# Patient Record
Sex: MALE | Race: BLACK OR AFRICAN AMERICAN | Employment: OTHER | URBAN - METROPOLITAN AREA
[De-identification: names, ages, dates, MRNs, and addresses within clinical notes are randomized per-mention and may not be internally consistent; named-entity substitution may affect disease eponyms.]

---

## 2018-09-18 ENCOUNTER — OP HISTORICAL/CONVERTED ENCOUNTER (OUTPATIENT)
Dept: OTHER | Age: 63
End: 2018-09-18

## 2019-01-30 ENCOUNTER — OP HISTORICAL/CONVERTED ENCOUNTER (OUTPATIENT)
Dept: OTHER | Age: 64
End: 2019-01-30

## 2019-11-06 ENCOUNTER — OP HISTORICAL/CONVERTED ENCOUNTER (OUTPATIENT)
Dept: OTHER | Age: 64
End: 2019-11-06

## 2019-11-08 ENCOUNTER — OP HISTORICAL/CONVERTED ENCOUNTER (OUTPATIENT)
Dept: OTHER | Age: 64
End: 2019-11-08

## 2021-01-05 ENCOUNTER — APPOINTMENT (OUTPATIENT)
Dept: CT IMAGING | Age: 66
DRG: 177 | End: 2021-01-05
Attending: EMERGENCY MEDICINE
Payer: MEDICARE

## 2021-01-05 ENCOUNTER — APPOINTMENT (OUTPATIENT)
Dept: GENERAL RADIOLOGY | Age: 66
DRG: 177 | End: 2021-01-05
Attending: EMERGENCY MEDICINE
Payer: MEDICARE

## 2021-01-05 ENCOUNTER — HOSPITAL ENCOUNTER (INPATIENT)
Age: 66
LOS: 8 days | Discharge: HOME | DRG: 177 | End: 2021-01-13
Attending: EMERGENCY MEDICINE | Admitting: INTERNAL MEDICINE
Payer: MEDICARE

## 2021-01-05 DIAGNOSIS — J96.01 ACUTE RESPIRATORY FAILURE WITH HYPOXIA (HCC): ICD-10-CM

## 2021-01-05 DIAGNOSIS — D64.9 ANEMIA, UNSPECIFIED TYPE: ICD-10-CM

## 2021-01-05 DIAGNOSIS — N17.9 AKI (ACUTE KIDNEY INJURY) (HCC): Primary | ICD-10-CM

## 2021-01-05 DIAGNOSIS — U07.1 COVID-19: ICD-10-CM

## 2021-01-05 DIAGNOSIS — E87.20 METABOLIC ACIDOSIS: ICD-10-CM

## 2021-01-05 PROBLEM — K92.2 GI BLEED: Status: ACTIVE | Noted: 2021-01-05

## 2021-01-05 LAB
ALBUMIN SERPL-MCNC: 2.6 G/DL (ref 3.5–5)
ALBUMIN/GLOB SERPL: 0.7 {RATIO} (ref 1.1–2.2)
ALP SERPL-CCNC: 61 U/L (ref 45–117)
ALT SERPL-CCNC: 22 U/L (ref 12–78)
ANION GAP SERPL CALC-SCNC: 9 MMOL/L (ref 5–15)
AST SERPL W P-5'-P-CCNC: 69 U/L (ref 15–37)
BASOPHILS # BLD: 0 K/UL (ref 0–0.1)
BASOPHILS NFR BLD: 0 % (ref 0–1)
BILIRUB SERPL-MCNC: 0.3 MG/DL (ref 0.2–1)
BUN SERPL-MCNC: 72 MG/DL (ref 6–20)
BUN/CREAT SERPL: 12 (ref 12–20)
CA-I BLD-MCNC: 7.9 MG/DL (ref 8.5–10.1)
CHLORIDE SERPL-SCNC: 119 MMOL/L (ref 97–108)
CO2 SERPL-SCNC: 15 MMOL/L (ref 21–32)
COVID-19 RAPID TEST, COVR: POSITIVE
CREAT SERPL-MCNC: 5.9 MG/DL (ref 0.7–1.3)
DIFFERENTIAL METHOD BLD: ABNORMAL
EOSINOPHIL # BLD: 0 K/UL (ref 0–0.4)
EOSINOPHIL NFR BLD: 0 % (ref 0–7)
ERYTHROCYTE [DISTWIDTH] IN BLOOD BY AUTOMATED COUNT: 16.5 % (ref 11.5–14.5)
GLOBULIN SER CALC-MCNC: 3.5 G/DL (ref 2–4)
GLUCOSE SERPL-MCNC: 105 MG/DL (ref 65–100)
HCT VFR BLD AUTO: 12 % (ref 36.6–50.3)
HEMOCCULT SP1 STL QL: POSITIVE
HGB BLD-MCNC: 3.5 G/DL (ref 12.1–17)
IMM GRANULOCYTES # BLD AUTO: 0 K/UL
IMM GRANULOCYTES NFR BLD AUTO: 0 %
LACTATE SERPL-SCNC: 1.3 MMOL/L (ref 0.4–2)
LYMPHOCYTES # BLD: 0.2 K/UL (ref 0.8–3.5)
LYMPHOCYTES NFR BLD: 4 % (ref 12–49)
MCH RBC QN AUTO: 23.8 PG (ref 26–34)
MCHC RBC AUTO-ENTMCNC: 29.2 G/DL (ref 30–36.5)
MCV RBC AUTO: 81.6 FL (ref 80–99)
MONOCYTES # BLD: 0.3 K/UL (ref 0–1)
MONOCYTES NFR BLD: 6 % (ref 5–13)
NEUTS BAND NFR BLD MANUAL: 2 % (ref 0–6)
NEUTS SEG # BLD: 5.1 K/UL (ref 1.8–8)
NEUTS SEG NFR BLD: 88 % (ref 32–75)
PLATELET # BLD AUTO: 243 K/UL (ref 150–400)
PMV BLD AUTO: 9.2 FL (ref 8.9–12.9)
POTASSIUM SERPL-SCNC: 6.2 MMOL/L (ref 3.5–5.1)
PROCALCITONIN SERPL-MCNC: 6.63 NG/ML
PROT SERPL-MCNC: 6.1 G/DL (ref 6.4–8.2)
RBC # BLD AUTO: 1.47 M/UL (ref 4.1–5.7)
RBC MORPH BLD: ABNORMAL
SARS-COV-2, COV2: NORMAL
SODIUM SERPL-SCNC: 143 MMOL/L (ref 136–145)
SPECIMEN SOURCE: ABNORMAL
WBC # BLD AUTO: 5.6 K/UL (ref 4.1–11.1)

## 2021-01-05 PROCEDURE — 30233N1 TRANSFUSION OF NONAUTOLOGOUS RED BLOOD CELLS INTO PERIPHERAL VEIN, PERCUTANEOUS APPROACH: ICD-10-PCS | Performed by: INTERNAL MEDICINE

## 2021-01-05 PROCEDURE — 80053 COMPREHEN METABOLIC PANEL: CPT

## 2021-01-05 PROCEDURE — 74011250636 HC RX REV CODE- 250/636: Performed by: EMERGENCY MEDICINE

## 2021-01-05 PROCEDURE — 83540 ASSAY OF IRON: CPT

## 2021-01-05 PROCEDURE — 36415 COLL VENOUS BLD VENIPUNCTURE: CPT

## 2021-01-05 PROCEDURE — 82272 OCCULT BLD FECES 1-3 TESTS: CPT

## 2021-01-05 PROCEDURE — 65610000006 HC RM INTENSIVE CARE

## 2021-01-05 PROCEDURE — P9016 RBC LEUKOCYTES REDUCED: HCPCS

## 2021-01-05 PROCEDURE — 96374 THER/PROPH/DIAG INJ IV PUSH: CPT

## 2021-01-05 PROCEDURE — 85025 COMPLETE CBC W/AUTO DIFF WBC: CPT

## 2021-01-05 PROCEDURE — 86901 BLOOD TYPING SEROLOGIC RH(D): CPT

## 2021-01-05 PROCEDURE — 83735 ASSAY OF MAGNESIUM: CPT

## 2021-01-05 PROCEDURE — 71045 X-RAY EXAM CHEST 1 VIEW: CPT

## 2021-01-05 PROCEDURE — 71250 CT THORAX DX C-: CPT

## 2021-01-05 PROCEDURE — 84145 PROCALCITONIN (PCT): CPT

## 2021-01-05 PROCEDURE — 83605 ASSAY OF LACTIC ACID: CPT

## 2021-01-05 PROCEDURE — 36430 TRANSFUSION BLD/BLD COMPNT: CPT

## 2021-01-05 PROCEDURE — 86923 COMPATIBILITY TEST ELECTRIC: CPT

## 2021-01-05 PROCEDURE — 82607 VITAMIN B-12: CPT

## 2021-01-05 PROCEDURE — 83880 ASSAY OF NATRIURETIC PEPTIDE: CPT

## 2021-01-05 PROCEDURE — 99285 EMERGENCY DEPT VISIT HI MDM: CPT

## 2021-01-05 PROCEDURE — 87040 BLOOD CULTURE FOR BACTERIA: CPT

## 2021-01-05 PROCEDURE — 87635 SARS-COV-2 COVID-19 AMP PRB: CPT

## 2021-01-05 RX ORDER — SODIUM CHLORIDE, SODIUM LACTATE, POTASSIUM CHLORIDE, CALCIUM CHLORIDE 600; 310; 30; 20 MG/100ML; MG/100ML; MG/100ML; MG/100ML
1000 INJECTION, SOLUTION INTRAVENOUS CONTINUOUS
Status: DISCONTINUED | OUTPATIENT
Start: 2021-01-05 | End: 2021-01-05

## 2021-01-05 RX ORDER — DEXAMETHASONE SODIUM PHOSPHATE 100 MG/10ML
10 INJECTION INTRAMUSCULAR; INTRAVENOUS
Status: COMPLETED | OUTPATIENT
Start: 2021-01-05 | End: 2021-01-05

## 2021-01-05 RX ORDER — ACETAMINOPHEN 325 MG/1
650 TABLET ORAL
Status: DISCONTINUED | OUTPATIENT
Start: 2021-01-05 | End: 2021-01-13 | Stop reason: HOSPADM

## 2021-01-05 RX ORDER — SODIUM CHLORIDE, SODIUM LACTATE, POTASSIUM CHLORIDE, CALCIUM CHLORIDE 600; 310; 30; 20 MG/100ML; MG/100ML; MG/100ML; MG/100ML
1000 INJECTION, SOLUTION INTRAVENOUS CONTINUOUS
Status: DISCONTINUED | OUTPATIENT
Start: 2021-01-05 | End: 2021-01-07

## 2021-01-05 RX ORDER — SODIUM CHLORIDE 9 MG/ML
250 INJECTION, SOLUTION INTRAVENOUS AS NEEDED
Status: DISCONTINUED | OUTPATIENT
Start: 2021-01-05 | End: 2021-01-13 | Stop reason: HOSPADM

## 2021-01-05 RX ADMIN — DEXAMETHASONE SODIUM PHOSPHATE 10 MG: 10 INJECTION INTRAMUSCULAR; INTRAVENOUS at 19:13

## 2021-01-05 RX ADMIN — SODIUM CHLORIDE, POTASSIUM CHLORIDE, SODIUM LACTATE AND CALCIUM CHLORIDE 1000 ML: 600; 310; 30; 20 INJECTION, SOLUTION INTRAVENOUS at 21:50

## 2021-01-05 NOTE — ED TRIAGE NOTES
Patient lives with sister who is covid positive , now having dizziness, nausea weakness and tachypnea .

## 2021-01-06 LAB
ALBUMIN SERPL-MCNC: 2.2 G/DL (ref 3.5–5)
ALBUMIN/GLOB SERPL: 0.7 {RATIO} (ref 1.1–2.2)
ALP SERPL-CCNC: 49 U/L (ref 45–117)
ALT SERPL-CCNC: 21 U/L (ref 12–78)
ANION GAP SERPL CALC-SCNC: 8 MMOL/L (ref 5–15)
AST SERPL W P-5'-P-CCNC: 61 U/L (ref 15–37)
BASOPHILS # BLD: 0 K/UL (ref 0–0.1)
BASOPHILS NFR BLD: 0 % (ref 0–1)
BILIRUB SERPL-MCNC: 0.3 MG/DL (ref 0.2–1)
BNP SERPL-MCNC: 5772 PG/ML
BUN SERPL-MCNC: 70 MG/DL (ref 6–20)
BUN/CREAT SERPL: 13 (ref 12–20)
CA-I BLD-MCNC: 7.6 MG/DL (ref 8.5–10.1)
CHLORIDE SERPL-SCNC: 120 MMOL/L (ref 97–108)
CO2 SERPL-SCNC: 17 MMOL/L (ref 21–32)
CREAT SERPL-MCNC: 5.47 MG/DL (ref 0.7–1.3)
CRP SERPL-MCNC: 2.2 MG/DL (ref 0–0.6)
DIFFERENTIAL METHOD BLD: ABNORMAL
EOSINOPHIL # BLD: 0 K/UL (ref 0–0.4)
EOSINOPHIL NFR BLD: 0 % (ref 0–7)
ERYTHROCYTE [DISTWIDTH] IN BLOOD BY AUTOMATED COUNT: 15.7 % (ref 11.5–14.5)
GLOBULIN SER CALC-MCNC: 3.2 G/DL (ref 2–4)
GLUCOSE SERPL-MCNC: 139 MG/DL (ref 65–100)
HCT VFR BLD AUTO: 17.2 % (ref 36.6–50.3)
HGB BLD-MCNC: 5.4 G/DL (ref 12.1–17)
IMM GRANULOCYTES # BLD AUTO: 0 K/UL
IMM GRANULOCYTES NFR BLD AUTO: 0 %
LDH SERPL L TO P-CCNC: 391 U/L (ref 85–241)
LYMPHOCYTES # BLD: 0.1 K/UL (ref 0.8–3.5)
LYMPHOCYTES NFR BLD: 4 % (ref 12–49)
MAGNESIUM SERPL-MCNC: 2.2 MG/DL (ref 1.6–2.4)
MCH RBC QN AUTO: 26.5 PG (ref 26–34)
MCHC RBC AUTO-ENTMCNC: 31.4 G/DL (ref 30–36.5)
MCV RBC AUTO: 84.3 FL (ref 80–99)
MONOCYTES # BLD: 0.1 K/UL (ref 0–1)
MONOCYTES NFR BLD: 4 % (ref 5–13)
NEUTS BAND NFR BLD MANUAL: 2 % (ref 0–6)
NEUTS SEG # BLD: 3.5 K/UL (ref 1.8–8)
NEUTS SEG NFR BLD: 90 % (ref 32–75)
PLATELET # BLD AUTO: 201 K/UL (ref 150–400)
PMV BLD AUTO: 9.1 FL (ref 8.9–12.9)
POTASSIUM SERPL-SCNC: 5.8 MMOL/L (ref 3.5–5.1)
PROCALCITONIN SERPL-MCNC: 5.19 NG/ML
PROT SERPL-MCNC: 5.4 G/DL (ref 6.4–8.2)
RBC # BLD AUTO: 2.04 M/UL (ref 4.1–5.7)
RBC MORPH BLD: ABNORMAL
RBC MORPH BLD: ABNORMAL
SODIUM SERPL-SCNC: 145 MMOL/L (ref 136–145)
WBC # BLD AUTO: 3.7 K/UL (ref 4.1–11.1)

## 2021-01-06 PROCEDURE — 74011000250 HC RX REV CODE- 250: Performed by: EMERGENCY MEDICINE

## 2021-01-06 PROCEDURE — 36430 TRANSFUSION BLD/BLD COMPNT: CPT

## 2021-01-06 PROCEDURE — 84145 PROCALCITONIN (PCT): CPT

## 2021-01-06 PROCEDURE — P9016 RBC LEUKOCYTES REDUCED: HCPCS

## 2021-01-06 PROCEDURE — 82728 ASSAY OF FERRITIN: CPT

## 2021-01-06 PROCEDURE — 74011000250 HC RX REV CODE- 250: Performed by: INTERNAL MEDICINE

## 2021-01-06 PROCEDURE — C9113 INJ PANTOPRAZOLE SODIUM, VIA: HCPCS | Performed by: INTERNAL MEDICINE

## 2021-01-06 PROCEDURE — 80053 COMPREHEN METABOLIC PANEL: CPT

## 2021-01-06 PROCEDURE — XW033E5 INTRODUCTION OF REMDESIVIR ANTI-INFECTIVE INTO PERIPHERAL VEIN, PERCUTANEOUS APPROACH, NEW TECHNOLOGY GROUP 5: ICD-10-PCS | Performed by: INTERNAL MEDICINE

## 2021-01-06 PROCEDURE — 65610000006 HC RM INTENSIVE CARE

## 2021-01-06 PROCEDURE — XW13325 TRANSFUSION OF CONVALESCENT PLASMA (NONAUTOLOGOUS) INTO PERIPHERAL VEIN, PERCUTANEOUS APPROACH, NEW TECHNOLOGY GROUP 5: ICD-10-PCS | Performed by: INTERNAL MEDICINE

## 2021-01-06 PROCEDURE — 86140 C-REACTIVE PROTEIN: CPT

## 2021-01-06 PROCEDURE — 74011250636 HC RX REV CODE- 250/636: Performed by: EMERGENCY MEDICINE

## 2021-01-06 PROCEDURE — 83615 LACTATE (LD) (LDH) ENZYME: CPT

## 2021-01-06 PROCEDURE — C9113 INJ PANTOPRAZOLE SODIUM, VIA: HCPCS | Performed by: EMERGENCY MEDICINE

## 2021-01-06 PROCEDURE — 30233N1 TRANSFUSION OF NONAUTOLOGOUS RED BLOOD CELLS INTO PERIPHERAL VEIN, PERCUTANEOUS APPROACH: ICD-10-PCS | Performed by: INTERNAL MEDICINE

## 2021-01-06 PROCEDURE — 74011250636 HC RX REV CODE- 250/636: Performed by: INTERNAL MEDICINE

## 2021-01-06 PROCEDURE — 99223 1ST HOSP IP/OBS HIGH 75: CPT | Performed by: INTERNAL MEDICINE

## 2021-01-06 RX ORDER — SODIUM CHLORIDE 9 MG/ML
250 INJECTION, SOLUTION INTRAVENOUS AS NEEDED
Status: DISCONTINUED | OUTPATIENT
Start: 2021-01-06 | End: 2021-01-13 | Stop reason: HOSPADM

## 2021-01-06 RX ORDER — DEXAMETHASONE SODIUM PHOSPHATE 4 MG/ML
4 INJECTION, SOLUTION INTRA-ARTICULAR; INTRALESIONAL; INTRAMUSCULAR; INTRAVENOUS; SOFT TISSUE EVERY 12 HOURS
Status: DISCONTINUED | OUTPATIENT
Start: 2021-01-06 | End: 2021-01-07

## 2021-01-06 RX ORDER — FUROSEMIDE 10 MG/ML
20 INJECTION INTRAMUSCULAR; INTRAVENOUS AS NEEDED
Status: COMPLETED | OUTPATIENT
Start: 2021-01-06 | End: 2021-01-06

## 2021-01-06 RX ADMIN — PANTOPRAZOLE SODIUM 40 MG: 40 INJECTION, POWDER, FOR SOLUTION INTRAVENOUS at 10:04

## 2021-01-06 RX ADMIN — FUROSEMIDE 20 MG: 10 INJECTION, SOLUTION INTRAMUSCULAR; INTRAVENOUS at 22:26

## 2021-01-06 RX ADMIN — AZITHROMYCIN DIHYDRATE 500 MG: 500 INJECTION, POWDER, LYOPHILIZED, FOR SOLUTION INTRAVENOUS at 01:37

## 2021-01-06 RX ADMIN — DEXAMETHASONE SODIUM PHOSPHATE 4 MG: 4 INJECTION, SOLUTION INTRAMUSCULAR; INTRAVENOUS at 10:04

## 2021-01-06 RX ADMIN — SODIUM BICARBONATE: 84 INJECTION, SOLUTION INTRAVENOUS at 14:11

## 2021-01-06 RX ADMIN — PANTOPRAZOLE SODIUM 40 MG: 40 INJECTION, POWDER, FOR SOLUTION INTRAVENOUS at 00:32

## 2021-01-06 RX ADMIN — FUROSEMIDE 20 MG: 10 INJECTION, SOLUTION INTRAMUSCULAR; INTRAVENOUS at 15:11

## 2021-01-06 RX ADMIN — DEXAMETHASONE SODIUM PHOSPHATE 4 MG: 4 INJECTION, SOLUTION INTRAMUSCULAR; INTRAVENOUS at 22:26

## 2021-01-06 RX ADMIN — FUROSEMIDE 20 MG: 10 INJECTION, SOLUTION INTRAMUSCULAR; INTRAVENOUS at 18:17

## 2021-01-06 RX ADMIN — DEXAMETHASONE SODIUM PHOSPHATE 4 MG: 4 INJECTION, SOLUTION INTRAMUSCULAR; INTRAVENOUS at 01:37

## 2021-01-06 RX ADMIN — SODIUM BICARBONATE: 84 INJECTION, SOLUTION INTRAVENOUS at 00:30

## 2021-01-06 RX ADMIN — CEFTRIAXONE SODIUM 1 G: 1 INJECTION, POWDER, FOR SOLUTION INTRAMUSCULAR; INTRAVENOUS at 01:37

## 2021-01-06 NOTE — H&P
History and Physical    NAME: Lanie Lundborg   :  1955   MRN:  [id number]     Date/Time:  2021 12:16 AM    Patient PCP: Praveen Ferrera MD  ______________________________________________________________________  Given the patient's current clinical presentation, I have a high level of concern for decompensation if discharged from the emergency department. Complex decision making was performed, which includes reviewing the patient's available past medical records, laboratory results, and x-ray films. My assessment of this patient's clinical condition and my plan of care is as follows. Assessment / Plan:  Severe anemia  COVID-19 needs oxygen  Acute on chronic renal failure  Hypertension        Code Status: Full code DVT Prophylaxis: Patient may have GI bleed so cannot get any anticoagulation plus he has edema on the legs or not a candidate for sequential pressure device  GI Prophylaxis: On IV Protonix  Baseline: Guarded        Problem List:     Active Hospital Problems    Diagnosis Date Noted    GI bleed 2021   Severe anemia  Acute on chronic renal failure  Hypertension  COVID-19 infection with hypoxemia on exertion    Subjective:   CHIEF COMPLAINT: Not feeling well since last 1 week history of shortness of breath about 1 week on exertion history of dizziness on change of position  HISTORY OF PRESENT ILLNESS:     Janelle Garrett is a 76 y.o.  male who presents with history of shortness of breath on exertion as well as dizziness on change of position. Denies any history of chest pain no history of loss of consciousnes diarrhea positive history of burning pain in abdomen as well as he has history of s no history of seizures no history of headache. No history of change in vision.   No history of nausea vomiting history of burning pain in epigastric region as well as history of black stool off and on  We were asked to admit for work up and evaluation of the above problems. Past Medical History:   Diagnosis Date    Chronic kidney disease     Hypertension         History reviewed. No pertinent surgical history. Social History     Tobacco Use    Smoking status: Never Smoker    Smokeless tobacco: Never Used   Substance Use Topics    Alcohol use: Not on file        History reviewed. No pertinent family history. No Known Allergies     Prior to Admission medications    Not on File       REVIEW OF SYSTEMS:     I am not able to complete the review of systems because:    The patient is intubated and sedated    The patient has altered mental status due to his acute medical problems    The patient has baseline aphasia from prior stroke(s)    The patient has baseline dementia and is not reliable historian    The patient is in acute medical distress and unable to provide information           Total of 12 systems reviewed as follows:       POSITIVE= underlined text  Negative = text not underlined  General:  fever, chills, sweats, generalized weakness, weight loss/gain,      loss of appetite   Eyes:    blurred vision, eye pain, loss of vision, double vision  ENT:    rhinorrhea, pharyngitis   Respiratory:   cough, sputum production, SOB, CARMONA, wheezing, pleuritic pain positive for shortness of breath  Cardiology:   chest pain, palpitations, orthopnea, PND, edema, syncope   Gastrointestinal:  abdominal pain , N/V, diarrhea, dysphagia, constipation, bleeding   Genitourinary:  frequency, urgency, dysuria, hematuria, incontinence   Muskuloskeletal :  arthralgia, myalgia, back pain  Hematology:  easy bruising, nose or gum bleeding, lymphadenopathy   Dermatological: rash, ulceration, pruritis, color change / jaundice  Endocrine:   hot flashes or polydipsia   Neurological:  headache, dizziness, confusion, focal weakness, paresthesia,     Speech difficulties, memory loss, gait difficulty positive for dizziness  Psychological: Feelings of anxiety, depression, agitation  IMMUNOLOGY: Records are not available  Objective:   VITALS:    Visit Vitals  /79 (BP 1 Location: Right arm, BP Patient Position: Head of bed elevated (Comment degrees))   Pulse 77   Temp 98.3 °F (36.8 °C)   Resp 21   Ht 6' (1.829 m)   Wt 79.4 kg (175 lb)   SpO2 100%   BMI 23.73 kg/m²       PHYSICAL EXAM:    General:    Alert, cooperative, no distress, appears stated age. HEENT: Atraumatic, anicteric sclerae, pink conjunctivae     No oral ulcers, mucosa moist, throat clear, dentition fair  Neck:  Supple, symmetrical,  thyroid: non tender  Lungs:   Clear to auscultation bilaterally. No Wheezing or Rhonchi. No rales. Chest wall:  No tenderness  No Accessory muscle use. Heart:   Regular  rhythm,  No  murmur   No edema  Abdomen:   Soft, non-tender. Not distended. Bowel sounds normal  Extremities: No cyanosis. No clubbing,      Skin turgor normal, Capillary refill normal, Radial dial pulse 2+ 2+ edema  Skin:     Not pale. Not Jaundiced  No rashes   Psych:  Good insight. Not depressed. Not anxious or agitated. Neurologic: EOMs intact. No facial asymmetry. No aphasia or slurred speech. Symmetrical strength, Sensation grossly intact.  Alert and oriented X 4.     _______________________________________________________________________  Care Plan discussed with:    Comments   Patient y    Family      RN y    Care Manager                    Consultant:      _______________________________________________________________________  Expected  Disposition:   Home with Family y   HH/PT/OT/RN    SNF/LTC    [place]    ________________________________________________________________________  TOTAL TIME: 47 Minutes    Critical Care Provided     Minutes non procedure based      Comments     Reviewed previous records   >50% of visit spent in counseling and coordination of care  Discussion with patient and/or family and questions answered       ________________________________________________________________________  Signed: Elbridge Shaker Bekah Kumar MD    Procedures: see electronic medical records for all procedures/Xrays and details which were not copied into this note but were reviewed prior to creation of Plan. LAB DATA REVIEWED:    Recent Results (from the past 24 hour(s))   METABOLIC PANEL, COMPREHENSIVE    Collection Time: 01/05/21  6:30 PM   Result Value Ref Range    Sodium 143 136 - 145 mmol/L    Potassium 6.2 (H) 3.5 - 5.1 mmol/L    Chloride 119 (H) 97 - 108 mmol/L    CO2 15 (LL) 21 - 32 mmol/L    Anion gap 9 5 - 15 mmol/L    Glucose 105 (H) 65 - 100 mg/dL    BUN 72 (H) 6 - 20 mg/dL    Creatinine 5.90 (H) 0.70 - 1.30 mg/dL    BUN/Creatinine ratio 12 12 - 20      GFR est AA 12 (L) >60 ml/min/1.73m2    GFR est non-AA 10 (L) >60 ml/min/1.73m2    Calcium 7.9 (L) 8.5 - 10.1 mg/dL    Bilirubin, total 0.3 0.2 - 1.0 mg/dL    AST (SGOT) 69 (H) 15 - 37 U/L    ALT (SGPT) 22 12 - 78 U/L    Alk. phosphatase 61 45 - 117 U/L    Protein, total 6.1 (L) 6.4 - 8.2 g/dL    Albumin 2.6 (L) 3.5 - 5.0 g/dL    Globulin 3.5 2.0 - 4.0 g/dL    A-G Ratio 0.7 (L) 1.1 - 2.2     LACTIC ACID    Collection Time: 01/05/21  6:30 PM   Result Value Ref Range    Lactic acid 1.3 0.4 - 2.0 mmol/L   PROCALCITONIN    Collection Time: 01/05/21  6:30 PM   Result Value Ref Range    Procalcitonin 6.63 (H) 0 ng/mL   CBC WITH AUTOMATED DIFF    Collection Time: 01/05/21  7:56 PM   Result Value Ref Range    WBC 5.6 4.1 - 11.1 K/uL    RBC 1.47 (L) 4.10 - 5.70 M/uL    HGB 3.5 (LL) 12.1 - 17.0 g/dL    HCT 12.0 (LL) 36.6 - 50.3 %    MCV 81.6 80.0 - 99.0 FL    MCH 23.8 (L) 26.0 - 34.0 PG    MCHC 29.2 (L) 30.0 - 36.5 g/dL    RDW 16.5 (H) 11.5 - 14.5 %    PLATELET 975 712 - 171 K/uL    MPV 9.2 8.9 - 12.9 FL    NEUTROPHILS 88 (H) 32 - 75 %    BAND NEUTROPHILS 2 0 - 6 %    LYMPHOCYTES 4 (L) 12 - 49 %    MONOCYTES 6 5 - 13 %    EOSINOPHILS 0 0 - 7 %    BASOPHILS 0 0 - 1 %    IMMATURE GRANULOCYTES 0 %    ABS. NEUTROPHILS 5.1 1.8 - 8.0 K/UL    ABS. LYMPHOCYTES 0.2 (L) 0.8 - 3.5 K/UL    ABS.  MONOCYTES 0.3 0.0 - 1.0 K/UL    ABS. EOSINOPHILS 0.0 0.0 - 0.4 K/UL    ABS. BASOPHILS 0.0 0.0 - 0.1 K/UL    ABS. IMM. GRANS. 0.0 K/UL    DF Manual      RBC COMMENTS Hypochromia  3+        RBC COMMENTS Microcytosis  2+        RBC COMMENTS Ovalocytes  2+       TYPE & SCREEN    Collection Time: 01/05/21  7:56 PM   Result Value Ref Range    Crossmatch Expiration 01/08/2021,2359     ABO/Rh(D) O Positive     Antibody screen Negative     Unit number [id number]     Blood component type [facility]     Unit division 00     Status of unit [address] to transfuse     Crossmatch result Compatible     Unit number [id number]     Blood component type  LR     Unit division 00     Status of unit [place] to transfuse     Crossmatch result Compatible    SARS-COV-2    Collection Time: 01/05/21  9:07 PM   Result Value Ref Range    SARS-CoV-2 Please find results under separate order     COVID-19 RAPID TEST    Collection Time: 01/05/21  9:07 PM   Result Value Ref Range    Specimen source Nasopharyngeal      COVID-19 rapid test Positive (A) Not Detected     OCCULT BLOOD, STOOL    Collection Time: 01/05/21  9:54 PM   Result Value Ref Range    Occult Blood,day 1 Positive (A) Negative         CT CHEST WO CONT   Final Result   IMPRESSION:   1. No pneumomediastinum or pneumothorax. 2. Cardiomegaly. 3. Subtle groundglass densities in both lungs might be pneumonia or other. XR CHEST PORT   Final Result   IMPRESSION: Subtle but potentially clinically significant findings as above. Suggest radiographic follow-up.        History and Physical

## 2021-01-06 NOTE — CONSULTS
Consult Date: 1/6/2021    Consults :  Covid-19    Subjective      This is a 76year old male with CKD and exposure to Covid-19, presenting with malaise, dizziness, weakness and nausea along with SOB. He was afebrile with initially normal WBC and lactic acid. Procalcitonin was markedly elevated. He tested positive for Covid-19. CXR showed patchy densities in the lower lungs and CT Chest showed subtle groundglass densities in both lungs, both images suggestive of pneumonia (reviewed by me). Patient has been started on Azithromycin, Ceftriaxone and Dexamethasone. ID has been consulted for this reason. Patient seen in the ED. He states that he was most concerned about progressive generalized weakness and malaise. SOB reportedly intermittent. No fever or chills. States that his legs were swollen two days ago but that has resolved while here. Past Medical History:   Diagnosis Date    Chronic kidney disease     Hypertension       History reviewed. No pertinent surgical history. History reviewed. No pertinent family history.    Social History     Tobacco Use    Smoking status: Never Smoker    Smokeless tobacco: Never Used   Substance Use Topics    Alcohol use: Not on file       Current Facility-Administered Medications   Medication Dose Route Frequency Provider Last Rate Last Admin    0.9% sodium chloride infusion 250 mL  250 mL IntraVENous PRN Sunshine Kearney MD        dexamethasone (DECADRON) 4 mg/mL injection 4 mg  4 mg IntraVENous Q12H Sunshine Kearney MD   4 mg at 01/06/21 1004    azithromycin (ZITHROMAX) 500 mg in 0.9% sodium chloride 250 mL (VIAL-MATE)  500 mg IntraVENous Q24H Sunshine Kearney MD   500 mg at 01/06/21 0137    cefTRIAXone (ROCEPHIN) 1 g in sterile water (preservative free) 10 mL IV syringe  1 g IntraVENous Q24H Sunshine Kearney MD   1 g at 01/06/21 0137    pantoprazole (PROTONIX) 40 mg in 0.9% sodium chloride 10 mL injection  40 mg IntraVENous DAILY Sunshine Kearney MD   40 mg at Maria Del Rosario Suárez is a 43 y.o. male.     43-year-old male presents today to follow-up on Nausea and vomiting.  At last visit patient was due for sleep study.  This was performed and patient was found to have obstructive sleep apnea.  Orders were placed to start CPAP machine.  At last visit also discussed that he is continuing workup with gastroenterology.  Next step was a gastric emptying study.  Of note cardiology workup has been negative.  Gastric emptying sending has not been done yet.  Patient said this was attempted however he had a bad reaction to the eggs he had to eat and was advised to reschedule however for study to be done with beef stew instead.  Patient has not heard back about rescheduling it.    He has not gotten his auto CPAP at either.  Continues to have nausea and vomiting.  GI workup is not complete.         PHQ-2/PHQ-9 Depression Screening 10/31/2017   Little interest or pleasure in doing things 0   Feeling down, depressed, or hopeless 0   Total Score 0       The following portions of the patient's history were reviewed and updated as appropriate: allergies, current medications, past family history, past medical history, past social history, past surgical history and problem list.    Review of Systems   Constitutional: Positive for fatigue. Negative for chills and fever.   HENT: Negative for congestion.    Respiratory: Positive for apnea. Negative for shortness of breath.    Gastrointestinal: Positive for nausea and vomiting.   Genitourinary: Negative for nocturia.   Psychiatric/Behavioral: Positive for sleep disturbance. Negative for depressed mood and stress. The patient is not nervous/anxious.        Objective   Physical Exam   Constitutional: He is oriented to person, place, and time. He appears well-developed and well-nourished.   HENT:   Head: Normocephalic and atraumatic.   Eyes: EOM are normal. Pupils are equal, round, and reactive to light.   Neck: Normal range of motion. Neck  01/06/21 1004    furosemide (LASIX) injection 20 mg  20 mg IntraVENous PRN Neymar Gallo MD        lactated Ringers infusion 1,000 mL  1,000 mL IntraVENous CONTINUOUS Isis Nicole MD   Stopped at 01/06/21 1229    0.9% sodium chloride infusion 250 mL  250 mL IntraVENous PRN Isis Nicole MD        dextrose 5% 1,000 mL with sodium bicarbonate (8.4%) 150 mEq infusion   IntraVENous CONTINUOUS Isis Nicole  mL/hr at 01/06/21 1411 New Bag at 01/06/21 1411    acetaminophen (TYLENOL) tablet 650 mg  650 mg Oral Q6H PRN Neymar Gallo MD            Review of Systems   Constitutional: Positive for activity change and fatigue. Negative for appetite change, chills, diaphoresis, fever and unexpected weight change. HENT: Negative. Eyes: Negative. Respiratory: Positive for shortness of breath. Negative for cough and wheezing. Cardiovascular: Positive for leg swelling ( ). Negative for chest pain. Gastrointestinal: Negative. Endocrine: Negative. Genitourinary: Negative. Musculoskeletal: Negative. Skin: Negative. Allergic/Immunologic: Negative. Neurological: Negative. Hematological: Negative. Psychiatric/Behavioral: Negative.         Objective     Vital signs for last 24 hours:  Visit Vitals  /75   Pulse (!) 58   Temp 98.1 °F (36.7 °C)   Resp 18   Ht 6' (1.829 m)   Wt 175 lb (79.4 kg)   SpO2 97%   BMI 23.73 kg/m²       Intake/Output this shift:  Current Shift: 01/06 0701 - 01/06 1900  In: 1000 [I.V.:1000]  Out: -   Last 3 Shifts: 01/04 1901 - 01/06 0700  In: 2000   Out: -     Data Review:   Recent Results (from the past 24 hour(s))   METABOLIC PANEL, COMPREHENSIVE    Collection Time: 01/05/21  6:30 PM   Result Value Ref Range    Sodium 143 136 - 145 mmol/L    Potassium 6.2 (H) 3.5 - 5.1 mmol/L    Chloride 119 (H) 97 - 108 mmol/L    CO2 15 (LL) 21 - 32 mmol/L    Anion gap 9 5 - 15 mmol/L    Glucose 105 (H) 65 - 100 mg/dL    BUN 72 (H) 6 - 20 mg/dL    Creatinine supple.   Cardiovascular: Normal rate, regular rhythm and normal heart sounds.   No murmur heard.  Pulmonary/Chest: Effort normal and breath sounds normal. No stridor. No respiratory distress. He has no wheezes. He has no rales.   Neurological: He is alert and oriented to person, place, and time.   Skin: Skin is warm.   Psychiatric: He has a normal mood and affect. His behavior is normal.         No results found for: COLORU, CLARITYU, SPECGRAV, PHUR, LEUKOCYTESUR, NITRITE, PROTEINPOCUA, GLUCOSEUR, KETONESU, UROBILINOGEN, BILIRUBINUR, RBCUR      Assessment/Plan     Jamie was seen today for nausea and vomiting intractability of vomiting not specified.    Diagnoses and all orders for this visit:    Nausea and vomiting, intractability of vomiting not specified, unspecified vomiting type    Restless legs  -     Ferritin    SANA (obstructive sleep apnea)        Extend FMLA 6 weeks.  Check ferritin as patient was told he was moving his legs around a lot at night at the night of sleep study.  Advised patient that restless legs can go away when sleep apnea is treated.  Follow-up in getting gastric emptying study done.  Return to clinic in 6 weeks for follow-up.        5.90 (H) 0.70 - 1.30 mg/dL    BUN/Creatinine ratio 12 12 - 20      GFR est AA 12 (L) >60 ml/min/1.73m2    GFR est non-AA 10 (L) >60 ml/min/1.73m2    Calcium 7.9 (L) 8.5 - 10.1 mg/dL    Bilirubin, total 0.3 0.2 - 1.0 mg/dL    AST (SGOT) 69 (H) 15 - 37 U/L    ALT (SGPT) 22 12 - 78 U/L    Alk. phosphatase 61 45 - 117 U/L    Protein, total 6.1 (L) 6.4 - 8.2 g/dL    Albumin 2.6 (L) 3.5 - 5.0 g/dL    Globulin 3.5 2.0 - 4.0 g/dL    A-G Ratio 0.7 (L) 1.1 - 2.2     LACTIC ACID    Collection Time: 01/05/21  6:30 PM   Result Value Ref Range    Lactic acid 1.3 0.4 - 2.0 mmol/L   PROCALCITONIN    Collection Time: 01/05/21  6:30 PM   Result Value Ref Range    Procalcitonin 6.63 (H) 0 ng/mL   BNP    Collection Time: 01/05/21  6:30 PM   Result Value Ref Range    NT pro-BNP 5,772 (H) <125 pg/mL   MAGNESIUM    Collection Time: 01/05/21  6:30 PM   Result Value Ref Range    Magnesium 2.2 1.6 - 2.4 mg/dL   CBC WITH AUTOMATED DIFF    Collection Time: 01/05/21  7:56 PM   Result Value Ref Range    WBC 5.6 4.1 - 11.1 K/uL    RBC 1.47 (L) 4.10 - 5.70 M/uL    HGB 3.5 (LL) 12.1 - 17.0 g/dL    HCT 12.0 (LL) 36.6 - 50.3 %    MCV 81.6 80.0 - 99.0 FL    MCH 23.8 (L) 26.0 - 34.0 PG    MCHC 29.2 (L) 30.0 - 36.5 g/dL    RDW 16.5 (H) 11.5 - 14.5 %    PLATELET 924 410 - 086 K/uL    MPV 9.2 8.9 - 12.9 FL    NEUTROPHILS 88 (H) 32 - 75 %    BAND NEUTROPHILS 2 0 - 6 %    LYMPHOCYTES 4 (L) 12 - 49 %    MONOCYTES 6 5 - 13 %    EOSINOPHILS 0 0 - 7 %    BASOPHILS 0 0 - 1 %    IMMATURE GRANULOCYTES 0 %    ABS. NEUTROPHILS 5.1 1.8 - 8.0 K/UL    ABS. LYMPHOCYTES 0.2 (L) 0.8 - 3.5 K/UL    ABS. MONOCYTES 0.3 0.0 - 1.0 K/UL    ABS. EOSINOPHILS 0.0 0.0 - 0.4 K/UL    ABS. BASOPHILS 0.0 0.0 - 0.1 K/UL    ABS. IMM.  GRANS. 0.0 K/UL    DF Manual      RBC COMMENTS Hypochromia  3+        RBC COMMENTS Microcytosis  2+        RBC COMMENTS Ovalocytes  2+       TYPE & SCREEN    Collection Time: 01/05/21  7:56 PM   Result Value Ref Range    Crossmatch Expiration 01/08/2021,1326 ABO/Rh(D) O Positive     Antibody screen Negative     Unit number [id number]     Blood component type  LR     Unit division 00     Status of unit Issued,final     TRANSFUSION STATUS Ok to transfuse     Crossmatch result Compatible     Unit number [id number]     Blood component type RC LR     Unit division 00     Status of unit [address] to transfuse     Crossmatch result Compatible     Unit number [id number]     Blood component type RC LR     Unit division 00     Status of unit [address] to transfuse     Crossmatch result Compatible     Unit number [id number]     Blood component type RC LR     Unit division 00     Status of unit [address] to transfuse     Crossmatch result Compatible     Unit number [id number]     Blood component type  LR     Unit division 00     Status of unit [address] to transfuse     Crossmatch result Compatible    SARS-COV-2    Collection Time: 01/05/21  9:07 PM   Result Value Ref Range    SARS-CoV-2 Please find results under separate order     COVID-19 RAPID TEST    Collection Time: 01/05/21  9:07 PM   Result Value Ref Range    Specimen source Nasopharyngeal      COVID-19 rapid test Positive (A) Not Detected     OCCULT BLOOD, STOOL    Collection Time: 01/05/21  9:54 PM   Result Value Ref Range    Occult Blood,day 1 Positive (A) Negative     CBC WITH AUTOMATED DIFF    Collection Time: 01/06/21  9:57 AM   Result Value Ref Range    WBC 3.7 (L) 4.1 - 11.1 K/uL    RBC 2.04 (L) 4.10 - 5.70 M/uL    HGB 5.4 (LL) 12.1 - 17.0 g/dL    HCT 17.2 (LL) 36.6 - 50.3 %    MCV 84.3 80.0 - 99.0 FL    MCH 26.5 26.0 - 34.0 PG    MCHC 31.4 30.0 - 36.5 g/dL    RDW 15.7 (H) 11.5 - 14.5 %    PLATELET 890 279 - 715 K/uL    MPV 9.1 8.9 - 12.9 FL    NEUTROPHILS PENDING %    LYMPHOCYTES PENDING %    MONOCYTES PENDING %    EOSINOPHILS PENDING %    BASOPHILS PENDING %    IMMATURE GRANULOCYTES PENDING % ABS. NEUTROPHILS PENDING K/UL    ABS. LYMPHOCYTES PENDING K/UL    ABS. MONOCYTES PENDING K/UL    ABS. EOSINOPHILS PENDING K/UL    ABS. BASOPHILS PENDING K/UL    ABS. IMM. GRANS. PENDING K/UL    DF PENDING    METABOLIC PANEL, COMPREHENSIVE    Collection Time: 01/06/21  9:57 AM   Result Value Ref Range    Sodium 145 136 - 145 mmol/L    Potassium 5.8 (H) 3.5 - 5.1 mmol/L    Chloride 120 (H) 97 - 108 mmol/L    CO2 17 (L) 21 - 32 mmol/L    Anion gap 8 5 - 15 mmol/L    Glucose 139 (H) 65 - 100 mg/dL    BUN 70 (H) 6 - 20 mg/dL    Creatinine 5.47 (H) 0.70 - 1.30 mg/dL    BUN/Creatinine ratio 13 12 - 20      GFR est AA 13 (L) >60 ml/min/1.73m2    GFR est non-AA 11 (L) >60 ml/min/1.73m2    Calcium 7.6 (L) 8.5 - 10.1 mg/dL    Bilirubin, total 0.3 0.2 - 1.0 mg/dL    AST (SGOT) 61 (H) 15 - 37 U/L    ALT (SGPT) 21 12 - 78 U/L    Alk. phosphatase 49 45 - 117 U/L    Protein, total 5.4 (L) 6.4 - 8.2 g/dL    Albumin 2.2 (L) 3.5 - 5.0 g/dL    Globulin 3.2 2.0 - 4.0 g/dL    A-G Ratio 0.7 (L) 1.1 - 2.2         Physical Exam  Constitutional:       General: He is not in acute distress. Appearance: Normal appearance. He is ill-appearing. He is not toxic-appearing. HENT:      Head: Normocephalic and atraumatic. Nose: Nose normal.      Comments: Nasal O2 cannula displaced but still with O2Sat 99%, set on 7 L/min     Mouth/Throat:      Pharynx: Oropharynx is clear. Eyes:      Pupils: Pupils are equal, round, and reactive to light. Neck:      Musculoskeletal: Neck supple. Cardiovascular:      Rate and Rhythm: Normal rate and regular rhythm. Heart sounds: No murmur. Pulmonary:      Breath sounds: Rhonchi present. No wheezing or rales. Abdominal:      Palpations: Abdomen is soft. Tenderness: There is no abdominal tenderness. There is no guarding. Genitourinary:     Penis: Normal.       Testes: Normal.      Comments: No Quesada  Musculoskeletal:      Right lower leg: No edema. Left lower leg: No edema. Comments: Not pitting edema but brawn changes noted   Skin:     Findings: Rash present. Neurological:      General: No focal deficit present. Mental Status: He is alert and oriented to person, place, and time. Psychiatric:         Mood and Affect: Mood normal.         Behavior: Behavior normal.         Thought Content: Thought content normal.         Judgment: Judgment normal.       ASSESSMENT/PLAN    1. Covid-19 infection with possible pneumonia  2. Possible sepsis with elevated procalcitonin  3. Severe anemia with positive occult stool  4. CKD with GFR <30    Comment:  I am inclined to hold off on Remdesivir because of his CKD and risk of accumulation of cyclodextrin. He reports SOB but CXR and CT findings are unimpressive. He is on nasal O2, however, when nasal cannula was off he was still had O2Sat 97%. Concerned about bacterial infection given his elevated procalcitonin. 1. Continue Azithromycin, Ceftriaxone and Decadron  2. Hold off on Remdesivir since he appears to be oxygenating well on room air and because of his CKD he is at risk for cyclodextrin accumulation. 3. Hold off on Actemra  4. Would favor convalescent plasma  5. Send urinalysis and urine culture  6. In am, repeat CBC, check procal, CRP, LDH and ferritin  7. Monitor oximetry    Hardy Rowell MD

## 2021-01-06 NOTE — ED PROVIDER NOTES
EMERGENCY DEPARTMENT HISTORY AND PHYSICAL EXAM      Date: 1/5/2021  Patient Name: Malik Saavedra    History of Presenting Illness     Chief Complaint   Patient presents with    Dizziness    Dehydration       History Provided By: Patient    HPI: Malik Saavedra, 76 y.o. male with PMHx as noted below presents the emergency department as noted below presents the emergency department with complaints of malaise and dyspnea. History is limited as patient is very poor historian. Reports he has been feeling unwell now for the last several weeks. Patient notes that he has been feeling lightheaded and becoming very dyspneic with minimal exertion. He otherwise is denying any pain at this time. No other relieving or exacerbating factors. Denying any syncope, chest pain, fevers, chills, abdominal pain, melena. PCP: Hyun Bridges MD    Current Facility-Administered Medications   Medication Dose Route Frequency Provider Last Rate Last Admin    lactated Ringers infusion 1,000 mL  1,000 mL IntraVENous CONTINUOUS Cristina Richmond MD   1,000 mL at 01/05/21 2150    0.9% sodium chloride infusion 250 mL  250 mL IntraVENous PRN Cristina Richmond MD        dextrose 5% 1,000 mL with sodium bicarbonate (8.4%) 150 mEq infusion   IntraVENous CONTINUOUS Cristina Richmond MD        pantoprazole (PROTONIX) 40 mg in 0.9% sodium chloride 10 mL injection  40 mg IntraVENous NOW Cristina Richmond MD        acetaminophen (TYLENOL) tablet 650 mg  650 mg Oral Q6H PRN Lorenso Favre, MD           Past History     Past Medical History:  Past Medical History:   Diagnosis Date    Chronic kidney disease     Hypertension        Past Surgical History:  History reviewed. No pertinent surgical history. Family History:  History reviewed. No pertinent family history.     Social History:  Social History     Tobacco Use    Smoking status: Never Smoker    Smokeless tobacco: Never Used   Substance Use Topics    Alcohol use: Not on file    Drug use: Not on file       Allergies:  No Known Allergies      Review of Systems   Review of Systems  Constitutional: Negative for fever, chills. Positive fatigue. HENT: Negative for congestion, sore throat, rhinorrhea, sneezing and neck stiffness   Eyes: Negative for discharge and redness. Respiratory: Positive for  shortness of breath. Negative wheezing   Cardiovascular: Negative for chest pain, palpitations   Gastrointestinal: Negative for nausea, vomiting, abdominal pain, constipation, diarrhea and blood in stool. Genitourinary: Negative for dysuria, urgency, frequency, hematuria, flank pain, decreased urine volume, discharge,   Musculoskeletal: Negative for myalgias or joint pain . Skin: Negative for rash or lesions . Neurological: Negative weakness, light-headedness, numbness and headaches. Physical Exam   Physical Exam    GENERAL: alert and oriented, no acute distress  EYES: PEERL, No injection, discharge or icterus. ENT: Mucous membranes dry  NECK: Supple  LUNGS: Airway patent. Labored respirations breath sounds clear with good air entry bilaterally. HEART: Regular rate and rhythm. ABDOMEN: Non-distended and non-tender, without guarding or rebound.   SKIN:  warm, dry, pale  EXTREMITIES:  symmetric with normal ROM  NEUROLOGICAL: Alert, oriented      Diagnostic Study Results     Labs -     Recent Results (from the past 12 hour(s))   METABOLIC PANEL, COMPREHENSIVE    Collection Time: 01/05/21  6:30 PM   Result Value Ref Range    Sodium 143 136 - 145 mmol/L    Potassium 6.2 (H) 3.5 - 5.1 mmol/L    Chloride 119 (H) 97 - 108 mmol/L    CO2 15 (LL) 21 - 32 mmol/L    Anion gap 9 5 - 15 mmol/L    Glucose 105 (H) 65 - 100 mg/dL    BUN 72 (H) 6 - 20 mg/dL    Creatinine 5.90 (H) 0.70 - 1.30 mg/dL    BUN/Creatinine ratio 12 12 - 20      GFR est AA 12 (L) >60 ml/min/1.73m2    GFR est non-AA 10 (L) >60 ml/min/1.73m2    Calcium 7.9 (L) 8.5 - 10.1 mg/dL    Bilirubin, total 0.3 0.2 - 1.0 mg/dL    AST (SGOT) 69 (H) 15 - 37 U/L    ALT (SGPT) 22 12 - 78 U/L    Alk. phosphatase 61 45 - 117 U/L    Protein, total 6.1 (L) 6.4 - 8.2 g/dL    Albumin 2.6 (L) 3.5 - 5.0 g/dL    Globulin 3.5 2.0 - 4.0 g/dL    A-G Ratio 0.7 (L) 1.1 - 2.2     LACTIC ACID    Collection Time: 01/05/21  6:30 PM   Result Value Ref Range    Lactic acid 1.3 0.4 - 2.0 mmol/L   PROCALCITONIN    Collection Time: 01/05/21  6:30 PM   Result Value Ref Range    Procalcitonin 6.63 (H) 0 ng/mL   CBC WITH AUTOMATED DIFF    Collection Time: 01/05/21  7:56 PM   Result Value Ref Range    WBC 5.6 4.1 - 11.1 K/uL    RBC 1.47 (L) 4.10 - 5.70 M/uL    HGB 3.5 (LL) 12.1 - 17.0 g/dL    HCT 12.0 (LL) 36.6 - 50.3 %    MCV 81.6 80.0 - 99.0 FL    MCH 23.8 (L) 26.0 - 34.0 PG    MCHC 29.2 (L) 30.0 - 36.5 g/dL    RDW 16.5 (H) 11.5 - 14.5 %    PLATELET 435 968 - 868 K/uL    MPV 9.2 8.9 - 12.9 FL    NEUTROPHILS 88 (H) 32 - 75 %    BAND NEUTROPHILS 2 0 - 6 %    LYMPHOCYTES 4 (L) 12 - 49 %    MONOCYTES 6 5 - 13 %    EOSINOPHILS 0 0 - 7 %    BASOPHILS 0 0 - 1 %    IMMATURE GRANULOCYTES 0 %    ABS. NEUTROPHILS 5.1 1.8 - 8.0 K/UL    ABS. LYMPHOCYTES 0.2 (L) 0.8 - 3.5 K/UL    ABS. MONOCYTES 0.3 0.0 - 1.0 K/UL    ABS. EOSINOPHILS 0.0 0.0 - 0.4 K/UL    ABS. BASOPHILS 0.0 0.0 - 0.1 K/UL    ABS. IMM.  GRANS. 0.0 K/UL    DF Manual      RBC COMMENTS Hypochromia  3+        RBC COMMENTS Microcytosis  2+        RBC COMMENTS Ovalocytes  2+       TYPE & SCREEN    Collection Time: 01/05/21  7:56 PM   Result Value Ref Range    Crossmatch Expiration 01/08/2021,2359     ABO/Rh(D) O Positive     Antibody screen Negative     Unit number [id number]     Blood component type [facility]     Unit division 00     Status of unit [address] to transfuse     Crossmatch result Compatible     Unit number [id number]     Blood component type [facility]     Unit division 00     Status of unit [place] to transfuse     Crossmatch result Compatible SARS-COV-2    Collection Time: 01/05/21  9:07 PM   Result Value Ref Range    SARS-CoV-2 Please find results under separate order     COVID-19 RAPID TEST    Collection Time: 01/05/21  9:07 PM   Result Value Ref Range    Specimen source Nasopharyngeal      COVID-19 rapid test Positive (A) Not Detected     OCCULT BLOOD, STOOL    Collection Time: 01/05/21  9:54 PM   Result Value Ref Range    Occult Blood,day 1 Positive (A) Negative         Radiologic Studies -   CT CHEST WO CONT   Final Result   IMPRESSION:   1. No pneumomediastinum or pneumothorax. 2. Cardiomegaly. 3. Subtle groundglass densities in both lungs might be pneumonia or other. XR CHEST PORT   Final Result   IMPRESSION: Subtle but potentially clinically significant findings as above. Suggest radiographic follow-up. CT Results  (Last 48 hours)               01/05/21 1936  CT CHEST WO CONT Final result    Impression:  IMPRESSION:   1. No pneumomediastinum or pneumothorax. 2. Cardiomegaly. 3. Subtle groundglass densities in both lungs might be pneumonia or other. Narrative:  Images obtained without contrast include the abdomen and pelvis. Comparison portable chest radiograph earlier today. Dose Reduction:    All CT scans at this facility are performed using dose reduction optimization   techniques as appropriate to a performed exam including the following: Automated   exposure control, adjustments of the mA and/or kV according to patient size, or   use of iterative reconstruction technique. Subtle peripheral groundglass densities are noted in both lungs, could reflect   Covid pneumonia or other. No pneumothorax or pneumomediastinum. The radiographic findings suspicious for   pneumomediastinum probably was artifact, perhaps related to cardiac motion. No pleural effusion or adenopathy. Cardiomegaly again noted.                CXR Results  (Last 48 hours)               01/05/21 1817  XR CHEST PORT Final result    Impression:  IMPRESSION: Subtle but potentially clinically significant findings as above. Suggest radiographic follow-up. Narrative:  Portable AP view at 1816 hours. Comparison 23 November 2008. Cardiac silhouette size is borderline enlarged. Arcuate lucency adjacent to the left side of the aortic knob margin could   reflect a tiny pneumomediastinum. Mild pulmonary vascular congestion, without overt edema. Patchy densities in the lower lungs could be minimal infiltrate. Consider   follow-up if warranted clinically. Medical Decision Making     I, Shari Luna MD am the first provider for this patient and am the attending of record for this patient encounter. I reviewed the vital signs, available nursing notes, past medical history, past surgical history, family history and social history. Vital Signs-Reviewed the patient's vital signs. Patient Vitals for the past 12 hrs:   Temp Pulse Resp BP SpO2   01/05/21 2320 98.3 °F (36.8 °C) 77 21 134/82 100 %   01/05/21 2152 -- 83 18 (!) 143/75 100 %   01/05/21 2115 -- -- -- -- 99 %   01/05/21 1915 98.6 °F (37 °C) 78 16 133/69 97 %       Records Reviewed: Nursing Notes and Old Medical Records    Provider Notes (Medical Decision Making):   Six 9year-old male, poor historian presenting feeling unwell for several months symptoms acutely worseneed over the last few days. On presentation patient was noted to be mildly hypoxic so was placed on supplemental oxygen. Differential was broad and included pneumonia, [id number], anemia, metabolic abnormalities, electrolyte abnormalities, ACS, pneumothorax. Work-up notable for likely acute on chronic kidney disease although do not have patient's baseline as well as hyperkalemia. No EKG changes warranting acute lowering and do suspect that his potassium will improve with fluids and blood. Patient was profoundly anemic with a hemoglobin of 3.5 and did have Hemoccult positive stool. Patient will be transfused 2 units of packed red blood cells and started on Protonix. We will plan for GI and nephrology consults. Patient is remained hemodynamically stable. We will plan to admit for further management. ED Course:     Initial assessment performed. The patients presenting problems have been discussed, and they are in agreement with the care plan formulated and outlined with them. I have encouraged them to ask questions as they arise throughout their visit. Medications   lactated Ringers infusion 1,000 mL (1,000 mL IntraVENous New Bag 1/5/21 2150)   0.9% sodium chloride infusion 250 mL (has no administration in time range)   dextrose 5% 1,000 mL with sodium bicarbonate (8.4%) 150 mEq infusion (has no administration in time range)   pantoprazole (PROTONIX) 40 mg in 0.9% sodium chloride 10 mL injection (has no administration in time range)   acetaminophen (TYLENOL) tablet 650 mg (has no administration in time range)   dexamethasone (DECADRON) 10 mg/mL injection 10 mg (10 mg IntraVENous Given 1/5/21 1913)         PROGRESS:  The patient has been re-evaluated and sx have improved. Reviewed available results with patient and have counseled them on diagnosis and care plan. They have expressed understanding, and all their questions have been answered. They agree with plan for admission. CONSULT:  Parker Leyva MD spoke with Dr. Ismael Aase  Discussed HPI and PE, available diagnostic tests and clinical findings. He is in agreement with care plans as outlined and will evaluate for admission     Admit Note  Patient is being admitted to  Dr. Irving Myesr. The results of their tests and reasons for their admission have been discussed with them and/or available family. They convey agreement and understanding for the need to be admitted and for their admission diagnosis. Consultation has been made with the inpatient physician specialist for hospitalization.     Critical Care Note  9:50 PM    IMPENDING DETERIORATION -Respiratory, Cardiovascular, CNS and Renal  ASSOCIATED RISK FACTORS - Bleeding and Metabolic changes  MANAGEMENT- Bedside Assessment and Supervision of Care  INTERPRETATION -  Xrays, ECG and Blood Pressure  INTERVENTIONS - Metobolic interventions  CASE REVIEW - Hospitalist  TREATMENT RESPONSE -Improved and Stable  PERFORMED BY - Self    NOTES   :  I have provided a total of 45 minutes of critical time not including time spent on separately documented procedures. The reason for providing this level of medical care for this critically ill patient was due to a critical illness that impaired one or more vital organ systems such that there was a high probability of imminent or life threatening deterioration in the patients condition. This care involved high complexity decision making to assess, manipulate, and support vital system functions,  lab review, consultations with specialist, family decision- making, bedside attention and documentation. During this entire length of time I was immediately available to the patient    Disposition:  admission    PLAN:  1. Admit     Diagnosis     Clinical Impression:   1. ESME (acute kidney injury) ([facility], [address]    2. Anemia, unspecified type    3. Metabolic acidosis    4. Acute respiratory failure with hypoxia ([address]    5. COVID-19        Please note that this dictation was completed with Dragon, computer voice recognition software. Quite often unanticipated grammatical, syntax, homophones, and other interpretive errors are inadvertently transcribed by the computer software. Please disregard these errors. Additionally, please excuse any errors that have escaped final proofreading.

## 2021-01-06 NOTE — CONSULTS
Pulmonary/ CC Consult    Subjective:   Date of Consultation:  2021  Referring Physician: Chris Marrero MD    Patient is a 76 y.o. male  AA who is known to have history of chronic kidney disease stage IV with baseline serum creatinine ranging between 3.7 and 4.2. He is admitted hospital because of generalized weakness, dizziness. He additionally history of hypertension. Initial work-up showed hemoglobin of 3.5 g/dL. He was also noted to have COVID-19 infection. Patient is admitted for further management. He has been on nasal cannula oxygen at 4 L via nasal cannula. Patient is evaluated in the ER where he is waiting for a bed on the floor. He denies any history of smoking, alcohol use. Reported that he lives with his sister who has been in hospital for last 12 days. Patient complains of black stools but denied any abdominal pains. Patient Active Problem List   Diagnosis Code    GI bleed K92.2     Past Medical History:   Diagnosis Date    Chronic kidney disease     Hypertension       History reviewed. No pertinent family history. Social History     Tobacco Use    Smoking status: Never Smoker    Smokeless tobacco: Never Used   Substance Use Topics    Alcohol use: Not on file     History reviewed. No pertinent surgical history. Prior to Admission medications    Not on File     No Known Allergies     Review of Systems: Denies any loose bowel movements, denies any nausea or vomiting. Denies any abdominal pains. Denies any chest pains. He does have complaints of shortness of breath on physical activity. She denies any high-grade fever or chills. Objective:   Blood pressure 127/72, pulse (!) 55, temperature 98.5 °F (36.9 °C), resp. rate 16, height 6' (1.829 m), weight 79.4 kg (175 lb), SpO2 96 %. Temp (24hrs), Av.5 °F (36.9 °C), Min:98.3 °F (36.8 °C), Max:99.9 °F (37.7 °C)    CT CHEST WO CONT   Final Result   IMPRESSION:   1. No pneumomediastinum or pneumothorax.    2. Cardiomegaly. 3. Subtle groundglass densities in both lungs might be pneumonia or other. XR CHEST PORT   Final Result   IMPRESSION: Subtle but potentially clinically significant findings as above. Suggest radiographic follow-up. Data Review:  Current Facility-Administered Medications   Medication Dose Route Frequency    0.9% sodium chloride infusion 250 mL  250 mL IntraVENous PRN    dexamethasone (DECADRON) 4 mg/mL injection 4 mg  4 mg IntraVENous Q12H    azithromycin (ZITHROMAX) 500 mg in 0.9% sodium chloride 250 mL (VIAL-MATE)  500 mg IntraVENous Q24H    cefTRIAXone (ROCEPHIN) 1 g in sterile water (preservative free) 10 mL IV syringe  1 g IntraVENous Q24H    pantoprazole (PROTONIX) 40 mg in 0.9% sodium chloride 10 mL injection  40 mg IntraVENous DAILY    lactated Ringers infusion 1,000 mL  1,000 mL IntraVENous CONTINUOUS    0.9% sodium chloride infusion 250 mL  250 mL IntraVENous PRN    dextrose 5% 1,000 mL with sodium bicarbonate (8.4%) 150 mEq infusion   IntraVENous CONTINUOUS    acetaminophen (TYLENOL) tablet 650 mg  650 mg Oral Q6H PRN     No current outpatient medications on file. Exam:      This is an elderly male who is currently not in significant respiratory distress. He is in of average build. He is alert and oriented x3  Neck is supple. No cervical lymphadenopathy. Thyroid not enlarged  Chest: Decreased air entry at lung bases. Few rhonchi audible. No wheezing was appreciated  Heart: S1-S2 normal  Abdomen: Soft, nontender, no visceromegaly. Extremities: No edema, cyanosis or clubbing  Neuro: No focal motor deficit.     Recent Results (from the past 24 hour(s))   METABOLIC PANEL, COMPREHENSIVE    Collection Time: 01/05/21  6:30 PM   Result Value Ref Range    Sodium 143 136 - 145 mmol/L    Potassium 6.2 (H) 3.5 - 5.1 mmol/L    Chloride 119 (H) 97 - 108 mmol/L    CO2 15 (LL) 21 - 32 mmol/L    Anion gap 9 5 - 15 mmol/L    Glucose 105 (H) 65 - 100 mg/dL    BUN 72 (H) 6 - 20 mg/dL    Creatinine 5.90 (H) 0.70 - 1.30 mg/dL    BUN/Creatinine ratio 12 12 - 20      GFR est AA 12 (L) >60 ml/min/1.73m2    GFR est non-AA 10 (L) >60 ml/min/1.73m2    Calcium 7.9 (L) 8.5 - 10.1 mg/dL    Bilirubin, total 0.3 0.2 - 1.0 mg/dL    AST (SGOT) 69 (H) 15 - 37 U/L    ALT (SGPT) 22 12 - 78 U/L    Alk. phosphatase 61 45 - 117 U/L    Protein, total 6.1 (L) 6.4 - 8.2 g/dL    Albumin 2.6 (L) 3.5 - 5.0 g/dL    Globulin 3.5 2.0 - 4.0 g/dL    A-G Ratio 0.7 (L) 1.1 - 2.2     LACTIC ACID    Collection Time: 01/05/21  6:30 PM   Result Value Ref Range    Lactic acid 1.3 0.4 - 2.0 mmol/L   PROCALCITONIN    Collection Time: 01/05/21  6:30 PM   Result Value Ref Range    Procalcitonin 6.63 (H) 0 ng/mL   BNP    Collection Time: 01/05/21  6:30 PM   Result Value Ref Range    NT pro-BNP 5,772 (H) <125 pg/mL   MAGNESIUM    Collection Time: 01/05/21  6:30 PM   Result Value Ref Range    Magnesium 2.2 1.6 - 2.4 mg/dL   CBC WITH AUTOMATED DIFF    Collection Time: 01/05/21  7:56 PM   Result Value Ref Range    WBC 5.6 4.1 - 11.1 K/uL    RBC 1.47 (L) 4.10 - 5.70 M/uL    HGB 3.5 (LL) 12.1 - 17.0 g/dL    HCT 12.0 (LL) 36.6 - 50.3 %    MCV 81.6 80.0 - 99.0 FL    MCH 23.8 (L) 26.0 - 34.0 PG    MCHC 29.2 (L) 30.0 - 36.5 g/dL    RDW 16.5 (H) 11.5 - 14.5 %    PLATELET 907 308 - 287 K/uL    MPV 9.2 8.9 - 12.9 FL    NEUTROPHILS 88 (H) 32 - 75 %    BAND NEUTROPHILS 2 0 - 6 %    LYMPHOCYTES 4 (L) 12 - 49 %    MONOCYTES 6 5 - 13 %    EOSINOPHILS 0 0 - 7 %    BASOPHILS 0 0 - 1 %    IMMATURE GRANULOCYTES 0 %    ABS. NEUTROPHILS 5.1 1.8 - 8.0 K/UL    ABS. LYMPHOCYTES 0.2 (L) 0.8 - 3.5 K/UL    ABS. MONOCYTES 0.3 0.0 - 1.0 K/UL    ABS. EOSINOPHILS 0.0 0.0 - 0.4 K/UL    ABS. BASOPHILS 0.0 0.0 - 0.1 K/UL    ABS. IMM.  GRANS. 0.0 K/UL    DF Manual      RBC COMMENTS Hypochromia  3+        RBC COMMENTS Microcytosis  2+        RBC COMMENTS Ovalocytes  2+       TYPE & SCREEN    Collection Time: 01/05/21  7:56 PM   Result Value Ref Range    Crossmatch Expiration 01/08/2021,7518     ABO/Rh(D) Jerrell Ley Positive     Antibody screen Negative     Unit number [id number]     Blood component type  LR     Unit division 00     Status of unit Issued,final     TRANSFUSION STATUS Ok to transfuse     Crossmatch result Compatible     Unit number [id number]     Blood component type RC LR     Unit division 00     Status of unit [address] to transfuse     Crossmatch result Compatible     Unit number [id number]     Blood component type RC LR     Unit division 00     Status of unit [address] to transfuse     Crossmatch result Compatible     Unit number [id number]     Blood component type RC LR     Unit division 00     Status of unit [place] to transfuse     Crossmatch result Compatible     Unit number [id number]     Blood component type RC LR     Unit division 00     Status of unit [place] to transfuse     Crossmatch result Compatible    SARS-COV-2    Collection Time: 01/05/21  9:07 PM   Result Value Ref Range    SARS-CoV-2 Please find results under separate order     COVID-19 RAPID TEST    Collection Time: 01/05/21  9:07 PM   Result Value Ref Range    Specimen source Nasopharyngeal      COVID-19 rapid test Positive (A) Not Detected     OCCULT BLOOD, STOOL    Collection Time: 01/05/21  9:54 PM   Result Value Ref Range    Occult Blood,day 1 Positive (A) Negative     CBC WITH AUTOMATED DIFF    Collection Time: 01/06/21  9:57 AM   Result Value Ref Range    WBC 3.7 (L) 4.1 - 11.1 K/uL    RBC 2.04 (L) 4.10 - 5.70 M/uL    HGB 5.4 (LL) 12.1 - 17.0 g/dL    HCT 17.2 (LL) 36.6 - 50.3 %    MCV 84.3 80.0 - 99.0 FL    MCH 26.5 26.0 - 34.0 PG    MCHC 31.4 30.0 - 36.5 g/dL    RDW 15.7 (H) 11.5 - 14.5 %    PLATELET 682 105 - 533 K/uL    MPV 9.1 8.9 - 12.9 FL    NEUTROPHILS PENDING %    LYMPHOCYTES PENDING %    MONOCYTES PENDING %    EOSINOPHILS PENDING %    BASOPHILS PENDING %    IMMATURE GRANULOCYTES PENDING %    ABS. NEUTROPHILS PENDING K/UL    ABS. LYMPHOCYTES PENDING K/UL    ABS. MONOCYTES PENDING K/UL    ABS. EOSINOPHILS PENDING K/UL    ABS. BASOPHILS PENDING K/UL    ABS. IMM. GRANS. PENDING K/UL    DF PENDING    METABOLIC PANEL, COMPREHENSIVE    Collection Time: 01/06/21  9:57 AM   Result Value Ref Range    Sodium 145 136 - 145 mmol/L    Potassium 5.8 (H) 3.5 - 5.1 mmol/L    Chloride 120 (H) 97 - 108 mmol/L    CO2 17 (L) 21 - 32 mmol/L    Anion gap 8 5 - 15 mmol/L    Glucose 139 (H) 65 - 100 mg/dL    BUN 70 (H) 6 - 20 mg/dL    Creatinine 5.47 (H) 0.70 - 1.30 mg/dL    BUN/Creatinine ratio 13 12 - 20      GFR est AA 13 (L) >60 ml/min/1.73m2    GFR est non-AA 11 (L) >60 ml/min/1.73m2    Calcium 7.6 (L) 8.5 - 10.1 mg/dL    Bilirubin, total 0.3 0.2 - 1.0 mg/dL    AST (SGOT) 61 (H) 15 - 37 U/L    ALT (SGPT) 21 12 - 78 U/L    Alk. phosphatase 49 45 - 117 U/L    Protein, total 5.4 (L) 6.4 - 8.2 g/dL    Albumin 2.2 (L) 3.5 - 5.0 g/dL    Globulin 3.2 2.0 - 4.0 g/dL    A-G Ratio 0.7 (L) 1.1 - 2.2         Impression:   Patient is a 76 y.o. male  AA who is known to have history of chronic kidney disease stage IV with baseline serum creatinine ranging between 3.7 and 4.2. He is admitted hospital because of generalized weakness, dizziness. He additionally history of hypertension. Initial work-up showed hemoglobin of 3.5 g/dL. He was also noted to have COVID-19 infection. Plan:   1. Dyspnea:  Patient noted to be short of breath on physical activity. Differential diagnosis includes hypoxia from severe anemia versus or combination from Covid 19 pneumonitis. Patient is on supplemental oxygen at 4 L/min. We will continue to watch his oxygen saturations. 2.  COVID-19 pneumonitis:  Patient has bilateral basal infiltrates, has positive COVID-19 test results. He has developed COVID-19 pneumonitis.   Patient is on supplemental oxygen, started on dexamethasone, however the increased dosage to 6 mg once daily. Patient started on IV Zithromax. Since patient has stage IV chronic kidney disease with GFR of 13, he will not be a candidate for remdesivir. 3.  Severe anemia:  He has developed hemoglobin of 3.5 g/dL. This seems to be secondary to blood loss anemia. He is getting 3 units of packed RBCs. We will repeat his CBC in the morning. He will require GI evaluation for chronic blood loss. 4.  Chronic kidney disease:  He has stage IV chronic kidney disease. He has been followed by Dr. Harvey Le. Patient also has nephrotic range proteinuria. Patient is on DVT prophylaxis. Once he gets a bed on the medical floor then he will get transferred. I thank you for involving me in the management of the patient    This document has been prepared by the Dragon voice recognition system, typographical errors may have occurred.  Attempts have been made to correct errors, however inadvertent errors may persist.    Dagoberto Beach MD  [facility] ( [facility])

## 2021-01-06 NOTE — ED NOTES
PT with episodes of bradycardia rate of 38 for 5-10 second periods. Pt asymptomatic with sustained Blood pressure. Spoke with benjamin rose MD states if pt stays asymptomatic and sustained blood pressures that no further orders were to be given.       Will  Continue to monitor

## 2021-01-06 NOTE — CONSULTS
[facility] Consult Note      NAME:  Noe Zuniga   :   1955   MRN:  [id number]     Requesting Physician Thai Perez MD   Reason for Consult:  ESME, CKD 4     PCP:  Ace Zarco MD     Date/Time:  2021 11:26 AM          Subjective:     CHIEF COMPLAINT: SOB, anemia, Covid +, pneumonia     HISTORY OF PRESENT ILLNESS:     Mr. Nisha Pacheco is a 76 y.o.  male who is admitted to the medical Service with anemia, dyspnea, Covid19+. We are asked to evaluate forAKI on CKD 4. Patient is well-known to our service. He has been followed for over a decade. Has chronic kidney disease which is slowly progressing. He has stage IV on the cusp of stage V.  Usual serum creatinine hovers between 3.7 and 4.2. Also has a history of sub-nephrotic range proteinuria with negative serologic work-up. Has a history of severe diverticular bleed in 2016 requiring multiple transfusions. His admission was prompted by development of weakness, shortness of breath he was found to have a hemoglobin of 3.5. He received 2 units of packed cells with hemoglobin increased to 5.5. Moreover, he was found to be COVID-19 positive with evidence of pneumonia. Serum creatinine increased to 5.9 with hyperkalemia and metabolic acidosis. Currently on a HCO3 drip. Past Medical History:   Diagnosis Date    Chronic kidney disease     Hypertension         History reviewed. No pertinent surgical history. Social History     Tobacco Use    Smoking status: Never Smoker    Smokeless tobacco: Never Used   Substance Use Topics    Alcohol use: Not on file        History reviewed. No pertinent family history.      No Known Allergies     Prior to Admission medications    Not on File   Home Meds per my records:    Amlodipine/atorvastatin 10/10 1 daily  Aspirin 81 mg a day  Hydralazine 100 mg every 8 hours  Lasix 40 mg daily  Lisinopril 40 mg daily  Metoprolol 50 mg extended release daily  Minoxidil. 5 mg tablet  Calcifediol 30 mg daily  Sodium bicarbonate 600 mg 3 times daily  Allergies none      Current Facility-Administered Medications:     0.9% sodium chloride infusion 250 mL, 250 mL, IntraVENous, PRN, Mary Cerna MD    dexamethasone (DECADRON) 4 mg/mL injection 4 mg, 4 mg, IntraVENous, Q12H, Mary Cerna MD, 4 mg at 01/06/21 1004    azithromycin (ZITHROMAX) 500 mg in 0.9% sodium chloride 250 mL (VIAL-MATE), 500 mg, IntraVENous, Q24H, Mary Cerna MD, 500 mg at 01/06/21 0137    cefTRIAXone (ROCEPHIN) 1 g in sterile water (preservative free) 10 mL IV syringe, 1 g, IntraVENous, Q24H, Mary Cerna MD, 1 g at 01/06/21 0137    pantoprazole (PROTONIX) 40 mg in 0.9% sodium chloride 10 mL injection, 40 mg, IntraVENous, DAILY, Mary Cerna MD, 40 mg at 01/06/21 1004    lactated Ringers infusion 1,000 mL, 1,000 mL, IntraVENous, CONTINUOUS, Jarod, Carmen Wang MD, 1,000 mL at 01/05/21 2150    0.9% sodium chloride infusion 250 mL, 250 mL, IntraVENous, PRN, Shelby Patel MD    dextrose 5% 1,000 mL with sodium bicarbonate (8.4%) 150 mEq infusion, , IntraVENous, CONTINUOUS, Shelby Patel MD, Last Rate: 100 mL/hr at 01/06/21 0030, New Bag at 01/06/21 0030    acetaminophen (TYLENOL) tablet 650 mg, 650 mg, Oral, Q6H PRN, Mary Cerna MD  No current outpatient medications on file.       Review of Systems:  Constitutional: positive for fatigue and malaise, negative for fevers, chills and sweats  Respiratory: positive for cough, pneumonia or dyspnea on exertion, negative for sputum, hemoptysis or wheezing  Cardiovascular: negative for chest pain, chest pressure/discomfort, palpitations, lower extremity edema  Gastrointestinal: negative for nausea, vomiting, change in bowel habits, melena, abdominal pain, jaundice and Blood per rectum  Genitourinary:negative for dysuria and hematuria  Musculoskeletal:negative for muscle weakness and bone pain  Neurological: negative Objective:      VITALS:    Vital signs reviewed; most recent are:    Visit Vitals  /72 (BP 1 Location: Right arm, BP Patient Position: At rest)   Pulse (!) 55   Temp 98.5 °F (36.9 °C)   Resp 16   Ht 6' (1.829 m)   Wt 79.4 kg (175 lb)   SpO2 96%   BMI 23.73 kg/m²     SpO2 Readings from Last 6 Encounters:   01/06/21 96%    O2 Flow Rate (L/min): 6 l/min       Intake/Output Summary (Last 24 hours) at 1/6/2021 1126  Last data filed at 1/6/2021 0630  Gross per 24 hour   Intake 2000 ml   Output --   Net 2000 ml            Exam:   Physical Exam:General appearance: alert, fatigued, cooperative, no distress, appears stated age  Head: Normocephalic, without obvious abnormality, atraumatic  Eyes: negative findings: anicteric sclera, lids and lashes normal and conjunctivae and sclerae normal  Neck: supple, symmetrical, trachea midline, no adenopathy, thyroid: not enlarged, symmetric, no tenderness/mass/nodules and no JVD  Lungs: Tubular breath sounds without wheezing or rhonchi  Heart: regular rate and rhythm, no S3 or S4  Abdomen: soft, non-tender. Bowel sounds normal. No masses,  no organomegaly  Extremities: no edema  Lymph nodes: Cervical, supraclavicular, and axillary nodes normal.  Neurologic: Grossly normal      LABS:  Recent Labs     01/05/21  1830      K 6.2*   *   CO2 15*   BUN 72*   CREA 5.90*   CA 7.9*   ALB 2.6*   MG 2.2     Recent Labs     01/06/21  0957 01/05/21  1956   WBC 3.7* 5.6   HGB 5.4* 3.5*   HCT 17.2* 12.0*    243     No results found for: GLUCPOC  No results for input(s): PH, PCO2, PO2, HCO3, FIO2 in the last 72 hours. No results for input(s): INR, INREXT in the last 72 hours. No results for input(s): HÉCTOR, KU, CLU, CREAU in the last 72 hours. No lab exists for component: PROU   CXR 1-5-21  Portable AP view at 1816 hours. Comparison 23 November 2008. Cardiac silhouette size is borderline enlarged.   Arcuate lucency adjacent to the left side of the aortic knob margin could  reflect a tiny pneumomediastinum. Mild pulmonary vascular congestion, without overt edema. Patchy densities in the lower lungs could be minimal infiltrate. Consider  follow-up if warranted clinically.     IMPRESSION  IMPRESSION: Subtle but potentially clinically significant findings as above. Suggest radiographic follow-up. CT chest 1-5-21  Images obtained without contrast include the abdomen and pelvis. Comparison portable chest radiograph earlier today.     Subtle peripheral groundglass densities are noted in both lungs, could reflect  Covid pneumonia or other. No pneumothorax or pneumomediastinum. The radiographic findings suspicious for  pneumomediastinum probably was artifact, perhaps related to cardiac motion. No pleural effusion or adenopathy. Cardiomegaly again noted.     IMPRESSION  IMPRESSION:  1. No pneumomediastinum or pneumothorax. 2. Cardiomegaly. 3. Subtle groundglass densities in both lungs might be pneumonia or other.     Assessment:   Renal Specific Problems  Chronic kidney disease stage IV at baseline  Acute kidney injury related to acute blood loss, Covid infection  Acute severe anemia, rule out GI bleed, past history of diverticular bleed but he has no blood per rectum  Hyperkalemia  Metabolic acidosis  Volume depletion    COVID-19 positive test (U07.1, COVID-19) with Acute Pneumonia (J12.89, Other viral pneumonia)  (If respiratory failure or sepsis present, add as separate assessment)            Plan:     Obtain/ Order: labs/cultures/radiology/procedures:  urine lytes and urine creatinine and spot urine protein and creatinine ratio and renal panel and magnesium      Therapeutic:    Continue current IV fluids  Recheck labs at 4 PM and adjust  No indication for dialysis yet, hopefully hyperkalemia will correct with correction of acidosis  Hold ACE inhibitor but restart oral bicarbonate  Antibiotic treatment as ordered  Remdesivir if appropriate, no real renal contraindication at this particular point    Risk of deterioration: medium      Total time spent with patient: 30 Minutes                  Discussed:  Patient           ___________________________________________________    Attending Physician: Nabeel Scott MD

## 2021-01-06 NOTE — CONSULTS
Gastroenterology Consult     Referring Physician: Monie Schultz MD     Consult Date: 1/6/2021     Subjective:     Chief Complaint: Dizziness and dehydration  History of Present Illness: Lucero Pruitt is a 76 y.o. male who is seen in consultation for anemia positive occult blood. Patient was admitted to the hospital with complaints of dizziness and dehydration. Periport, patient is has not been feeling well in the last week with history of dizziness and shortness of breath. Past medical history includes chronic kidney disease and hypertension. He lives with his sister who is Covid positive. He is Covid positive as well. Patient is awake and alert, not in acute distress. Denies any acute pain. He looks pale, shortness of breath seems to be better. He is receiving blood transfusion for an H&H of 5.4 and 17.2. He reports that he has been having blood in his stool on and off for the past couple of days. According to chart he had a history of diverticular bleed in 2016 and received blood transfusions. Stool is positive for occult blood. He cannot remember the last time he had a colonoscopy done. Past Medical History:   Diagnosis Date    Chronic kidney disease     Hypertension      History reviewed. No pertinent surgical history. History reviewed. No pertinent family history.   Social History     Tobacco Use    Smoking status: Never Smoker    Smokeless tobacco: Never Used   Substance Use Topics    Alcohol use: Not on file      No Known Allergies  Current Facility-Administered Medications   Medication Dose Route Frequency    0.9% sodium chloride infusion 250 mL  250 mL IntraVENous PRN    dexamethasone (DECADRON) 4 mg/mL injection 4 mg  4 mg IntraVENous Q12H    azithromycin (ZITHROMAX) 500 mg in 0.9% sodium chloride 250 mL (VIAL-MATE)  500 mg IntraVENous Q24H    cefTRIAXone (ROCEPHIN) 1 g in sterile water (preservative free) 10 mL IV syringe  1 g IntraVENous Q24H    pantoprazole (PROTONIX) 40 mg in 0.9% sodium chloride 10 mL injection  40 mg IntraVENous DAILY    furosemide (LASIX) injection 20 mg  20 mg IntraVENous PRN    lactated Ringers infusion 1,000 mL  1,000 mL IntraVENous CONTINUOUS    0.9% sodium chloride infusion 250 mL  250 mL IntraVENous PRN    dextrose 5% 1,000 mL with sodium bicarbonate (8.4%) 150 mEq infusion   IntraVENous CONTINUOUS    acetaminophen (TYLENOL) tablet 650 mg  650 mg Oral Q6H PRN     No current outpatient medications on file. Review of Systems:  A detailed 10 organ review of systems is obtained with pertinent positives as listed in the History of Present Illness and Past Medical History. All others are negative. Objective:     Physical Exam:  Visit Vitals  /75   Pulse (!) 58   Temp 98.1 °F (36.7 °C)   Resp 18   Ht 6' (1.829 m)   Wt 79.4 kg (175 lb)   SpO2 97%   BMI 23.73 kg/m²        Skin:  Extremities and face reveal no rashes. No green erythema. No telangiectasias on the chest wall. HEENT: Sclerae anicteric. Extra-occular muscles are intact. No oral ulcers. No abnormal pigmentation of the lips. The neck is supple. Cardiovascular: Regular rate and rhythm. No murmurs, gallops, or rubs. PMI nondisplaced. Carotids without bruits. Respiratory:  Comfortable breathing with no accessory muscle use. Clear breath sounds with no wheezes, rales, or rhonchi. GI:  Abdomen nondistended, soft, and nontender. Normal active bowel sounds. No enlargement of the liver or spleen. No masses palpable. Rectal:  Deferred  Musculoskeletal:  No pitting edema of the lower legs. Extremities have good range of motion. No costovertebral tenderness. Neurological:  Gross memory appears intact. Patient is alert and oriented. Psychiatric:  Mood appears appropriate with judgement intact. Lymphatic:  No cervical or supraclavicular adenopathy.     Lab/Data Review:  Recent Results (from the past 24 hour(s))   METABOLIC PANEL, COMPREHENSIVE    Collection Time: 01/05/21 6:30 PM   Result Value Ref Range    Sodium 143 136 - 145 mmol/L    Potassium 6.2 (H) 3.5 - 5.1 mmol/L    Chloride 119 (H) 97 - 108 mmol/L    CO2 15 (LL) 21 - 32 mmol/L    Anion gap 9 5 - 15 mmol/L    Glucose 105 (H) 65 - 100 mg/dL    BUN 72 (H) 6 - 20 mg/dL    Creatinine 5.90 (H) 0.70 - 1.30 mg/dL    BUN/Creatinine ratio 12 12 - 20      GFR est AA 12 (L) >60 ml/min/1.73m2    GFR est non-AA 10 (L) >60 ml/min/1.73m2    Calcium 7.9 (L) 8.5 - 10.1 mg/dL    Bilirubin, total 0.3 0.2 - 1.0 mg/dL    AST (SGOT) 69 (H) 15 - 37 U/L    ALT (SGPT) 22 12 - 78 U/L    Alk. phosphatase 61 45 - 117 U/L    Protein, total 6.1 (L) 6.4 - 8.2 g/dL    Albumin 2.6 (L) 3.5 - 5.0 g/dL    Globulin 3.5 2.0 - 4.0 g/dL    A-G Ratio 0.7 (L) 1.1 - 2.2     LACTIC ACID    Collection Time: 01/05/21  6:30 PM   Result Value Ref Range    Lactic acid 1.3 0.4 - 2.0 mmol/L   PROCALCITONIN    Collection Time: 01/05/21  6:30 PM   Result Value Ref Range    Procalcitonin 6.63 (H) 0 ng/mL   BNP    Collection Time: 01/05/21  6:30 PM   Result Value Ref Range    NT pro-BNP 5,772 (H) <125 pg/mL   MAGNESIUM    Collection Time: 01/05/21  6:30 PM   Result Value Ref Range    Magnesium 2.2 1.6 - 2.4 mg/dL   CBC WITH AUTOMATED DIFF    Collection Time: 01/05/21  7:56 PM   Result Value Ref Range    WBC 5.6 4.1 - 11.1 K/uL    RBC 1.47 (L) 4.10 - 5.70 M/uL    HGB 3.5 (LL) 12.1 - 17.0 g/dL    HCT 12.0 (LL) 36.6 - 50.3 %    MCV 81.6 80.0 - 99.0 FL    MCH 23.8 (L) 26.0 - 34.0 PG    MCHC 29.2 (L) 30.0 - 36.5 g/dL    RDW 16.5 (H) 11.5 - 14.5 %    PLATELET 005 931 - 419 K/uL    MPV 9.2 8.9 - 12.9 FL    NEUTROPHILS 88 (H) 32 - 75 %    BAND NEUTROPHILS 2 0 - 6 %    LYMPHOCYTES 4 (L) 12 - 49 %    MONOCYTES 6 5 - 13 %    EOSINOPHILS 0 0 - 7 %    BASOPHILS 0 0 - 1 %    IMMATURE GRANULOCYTES 0 %    ABS. NEUTROPHILS 5.1 1.8 - 8.0 K/UL    ABS. LYMPHOCYTES 0.2 (L) 0.8 - 3.5 K/UL    ABS. MONOCYTES 0.3 0.0 - 1.0 K/UL    ABS. EOSINOPHILS 0.0 0.0 - 0.4 K/UL    ABS.  BASOPHILS 0.0 0.0 - 0.1 K/UL ABS. IMM.  GRANS. 0.0 K/UL    DF Manual      RBC COMMENTS Hypochromia  3+        RBC COMMENTS Microcytosis  2+        RBC COMMENTS Ovalocytes  2+       TYPE & SCREEN    Collection Time: 01/05/21  7:56 PM   Result Value Ref Range    Crossmatch Expiration 01/08/2021,2359     ABO/Rh(D) O Positive     Antibody screen Negative     Unit number [id number]     Blood component type RC LR     Unit division 00     Status of unit Issued,final     TRANSFUSION STATUS Ok to transfuse     Crossmatch result Compatible     Unit number [id number]     Blood component type RC LR     Unit division 00     Status of unit [address] to transfuse     Crossmatch result Compatible     Unit number [id number]     Blood component type RC LR     Unit division 00     Status of unit [address] to transfuse     Crossmatch result Compatible     Unit number [id number]     Blood component type RC LR     Unit division 00     Status of unit [address] to transfuse     Crossmatch result Compatible     Unit number [id number]     Blood component type RC LR     Unit division 00     Status of unit [place] to transfuse     Crossmatch result Compatible    SARS-COV-2    Collection Time: 01/05/21  9:07 PM   Result Value Ref Range    SARS-CoV-2 Please find results under separate order     COVID-19 RAPID TEST    Collection Time: 01/05/21  9:07 PM   Result Value Ref Range    Specimen source Nasopharyngeal      COVID-19 rapid test Positive (A) Not Detected     OCCULT BLOOD, STOOL    Collection Time: 01/05/21  9:54 PM   Result Value Ref Range    Occult Blood,day 1 Positive (A) Negative     CBC WITH AUTOMATED DIFF    Collection Time: 01/06/21  9:57 AM   Result Value Ref Range    WBC 3.7 (L) 4.1 - 11.1 K/uL    RBC 2.04 (L) 4.10 - 5.70 M/uL    HGB 5.4 (LL) 12.1 - 17.0 g/dL    HCT 17.2 (LL) 36.6 - 50.3 %    MCV 84.3 80.0 - 99.0 FL    MCH 26.5 26.0 - 34.0 PG MCHC 31.4 30.0 - 36.5 g/dL    RDW 15.7 (H) 11.5 - 14.5 %    PLATELET 619 325 - 681 K/uL    MPV 9.1 8.9 - 12.9 FL    NEUTROPHILS 90 (H) 32 - 75 %    BAND NEUTROPHILS 2 0 - 6 %    LYMPHOCYTES 4 (L) 12 - 49 %    MONOCYTES 4 (L) 5 - 13 %    EOSINOPHILS 0 0 - 7 %    BASOPHILS 0 0 - 1 %    IMMATURE GRANULOCYTES 0 %    ABS. NEUTROPHILS 3.5 1.8 - 8.0 K/UL    ABS. LYMPHOCYTES 0.1 (L) 0.8 - 3.5 K/UL    ABS. MONOCYTES 0.1 0.0 - 1.0 K/UL    ABS. EOSINOPHILS 0.0 0.0 - 0.4 K/UL    ABS. BASOPHILS 0.0 0.0 - 0.1 K/UL    ABS. IMM. GRANS. 0.0 K/UL    DF AUTOMATED      RBC COMMENTS Anisocytosis  1+        RBC COMMENTS Poikilocytosis  1+       METABOLIC PANEL, COMPREHENSIVE    Collection Time: 01/06/21  9:57 AM   Result Value Ref Range    Sodium 145 136 - 145 mmol/L    Potassium 5.8 (H) 3.5 - 5.1 mmol/L    Chloride 120 (H) 97 - 108 mmol/L    CO2 17 (L) 21 - 32 mmol/L    Anion gap 8 5 - 15 mmol/L    Glucose 139 (H) 65 - 100 mg/dL    BUN 70 (H) 6 - 20 mg/dL    Creatinine 5.47 (H) 0.70 - 1.30 mg/dL    BUN/Creatinine ratio 13 12 - 20      GFR est AA 13 (L) >60 ml/min/1.73m2    GFR est non-AA 11 (L) >60 ml/min/1.73m2    Calcium 7.6 (L) 8.5 - 10.1 mg/dL    Bilirubin, total 0.3 0.2 - 1.0 mg/dL    AST (SGOT) 61 (H) 15 - 37 U/L    ALT (SGPT) 21 12 - 78 U/L    Alk. phosphatase 49 45 - 117 U/L    Protein, total 5.4 (L) 6.4 - 8.2 g/dL    Albumin 2.2 (L) 3.5 - 5.0 g/dL    Globulin 3.2 2.0 - 4.0 g/dL    A-G Ratio 0.7 (L) 1.1 - 2.2          CT CHEST WO CONT   Final Result   IMPRESSION:   1. No pneumomediastinum or pneumothorax. 2. Cardiomegaly. 3. Subtle groundglass densities in both lungs might be pneumonia or other. XR CHEST PORT   Final Result   IMPRESSION: Subtle but potentially clinically significant findings as above. Suggest radiographic follow-up. Assessment/Plan:      -GI is consulted for anemia and positive occult blood.   Patient reported that he had this happened in the past. This could be another diverticular bleed.  -Do bleeding scan. -Monitor H&H, transfuse as necessary. Continue IV Protonix. Colonoscopy once patient is stable. Active Problems:    GI bleed (1/5/2021)         IP CONSULT TO HOSPITALIST  IP CONSULT TO GASTROENTEROLOGY  IP CONSULT TO NEPHROLOGY  IP CONSULT TO INFECTIOUS DISEASES  IP CONSULT TO PULMONOLOGY      Patient discussed with Dr Shae Meyers and agrees to above impression and plan.   Thank you for allowing me to participate in this patients care    VASHTI Omer  Cc Referring Physician   Ayaz Garvin MD

## 2021-01-06 NOTE — CONSULTS
Consult    Patient: Abbey Agudelo MRN: [id number]  SSN: [id number]    YOB: 1955  Age: 76 y.o. Sex: male      Subjective:      Abbey Agudelo is a 76 y.o. male who is being seen for anemia. patient is seen in ER room   Patient's present to ER for dizziness,, shortness of breath he was found to have a hemoglobin of 3.5. He received 2 units of packed cells with hemoglobin increased to 5.5. Moreover, he was found to be COVID-19 positive with evidence of pneumonia. Has a history of severe diverticular bleed in 2016 requiring multiple transfusions. Past Medical History:   Diagnosis Date    Chronic kidney disease     Hypertension      History reviewed. No pertinent surgical history. History reviewed. No pertinent family history.   Social History     Tobacco Use    Smoking status: Never Smoker    Smokeless tobacco: Never Used   Substance Use Topics    Alcohol use: Not on file      Current Facility-Administered Medications   Medication Dose Route Frequency Provider Last Rate Last Admin    0.9% sodium chloride infusion 250 mL  250 mL IntraVENous PRN Krystal Roach MD        dexamethasone (DECADRON) 4 mg/mL injection 4 mg  4 mg IntraVENous Q12H Krystal Roach MD   4 mg at 01/06/21 1004    azithromycin (ZITHROMAX) 500 mg in 0.9% sodium chloride 250 mL (VIAL-MATE)  500 mg IntraVENous Q24H Krystal Roach MD   500 mg at 01/06/21 0137    cefTRIAXone (ROCEPHIN) 1 g in sterile water (preservative free) 10 mL IV syringe  1 g IntraVENous Q24H Krystal Roach MD   1 g at 01/06/21 0137    pantoprazole (PROTONIX) 40 mg in 0.9% sodium chloride 10 mL injection  40 mg IntraVENous DAILY Krystal Roach MD   40 mg at 01/06/21 1004    furosemide (LASIX) injection 20 mg  20 mg IntraVENous PRN Krystal Roach MD   20 mg at 01/06/21 1511    lactated Ringers infusion 1,000 mL  1,000 mL IntraVENous CONTINUOUS Kaleigh Rudolph MD   Stopped at 01/06/21 1229    0.9% sodium chloride infusion 250 mL  250 mL IntraVENous PRN Kit Nava MD        dextrose 5% 1,000 mL with sodium bicarbonate (8.4%) 150 mEq infusion   IntraVENous CONTINUOUS Kit Nava  mL/hr at 01/06/21 1411 New Bag at 01/06/21 1411    acetaminophen (TYLENOL) tablet 650 mg  650 mg Oral Q6H PRN Sofy Wright MD            No Known Allergies    Review of Systems:  Review of Systems   Constitutional: Positive for malaise/fatigue. HENT: Negative. Eyes: Negative. Cardiovascular: Positive for palpitations, orthopnea and PND. Gastrointestinal: Positive for melena. Genitourinary: Positive for dysuria. Negative for hematuria. Skin: Negative. Neurological: Positive for dizziness and headaches. Endo/Heme/Allergies: Negative. Psychiatric/Behavioral: Negative. Objective:     Vitals:    01/06/21 1355 01/06/21 1556 01/06/21 1613 01/06/21 1626   BP: 137/75 (!) 144/79 (!) 151/87 (!) 140/81   Pulse: (!) 58 (!) 54 69 (!) 58   Resp: 18  20 18   Temp:  98.2 °F (36.8 °C)  98.2 °F (36.8 °C)   SpO2: 97% 97% 100% 98%   Weight:       Height:            Physical Exam:  Physical Exam   Constitutional: He is oriented to person, place, and time. He appears distressed. HENT:   Head: Atraumatic. Eyes: Pupils are equal, round, and reactive to light. Conjunctivae and EOM are normal.   Neck: No JVD present. No tracheal deviation present. Cardiovascular: Normal heart sounds and intact distal pulses. Pulmonary/Chest: Effort normal and breath sounds normal.   Abdominal: Soft. Bowel sounds are normal. He exhibits no distension. There is no abdominal tenderness. There is no rebound and no guarding. Musculoskeletal:         General: No tenderness or edema. Neurological: He is oriented to person, place, and time. Skin: Skin is dry. Psychiatric: He has a normal mood and affect.         Recent Results (from the past 24 hour(s))   METABOLIC PANEL, COMPREHENSIVE    Collection Time: 01/05/21  6:30 PM   Result Value Ref Range    Sodium 143 136 - 145 mmol/L    Potassium 6.2 (H) 3.5 - 5.1 mmol/L    Chloride 119 (H) 97 - 108 mmol/L    CO2 15 (LL) 21 - 32 mmol/L    Anion gap 9 5 - 15 mmol/L    Glucose 105 (H) 65 - 100 mg/dL    BUN 72 (H) 6 - 20 mg/dL    Creatinine 5.90 (H) 0.70 - 1.30 mg/dL    BUN/Creatinine ratio 12 12 - 20      GFR est AA 12 (L) >60 ml/min/1.73m2    GFR est non-AA 10 (L) >60 ml/min/1.73m2    Calcium 7.9 (L) 8.5 - 10.1 mg/dL    Bilirubin, total 0.3 0.2 - 1.0 mg/dL    AST (SGOT) 69 (H) 15 - 37 U/L    ALT (SGPT) 22 12 - 78 U/L    Alk. phosphatase 61 45 - 117 U/L    Protein, total 6.1 (L) 6.4 - 8.2 g/dL    Albumin 2.6 (L) 3.5 - 5.0 g/dL    Globulin 3.5 2.0 - 4.0 g/dL    A-G Ratio 0.7 (L) 1.1 - 2.2     LACTIC ACID    Collection Time: 01/05/21  6:30 PM   Result Value Ref Range    Lactic acid 1.3 0.4 - 2.0 mmol/L   PROCALCITONIN    Collection Time: 01/05/21  6:30 PM   Result Value Ref Range    Procalcitonin 6.63 (H) 0 ng/mL   BNP    Collection Time: 01/05/21  6:30 PM   Result Value Ref Range    NT pro-BNP 5,772 (H) <125 pg/mL   MAGNESIUM    Collection Time: 01/05/21  6:30 PM   Result Value Ref Range    Magnesium 2.2 1.6 - 2.4 mg/dL   CBC WITH AUTOMATED DIFF    Collection Time: 01/05/21  7:56 PM   Result Value Ref Range    WBC 5.6 4.1 - 11.1 K/uL    RBC 1.47 (L) 4.10 - 5.70 M/uL    HGB 3.5 (LL) 12.1 - 17.0 g/dL    HCT 12.0 (LL) 36.6 - 50.3 %    MCV 81.6 80.0 - 99.0 FL    MCH 23.8 (L) 26.0 - 34.0 PG    MCHC 29.2 (L) 30.0 - 36.5 g/dL    RDW 16.5 (H) 11.5 - 14.5 %    PLATELET 924 718 - 109 K/uL    MPV 9.2 8.9 - 12.9 FL    NEUTROPHILS 88 (H) 32 - 75 %    BAND NEUTROPHILS 2 0 - 6 %    LYMPHOCYTES 4 (L) 12 - 49 %    MONOCYTES 6 5 - 13 %    EOSINOPHILS 0 0 - 7 %    BASOPHILS 0 0 - 1 %    IMMATURE GRANULOCYTES 0 %    ABS. NEUTROPHILS 5.1 1.8 - 8.0 K/UL    ABS. LYMPHOCYTES 0.2 (L) 0.8 - 3.5 K/UL    ABS. MONOCYTES 0.3 0.0 - 1.0 K/UL    ABS. EOSINOPHILS 0.0 0.0 - 0.4 K/UL    ABS. BASOPHILS 0.0 0.0 - 0.1 K/UL    ABS. IMM.  GRANS. 0.0 K/UL    DF Manual      RBC COMMENTS Hypochromia  3+        RBC COMMENTS Microcytosis  2+        RBC COMMENTS Ovalocytes  2+       TYPE & SCREEN    Collection Time: 01/05/21  7:56 PM   Result Value Ref Range    Crossmatch Expiration 01/08/2021,2359     ABO/Rh(D) O Positive     Antibody screen Negative     Unit number [id number]     Blood component type RC LR     Unit division 00     Status of unit Issued,final     TRANSFUSION STATUS Ok to transfuse     Crossmatch result Compatible     Unit number [id number]     Blood component type RC LR     Unit division 00     Status of unit [address] to transfuse     Crossmatch result Compatible     Unit number [id number]     Blood component type RC LR     Unit division 00     Status of unit [address] to transfuse     Crossmatch result Compatible     Unit number [id number]     Blood component type RC LR     Unit division 00     Status of unit [address] to transfuse     Crossmatch result Compatible     Unit number [id number]     Blood component type RC LR     Unit division 00     Status of unit [place] to transfuse     Crossmatch result Compatible    SARS-COV-2    Collection Time: 01/05/21  9:07 PM   Result Value Ref Range    SARS-CoV-2 Please find results under separate order     COVID-19 RAPID TEST    Collection Time: 01/05/21  9:07 PM   Result Value Ref Range    Specimen source Nasopharyngeal      COVID-19 rapid test Positive (A) Not Detected     OCCULT BLOOD, STOOL    Collection Time: 01/05/21  9:54 PM   Result Value Ref Range    Occult Blood,day 1 Positive (A) Negative     CBC WITH AUTOMATED DIFF    Collection Time: 01/06/21  9:57 AM   Result Value Ref Range    WBC 3.7 (L) 4.1 - 11.1 K/uL    RBC 2.04 (L) 4.10 - 5.70 M/uL    HGB 5.4 (LL) 12.1 - 17.0 g/dL    HCT 17.2 (LL) 36.6 - 50.3 %    MCV 84.3 80.0 - 99.0 FL    MCH 26.5 26.0 - 34.0 PG    MCHC 31.4 30.0 - 36.5 g/dL RDW 15.7 (H) 11.5 - 14.5 %    PLATELET 377 848 - 330 K/uL    MPV 9.1 8.9 - 12.9 FL    NEUTROPHILS 90 (H) 32 - 75 %    BAND NEUTROPHILS 2 0 - 6 %    LYMPHOCYTES 4 (L) 12 - 49 %    MONOCYTES 4 (L) 5 - 13 %    EOSINOPHILS 0 0 - 7 %    BASOPHILS 0 0 - 1 %    IMMATURE GRANULOCYTES 0 %    ABS. NEUTROPHILS 3.5 1.8 - 8.0 K/UL    ABS. LYMPHOCYTES 0.1 (L) 0.8 - 3.5 K/UL    ABS. MONOCYTES 0.1 0.0 - 1.0 K/UL    ABS. EOSINOPHILS 0.0 0.0 - 0.4 K/UL    ABS. BASOPHILS 0.0 0.0 - 0.1 K/UL    ABS. IMM. GRANS. 0.0 K/UL    DF AUTOMATED      RBC COMMENTS Anisocytosis  1+        RBC COMMENTS Poikilocytosis  1+       METABOLIC PANEL, COMPREHENSIVE    Collection Time: 01/06/21  9:57 AM   Result Value Ref Range    Sodium 145 136 - 145 mmol/L    Potassium 5.8 (H) 3.5 - 5.1 mmol/L    Chloride 120 (H) 97 - 108 mmol/L    CO2 17 (L) 21 - 32 mmol/L    Anion gap 8 5 - 15 mmol/L    Glucose 139 (H) 65 - 100 mg/dL    BUN 70 (H) 6 - 20 mg/dL    Creatinine 5.47 (H) 0.70 - 1.30 mg/dL    BUN/Creatinine ratio 13 12 - 20      GFR est AA 13 (L) >60 ml/min/1.73m2    GFR est non-AA 11 (L) >60 ml/min/1.73m2    Calcium 7.6 (L) 8.5 - 10.1 mg/dL    Bilirubin, total 0.3 0.2 - 1.0 mg/dL    AST (SGOT) 61 (H) 15 - 37 U/L    ALT (SGPT) 21 12 - 78 U/L    Alk. phosphatase 49 45 - 117 U/L    Protein, total 5.4 (L) 6.4 - 8.2 g/dL    Albumin 2.2 (L) 3.5 - 5.0 g/dL    Globulin 3.2 2.0 - 4.0 g/dL    A-G Ratio 0.7 (L) 1.1 - 2.2          CT CHEST WO CONT   Final Result   IMPRESSION:   1. No pneumomediastinum or pneumothorax. 2. Cardiomegaly. 3. Subtle groundglass densities in both lungs might be pneumonia or other. XR CHEST PORT   Final Result   IMPRESSION: Subtle but potentially clinically significant findings as above. Suggest radiographic follow-up.          Assessment:     Hospital Problems  Never Reviewed          Codes Class Noted POA    GI bleed ICD-10-CM: K92.2  ICD-9-CM: 578.9  1/5/2021 Unknown          Severe anemia  COVID-19 needs oxygen  Acute on chronic renal     Plan:   May need a more blood transfusion  PPI as ordered  scd for GI Prophylaxis:  Follow the hemoglobin closely  COVID 19 treatment  We will follow the patient to see if any GI study is needed  Signed By: Ryan Johnson MD     January 6, 2021         Thank you for allowing me to participate in this patients care  Cc Referring Physician   Marcial Tinoco MD

## 2021-01-06 NOTE — PROGRESS NOTES
Hospitalist Progress Note               Daily Progress Note: 1/6/2021      Subjective: The patient is seen for follow up. Patient's dizziness has been gone he is not short of breath. Also patient has been bradycardic as per RN it goes into 35s but he has been asymptomatic and blood pressure has been normal.  I will check his thyroid function if it is not checked already. I have discussed the case with pulmonologist as well as infectious disease. Problem List:  Problem List as of 1/6/2021 Never Reviewed          Codes Class Noted - Resolved    GI bleed ICD-10-CM: K92.2  ICD-9-CM: 578.9  1/5/2021 - Present              Medications reviewed  Current Facility-Administered Medications   Medication Dose Route Frequency    0.9% sodium chloride infusion 250 mL  250 mL IntraVENous PRN    dexamethasone (DECADRON) 4 mg/mL injection 4 mg  4 mg IntraVENous Q12H    azithromycin (ZITHROMAX) 500 mg in 0.9% sodium chloride 250 mL (VIAL-MATE)  500 mg IntraVENous Q24H    cefTRIAXone (ROCEPHIN) 1 g in sterile water (preservative free) 10 mL IV syringe  1 g IntraVENous Q24H    pantoprazole (PROTONIX) 40 mg in 0.9% sodium chloride 10 mL injection  40 mg IntraVENous DAILY    furosemide (LASIX) injection 20 mg  20 mg IntraVENous PRN    lactated Ringers infusion 1,000 mL  1,000 mL IntraVENous CONTINUOUS    0.9% sodium chloride infusion 250 mL  250 mL IntraVENous PRN    dextrose 5% 1,000 mL with sodium bicarbonate (8.4%) 150 mEq infusion   IntraVENous CONTINUOUS    acetaminophen (TYLENOL) tablet 650 mg  650 mg Oral Q6H PRN     No current outpatient medications on file. Review of Systems:   A comprehensive review of systems was negative except for that written in the HPI.     Objective:   Physical Exam:     Visit Vitals  /75   Pulse (!) 58   Temp 98.1 °F (36.7 °C)   Resp 18   Ht 6' (1.829 m)   Wt 79.4 kg (175 lb)   SpO2 97%   BMI 23.73 kg/m²    O2 Flow Rate (L/min): 6 l/min O2 Device: Nasal cannula    Temp (24hrs), Av.4 °F (36.9 °C), Min:98.1 °F (36.7 °C), Max:99.9 °F (37.7 °C)    701 - 1900  In: 1000 [I.V.:1000]  Out: -    1901 -  07  In:    Out: -   HEENT-normocephalic atraumatic skull pupils are bilaterally equal reacting to light sclera nonicteric conjunctive is pale no edema of the eyelids no yellow nasal discharge tongue is pale no ulcer positive gag reflex no pharyngeal inflammation extraocular movements are intact  Neck is supple pharyngeal motion no JVD no HJR no lymphadenopathy no thyromegaly no carotid bruit  General:   Awake and alert   Lungs:   Clear to auscultation bilaterally. Chest wall:  No tenderness or deformity. Heart:  Regular rate and rhythm, S1, S2 normal, no murmur, click, rub or gallop. Abdomen:   Soft, non-tender. Bowel sounds normal. No masses,  No organomegaly. Extremities: Extremities normal, atraumatic, no cyanosis or edema. Pulses: 2+ and symmetric all extremities. No edema   Skin: Skin color, texture, turgor normal. No rashes or lesions   Neurologic: CNII-XII intact. No gross focal deficits         Data Review:       Recent Days:  Recent Labs     21  0957 21  1956   WBC 3.7* 5.6   HGB 5.4* 3.5*   HCT 17.2* 12.0*    243     Recent Labs     21  0957 21  1830    143   K 5.8* 6.2*   * 119*   CO2 17* 15*   * 105*   BUN 70* 72*   CREA 5.47* 5.90*   CA 7.6* 7.9*   MG  --  2.2   ALB 2.2* 2.6*   TBILI 0.3 0.3   ALT 21 22     No results for input(s): PH, PCO2, PO2, HCO3, FIO2 in the last 72 hours.     24 Hour Results:  Recent Results (from the past 24 hour(s))   METABOLIC PANEL, COMPREHENSIVE    Collection Time: 21  6:30 PM   Result Value Ref Range    Sodium 143 136 - 145 mmol/L    Potassium 6.2 (H) 3.5 - 5.1 mmol/L    Chloride 119 (H) 97 - 108 mmol/L    CO2 15 (LL) 21 - 32 mmol/L    Anion gap 9 5 - 15 mmol/L    Glucose 105 (H) 65 - 100 mg/dL    BUN 72 (H) 6 - 20 mg/dL    Creatinine 5.90 (H) 0.70 - 1.30 mg/dL    BUN/Creatinine ratio 12 12 - 20      GFR est AA 12 (L) >60 ml/min/1.73m2    GFR est non-AA 10 (L) >60 ml/min/1.73m2    Calcium 7.9 (L) 8.5 - 10.1 mg/dL    Bilirubin, total 0.3 0.2 - 1.0 mg/dL    AST (SGOT) 69 (H) 15 - 37 U/L    ALT (SGPT) 22 12 - 78 U/L    Alk. phosphatase 61 45 - 117 U/L    Protein, total 6.1 (L) 6.4 - 8.2 g/dL    Albumin 2.6 (L) 3.5 - 5.0 g/dL    Globulin 3.5 2.0 - 4.0 g/dL    A-G Ratio 0.7 (L) 1.1 - 2.2     LACTIC ACID    Collection Time: 01/05/21  6:30 PM   Result Value Ref Range    Lactic acid 1.3 0.4 - 2.0 mmol/L   PROCALCITONIN    Collection Time: 01/05/21  6:30 PM   Result Value Ref Range    Procalcitonin 6.63 (H) 0 ng/mL   BNP    Collection Time: 01/05/21  6:30 PM   Result Value Ref Range    NT pro-BNP 5,772 (H) <125 pg/mL   MAGNESIUM    Collection Time: 01/05/21  6:30 PM   Result Value Ref Range    Magnesium 2.2 1.6 - 2.4 mg/dL   CBC WITH AUTOMATED DIFF    Collection Time: 01/05/21  7:56 PM   Result Value Ref Range    WBC 5.6 4.1 - 11.1 K/uL    RBC 1.47 (L) 4.10 - 5.70 M/uL    HGB 3.5 (LL) 12.1 - 17.0 g/dL    HCT 12.0 (LL) 36.6 - 50.3 %    MCV 81.6 80.0 - 99.0 FL    MCH 23.8 (L) 26.0 - 34.0 PG    MCHC 29.2 (L) 30.0 - 36.5 g/dL    RDW 16.5 (H) 11.5 - 14.5 %    PLATELET 714 657 - 082 K/uL    MPV 9.2 8.9 - 12.9 FL    NEUTROPHILS 88 (H) 32 - 75 %    BAND NEUTROPHILS 2 0 - 6 %    LYMPHOCYTES 4 (L) 12 - 49 %    MONOCYTES 6 5 - 13 %    EOSINOPHILS 0 0 - 7 %    BASOPHILS 0 0 - 1 %    IMMATURE GRANULOCYTES 0 %    ABS. NEUTROPHILS 5.1 1.8 - 8.0 K/UL    ABS. LYMPHOCYTES 0.2 (L) 0.8 - 3.5 K/UL    ABS. MONOCYTES 0.3 0.0 - 1.0 K/UL    ABS. EOSINOPHILS 0.0 0.0 - 0.4 K/UL    ABS. BASOPHILS 0.0 0.0 - 0.1 K/UL    ABS. IMM.  GRANS. 0.0 K/UL    DF Manual      RBC COMMENTS Hypochromia  3+        RBC COMMENTS Microcytosis  2+        RBC COMMENTS Ovalocytes  2+       TYPE & SCREEN    Collection Time: 01/05/21  7:56 PM   Result Value Ref Range    Crossmatch Expiration 01/08/2021,2357     ABO/Rh(D) O Positive     Antibody screen Negative     Unit number [id number]     Blood component type  LR     Unit division 00     Status of unit Issued,final     TRANSFUSION STATUS Ok to transfuse     Crossmatch result Compatible     Unit number [id number]     Blood component type RC LR     Unit division 00     Status of unit [address] to transfuse     Crossmatch result Compatible     Unit number [id number]     Blood component type RC LR     Unit division 00     Status of unit [address] to transfuse     Crossmatch result Compatible     Unit number [id number]     Blood component type RC LR     Unit division 00     Status of unit [place] to transfuse     Crossmatch result Compatible     Unit number [id number]     Blood component type  LR     Unit division 00     Status of unit [place] to transfuse     Crossmatch result Compatible    SARS-COV-2    Collection Time: 01/05/21  9:07 PM   Result Value Ref Range    SARS-CoV-2 Please find results under separate order     COVID-19 RAPID TEST    Collection Time: 01/05/21  9:07 PM   Result Value Ref Range    Specimen source Nasopharyngeal      COVID-19 rapid test Positive (A) Not Detected     OCCULT BLOOD, STOOL    Collection Time: 01/05/21  9:54 PM   Result Value Ref Range    Occult Blood,day 1 Positive (A) Negative     CBC WITH AUTOMATED DIFF    Collection Time: 01/06/21  9:57 AM   Result Value Ref Range    WBC 3.7 (L) 4.1 - 11.1 K/uL    RBC 2.04 (L) 4.10 - 5.70 M/uL    HGB 5.4 (LL) 12.1 - 17.0 g/dL    HCT 17.2 (LL) 36.6 - 50.3 %    MCV 84.3 80.0 - 99.0 FL    MCH 26.5 26.0 - 34.0 PG    MCHC 31.4 30.0 - 36.5 g/dL    RDW 15.7 (H) 11.5 - 14.5 %    PLATELET 230 261 - 180 K/uL    MPV 9.1 8.9 - 12.9 FL    NEUTROPHILS PENDING %    LYMPHOCYTES PENDING %    MONOCYTES PENDING %    EOSINOPHILS PENDING %    BASOPHILS PENDING %    IMMATURE GRANULOCYTES PENDING %    ABS. NEUTROPHILS PENDING K/UL    ABS. LYMPHOCYTES PENDING K/UL    ABS. MONOCYTES PENDING K/UL    ABS. EOSINOPHILS PENDING K/UL    ABS. BASOPHILS PENDING K/UL    ABS. IMM. GRANS. PENDING K/UL    DF PENDING    METABOLIC PANEL, COMPREHENSIVE    Collection Time: 01/06/21  9:57 AM   Result Value Ref Range    Sodium 145 136 - 145 mmol/L    Potassium 5.8 (H) 3.5 - 5.1 mmol/L    Chloride 120 (H) 97 - 108 mmol/L    CO2 17 (L) 21 - 32 mmol/L    Anion gap 8 5 - 15 mmol/L    Glucose 139 (H) 65 - 100 mg/dL    BUN 70 (H) 6 - 20 mg/dL    Creatinine 5.47 (H) 0.70 - 1.30 mg/dL    BUN/Creatinine ratio 13 12 - 20      GFR est AA 13 (L) >60 ml/min/1.73m2    GFR est non-AA 11 (L) >60 ml/min/1.73m2    Calcium 7.6 (L) 8.5 - 10.1 mg/dL    Bilirubin, total 0.3 0.2 - 1.0 mg/dL    AST (SGOT) 61 (H) 15 - 37 U/L    ALT (SGPT) 21 12 - 78 U/L    Alk. phosphatase 49 45 - 117 U/L    Protein, total 5.4 (L) 6.4 - 8.2 g/dL    Albumin 2.2 (L) 3.5 - 5.0 g/dL    Globulin 3.2 2.0 - 4.0 g/dL    A-G Ratio 0.7 (L) 1.1 - 2.2         CT CHEST WO CONT   Final Result   IMPRESSION:   1. No pneumomediastinum or pneumothorax. 2. Cardiomegaly. 3. Subtle groundglass densities in both lungs might be pneumonia or other. XR CHEST PORT   Final Result   IMPRESSION: Subtle but potentially clinically significant findings as above. Suggest radiographic follow-up. Assessment: COVID-19 pneumonitis CT scan is showing groundglass appearance in both the basal area  Anemia  History of hypertension  Acute on chronic renal failure  Bradycardia-asymptomatic      Plan: I will order thyroid function test.  We will continue other medications. Awaiting for GI evaluation        Care Plan discussed with: Patient as well as RN taking care of the patient    Total time spent with patient: 45 minutes.     Leanna Walton MD

## 2021-01-07 ENCOUNTER — APPOINTMENT (OUTPATIENT)
Dept: NUCLEAR MEDICINE | Age: 66
DRG: 177 | End: 2021-01-07
Attending: NURSE PRACTITIONER
Payer: MEDICARE

## 2021-01-07 LAB
ABO + RH BLD: NORMAL
ALBUMIN SERPL-MCNC: 2.3 G/DL (ref 3.5–5)
ALBUMIN/GLOB SERPL: 0.7 {RATIO} (ref 1.1–2.2)
ALP SERPL-CCNC: 57 U/L (ref 45–117)
ALT SERPL-CCNC: 25 U/L (ref 12–78)
ANION GAP SERPL CALC-SCNC: 6 MMOL/L (ref 5–15)
AST SERPL W P-5'-P-CCNC: 55 U/L (ref 15–37)
BASOPHILS # BLD: 0 K/UL (ref 0–0.1)
BASOPHILS # BLD: 0 K/UL (ref 0–0.1)
BASOPHILS NFR BLD: 0 % (ref 0–1)
BASOPHILS NFR BLD: 0 % (ref 0–1)
BILIRUB SERPL-MCNC: 0.3 MG/DL (ref 0.2–1)
BLD PROD TYP BPU: NORMAL
BLOOD GROUP ANTIBODIES SERPL: NEGATIVE
BPU ID: NORMAL
BUN SERPL-MCNC: 83 MG/DL (ref 6–20)
BUN/CREAT SERPL: 15 (ref 12–20)
CA-I BLD-MCNC: 7.9 MG/DL (ref 8.5–10.1)
CHLORIDE SERPL-SCNC: 114 MMOL/L (ref 97–108)
CO2 SERPL-SCNC: 22 MMOL/L (ref 21–32)
CREAT SERPL-MCNC: 5.42 MG/DL (ref 0.7–1.3)
CROSSMATCH RESULT,%XM: NORMAL
DIFFERENTIAL METHOD BLD: ABNORMAL
DIFFERENTIAL METHOD BLD: ABNORMAL
EOSINOPHIL # BLD: 0 K/UL (ref 0–0.4)
EOSINOPHIL # BLD: 0 K/UL (ref 0–0.4)
EOSINOPHIL NFR BLD: 0 % (ref 0–7)
EOSINOPHIL NFR BLD: 0 % (ref 0–7)
ERYTHROCYTE [DISTWIDTH] IN BLOOD BY AUTOMATED COUNT: 15.5 % (ref 11.5–14.5)
ERYTHROCYTE [DISTWIDTH] IN BLOOD BY AUTOMATED COUNT: 15.5 % (ref 11.5–14.5)
FOLATE SERPL-MCNC: 6.1 NG/ML (ref 5–21)
GLOBULIN SER CALC-MCNC: 3.2 G/DL (ref 2–4)
GLUCOSE SERPL-MCNC: 136 MG/DL (ref 65–100)
HCT VFR BLD AUTO: 24.5 % (ref 36.6–50.3)
HCT VFR BLD AUTO: 26.3 % (ref 36.6–50.3)
HGB BLD-MCNC: 8.1 G/DL (ref 12.1–17)
HGB BLD-MCNC: 8.4 G/DL (ref 12.1–17)
IMM GRANULOCYTES # BLD AUTO: 0 K/UL (ref 0–0.04)
IMM GRANULOCYTES # BLD AUTO: 0 K/UL (ref 0–0.04)
IMM GRANULOCYTES NFR BLD AUTO: 0 % (ref 0–0.5)
IMM GRANULOCYTES NFR BLD AUTO: 0 % (ref 0–0.5)
IRON SATN MFR SERPL: 3 % (ref 20–50)
IRON SERPL-MCNC: 10 UG/DL (ref 35–150)
LYMPHOCYTES # BLD: 0.3 K/UL (ref 0.8–3.5)
LYMPHOCYTES # BLD: 0.3 K/UL (ref 0.8–3.5)
LYMPHOCYTES NFR BLD: 5 % (ref 12–49)
LYMPHOCYTES NFR BLD: 5 % (ref 12–49)
MCH RBC QN AUTO: 27.5 PG (ref 26–34)
MCH RBC QN AUTO: 28 PG (ref 26–34)
MCHC RBC AUTO-ENTMCNC: 31.9 G/DL (ref 30–36.5)
MCHC RBC AUTO-ENTMCNC: 33.1 G/DL (ref 30–36.5)
MCV RBC AUTO: 84.8 FL (ref 80–99)
MCV RBC AUTO: 85.9 FL (ref 80–99)
MONOCYTES # BLD: 0.3 K/UL (ref 0–1)
MONOCYTES # BLD: 0.5 K/UL (ref 0–1)
MONOCYTES NFR BLD: 5 % (ref 5–13)
MONOCYTES NFR BLD: 9 % (ref 5–13)
MRSA DNA SPEC QL NAA+PROBE: NOT DETECTED
NEUTS SEG # BLD: 4.7 K/UL (ref 1.8–8)
NEUTS SEG # BLD: 6.6 K/UL (ref 1.8–8)
NEUTS SEG NFR BLD: 86 % (ref 32–75)
NEUTS SEG NFR BLD: 91 % (ref 32–75)
PLATELET # BLD AUTO: 185 K/UL (ref 150–400)
PLATELET # BLD AUTO: 199 K/UL (ref 150–400)
PMV BLD AUTO: 10.2 FL (ref 8.9–12.9)
PMV BLD AUTO: 9.8 FL (ref 8.9–12.9)
POTASSIUM SERPL-SCNC: 5.5 MMOL/L (ref 3.5–5.1)
PROT SERPL-MCNC: 5.5 G/DL (ref 6.4–8.2)
RBC # BLD AUTO: 2.89 M/UL (ref 4.1–5.7)
RBC # BLD AUTO: 3.06 M/UL (ref 4.1–5.7)
SODIUM SERPL-SCNC: 142 MMOL/L (ref 136–145)
SPECIMEN EXP DATE BLD: NORMAL
STATUS OF UNIT,%ST: NORMAL
TIBC SERPL-MCNC: 320 UG/DL (ref 250–450)
TRANSFUSION STATUS PATIENT QL: NORMAL
UNIT DIVISION, %UDIV: 0
VIT B12 SERPL-MCNC: 381 PG/ML (ref 193–986)
WBC # BLD AUTO: 5.5 K/UL (ref 4.1–11.1)
WBC # BLD AUTO: 7.3 K/UL (ref 4.1–11.1)

## 2021-01-07 PROCEDURE — 74011250636 HC RX REV CODE- 250/636: Performed by: INTERNAL MEDICINE

## 2021-01-07 PROCEDURE — 74011000258 HC RX REV CODE- 258: Performed by: INTERNAL MEDICINE

## 2021-01-07 PROCEDURE — 84300 ASSAY OF URINE SODIUM: CPT

## 2021-01-07 PROCEDURE — 82570 ASSAY OF URINE CREATININE: CPT

## 2021-01-07 PROCEDURE — C9113 INJ PANTOPRAZOLE SODIUM, VIA: HCPCS | Performed by: INTERNAL MEDICINE

## 2021-01-07 PROCEDURE — 65270000029 HC RM PRIVATE

## 2021-01-07 PROCEDURE — 74011250637 HC RX REV CODE- 250/637: Performed by: INTERNAL MEDICINE

## 2021-01-07 PROCEDURE — 87641 MR-STAPH DNA AMP PROBE: CPT

## 2021-01-07 PROCEDURE — 99233 SBSQ HOSP IP/OBS HIGH 50: CPT | Performed by: INTERNAL MEDICINE

## 2021-01-07 PROCEDURE — 84156 ASSAY OF PROTEIN URINE: CPT

## 2021-01-07 PROCEDURE — 74011000250 HC RX REV CODE- 250: Performed by: INTERNAL MEDICINE

## 2021-01-07 PROCEDURE — 80053 COMPREHEN METABOLIC PANEL: CPT

## 2021-01-07 PROCEDURE — 84133 ASSAY OF URINE POTASSIUM: CPT

## 2021-01-07 PROCEDURE — 85025 COMPLETE CBC W/AUTO DIFF WBC: CPT

## 2021-01-07 RX ORDER — DEXAMETHASONE SODIUM PHOSPHATE 4 MG/ML
6 INJECTION, SOLUTION INTRA-ARTICULAR; INTRALESIONAL; INTRAMUSCULAR; INTRAVENOUS; SOFT TISSUE EVERY 12 HOURS
Status: DISCONTINUED | OUTPATIENT
Start: 2021-01-07 | End: 2021-01-10

## 2021-01-07 RX ORDER — SODIUM BICARBONATE 650 MG/1
650 TABLET ORAL 2 TIMES DAILY
Status: DISCONTINUED | OUTPATIENT
Start: 2021-01-07 | End: 2021-01-13 | Stop reason: HOSPADM

## 2021-01-07 RX ORDER — SODIUM CHLORIDE 450 MG/100ML
25 INJECTION, SOLUTION INTRAVENOUS CONTINUOUS
Status: DISCONTINUED | OUTPATIENT
Start: 2021-01-07 | End: 2021-01-13 | Stop reason: HOSPADM

## 2021-01-07 RX ADMIN — AZITHROMYCIN DIHYDRATE 500 MG: 500 INJECTION, POWDER, LYOPHILIZED, FOR SOLUTION INTRAVENOUS at 01:10

## 2021-01-07 RX ADMIN — PANTOPRAZOLE SODIUM 40 MG: 40 INJECTION, POWDER, FOR SOLUTION INTRAVENOUS at 11:08

## 2021-01-07 RX ADMIN — AZITHROMYCIN DIHYDRATE 500 MG: 500 INJECTION, POWDER, LYOPHILIZED, FOR SOLUTION INTRAVENOUS at 23:58

## 2021-01-07 RX ADMIN — SODIUM CHLORIDE 75 ML/HR: 4.5 INJECTION, SOLUTION INTRAVENOUS at 16:42

## 2021-01-07 RX ADMIN — SODIUM BICARBONATE 650 MG TABLET 650 MG: at 22:13

## 2021-01-07 RX ADMIN — DEXAMETHASONE SODIUM PHOSPHATE 6 MG: 4 INJECTION, SOLUTION INTRAMUSCULAR; INTRAVENOUS at 22:13

## 2021-01-07 RX ADMIN — CEFTRIAXONE SODIUM 1 G: 1 INJECTION, POWDER, FOR SOLUTION INTRAMUSCULAR; INTRAVENOUS at 23:57

## 2021-01-07 RX ADMIN — CEFTRIAXONE SODIUM 1 G: 1 INJECTION, POWDER, FOR SOLUTION INTRAMUSCULAR; INTRAVENOUS at 01:10

## 2021-01-07 RX ADMIN — DEXAMETHASONE SODIUM PHOSPHATE 4 MG: 4 INJECTION, SOLUTION INTRAMUSCULAR; INTRAVENOUS at 11:09

## 2021-01-07 NOTE — PROGRESS NOTES
[facility] skin assessment by Shawnee Martin RN and Chandrika Marrero RN. Excessive dryness noted, but no open areas. Skin is warm, dry, and intact.

## 2021-01-07 NOTE — ROUTINE PROCESS
Patient transferred to  516 on 3L NC. No signs of acute distress or complaints during transfer. Bedside report given to Nicky Nieves RN. Patient belongings transferred with patient in bed and placed in closet.

## 2021-01-07 NOTE — PROGRESS NOTES
Progress Note    Patient: Silverio Miguel MRN: [id number]  SSN: [id number]    YOB: 1955  Age: 76 y.o.   Sex: male      Admit Date: 1/5/2021    LOS: 2 days     Subjective:   Patient was seen   Initially patient was in ICU  patient will be transferred to the medical floor  Denied any rectal bleeding,no severe shortness breath,  Past Medical History:   Diagnosis Date    Chronic kidney disease     Hypertension         Current Facility-Administered Medications:     technetium labeled red blood cells (ULTRATAG) injection 25 millicurie, 25 millicurie, IntraVENous, RAD ONCE, Sabrina Luong NP, Stopped at 01/07/21 0845    dexamethasone (DECADRON) 4 mg/mL injection 6 mg, 6 mg, IntraVENous, Q12H, Roney Mcnulty MD    0.45% sodium chloride infusion, 75 mL/hr, IntraVENous, CONTINUOUS, Frankie Valencia MD, Last Rate: 75 mL/hr at 01/07/21 1642, 75 mL/hr at 01/07/21 1642    sodium bicarbonate tablet 650 mg, 650 mg, Oral, BID, Frankie Valencia MD    0.9% sodium chloride infusion 250 mL, 250 mL, IntraVENous, PRN, Ruma Martins MD    azithromycin (ZITHROMAX) 500 mg in 0.9% sodium chloride 250 mL (VIAL-MATE), 500 mg, IntraVENous, Q24H, Ruma Martins MD, 500 mg at 01/07/21 0110    cefTRIAXone (ROCEPHIN) 1 g in sterile water (preservative free) 10 mL IV syringe, 1 g, IntraVENous, Q24H, Ruma Martins MD, 1 g at 01/07/21 0110    pantoprazole (PROTONIX) 40 mg in 0.9% sodium chloride 10 mL injection, 40 mg, IntraVENous, DAILY, Ruma Martins MD, 40 mg at 01/07/21 1108    0.9% sodium chloride infusion 250 mL, 250 mL, IntraVENous, PRN, Rogelio Muñoz MD    0.9% sodium chloride infusion 250 mL, 250 mL, IntraVENous, PRN, Juju Drew MD    acetaminophen (TYLENOL) tablet 650 mg, 650 mg, Oral, Q6H PRN, Ruma Martins MD    Objective:     Vitals:    01/07/21 0806 01/07/21 0809 01/07/21 1113 01/07/21 1500   BP: (!) 158/86  (!) 152/81    Pulse: (!) 37  (!) 45    Resp: 15  14    Temp: 98.2 °F (36.8 °C)  97.7 °F (36.5 °C) 98.1 °F (36.7 °C)   SpO2: 100% 99% 100%    Weight:       Height:            Intake and Output:  Current Shift: No intake/output data recorded. Last three shifts: 01/05 1901 - 01/07 0700  In: 05743 [I.V.:1000]  Out: 1200 [Urine:1200]    Physical Exam:   Physical Exam   Constitutional: He appears distressed. HENT:   Head: Atraumatic. Eyes: Conjunctivae are normal.   Neck: Neck supple. No tracheal deviation present. Cardiovascular: Normal heart sounds. Pulmonary/Chest: Effort normal. No respiratory distress. He has no wheezes. Abdominal: Soft. Bowel sounds are normal.   Musculoskeletal: Normal range of motion. Neurological: He is alert. Skin: Skin is warm. Lab/Data Review:  Recent Results (from the past 24 hour(s))   CONVALESCENT PLASMA, ALLOCATE    Collection Time: 01/06/21  5:45 PM   Result Value Ref Range    Unit number [id number]     Blood component type Convs,     Unit division 00     Status of unit Issued,final     TRANSFUSION STATUS Ok to transfuse    C REACTIVE PROTEIN, QT    Collection Time: 01/06/21  8:30 PM   Result Value Ref Range    C-Reactive protein 2.20 (H) 0.00 - 0.60 mg/dL   LD    Collection Time: 01/06/21  8:30 PM   Result Value Ref Range     (H) 85 - 241 U/L   PROCALCITONIN    Collection Time: 01/06/21  8:30 PM   Result Value Ref Range    Procalcitonin 5.19 (H) 0 ng/mL   CBC WITH AUTOMATED DIFF    Collection Time: 01/07/21  4:21 AM   Result Value Ref Range    WBC 7.3 4.1 - 11.1 K/uL    RBC 2.89 (L) 4.10 - 5.70 M/uL    HGB 8.1 (L) 12.1 - 17.0 g/dL    HCT 24.5 (L) 36.6 - 50.3 %    MCV 84.8 80.0 - 99.0 FL    MCH 28.0 26.0 - 34.0 PG    MCHC 33.1 30.0 - 36.5 g/dL    RDW 15.5 (H) 11.5 - 14.5 %    PLATELET 915 301 - 408 K/uL    MPV 9.8 8.9 - 12.9 FL    NEUTROPHILS 91 (H) 32 - 75 %    LYMPHOCYTES 5 (L) 12 - 49 %    MONOCYTES 5 5 - 13 %    EOSINOPHILS 0 0 - 7 %    BASOPHILS 0 0 - 1 %    IMMATURE GRANULOCYTES 0 0.0 - 0.5 %    ABS.  NEUTROPHILS 6.6 1.8 - 8.0 K/UL    ABS. LYMPHOCYTES 0.3 (L) 0.8 - 3.5 K/UL    ABS. MONOCYTES 0.3 0.0 - 1.0 K/UL    ABS. EOSINOPHILS 0.0 0.0 - 0.4 K/UL    ABS. BASOPHILS 0.0 0.0 - 0.1 K/UL    ABS. IMM. GRANS. 0.0 0.00 - 0.04 K/UL    DF AUTOMATED     METABOLIC PANEL, COMPREHENSIVE    Collection Time: 01/07/21  4:21 AM   Result Value Ref Range    Sodium 142 136 - 145 mmol/L    Potassium 5.5 (H) 3.5 - 5.1 mmol/L    Chloride 114 (H) 97 - 108 mmol/L    CO2 22 21 - 32 mmol/L    Anion gap 6 5 - 15 mmol/L    Glucose 136 (H) 65 - 100 mg/dL    BUN 83 (H) 6 - 20 mg/dL    Creatinine 5.42 (H) 0.70 - 1.30 mg/dL    BUN/Creatinine ratio 15 12 - 20      GFR est AA 13 (L) >60 ml/min/1.73m2    GFR est non-AA 11 (L) >60 ml/min/1.73m2    Calcium 7.9 (L) 8.5 - 10.1 mg/dL    Bilirubin, total 0.3 0.2 - 1.0 mg/dL    AST (SGOT) 55 (H) 15 - 37 U/L    ALT (SGPT) 25 12 - 78 U/L    Alk. phosphatase 57 45 - 117 U/L    Protein, total 5.5 (L) 6.4 - 8.2 g/dL    Albumin 2.3 (L) 3.5 - 5.0 g/dL    Globulin 3.2 2.0 - 4.0 g/dL    A-G Ratio 0.7 (L) 1.1 - 2.2     MRSA SCREEN - PCR (NASAL)    Collection Time: 01/07/21 12:00 PM   Result Value Ref Range    MRSA by PCR, Nasal Not Detected Not Detected          CT CHEST WO CONT   Final Result   IMPRESSION:   1. No pneumomediastinum or pneumothorax.   2. Cardiomegaly.   3. Subtle groundglass densities in both lungs might be pneumonia or other.         XR CHEST PORT   Final Result   IMPRESSION: Subtle but potentially clinically significant findings as above.   Suggest radiographic follow-up.           Assessment:     Active Problems:    GI bleed (1/5/2021)             Severe anemia, past history of diverticular bleed but he has no no evidence of active GI bleeding  COVID-19 pneumonia,  Renal insufficiency     Plan:   May need a more blood transfusion, IV half-normal saline to correct electrolytes of abnormally  PPI as ordered  Follow the hemoglobin closely  Follow the patient to see if an endoscopy study is needed    Plan:  Signed By: Elisa Douglas MD     January 7, 2021        Thank you for allowing me to participate in this patients care  Cc Referring Physician   Tanya Castro MD

## 2021-01-07 NOTE — PROGRESS NOTES
Patient is COVID positive. CM attempted to reach patient on the room phone, no answer. CM attempted to reach patient's emergency contact, Nettie Sharri [phone], for discharge assessment. No answer. Number no longer in service. CM will attempted assessment at a later time.

## 2021-01-07 NOTE — PROGRESS NOTES
Pulmonary/CC Progress Note  Patient is a 76 y.o. male  AA who is known to have history of chronic kidney disease stage IV with baseline serum creatinine ranging between 3.7 and 4.2. He is admitted hospital because of generalized weakness, dizziness. He additionally history of hypertension. Initial work-up showed hemoglobin of 3.5 g/dL. He was also noted to have COVID-19 infection. Subjective:   Daily Progress Note: 1/7/2021 2:10 PM    Patient is transferred to ICU. Examined at bedside. Not in any distress. He is on 2 L of nasal cannula oxygen. No respiratory distress noted. He received 2 units of packed RBCs yesterday. He was evaluated by GI service. Denied any fever or chills. Dizziness has shown improvement      Current Facility-Administered Medications   Medication Dose Route Frequency    technetium labeled red blood cells (ULTRATAG) injection 25 millicurie  25 millicurie IntraVENous RAD ONCE    0.9% sodium chloride infusion 250 mL  250 mL IntraVENous PRN    dexamethasone (DECADRON) 4 mg/mL injection 4 mg  4 mg IntraVENous Q12H    azithromycin (ZITHROMAX) 500 mg in 0.9% sodium chloride 250 mL (VIAL-MATE)  500 mg IntraVENous Q24H    cefTRIAXone (ROCEPHIN) 1 g in sterile water (preservative free) 10 mL IV syringe  1 g IntraVENous Q24H    pantoprazole (PROTONIX) 40 mg in 0.9% sodium chloride 10 mL injection  40 mg IntraVENous DAILY    0.9% sodium chloride infusion 250 mL  250 mL IntraVENous PRN    lactated Ringers infusion 1,000 mL  1,000 mL IntraVENous CONTINUOUS    0.9% sodium chloride infusion 250 mL  250 mL IntraVENous PRN    dextrose 5% 1,000 mL with sodium bicarbonate (8.4%) 150 mEq infusion   IntraVENous CONTINUOUS    acetaminophen (TYLENOL) tablet 650 mg  650 mg Oral Q6H PRN        Review of Systems  Denies any loose bowel movements. Denied any bloody stools. Denied any nausea or vomiting.     Objective:     Visit Vitals  BP (!) 152/81 (BP 1 Location: Right arm, BP Patient Position: At rest)   Pulse (!) 45   Temp 97.7 °F (36.5 °C)   Resp 14   Ht 6' (1.829 m)   Wt 79.4 kg (175 lb)   SpO2 100%   BMI 23.73 kg/m²    O2 Flow Rate (L/min): 3 l/min O2 Device: Nasal cannula    Temp (24hrs), Av.2 °F (36.8 °C), Min:97.7 °F (36.5 °C), Max:98.4 °F (36.9 °C)      No intake/output data recorded.  1901 -  0700  In: 89540 [I.V.:1000]  Out: 18 [Urine:1200]    This is an elderly male who is currently not in significant respiratory distress. He is in of average build. He is alert and oriented x3  Neck is supple. No cervical lymphadenopathy. Thyroid not enlarged  Chest: Decreased air entry at lung bases. Few rhonchi audible. No wheezing was appreciated  Heart: S1-S2 normal  Abdomen: Soft, nontender, no visceromegaly. Extremities: No edema, cyanosis or clubbing  Neuro: No focal motor deficit. Lab/Data Review:  Recent Labs     21  0421 21  0957 21  0001   WBC 7.3 3.7* 5.5   HGB 8.1* 5.4* 8.4*   HCT 24.5* 17.2* 26.3*    201 199     Recent Labs     21  0421 21  0957 21  1830    145 143   K 5.5* 5.8* 6.2*   * 120* 119*   CO2 22 17* 15*   * 139* 105*   BUN 83* 70* 72*   CREA 5.42* 5.47* 5.90*   CA 7.9* 7.6* 7.9*   MG  --   --  2.2   ALB 2.3* 2.2* 2.6*   TBILI 0.3 0.3 0.3   ALT 25          Diagnostics   CXR Results  (Last 48 hours)               21 1817  XR CHEST PORT Final result    Impression:  IMPRESSION: Subtle but potentially clinically significant findings as above. Suggest radiographic follow-up. Narrative:  Portable AP view at 1816 hours. Comparison 2008. Cardiac silhouette size is borderline enlarged. Arcuate lucency adjacent to the left side of the aortic knob margin could   reflect a tiny pneumomediastinum. Mild pulmonary vascular congestion, without overt edema. Patchy densities in the lower lungs could be minimal infiltrate. Consider   follow-up if warranted clinically. Assessment/Plan:     Active Problems:    GI bleed (1/5/2021)       Impression:   Patient is a 76 y.o. male  AA who is known to have history of chronic kidney disease stage IV with baseline serum creatinine ranging between 3.7 and 4.2. He is admitted hospital because of generalized weakness, dizziness. He additionally history of hypertension. Initial work-up showed hemoglobin of 3.5 g/dL. He was also noted to have COVID-19 infection.     Plan:   1. Dyspnea:  Patient noted to be short of breath on physical activity. Differential diagnosis includes hypoxia from severe anemia versus or combination from Covid 19 pneumonitis. Patient is on supplemental oxygen at 4 L/min. Saturating 98%. We will continue to watch his oxygen saturations. Patient can be transferred to medical floor  2. COVID-19 pneumonitis:  Patient has bilateral basal infiltrates, has positive COVID-19 test results. He has developed COVID-19 pneumonitis. Patient is on supplemental oxygen, started on dexamethasone, however the increased dosage to 6 mg once daily. Patient started on IV Zithromax. Since patient has stage IV chronic kidney disease with GFR of 13, he will not be a candidate for remdesivir.     3. Severe anemia:  He had hemoglobin of 3.5 g/dL. This seems to be secondary to blood loss anemia. He is getting 5 units of packed RBCs. His hemoglobin has come up to 8.1 g/dL. Patient was seen by GI service. 4.  Chronic kidney disease:  He has stage IV chronic kidney disease. He has been followed by Dr. Stephanie Stewart. Patient also has nephrotic range proteinuria.     Patient is on DVT prophylaxis. Once he gets a bed on the medical floor then he will get transferred.     I thank you for involving me in the management of the patient     This document has been prepared by the Dragon voice recognition system, typographical errors may have occurred.  Attempts have been made to correct errors, however inadvertent errors may persist.        Care Plan discussed with: patient and covering nurse        Thomas Cline MD  Pulmonary/CC

## 2021-01-07 NOTE — PROGRESS NOTES
[facility]     Renal Daily Progress Note:     Admission Date: 2021     Subjective: seen in ICU bed 283 post transfer from ED. That additional blood yesterday and now hemoglobin up to 8.1. Feels better. O2 sats are acceptable on supplemental oxygen. However, his chest x-ray suggests CoVid pneumonitis. Serum creatinine has dropped with volume administration. He is 5 L positive. He has no longer acidotic. Not short of breath, no chest pain, no nausea or vomiting. Able to eat. No longer with edema. No abdominal pain. Review of Systems  Pertinent items are noted in HPI.     Objective:     Visit Vitals  BP (!) 152/81 (BP 1 Location: Right arm, BP Patient Position: At rest)   Pulse (!) 45   Temp 97.7 °F (36.5 °C)   Resp 14   Ht 6' (1.829 m)   Wt 79.4 kg (175 lb)   SpO2 100%   BMI 23.73 kg/m²     Temp (24hrs), Av.2 °F (36.8 °C), Min:97.7 °F (36.5 °C), Max:98.4 °F (36.9 °C)        Intake/Output Summary (Last 24 hours) at 2021 1501  Last data filed at 2021 0228  Gross per 24 hour   Intake 6900 ml   Output 1200 ml   Net 5700 ml     Current Facility-Administered Medications   Medication Dose Route Frequency    technetium labeled red blood cells (ULTRATAG) injection 25 millicurie  25 millicurie IntraVENous RAD ONCE    dexamethasone (DECADRON) 4 mg/mL injection 6 mg  6 mg IntraVENous Q12H    0.9% sodium chloride infusion 250 mL  250 mL IntraVENous PRN    azithromycin (ZITHROMAX) 500 mg in 0.9% sodium chloride 250 mL (VIAL-MATE)  500 mg IntraVENous Q24H    cefTRIAXone (ROCEPHIN) 1 g in sterile water (preservative free) 10 mL IV syringe  1 g IntraVENous Q24H    pantoprazole (PROTONIX) 40 mg in 0.9% sodium chloride 10 mL injection  40 mg IntraVENous DAILY    0.9% sodium chloride infusion 250 mL  250 mL IntraVENous PRN    lactated Ringers infusion 1,000 mL  1,000 mL IntraVENous CONTINUOUS    0.9% sodium chloride infusion 250 mL  250 mL IntraVENous PRN    dextrose 5% 1,000 mL with sodium bicarbonate (8.4%) 150 mEq infusion   IntraVENous CONTINUOUS    acetaminophen (TYLENOL) tablet 650 mg  650 mg Oral Q6H PRN       Physical Exam:General appearance: alert, cooperative, no distress, appears stated age  Head: Normocephalic, without obvious abnormality, atraumatic  Neck: supple, symmetrical, trachea midline, no adenopathy and no JVD  Lungs: scattered crackles, no wheeze  Heart: regular rate and rhythm, no S3 or S4, no rub  Abdomen: soft, non-tender. Bowel sounds normal. No masses,  no organomegaly  Extremities: no edema, lower extremity skin is wrinkled and appears actually to be poor turgor  Lymph nodes: no adenopathy  Neurologic: grossly normal    Data Review:     LABS:  Recent Labs     01/07/21  0421 01/06/21  0957 01/05/21  1830    145 143   K 5.5* 5.8* 6.2*   * 120* 119*   CO2 22 17* 15*   BUN 83* 70* 72*   CREA 5.42* 5.47* 5.90*   CA 7.9* 7.6* 7.9*   ALB 2.3* 2.2* 2.6*   MG  --   --  2.2     Recent Labs     01/07/21  0421 01/06/21  0957 01/06/21  0001   WBC 7.3 3.7* 5.5   HGB 8.1* 5.4* 8.4*   HCT 24.5* 17.2* 26.3*    201 199     No results for input(s): HÉCTOR, KU, CLU, CREAU in the last 72 hours. No lab exists for component: PROU     CXR 1-5-21  Portable AP view at 1816 hours. Comparison 23 November 2008. Cardiac silhouette size is borderline enlarged. Arcuate lucency adjacent to the left side of the aortic knob margin could  reflect a tiny pneumomediastinum. Mild pulmonary vascular congestion, without overt edema. Patchy densities in the lower lungs could be minimal infiltrate. Consider  follow-up if warranted clinically.     IMPRESSION  IMPRESSION: Subtle but potentially clinically significant findings as above. Suggest radiographic follow-up.     CT chest 1-5-21  Images obtained without contrast include the abdomen and pelvis.   Comparison portable chest radiograph earlier today.     Subtle peripheral groundglass densities are noted in both lungs, could reflect  Covid pneumonia or other. No pneumothorax or pneumomediastinum. The radiographic findings suspicious for  pneumomediastinum probably was artifact, perhaps related to cardiac motion. No pleural effusion or adenopathy. Cardiomegaly again noted.     IMPRESSION  IMPRESSION:  1. No pneumomediastinum or pneumothorax. 2. Cardiomegaly.   3. Subtle groundglass densities in both lungs might be pneumonia or other.     Assessment:   Renal Specific Problems  Chronic kidney disease stage IV at baseline  Acute kidney injury related to acute blood loss, Covid infection  Acute severe anemia, rule out GI bleed, past history of diverticular bleed but he has no blood per rectum  Hyperkalemia  Metabolic acidosis--resolved  Volume depletion--corrected     COVID-19 positive test (U07.1, COVID-19) with Acute Pneumonia (J12.89, Other viral pneumonia)  (If respiratory failure or sepsis present, add as separate assessment)  in    Plan:     Obtain/ Order: labs/cultures/radiology/procedures:  Serial creatinine/BMP    Awaiting urine electrolytes  Oral sodium bicarbonate    Therapeutic:    Continue IV fluids at half-normal saline        Douglas Camargo, [id number] [address]

## 2021-01-07 NOTE — PROGRESS NOTES
Progress Note    Patient: Nabil Mercedes MRN: [id number]  SSN: [id number]    YOB: 1955  Age: 76 y.o. Sex: male      Admit Date: 1/5/2021    LOS: 2 days     Subjective:   Patient followed for Covid-19 with possible pneumonia and sepsis. Decision was made to hold off on Remdesivir because he saturating well on room air and has CKD. He was admitted to ICU. He is now on nasal O2 but still without respiratory difficulty. May be transferred to the floor. Objective:     Vitals:    01/07/21 0616 01/07/21 0806 01/07/21 0809 01/07/21 1113   BP: 125/87 (!) 158/86  (!) 152/81   Pulse: (!) 35 (!) 37  (!) 45   Resp: 16 15  14   Temp:  98.2 °F (36.8 °C)  97.7 °F (36.5 °C)   SpO2: 100% 100% 99% 100%   Weight:       Height:            Intake and Output:  Current Shift: No intake/output data recorded. Last three shifts: 01/05 1901 - 01/07 0700  In: 20633 [I.V.:1000]  Out: 1200 [Urine:1200]    Physical Exam:    Constitutional:       General: He is not in acute distress. Appearance: Normal appearance. He is ill-appearing. He is not toxic-appearing. HENT:      Head: Normocephalic and atraumatic. Nose: Nose normal.      Comments: Nasal O2 cannula 2L/min     Mouth/Throat:      Pharynx: Oropharynx is clear. Eyes:      Pupils: Pupils are equal, round, and reactive to light. Neck:      Musculoskeletal: Neck supple. Cardiovascular:  Bradycardic to 40 bpm      Heart sounds: No murmur. Pulmonary:      Breath sounds: Rhonchi present. No wheezing or rales. Abdominal:      Palpations: Abdomen is soft. Tenderness: There is no abdominal tenderness. There is no guarding. Genitourinary:     Penis: Normal.       Testes: Normal.      Comments: No Quesada  Musculoskeletal:      Right lower leg: No edema. Left lower leg: No edema. Comments: Not pitting edema but brawny changes noted   Skin: no rashes or open wounds   Neurological:      General: No focal deficit present.       Mental Status: He is alert and oriented to person, place, and time. Psychiatric:         Mood and Affect: Mood normal.         Behavior: Behavior normal.         Thought Content: Thought content normal.         Judgment: Judgment normal.     Lab/Data Review:     WBC 7,300      Ferritin pending    Procalcitonin 5.19 <6.63  CRP 2.20    Blood cultures (1/5) No growth at 1 day  Assessment:     Active Problems:    GI bleed (1/5/2021)    1. Covid-19 infection with possible pneumonia  2. Possible sepsis with elevated procalcitonin  3. Severe anemia with positive occult stool  4. CKD with GFR <30     Comment:  I would continue to hold off on Remdesivir because of his CKD and risk of accumulation of cyclodextrin. He is on nasal O2 but in no significant respiratory distress. Still concerned about bacterial infection given his elevated procalcitonin. Plan:   1. Continue Azithromycin, Ceftriaxone and Decadron  2. Hold off on Remdesivir since he appears to be oxygenating well on room air and because of his CKD he is at risk for cyclodextrin accumulation. 3. Hold off on Actemra  4. Convalescent plasma ordered  5. Awaiting urinalysis and urine culture  6. In am, repeat CBC, procal, CRP, LDH   7.  Monitor oximetry       Signed By: Mallory Cope MD     January 7, 2021

## 2021-01-07 NOTE — ROUTINE PROCESS
TRANSFER - OUT REPORT:    Verbal report given to [address] Rosen RN(name) on Mirta Quijano  being transferred to ICU(unit) for routine progression of care       Report consisted of patients Situation, Background, Assessment and   Recommendations(SBAR). Information from the following report(s) SBAR, Kardex, Intake/Output, MAR and Recent Results was reviewed with the receiving nurse. Lines:   Peripheral IV 01/05/21 Posterior;Right Hand (Active)   Site Assessment Clean, dry, & intact 01/05/21 2034   Phlebitis Assessment 0 01/05/21 2034   Infiltration Assessment 0 01/05/21 2034   Dressing Status Clean, dry, & intact 01/05/21 2034   Dressing Type Transparent 01/05/21 2034   Hub Color/Line Status Flushed 01/05/21 2034   Action Taken Blood drawn 01/05/21 2034       Peripheral IV 01/06/21 Left Antecubital (Active)       Peripheral IV 01/06/21 Left Arm (Active)        Opportunity for questions and clarification was provided.       Patient transported with:   Monitor  O2 @ 3 liters  Registered Nurse, EDT, pt belongings and chart

## 2021-01-08 LAB
ALBUMIN SERPL-MCNC: 2.3 G/DL (ref 3.5–5)
ALBUMIN SERPL-MCNC: 2.3 G/DL (ref 3.5–5)
ALBUMIN/GLOB SERPL: 0.7 {RATIO} (ref 1.1–2.2)
ALP SERPL-CCNC: 56 U/L (ref 45–117)
ALT SERPL-CCNC: 26 U/L (ref 12–78)
ANION GAP SERPL CALC-SCNC: 7 MMOL/L (ref 5–15)
ANION GAP SERPL CALC-SCNC: 7 MMOL/L (ref 5–15)
AST SERPL W P-5'-P-CCNC: 40 U/L (ref 15–37)
BASOPHILS # BLD: 0 K/UL (ref 0–0.1)
BASOPHILS NFR BLD: 0 % (ref 0–1)
BILIRUB SERPL-MCNC: 0.4 MG/DL (ref 0.2–1)
BUN SERPL-MCNC: 84 MG/DL (ref 6–20)
BUN SERPL-MCNC: 87 MG/DL (ref 6–20)
BUN/CREAT SERPL: 16 (ref 12–20)
BUN/CREAT SERPL: 17 (ref 12–20)
CA-I BLD-MCNC: 7.4 MG/DL (ref 8.5–10.1)
CA-I BLD-MCNC: 7.7 MG/DL (ref 8.5–10.1)
CHLORIDE SERPL-SCNC: 114 MMOL/L (ref 97–108)
CHLORIDE SERPL-SCNC: 115 MMOL/L (ref 97–108)
CO2 SERPL-SCNC: 21 MMOL/L (ref 21–32)
CO2 SERPL-SCNC: 21 MMOL/L (ref 21–32)
CREAT SERPL-MCNC: 5.26 MG/DL (ref 0.7–1.3)
CREAT SERPL-MCNC: 5.27 MG/DL (ref 0.7–1.3)
CREAT UR-MCNC: 91 MG/DL
CREAT UR-MCNC: 91 MG/DL
DIFFERENTIAL METHOD BLD: ABNORMAL
EOSINOPHIL # BLD: 0 K/UL (ref 0–0.4)
EOSINOPHIL NFR BLD: 0 % (ref 0–7)
ERYTHROCYTE [DISTWIDTH] IN BLOOD BY AUTOMATED COUNT: 16 % (ref 11.5–14.5)
FERRITIN SERPL-MCNC: 60 NG/ML (ref 26–388)
GLOBULIN SER CALC-MCNC: 3.1 G/DL (ref 2–4)
GLUCOSE BLD STRIP.AUTO-MCNC: 123 MG/DL (ref 65–100)
GLUCOSE SERPL-MCNC: 130 MG/DL (ref 65–100)
GLUCOSE SERPL-MCNC: 132 MG/DL (ref 65–100)
HCT VFR BLD AUTO: 27.4 % (ref 36.6–50.3)
HGB BLD-MCNC: 8.5 G/DL (ref 12.1–17)
IMM GRANULOCYTES # BLD AUTO: 0.1 K/UL (ref 0–0.04)
IMM GRANULOCYTES NFR BLD AUTO: 1 % (ref 0–0.5)
LYMPHOCYTES # BLD: 0.3 K/UL (ref 0.8–3.5)
LYMPHOCYTES NFR BLD: 4 % (ref 12–49)
MCH RBC QN AUTO: 27 PG (ref 26–34)
MCHC RBC AUTO-ENTMCNC: 31 G/DL (ref 30–36.5)
MCV RBC AUTO: 87 FL (ref 80–99)
MONOCYTES # BLD: 0.3 K/UL (ref 0–1)
MONOCYTES NFR BLD: 4 % (ref 5–13)
NEUTS SEG # BLD: 7.6 K/UL (ref 1.8–8)
NEUTS SEG NFR BLD: 91 % (ref 32–75)
PERFORMED BY, TECHID: ABNORMAL
PHOSPHATE SERPL-MCNC: 6.3 MG/DL (ref 2.6–4.7)
PLATELET # BLD AUTO: 209 K/UL (ref 150–400)
PMV BLD AUTO: 9.7 FL (ref 8.9–12.9)
POTASSIUM SERPL-SCNC: 5.4 MMOL/L (ref 3.5–5.1)
POTASSIUM SERPL-SCNC: 5.5 MMOL/L (ref 3.5–5.1)
POTASSIUM UR-SCNC: 26 MMOL/L
PROT SERPL-MCNC: 5.4 G/DL (ref 6.4–8.2)
PROT UR-MCNC: 126 MG/DL (ref 0–11.9)
PROT UR-MCNC: 128 MG/DL (ref 0–11.9)
PROT/CREAT UR-RTO: 1.4
RBC # BLD AUTO: 3.15 M/UL (ref 4.1–5.7)
SODIUM SERPL-SCNC: 142 MMOL/L (ref 136–145)
SODIUM SERPL-SCNC: 143 MMOL/L (ref 136–145)
SODIUM UR-SCNC: 52 MMOL/L
WBC # BLD AUTO: 8.3 K/UL (ref 4.1–11.1)

## 2021-01-08 PROCEDURE — 74011250636 HC RX REV CODE- 250/636: Performed by: INTERNAL MEDICINE

## 2021-01-08 PROCEDURE — 99232 SBSQ HOSP IP/OBS MODERATE 35: CPT | Performed by: INTERNAL MEDICINE

## 2021-01-08 PROCEDURE — 80053 COMPREHEN METABOLIC PANEL: CPT

## 2021-01-08 PROCEDURE — 94760 N-INVAS EAR/PLS OXIMETRY 1: CPT

## 2021-01-08 PROCEDURE — C9113 INJ PANTOPRAZOLE SODIUM, VIA: HCPCS | Performed by: INTERNAL MEDICINE

## 2021-01-08 PROCEDURE — 80069 RENAL FUNCTION PANEL: CPT

## 2021-01-08 PROCEDURE — 85025 COMPLETE CBC W/AUTO DIFF WBC: CPT

## 2021-01-08 PROCEDURE — 74011250637 HC RX REV CODE- 250/637: Performed by: INTERNAL MEDICINE

## 2021-01-08 PROCEDURE — 77010033678 HC OXYGEN DAILY

## 2021-01-08 PROCEDURE — 65270000029 HC RM PRIVATE

## 2021-01-08 PROCEDURE — 82962 GLUCOSE BLOOD TEST: CPT

## 2021-01-08 RX ORDER — HYDRALAZINE HYDROCHLORIDE 25 MG/1
25 TABLET, FILM COATED ORAL 3 TIMES DAILY
Status: DISCONTINUED | OUTPATIENT
Start: 2021-01-08 | End: 2021-01-09

## 2021-01-08 RX ADMIN — HYDRALAZINE HYDROCHLORIDE 25 MG: 25 TABLET, FILM COATED ORAL at 21:48

## 2021-01-08 RX ADMIN — DEXAMETHASONE SODIUM PHOSPHATE 6 MG: 4 INJECTION, SOLUTION INTRAMUSCULAR; INTRAVENOUS at 09:26

## 2021-01-08 RX ADMIN — DEXAMETHASONE SODIUM PHOSPHATE 6 MG: 4 INJECTION, SOLUTION INTRAMUSCULAR; INTRAVENOUS at 21:48

## 2021-01-08 RX ADMIN — SODIUM BICARBONATE 650 MG TABLET 650 MG: at 21:48

## 2021-01-08 RX ADMIN — SODIUM BICARBONATE 650 MG TABLET 650 MG: at 09:27

## 2021-01-08 RX ADMIN — PANTOPRAZOLE SODIUM 40 MG: 40 INJECTION, POWDER, FOR SOLUTION INTRAVENOUS at 09:26

## 2021-01-08 RX ADMIN — HYDRALAZINE HYDROCHLORIDE 25 MG: 25 TABLET, FILM COATED ORAL at 17:07

## 2021-01-08 NOTE — PROGRESS NOTES
Progress Note    Patient: Nancy Frank MRN: [id number]  SSN: [id number]    YOB: 1955  Age: 76 y.o. Sex: male      Admit Date: 1/5/2021    LOS: 3 days     Subjective:   Patient followed for Covid-19 with possible pneumonia and sepsis. Decision was made to hold off on Remdesivir because he was saturating well on room air and has CKD. He has been transferred out of the ICU but is now on nasal O2. He still denies any SOB and minimal cough. Objective:     Vitals:    01/08/21 0912 01/08/21 0939 01/08/21 1055 01/08/21 1441   BP:  (!) 151/89  (!) 160/89   Pulse:  (!) 49  (!) 55   Resp:  16  18   Temp:  97.8 °F (36.6 °C)  98.3 °F (36.8 °C)   SpO2: 97% 100%  98%   Weight:       Height:   6' 0.01\" (1.829 m)         Intake and Output:  Current Shift: No intake/output data recorded. Last three shifts: 01/06 1901 - 01/08 0700  In: 1400   Out: 1200 [Urine:1200]    Physical Exam:    Constitutional:       General: He is not in acute distress. Appearance: Normal appearance. He is ill-appearing. He is not toxic-appearing. HENT:      Head: Normocephalic and atraumatic. Nose: Nose normal.      Comments: Nasal O2 cannula 2L/min     Mouth/Throat:      Pharynx: Oropharynx is clear. Eyes:      Pupils: Pupils are equal, round, and reactive to light. Neck:      Musculoskeletal: Neck supple. Cardiovascular:  Bradycardic to 49 bpm      Heart sounds: No murmur. Pulmonary:      Breath sounds: Rhonchi present. No wheezing or rales. Abdominal:      Palpations: Abdomen is soft. Tenderness: There is no abdominal tenderness. There is no guarding. Genitourinary:     Penis: Normal.       Testes: Normal.      Comments: No Quesada  Musculoskeletal:      Right lower leg: No edema. Left lower leg: No edema. Comments: Not pitting edema but brawny changes noted   Skin: no rashes or open wounds   Neurological:      General: No focal deficit present.       Mental Status: He is alert and oriented to person, place, and time. Psychiatric:         Mood and Affect: Mood normal.         Behavior: Behavior normal.         Thought Content: Thought content normal.         Judgment: Judgment normal.     Lab/Data Review:     WBC 8,300      Ferritin pending    Procalcitonin 5.19 <6.63  CRP 2.20    Blood cultures (1/5) No growth at 2 days  Assessment:     Active Problems:    GI bleed (1/5/2021)    1. Covid-19 infection with possible pneumonia  2. Possible sepsis with elevated procalcitonin  3. Severe anemia with positive occult stool  4. CKD with GFR <30     Comment:  I would continue to hold off on Remdesivir because of his CKD and risk of accumulation of cyclodextrin. He is on nasal O2 but in no significant respiratory distress. Still concerned about bacterial infection given his elevated procalcitonin. Plan:   1. Continue Azithromycin, Ceftriaxone and Decadron  2. Hold off on Remdesivir since he appears to be oxygenating well on room air and because of his CKD he is at risk for cyclodextrin accumulation. 3. No indication for Actemra  4. Convalescent plasma   5. Awaiting urinalysis and urine culture  6.  Monday, repeat procal, CRP, LDH           Signed By: Tiana Santiago MD     January 8, 2021

## 2021-01-08 NOTE — PROGRESS NOTES
Progress Note    Penny Glasgow MD             Daily Progress Note: 2021      Subjective: The patient is seen for follow  up. ofers no complaints. Problem List:  Problem List as of 2021 Never Reviewed          Codes Class Noted - Resolved    GI bleed ICD-10-CM: K92.2  ICD-9-CM: 578.9  2021 - Present              Medications reviewed  Current Facility-Administered Medications   Medication Dose Route Frequency    dexamethasone (DECADRON) 4 mg/mL injection 6 mg  6 mg IntraVENous Q12H    0.45% sodium chloride infusion  75 mL/hr IntraVENous CONTINUOUS    sodium bicarbonate tablet 650 mg  650 mg Oral BID    0.9% sodium chloride infusion 250 mL  250 mL IntraVENous PRN    azithromycin (ZITHROMAX) 500 mg in 0.9% sodium chloride 250 mL (VIAL-MATE)  500 mg IntraVENous Q24H    cefTRIAXone (ROCEPHIN) 1 g in sterile water (preservative free) 10 mL IV syringe  1 g IntraVENous Q24H    pantoprazole (PROTONIX) 40 mg in 0.9% sodium chloride 10 mL injection  40 mg IntraVENous DAILY    0.9% sodium chloride infusion 250 mL  250 mL IntraVENous PRN    0.9% sodium chloride infusion 250 mL  250 mL IntraVENous PRN    acetaminophen (TYLENOL) tablet 650 mg  650 mg Oral Q6H PRN       Review of Systems:   A comprehensive review of systems was negative except for that written in the HPI. Objective:   Physical Exam:     Visit Vitals  BP (!) 167/98   Pulse 92   Temp 97.5 °F (36.4 °C)   Resp 16   Ht 6' (1.829 m)   Wt 79.4 kg (175 lb)   SpO2 97%   BMI 23.73 kg/m²    O2 Flow Rate (L/min): 3 l/min O2 Device: Nasal cannula    Temp (24hrs), Av.1 °F (36.7 °C), Min:97.5 °F (36.4 °C), Max:98.4 °F (36.9 °C)    No intake/output data recorded.     07 -  1900  In: 8400 [I.V.:1000]  Out: 1200 [Urine:1200]  HEENt-NC AT skull pupils -BERL sclera nonicteric conjunctive are pink no edema of the eyelids no yellow nasal discharge tongue is pink no ulcer positive gag reflex no pharyngeal inflammation extraocular movements are intact  Neck is supple for range of motion no JVD no HJR no lymphadenopathy no thyromegaly no carotid  General:  Alert, cooperative, no distress, appears stated age. Lungs:   Clear to auscultation bilaterally. Chest wall:  No tenderness or deformity. Heart:  Regular rate and rhythm, S1, S2 normal, no murmur, click, rub or gallop. Abdomen:   Soft, non-tender. Bowel sounds normal. No masses,  No organomegaly. Extremities: Extremities normal, atraumatic, no cyanosis or edema. Pulses: 2+ and symmetric all extremities. Skin: Skin color, texture, turgor normal. No rashes or lesions   Neurologic: CNII-XII intact. No gross sensory or motor deficits     Data Review:       Recent Days:  Recent Labs     01/07/21  0421 01/06/21  0957 01/06/21  0001   WBC 7.3 3.7* 5.5   HGB 8.1* 5.4* 8.4*   HCT 24.5* 17.2* 26.3*    201 199     Recent Labs     01/07/21  0421 01/06/21  0957 01/05/21  1830    145 143   K 5.5* 5.8* 6.2*   * 120* 119*   CO2 22 17* 15*   * 139* 105*   BUN 83* 70* 72*   CREA 5.42* 5.47* 5.90*   CA 7.9* 7.6* 7.9*   MG  --   --  2.2   ALB 2.3* 2.2* 2.6*   TBILI 0.3 0.3 0.3   ALT 25 21 22     No results for input(s): PH, PCO2, PO2, HCO3, FIO2 in the last 72 hours. 24 Hour Results:  Recent Results (from the past 24 hour(s))   CBC WITH AUTOMATED DIFF    Collection Time: 01/07/21  4:21 AM   Result Value Ref Range    WBC 7.3 4.1 - 11.1 K/uL    RBC 2.89 (L) 4.10 - 5.70 M/uL    HGB 8.1 (L) 12.1 - 17.0 g/dL    HCT 24.5 (L) 36.6 - 50.3 %    MCV 84.8 80.0 - 99.0 FL    MCH 28.0 26.0 - 34.0 PG    MCHC 33.1 30.0 - 36.5 g/dL    RDW 15.5 (H) 11.5 - 14.5 %    PLATELET 776 211 - 543 K/uL    MPV 9.8 8.9 - 12.9 FL    NEUTROPHILS 91 (H) 32 - 75 %    LYMPHOCYTES 5 (L) 12 - 49 %    MONOCYTES 5 5 - 13 %    EOSINOPHILS 0 0 - 7 %    BASOPHILS 0 0 - 1 %    IMMATURE GRANULOCYTES 0 0.0 - 0.5 %    ABS. NEUTROPHILS 6.6 1.8 - 8.0 K/UL    ABS. LYMPHOCYTES 0.3 (L) 0.8 - 3.5 K/UL    ABS.  MONOCYTES 0.3 0.0 - 1.0 K/UL    ABS. EOSINOPHILS 0.0 0.0 - 0.4 K/UL    ABS. BASOPHILS 0.0 0.0 - 0.1 K/UL    ABS. IMM. GRANS. 0.0 0.00 - 0.04 K/UL    DF AUTOMATED     METABOLIC PANEL, COMPREHENSIVE    Collection Time: 01/07/21  4:21 AM   Result Value Ref Range    Sodium 142 136 - 145 mmol/L    Potassium 5.5 (H) 3.5 - 5.1 mmol/L    Chloride 114 (H) 97 - 108 mmol/L    CO2 22 21 - 32 mmol/L    Anion gap 6 5 - 15 mmol/L    Glucose 136 (H) 65 - 100 mg/dL    BUN 83 (H) 6 - 20 mg/dL    Creatinine 5.42 (H) 0.70 - 1.30 mg/dL    BUN/Creatinine ratio 15 12 - 20      GFR est AA 13 (L) >60 ml/min/1.73m2    GFR est non-AA 11 (L) >60 ml/min/1.73m2    Calcium 7.9 (L) 8.5 - 10.1 mg/dL    Bilirubin, total 0.3 0.2 - 1.0 mg/dL    AST (SGOT) 55 (H) 15 - 37 U/L    ALT (SGPT) 25 12 - 78 U/L    Alk. phosphatase 57 45 - 117 U/L    Protein, total 5.5 (L) 6.4 - 8.2 g/dL    Albumin 2.3 (L) 3.5 - 5.0 g/dL    Globulin 3.2 2.0 - 4.0 g/dL    A-G Ratio 0.7 (L) 1.1 - 2.2     MRSA SCREEN - PCR (NASAL)    Collection Time: 01/07/21 12:00 PM   Result Value Ref Range    MRSA by PCR, Nasal Not Detected Not Detected         CT CHEST WO CONT   Final Result   IMPRESSION:   1. No pneumomediastinum or pneumothorax. 2. Cardiomegaly. 3. Subtle groundglass densities in both lungs might be pneumonia or other. XR CHEST PORT   Final Result   IMPRESSION: Subtle but potentially clinically significant findings as above. Suggest radiographic follow-up. Assessment: Anemia  COVID-19 pneumonitis  Hypertension  Acute on chronic renal failure        Plan: Improved renal function now. Also his hemoglobin is 8.1 I will wait till tomorrow how we see his hemoglobin. I believe that this is most likely chronic. Also anemia of chronic disease as indices are in favor of it. We will follow recommendations from pulmonary as well as infectious disease        Care Plan discussed with: Patient himself    Total time spent with patient: 30 minutes.     Nils Osler, MD

## 2021-01-08 NOTE — PROGRESS NOTES
Pulmonary/CC Progress Note  Patient is a 76 y.o. male  AA who is known to have history of chronic kidney disease stage IV with baseline serum creatinine ranging between 3.7 and 4.2. He is admitted hospital because of generalized weakness, dizziness. He additionally history of hypertension. Initial work-up showed hemoglobin of 3.5 g/dL. He was also noted to have COVID-19 infection. Subjective:   Daily Progress Note: 1/8/2021 2:10 PM    Examined at bedside. Not in any distress. He is on 2 L of nasal cannula oxygen. No respiratory distress noted. He received 5 units of packed RBCs yesterday. He was evaluated by GI service. Denied any fever or chills. Dizziness has shown improvement      Current Facility-Administered Medications   Medication Dose Route Frequency    hydrALAZINE (APRESOLINE) tablet 25 mg  25 mg Oral TID    dexamethasone (DECADRON) 4 mg/mL injection 6 mg  6 mg IntraVENous Q12H    0.45% sodium chloride infusion  25 mL/hr IntraVENous CONTINUOUS    sodium bicarbonate tablet 650 mg  650 mg Oral BID    0.9% sodium chloride infusion 250 mL  250 mL IntraVENous PRN    azithromycin (ZITHROMAX) 500 mg in 0.9% sodium chloride 250 mL (VIAL-MATE)  500 mg IntraVENous Q24H    cefTRIAXone (ROCEPHIN) 1 g in sterile water (preservative free) 10 mL IV syringe  1 g IntraVENous Q24H    pantoprazole (PROTONIX) 40 mg in 0.9% sodium chloride 10 mL injection  40 mg IntraVENous DAILY    0.9% sodium chloride infusion 250 mL  250 mL IntraVENous PRN    0.9% sodium chloride infusion 250 mL  250 mL IntraVENous PRN    acetaminophen (TYLENOL) tablet 650 mg  650 mg Oral Q6H PRN        Review of Systems  Denies any loose bowel movements. Denied any bloody stools. Denied any nausea or vomiting.     Objective:     Visit Vitals  BP (!) 160/89 (BP 1 Location: Right arm, BP Patient Position: At rest)   Pulse (!) 55   Temp 98.3 °F (36.8 °C)   Resp 18   Ht 6' 0.01\" (1.829 m)   Wt 79.4 kg (175 lb)   SpO2 98%   BMI 23.73 kg/m²    O2 Flow Rate (L/min): 2 l/min O2 Device: Nasal cannula    Temp (24hrs), Av.8 °F (36.6 °C), Min:97.4 °F (36.3 °C), Max:98.3 °F (36.8 °C)      No intake/output data recorded.  190 -  0700  In: 1400   Out: 18 [Urine:1200]    This is an elderly male who is currently not in significant respiratory distress. He is in of average build. He is alert and oriented x3  Neck is supple. No cervical lymphadenopathy. Thyroid not enlarged  Chest: Decreased air entry at lung bases. Few rhonchi audible. No wheezing was appreciated  Heart: S1-S2 normal  Abdomen: Soft, nontender, no visceromegaly. Extremities: No edema, cyanosis or clubbing  Neuro: No focal motor deficit. Lab/Data Review:  Recent Labs     21  0650 21  0421 21  0957   WBC 8.3 7.3 3.7*   HGB 8.5* 8.1* 5.4*   HCT 27.4* 24.5* 17.2*    185 201     Recent Labs     21  0650 21  0421 21  0957 21  1830     143 142 145 143   K 5.4*  5.5* 5.5* 5.8* 6.2*   *  115* 114* 120* 119*   CO2 21  21 22 17* 15*   *  130* 136* 139* 105*   BUN 84*  87* 83* 70* 72*   CREA 5.27*  5.26* 5.42* 5.47* 5.90*   CA 7.7*  7.4* 7.9* 7.6* 7.9*   MG  --   --   --  2.2   PHOS 6.3*  --   --   --    ALB 2.3*  2.3* 2.3* 2.2* 2.6*   TBILI 0.4 0.3 0.3 0.3   ALT 26 25 21 22         Diagnostics   CXR Results  (Last 48 hours)    None             Assessment/Plan:     Active Problems:    GI bleed (2021)       Impression:   Patient is a 76 y.o. male  AA who is known to have history of chronic kidney disease stage IV with baseline serum creatinine ranging between 3.7 and 4.2. He is admitted hospital because of generalized weakness, dizziness. He additionally history of hypertension. Initial work-up showed hemoglobin of 3.5 g/dL. He was also noted to have COVID-19 infection.     Plan:   1. Dyspnea:  Patient noted to be short of breath on physical activity.   Differential diagnosis includes hypoxia from severe anemia versus or combination from Covid 19 pneumonitis. Patient is on supplemental oxygen at 4 L/min. Saturating 98%. We will continue to watch his oxygen saturations. 2.  COVID-19 pneumonitis:  Patient has bilateral basal infiltrates, has positive COVID-19 test results. He has developed COVID-19 pneumonitis. Patient is on supplemental oxygen, started on dexamethasone, however the increased dosage to 6 mg once daily. Patient started on IV Zithromax. Since patient has stage IV chronic kidney disease with GFR of 13, he will not be a candidate for remdesivir.     3. Severe anemia:  He had hemoglobin of 3.5 g/dL. This seems to be secondary to blood loss anemia. He is getting 5 units of packed RBCs. His hemoglobin has come up to 8.1 g/dL. Patient was seen by GI service. 4.  Chronic kidney disease:  He has stage IV chronic kidney disease. He has been followed by Dr. Heidi Chavez. Patient also has nephrotic range proteinuria.     Patient is on DVT prophylaxis. Once he gets a bed on the medical floor then he will get transferred.     I thank you for involving me in the management of the patient     This document has been prepared by the Dragon voice recognition system, typographical errors may have occurred.  Attempts have been made to correct errors, however inadvertent errors may persist.        Care Plan discussed with: patient and covering nurse        Thomas Cline MD  Pulmonary/CC

## 2021-01-08 NOTE — CONSULTS
Consult    NAME: Sharee Vazquez   :  1955   MRN:  [id number]     Date/Time:  2021 2:56 PM    Patient PCP: Joya Jeffries MD  ________________________________________________________________________      Subjective:   CHIEF COMPLAINT: Fatigue, shortness of breath and that he was found anemic and has a Covid positive. HISTORY OF PRESENT ILLNESS:   Patient presented with multiple symptoms including fatigue, shortness of breath, dark-colored bowel movement for 3 to 4 weeks. He did experience some dizziness. He denied any fever or muscle ache or joint pain. He is found to have high blood pressure and heart rate dropped significantly into 30s and 40s and cardiology consultation is invited. Past Medical History:   Diagnosis Date    Chronic kidney disease     Hypertension    Anemia. History reviewed. No pertinent surgical history. No Known Allergies   Meds:  See below  Social History     Tobacco Use    Smoking status: Never Smoker    Smokeless tobacco: Never Used   Substance Use Topics    Alcohol use: Not on file   Negative for smoking drinking or drugs. History reviewed. No pertinent family history. FAMILY HISTORY: Mother had a brain cancer and  at the age of 58 and father  in his 46s from cancer but patient does not know what kind of cancer he had. PERSONAL HISTORY : Patient is single and has 2 children and works in [employer] line.     REVIEW OF SYSTEMS:     []         Unable to obtain  ROS due to ---   [x]         Total of 12 systems reviewed as follows:    Constitutional: negative fever, positive for fatigue and shortness of breath  Eyes:   negative visual changes  ENT:   negative sore throat, tongue or lip swelling  Respiratory:  negative cough, positive for dyspnea  Cards:  negative for chest pain, palpitations, positive for lower extremity edema  GI:   negative for nausea, vomiting, diarrhea, and abdominal pain  Genitourinary: negative for frequency, dysuria  Integument:  negative for rash   Hematologic:  negative for easy bruising and gum/nose bleeding  Musculoskel: negative for myalgias,  back pain  Neurological:  negative for headaches, dizziness, vertigo, weakness  Behavl/Psych: negative for feelings of anxiety, depression     Pertinent Positives include :    Objective:      Physical Exam:    Last 24hrs VS reviewed since prior progress note. Most recent are:    Visit Vitals  BP (!) 160/89 (BP 1 Location: Right arm, BP Patient Position: At rest)   Pulse (!) 55   Temp 98.3 °F (36.8 °C)   Resp 18   Ht 6' 0.01\" (1.829 m)   Wt 79.4 kg (175 lb)   SpO2 98%   BMI 23.73 kg/m²     No intake or output data in the 24 hours ending 01/08/21 1456     General Appearance: Well developed, well nourished, alert & oriented x 3,    no acute distress. Ears/Nose/Mouth/Throat: Pupils equal and round, Hearing grossly normal.  Neck: Supple. JVP within normal limits. Carotids good upstrokes, with no bruit. Chest: Lungs clear to auscultation bilaterally. Cardiovascular: Regular rate and rhythm, S1S2 normal, no murmur, rubs, gallops. Abdomen: Soft, non-tender, bowel sounds are active. No organomegaly. Extremities: No edema bilaterally. Femoral pulses +2, Distal Pulses +1. Skin: Warm and dry. Neuro: CN II-XII grossly intact, Strength and sensation grossly intact. []         Post-cath site without hematoma, bruit, tenderness, or thrill. Distal pulses intact. Data:      Telemetry:    EKG:  []  No new EKG for review.         Prior to Admission medications    Not on File       Recent Results (from the past 24 hour(s))   RENAL FUNCTION PANEL    Collection Time: 01/08/21  6:50 AM   Result Value Ref Range    Sodium 143 136 - 145 mmol/L    Potassium 5.5 (H) 3.5 - 5.1 mmol/L    Chloride 115 (H) 97 - 108 mmol/L    CO2 21 21 - 32 mmol/L    Anion gap 7 5 - 15 mmol/L    Glucose 130 (H) 65 - 100 mg/dL    BUN 87 (H) 6 - 20 mg/dL    Creatinine 5.26 (H) 0.70 - 1.30 mg/dL    BUN/Creatinine ratio 17 12 - 20      GFR est AA 13 (L) >60 ml/min/1.73m2    GFR est non-AA 11 (L) >60 ml/min/1.73m2    Calcium 7.4 (L) 8.5 - 10.1 mg/dL    Phosphorus 6.3 (H) 2.6 - 4.7 mg/dL    Albumin 2.3 (L) 3.5 - 5.0 g/dL   CBC WITH AUTOMATED DIFF    Collection Time: 01/08/21  6:50 AM   Result Value Ref Range    WBC 8.3 4.1 - 11.1 K/uL    RBC 3.15 (L) 4.10 - 5.70 M/uL    HGB 8.5 (L) 12.1 - 17.0 g/dL    HCT 27.4 (L) 36.6 - 50.3 %    MCV 87.0 80.0 - 99.0 FL    MCH 27.0 26.0 - 34.0 PG    MCHC 31.0 30.0 - 36.5 g/dL    RDW 16.0 (H) 11.5 - 14.5 %    PLATELET 239 683 - 527 K/uL    MPV 9.7 8.9 - 12.9 FL    NEUTROPHILS 91 (H) 32 - 75 %    LYMPHOCYTES 4 (L) 12 - 49 %    MONOCYTES 4 (L) 5 - 13 %    EOSINOPHILS 0 0 - 7 %    BASOPHILS 0 0 - 1 %    IMMATURE GRANULOCYTES 1 (H) 0.0 - 0.5 %    ABS. NEUTROPHILS 7.6 1.8 - 8.0 K/UL    ABS. LYMPHOCYTES 0.3 (L) 0.8 - 3.5 K/UL    ABS. MONOCYTES 0.3 0.0 - 1.0 K/UL    ABS. EOSINOPHILS 0.0 0.0 - 0.4 K/UL    ABS. BASOPHILS 0.0 0.0 - 0.1 K/UL    ABS. IMM. GRANS. 0.1 (H) 0.00 - 0.04 K/UL    DF AUTOMATED     METABOLIC PANEL, COMPREHENSIVE    Collection Time: 01/08/21  6:50 AM   Result Value Ref Range    Sodium 142 136 - 145 mmol/L    Potassium 5.4 (H) 3.5 - 5.1 mmol/L    Chloride 114 (H) 97 - 108 mmol/L    CO2 21 21 - 32 mmol/L    Anion gap 7 5 - 15 mmol/L    Glucose 132 (H) 65 - 100 mg/dL    BUN 84 (H) 6 - 20 mg/dL    Creatinine 5.27 (H) 0.70 - 1.30 mg/dL    BUN/Creatinine ratio 16 12 - 20      GFR est AA 13 (L) >60 ml/min/1.73m2    GFR est non-AA 11 (L) >60 ml/min/1.73m2    Calcium 7.7 (L) 8.5 - 10.1 mg/dL    Bilirubin, total 0.4 0.2 - 1.0 mg/dL    AST (SGOT) 40 (H) 15 - 37 U/L    ALT (SGPT) 26 12 - 78 U/L    Alk. phosphatase 56 45 - 117 U/L    Protein, total 5.4 (L) 6.4 - 8.2 g/dL    Albumin 2.3 (L) 3.5 - 5.0 g/dL    Globulin 3.1 2.0 - 4.0 g/dL    A-G Ratio 0.7 (L) 1.1 - 2.2           Assessment:   Blood pressure is uncontrolled. Bradycardia. Chronic kidney disease stage V.   Anemia could be from anemia of chronic diseases however could have a GI bleed also. Covid positive. 1.         Plan:   I will start him on hydralazine 25 mg 3 times a day. I will check serial EKG and echocardiogram.  We will continue to follow pulmonary, infectious disease and GI recommendations. For chronic kidney disease will follow nephrology recommendations. Thank you.   1.       []        High complexity decision making was performed    Ying Farris MD

## 2021-01-08 NOTE — PROGRESS NOTES
Progress Note    Patient: Salma Pierson MRN: [id number]  SSN: [id number]    YOB: 1955  Age: 76 y.o.   Sex: male      Admit Date: 1/5/2021    LOS: 3 days     Subjective:   Patient was seen   Patient has no perceived complaint, on nasal cannula oxygen  Denied any rectal bleeding,no severe shortness breath,  Past Medical History:   Diagnosis Date    Chronic kidney disease     Hypertension         Current Facility-Administered Medications:     dexamethasone (DECADRON) 4 mg/mL injection 6 mg, 6 mg, IntraVENous, Q12H, Douglas Dangelo MD, 6 mg at 01/08/21 0926    0.45% sodium chloride infusion, 75 mL/hr, IntraVENous, CONTINUOUS, Esha Lockwood MD, Last Rate: 75 mL/hr at 01/07/21 1642, 75 mL/hr at 01/07/21 1642    sodium bicarbonate tablet 650 mg, 650 mg, Oral, BID, Esha Lockwood MD, 650 mg at 01/08/21 0132    0.9% sodium chloride infusion 250 mL, 250 mL, IntraVENous, PRN, Yuli Mcmullen MD    azithromycin (ZITHROMAX) 500 mg in 0.9% sodium chloride 250 mL (VIAL-MATE), 500 mg, IntraVENous, Q24H, Yuli Mcmullen MD, 500 mg at 01/07/21 2358    cefTRIAXone (ROCEPHIN) 1 g in sterile water (preservative free) 10 mL IV syringe, 1 g, IntraVENous, Q24H, Yuli Mcmullen MD, 1 g at 01/07/21 2357    pantoprazole (PROTONIX) 40 mg in 0.9% sodium chloride 10 mL injection, 40 mg, IntraVENous, DAILY, Yuli Mcmullen MD, 40 mg at 01/08/21 0926    0.9% sodium chloride infusion 250 mL, 250 mL, IntraVENous, PRN, Carmenza Melvin MD    0.9% sodium chloride infusion 250 mL, 250 mL, IntraVENous, PRN, Claude Monte MD    acetaminophen (TYLENOL) tablet 650 mg, 650 mg, Oral, Q6H PRN, Yuli Mcmullen MD    Objective:     Vitals:    01/07/21 2354 01/08/21 0912 01/08/21 0939 01/08/21 1055   BP: 128/65  (!) 151/89    Pulse: 80  (!) 49    Resp: 16  16    Temp: 97.4 °F (36.3 °C)  97.8 °F (36.6 °C)    SpO2: 98% 97% 100%    Weight:       Height:    6' 0.01\" (1.829 m)        Intake and Output:  Current Shift: No intake/output data recorded. Last three shifts: 01/06 1901 - 01/08 0700  In: 1400   Out: 1200 [Urine:1200]    Physical Exam:   Physical Exam   Constitutional: He appears distressed. HENT:   Head: Atraumatic. Eyes: Conjunctivae are normal.   Neck: Neck supple. No tracheal deviation present. Cardiovascular: Normal heart sounds. Pulmonary/Chest: Effort normal. No respiratory distress. He has no wheezes. Abdominal: Soft. Bowel sounds are normal.   Musculoskeletal: Normal range of motion. Neurological: He is alert. Skin: Skin is warm. Lab/Data Review:  Recent Results (from the past 24 hour(s))   RENAL FUNCTION PANEL    Collection Time: 01/08/21  6:50 AM   Result Value Ref Range    Sodium 143 136 - 145 mmol/L    Potassium 5.5 (H) 3.5 - 5.1 mmol/L    Chloride 115 (H) 97 - 108 mmol/L    CO2 21 21 - 32 mmol/L    Anion gap 7 5 - 15 mmol/L    Glucose 130 (H) 65 - 100 mg/dL    BUN 87 (H) 6 - 20 mg/dL    Creatinine 5.26 (H) 0.70 - 1.30 mg/dL    BUN/Creatinine ratio 17 12 - 20      GFR est AA 13 (L) >60 ml/min/1.73m2    GFR est non-AA 11 (L) >60 ml/min/1.73m2    Calcium 7.4 (L) 8.5 - 10.1 mg/dL    Phosphorus 6.3 (H) 2.6 - 4.7 mg/dL    Albumin 2.3 (L) 3.5 - 5.0 g/dL   CBC WITH AUTOMATED DIFF    Collection Time: 01/08/21  6:50 AM   Result Value Ref Range    WBC 8.3 4.1 - 11.1 K/uL    RBC 3.15 (L) 4.10 - 5.70 M/uL    HGB 8.5 (L) 12.1 - 17.0 g/dL    HCT 27.4 (L) 36.6 - 50.3 %    MCV 87.0 80.0 - 99.0 FL    MCH 27.0 26.0 - 34.0 PG    MCHC 31.0 30.0 - 36.5 g/dL    RDW 16.0 (H) 11.5 - 14.5 %    PLATELET 239 728 - 135 K/uL    MPV 9.7 8.9 - 12.9 FL    NEUTROPHILS 91 (H) 32 - 75 %    LYMPHOCYTES 4 (L) 12 - 49 %    MONOCYTES 4 (L) 5 - 13 %    EOSINOPHILS 0 0 - 7 %    BASOPHILS 0 0 - 1 %    IMMATURE GRANULOCYTES 1 (H) 0.0 - 0.5 %    ABS. NEUTROPHILS 7.6 1.8 - 8.0 K/UL    ABS. LYMPHOCYTES 0.3 (L) 0.8 - 3.5 K/UL    ABS. MONOCYTES 0.3 0.0 - 1.0 K/UL    ABS. EOSINOPHILS 0.0 0.0 - 0.4 K/UL    ABS.  BASOPHILS 0.0 0.0 - 0.1 K/UL    ABS. IMM. GRANS. 0.1 (H) 0.00 - 0.04 K/UL    DF AUTOMATED     METABOLIC PANEL, COMPREHENSIVE    Collection Time: 01/08/21  6:50 AM   Result Value Ref Range    Sodium 142 136 - 145 mmol/L    Potassium 5.4 (H) 3.5 - 5.1 mmol/L    Chloride 114 (H) 97 - 108 mmol/L    CO2 21 21 - 32 mmol/L    Anion gap 7 5 - 15 mmol/L    Glucose 132 (H) 65 - 100 mg/dL    BUN 84 (H) 6 - 20 mg/dL    Creatinine 5.27 (H) 0.70 - 1.30 mg/dL    BUN/Creatinine ratio 16 12 - 20      GFR est AA 13 (L) >60 ml/min/1.73m2    GFR est non-AA 11 (L) >60 ml/min/1.73m2    Calcium 7.7 (L) 8.5 - 10.1 mg/dL    Bilirubin, total 0.4 0.2 - 1.0 mg/dL    AST (SGOT) 40 (H) 15 - 37 U/L    ALT (SGPT) 26 12 - 78 U/L    Alk. phosphatase 56 45 - 117 U/L    Protein, total 5.4 (L) 6.4 - 8.2 g/dL    Albumin 2.3 (L) 3.5 - 5.0 g/dL    Globulin 3.1 2.0 - 4.0 g/dL    A-G Ratio 0.7 (L) 1.1 - 2.2          CT CHEST WO CONT   Final Result   IMPRESSION:   1. No pneumomediastinum or pneumothorax. 2. Cardiomegaly. 3. Subtle groundglass densities in both lungs might be pneumonia or other. XR CHEST PORT   Final Result   IMPRESSION: Subtle but potentially clinically significant findings as above. Suggest radiographic follow-up.            Assessment:     Active Problems:    GI bleed (1/5/2021)             Severe anemia, questionable GI blood loss   past history of diverticular bleed but he has no no evidence of active GI bleeding  COVID-19 pneumonia,  Renal insufficiency  Patient was not on any anticoagulation  Plan:   May need a more blood transfusion, IV half-normal saline to correct electrolytes of abnormally  PPI as ordered  Follow the hemoglobin closely  no endoscopy study is needed at this time    Plan:       Signed By: Suzan Leach MD     January 8, 2021        Thank you for allowing me to participate in this patients care  Cc Referring Physician   Monie Schultz MD

## 2021-01-08 NOTE — PROGRESS NOTES
[facility] KIDNEY     Renal Daily Progress Note:     Admission Date: 2021     Subjective: seen in room 516  No additional blood x 2 days and hemoglobin stable. Feels better. O2 sats are acceptable on supplemental oxygen. However, his chest x-ray suggests CoVid pneumonitis. Serum creatinine has dropped with volume administration. \He has no longer acidotic. Not short of breath, no chest pain, no nausea or vomiting. Able to eat. No longer with edema. No abdominal pain. Review of Systems  Pertinent items are noted in HPI.     Objective:     Visit Vitals  BP (!) 151/89   Pulse (!) 49   Temp 97.8 °F (36.6 °C)   Resp 16   Ht 6' 0.01\" (1.829 m)   Wt 79.4 kg (175 lb)   SpO2 100%   BMI 23.73 kg/m²     Temp (24hrs), Av.8 °F (36.6 °C), Min:97.4 °F (36.3 °C), Max:98.2 °F (36.8 °C)      No intake or output data in the 24 hours ending 21 1406  Current Facility-Administered Medications   Medication Dose Route Frequency    dexamethasone (DECADRON) 4 mg/mL injection 6 mg  6 mg IntraVENous Q12H    0.45% sodium chloride infusion  75 mL/hr IntraVENous CONTINUOUS    sodium bicarbonate tablet 650 mg  650 mg Oral BID    0.9% sodium chloride infusion 250 mL  250 mL IntraVENous PRN    azithromycin (ZITHROMAX) 500 mg in 0.9% sodium chloride 250 mL (VIAL-MATE)  500 mg IntraVENous Q24H    cefTRIAXone (ROCEPHIN) 1 g in sterile water (preservative free) 10 mL IV syringe  1 g IntraVENous Q24H    pantoprazole (PROTONIX) 40 mg in 0.9% sodium chloride 10 mL injection  40 mg IntraVENous DAILY    0.9% sodium chloride infusion 250 mL  250 mL IntraVENous PRN    0.9% sodium chloride infusion 250 mL  250 mL IntraVENous PRN    acetaminophen (TYLENOL) tablet 650 mg  650 mg Oral Q6H PRN       Physical Exam:General appearance: alert, cooperative, no distress, appears stated age  Head: Normocephalic, without obvious abnormality, atraumatic  Neck: supple, symmetrical, trachea midline, no adenopathy and no JVD  Lungs: scattered crackles, no wheeze  Heart: regular rate and rhythm, no S3 or S4, no rub  Abdomen: soft, non-tender. Bowel sounds normal. No masses,  no organomegaly  Extremities: no edema  Lymph nodes: no adenopathy  Neurologic: grossly normal    Data Review:     LABS:  Recent Labs     01/08/21  0650 01/07/21  0421 01/06/21  0957 01/05/21  1830     143 142 145 143   K 5.4*  5.5* 5.5* 5.8* 6.2*   *  115* 114* 120* 119*   CO2 21 21 22 17* 15*   BUN 84*  87* 83* 70* 72*   CREA 5.27*  5.26* 5.42* 5.47* 5.90*   CA 7.7*  7.4* 7.9* 7.6* 7.9*   ALB 2.3*  2.3* 2.3* 2.2* 2.6*   PHOS 6.3*  --   --   --    MG  --   --   --  2.2     Recent Labs     01/08/21  0650 01/07/21 0421 01/06/21  0957   WBC 8.3 7.3 3.7*   HGB 8.5* 8.1* 5.4*   HCT 27.4* 24.5* 17.2*    185 201     No results for input(s): HÉCTOR, KU, CLU, CREAU in the last 72 hours. No lab exists for component: PROU     CXR 1-5-21  Portable AP view at 1816 hours. Comparison 23 November 2008. Cardiac silhouette size is borderline enlarged. Arcuate lucency adjacent to the left side of the aortic knob margin could  reflect a tiny pneumomediastinum. Mild pulmonary vascular congestion, without overt edema. Patchy densities in the lower lungs could be minimal infiltrate. Consider  follow-up if warranted clinically.     IMPRESSION  IMPRESSION: Subtle but potentially clinically significant findings as above. Suggest radiographic follow-up.     CT chest 1-5-21  Images obtained without contrast include the abdomen and pelvis. Comparison portable chest radiograph earlier today.     Subtle peripheral groundglass densities are noted in both lungs, could reflect  Covid pneumonia or other. No pneumothorax or pneumomediastinum. The radiographic findings suspicious for  pneumomediastinum probably was artifact, perhaps related to cardiac motion. No pleural effusion or adenopathy. Cardiomegaly again noted.     IMPRESSION  IMPRESSION:  1.  No pneumomediastinum or pneumothorax. 2. Cardiomegaly.   3. Subtle groundglass densities in both lungs might be pneumonia or other.     Assessment:   Renal Specific Problems  Chronic kidney disease stage IV at baseline  Acute kidney injury related to acute blood loss, Covid infection--improving  Acute severe anemia, rule out GI bleed, past history of diverticular bleed but he has no blood per rectum  Hyperkalemia  Metabolic acidosis--resolved  Volume depletion--corrected     COVID-19 positive test (U07.1, COVID-19) with Acute Pneumonia (J12.89, Other viral pneumonia)  (If respiratory failure or sepsis present, add as separate assessment)  in    Plan:   D/w Dr. Robinson Luna, no plans to do EGD/ colonoscopy if Hgb stabel    Obtain/ Order: labs/cultures/radiology/procedures:  Serial creatinine/BMP      Oral sodium bicarbonate    Therapeutic:    decrease IV fluids at half-normal saline  Feed patient  PT        Narcisa Timmons MD    [phone]

## 2021-01-08 NOTE — PROGRESS NOTES
15: 10PM Called patient's cellphone @ [phone]. VM left requesting a return phone call. RN informed writer patient doesn't have a phone on his side of the room. JUAN Sosa          10:05AM Outbound call to patient's daughter Radha White. Phone number isn't currently working at this time. JUAN Sosa        09:55AM SW phoned patient's room no answer. Called patient's cell phone and voice message left requesting a return phone call. Will try again.       JUAN Sosa

## 2021-01-08 NOTE — PROGRESS NOTES
Comprehensive Nutrition Assessment    Type and Reason for Visit: Initial, NPO/clear liquid    Nutrition Recommendations/Plan:   Advance diet as medically able to goal of GI soft   Adding Ensure Clear TID (720kcals, 24g pro)   RD to adjust ONS as appropriate with diet advancement  Please document % of all meal/snack consumed    Nutrition Assessment:  Admitted for malaise, SOB. +COVID-19, +ESME. Severely anemic on admit with HGB 3.5, GI consulted for GIB, planning colonoscopy when stable as pt with hx severe diverticular bleed. In ICU x1 day. , now on medical floor. S/p 6 U PRBC. Noted no appetite or intakes documented in EMR. RD called pt x1, unsuccessful. Called RN who was able to speak with pt who stated he ate nothing this AM as no one brought him a tray, was eating well yesterday with no issues with clear liquids. Discussed with RN ability to advance diet, RN to speak with GI about plan of action. RD to add Ensure Clear TID, recs advance diet as medically able to goal of GI soft. Labs: H/H 8.5/27.4, K+ 5.5, BUN 87, Cr 5.26, -136, Corrected Ca 8.76, AST 40, Phos 6.3. Meds: 1.2 NS at 75, azithromycin, ceftriaxone, dexamethasone, PPI, bicarb. Malnutrition Assessment:  Malnutrition Status:  Mild malnutrition(Insufficient data)    Context:  Acute illness     Findings of the 6 clinical characteristics of malnutrition:   Energy Intake:  7 - 50% or less of est energy requirements for 5 or more days  Weight Loss:  Unable to assess     Body Fat Loss:  Unable to assess,     Muscle Mass Loss:  Unable to assess,    Fluid Accumulation:  No significant fluid accumulation,      Estimated Daily Nutrient Needs:  Energy (kcal): 2223kcals (28kcals/kg); Weight Used for Energy Requirements: Current  Protein (g): 80g (1.0g/kg); Weight Used for Protein Requirements: Current  Fluid (ml/day): 2000ml (25ml/kg);  Method Used for Fluid Requirements: 1 ml/kcal    Needs for wt maintenance with GIB vs CKD-4    Nutrition Related Findings: Unable to complete NFPE d/t COVID-19 precautions. No edema documented. No N/V/D/C per EMR, per RN pt unable to report last BM, none documented since admit, RD discussed addition of bowel regimen. Unknown hx dysphagia. Wounds:   None       Current Nutrition Therapies:  DIET NUTRITIONAL SUPPLEMENTS Breakfast, Lunch, Dinner; Ensure Clear    Anthropometric Measures:  · Height:  6' 0.01\" (182.9 cm)  · Current Body Wt:  79.4 kg (175 lb 0.7 oz)(1/5)   · Usual Body Wt:  (rachel)     · Ideal Body Wt:  178 lbs:  98.3 %   · BMI Category:  Normal weight (BMI 22.0-24.9) age over 72     No prior wt hx to assess, pt unable to provides as RD unable to reach. ? If current wt stated vs estimated on admit    Nutrition Diagnosis:   · Inadequate oral intake related to altered GI function(GIB) as evidenced by NPO or clear liquid status due to medical condition(x3-4 days)    Nutrition Interventions:   Food and/or Nutrient Delivery: Modify current diet(Advance diet ad medically able to goal of GI soft, adding Ensure Clear TID)  Nutrition Education and Counseling: No recommendations at this time, Education not appropriate  Coordination of Nutrition Care: Continue to monitor while inpatient    Goals:  Initiate diet capable of meeting >/=75% of EENs within 3-4 days  Intakes >/=50% of EENs in >5 days  Wt maintenance within +/-0.5kg in >5 days       Nutrition Monitoring and Evaluation:   Behavioral-Environmental Outcomes: None identified  Food/Nutrient Intake Outcomes: Food and nutrient intake, Diet advancement/tolerance, Supplement intake  Physical Signs/Symptoms Outcomes: Biochemical data, Hemodynamic status, Weight, GI status(H/H, lytes)    Discharge Planning:     Too soon to determine     Electronically signed by Daamri Sutherland RD on 1/8/2021 at 10:38 AM    Contact: Ext [phone]

## 2021-01-09 LAB
ALBUMIN SERPL-MCNC: 2.3 G/DL (ref 3.5–5)
ALBUMIN/GLOB SERPL: 0.8 {RATIO} (ref 1.1–2.2)
ALP SERPL-CCNC: 54 U/L (ref 45–117)
ALT SERPL-CCNC: 29 U/L (ref 12–78)
ANION GAP SERPL CALC-SCNC: 8 MMOL/L (ref 5–15)
AST SERPL W P-5'-P-CCNC: 29 U/L (ref 15–37)
BASOPHILS # BLD: 0 K/UL (ref 0–0.1)
BASOPHILS NFR BLD: 0 % (ref 0–1)
BILIRUB SERPL-MCNC: 0.3 MG/DL (ref 0.2–1)
BUN SERPL-MCNC: 87 MG/DL (ref 6–20)
BUN/CREAT SERPL: 17 (ref 12–20)
CA-I BLD-MCNC: 7.4 MG/DL (ref 8.5–10.1)
CHLORIDE SERPL-SCNC: 114 MMOL/L (ref 97–108)
CO2 SERPL-SCNC: 22 MMOL/L (ref 21–32)
CREAT SERPL-MCNC: 5.03 MG/DL (ref 0.7–1.3)
DIFFERENTIAL METHOD BLD: ABNORMAL
EOSINOPHIL # BLD: 0 K/UL (ref 0–0.4)
EOSINOPHIL NFR BLD: 0 % (ref 0–7)
ERYTHROCYTE [DISTWIDTH] IN BLOOD BY AUTOMATED COUNT: 15.9 % (ref 11.5–14.5)
GLOBULIN SER CALC-MCNC: 3 G/DL (ref 2–4)
GLUCOSE SERPL-MCNC: 112 MG/DL (ref 65–100)
HCT VFR BLD AUTO: 29.8 % (ref 36.6–50.3)
HGB BLD-MCNC: 9.2 G/DL (ref 12.1–17)
IMM GRANULOCYTES # BLD AUTO: 0 K/UL
IMM GRANULOCYTES NFR BLD AUTO: 0 %
LYMPHOCYTES # BLD: 0.2 K/UL (ref 0.8–3.5)
LYMPHOCYTES NFR BLD: 3 % (ref 12–49)
MCH RBC QN AUTO: 27.1 PG (ref 26–34)
MCHC RBC AUTO-ENTMCNC: 30.9 G/DL (ref 30–36.5)
MCV RBC AUTO: 87.9 FL (ref 80–99)
MONOCYTES # BLD: 0.2 K/UL (ref 0–1)
MONOCYTES NFR BLD: 2 % (ref 5–13)
NEUTS SEG # BLD: 7.3 K/UL (ref 1.8–8)
NEUTS SEG NFR BLD: 95 % (ref 32–75)
PLATELET # BLD AUTO: 210 K/UL (ref 150–400)
PMV BLD AUTO: 9.6 FL (ref 8.9–12.9)
POTASSIUM SERPL-SCNC: 4.7 MMOL/L (ref 3.5–5.1)
PROT SERPL-MCNC: 5.3 G/DL (ref 6.4–8.2)
RBC # BLD AUTO: 3.39 M/UL (ref 4.1–5.7)
RBC MORPH BLD: ABNORMAL
RBC MORPH BLD: ABNORMAL
SODIUM SERPL-SCNC: 144 MMOL/L (ref 136–145)
WBC # BLD AUTO: 7.7 K/UL (ref 4.1–11.1)

## 2021-01-09 PROCEDURE — 74011250637 HC RX REV CODE- 250/637: Performed by: INTERNAL MEDICINE

## 2021-01-09 PROCEDURE — 85025 COMPLETE CBC W/AUTO DIFF WBC: CPT

## 2021-01-09 PROCEDURE — 36415 COLL VENOUS BLD VENIPUNCTURE: CPT

## 2021-01-09 PROCEDURE — 65270000029 HC RM PRIVATE

## 2021-01-09 PROCEDURE — 74011000250 HC RX REV CODE- 250: Performed by: INTERNAL MEDICINE

## 2021-01-09 PROCEDURE — 80053 COMPREHEN METABOLIC PANEL: CPT

## 2021-01-09 PROCEDURE — 74011250636 HC RX REV CODE- 250/636: Performed by: INTERNAL MEDICINE

## 2021-01-09 PROCEDURE — C9113 INJ PANTOPRAZOLE SODIUM, VIA: HCPCS | Performed by: INTERNAL MEDICINE

## 2021-01-09 PROCEDURE — 83540 ASSAY OF IRON: CPT

## 2021-01-09 RX ORDER — HYDRALAZINE HYDROCHLORIDE 50 MG/1
50 TABLET, FILM COATED ORAL 3 TIMES DAILY
Status: DISCONTINUED | OUTPATIENT
Start: 2021-01-09 | End: 2021-01-13 | Stop reason: HOSPADM

## 2021-01-09 RX ORDER — THEOPHYLLINE 300 MG/1
150 TABLET, EXTENDED RELEASE ORAL EVERY 12 HOURS
Status: DISCONTINUED | OUTPATIENT
Start: 2021-01-09 | End: 2021-01-13 | Stop reason: HOSPADM

## 2021-01-09 RX ORDER — ICOSAPENT ETHYL 1000 MG/1
2 CAPSULE ORAL 2 TIMES DAILY WITH MEALS
Status: DISCONTINUED | OUTPATIENT
Start: 2021-01-09 | End: 2021-01-13 | Stop reason: HOSPADM

## 2021-01-09 RX ADMIN — SODIUM BICARBONATE 650 MG TABLET 650 MG: at 09:26

## 2021-01-09 RX ADMIN — PANTOPRAZOLE SODIUM 40 MG: 40 INJECTION, POWDER, FOR SOLUTION INTRAVENOUS at 14:40

## 2021-01-09 RX ADMIN — THEOPHYLLINE 150 MG: 300 TABLET, EXTENDED RELEASE ORAL at 16:59

## 2021-01-09 RX ADMIN — SODIUM BICARBONATE 650 MG TABLET 650 MG: at 21:05

## 2021-01-09 RX ADMIN — AZITHROMYCIN DIHYDRATE 500 MG: 500 INJECTION, POWDER, LYOPHILIZED, FOR SOLUTION INTRAVENOUS at 01:13

## 2021-01-09 RX ADMIN — THEOPHYLLINE 150 MG: 300 TABLET, EXTENDED RELEASE ORAL at 21:00

## 2021-01-09 RX ADMIN — CEFTRIAXONE SODIUM 1 G: 1 INJECTION, POWDER, FOR SOLUTION INTRAMUSCULAR; INTRAVENOUS at 23:19

## 2021-01-09 RX ADMIN — DEXAMETHASONE SODIUM PHOSPHATE 6 MG: 4 INJECTION, SOLUTION INTRAMUSCULAR; INTRAVENOUS at 09:26

## 2021-01-09 RX ADMIN — HYDRALAZINE HYDROCHLORIDE 50 MG: 50 TABLET, FILM COATED ORAL at 21:05

## 2021-01-09 RX ADMIN — ICOSAPENT ETHYL 2 G: 1000 CAPSULE ORAL at 17:00

## 2021-01-09 RX ADMIN — CEFTRIAXONE SODIUM 1 G: 1 INJECTION, POWDER, FOR SOLUTION INTRAMUSCULAR; INTRAVENOUS at 01:09

## 2021-01-09 RX ADMIN — AZITHROMYCIN DIHYDRATE 500 MG: 500 INJECTION, POWDER, LYOPHILIZED, FOR SOLUTION INTRAVENOUS at 23:19

## 2021-01-09 RX ADMIN — HYDRALAZINE HYDROCHLORIDE 25 MG: 25 TABLET, FILM COATED ORAL at 09:26

## 2021-01-09 RX ADMIN — DEXAMETHASONE SODIUM PHOSPHATE 6 MG: 4 INJECTION, SOLUTION INTRAMUSCULAR; INTRAVENOUS at 21:05

## 2021-01-09 NOTE — PROGRESS NOTES
Visited patient in [facility] unit during rounds. Per current COVID-19 isolation protocol in effect, I provided silent support and prayer. No family members were present. Chaplains will follow up if further referrals are requested. Chaplain Santiago Castle M.Div.    can be reached by calling the  at [facility]  [phone]

## 2021-01-09 NOTE — PROGRESS NOTES
Name: Jude Ohara MRN: [id number]   : 1955 Hospital: [address] [place] [address]   Date: 2021        IMPRESSION:   · CKD stage 4/5. Patient's GFR is incrementally improving. · ESME- better  · Hyperkalemia- resolved. · COVID pneumonia- on standard treatment  · Critical anemia- Hb is better after transfusion of several units of RBC's      PLAN:   · No indications for RRT  · Continue present care  · Monitor electrolytes daily  · Iron profile  · Avoid nephrotoxic agents. · Will be seen again on Monday. Please call if any questions. Subjective/Interval History:   I have reviewed the flowsheet and previous days notes. ROS:Pertinent items are noted in HPI. Feels somewhat better. Objective:   Vital Signs:    Visit Vitals  BP (!) 168/96 (BP 1 Location: Right arm, BP Patient Position: At rest;Supine)   Pulse (!) 50   Temp 98.9 °F (37.2 °C)   Resp 16   Ht 6' 0.01\" (1.829 m)   Wt 79.4 kg (175 lb)   SpO2 100%   BMI 23.73 kg/m²       O2 Device: Nasal cannula   O2 Flow Rate (L/min): 2 l/min   Temp (24hrs), Av.2 °F (36.8 °C), Min:97.5 °F (36.4 °C), Max:98.9 °F (37.2 °C)       Intake/Output:   Last shift:      No intake/output data recorded. Last 3 shifts: No intake/output data recorded. Intake/Output Summary (Last 24 hours) at 2021 1436  Last data filed at 2021 0933  Gross per 24 hour   Intake 0 ml   Output --   Net 0 ml        Physical Exam:  General:    Alert, cooperative, no distress, appears stated age. Head:   Normocephalic, without obvious abnormality, atraumatic. Eyes:   Conjunctivae/corneas clear. Nose:  Nares normal. No drainage or sinus tenderness. Neck:  Supple, symmetrical.  Back:    Symmetric,  No CVA tenderness. Lungs:   Diminished to auscultation bilaterally. Bibasilar crackles  Chest wall:  No tenderness or deformity. No Accessory muscle use. Heart:   Regular rate and rhythm,  no murmur, rub or gallop. Abdomen:   Soft, non-tender. Not distended. Bowel sounds normal. No masses  Extremities: Extremities normal, atraumatic, No cyanosis. No edema. No clubbing  Skin:     Texture, turgor normal. No rashes or lesions. Not Jaundiced  Psych:  Fair insight. Not depressed. Not anxious or agitated.         DATA:  Labs:  Recent Labs     01/09/21  1030 01/08/21  0650 01/07/21  0421    142  143 142   K 4.7 5.4*  5.5* 5.5*   * 114*  115* 114*   CO2 22 21 21 22   BUN 87* 84*  87* 83*   CREA 5.03* 5.27*  5.26* 5.42*   CA 7.4* 7.7*  7.4* 7.9*   ALB 2.3* 2.3*  2.3* 2.3*   PHOS  --  6.3*  --      Recent Labs     01/09/21  1030 01/08/21  0650 01/07/21  0421   WBC 7.7 8.3 7.3   HGB 9.2* 8.5* 8.1*   HCT 29.8* 27.4* 24.5*    209 185     Recent Labs     01/07/21  1645   HÉCTOR 52   KU 26   CREAU 91.00  91.00       Total time spent with patient:  35 minutes    [] Critical Care Provided    Care Plan discussed with:   Staff, Yandel Neri MD

## 2021-01-09 NOTE — PROGRESS NOTES
Progress Note    Camila Nieves MD             Daily Progress Note: 2021      Subjective: The patient is seen for follow  up. Offers no complaints. Patient told me that today morning he had black bowel movement but his hemoglobin is improving 8.5 today oxygenation is good. Agree with Dr. Marcela Zayas plan  Problem List:. Problem List as of 2021 Never Reviewed          Codes Class Noted - Resolved    GI bleed ICD-10-CM: K92.2  ICD-9-CM: 578.9  2021 - Present              Medications reviewed  Current Facility-Administered Medications   Medication Dose Route Frequency    hydrALAZINE (APRESOLINE) tablet 25 mg  25 mg Oral TID    dexamethasone (DECADRON) 4 mg/mL injection 6 mg  6 mg IntraVENous Q12H    0.45% sodium chloride infusion  25 mL/hr IntraVENous CONTINUOUS    sodium bicarbonate tablet 650 mg  650 mg Oral BID    0.9% sodium chloride infusion 250 mL  250 mL IntraVENous PRN    azithromycin (ZITHROMAX) 500 mg in 0.9% sodium chloride 250 mL (VIAL-MATE)  500 mg IntraVENous Q24H    cefTRIAXone (ROCEPHIN) 1 g in sterile water (preservative free) 10 mL IV syringe  1 g IntraVENous Q24H    pantoprazole (PROTONIX) 40 mg in 0.9% sodium chloride 10 mL injection  40 mg IntraVENous DAILY    0.9% sodium chloride infusion 250 mL  250 mL IntraVENous PRN    0.9% sodium chloride infusion 250 mL  250 mL IntraVENous PRN    acetaminophen (TYLENOL) tablet 650 mg  650 mg Oral Q6H PRN       Review of Systems:   All systems are reviewed melena  Objective:   Physical Exam:     Visit Vitals  BP (!) 160/89 (BP 1 Location: Right arm, BP Patient Position: At rest)   Pulse (!) 55   Temp 98.3 °F (36.8 °C)   Resp 18   Ht 6' 0.01\" (1.829 m)   Wt 79.4 kg (175 lb)   SpO2 98%   BMI 23.73 kg/m²    O2 Flow Rate (L/min): 2 l/min O2 Device: Nasal cannula    Temp (24hrs), Av.8 °F (36.6 °C), Min:97.4 °F (36.3 °C), Max:98.3 °F (36.8 °C)    No intake/output data recorded.    No intake/output data recorded. HEENT-normocephalic atraumatic skull pupils are bilaterally equal reactive to light sclera nonicteric conjunctive are pink no edema of the eyelids no yellow nasal discharge tongue is pink no ulcer positive gag reflex no pharyngeal inflammation extraocular movements are intact  Neck is supple lymphadenopathy no thyromegaly no carotid  General:  Alert, cooperative, no distress, appears stated age. Lungs:   Clear to auscultation bilaterally. Chest wall:  No tenderness or deformity. Heart:  Regular rate and rhythm, S1, S2 normal, no murmur, click, rub or gallop. Abdomen:   Soft, non-tender. Bowel sounds normal. No masses,  No organomegaly. Extremities: Extremities normal, atraumatic, no cyanosis or edema. Pulses: 2+ and symmetric all extremities. Skin: Skin color, texture, turgor normal. No rashes or lesions   Neurologic: CNII-XII intact. No gross sensory or motor deficits     Data Review:       Recent Days:  Recent Labs     01/08/21  0650 01/07/21  0421 01/06/21  0957   WBC 8.3 7.3 3.7*   HGB 8.5* 8.1* 5.4*   HCT 27.4* 24.5* 17.2*    185 201     Recent Labs     01/08/21  0650 01/07/21  0421 01/06/21  0957     143 142 145   K 5.4*  5.5* 5.5* 5.8*   *  115* 114* 120*   CO2 21 21 22 17*   *  130* 136* 139*   BUN 84*  87* 83* 70*   CREA 5.27*  5.26* 5.42* 5.47*   CA 7.7*  7.4* 7.9* 7.6*   PHOS 6.3*  --   --    ALB 2.3*  2.3* 2.3* 2.2*   TBILI 0.4 0.3 0.3   ALT 26 25 21     No results for input(s): PH, PCO2, PO2, HCO3, FIO2 in the last 72 hours.     24 Hour Results:  Recent Results (from the past 24 hour(s))   RENAL FUNCTION PANEL    Collection Time: 01/08/21  6:50 AM   Result Value Ref Range    Sodium 143 136 - 145 mmol/L    Potassium 5.5 (H) 3.5 - 5.1 mmol/L    Chloride 115 (H) 97 - 108 mmol/L    CO2 21 21 - 32 mmol/L    Anion gap 7 5 - 15 mmol/L    Glucose 130 (H) 65 - 100 mg/dL    BUN 87 (H) 6 - 20 mg/dL    Creatinine 5.26 (H) 0.70 - 1.30 mg/dL BUN/Creatinine ratio 17 12 - 20      GFR est AA 13 (L) >60 ml/min/1.73m2    GFR est non-AA 11 (L) >60 ml/min/1.73m2    Calcium 7.4 (L) 8.5 - 10.1 mg/dL    Phosphorus 6.3 (H) 2.6 - 4.7 mg/dL    Albumin 2.3 (L) 3.5 - 5.0 g/dL   CBC WITH AUTOMATED DIFF    Collection Time: 01/08/21  6:50 AM   Result Value Ref Range    WBC 8.3 4.1 - 11.1 K/uL    RBC 3.15 (L) 4.10 - 5.70 M/uL    HGB 8.5 (L) 12.1 - 17.0 g/dL    HCT 27.4 (L) 36.6 - 50.3 %    MCV 87.0 80.0 - 99.0 FL    MCH 27.0 26.0 - 34.0 PG    MCHC 31.0 30.0 - 36.5 g/dL    RDW 16.0 (H) 11.5 - 14.5 %    PLATELET 810 522 - 602 K/uL    MPV 9.7 8.9 - 12.9 FL    NEUTROPHILS 91 (H) 32 - 75 %    LYMPHOCYTES 4 (L) 12 - 49 %    MONOCYTES 4 (L) 5 - 13 %    EOSINOPHILS 0 0 - 7 %    BASOPHILS 0 0 - 1 %    IMMATURE GRANULOCYTES 1 (H) 0.0 - 0.5 %    ABS. NEUTROPHILS 7.6 1.8 - 8.0 K/UL    ABS. LYMPHOCYTES 0.3 (L) 0.8 - 3.5 K/UL    ABS. MONOCYTES 0.3 0.0 - 1.0 K/UL    ABS. EOSINOPHILS 0.0 0.0 - 0.4 K/UL    ABS. BASOPHILS 0.0 0.0 - 0.1 K/UL    ABS. IMM. GRANS. 0.1 (H) 0.00 - 0.04 K/UL    DF AUTOMATED     METABOLIC PANEL, COMPREHENSIVE    Collection Time: 01/08/21  6:50 AM   Result Value Ref Range    Sodium 142 136 - 145 mmol/L    Potassium 5.4 (H) 3.5 - 5.1 mmol/L    Chloride 114 (H) 97 - 108 mmol/L    CO2 21 21 - 32 mmol/L    Anion gap 7 5 - 15 mmol/L    Glucose 132 (H) 65 - 100 mg/dL    BUN 84 (H) 6 - 20 mg/dL    Creatinine 5.27 (H) 0.70 - 1.30 mg/dL    BUN/Creatinine ratio 16 12 - 20      GFR est AA 13 (L) >60 ml/min/1.73m2    GFR est non-AA 11 (L) >60 ml/min/1.73m2    Calcium 7.7 (L) 8.5 - 10.1 mg/dL    Bilirubin, total 0.4 0.2 - 1.0 mg/dL    AST (SGOT) 40 (H) 15 - 37 U/L    ALT (SGPT) 26 12 - 78 U/L    Alk. phosphatase 56 45 - 117 U/L    Protein, total 5.4 (L) 6.4 - 8.2 g/dL    Albumin 2.3 (L) 3.5 - 5.0 g/dL    Globulin 3.1 2.0 - 4.0 g/dL    A-G Ratio 0.7 (L) 1.1 - 2.2         CT CHEST WO CONT   Final Result   IMPRESSION:   1. No pneumomediastinum or pneumothorax. 2. Cardiomegaly. 3. Subtle groundglass densities in both lungs might be pneumonia or other. XR CHEST PORT   Final Result   IMPRESSION: Subtle but potentially clinically significant findings as above. Suggest radiographic follow-up. Assessment: Anemia  COVID-19 pneumonitis  Hypertension  Acute on chronic renal failure        Plan: Agree with infectious disease plan  Patient's hemoglobin is stable  We will continue current treatment        Care Plan discussed with: RN taking care of patient as well as patient himself    Total time spent with patient: 30 minutes.     Briseida Verma MD

## 2021-01-09 NOTE — PROGRESS NOTES
Problem: Falls - Risk of  Goal: *Absence of Falls  Description: Document Danne Lobe Fall Risk and appropriate interventions in the flowsheet. Outcome: Progressing Towards Goal  Note: Fall Risk Interventions:  Mobility Interventions: Patient to call before getting OOB    Mentation Interventions: Bed/chair exit alarm, Increase mobility, Adequate sleep, hydration, pain control    Medication Interventions: Teach patient to arise slowly    Elimination Interventions: Patient to call for help with toileting needs              Problem: Patient Education: Go to Patient Education Activity  Goal: Patient/Family Education  Outcome: Progressing Towards Goal     Problem: Pressure Injury - Risk of  Goal: *Prevention of pressure injury  Description: Document Alfa Scale and appropriate interventions in the flowsheet. Outcome: Progressing Towards Goal  Note: Pressure Injury Interventions:             Activity Interventions: Increase time out of bed    Mobility Interventions: HOB 30 degrees or less, PT/OT evaluation    Nutrition Interventions: Document food/fluid/supplement intake, Offer support with meals,snacks and hydration                     Problem: Patient Education: Go to Patient Education Activity  Goal: Patient/Family Education  Outcome: Progressing Towards Goal     Problem: Nutrition Deficit  Goal: *Optimize nutritional status  Outcome: Progressing Towards Goal

## 2021-01-09 NOTE — PROGRESS NOTES
Pulmonary/CC Progress Note  Patient is a 76 y.o. male  AA who is known to have history of chronic kidney disease stage IV with baseline serum creatinine ranging between 3.7 and 4.2. He is admitted hospital because of generalized weakness, dizziness. He additionally history of hypertension. Initial work-up showed hemoglobin of 3.5 g/dL. He was also noted to have COVID-19 infection. Subjective:   Daily Progress Note: 1/9/2021 2:10 PM    Examined at bedside. Awake and alert  Not in any distress. He is on 2 L of nasal cannula oxygen. No respiratory distress noted. He was evaluated by GI service. Denied any fever or chills. Dizziness has shown improvement      Current Facility-Administered Medications   Medication Dose Route Frequency    theophylline ER(12 hour) (THEOCHRON) tablet 150 mg  150 mg Oral Q12H    icosapent ethyL (VASCEPA) capsule 2 g  2 g Oral BID WITH MEALS    hydrALAZINE (APRESOLINE) tablet 50 mg  50 mg Oral TID    dexamethasone (DECADRON) 4 mg/mL injection 6 mg  6 mg IntraVENous Q12H    0.45% sodium chloride infusion  25 mL/hr IntraVENous CONTINUOUS    sodium bicarbonate tablet 650 mg  650 mg Oral BID    0.9% sodium chloride infusion 250 mL  250 mL IntraVENous PRN    azithromycin (ZITHROMAX) 500 mg in 0.9% sodium chloride 250 mL (VIAL-MATE)  500 mg IntraVENous Q24H    cefTRIAXone (ROCEPHIN) 1 g in sterile water (preservative free) 10 mL IV syringe  1 g IntraVENous Q24H    pantoprazole (PROTONIX) 40 mg in 0.9% sodium chloride 10 mL injection  40 mg IntraVENous DAILY    0.9% sodium chloride infusion 250 mL  250 mL IntraVENous PRN    0.9% sodium chloride infusion 250 mL  250 mL IntraVENous PRN    acetaminophen (TYLENOL) tablet 650 mg  650 mg Oral Q6H PRN        Review of Systems  Denies any loose bowel movements. Denied any bloody stools. Denied any nausea or vomiting.     Objective:     Visit Vitals  BP (!) 168/96 (BP 1 Location: Right arm, BP Patient Position: At rest;Supine) Pulse (!) 50   Temp 98.9 °F (37.2 °C)   Resp 16   Ht 6' 0.01\" (1.829 m)   Wt 79.4 kg (175 lb)   SpO2 100%   BMI 23.73 kg/m²    O2 Flow Rate (L/min): 2 l/min O2 Device: Nasal cannula    Temp (24hrs), Av.2 °F (36.8 °C), Min:97.5 °F (36.4 °C), Max:98.9 °F (37.2 °C)      No intake/output data recorded. No intake/output data recorded. This is an elderly male who is currently not in significant respiratory distress. He is in of average build. He is alert and oriented x3  Neck is supple. No cervical lymphadenopathy. Thyroid not enlarged  Chest: Decreased air entry at lung bases. Few rhonchi audible. No wheezing was appreciated  Heart: S1-S2 normal  Abdomen: Soft, nontender, no visceromegaly. Extremities: No edema, cyanosis or clubbing  Neuro: No focal motor deficit. Lab/Data Review:  Recent Labs     21  1030 21  0650 21  0421   WBC 7.7 8.3 7.3   HGB 9.2* 8.5* 8.1*   HCT 29.8* 27.4* 24.5*    209 185     Recent Labs     21  1030 21  0650 21  0421    142  143 142   K 4.7 5.4*  5.5* 5.5*   * 114*  115* 114*   CO2    * 132*  130* 136*   BUN 87* 84*  87* 83*   CREA 5.03* 5.27*  5.26* 5.42*   CA 7.4* 7.7*  7.4* 7.9*   PHOS  --  6.3*  --    ALB 2.3* 2.3*  2.3* 2.3*   TBILI 0.3 0.4 0.3   ALT 29 26 25         Diagnostics   CXR Results  (Last 48 hours)    None             Assessment/Plan:     Active Problems:    GI bleed (2021)       Impression:   Patient is a 76 y.o. male  AA who is known to have history of chronic kidney disease stage IV with baseline serum creatinine ranging between 3.7 and 4.2. He is admitted hospital because of generalized weakness, dizziness. He additionally history of hypertension. Initial work-up showed hemoglobin of 3.5 g/dL. He was also noted to have COVID-19 infection.     Plan:   1. Dyspnea:  Patient noted to be short of breath on physical activity.   Differential diagnosis includes hypoxia from severe anemia versus or combination from Covid 19 pneumonitis. Patient is on supplemental oxygen at 4 L/min. Saturating 98%. We will continue to watch his oxygen saturations. 2.  COVID-19 pneumonitis:  Patient has bilateral basal infiltrates, has positive COVID-19 test results. He has developed COVID-19 pneumonitis. Patient is on supplemental oxygen, started on dexamethasone, however the increased dosage to 6 mg once daily. Patient started on IV Zithromax. Since patient has stage IV chronic kidney disease with GFR of 13, he will not be a candidate for remdesivir.     3. Severe anemia:  He had hemoglobin of 3.5 g/dL. This seems to be secondary to blood loss anemia. He is getting 5 units of packed RBCs. His hemoglobin has come up to 8.1 g/dL. Patient was seen by GI service. 4.  Chronic kidney disease:  He has stage IV chronic kidney disease. He has been followed by Dr. Heidi Chavez. Patient also has nephrotic range proteinuria.     Patient is on DVT prophylaxis. Once he gets a bed on the medical floor then he will get transferred.     Questions of patient were answered at bedside in detail  Case discussed in detail with RN, RT, and care team  Thank you for involving me in the care of this patient  I will follow with you closely during hospitalization    Time spent more than 30 minutes under patient care with no overlap reviewing results and records, decision making, and answering questions. Haris Graf MD  Pulmonary/CC     This document has been prepared by the Dragon voice recognition system, typographical errors may have occurred.  Attempts have been made to correct errors, however inadvertent errors may persist.

## 2021-01-09 NOTE — PROGRESS NOTES
Progress Note      1/9/2021 12:25 PM  NAME: Eldon Carroll   MRN:  [id number]   Admit Diagnosis: GI bleed [K92.2]      Subjective:   Chart reviewed. Patient feels somewhat better. Review of Systems:    Symptom Y/N Comments  Symptom Y/N Comments   Fever/Chills n   Chest Pain n    Poor Appetite    Edema     Cough    Abdominal Pain n    Sputum    Joint Pain     SOB/CARMONA n   Pruritis/Rash     Nausea/vomit n   Other     Diarrhea         Constipation           Could NOT obtain due to:      Objective:          Physical Exam:    Last 24hrs VS reviewed since prior progress note. Most recent are:    Visit Vitals  BP (!) 168/96 (BP 1 Location: Right arm, BP Patient Position: At rest;Supine)   Pulse (!) 50   Temp 98.9 °F (37.2 °C)   Resp 16   Ht 6' 0.01\" (1.829 m)   Wt 79.4 kg (175 lb)   SpO2 100%   BMI 23.73 kg/m²       Intake/Output Summary (Last 24 hours) at 1/9/2021 1225  Last data filed at 1/9/2021 0933  Gross per 24 hour   Intake 0 ml   Output --   Net 0 ml        General Appearance: Well developed, well nourished, alert & oriented x 3,    no acute distress. Ears/Nose/Mouth/Throat: Hearing grossly normal.  Neck: Supple. Chest: Lungs clear to auscultation bilaterally. Cardiovascular: Regular rate and rhythm, S1,S2 normal, no murmur. Abdomen: Soft, non-tender, bowel sounds are active. Extremities: No edema bilaterally. Skin: Warm and dry. []         Post-cath site without hematoma, bruit, tenderness, or thrill. Distal pulses intact. PMH/SH reviewed - no change compared to H&P    Data Review    Telemetry: Sinus bradycardia, episodes of PVCs and bigeminy and a pause up to 2.5 seconds. EKG:   []  No new EKG for review    Lab Data Personally Reviewed:    Recent Labs     01/09/21  1030 01/08/21  0650   WBC 7.7 8.3   HGB 9.2* 8.5*   HCT 29.8* 27.4*    209     No results for input(s): INR, PTP, APTT, INREXT in the last 72 hours.    Recent Labs     01/09/21  1030 01/08/21  0650 01/07/21  0421  142  143 142   K 4.7 5.4*  5.5* 5.5*   * 114*  115* 114*   CO2 22 21 21 22   BUN 87* 84*  87* 83*   CREA 5.03* 5.27*  5.26* 5.42*   * 132*  130* 136*   CA 7.4* 7.7*  7.4* 7.9*     No results for input(s): CPK, CKNDX, TROIQ in the last 72 hours. No lab exists for component: CPKMB  No results found for: CHOL, CHOLX, CHLST, CHOLV, HDL, HDLP, LDL, LDLC, DLDLP, Ana Levels, CHHD, CHHDX    Recent Labs     01/09/21  1030 01/08/21  0650 01/07/21  0421   AP 54 56 57   TP 5.3* 5.4* 5.5*   ALB 2.3* 2.3*  2.3* 2.3*   GLOB 3.0 3.1 3.2     No results for input(s): PH, PCO2, PO2 in the last 72 hours. Medications Personally Reviewed:    Current Facility-Administered Medications   Medication Dose Route Frequency    theophylline ER(12 hour) (THEOCHRON) tablet 150 mg  150 mg Oral Q12H    icosapent ethyL (VASCEPA) capsule 2 g  2 g Oral BID WITH MEALS    hydrALAZINE (APRESOLINE) tablet 25 mg  25 mg Oral TID    dexamethasone (DECADRON) 4 mg/mL injection 6 mg  6 mg IntraVENous Q12H    0.45% sodium chloride infusion  25 mL/hr IntraVENous CONTINUOUS    sodium bicarbonate tablet 650 mg  650 mg Oral BID    0.9% sodium chloride infusion 250 mL  250 mL IntraVENous PRN    azithromycin (ZITHROMAX) 500 mg in 0.9% sodium chloride 250 mL (VIAL-MATE)  500 mg IntraVENous Q24H    cefTRIAXone (ROCEPHIN) 1 g in sterile water (preservative free) 10 mL IV syringe  1 g IntraVENous Q24H    pantoprazole (PROTONIX) 40 mg in 0.9% sodium chloride 10 mL injection  40 mg IntraVENous DAILY    0.9% sodium chloride infusion 250 mL  250 mL IntraVENous PRN    0.9% sodium chloride infusion 250 mL  250 mL IntraVENous PRN    acetaminophen (TYLENOL) tablet 650 mg  650 mg Oral Q6H PRN           Problem List:   Patient has had a marked bradycardia and seems to have somewhat improvement after hydralazine has been started. Covid positive status. Chronic kidney disease. Blood pressure is still high.   1. Assessment/Plan:   I will increase the dose of hydralazine to 50 mg 3 times daily. I will also add Ezekiel-Dur 150 mg twice daily. Will add Vascepa 2 g twice daily as patient is Covid positive. Thank you. 1.          []       High complexity decision making was performed in this patient at high risk for decompensation with multiple organ involvement.     Justin Franco MD

## 2021-01-10 LAB
ALBUMIN SERPL-MCNC: 2.2 G/DL (ref 3.5–5)
ALBUMIN/GLOB SERPL: 0.7 {RATIO} (ref 1.1–2.2)
ALP SERPL-CCNC: 56 U/L (ref 45–117)
ALT SERPL-CCNC: 33 U/L (ref 12–78)
ANION GAP SERPL CALC-SCNC: 8 MMOL/L (ref 5–15)
AST SERPL W P-5'-P-CCNC: 27 U/L (ref 15–37)
BASOPHILS # BLD: 0 K/UL (ref 0–0.1)
BASOPHILS NFR BLD: 0 % (ref 0–1)
BILIRUB SERPL-MCNC: 0.3 MG/DL (ref 0.2–1)
BUN SERPL-MCNC: 89 MG/DL (ref 6–20)
BUN/CREAT SERPL: 18 (ref 12–20)
CA-I BLD-MCNC: 7.3 MG/DL (ref 8.5–10.1)
CHLORIDE SERPL-SCNC: 111 MMOL/L (ref 97–108)
CO2 SERPL-SCNC: 21 MMOL/L (ref 21–32)
CREAT SERPL-MCNC: 4.88 MG/DL (ref 0.7–1.3)
DIFFERENTIAL METHOD BLD: ABNORMAL
EOSINOPHIL # BLD: 0 K/UL (ref 0–0.4)
EOSINOPHIL NFR BLD: 0 % (ref 0–7)
ERYTHROCYTE [DISTWIDTH] IN BLOOD BY AUTOMATED COUNT: 15.9 % (ref 11.5–14.5)
GLOBULIN SER CALC-MCNC: 3 G/DL (ref 2–4)
GLUCOSE BLD STRIP.AUTO-MCNC: 124 MG/DL (ref 65–100)
GLUCOSE BLD STRIP.AUTO-MCNC: 133 MG/DL (ref 65–100)
GLUCOSE BLD STRIP.AUTO-MCNC: 99 MG/DL (ref 65–100)
GLUCOSE SERPL-MCNC: 117 MG/DL (ref 65–100)
HCT VFR BLD AUTO: 29.3 % (ref 36.6–50.3)
HGB BLD-MCNC: 9 G/DL (ref 12.1–17)
IMM GRANULOCYTES # BLD AUTO: 0.1 K/UL (ref 0–0.04)
IMM GRANULOCYTES NFR BLD AUTO: 1 % (ref 0–0.5)
IRON SATN MFR SERPL: 9 % (ref 20–50)
IRON SERPL-MCNC: 22 UG/DL (ref 35–150)
LYMPHOCYTES # BLD: 0.2 K/UL (ref 0.8–3.5)
LYMPHOCYTES NFR BLD: 3 % (ref 12–49)
MCH RBC QN AUTO: 27.3 PG (ref 26–34)
MCHC RBC AUTO-ENTMCNC: 30.7 G/DL (ref 30–36.5)
MCV RBC AUTO: 88.8 FL (ref 80–99)
MONOCYTES # BLD: 0.3 K/UL (ref 0–1)
MONOCYTES NFR BLD: 4 % (ref 5–13)
NEUTS SEG # BLD: 6.9 K/UL (ref 1.8–8)
NEUTS SEG NFR BLD: 92 % (ref 32–75)
PERFORMED BY, TECHID: ABNORMAL
PERFORMED BY, TECHID: ABNORMAL
PERFORMED BY, TECHID: NORMAL
PLATELET # BLD AUTO: 174 K/UL (ref 150–400)
PMV BLD AUTO: 9.4 FL (ref 8.9–12.9)
POTASSIUM SERPL-SCNC: 5.2 MMOL/L (ref 3.5–5.1)
PROT SERPL-MCNC: 5.2 G/DL (ref 6.4–8.2)
RBC # BLD AUTO: 3.3 M/UL (ref 4.1–5.7)
SODIUM SERPL-SCNC: 140 MMOL/L (ref 136–145)
TIBC SERPL-MCNC: 253 UG/DL (ref 250–450)
WBC # BLD AUTO: 7.5 K/UL (ref 4.1–11.1)

## 2021-01-10 PROCEDURE — 74011000250 HC RX REV CODE- 250: Performed by: INTERNAL MEDICINE

## 2021-01-10 PROCEDURE — 85025 COMPLETE CBC W/AUTO DIFF WBC: CPT

## 2021-01-10 PROCEDURE — 82962 GLUCOSE BLOOD TEST: CPT

## 2021-01-10 PROCEDURE — 74011250637 HC RX REV CODE- 250/637: Performed by: INTERNAL MEDICINE

## 2021-01-10 PROCEDURE — 74011250636 HC RX REV CODE- 250/636: Performed by: INTERNAL MEDICINE

## 2021-01-10 PROCEDURE — 77010033678 HC OXYGEN DAILY

## 2021-01-10 PROCEDURE — 80053 COMPREHEN METABOLIC PANEL: CPT

## 2021-01-10 PROCEDURE — C9113 INJ PANTOPRAZOLE SODIUM, VIA: HCPCS | Performed by: INTERNAL MEDICINE

## 2021-01-10 PROCEDURE — 65270000029 HC RM PRIVATE

## 2021-01-10 RX ORDER — DEXAMETHASONE SODIUM PHOSPHATE 4 MG/ML
3 INJECTION, SOLUTION INTRA-ARTICULAR; INTRALESIONAL; INTRAMUSCULAR; INTRAVENOUS; SOFT TISSUE EVERY 24 HOURS
Status: DISCONTINUED | OUTPATIENT
Start: 2021-01-10 | End: 2021-01-13 | Stop reason: HOSPADM

## 2021-01-10 RX ADMIN — DEXAMETHASONE SODIUM PHOSPHATE 3 MG: 4 INJECTION, SOLUTION INTRAMUSCULAR; INTRAVENOUS at 17:42

## 2021-01-10 RX ADMIN — DEXAMETHASONE SODIUM PHOSPHATE 6 MG: 4 INJECTION, SOLUTION INTRAMUSCULAR; INTRAVENOUS at 09:23

## 2021-01-10 RX ADMIN — CEFTRIAXONE SODIUM 1 G: 1 INJECTION, POWDER, FOR SOLUTION INTRAMUSCULAR; INTRAVENOUS at 23:15

## 2021-01-10 RX ADMIN — THEOPHYLLINE: 300 TABLET, EXTENDED RELEASE ORAL at 19:08

## 2021-01-10 RX ADMIN — ICOSAPENT ETHYL 2 G: 1000 CAPSULE ORAL at 08:00

## 2021-01-10 RX ADMIN — ICOSAPENT ETHYL 2 G: 1000 CAPSULE ORAL at 17:00

## 2021-01-10 RX ADMIN — PANTOPRAZOLE SODIUM 40 MG: 40 INJECTION, POWDER, FOR SOLUTION INTRAVENOUS at 09:24

## 2021-01-10 RX ADMIN — HYDRALAZINE HYDROCHLORIDE 50 MG: 50 TABLET, FILM COATED ORAL at 17:42

## 2021-01-10 RX ADMIN — SODIUM BICARBONATE 650 MG TABLET 650 MG: at 09:23

## 2021-01-10 RX ADMIN — HYDRALAZINE HYDROCHLORIDE 50 MG: 50 TABLET, FILM COATED ORAL at 19:08

## 2021-01-10 RX ADMIN — THEOPHYLLINE 150 MG: 300 TABLET, EXTENDED RELEASE ORAL at 09:23

## 2021-01-10 RX ADMIN — HYDRALAZINE HYDROCHLORIDE 50 MG: 50 TABLET, FILM COATED ORAL at 09:23

## 2021-01-10 RX ADMIN — SODIUM BICARBONATE 650 MG TABLET 650 MG: at 19:08

## 2021-01-10 NOTE — PROGRESS NOTES
Progress Note    Germania Ramos MD             Daily Progress Note: 1/10/2021      Subjective: The patient is seen for follow  up. Offers no complaints.   Problem List:  Problem List as of 1/10/2021 Never Reviewed          Codes Class Noted - Resolved    GI bleed ICD-10-CM: K92.2  ICD-9-CM: 578.9  2021 - Present              Medications reviewed  Current Facility-Administered Medications   Medication Dose Route Frequency    theophylline ER(12 hour) (THEOCHRON) tablet 150 mg  150 mg Oral Q12H    icosapent ethyL (VASCEPA) capsule 2 g  2 g Oral BID WITH MEALS    hydrALAZINE (APRESOLINE) tablet 50 mg  50 mg Oral TID    dexamethasone (DECADRON) 4 mg/mL injection 6 mg  6 mg IntraVENous Q12H    0.45% sodium chloride infusion  25 mL/hr IntraVENous CONTINUOUS    sodium bicarbonate tablet 650 mg  650 mg Oral BID    0.9% sodium chloride infusion 250 mL  250 mL IntraVENous PRN    cefTRIAXone (ROCEPHIN) 1 g in sterile water (preservative free) 10 mL IV syringe  1 g IntraVENous Q24H    0.9% sodium chloride infusion 250 mL  250 mL IntraVENous PRN    0.9% sodium chloride infusion 250 mL  250 mL IntraVENous PRN    acetaminophen (TYLENOL) tablet 650 mg  650 mg Oral Q6H PRN       Review of Systems:   All the systems are reviewed negative    Objective:   Physical Exam:     Visit Vitals  BP (!) 141/93   Pulse (!) 57   Temp 97.7 °F (36.5 °C)   Resp 18   Ht 6' 0.01\" (1.829 m)   Wt 79.4 kg (175 lb)   SpO2 93%   BMI 23.73 kg/m²    O2 Flow Rate (L/min): 2 l/min O2 Device: Nasal cannula    Temp (24hrs), Av.5 °F (36.9 °C), Min:97.7 °F (36.5 °C), Max:99.1 °F (37.3 °C)    01/10 0701 - 01/10 1900  In: 100 [P.O.:100]  Out: -    1901 - 01/10 07  In: 360 [P.O.:360]  Out: 800 [Urine:800]  HEENT-normocephalic atraumatic skull pupils are bilaterally equal reacting to light sclera nonicteric conjunctive pink no edema of the eyelids no yellow nasal discharge tongue is pink no ulcer positive gag reflex no pharyngeal inflammation extraocular movements are intact  Neck is supple for range of motion no JVD no HJR no lymphadenopathy no thyromegaly no carotid bruit  General:  Alert, cooperative, no distress, appears stated age. Lungs:   Clear to auscultation bilaterally. Chest wall:  No tenderness or deformity. Heart:  Regular rate and rhythm, S1, S2 normal, no murmur, click, rub or gallop. Abdomen:   Soft, non-tender. Bowel sounds normal. No masses,  No organomegaly. Extremities: Extremities normal, atraumatic, no cyanosis or edema. Pulses: 2+ and symmetric all extremities. Skin: Skin color, texture, turgor normal. No rashes or lesions   Neurologic: CNII-XII intact. No gross sensory or motor deficits     Data Review:       Recent Days:  Recent Labs     01/09/21  1030 01/08/21  0650   WBC 7.7 8.3   HGB 9.2* 8.5*   HCT 29.8* 27.4*    209     Recent Labs     01/09/21  1030 01/08/21  0650    142  143   K 4.7 5.4*  5.5*   * 114*  115*   CO2 22 21  21   * 132*  130*   BUN 87* 84*  87*   CREA 5.03* 5.27*  5.26*   CA 7.4* 7.7*  7.4*   PHOS  --  6.3*   ALB 2.3* 2.3*  2.3*   TBILI 0.3 0.4   ALT 29 26     No results for input(s): PH, PCO2, PO2, HCO3, FIO2 in the last 72 hours. 24 Hour Results:  Recent Results (from the past 24 hour(s))   GLUCOSE, POC    Collection Time: 01/10/21 11:07 AM   Result Value Ref Range    Glucose (POC) 99 65 - 100 mg/dL    Performed by Kevin Daniel        CT CHEST WO CONT   Final Result   IMPRESSION:   1. No pneumomediastinum or pneumothorax. 2. Cardiomegaly. 3. Subtle groundglass densities in both lungs might be pneumonia or other. XR CHEST PORT   Final Result   IMPRESSION: Subtle but potentially clinically significant findings as above. Suggest radiographic follow-up.            Assessment: Anemia  COVID-19 pneumonitis  Hypertension  Acute renal failure on chronic renal failure        Plan: Since patient is improving I would like to order pulse oximetry on room air as well as on ambulation on room air        Care Plan discussed with: RN taking care of the patient as well as patient himself    Total time spent with patient: 30 minutes.     Shira Whyte MD

## 2021-01-10 NOTE — PROGRESS NOTES
Progress Note    Patient: Shaka Bennett MRN: [id number]  SSN: [id number]    YOB: 1955  Age: 76 y.o.   Sex: male      Admit Date: 1/5/2021    LOS: 4 days     Subjective:   Patient was seen   Patient has  on nasal cannula oxygen  Denied any rectal bleeding,no severe shortness breath,  Past Medical History:   Diagnosis Date    Chronic kidney disease     Hypertension         Current Facility-Administered Medications:     theophylline ER(12 hour) (THEOCHRON) tablet 150 mg, 150 mg, Oral, Q12H, Segun Bowman MD, 150 mg at 01/09/21 1659    icosapent ethyL (VASCEPA) capsule 2 g, 2 g, Oral, BID WITH MEALS, Segun Bowman MD, 2 g at 01/09/21 1700    hydrALAZINE (APRESOLINE) tablet 50 mg, 50 mg, Oral, TID, Segun Bowman MD, 50 mg at 01/09/21 2105    dexamethasone (DECADRON) 4 mg/mL injection 6 mg, 6 mg, IntraVENous, Q12H, Nisreen Mcnair MD, 6 mg at 01/09/21 2105    0.45% sodium chloride infusion, 25 mL/hr, IntraVENous, CONTINUOUS, Tila Leonardo MD, Last Rate: 25 mL/hr at 01/09/21 0928, 25 mL/hr at 01/09/21 0928    sodium bicarbonate tablet 650 mg, 650 mg, Oral, BID, Tila Leonardo MD, 650 mg at 01/09/21 2105    0.9% sodium chloride infusion 250 mL, 250 mL, IntraVENous, PRN, Neymar Gallo MD    azithromycin (ZITHROMAX) 500 mg in 0.9% sodium chloride 250 mL (VIAL-MATE), 500 mg, IntraVENous, Q24H, Neymar Gallo MD, 500 mg at 01/09/21 0113    cefTRIAXone (ROCEPHIN) 1 g in sterile water (preservative free) 10 mL IV syringe, 1 g, IntraVENous, Q24H, Neymar Gallo MD, 1 g at 01/09/21 0109    pantoprazole (PROTONIX) 40 mg in 0.9% sodium chloride 10 mL injection, 40 mg, IntraVENous, DAILY, Neymar Gallo MD, 40 mg at 01/09/21 1440    0.9% sodium chloride infusion 250 mL, 250 mL, IntraVENous, PRN, See Cui MD    0.9% sodium chloride infusion 250 mL, 250 mL, IntraVENous, PRN, Isis Nicole MD    acetaminophen (TYLENOL) tablet 650 mg, 650 mg, Oral, Q6H PRN, Neymar Gallo, MD    Objective:     Vitals:    01/09/21 0131 01/09/21 0933 01/09/21 1600 01/09/21 2054   BP: (!) 156/89 (!) 168/96 123/80 (!) 154/74   Pulse: (!) 55 (!) 50 (!) 55 (!) 57   Resp: 16 16  18   Temp: 98.4 °F (36.9 °C) 98.9 °F (37.2 °C)  99.1 °F (37.3 °C)   SpO2: 100% 100%  99%   Weight:       Height:            Intake and Output:  Current Shift: No intake/output data recorded. Last three shifts: No intake/output data recorded. Physical Exam:   Physical Exam   Constitutional: He appears distressed. HENT:   Head: Atraumatic. Eyes: Conjunctivae are normal.   Neck: Neck supple. No tracheal deviation present. Cardiovascular: Normal heart sounds. Pulmonary/Chest: Effort normal. No respiratory distress. He has no wheezes. Abdominal: Soft. Bowel sounds are normal.   Musculoskeletal: Normal range of motion. Neurological: He is alert. Skin: Skin is warm. Lab/Data Review:  Recent Results (from the past 24 hour(s))   GLUCOSE, POC    Collection Time: 01/08/21 10:21 PM   Result Value Ref Range    Glucose (POC) 123 (H) 65 - 100 mg/dL    Performed by DES DE SANTIAGO    CBC WITH AUTOMATED DIFF    Collection Time: 01/09/21 10:30 AM   Result Value Ref Range    WBC 7.7 4.1 - 11.1 K/uL    RBC 3.39 (L) 4.10 - 5.70 M/uL    HGB 9.2 (L) 12.1 - 17.0 g/dL    HCT 29.8 (L) 36.6 - 50.3 %    MCV 87.9 80.0 - 99.0 FL    MCH 27.1 26.0 - 34.0 PG    MCHC 30.9 30.0 - 36.5 g/dL    RDW 15.9 (H) 11.5 - 14.5 %    PLATELET 363 686 - 606 K/uL    MPV 9.6 8.9 - 12.9 FL    NEUTROPHILS 95 (H) 32 - 75 %    LYMPHOCYTES 3 (L) 12 - 49 %    MONOCYTES 2 (L) 5 - 13 %    EOSINOPHILS 0 0 - 7 %    BASOPHILS 0 0 - 1 %    IMMATURE GRANULOCYTES 0 %    ABS. NEUTROPHILS 7.3 1.8 - 8.0 K/UL    ABS. LYMPHOCYTES 0.2 (L) 0.8 - 3.5 K/UL    ABS. MONOCYTES 0.2 0.0 - 1.0 K/UL    ABS. EOSINOPHILS 0.0 0.0 - 0.4 K/UL    ABS. BASOPHILS 0.0 0.0 - 0.1 K/UL    ABS. IMM.  GRANS. 0.0 K/UL    DF AUTOMATED      RBC COMMENTS Anisocytosis  1+        RBC COMMENTS Poikilocytosis  1+       METABOLIC PANEL, COMPREHENSIVE    Collection Time: 01/09/21 10:30 AM   Result Value Ref Range    Sodium 144 136 - 145 mmol/L    Potassium 4.7 3.5 - 5.1 mmol/L    Chloride 114 (H) 97 - 108 mmol/L    CO2 22 21 - 32 mmol/L    Anion gap 8 5 - 15 mmol/L    Glucose 112 (H) 65 - 100 mg/dL    BUN 87 (H) 6 - 20 mg/dL    Creatinine 5.03 (H) 0.70 - 1.30 mg/dL    BUN/Creatinine ratio 17 12 - 20      GFR est AA 14 (L) >60 ml/min/1.73m2    GFR est non-AA 12 (L) >60 ml/min/1.73m2    Calcium 7.4 (L) 8.5 - 10.1 mg/dL    Bilirubin, total 0.3 0.2 - 1.0 mg/dL    AST (SGOT) 29 15 - 37 U/L    ALT (SGPT) 29 12 - 78 U/L    Alk. phosphatase 54 45 - 117 U/L    Protein, total 5.3 (L) 6.4 - 8.2 g/dL    Albumin 2.3 (L) 3.5 - 5.0 g/dL    Globulin 3.0 2.0 - 4.0 g/dL    A-G Ratio 0.8 (L) 1.1 - 2.2          CT CHEST WO CONT   Final Result   IMPRESSION:   1. No pneumomediastinum or pneumothorax. 2. Cardiomegaly. 3. Subtle groundglass densities in both lungs might be pneumonia or other. XR CHEST PORT   Final Result   IMPRESSION: Subtle but potentially clinically significant findings as above. Suggest radiographic follow-up.            Assessment:     Active Problems:    GI bleed (1/5/2021)             Severe anemia, questionable GI blood loss, HB 9 after blood transfusion , stable in last 24 hrs   past history of diverticular bleed but he has no no evidence of active GI bleeding  COVID-19 pneumonia,  Plan:   Followed by nephrologist, IV iron, Epogen placement  PPI as ordered  Follow the hemoglobin closely  no endoscopy study is needed at this time  Sign off  Plan:       Signed By: Ninfa Rubio MD     January 9, 2021        Thank you for allowing me to participate in this patients care  Cc Referring Physician   Horace Mendez MD

## 2021-01-10 NOTE — PROGRESS NOTES
Progress Note      1/10/2021 12:25 PM  NAME: Adiel Díaz   MRN:  [id number]   Admit Diagnosis: GI bleed [K92.2]      Subjective:   Chart reviewed. Patient feels somewhat better. Remains afebrile. Review of Systems:    Symptom Y/N Comments  Symptom Y/N Comments   Fever/Chills n   Chest Pain n    Poor Appetite    Edema     Cough    Abdominal Pain n    Sputum    Joint Pain     SOB/CARMONA n   Pruritis/Rash     Nausea/vomit n   Other     Diarrhea         Constipation           Could NOT obtain due to:      Objective:          Physical Exam:    Last 24hrs VS reviewed since prior progress note. Most recent are:    Visit Vitals  BP (!) 141/93   Pulse (!) 56   Temp 98.2 °F (36.8 °C)   Resp 18   Ht 6' 0.01\" (1.829 m)   Wt 79.4 kg (175 lb)   SpO2 100%   BMI 23.73 kg/m²       Intake/Output Summary (Last 24 hours) at 1/10/2021 1305  Last data filed at 1/10/2021 0126  Gross per 24 hour   Intake 360 ml   Output 800 ml   Net -440 ml        General Appearance: Well developed, well nourished, alert & oriented x 3,    no acute distress. Ears/Nose/Mouth/Throat: Hearing grossly normal.  Neck: Supple. Chest: Lungs clear to auscultation bilaterally. Cardiovascular: Regular rate and rhythm, S1,S2 normal, no murmur. Abdomen: Soft, non-tender, bowel sounds are active. Extremities: No edema bilaterally. Skin: Warm and dry. []         Post-cath site without hematoma, bruit, tenderness, or thrill. Distal pulses intact. PMH/SH reviewed - no change compared to H&P    Data Review    Telemetry: Sinus bradycardia, episodes of PVCs and bigeminy and a pause up to 2.5 seconds. EKG:   []  No new EKG for review    Lab Data Personally Reviewed:    Recent Labs     01/09/21  1030 01/08/21  0650   WBC 7.7 8.3   HGB 9.2* 8.5*   HCT 29.8* 27.4*    209     No results for input(s): INR, PTP, APTT, INREXT, INREXT in the last 72 hours.    Recent Labs     01/09/21  1030 01/08/21  0650    142  143   K 4.7 5.4*  5.5* * 114*  115*   CO2 22 21 21   BUN 87* 84*  87*   CREA 5.03* 5.27*  5.26*   * 132*  130*   CA 7.4* 7.7*  7.4*     No results for input(s): CPK, CKNDX, TROIQ in the last 72 hours. No lab exists for component: CPKMB  No results found for: CHOL, CHOLX, CHLST, CHOLV, HDL, HDLP, LDL, LDLC, DLDLP, TGLX, TRIGL, TRIGP, CHHD, CHHDX    Recent Labs     01/09/21  1030 01/08/21  0650   AP 54 56   TP 5.3* 5.4*   ALB 2.3* 2.3*  2.3*   GLOB 3.0 3.1     No results for input(s): PH, PCO2, PO2 in the last 72 hours. Medications Personally Reviewed:    Current Facility-Administered Medications   Medication Dose Route Frequency    theophylline ER(12 hour) (THEOCHRON) tablet 150 mg  150 mg Oral Q12H    icosapent ethyL (VASCEPA) capsule 2 g  2 g Oral BID WITH MEALS    hydrALAZINE (APRESOLINE) tablet 50 mg  50 mg Oral TID    dexamethasone (DECADRON) 4 mg/mL injection 6 mg  6 mg IntraVENous Q12H    0.45% sodium chloride infusion  25 mL/hr IntraVENous CONTINUOUS    sodium bicarbonate tablet 650 mg  650 mg Oral BID    0.9% sodium chloride infusion 250 mL  250 mL IntraVENous PRN    cefTRIAXone (ROCEPHIN) 1 g in sterile water (preservative free) 10 mL IV syringe  1 g IntraVENous Q24H    0.9% sodium chloride infusion 250 mL  250 mL IntraVENous PRN    0.9% sodium chloride infusion 250 mL  250 mL IntraVENous PRN    acetaminophen (TYLENOL) tablet 650 mg  650 mg Oral Q6H PRN           Problem List:   Patient has had a marked bradycardia and seems to have somewhat improvement after hydralazine has been started. Covid positive status. Chronic kidney disease. Blood pressure is better. 1.      Assessment/Plan:   I will increase the dose of hydralazine to 50 mg 3 times daily. I will also add Ezekiel-Dur 150 mg twice daily. Will add Vascepa 2 g twice daily as patient is Covid positive. Thank you.   1.          []       High complexity decision making was performed in this patient at high risk for decompensation with multiple organ involvement.     Shailesh Sheffield MD

## 2021-01-11 LAB
ALBUMIN SERPL-MCNC: 2.3 G/DL (ref 3.5–5)
ALBUMIN/GLOB SERPL: 0.7 {RATIO} (ref 1.1–2.2)
ALP SERPL-CCNC: 55 U/L (ref 45–117)
ALT SERPL-CCNC: 33 U/L (ref 12–78)
ANION GAP SERPL CALC-SCNC: 9 MMOL/L (ref 5–15)
AST SERPL W P-5'-P-CCNC: 24 U/L (ref 15–37)
BASOPHILS # BLD: 0 K/UL (ref 0–0.1)
BASOPHILS NFR BLD: 0 % (ref 0–1)
BILIRUB SERPL-MCNC: 0.4 MG/DL (ref 0.2–1)
BUN SERPL-MCNC: 87 MG/DL (ref 6–20)
BUN/CREAT SERPL: 18 (ref 12–20)
CA-I BLD-MCNC: 7.4 MG/DL (ref 8.5–10.1)
CHLORIDE SERPL-SCNC: 110 MMOL/L (ref 97–108)
CO2 SERPL-SCNC: 20 MMOL/L (ref 21–32)
CREAT SERPL-MCNC: 4.77 MG/DL (ref 0.7–1.3)
CRP SERPL-MCNC: 0.32 MG/DL (ref 0–0.6)
DIFFERENTIAL METHOD BLD: ABNORMAL
EOSINOPHIL # BLD: 0.1 K/UL (ref 0–0.4)
EOSINOPHIL NFR BLD: 2 % (ref 0–7)
ERYTHROCYTE [DISTWIDTH] IN BLOOD BY AUTOMATED COUNT: 15.6 % (ref 11.5–14.5)
GLOBULIN SER CALC-MCNC: 3.2 G/DL (ref 2–4)
GLUCOSE SERPL-MCNC: 109 MG/DL (ref 65–100)
HCT VFR BLD AUTO: 29.7 % (ref 36.6–50.3)
HGB BLD-MCNC: 9.3 G/DL (ref 12.1–17)
IMM GRANULOCYTES # BLD AUTO: 0.1 K/UL (ref 0–0.04)
IMM GRANULOCYTES NFR BLD AUTO: 1 % (ref 0–0.5)
LDH SERPL L TO P-CCNC: 413 U/L (ref 85–241)
LYMPHOCYTES # BLD: 0.5 K/UL (ref 0.8–3.5)
LYMPHOCYTES NFR BLD: 8 % (ref 12–49)
MCH RBC QN AUTO: 27.6 PG (ref 26–34)
MCHC RBC AUTO-ENTMCNC: 31.3 G/DL (ref 30–36.5)
MCV RBC AUTO: 88.1 FL (ref 80–99)
MONOCYTES # BLD: 0.5 K/UL (ref 0–1)
MONOCYTES NFR BLD: 7 % (ref 5–13)
NEUTS SEG # BLD: 5.9 K/UL (ref 1.8–8)
NEUTS SEG NFR BLD: 82 % (ref 32–75)
PLATELET # BLD AUTO: 206 K/UL (ref 150–400)
PMV BLD AUTO: 9.7 FL (ref 8.9–12.9)
POTASSIUM SERPL-SCNC: 4.5 MMOL/L (ref 3.5–5.1)
PROCALCITONIN SERPL-MCNC: 0.64 NG/ML
PROT SERPL-MCNC: 5.5 G/DL (ref 6.4–8.2)
RBC # BLD AUTO: 3.37 M/UL (ref 4.1–5.7)
SODIUM SERPL-SCNC: 139 MMOL/L (ref 136–145)
WBC # BLD AUTO: 7 K/UL (ref 4.1–11.1)

## 2021-01-11 PROCEDURE — 99232 SBSQ HOSP IP/OBS MODERATE 35: CPT | Performed by: INTERNAL MEDICINE

## 2021-01-11 PROCEDURE — 36415 COLL VENOUS BLD VENIPUNCTURE: CPT

## 2021-01-11 PROCEDURE — 85025 COMPLETE CBC W/AUTO DIFF WBC: CPT

## 2021-01-11 PROCEDURE — 77010033678 HC OXYGEN DAILY

## 2021-01-11 PROCEDURE — 74011250637 HC RX REV CODE- 250/637: Performed by: INTERNAL MEDICINE

## 2021-01-11 PROCEDURE — 65270000029 HC RM PRIVATE

## 2021-01-11 PROCEDURE — 94760 N-INVAS EAR/PLS OXIMETRY 1: CPT

## 2021-01-11 PROCEDURE — 74011250636 HC RX REV CODE- 250/636: Performed by: INTERNAL MEDICINE

## 2021-01-11 PROCEDURE — 80053 COMPREHEN METABOLIC PANEL: CPT

## 2021-01-11 PROCEDURE — 84145 PROCALCITONIN (PCT): CPT

## 2021-01-11 PROCEDURE — 74011000250 HC RX REV CODE- 250: Performed by: INTERNAL MEDICINE

## 2021-01-11 PROCEDURE — 86140 C-REACTIVE PROTEIN: CPT

## 2021-01-11 PROCEDURE — 83615 LACTATE (LD) (LDH) ENZYME: CPT

## 2021-01-11 RX ADMIN — THEOPHYLLINE 150 MG: 300 TABLET, EXTENDED RELEASE ORAL at 08:46

## 2021-01-11 RX ADMIN — HYDRALAZINE HYDROCHLORIDE 50 MG: 50 TABLET, FILM COATED ORAL at 20:30

## 2021-01-11 RX ADMIN — THEOPHYLLINE 150 MG: 300 TABLET, EXTENDED RELEASE ORAL at 20:31

## 2021-01-11 RX ADMIN — ICOSAPENT ETHYL 2 G: 1000 CAPSULE ORAL at 08:00

## 2021-01-11 RX ADMIN — SODIUM BICARBONATE 650 MG TABLET 650 MG: at 08:46

## 2021-01-11 RX ADMIN — DEXAMETHASONE SODIUM PHOSPHATE 3 MG: 4 INJECTION, SOLUTION INTRAMUSCULAR; INTRAVENOUS at 17:24

## 2021-01-11 RX ADMIN — HYDRALAZINE HYDROCHLORIDE 50 MG: 50 TABLET, FILM COATED ORAL at 17:24

## 2021-01-11 RX ADMIN — SODIUM BICARBONATE 650 MG TABLET 650 MG: at 20:30

## 2021-01-11 RX ADMIN — CEFTRIAXONE SODIUM 1 G: 1 INJECTION, POWDER, FOR SOLUTION INTRAMUSCULAR; INTRAVENOUS at 23:22

## 2021-01-11 RX ADMIN — HYDRALAZINE HYDROCHLORIDE 50 MG: 50 TABLET, FILM COATED ORAL at 08:46

## 2021-01-11 RX ADMIN — ICOSAPENT ETHYL 2 G: 1000 CAPSULE ORAL at 17:00

## 2021-01-11 NOTE — PROGRESS NOTES
Bedside shift change report given to nando ng (oncoming nurse) by Velvet douglas (offgoing nurse). Report included the following information SBAR, Intake/Output, MAR and Recent Results.

## 2021-01-11 NOTE — PROGRESS NOTES
Reason for Admission:  GI Bleed                    RUR Score: 16%                    Plan for utilizing home health:  declines        PCP: First and Last name: Dr. Brandon Saavedra    Name of Practice:    Are you a current patient: Yes/No: yes   Approximate date of last visit: one month   Can you participate in a virtual visit with your PCP: no                    Current Advanced Directive/Advance Care Plan: Full Code, No advance directive or POA. Sister is next of kin for decision making if patient is unable Yolanda Hannah [phone]. Transition of Care Plan:  CM met with the patient at the bedside to complete assessment. Patient reported he lives at home with his sister. He reported he is mostly independent and only uses a cane recently to maximize his independence. We discussed any discharge needs and he declined. He stated he does not feel he needs any PT or OT at home. He is currently on 2L of oxygen, however, per attending note plan is to have a room air pulse ox prior to discharge to determine if patient needs home oxygen. CM will follow. DC plan: home self-care pending oxygen needs.

## 2021-01-11 NOTE — PROGRESS NOTES
Progress Note    Patient: Silverio Miguel MRN: [id number]  SSN: [id number]    YOB: 1955  Age: 76 y.o. Sex: male      Admit Date: 1/5/2021    LOS: 6 days     Subjective:   Patient followed for Covid-19 with possible pneumonia and sepsis. He remains afebrile and is now off supplemental oxygen. Resting comfortably with no complaints. Objective:     Vitals:    01/10/21 2314 01/11/21 0803 01/11/21 0817 01/11/21 1430   BP: (!) 146/88 (!) 159/97  125/78   Pulse: (!) 54 60  63   Resp: 20 18  18   Temp: 99.1 °F (37.3 °C) 98.1 °F (36.7 °C)  98.1 °F (36.7 °C)   SpO2: 95% 100% 100% 100%   Weight:       Height:            Intake and Output:  Current Shift: No intake/output data recorded. Last three shifts: 01/09 1901 - 01/11 0700  In: 36 [P.O.:820]  Out: 1400 [Urine:1400]    Physical Exam:    Constitutional:       General: He is not in acute distress. Appearance: Normal appearance. He is ill-appearing. He is not toxic-appearing. HENT:      Head: Normocephalic and atraumatic. Nose: Nose normal.      Comments: OFF nasal O2      Mouth/Throat:      Pharynx: Oropharynx is clear. Eyes:      Pupils: Pupils are equal, round, and reactive to light. Neck:      Musculoskeletal: Neck supple. Cardiovascular:  Bradycardic to 54 bpm      Heart sounds: No murmur. Pulmonary:      Breath sounds: Rhonchi present. No wheezing or rales. Abdominal:      Palpations: Abdomen is soft. Tenderness: There is no abdominal tenderness. There is no guarding. Genitourinary:     Penis: Normal.       Testes: Normal.      Comments: No Quesada  Musculoskeletal:      Right lower leg: No edema. Left lower leg: No edema. Comments: Not pitting edema but brawny changes noted   Skin: no rashes or open wounds   Neurological:      General: No focal deficit present. Mental Status: He is alert and oriented to person, place, and time.    Psychiatric:         Mood and Affect: Mood normal.         Behavior: Behavior normal.         Thought Content: Thought content normal.         Judgment: Judgment normal.     Lab/Data Review:     WBC 7,000           Procalcitonin 0.64 <5.19 <6.63  CRP 0.32 < 2.20    Blood cultures (1/5) No growth at 5 days  Assessment:     Active Problems:    GI bleed (1/5/2021)    1. Covid-19 infection with possible pneumonia, resolving  2. Possible sepsis with elevated procalcitonin/CRP, Day #6 IV Ceftriaxone  3. Severe anemia with positive occult stool  4. CKD with GFR <30     Comment:  Procalcitonin and CRP have decreased with no signs of bacterial infection. I think that is reasonable to discontinue Ceftriaxone tomorrow. Oxygenating well on room air. Plan:   1. Discontinue Ceftriaxone tomorrow  2. In am, repeat procal and CRP. 3. Follow-up blood cultures  4.  Consider stopping Azithromycin and Dexamethasone; defer to Pulmonary        Signed By: Keiry Kirk MD     January 11, 2021

## 2021-01-11 NOTE — PROGRESS NOTES
Pulmonary/CC Progress Note  Patient is a 76 y.o. male  AA who is known to have history of chronic kidney disease stage IV with baseline serum creatinine ranging between 3.7 and 4.2. He is admitted hospital because of generalized weakness, dizziness. He additionally history of hypertension. Initial work-up showed hemoglobin of 3.5 g/dL. He was also noted to have COVID-19 infection. Subjective:   Daily Progress Note: 1/11/2021 2:10 PM    Patient seen and examined in his room this morning, no acute events overnight. Patient was resting comfortably on 2 L nasal cannula and he is satting very well, hopefully can get him off of oxygen today. Heart rate improved with theophylline. Still on IV Decadron scheduled. Awake and alert  Not in any distress. No respiratory distress noted. He was evaluated by GI service. Denied any fever or chills. Dizziness has shown improvement      Current Facility-Administered Medications   Medication Dose Route Frequency    dexamethasone (DECADRON) 4 mg/mL injection 3 mg  3 mg IntraVENous Q24H    theophylline ER(12 hour) (THEOCHRON) tablet 150 mg  150 mg Oral Q12H    icosapent ethyL (VASCEPA) capsule 2 g  2 g Oral BID WITH MEALS    hydrALAZINE (APRESOLINE) tablet 50 mg  50 mg Oral TID    0.45% sodium chloride infusion  25 mL/hr IntraVENous CONTINUOUS    sodium bicarbonate tablet 650 mg  650 mg Oral BID    0.9% sodium chloride infusion 250 mL  250 mL IntraVENous PRN    cefTRIAXone (ROCEPHIN) 1 g in sterile water (preservative free) 10 mL IV syringe  1 g IntraVENous Q24H    0.9% sodium chloride infusion 250 mL  250 mL IntraVENous PRN    0.9% sodium chloride infusion 250 mL  250 mL IntraVENous PRN    acetaminophen (TYLENOL) tablet 650 mg  650 mg Oral Q6H PRN        Review of Systems  Denies any loose bowel movements. Denied any bloody stools. Denied any nausea or vomiting.     Objective:     Visit Vitals  BP (!) 159/97 (BP 1 Location: Left arm)   Pulse 60   Temp 98.1 °F (36.7 °C)   Resp 18   Ht 6' 0.01\" (1.829 m)   Wt 79.4 kg (175 lb)   SpO2 100%   BMI 23.73 kg/m²    O2 Flow Rate (L/min): 2 l/min O2 Device: Nasal cannula    Temp (24hrs), Av.2 °F (36.8 °C), Min:97.7 °F (36.5 °C), Max:99.1 °F (37.3 °C)      No intake/output data recorded.  1901 -  0700  In: 36 [P.O.:820]  Out: 1400 [Urine:1400]    This is an elderly male who is currently not in significant respiratory distress. He is in of average build. He is alert and oriented x3  Neck is supple. No cervical lymphadenopathy. Thyroid not enlarged  Chest: Decreased air entry at lung bases. Few rhonchi audible. No wheezing was appreciated  Heart: S1-S2 normal  Abdomen: Soft, nontender, no visceromegaly. Extremities: No edema, cyanosis or clubbing  Neuro: No focal motor deficit. Lab/Data Review:  Recent Labs     21  0935 01/10/21  1645 21  1030   WBC 7.0 7.5 7.7   HGB 9.3* 9.0* 9.2*   HCT 29.7* 29.3* 29.8*    174 210     Recent Labs     21  0935 01/10/21  1645 21  1030    140 144   K 4.5 5.2* 4.7   * 111* 114*   CO2 20* 21 22   * 117* 112*   BUN 87* 89* 87*   CREA 4.77* 4.88* 5.03*   CA 7.4* 7.3* 7.4*   ALB 2.3* 2.2* 2.3*   TBILI 0.4 0.3 0.3   ALT 33 33 29         Diagnostics   CXR Results  (Last 48 hours)    None             Assessment/Plan:     Active Problems:    GI bleed (2021)       Impression:   Patient is a 76 y.o. male  AA who is known to have history of chronic kidney disease stage IV with baseline serum creatinine ranging between 3.7 and 4.2. He is admitted hospital because of generalized weakness, dizziness. He additionally history of hypertension. Initial work-up showed hemoglobin of 3.5 g/dL. He was also noted to have COVID-19 infection.     Plan:     1. Dyspnea:  Patient noted to be short of breath on physical activity on admission.   Differential diagnosis includes hypoxia from severe anemia versus or combination from Covid 19 pneumonitis. Patient is on supplemental oxygen at 2 L/min. Saturating 96%. Continue to wean supplemental oxygen to off for goal sats greater than 92%. Otherwise, patient is likely ready for discharge from a pulmonary perspective. 2.  COVID-19 pneumonitis:  Patient has bilateral basal infiltrates, has positive COVID-19 test results. He has developed COVID-19 pneumonitis, very mild groundglass opacities bilaterally on admission CT chest.  Patient is on supplemental oxygen (2 L nasal cannula), started on dexamethasone. This can be continued for a total of 10 days. Patient started on IV Rocephin, it appears that he already finished azithromycin  Since patient has stage IV chronic kidney disease with GFR of 13, he will not be a candidate for remdesivir.     3. Severe anemia:  He had hemoglobin of 3.5 g/dL on admission. This seems to be secondary to blood loss anemia. He is status post 5 units of packed RBCs. His hemoglobin has come up in the 8-9 range. Patient was seen by GI service, they have signed off.    4.  Chronic kidney disease:  He has stage IV chronic kidney disease. He has been followed by Dr. Shree Archer. Patient also has nephrotic range proteinuria.     Patient is on DVT prophylaxis. Once he gets a bed on the medical floor then he will get transferred.     Questions of patient were answered at bedside in detail  Case discussed in detail with RN, RT, and care team  Thank you for involving me in the care of this patient  I will follow with you closely during hospitalization    Time spent more than 30 minutes under patient care with no overlap reviewing results and records, decision making, and answering questions. Shay Muhammad, DO  Pulmonary/CC     This document has been prepared by the Dragon voice recognition system, typographical errors may have occurred.  Attempts have been made to correct errors, however inadvertent errors may persist.

## 2021-01-11 NOTE — PROGRESS NOTES
Progress Note      1/11/2021 12:25 PM  NAME: Lucero Pruitt   MRN:  [id number]   Admit Diagnosis: GI bleed [K92.2]      Subjective:   Chart reviewed. Patient feels somewhat better. Remains afebrile. Offers no complaints. Review of Systems:    Symptom Y/N Comments  Symptom Y/N Comments   Fever/Chills n   Chest Pain n    Poor Appetite    Edema     Cough    Abdominal Pain n    Sputum    Joint Pain     SOB/CARMONA n   Pruritis/Rash     Nausea/vomit n   Other     Diarrhea         Constipation           Could NOT obtain due to:      Objective:          Physical Exam:    Last 24hrs VS reviewed since prior progress note. Most recent are:    Visit Vitals  BP (!) 159/97 (BP 1 Location: Left arm)   Pulse 60   Temp 98.1 °F (36.7 °C)   Resp 18   Ht 6' 0.01\" (1.829 m)   Wt 79.4 kg (175 lb)   SpO2 100%   BMI 23.73 kg/m²       Intake/Output Summary (Last 24 hours) at 1/11/2021 1325  Last data filed at 1/10/2021 2314  Gross per 24 hour   Intake 360 ml   Output 600 ml   Net -240 ml        General Appearance: Well developed, well nourished, alert & oriented x 3,    no acute distress. Ears/Nose/Mouth/Throat: Hearing grossly normal.  Neck: Supple. Chest: Lungs clear to auscultation bilaterally. Cardiovascular: Regular rate and rhythm, S1,S2 normal, no murmur. Abdomen: Soft, non-tender, bowel sounds are active. Extremities: No edema bilaterally. Skin: Warm and dry. []         Post-cath site without hematoma, bruit, tenderness, or thrill. Distal pulses intact. PMH/SH reviewed - no change compared to H&P    Data Review    Telemetry: Sinus bradycardia, episodes of PVCs and bigeminy and a pause up to 2.5 seconds. EKG:   []  No new EKG for review    Lab Data Personally Reviewed:    Recent Labs     01/11/21  0935 01/10/21  1645   WBC 7.0 7.5   HGB 9.3* 9.0*   HCT 29.7* 29.3*    174     No results for input(s): INR, PTP, APTT, INREXT, INREXT in the last 72 hours.    Recent Labs     01/11/21  0935 01/10/21  1645 01/09/21  1030    140 144   K 4.5 5.2* 4.7   * 111* 114*   CO2 20* 21 22   BUN 87* 89* 87*   CREA 4.77* 4.88* 5.03*   * 117* 112*   CA 7.4* 7.3* 7.4*     No results for input(s): CPK, CKNDX, TROIQ in the last 72 hours. No lab exists for component: CPKMB  No results found for: CHOL, CHOLX, CHLST, CHOLV, HDL, HDLP, LDL, LDLC, DLDLP, TGLX, [place], CHHD, CHHDX    Recent Labs     01/11/21  0935 01/10/21  1645 01/09/21  1030   AP 55 56 54   TP 5.5* 5.2* 5.3*   ALB 2.3* 2.2* 2.3*   GLOB 3.2 3.0 3.0     No results for input(s): PH, PCO2, PO2 in the last 72 hours. Medications Personally Reviewed:    Current Facility-Administered Medications   Medication Dose Route Frequency    dexamethasone (DECADRON) 4 mg/mL injection 3 mg  3 mg IntraVENous Q24H    theophylline ER(12 hour) (THEOCHRON) tablet 150 mg  150 mg Oral Q12H    icosapent ethyL (VASCEPA) capsule 2 g  2 g Oral BID WITH MEALS    hydrALAZINE (APRESOLINE) tablet 50 mg  50 mg Oral TID    0.45% sodium chloride infusion  25 mL/hr IntraVENous CONTINUOUS    sodium bicarbonate tablet 650 mg  650 mg Oral BID    0.9% sodium chloride infusion 250 mL  250 mL IntraVENous PRN    cefTRIAXone (ROCEPHIN) 1 g in sterile water (preservative free) 10 mL IV syringe  1 g IntraVENous Q24H    0.9% sodium chloride infusion 250 mL  250 mL IntraVENous PRN    0.9% sodium chloride infusion 250 mL  250 mL IntraVENous PRN    acetaminophen (TYLENOL) tablet 650 mg  650 mg Oral Q6H PRN           Problem List:   Patient has had a marked bradycardia and seems to have somewhat improvement after hydralazine has been started. Covid positive status. Chronic kidney disease. Blood pressure is better. 1.      Assessment/Plan:   I will increase the dose of hydralazine to 50 mg 3 times daily. I will also add Ezekiel-Dur 150 mg twice daily. Will add Vascepa 2 g twice daily as patient is Covid positive. Recommend to check theophylline level.   Thank you.  1.          []       High complexity decision making was performed in this patient at high risk for decompensation with multiple organ involvement.     Mia Gaxiola MD

## 2021-01-11 NOTE — PROGRESS NOTES
Pulmonary/CC Progress Note  Patient is a 76 y.o. male  AA who is known to have history of chronic kidney disease stage IV with baseline serum creatinine ranging between 3.7 and 4.2. He is admitted hospital because of generalized weakness, dizziness. He additionally history of hypertension. Initial work-up showed hemoglobin of 3.5 g/dL. He was also noted to have COVID-19 infection. Subjective:   Daily Progress Note: 2021 2:10 PM    Examined at bedside. Awake and alert  Not in any distress. He is on 2 L of nasal cannula oxygen. No respiratory distress noted. He was evaluated by GI service. Denied any fever or chills. Dizziness has shown improvement      Current Facility-Administered Medications   Medication Dose Route Frequency    dexamethasone (DECADRON) 4 mg/mL injection 3 mg  3 mg IntraVENous Q24H    theophylline ER(12 hour) (THEOCHRON) tablet 150 mg  150 mg Oral Q12H    icosapent ethyL (VASCEPA) capsule 2 g  2 g Oral BID WITH MEALS    hydrALAZINE (APRESOLINE) tablet 50 mg  50 mg Oral TID    0.45% sodium chloride infusion  25 mL/hr IntraVENous CONTINUOUS    sodium bicarbonate tablet 650 mg  650 mg Oral BID    0.9% sodium chloride infusion 250 mL  250 mL IntraVENous PRN    cefTRIAXone (ROCEPHIN) 1 g in sterile water (preservative free) 10 mL IV syringe  1 g IntraVENous Q24H    0.9% sodium chloride infusion 250 mL  250 mL IntraVENous PRN    0.9% sodium chloride infusion 250 mL  250 mL IntraVENous PRN    acetaminophen (TYLENOL) tablet 650 mg  650 mg Oral Q6H PRN        Review of Systems  Denies any loose bowel movements. Denied any bloody stools. Denied any nausea or vomiting.     Objective:     Visit Vitals  BP (!) 146/88   Pulse (!) 54   Temp 99.1 °F (37.3 °C)   Resp 20   Ht 6' 0.01\" (1.829 m)   Wt 79.4 kg (175 lb)   SpO2 95%   BMI 23.73 kg/m²    O2 Flow Rate (L/min): 2 l/min O2 Device: Room air    Temp (24hrs), Av.2 °F (36.8 °C), Min:97.7 °F (36.5 °C), Max:99.1 °F (37.3 °C)      01/10 1901 - 01/11 0700  In: 360 [P.O.:360]  Out: 600 [Urine:600]  01/09 0701 - 01/10 1900  In: 26 [P.O.:460]  Out: 12 [Urine:800]    This is an elderly male who is currently not in significant respiratory distress. He is in of average build. He is alert and oriented x3  Neck is supple. No cervical lymphadenopathy. Thyroid not enlarged  Chest: Decreased air entry at lung bases. Few rhonchi audible. No wheezing was appreciated  Heart: S1-S2 normal  Abdomen: Soft, nontender, no visceromegaly. Extremities: No edema, cyanosis or clubbing  Neuro: No focal motor deficit. Lab/Data Review:  Recent Labs     01/10/21  1645 01/09/21  1030 01/08/21  0650   WBC 7.5 7.7 8.3   HGB 9.0* 9.2* 8.5*   HCT 29.3* 29.8* 27.4*    210 209     Recent Labs     01/10/21  1645 01/09/21  1030 01/08/21  0650    144 142  143   K 5.2* 4.7 5.4*  5.5*   * 114* 114*  115*   CO2 21 22 21 21   * 112* 132*  130*   BUN 89* 87* 84*  87*   CREA 4.88* 5.03* 5.27*  5.26*   CA 7.3* 7.4* 7.7*  7.4*   PHOS  --   --  6.3*   ALB 2.2* 2.3* 2.3*  2.3*   TBILI 0.3 0.3 0.4   ALT 33 29 26         Diagnostics   CXR Results  (Last 48 hours)    None             Assessment/Plan:     Active Problems:    GI bleed (1/5/2021)       Impression:   Patient is a 76 y.o. male  AA who is known to have history of chronic kidney disease stage IV with baseline serum creatinine ranging between 3.7 and 4.2. He is admitted hospital because of generalized weakness, dizziness. He additionally history of hypertension. Initial work-up showed hemoglobin of 3.5 g/dL. He was also noted to have COVID-19 infection.     Plan:   1. Dyspnea:  Patient noted to be short of breath on physical activity. Differential diagnosis includes hypoxia from severe anemia versus or combination from Covid 19 pneumonitis. Patient is on supplemental oxygen at 4 L/min. Saturating 98%. We will continue to watch his oxygen saturations.     2.  COVID-19 pneumonitis:  Patient has bilateral basal infiltrates, has positive COVID-19 test results. He has developed COVID-19 pneumonitis. Patient is on supplemental oxygen, started on dexamethasone, however the increased dosage to 6 mg once daily. Patient started on IV Zithromax. Since patient has stage IV chronic kidney disease with GFR of 13, he will not be a candidate for remdesivir.     3. Severe anemia:  He had hemoglobin of 3.5 g/dL. This seems to be secondary to blood loss anemia. He is getting 5 units of packed RBCs. His hemoglobin has come up to 8.1 g/dL. Patient was seen by GI service. 4.  Chronic kidney disease:  He has stage IV chronic kidney disease. He has been followed by Dr. Shree Archer. Patient also has nephrotic range proteinuria.     Patient is on DVT prophylaxis. Once he gets a bed on the medical floor then he will get transferred.     Questions of patient were answered at bedside in detail  Case discussed in detail with RN, RT, and care team  Thank you for involving me in the care of this patient  I will follow with you closely during hospitalization    Time spent more than 30 minutes under patient care with no overlap reviewing results and records, decision making, and answering questions. Clarissa Doty MD  Pulmonary/CC     This document has been prepared by the Dragon voice recognition system, typographical errors may have occurred.  Attempts have been made to correct errors, however inadvertent errors may persist.

## 2021-01-11 NOTE — PROGRESS NOTES
Progress Note    Teodora Manuel MD             Daily Progress Note: 2021      Subjective: The patient is seen for follow  up. Offers no complaints lying in bed comfortably. No acute shortness of breath no cough fever  Problem List:  Problem List as of 2021 Never Reviewed          Codes Class Noted - Resolved    GI bleed ICD-10-CM: K92.2  ICD-9-CM: 578.9  2021 - Present              Medications reviewed  Current Facility-Administered Medications   Medication Dose Route Frequency    dexamethasone (DECADRON) 4 mg/mL injection 3 mg  3 mg IntraVENous Q24H    theophylline ER(12 hour) (THEOCHRON) tablet 150 mg  150 mg Oral Q12H    icosapent ethyL (VASCEPA) capsule 2 g  2 g Oral BID WITH MEALS    hydrALAZINE (APRESOLINE) tablet 50 mg  50 mg Oral TID    0.45% sodium chloride infusion  25 mL/hr IntraVENous CONTINUOUS    sodium bicarbonate tablet 650 mg  650 mg Oral BID    0.9% sodium chloride infusion 250 mL  250 mL IntraVENous PRN    cefTRIAXone (ROCEPHIN) 1 g in sterile water (preservative free) 10 mL IV syringe  1 g IntraVENous Q24H    0.9% sodium chloride infusion 250 mL  250 mL IntraVENous PRN    0.9% sodium chloride infusion 250 mL  250 mL IntraVENous PRN    acetaminophen (TYLENOL) tablet 650 mg  650 mg Oral Q6H PRN       Review of Systems:   All systems are reviewed and it is negative    Objective:   Physical Exam:     Visit Vitals  BP (!) 159/97 (BP 1 Location: Left arm)   Pulse 60   Temp 98.1 °F (36.7 °C)   Resp 18   Ht 6' 0.01\" (1.829 m)   Wt 79.4 kg (175 lb)   SpO2 100%   BMI 23.73 kg/m²    O2 Flow Rate (L/min): 2 l/min O2 Device: Nasal cannula    Temp (24hrs), Av.2 °F (36.8 °C), Min:97.7 °F (36.5 °C), Max:99.1 °F (37.3 °C)    No intake/output data recorded.     1901 -  0700  In: 820 [P.O.:820]  Out: 1400 [Urine:1400]  HEENT-normocephalic atraumatic skull pupils are bilaterally equal reacting to light sclera nonicteric conjunctive pink no edema of the eyelids no ear or nasal discharge tongue is pink no ulcer positive gag reflex no pharyngeal permission extraocular movements I  General:  Alert, cooperative, no distress, appears stated age. Lungs:   Clear to auscultation bilaterally. Chest wall:  No tenderness or deformity. Heart:  Regular rate and rhythm, S1, S2 normal, no murmur, click, rub or gallop. Abdomen:   Soft, non-tender. Bowel sounds normal. No masses,  No organomegaly. Extremities: Extremities normal, atraumatic, no cyanosis or edema. Pulses: 2+ and symmetric all extremities. Skin: Skin color, texture, turgor normal. No rashes or lesions   Neurologic: CNII-XII intact. No gross sensory or motor deficits     Data Review:       Recent Days:  Recent Labs     01/11/21  0935 01/10/21  1645 01/09/21  1030   WBC 7.0 7.5 7.7   HGB 9.3* 9.0* 9.2*   HCT 29.7* 29.3* 29.8*    174 210     Recent Labs     01/11/21  0935 01/10/21  1645 01/09/21  1030    140 144   K 4.5 5.2* 4.7   * 111* 114*   CO2 20* 21 22   * 117* 112*   BUN 87* 89* 87*   CREA 4.77* 4.88* 5.03*   CA 7.4* 7.3* 7.4*   ALB 2.3* 2.2* 2.3*   TBILI 0.4 0.3 0.3   ALT 33 33 29     No results for input(s): PH, PCO2, PO2, HCO3, FIO2 in the last 72 hours. 24 Hour Results:  Recent Results (from the past 24 hour(s))   CBC WITH AUTOMATED DIFF    Collection Time: 01/10/21  4:45 PM   Result Value Ref Range    WBC 7.5 4.1 - 11.1 K/uL    RBC 3.30 (L) 4.10 - 5.70 M/uL    HGB 9.0 (L) 12.1 - 17.0 g/dL    HCT 29.3 (L) 36.6 - 50.3 %    MCV 88.8 80.0 - 99.0 FL    MCH 27.3 26.0 - 34.0 PG    MCHC 30.7 30.0 - 36.5 g/dL    RDW 15.9 (H) 11.5 - 14.5 %    PLATELET 211 321 - 375 K/uL    MPV 9.4 8.9 - 12.9 FL    NEUTROPHILS 92 (H) 32 - 75 %    LYMPHOCYTES 3 (L) 12 - 49 %    MONOCYTES 4 (L) 5 - 13 %    EOSINOPHILS 0 0 - 7 %    BASOPHILS 0 0 - 1 %    IMMATURE GRANULOCYTES 1 (H) 0.0 - 0.5 %    ABS. NEUTROPHILS 6.9 1.8 - 8.0 K/UL    ABS. LYMPHOCYTES 0.2 (L) 0.8 - 3.5 K/UL    ABS.  MONOCYTES 0.3 0.0 - 1.0 K/UL ABS. EOSINOPHILS 0.0 0.0 - 0.4 K/UL    ABS. BASOPHILS 0.0 0.0 - 0.1 K/UL    ABS. IMM. GRANS. 0.1 (H) 0.00 - 0.04 K/UL    DF AUTOMATED     METABOLIC PANEL, COMPREHENSIVE    Collection Time: 01/10/21  4:45 PM   Result Value Ref Range    Sodium 140 136 - 145 mmol/L    Potassium 5.2 (H) 3.5 - 5.1 mmol/L    Chloride 111 (H) 97 - 108 mmol/L    CO2 21 21 - 32 mmol/L    Anion gap 8 5 - 15 mmol/L    Glucose 117 (H) 65 - 100 mg/dL    BUN 89 (H) 6 - 20 mg/dL    Creatinine 4.88 (H) 0.70 - 1.30 mg/dL    BUN/Creatinine ratio 18 12 - 20      GFR est AA 15 (L) >60 ml/min/1.73m2    GFR est non-AA 12 (L) >60 ml/min/1.73m2    Calcium 7.3 (L) 8.5 - 10.1 mg/dL    Bilirubin, total 0.3 0.2 - 1.0 mg/dL    AST (SGOT) 27 15 - 37 U/L    ALT (SGPT) 33 12 - 78 U/L    Alk.  phosphatase 56 45 - 117 U/L    Protein, total 5.2 (L) 6.4 - 8.2 g/dL    Albumin 2.2 (L) 3.5 - 5.0 g/dL    Globulin 3.0 2.0 - 4.0 g/dL    A-G Ratio 0.7 (L) 1.1 - 2.2     GLUCOSE, POC    Collection Time: 01/10/21  4:57 PM   Result Value Ref Range    Glucose (POC) 133 (H) 65 - 100 mg/dL    Performed by Markel Vázquez    GLUCOSE, POC    Collection Time: 01/10/21  7:33 PM   Result Value Ref Range    Glucose (POC) 124 (H) 65 - 100 mg/dL    Performed by Sunshine Jones    C REACTIVE PROTEIN, QT    Collection Time: 01/11/21  9:35 AM   Result Value Ref Range    C-Reactive protein 0.32 0.00 - 0.60 mg/dL   PROCALCITONIN    Collection Time: 01/11/21  9:35 AM   Result Value Ref Range    Procalcitonin 0.64 (H) 0 ng/mL   LD    Collection Time: 01/11/21  9:35 AM   Result Value Ref Range     (H) 85 - 241 U/L   CBC WITH AUTOMATED DIFF    Collection Time: 01/11/21  9:35 AM   Result Value Ref Range    WBC 7.0 4.1 - 11.1 K/uL    RBC 3.37 (L) 4.10 - 5.70 M/uL    HGB 9.3 (L) 12.1 - 17.0 g/dL    HCT 29.7 (L) 36.6 - 50.3 %    MCV 88.1 80.0 - 99.0 FL    MCH 27.6 26.0 - 34.0 PG    MCHC 31.3 30.0 - 36.5 g/dL    RDW 15.6 (H) 11.5 - 14.5 %    PLATELET 128 366 - 321 K/uL    MPV 9.7 8.9 - 12.9 FL NEUTROPHILS 82 (H) 32 - 75 %    LYMPHOCYTES 8 (L) 12 - 49 %    MONOCYTES 7 5 - 13 %    EOSINOPHILS 2 0 - 7 %    BASOPHILS 0 0 - 1 %    IMMATURE GRANULOCYTES 1 (H) 0.0 - 0.5 %    ABS. NEUTROPHILS 5.9 1.8 - 8.0 K/UL    ABS. LYMPHOCYTES 0.5 (L) 0.8 - 3.5 K/UL    ABS. MONOCYTES 0.5 0.0 - 1.0 K/UL    ABS. EOSINOPHILS 0.1 0.0 - 0.4 K/UL    ABS. BASOPHILS 0.0 0.0 - 0.1 K/UL    ABS. IMM. GRANS. 0.1 (H) 0.00 - 0.04 K/UL    DF AUTOMATED     METABOLIC PANEL, COMPREHENSIVE    Collection Time: 01/11/21  9:35 AM   Result Value Ref Range    Sodium 139 136 - 145 mmol/L    Potassium 4.5 3.5 - 5.1 mmol/L    Chloride 110 (H) 97 - 108 mmol/L    CO2 20 (L) 21 - 32 mmol/L    Anion gap 9 5 - 15 mmol/L    Glucose 109 (H) 65 - 100 mg/dL    BUN 87 (H) 6 - 20 mg/dL    Creatinine 4.77 (H) 0.70 - 1.30 mg/dL    BUN/Creatinine ratio 18 12 - 20      GFR est AA 15 (L) >60 ml/min/1.73m2    GFR est non-AA 12 (L) >60 ml/min/1.73m2    Calcium 7.4 (L) 8.5 - 10.1 mg/dL    Bilirubin, total 0.4 0.2 - 1.0 mg/dL    AST (SGOT) 24 15 - 37 U/L    ALT (SGPT) 33 12 - 78 U/L    Alk. phosphatase 55 45 - 117 U/L    Protein, total 5.5 (L) 6.4 - 8.2 g/dL    Albumin 2.3 (L) 3.5 - 5.0 g/dL    Globulin 3.2 2.0 - 4.0 g/dL    A-G Ratio 0.7 (L) 1.1 - 2.2         CT CHEST WO CONT   Final Result   IMPRESSION:   1. No pneumomediastinum or pneumothorax. 2. Cardiomegaly. 3. Subtle groundglass densities in both lungs might be pneumonia or other. XR CHEST PORT   Final Result   IMPRESSION: Subtle but potentially clinically significant findings as above. Suggest radiographic follow-up. Assessment: COVID-19 pneumonitis improving  Anemia  Hypertension  Acute on chronic renal failure        Plan: Doing for the room air saturation as well as ambulatory pulse oximetry        Care Plan discussed with: Patient himself as well as RN taking care of the patient Autumn Wesly. Total time spent with patient: 30 minutes.     Malachi Nunez MD

## 2021-01-12 LAB
BACTERIA SPEC CULT: NORMAL
GLUCOSE BLD STRIP.AUTO-MCNC: 134 MG/DL (ref 65–100)
PERFORMED BY, TECHID: ABNORMAL
SPECIAL REQUESTS,SREQ: NORMAL

## 2021-01-12 PROCEDURE — 99232 SBSQ HOSP IP/OBS MODERATE 35: CPT | Performed by: INTERNAL MEDICINE

## 2021-01-12 PROCEDURE — 65270000029 HC RM PRIVATE

## 2021-01-12 PROCEDURE — 74011250637 HC RX REV CODE- 250/637: Performed by: INTERNAL MEDICINE

## 2021-01-12 PROCEDURE — 82962 GLUCOSE BLOOD TEST: CPT

## 2021-01-12 PROCEDURE — 74011250636 HC RX REV CODE- 250/636: Performed by: INTERNAL MEDICINE

## 2021-01-12 RX ADMIN — THEOPHYLLINE 150 MG: 300 TABLET, EXTENDED RELEASE ORAL at 21:07

## 2021-01-12 RX ADMIN — HYDRALAZINE HYDROCHLORIDE 50 MG: 50 TABLET, FILM COATED ORAL at 17:07

## 2021-01-12 RX ADMIN — DEXAMETHASONE SODIUM PHOSPHATE 3 MG: 4 INJECTION, SOLUTION INTRAMUSCULAR; INTRAVENOUS at 17:08

## 2021-01-12 RX ADMIN — SODIUM BICARBONATE 650 MG TABLET 650 MG: at 21:07

## 2021-01-12 RX ADMIN — SODIUM BICARBONATE 650 MG TABLET 650 MG: at 09:04

## 2021-01-12 RX ADMIN — HYDRALAZINE HYDROCHLORIDE 50 MG: 50 TABLET, FILM COATED ORAL at 09:04

## 2021-01-12 RX ADMIN — ICOSAPENT ETHYL 2 G: 1000 CAPSULE ORAL at 08:00

## 2021-01-12 RX ADMIN — ICOSAPENT ETHYL 2 G: 1000 CAPSULE ORAL at 17:00

## 2021-01-12 RX ADMIN — THEOPHYLLINE 150 MG: 300 TABLET, EXTENDED RELEASE ORAL at 09:04

## 2021-01-12 RX ADMIN — HYDRALAZINE HYDROCHLORIDE 50 MG: 50 TABLET, FILM COATED ORAL at 21:07

## 2021-01-12 NOTE — PROGRESS NOTES
[facility]     Renal Daily Progress Note:     Admission Date: 2021     Subjective: seen in room 505. Comfortable, bored. Hemoglobin stable. Feels better. O2 sats are acceptable on room air   Serum creatinine continues to drop. He has a mild  Acidosis. Not short of breath, no chest pain, no nausea or vomiting. Able to eat. No longer with edema. No abdominal pain. Review of Systems  Pertinent items are noted in HPI. Objective:     Visit Vitals  /63   Pulse 62   Temp 98.5 °F (36.9 °C)   Resp 18   Ht 6' 0.01\" (1.829 m)   Wt 79.4 kg (175 lb)   SpO2 98%   BMI 23.73 kg/m²     Temp (24hrs), Av.5 °F (36.9 °C), Min:98.3 °F (36.8 °C), Max:98.7 °F (37.1 °C)      No intake or output data in the 24 hours ending 21 9374  Current Facility-Administered Medications   Medication Dose Route Frequency    dexamethasone (DECADRON) 4 mg/mL injection 3 mg  3 mg IntraVENous Q24H    theophylline ER(12 hour) (THEOCHRON) tablet 150 mg  150 mg Oral Q12H    icosapent ethyL (VASCEPA) capsule 2 g  2 g Oral BID WITH MEALS    hydrALAZINE (APRESOLINE) tablet 50 mg  50 mg Oral TID    0.45% sodium chloride infusion  25 mL/hr IntraVENous CONTINUOUS    sodium bicarbonate tablet 650 mg  650 mg Oral BID    0.9% sodium chloride infusion 250 mL  250 mL IntraVENous PRN    cefTRIAXone (ROCEPHIN) 1 g in sterile water (preservative free) 10 mL IV syringe  1 g IntraVENous Q24H    0.9% sodium chloride infusion 250 mL  250 mL IntraVENous PRN    0.9% sodium chloride infusion 250 mL  250 mL IntraVENous PRN    acetaminophen (TYLENOL) tablet 650 mg  650 mg Oral Q6H PRN       Physical Exam:    General appearance: alert, cooperative, no distress, appears stated age  Head: Normocephalic, without obvious abnormality, atraumatic  Neck: supple, symmetrical, trachea midline, no adenopathy and no JVD  Lungs: clear, no wheeze  Heart: regular rate and rhythm, no S3 or S4, no rub  Abdomen: soft, non-tender.  Bowel sounds normal. No masses,  no organomegaly  Extremities: no edema  Lymph nodes: no adenopathy  Neurologic: grossly normal    Data Review:     LABS:  Recent Labs     01/11/21  0935 01/10/21  1645    140   K 4.5 5.2*   * 111*   CO2 20* 21   BUN 87* 89*   CREA 4.77* 4.88*   CA 7.4* 7.3*   ALB 2.3* 2.2*     Recent Labs     01/11/21  0935 01/10/21  1645   WBC 7.0 7.5   HGB 9.3* 9.0*   HCT 29.7* 29.3*    174     No results for input(s): HÉCTOR, KU, CLU, CREAU in the last 72 hours. No lab exists for component: PROU     CXR 1-5-21  Portable AP view at 1816 hours. Comparison 23 November 2008. Cardiac silhouette size is borderline enlarged. Arcuate lucency adjacent to the left side of the aortic knob margin could  reflect a tiny pneumomediastinum. Mild pulmonary vascular congestion, without overt edema. Patchy densities in the lower lungs could be minimal infiltrate. Consider  follow-up if warranted clinically.     IMPRESSION  IMPRESSION: Subtle but potentially clinically significant findings as above. Suggest radiographic follow-up.     CT chest 1-5-21  Images obtained without contrast include the abdomen and pelvis. Comparison portable chest radiograph earlier today.     Subtle peripheral groundglass densities are noted in both lungs, could reflect  Covid pneumonia or other. No pneumothorax or pneumomediastinum. The radiographic findings suspicious for  pneumomediastinum probably was artifact, perhaps related to cardiac motion. No pleural effusion or adenopathy. Cardiomegaly again noted.     IMPRESSION  IMPRESSION:  1. No pneumomediastinum or pneumothorax. 2. Cardiomegaly.   3. Subtle groundglass densities in both lungs might be pneumonia or other.     Assessment:   Renal Specific Problems  Chronic kidney disease stage IV at baseline  Acute kidney injury related to acute blood loss, Covid infection--improving  Acute severe anemia, rule out GI bleed, past history of diverticular bleed but he has no blood per rectum  Hyperkalemia  Metabolic acidosis--resolved  Volume depletion--corrected     COVID-19 positive test (U07.1, COVID-19) with Acute Pneumonia (J12.89, Other viral pneumonia)  (If respiratory failure or sepsis present, add as separate assessment)  in    Plan:   D/w Dr. Antoinette Schaefer, no plans to do EGD/ colonoscopy if Hgb stable    Obtain/ Order: labs/cultures/radiology/procedures:  Serial creatinine/BMP      Oral sodium bicarbonate    Therapeutic:    Will f/u in office 4-6 weeks  PT  D/c soon once cleared by ID/ Samra Ovalles MD    [phone]

## 2021-01-12 NOTE — INTERDISCIPLINARY ROUNDS
Pt. Currently has SCDs on but refuses to be hooked up. He says he gets up too much to be hooked up. Patient has an IV in his right wrist and refuses to be hooked up. He states that he has been drinking enough.

## 2021-01-12 NOTE — PROGRESS NOTES
[facility]     Renal Daily Progress Note:     Admission Date: 2021     Subjective: seen in room 505. Events of weekend noted. Hemoglobin stable. Feels better. O2 sats are acceptable on room air   Serum creatinine has dropped with volume administration. He has a mild  Acidosis. Not short of breath, no chest pain, no nausea or vomiting. Able to eat. No longer with edema. No abdominal pain. Review of Systems  Pertinent items are noted in HPI.     Objective:     Visit Vitals  /61   Pulse 86   Temp 98.3 °F (36.8 °C)   Resp 18   Ht 6' 0.01\" (1.829 m)   Wt 79.4 kg (175 lb)   SpO2 97%   BMI 23.73 kg/m²     Temp (24hrs), Av.4 °F (36.9 °C), Min:98.1 °F (36.7 °C), Max:99.1 °F (37.3 °C)        Intake/Output Summary (Last 24 hours) at 2021 9098  Last data filed at 1/10/2021 2315  Gross per 24 hour   Intake 360 ml   Output 600 ml   Net -240 ml     Current Facility-Administered Medications   Medication Dose Route Frequency    dexamethasone (DECADRON) 4 mg/mL injection 3 mg  3 mg IntraVENous Q24H    theophylline ER(12 hour) (THEOCHRON) tablet 150 mg  150 mg Oral Q12H    icosapent ethyL (VASCEPA) capsule 2 g  2 g Oral BID WITH MEALS    hydrALAZINE (APRESOLINE) tablet 50 mg  50 mg Oral TID    0.45% sodium chloride infusion  25 mL/hr IntraVENous CONTINUOUS    sodium bicarbonate tablet 650 mg  650 mg Oral BID    0.9% sodium chloride infusion 250 mL  250 mL IntraVENous PRN    cefTRIAXone (ROCEPHIN) 1 g in sterile water (preservative free) 10 mL IV syringe  1 g IntraVENous Q24H    0.9% sodium chloride infusion 250 mL  250 mL IntraVENous PRN    0.9% sodium chloride infusion 250 mL  250 mL IntraVENous PRN    acetaminophen (TYLENOL) tablet 650 mg  650 mg Oral Q6H PRN       Physical Exam:General appearance: alert, cooperative, no distress, appears stated age  Head: Normocephalic, without obvious abnormality, atraumatic  Neck: supple, symmetrical, trachea midline, no adenopathy and no JVD  Lungs: scattered crackles, no wheeze  Heart: regular rate and rhythm, no S3 or S4, no rub  Abdomen: soft, non-tender. Bowel sounds normal. No masses,  no organomegaly  Extremities: no edema  Lymph nodes: no adenopathy  Neurologic: grossly normal    Data Review:     LABS:  Recent Labs     01/11/21  0935 01/10/21  1645 01/09/21  1030    140 144   K 4.5 5.2* 4.7   * 111* 114*   CO2 20* 21 22   BUN 87* 89* 87*   CREA 4.77* 4.88* 5.03*   CA 7.4* 7.3* 7.4*   ALB 2.3* 2.2* 2.3*     Recent Labs     01/11/21  0935 01/10/21  1645 01/09/21  1030   WBC 7.0 7.5 7.7   HGB 9.3* 9.0* 9.2*   HCT 29.7* 29.3* 29.8*    174 210     No results for input(s): HÉCTOR, KU, CLU, CREAU in the last 72 hours. No lab exists for component: PROU     CXR 1-5-21  Portable AP view at 1816 hours. Comparison 23 November 2008. Cardiac silhouette size is borderline enlarged. Arcuate lucency adjacent to the left side of the aortic knob margin could  reflect a tiny pneumomediastinum. Mild pulmonary vascular congestion, without overt edema. Patchy densities in the lower lungs could be minimal infiltrate. Consider  follow-up if warranted clinically.     IMPRESSION  IMPRESSION: Subtle but potentially clinically significant findings as above. Suggest radiographic follow-up.     CT chest 1-5-21  Images obtained without contrast include the abdomen and pelvis. Comparison portable chest radiograph earlier today.     Subtle peripheral groundglass densities are noted in both lungs, could reflect  Covid pneumonia or other. No pneumothorax or pneumomediastinum. The radiographic findings suspicious for  pneumomediastinum probably was artifact, perhaps related to cardiac motion. No pleural effusion or adenopathy. Cardiomegaly again noted.     IMPRESSION  IMPRESSION:  1. No pneumomediastinum or pneumothorax. 2. Cardiomegaly.   3. Subtle groundglass densities in both lungs might be pneumonia or other.     Assessment:   Renal Specific Problems  Chronic kidney disease stage IV at baseline  Acute kidney injury related to acute blood loss, Covid infection--improving  Acute severe anemia, rule out GI bleed, past history of diverticular bleed but he has no blood per rectum  Hyperkalemia  Metabolic acidosis--resolved  Volume depletion--corrected     COVID-19 positive test (U07.1, COVID-19) with Acute Pneumonia (J12.89, Other viral pneumonia)  (If respiratory failure or sepsis present, add as separate assessment)  in    Plan:   D/w Dr. Nela Hagan, no plans to do EGD/ colonoscopy if Hgb stabel    Obtain/ Order: labs/cultures/radiology/procedures:  Serial creatinine/BMP      Oral sodium bicarbonate    Therapeutic:    Off IV fluids  Feed patient  PT  D/c soon once cleared by ID/ Jaison Baptiste MD    [phone]

## 2021-01-12 NOTE — PROGRESS NOTES
Progress Note    Manav Remy MD             Daily Progress Note: 2021      Subjective: The patient is seen for follow  up. Offers no complaints. Patient's room air saturation is 97% and on room air ambulatory pulse oximetry is 94%. Will DC oxygen  Awaiting for the CBC results today.   If hemoglobin is is improving then I would like to discharge patient in the morning provided pulmonary as well as infectious disease agrees with with the decision  Problem List:  Problem List as of 2021 Never Reviewed          Codes Class Noted - Resolved    GI bleed ICD-10-CM: K92.2  ICD-9-CM: 578.9  2021 - Present              Medications reviewed  Current Facility-Administered Medications   Medication Dose Route Frequency    dexamethasone (DECADRON) 4 mg/mL injection 3 mg  3 mg IntraVENous Q24H    theophylline ER(12 hour) (THEOCHRON) tablet 150 mg  150 mg Oral Q12H    icosapent ethyL (VASCEPA) capsule 2 g  2 g Oral BID WITH MEALS    hydrALAZINE (APRESOLINE) tablet 50 mg  50 mg Oral TID    0.45% sodium chloride infusion  25 mL/hr IntraVENous CONTINUOUS    sodium bicarbonate tablet 650 mg  650 mg Oral BID    0.9% sodium chloride infusion 250 mL  250 mL IntraVENous PRN    cefTRIAXone (ROCEPHIN) 1 g in sterile water (preservative free) 10 mL IV syringe  1 g IntraVENous Q24H    0.9% sodium chloride infusion 250 mL  250 mL IntraVENous PRN    0.9% sodium chloride infusion 250 mL  250 mL IntraVENous PRN    acetaminophen (TYLENOL) tablet 650 mg  650 mg Oral Q6H PRN       Review of Systems:   All systems are reviewed negative    Objective:   Physical Exam:     Visit Vitals  /79 (BP Patient Position: At rest;Head of bed elevated (Comment degrees))   Pulse 64   Temp 98.5 °F (36.9 °C)   Resp 18   Ht 6' 0.01\" (1.829 m)   Wt 79.4 kg (175 lb)   SpO2 99%   BMI 23.73 kg/m²    O2 Flow Rate (L/min): 2 l/min O2 Device: Room air    Temp (24hrs), Av.4 °F (36.9 °C), Min:98.1 °F (36.7 °C), Max:98.7 °F (37.1 °C)    No intake/output data recorded. 01/10 1901 - 01/12 0700  In: 360 [P.O.:360]  Out: 600 [Urine:600]  HEENT-normocephalic atraumatic skull pupils are bilaterally equal reacting to light sclera nonicteric conjunctive are pink no edema of the eyelids no yellow nasal discharge tongue is pink no ulcer positive gag reflex no pharyngeal inflammation extraocular movements are intact  Neck is supple pharyngeal motion no JVD no HJR no lymphadenopathy no thyromegaly no carotid bruit  General:  Alert, cooperative, no distress, appears stated age. Lungs:   Clear to auscultation bilaterally. Chest wall:  No tenderness or deformity. Heart:  Regular rate and rhythm, S1, S2 normal, no murmur, click, rub or gallop. Abdomen:   Soft, non-tender. Bowel sounds normal. No masses,  No organomegaly. Extremities: Extremities normal, atraumatic, no cyanosis or edema. Pulses: 2+ and symmetric all extremities. Skin: Skin color, texture, turgor normal. No rashes or lesions   Neurologic: CNII-XII intact. No gross sensory or motor deficits     Data Review:       Recent Days:  Recent Labs     01/11/21  0935 01/10/21  1645   WBC 7.0 7.5   HGB 9.3* 9.0*   HCT 29.7* 29.3*    174     Recent Labs     01/11/21  0935 01/10/21  1645    140   K 4.5 5.2*   * 111*   CO2 20* 21   * 117*   BUN 87* 89*   CREA 4.77* 4.88*   CA 7.4* 7.3*   ALB 2.3* 2.2*   TBILI 0.4 0.3   ALT 33 33     No results for input(s): PH, PCO2, PO2, HCO3, FIO2 in the last 72 hours. 24 Hour Results:  No results found for this or any previous visit (from the past 24 hour(s)). CT CHEST WO CONT   Final Result   IMPRESSION:   1. No pneumomediastinum or pneumothorax. 2. Cardiomegaly. 3. Subtle groundglass densities in both lungs might be pneumonia or other. XR CHEST PORT   Final Result   IMPRESSION: Subtle but potentially clinically significant findings as above. Suggest radiographic follow-up.            Assessment: COVID-19 pneumonitis  Anemia  Acute on chronic renal failure  Hypertension        Plan: Will await for the lab results today        Care Plan discussed with: RN taking care of the patient as well as patient himself    Total time spent with patient: 30 minutes.     Cristal Mancera MD

## 2021-01-12 NOTE — PROGRESS NOTES
Progress Note      1/12/2021 12:25 PM  NAME: Donnajean Alpers   MRN:  [id number]   Admit Diagnosis: GI bleed [K92.2]      Subjective:   Chart reviewed. Patient feels somewhat better. Remains afebrile. Offers no complaints. Review of Systems:    Symptom Y/N Comments  Symptom Y/N Comments   Fever/Chills n   Chest Pain n    Poor Appetite    Edema     Cough    Abdominal Pain n    Sputum    Joint Pain     SOB/CARMONA n   Pruritis/Rash     Nausea/vomit n   Other     Diarrhea         Constipation           Could NOT obtain due to:      Objective:          Physical Exam:    Last 24hrs VS reviewed since prior progress note. Most recent are:    Visit Vitals  /79 (BP Patient Position: At rest;Head of bed elevated (Comment degrees))   Pulse 64   Temp 98.5 °F (36.9 °C)   Resp 18   Ht 6' 0.01\" (1.829 m)   Wt 79.4 kg (175 lb)   SpO2 99%   BMI 23.73 kg/m²     No intake or output data in the 24 hours ending 01/12/21 1235     General Appearance: Well developed, well nourished, alert & oriented x 3,    no acute distress. Ears/Nose/Mouth/Throat: Hearing grossly normal.  Neck: Supple. Chest: Lungs clear to auscultation bilaterally. Cardiovascular: Regular rate and rhythm, S1,S2 normal, no murmur. Abdomen: Soft, non-tender, bowel sounds are active. Extremities: No edema bilaterally. Skin: Warm and dry. []         Post-cath site without hematoma, bruit, tenderness, or thrill. Distal pulses intact. PMH/SH reviewed - no change compared to H&P    Data Review    Telemetry: Sinus bradycardia, episodes of PVCs and bigeminy and a pause up to 2.5 seconds. EKG:   []  No new EKG for review    Lab Data Personally Reviewed:    Recent Labs     01/11/21  0935 01/10/21  1645   WBC 7.0 7.5   HGB 9.3* 9.0*   HCT 29.7* 29.3*    174     No results for input(s): INR, PTP, APTT, INREXT, INREXT in the last 72 hours.    Recent Labs     01/11/21  0935 01/10/21  1645    140   K 4.5 5.2*   * 111*   CO2 20* 21 BUN 87* 89*   CREA 4.77* 4.88*   * 117*   CA 7.4* 7.3*     No results for input(s): CPK, CKNDX, TROIQ in the last 72 hours. No lab exists for component: CPKMB  No results found for: CHOL, CHOLX, CHLST, CHOLV, HDL, HDLP, LDL, LDLC, DLDLP, TGLX, TRIGL, TRIGP, CHHD, CHHDX    Recent Labs     01/11/21  0935 01/10/21  1645   AP 55 56   TP 5.5* 5.2*   ALB 2.3* 2.2*   GLOB 3.2 3.0     No results for input(s): PH, PCO2, PO2 in the last 72 hours. Medications Personally Reviewed:    Current Facility-Administered Medications   Medication Dose Route Frequency    dexamethasone (DECADRON) 4 mg/mL injection 3 mg  3 mg IntraVENous Q24H    theophylline ER(12 hour) (THEOCHRON) tablet 150 mg  150 mg Oral Q12H    icosapent ethyL (VASCEPA) capsule 2 g  2 g Oral BID WITH MEALS    hydrALAZINE (APRESOLINE) tablet 50 mg  50 mg Oral TID    0.45% sodium chloride infusion  25 mL/hr IntraVENous CONTINUOUS    sodium bicarbonate tablet 650 mg  650 mg Oral BID    0.9% sodium chloride infusion 250 mL  250 mL IntraVENous PRN    cefTRIAXone (ROCEPHIN) 1 g in sterile water (preservative free) 10 mL IV syringe  1 g IntraVENous Q24H    0.9% sodium chloride infusion 250 mL  250 mL IntraVENous PRN    0.9% sodium chloride infusion 250 mL  250 mL IntraVENous PRN    acetaminophen (TYLENOL) tablet 650 mg  650 mg Oral Q6H PRN           Problem List:   Patient has had a marked bradycardia and seems to havet improvement after hydralazine and Ezekiel-Dur has been started. Covid positive status. Chronic kidney disease. Blood pressure is better. 1.      Assessment/Plan:   I will increase the dose of hydralazine to 50 mg 3 times daily. Will add Vascepa 2 g twice daily as patient is Covid positive. Recommend to check theophylline level. Thank you. 1.          []       High complexity decision making was performed in this patient at high risk for decompensation with multiple organ involvement.     Ryan Nuno MD

## 2021-01-12 NOTE — CARDIO/PULMONARY
Chart reviewed in detail today, patient was seen by myself in person yesterday. Now doing well on room air. Saturating well on room air at rest and with ambulation per respiratory therapy notes yesterday. Heart rate better controlled, hemoglobin stable. Per infectious disease Rocephin likely being stopped today. Decadron can be stopped at any time per primary team.  The pulmonary service will sign off now, please call with any questions/concerns prior to discharge.       Ray Pichardo,   [facility] ([facility])

## 2021-01-13 VITALS
HEIGHT: 72 IN | WEIGHT: 175 LBS | HEART RATE: 71 BPM | RESPIRATION RATE: 18 BRPM | TEMPERATURE: 98.4 F | SYSTOLIC BLOOD PRESSURE: 107 MMHG | OXYGEN SATURATION: 100 % | DIASTOLIC BLOOD PRESSURE: 70 MMHG | BODY MASS INDEX: 23.7 KG/M2

## 2021-01-13 LAB
ALBUMIN SERPL-MCNC: 2.2 G/DL (ref 3.5–5)
ALBUMIN/GLOB SERPL: 0.7 {RATIO} (ref 1.1–2.2)
ALP SERPL-CCNC: 51 U/L (ref 45–117)
ALT SERPL-CCNC: 28 U/L (ref 12–78)
ANION GAP SERPL CALC-SCNC: 9 MMOL/L (ref 5–15)
AST SERPL W P-5'-P-CCNC: 18 U/L (ref 15–37)
BASOPHILS # BLD: 0 K/UL (ref 0–0.1)
BASOPHILS NFR BLD: 0 % (ref 0–1)
BILIRUB SERPL-MCNC: 0.3 MG/DL (ref 0.2–1)
BUN SERPL-MCNC: 95 MG/DL (ref 6–20)
BUN/CREAT SERPL: 18 (ref 12–20)
CA-I BLD-MCNC: 7.6 MG/DL (ref 8.5–10.1)
CHLORIDE SERPL-SCNC: 110 MMOL/L (ref 97–108)
CO2 SERPL-SCNC: 19 MMOL/L (ref 21–32)
CREAT SERPL-MCNC: 5.22 MG/DL (ref 0.7–1.3)
DIFFERENTIAL METHOD BLD: ABNORMAL
EOSINOPHIL # BLD: 0.2 K/UL (ref 0–0.4)
EOSINOPHIL NFR BLD: 3 % (ref 0–7)
ERYTHROCYTE [DISTWIDTH] IN BLOOD BY AUTOMATED COUNT: 16 % (ref 11.5–14.5)
GLOBULIN SER CALC-MCNC: 3.1 G/DL (ref 2–4)
GLUCOSE BLD STRIP.AUTO-MCNC: 126 MG/DL (ref 65–100)
GLUCOSE SERPL-MCNC: 102 MG/DL (ref 65–100)
HCT VFR BLD AUTO: 27.8 % (ref 36.6–50.3)
HGB BLD-MCNC: 8.6 G/DL (ref 12.1–17)
IMM GRANULOCYTES # BLD AUTO: 0.1 K/UL (ref 0–0.04)
IMM GRANULOCYTES NFR BLD AUTO: 2 % (ref 0–0.5)
LYMPHOCYTES # BLD: 0.8 K/UL (ref 0.8–3.5)
LYMPHOCYTES NFR BLD: 12 % (ref 12–49)
MCH RBC QN AUTO: 27.2 PG (ref 26–34)
MCHC RBC AUTO-ENTMCNC: 30.9 G/DL (ref 30–36.5)
MCV RBC AUTO: 88 FL (ref 80–99)
MONOCYTES # BLD: 0.6 K/UL (ref 0–1)
MONOCYTES NFR BLD: 10 % (ref 5–13)
NEUTS SEG # BLD: 4.8 K/UL (ref 1.8–8)
NEUTS SEG NFR BLD: 73 % (ref 32–75)
PERFORMED BY, TECHID: ABNORMAL
PLATELET # BLD AUTO: 198 K/UL (ref 150–400)
PMV BLD AUTO: 9.6 FL (ref 8.9–12.9)
POTASSIUM SERPL-SCNC: 5 MMOL/L (ref 3.5–5.1)
PROT SERPL-MCNC: 5.3 G/DL (ref 6.4–8.2)
RBC # BLD AUTO: 3.16 M/UL (ref 4.1–5.7)
SODIUM SERPL-SCNC: 138 MMOL/L (ref 136–145)
WBC # BLD AUTO: 6.4 K/UL (ref 4.1–11.1)

## 2021-01-13 PROCEDURE — 36415 COLL VENOUS BLD VENIPUNCTURE: CPT

## 2021-01-13 PROCEDURE — 99232 SBSQ HOSP IP/OBS MODERATE 35: CPT | Performed by: INTERNAL MEDICINE

## 2021-01-13 PROCEDURE — 85025 COMPLETE CBC W/AUTO DIFF WBC: CPT

## 2021-01-13 PROCEDURE — 74011250636 HC RX REV CODE- 250/636: Performed by: INTERNAL MEDICINE

## 2021-01-13 PROCEDURE — 74011250637 HC RX REV CODE- 250/637: Performed by: INTERNAL MEDICINE

## 2021-01-13 PROCEDURE — 82962 GLUCOSE BLOOD TEST: CPT

## 2021-01-13 PROCEDURE — 80053 COMPREHEN METABOLIC PANEL: CPT

## 2021-01-13 RX ORDER — SODIUM BICARBONATE 650 MG/1
650 TABLET ORAL 2 TIMES DAILY
Qty: 60 TAB | Refills: 0 | Status: SHIPPED | OUTPATIENT
Start: 2021-01-13

## 2021-01-13 RX ORDER — HYDRALAZINE HYDROCHLORIDE 50 MG/1
50 TABLET, FILM COATED ORAL 3 TIMES DAILY
Qty: 90 TAB | Refills: 0 | Status: SHIPPED | OUTPATIENT
Start: 2021-01-13 | End: 2021-08-05

## 2021-01-13 RX ADMIN — ICOSAPENT ETHYL 2 G: 1000 CAPSULE ORAL at 08:00

## 2021-01-13 RX ADMIN — SODIUM BICARBONATE 650 MG TABLET 650 MG: at 09:04

## 2021-01-13 RX ADMIN — ICOSAPENT ETHYL 2 G: 1000 CAPSULE ORAL at 17:00

## 2021-01-13 RX ADMIN — HYDRALAZINE HYDROCHLORIDE 50 MG: 50 TABLET, FILM COATED ORAL at 09:04

## 2021-01-13 RX ADMIN — THEOPHYLLINE 150 MG: 300 TABLET, EXTENDED RELEASE ORAL at 09:05

## 2021-01-13 RX ADMIN — DEXAMETHASONE SODIUM PHOSPHATE 3 MG: 4 INJECTION, SOLUTION INTRAMUSCULAR; INTRAVENOUS at 16:56

## 2021-01-13 RX ADMIN — HYDRALAZINE HYDROCHLORIDE 50 MG: 50 TABLET, FILM COATED ORAL at 16:56

## 2021-01-13 NOTE — PROGRESS NOTES
Progress Note    Patient: Sharona Cabral MRN: [id number]  SSN: [id number]    YOB: 1955  Age: 76 y.o. Sex: male      Admit Date: 1/5/2021    LOS: 8 days     Subjective:   Patient followed for Covid-19 with possible pneumonia and sepsis. He remains afebrile and is on room air with O2Sat 100%. Resting comfortably with no complaints. Staff state that he may discharged today. Objective:     Vitals:    01/12/21 0831 01/12/21 1655 01/12/21 2105 01/13/21 0729   BP: 116/79 111/63 108/64 139/82   Pulse: 64 62 (!) 57 70   Resp: 18 18 18 18   Temp: 98.5 °F (36.9 °C) 98.5 °F (36.9 °C) 98.9 °F (37.2 °C) 97.9 °F (36.6 °C)   SpO2: 99% 98% 96% 98%   Weight:       Height:            Intake and Output:  Current Shift: No intake/output data recorded. Last three shifts: No intake/output data recorded. Physical Exam:    Constitutional:       General: He is not in acute distress. Appearance: Normal appearance. He is well appearing    HENT:      Head: Normocephalic and atraumatic. Nose: Nose normal.      Comments: OFF nasal O2      Mouth/Throat:      Pharynx: Oropharynx is clear. Eyes:      Pupils: Pupils are equal, round, and reactive to light. Neck:      Musculoskeletal: Neck supple. Cardiovascular:  Bradycardic to 57 bpm      Heart sounds: No murmur. Pulmonary:      Breath sounds: Rhonchi present. No wheezing or rales. Abdominal:      Palpations: Abdomen is soft. Tenderness: There is no abdominal tenderness. There is no guarding. Genitourinary:     Penis: Normal.       Testes: Normal.      Comments: No Quesada  Musculoskeletal:      Right lower leg: No edema. Left lower leg: No edema. Comments: Not pitting edema but brawny changes noted   Skin: no rashes or open wounds   Neurological:      General: No focal deficit present. Mental Status: He is alert and oriented to person, place, and time.    Psychiatric:         Mood and Affect: Mood normal.         Behavior: Behavior normal.         Thought Content: Thought content normal.         Judgment: Judgment normal.     Lab/Data Review:     WBC 7,000           Procalcitonin 0.64 <5.19 <6.63  CRP 0.32 < 2.20    Blood cultures (1/5) No growth FINAL  Assessment:     Active Problems:    GI bleed (1/5/2021)    1. Covid-19 infection with possible pneumonia, resolving  2. Possible sepsis with elevated procalcitonin/CRP, status post Ceftriaxone  3. Severe anemia with positive occult stool  4. CKD with GFR <30        Plan:   1. No further recommendations  2.  Cleared for discharge from ID standpoint        Signed By: Nicole Quintanilla MD     January 13, 2021

## 2021-01-13 NOTE — PROGRESS NOTES
Progress Note    Patient: Lanie Lundborg MRN: [id number]  SSN: [id number]    YOB: 1955  Age: 76 y.o. Sex: male      Admit Date: 1/5/2021    LOS: 7 days     Subjective:   Patient followed for Covid-19 with possible pneumonia and sepsis. He remains afebrile and is on room air. Resting comfortably with no complaints. Remains on Ceftriaxone. Objective:     Vitals:    01/12/21 0339 01/12/21 0831 01/12/21 1655 01/12/21 2105   BP: 115/77 116/79 111/63 108/64   Pulse: 67 64 62 (!) 57   Resp: 16 18 18 18   Temp: 98.7 °F (37.1 °C) 98.5 °F (36.9 °C) 98.5 °F (36.9 °C) 98.9 °F (37.2 °C)   SpO2: 98% 99% 98% 96%   Weight:       Height:            Intake and Output:  Current Shift: No intake/output data recorded. Last three shifts: No intake/output data recorded. Physical Exam:    Constitutional:       General: He is not in acute distress. Appearance: Normal appearance. He is well appearing    HENT:      Head: Normocephalic and atraumatic. Nose: Nose normal.      Comments: OFF nasal O2      Mouth/Throat:      Pharynx: Oropharynx is clear. Eyes:      Pupils: Pupils are equal, round, and reactive to light. Neck:      Musculoskeletal: Neck supple. Cardiovascular:  Bradycardic to 57 bpm      Heart sounds: No murmur. Pulmonary:      Breath sounds: Rhonchi present. No wheezing or rales. Abdominal:      Palpations: Abdomen is soft. Tenderness: There is no abdominal tenderness. There is no guarding. Genitourinary:     Penis: Normal.       Testes: Normal.      Comments: No Quesada  Musculoskeletal:      Right lower leg: No edema. Left lower leg: No edema. Comments: Not pitting edema but brawny changes noted   Skin: no rashes or open wounds   Neurological:      General: No focal deficit present. Mental Status: He is alert and oriented to person, place, and time.    Psychiatric:         Mood and Affect: Mood normal.         Behavior: Behavior normal.         Thought Content: Thought content normal.         Judgment: Judgment normal.     Lab/Data Review:     WBC 7,000           Procalcitonin 0.64 <5.19 <6.63  CRP 0.32 < 2.20    Blood cultures (1/5) No growth FINAL  Assessment:     Active Problems:    GI bleed (1/5/2021)    1. Covid-19 infection with possible pneumonia, resolving  2. Possible sepsis with elevated procalcitonin/CRP, Day #7 IV Ceftriaxone  3. Severe anemia with positive occult stool  4. CKD with GFR <30     Comment:  Procalcitonin and CRP have decreased with no signs of bacterial infection. I think that is reasonable to discontinue Ceftriaxone tomorrow. Oxygenating well on room air. Plan:   1. Discontinue Ceftriaxone  2. No further recommendations  3.  Cleared for discharge from ID standpoint        Signed By: Yayo Dove MD     January 12, 2021

## 2021-01-13 NOTE — PROGRESS NOTES
Chart reviewed. Patient continues to have no discharge planning needs at this time and plans to return home self-care.

## 2021-01-13 NOTE — PROGRESS NOTES
Progress Note      1/13/2021 12:25 PM  NAME: Noe Zuniga   MRN:  [id number]   Admit Diagnosis: GI bleed [K92.2]      Subjective:   Chart reviewed. Patient feels much better. Remains afebrile. Offers no complaints. Review of Systems:    Symptom Y/N Comments  Symptom Y/N Comments   Fever/Chills n   Chest Pain n    Poor Appetite    Edema     Cough    Abdominal Pain n    Sputum    Joint Pain     SOB/CARMONA n   Pruritis/Rash     Nausea/vomit n   Other     Diarrhea         Constipation           Could NOT obtain due to:      Objective:          Physical Exam:    Last 24hrs VS reviewed since prior progress note. Most recent are:    Visit Vitals  /82 (BP 1 Location: Left arm, BP Patient Position: At rest)   Pulse 70   Temp 97.9 °F (36.6 °C)   Resp 18   Ht 6' 0.01\" (1.829 m)   Wt 79.4 kg (175 lb)   SpO2 98%   BMI 23.73 kg/m²     No intake or output data in the 24 hours ending 01/13/21 1147     General Appearance: Well developed, well nourished, alert & oriented x 3,    no acute distress. Ears/Nose/Mouth/Throat: Hearing grossly normal.  Neck: Supple. Chest: Lungs clear to auscultation bilaterally. Cardiovascular: Regular rate and rhythm, S1,S2 normal, no murmur. Abdomen: Soft, non-tender, bowel sounds are active. Extremities: No edema bilaterally. Skin: Warm and dry. []         Post-cath site without hematoma, bruit, tenderness, or thrill. Distal pulses intact. PMH/SH reviewed - no change compared to H&P    Data Review    Telemetry: Sinus bradycardia, episodes of PVCs and bigeminy and a pause up to 2.5 seconds. EKG:   []  No new EKG for review    Lab Data Personally Reviewed:    Recent Labs     01/11/21  0935 01/10/21  1645   WBC 7.0 7.5   HGB 9.3* 9.0*   HCT 29.7* 29.3*    174     No results for input(s): INR, PTP, APTT, INREXT, INREXT in the last 72 hours.    Recent Labs     01/11/21  0935 01/10/21  1645    140   K 4.5 5.2*   * 111*   CO2 20* 21   BUN 87* 89*   CREA 4.77* 4.88*   * 117*   CA 7.4* 7.3*     No results for input(s): CPK, CKNDX, TROIQ in the last 72 hours. No lab exists for component: CPKMB  No results found for: CHOL, CHOLX, CHLST, CHOLV, HDL, HDLP, LDL, LDLC, DLDLP, TGLX, TRIGL, TRIGP, CHHD, CHHDX    Recent Labs     01/11/21  0935 01/10/21  1645   AP 55 56   TP 5.5* 5.2*   ALB 2.3* 2.2*   GLOB 3.2 3.0     No results for input(s): PH, PCO2, PO2 in the last 72 hours. Medications Personally Reviewed:    Current Facility-Administered Medications   Medication Dose Route Frequency    dexamethasone (DECADRON) 4 mg/mL injection 3 mg  3 mg IntraVENous Q24H    theophylline ER(12 hour) (THEOCHRON) tablet 150 mg  150 mg Oral Q12H    icosapent ethyL (VASCEPA) capsule 2 g  2 g Oral BID WITH MEALS    hydrALAZINE (APRESOLINE) tablet 50 mg  50 mg Oral TID    0.45% sodium chloride infusion  25 mL/hr IntraVENous CONTINUOUS    sodium bicarbonate tablet 650 mg  650 mg Oral BID    0.9% sodium chloride infusion 250 mL  250 mL IntraVENous PRN    0.9% sodium chloride infusion 250 mL  250 mL IntraVENous PRN    0.9% sodium chloride infusion 250 mL  250 mL IntraVENous PRN    acetaminophen (TYLENOL) tablet 650 mg  650 mg Oral Q6H PRN           Problem List:   Patient has had a marked bradycardia and seems to havet improvement after hydralazine and Ezekiel-Dur has been started. Covid positive status. Chronic kidney disease. Blood pressure is better. 1.      Assessment/Plan:   I will increase the dose of hydralazine to 50 mg 3 times daily. Will add Vascepa 2 g twice daily as patient is Covid positive. Recommend to check theophylline level. Okay to discharge from cardiac viewpoint. Thank you. 1.          []       High complexity decision making was performed in this patient at high risk for decompensation with multiple organ involvement.     Stephanie Quintanilla MD

## 2021-01-13 NOTE — INTERDISCIPLINARY ROUNDS
Patient discharged home self. Prescriptions changed pharmacy. Medication rec reviewed with patient. Belongings packed at bedside; patient wheeled down front to his ride. No further questions or concerns at this time. IV and telemetry were removed.

## 2021-01-13 NOTE — PROGRESS NOTES
Problem: Falls - Risk of  Goal: *Absence of Falls  Description: Document Earma Sheldon Fall Risk and appropriate interventions in the flowsheet.   Outcome: Progressing Towards Goal  Note: Fall Risk Interventions:  Mobility Interventions: Bed/chair exit alarm, OT consult for ADLs, PT Consult for mobility concerns, PT Consult for assist device competence    Mentation Interventions: Adequate sleep, hydration, pain control    Medication Interventions: Patient to call before getting OOB    Elimination Interventions: Call light in reach              Problem: Patient Education: Go to Patient Education Activity  Goal: Patient/Family Education  Outcome: Progressing Towards Goal

## 2021-01-13 NOTE — PROGRESS NOTES
shift change report given to [facility] and Paulette Rn(oncoming nurse) by Ubaldo Garcia RN(offgoing nurse). Report included the following information SBAR.

## 2021-01-13 NOTE — DISCHARGE SUMMARY
Discharge Summary       PATIENT ID: Darryle Hatter  MRN: [id number]   YOB: 1955    DATE OF ADMISSION: 1/5/2021  5:53 PM    DATE OF DISCHARGE today  PRIMARY CARE PROVIDER: Kyle Sharif MD     ATTENDING PHYSICIAN: Artemio Mckeon  DISCHARGING [place] [address]   To contact this individual call [phone]    CONSULTATIONS: IP CONSULT TO HOSPITALIST  IP CONSULT TO GASTROENTEROLOGY  IP CONSULT TO NEPHROLOGY  IP CONSULT TO INFECTIOUS DISEASES  IP CONSULT TO PULMONOLOGY  IP CONSULT TO CARDIOLOGY    PROCEDURES/SURGERIES: * No surgery found *    [facility] COURSE:   COVID-19 pneumonitis  Acute on chronic renal failure  Severe anemia  Patient came in with history of shortness of breath and his hemoglobin was found 3.5 he received  Total of 4 units of PRBCs and his hemoglobin came up after that patient's hemoglobin has been always stable he was started on IV dexamethasone as well as he was also on supplemental oxygen also. He was also started on IV Zithromax and Rocephin. Patient improved later on. His hemoglobin has been stable as I mentioned before renal function has been also stable now. He will be discharged today advised him to have 1 week of quarantine and then he can go in general public we will also give him dexamethasone as direct  DISCHARGE DIAGNOSES / PLAN:      1. Same as admission     ADDITIONAL CARE RECOMMENDATIONS:   None    PENDING TEST RESULTS:   At the time of discharge the following test results are still pending: None    FOLLOW UP APPOINTMENTS:    Dr.Akshay FLAVIA Hernandez in 1 week      DIET: Regular Diet  Oral Nutritional Supplements: No Oral Supplement prescribedNone    ACTIVITY: Activity as tolerated    WOUND CARE: Not required    EQUIPMENT needed: Not needed      DISCHARGE MEDICATIONS:  Current Discharge Medication List      START taking these medications    Details   hydrALAZINE (APRESOLINE) 50 mg tablet Take 1 Tab by mouth three (3) times daily.  Qty: 90 Tab, Refills: 0      sodium bicarbonate 650 mg tablet Take 1 Tab by mouth two (2) times a day. Qty: 60 Tab, Refills: 0         Dexamethasone as prescribed    NOTIFY YOUR PHYSICIAN FOR ANY OF THE FOLLOWING:   Fever over 101 degrees for 24 hours. Chest pain, shortness of breath, fever, chills, nausea, vomiting, diarrhea, change in mentation, falling, weakness, bleeding. Severe pain or pain not relieved by medications. Or, any other signs or symptoms that you may have questions about. DISPOSITION:    Home With:   OT  PT  HH  RN       Long term SNF/Inpatient Rehab    Independent/assisted living    Hospice   y Other:       PATIENT CONDITION AT DISCHARGE:     Functional status    Poor     Deconditioned    y Independent      Cognition     Lucid     Forgetful     Dementia      Catheters/lines (plus indication)    Quesada     PICC     PEG    y None      Code status   y  Full code     DNR      PHYSICAL EXAMINATION AT DISCHARGE:  General:          Alert, cooperative, no distress, appears stated age. HEENT:           Atraumatic, anicteric sclerae, pink conjunctivae                          No oral ulcers, mucosa moist, throat clear, dentition fair  Neck:               Supple, symmetrical  Lungs:             Clear to auscultation bilaterally. No Wheezing or Rhonchi. No rales. Chest wall:      No tenderness  No Accessory muscle use. Heart:              Regular  rhythm,  No  murmur   No edema  Abdomen:        Soft, non-tender. Not distended. Bowel sounds normal  Extremities:     No cyanosis. No clubbing,                            Skin turgor normal, Capillary refill normal  Skin:                Not pale. Not Jaundiced  No rashes   Psych:             Not anxious or agitated.   Neurologic:      Alert, moves all extremities, answers questions appropriately and responds to commands       CHRONIC MEDICAL DIAGNOSES:  Chronic renal failure    Greater than 36 minutes were spent with the patient on counseling and coordination of care    Signed:   Germania Ramos MD  1/13/2021  5:17 PM

## 2021-07-12 ENCOUNTER — HOSPITAL ENCOUNTER (INPATIENT)
Age: 66
LOS: 23 days | Discharge: HOME HEALTH | DRG: 291 | End: 2021-08-05
Attending: EMERGENCY MEDICINE | Admitting: INTERNAL MEDICINE
Payer: MEDICARE

## 2021-07-12 ENCOUNTER — APPOINTMENT (OUTPATIENT)
Dept: GENERAL RADIOLOGY | Age: 66
DRG: 291 | End: 2021-07-12
Attending: EMERGENCY MEDICINE
Payer: MEDICARE

## 2021-07-12 DIAGNOSIS — J18.9 PNEUMONIA DUE TO INFECTIOUS ORGANISM, UNSPECIFIED LATERALITY, UNSPECIFIED PART OF LUNG: ICD-10-CM

## 2021-07-12 DIAGNOSIS — E87.5 ACUTE HYPERKALEMIA: ICD-10-CM

## 2021-07-12 DIAGNOSIS — I50.9 ACUTE ON CHRONIC CONGESTIVE HEART FAILURE, UNSPECIFIED HEART FAILURE TYPE (HCC): ICD-10-CM

## 2021-07-12 DIAGNOSIS — I82.409 DEEP VEIN THROMBOSIS (DVT) OF LOWER EXTREMITY, UNSPECIFIED CHRONICITY, UNSPECIFIED LATERALITY, UNSPECIFIED VEIN (HCC): ICD-10-CM

## 2021-07-12 DIAGNOSIS — N17.9 ACUTE KIDNEY INJURY (HCC): ICD-10-CM

## 2021-07-12 DIAGNOSIS — J81.0 ACUTE PULMONARY EDEMA (HCC): Primary | ICD-10-CM

## 2021-07-12 DIAGNOSIS — A41.9 SEPTICEMIA (HCC): ICD-10-CM

## 2021-07-12 DIAGNOSIS — I73.9 PVD (PERIPHERAL VASCULAR DISEASE) (HCC): ICD-10-CM

## 2021-07-12 DIAGNOSIS — R33.9 INCOMPLETE EMPTYING OF BLADDER: ICD-10-CM

## 2021-07-12 DIAGNOSIS — I47.20 V-TACH: ICD-10-CM

## 2021-07-12 DIAGNOSIS — R77.8 ELEVATED TROPONIN: ICD-10-CM

## 2021-07-12 LAB
BASOPHILS # BLD: 0.1 K/UL (ref 0–0.1)
BASOPHILS NFR BLD: 1 % (ref 0–1)
DIFFERENTIAL METHOD BLD: ABNORMAL
EOSINOPHIL # BLD: 0 K/UL (ref 0–0.4)
EOSINOPHIL NFR BLD: 0 % (ref 0–7)
ERYTHROCYTE [DISTWIDTH] IN BLOOD BY AUTOMATED COUNT: 16.5 % (ref 11.5–14.5)
HCT VFR BLD AUTO: 25.1 % (ref 36.6–50.3)
HGB BLD-MCNC: 7.2 G/DL (ref 12.1–17)
IMM GRANULOCYTES # BLD AUTO: 0 K/UL (ref 0–0.04)
IMM GRANULOCYTES NFR BLD AUTO: 0 % (ref 0–0.5)
LACTATE SERPL-SCNC: 2.6 MMOL/L (ref 0.4–2)
LYMPHOCYTES # BLD: 0.2 K/UL (ref 0.8–3.5)
LYMPHOCYTES NFR BLD: 3 % (ref 12–49)
MCH RBC QN AUTO: 26 PG (ref 26–34)
MCHC RBC AUTO-ENTMCNC: 28.7 G/DL (ref 30–36.5)
MCV RBC AUTO: 90.6 FL (ref 80–99)
MONOCYTES # BLD: 0.3 K/UL (ref 0–1)
MONOCYTES NFR BLD: 5 % (ref 5–13)
NEUTS SEG # BLD: 6 K/UL (ref 1.8–8)
NEUTS SEG NFR BLD: 91 % (ref 32–75)
NRBC # BLD: 0 K/UL (ref 0–0.01)
NRBC BLD-RTO: 0 PER 100 WBC
PLATELET # BLD AUTO: 193 K/UL (ref 150–400)
PMV BLD AUTO: 10.4 FL (ref 8.9–12.9)
RBC # BLD AUTO: 2.77 M/UL (ref 4.1–5.7)
WBC # BLD AUTO: 6.6 K/UL (ref 4.1–11.1)

## 2021-07-12 PROCEDURE — 83605 ASSAY OF LACTIC ACID: CPT

## 2021-07-12 PROCEDURE — 83735 ASSAY OF MAGNESIUM: CPT

## 2021-07-12 PROCEDURE — 36415 COLL VENOUS BLD VENIPUNCTURE: CPT

## 2021-07-12 PROCEDURE — 71045 X-RAY EXAM CHEST 1 VIEW: CPT

## 2021-07-12 PROCEDURE — 99285 EMERGENCY DEPT VISIT HI MDM: CPT

## 2021-07-12 PROCEDURE — 87040 BLOOD CULTURE FOR BACTERIA: CPT

## 2021-07-12 PROCEDURE — 86901 BLOOD TYPING SEROLOGIC RH(D): CPT

## 2021-07-12 PROCEDURE — 80053 COMPREHEN METABOLIC PANEL: CPT

## 2021-07-12 PROCEDURE — 93005 ELECTROCARDIOGRAM TRACING: CPT

## 2021-07-12 PROCEDURE — 83036 HEMOGLOBIN GLYCOSYLATED A1C: CPT

## 2021-07-12 PROCEDURE — 85025 COMPLETE CBC W/AUTO DIFF WBC: CPT

## 2021-07-12 PROCEDURE — 84484 ASSAY OF TROPONIN QUANT: CPT

## 2021-07-12 PROCEDURE — 83880 ASSAY OF NATRIURETIC PEPTIDE: CPT

## 2021-07-12 NOTE — LETTER
[address] [address]  [phone]    Work/School Note    Date: 7/12/2021    To Whom It May concern:    Bee Coates was seen and treated today in the emergency room . Ismael Mónica is excused from work/school on 07/12/2021 and 07/13/2021.            Sincerely,  Dr Therese Calderón

## 2021-07-13 ENCOUNTER — APPOINTMENT (OUTPATIENT)
Dept: NON INVASIVE DIAGNOSTICS | Age: 66
DRG: 291 | End: 2021-07-13
Attending: INTERNAL MEDICINE
Payer: MEDICARE

## 2021-07-13 PROBLEM — J81.1 PULMONARY EDEMA: Status: ACTIVE | Noted: 2021-07-13

## 2021-07-13 LAB
ALBUMIN SERPL-MCNC: 3.7 G/DL (ref 3.5–5)
ALBUMIN/GLOB SERPL: 1.1 {RATIO} (ref 1.1–2.2)
ALP SERPL-CCNC: 76 U/L (ref 45–117)
ALT SERPL-CCNC: 19 U/L (ref 12–78)
ANION GAP SERPL CALC-SCNC: 11 MMOL/L (ref 5–15)
APPEARANCE UR: CLEAR
APTT PPP: 28.8 SEC (ref 21.2–34.1)
APTT PPP: 72.3 SEC (ref 21.2–34.1)
AST SERPL W P-5'-P-CCNC: 26 U/L (ref 15–37)
ATRIAL RATE: 95 BPM
BACTERIA URNS QL MICRO: NEGATIVE /HPF
BILIRUB SERPL-MCNC: 0.7 MG/DL (ref 0.2–1)
BILIRUB UR QL: NEGATIVE
BNP SERPL-MCNC: ABNORMAL PG/ML
BUN SERPL-MCNC: 59 MG/DL (ref 6–20)
BUN/CREAT SERPL: 10 (ref 12–20)
CA-I BLD-MCNC: 8.5 MG/DL (ref 8.5–10.1)
CALCULATED P AXIS, ECG09: 63 DEGREES
CALCULATED R AXIS, ECG10: 91 DEGREES
CALCULATED T AXIS, ECG11: 98 DEGREES
CHLORIDE SERPL-SCNC: 111 MMOL/L (ref 97–108)
CO2 SERPL-SCNC: 17 MMOL/L (ref 21–32)
COLOR UR: ABNORMAL
COVID-19 RAPID TEST, COVR: NOT DETECTED
CREAT SERPL-MCNC: 6.02 MG/DL (ref 0.7–1.3)
CREAT UR-MCNC: 51 MG/DL
CREAT UR-MCNC: 52 MG/DL
DIAGNOSIS, 93000: NORMAL
GLOBULIN SER CALC-MCNC: 3.5 G/DL (ref 2–4)
GLUCOSE SERPL-MCNC: 110 MG/DL (ref 65–100)
GLUCOSE UR STRIP.AUTO-MCNC: 50 MG/DL
HCT VFR BLD AUTO: 23.5 % (ref 36.6–50.3)
HCT VFR BLD AUTO: 24.8 % (ref 36.6–50.3)
HGB BLD-MCNC: 6.9 G/DL (ref 12.1–17)
HGB BLD-MCNC: 7.1 G/DL (ref 12.1–17)
HGB UR QL STRIP: NEGATIVE
KETONES UR QL STRIP.AUTO: NEGATIVE MG/DL
LACTATE SERPL-SCNC: 1.5 MMOL/L (ref 0.4–2)
LEUKOCYTE ESTERASE UR QL STRIP.AUTO: NEGATIVE
MAGNESIUM SERPL-MCNC: 2.8 MG/DL (ref 1.6–2.4)
MRSA DNA SPEC QL NAA+PROBE: NOT DETECTED
NITRITE UR QL STRIP.AUTO: NEGATIVE
P-R INTERVAL, ECG05: 188 MS
PH UR STRIP: 6 [PH] (ref 5–8)
POTASSIUM SERPL-SCNC: 5.7 MMOL/L (ref 3.5–5.1)
POTASSIUM UR-SCNC: 21 MMOL/L
PROT SERPL-MCNC: 7.2 G/DL (ref 6.4–8.2)
PROT UR STRIP-MCNC: >300 MG/DL
PROT UR-MCNC: 207 MG/DL (ref 0–11.9)
PROT UR-MCNC: 209 MG/DL (ref 0–11.9)
PROT/CREAT UR-RTO: 4
Q-T INTERVAL, ECG07: 386 MS
QRS DURATION, ECG06: 92 MS
QTC CALCULATION (BEZET), ECG08: 485 MS
RBC #/AREA URNS HPF: ABNORMAL /HPF (ref 0–5)
SODIUM SERPL-SCNC: 139 MMOL/L (ref 136–145)
SODIUM UR-SCNC: 93 MMOL/L
SP GR UR REFRACTOMETRY: 1.01 (ref 1–1.03)
SPECIMEN SOURCE: NORMAL
THERAPEUTIC RANGE,PTTT: ABNORMAL SEC (ref 82–109)
THERAPEUTIC RANGE,PTTT: NORMAL SEC (ref 82–109)
TROPONIN I SERPL-MCNC: 0.18 NG/ML
TROPONIN I SERPL-MCNC: 0.18 NG/ML
UA: UC IF INDICATED,UAUC: ABNORMAL
UROBILINOGEN UR QL STRIP.AUTO: 0.1 EU/DL (ref 0.1–1)
VENTRICULAR RATE, ECG03: 95 BPM
WBC URNS QL MICRO: ABNORMAL /HPF (ref 0–4)

## 2021-07-13 PROCEDURE — 5A09457 ASSISTANCE WITH RESPIRATORY VENTILATION, 24-96 CONSECUTIVE HOURS, CONTINUOUS POSITIVE AIRWAY PRESSURE: ICD-10-PCS | Performed by: INTERNAL MEDICINE

## 2021-07-13 PROCEDURE — 84300 ASSAY OF URINE SODIUM: CPT

## 2021-07-13 PROCEDURE — 94660 CPAP INITIATION&MGMT: CPT

## 2021-07-13 PROCEDURE — 74011250636 HC RX REV CODE- 250/636: Performed by: INTERNAL MEDICINE

## 2021-07-13 PROCEDURE — 74011000250 HC RX REV CODE- 250: Performed by: EMERGENCY MEDICINE

## 2021-07-13 PROCEDURE — 74011636637 HC RX REV CODE- 636/637: Performed by: EMERGENCY MEDICINE

## 2021-07-13 PROCEDURE — 81001 URINALYSIS AUTO W/SCOPE: CPT

## 2021-07-13 PROCEDURE — 96365 THER/PROPH/DIAG IV INF INIT: CPT

## 2021-07-13 PROCEDURE — 74011250637 HC RX REV CODE- 250/637: Performed by: EMERGENCY MEDICINE

## 2021-07-13 PROCEDURE — 87635 SARS-COV-2 COVID-19 AMP PRB: CPT

## 2021-07-13 PROCEDURE — 84156 ASSAY OF PROTEIN URINE: CPT

## 2021-07-13 PROCEDURE — 65270000029 HC RM PRIVATE

## 2021-07-13 PROCEDURE — 84484 ASSAY OF TROPONIN QUANT: CPT

## 2021-07-13 PROCEDURE — 74011250636 HC RX REV CODE- 250/636: Performed by: EMERGENCY MEDICINE

## 2021-07-13 PROCEDURE — 83605 ASSAY OF LACTIC ACID: CPT

## 2021-07-13 PROCEDURE — 51798 US URINE CAPACITY MEASURE: CPT

## 2021-07-13 PROCEDURE — 87641 MR-STAPH DNA AMP PROBE: CPT

## 2021-07-13 PROCEDURE — 85730 THROMBOPLASTIN TIME PARTIAL: CPT

## 2021-07-13 PROCEDURE — 85018 HEMOGLOBIN: CPT

## 2021-07-13 PROCEDURE — 84133 ASSAY OF URINE POTASSIUM: CPT

## 2021-07-13 PROCEDURE — 96375 TX/PRO/DX INJ NEW DRUG ADDON: CPT

## 2021-07-13 PROCEDURE — 82570 ASSAY OF URINE CREATININE: CPT

## 2021-07-13 PROCEDURE — 93971 EXTREMITY STUDY: CPT

## 2021-07-13 RX ORDER — CLONIDINE HYDROCHLORIDE 0.1 MG/1
0.2 TABLET ORAL
Status: COMPLETED | OUTPATIENT
Start: 2021-07-13 | End: 2021-07-13

## 2021-07-13 RX ORDER — LOSARTAN POTASSIUM 50 MG/1
100 TABLET ORAL DAILY
Status: DISCONTINUED | OUTPATIENT
Start: 2021-07-13 | End: 2021-07-13

## 2021-07-13 RX ORDER — HYDRALAZINE HYDROCHLORIDE 20 MG/ML
20 INJECTION INTRAMUSCULAR; INTRAVENOUS ONCE
Status: COMPLETED | OUTPATIENT
Start: 2021-07-13 | End: 2021-07-13

## 2021-07-13 RX ORDER — HEPARIN SODIUM 1000 [USP'U]/ML
40 INJECTION, SOLUTION INTRAVENOUS; SUBCUTANEOUS AS NEEDED
Status: DISCONTINUED | OUTPATIENT
Start: 2021-07-13 | End: 2021-07-23

## 2021-07-13 RX ORDER — HEPARIN SODIUM 1000 [USP'U]/ML
80 INJECTION, SOLUTION INTRAVENOUS; SUBCUTANEOUS AS NEEDED
Status: DISCONTINUED | OUTPATIENT
Start: 2021-07-13 | End: 2021-07-23

## 2021-07-13 RX ORDER — HEPARIN SODIUM 10000 [USP'U]/100ML
18-36 INJECTION, SOLUTION INTRAVENOUS
Status: DISCONTINUED | OUTPATIENT
Start: 2021-07-13 | End: 2021-07-23

## 2021-07-13 RX ORDER — CLONIDINE 0.3 MG/24H
1 PATCH, EXTENDED RELEASE TRANSDERMAL
Status: DISCONTINUED | OUTPATIENT
Start: 2021-07-13 | End: 2021-07-19

## 2021-07-13 RX ORDER — HYDRALAZINE HYDROCHLORIDE 20 MG/ML
40 INJECTION INTRAMUSCULAR; INTRAVENOUS
Status: DISCONTINUED | OUTPATIENT
Start: 2021-07-13 | End: 2021-08-05 | Stop reason: HOSPADM

## 2021-07-13 RX ORDER — FUROSEMIDE 10 MG/ML
100 INJECTION INTRAMUSCULAR; INTRAVENOUS EVERY 8 HOURS
Status: DISCONTINUED | OUTPATIENT
Start: 2021-07-13 | End: 2021-07-14

## 2021-07-13 RX ORDER — DEXTROSE 50 % IN WATER (D50W) INTRAVENOUS SYRINGE
25
Status: COMPLETED | OUTPATIENT
Start: 2021-07-13 | End: 2021-07-13

## 2021-07-13 RX ORDER — CLONIDINE HYDROCHLORIDE 0.1 MG/1
0.1 TABLET ORAL
Status: COMPLETED | OUTPATIENT
Start: 2021-07-13 | End: 2021-07-13

## 2021-07-13 RX ORDER — FUROSEMIDE 10 MG/ML
40 INJECTION INTRAMUSCULAR; INTRAVENOUS EVERY 12 HOURS
Status: DISCONTINUED | OUTPATIENT
Start: 2021-07-13 | End: 2021-07-13

## 2021-07-13 RX ORDER — ASPIRIN 325 MG
325 TABLET ORAL
Status: COMPLETED | OUTPATIENT
Start: 2021-07-13 | End: 2021-07-13

## 2021-07-13 RX ORDER — SODIUM BICARBONATE 1 MEQ/ML
50 SYRINGE (ML) INTRAVENOUS
Status: COMPLETED | OUTPATIENT
Start: 2021-07-13 | End: 2021-07-13

## 2021-07-13 RX ORDER — FUROSEMIDE 10 MG/ML
40 INJECTION INTRAMUSCULAR; INTRAVENOUS ONCE
Status: DISCONTINUED | OUTPATIENT
Start: 2021-07-13 | End: 2021-07-13

## 2021-07-13 RX ADMIN — CLONIDINE HYDROCHLORIDE 0.2 MG: 0.1 TABLET ORAL at 11:16

## 2021-07-13 RX ADMIN — FUROSEMIDE 100 MG: 10 INJECTION, SOLUTION INTRAMUSCULAR; INTRAVENOUS at 11:18

## 2021-07-13 RX ADMIN — HYDRALAZINE HYDROCHLORIDE 40 MG: 20 INJECTION INTRAMUSCULAR; INTRAVENOUS at 11:28

## 2021-07-13 RX ADMIN — CLONIDINE HYDROCHLORIDE 0.1 MG: 0.1 TABLET ORAL at 00:22

## 2021-07-13 RX ADMIN — HYDRALAZINE HYDROCHLORIDE 20 MG: 20 INJECTION INTRAMUSCULAR; INTRAVENOUS at 00:18

## 2021-07-13 RX ADMIN — DEXTROSE MONOHYDRATE 25 G: 25 INJECTION, SOLUTION INTRAVENOUS at 03:53

## 2021-07-13 RX ADMIN — INSULIN HUMAN 10 UNITS: 100 INJECTION, SOLUTION PARENTERAL at 03:55

## 2021-07-13 RX ADMIN — SODIUM BICARBONATE 50 MEQ: 84 INJECTION, SOLUTION INTRAVENOUS at 03:59

## 2021-07-13 RX ADMIN — VANCOMYCIN HYDROCHLORIDE 1500 MG: 10 INJECTION, POWDER, LYOPHILIZED, FOR SOLUTION INTRAVENOUS at 02:13

## 2021-07-13 RX ADMIN — HEPARIN SODIUM AND DEXTROSE 18 UNITS/KG/HR: 10000; 5 INJECTION INTRAVENOUS at 14:37

## 2021-07-13 RX ADMIN — CEFTRIAXONE 1 G: 1 INJECTION, POWDER, FOR SOLUTION INTRAMUSCULAR; INTRAVENOUS at 00:19

## 2021-07-13 RX ADMIN — FUROSEMIDE 100 MG: 10 INJECTION, SOLUTION INTRAMUSCULAR; INTRAVENOUS at 18:44

## 2021-07-13 RX ADMIN — ASPIRIN 325 MG ORAL TABLET 325 MG: 325 PILL ORAL at 00:22

## 2021-07-13 NOTE — H&P
History & Physical    Primary Care Provider: Kyle Sarabia MD  Source of Information: Patient      History of Presenting Illness:   Jacobo Uribe is a 76 y.o. male who presents with history of increased shortness of breath since last few days. History of increasing the weight. History of unable to lie down flat of the bed. History of waking up at night because of shortness of breath. No history of cough fever or chills. History of left upper limb swelling started recently. No history of chest pain. No history of nausea vomiting diarrhea abdominal pain or black stool. No history of increased frequency of micturition or painful micturition. No history of headache or dizziness. No history of loss of consciousness or seizures. No history of change in vision. No history of any joint pain or joint swelling. Review of Systems:  A comprehensive review of systems was negative except for that written in the History of Present Illness. Past Medical History:   Diagnosis Date    Chronic kidney disease     Hypertension       No past surgical history on file. Prior to Admission medications    Medication Sig Start Date End Date Taking? Authorizing Provider   hydrALAZINE (APRESOLINE) 50 mg tablet Take 1 Tab by mouth three (3) times daily. 1/13/21  Yes Kyle Sarabia MD   sodium bicarbonate 650 mg tablet Take 1 Tab by mouth two (2) times a day. 1/13/21  Yes Kyle Sarabia MD     No Known Allergies   No family history on file.    Social History     Social History Narrative    Not on file      SOCIAL HISTORY:    Patient resides:  Independently y   Assisted Living    SNF    With family care    Ambulates:   Independently y   w/cane    w/walker    w/wc    CODE STATUS:  DNR    Full y   Other      Objective:     Physical Exam: Patient is a 76years old -American man wearing a BiPAP alert awake oriented x3    Visit Vitals  BP (!) 179/118   Pulse 85   Temp 98.2 °F (36.8 °C)   Resp 16   Ht 6' (1.829 m)   Wt 81.6 kg (180 lb)   SpO2 100%   BMI 24.41 kg/m²    O2 Flow Rate (L/min): 2 l/min O2 Device: BIPAP    General:  Alert, cooperative, no distress, appears stated age. Head:  Normocephalic, without obvious abnormality, atraumatic. Eyes:  Conjunctivae/corneas clear. PERRL, EOMs intact. Nose: Nares normal. Septum midline. Mucosa normal. No drainage or sinus tenderness. Throat: Lips, mucosa, and tongue normal. Teeth and gums normal.   Neck: Supple, symmetrical, trachea midline, no adenopathy, thyroid: no enlargement/tenderness/nodules, no carotid bruit and no JVD. Back:   Symmetric, no curvature. ROM normal. No CVA tenderness. Lungs:   Clear to auscultation bilaterally. Decreased in bilaterally and lower zone   Chest wall:  No tenderness or deformity. Heart:  Regular rate and rhythm, S1, S2 normal, no murmur, click, rub or gallop. Abdomen:   Soft, non-tender. Bowel sounds normal. No masses,  No organomegaly. Extremities:  4+ edema no calf tenderness   Pulses: 2+ and symmetric both upper limbs unable to palpate on lower limbs because of swelling   Skin: Skin color, texture, turgor normal. No rashes or lesions   Neurologic: CNII-XII intact. No motor or sensory deficits. EKG:   As read by cardiologist      Data Review: Chest x-ray showed pulmonary edema    Recent Days:  Recent Labs     07/12/21  2240   WBC 6.6   HGB 7.2*   HCT 25.1*        Recent Labs     07/12/21  2240      K 5.7*   *   CO2 17*   *   BUN 59*   CREA 6.02*   CA 8.5   MG 2.8*   ALB 3.7   TBILI 0.7   ALT 19     No results for input(s): PH, PCO2, PO2, HCO3, FIO2 in the last 72 hours.     24 Hour Results:  Recent Results (from the past 24 hour(s))   LACTIC ACID    Collection Time: 07/12/21 10:40 PM   Result Value Ref Range    Lactic acid 2.6 (HH) 0.4 - 2.0 mmol/L   CBC WITH AUTOMATED DIFF    Collection Time: 07/12/21 10:40 PM   Result Value Ref Range    WBC 6.6 4.1 - 11.1 K/uL    RBC 2.77 (L) 4.10 - 5.70 M/uL    HGB 7.2 (L) 12.1 - 17.0 g/dL    HCT 25.1 (L) 36.6 - 50.3 %    MCV 90.6 80.0 - 99.0 FL    MCH 26.0 26.0 - 34.0 PG    MCHC 28.7 (L) 30.0 - 36.5 g/dL    RDW 16.5 (H) 11.5 - 14.5 %    PLATELET 858 618 - 019 K/uL    MPV 10.4 8.9 - 12.9 FL    NRBC 0.0 0.0  WBC    ABSOLUTE NRBC 0.00 0.00 - 0.01 K/uL    NEUTROPHILS 91 (H) 32 - 75 %    LYMPHOCYTES 3 (L) 12 - 49 %    MONOCYTES 5 5 - 13 %    EOSINOPHILS 0 0 - 7 %    BASOPHILS 1 0 - 1 %    IMMATURE GRANULOCYTES 0 0 - 0.5 %    ABS. NEUTROPHILS 6.0 1.8 - 8.0 K/UL    ABS. LYMPHOCYTES 0.2 (L) 0.8 - 3.5 K/UL    ABS. MONOCYTES 0.3 0.0 - 1.0 K/UL    ABS. EOSINOPHILS 0.0 0.0 - 0.4 K/UL    ABS. BASOPHILS 0.1 0.0 - 0.1 K/UL    ABS. IMM. GRANS. 0.0 0.00 - 0.04 K/UL    DF AUTOMATED     METABOLIC PANEL, COMPREHENSIVE    Collection Time: 07/12/21 10:40 PM   Result Value Ref Range    Sodium 139 136 - 145 mmol/L    Potassium 5.7 (H) 3.5 - 5.1 mmol/L    Chloride 111 (H) 97 - 108 mmol/L    CO2 17 (L) 21 - 32 mmol/L    Anion gap 11 5 - 15 mmol/L    Glucose 110 (H) 65 - 100 mg/dL    BUN 59 (H) 6 - 20 mg/dL    Creatinine 6.02 (H) 0.70 - 1.30 mg/dL    BUN/Creatinine ratio 10 (L) 12 - 20      GFR est AA 11 (L) >60 ml/min/1.73m2    GFR est non-AA 9 (L) >60 ml/min/1.73m2    Calcium 8.5 8.5 - 10.1 mg/dL    Bilirubin, total 0.7 0.2 - 1.0 mg/dL    AST (SGOT) 26 15 - 37 U/L    ALT (SGPT) 19 12 - 78 U/L    Alk.  phosphatase 76 45 - 117 U/L    Protein, total 7.2 6.4 - 8.2 g/dL    Albumin 3.7 3.5 - 5.0 g/dL    Globulin 3.5 2.0 - 4.0 g/dL    A-G Ratio 1.1 1.1 - 2.2     TROPONIN I    Collection Time: 07/12/21 10:40 PM   Result Value Ref Range    Troponin-I, Qt. 0.18 (H) <0.05 ng/mL   MAGNESIUM    Collection Time: 07/12/21 10:40 PM   Result Value Ref Range    Magnesium 2.8 (H) 1.6 - 2.4 mg/dL   BNP    Collection Time: 07/12/21 10:40 PM   Result Value Ref Range    NT pro-BNP >35,000 (H) <125 pg/mL   TYPE & SCREEN    Collection Time: 07/12/21 11:15 PM   Result Value Ref Range    Crossmatch Expiration 07/15/2021,2359     ABO/Rh(D) [place] Positive     Antibody screen Negative    LACTIC ACID    Collection Time: 07/13/21  3:00 AM   Result Value Ref Range    Lactic acid 1.5 0.4 - 2.0 mmol/L   TROPONIN I    Collection Time: 07/13/21  3:00 AM   Result Value Ref Range    Troponin-I, Qt. 0.18 (H) <0.05 ng/mL   COVID-19 RAPID TEST    Collection Time: 07/13/21  5:39 AM   Result Value Ref Range    Specimen source Nasopharyngeal      COVID-19 rapid test Not Detected Not Detected           Imaging: Chest x-ray reported pulmonary edema    Assessment:     Decompensated CHF  Left upper limb DVT  Hypertension  Acute hypoxemic respiratory failure  Acute on chronic renal failure  Anemia most likely secondary to chronic disease as indices are normal       Plan:     Patient is not septic elevated lactic acid level is because of renal failure as well as respiratory failure. Start on IV Lasix  Start on heparin drip  Cardiology evaluation  Pulmonary evaluation  Start him on IV Lasix as well as p.o. losartan  Requested RN to obtain list of home medications  Discussed with pulmonologist regarding patient's condition as well as heparin drip.   We will check patient's hemoglobin every 4 hours  Nephrology evaluation  Discussed with patient treatment plan in detail       Signed By: Abner Pressley MD     July 13, 2021

## 2021-07-13 NOTE — ED NOTES
2355- Pt suddenly in significantly increased respiratory distress while talking with writer. , RR 32, Sat 99 on 2L, /129 diaphoretic, with audible wheezing. Pt re positioned sitting straight up at 90 degrees and given duoneb by writer by VO from provider.     0003- Pt still hypertensive, hr 102, states breathing is easier    0005- Respiratory called for BiPap VO from Provider

## 2021-07-13 NOTE — ROUTINE PROCESS
TRANSFER - OUT REPORT:    Verbal report given to Jackie Claudio RN(name) on Vinnie Cousin  being transferred to 470(unit) for routine progression of care       Report consisted of patients Situation, Background, Assessment and   Recommendations(SBAR). Information from the following report(s) SBAR, ED Summary, [facility] and Recent Results was reviewed with the receiving nurse. Lines:   Peripheral IV 07/12/21 Distal;Posterior;Right Forearm (Active)        Opportunity for questions and clarification was provided.       Patient transported with:   Calvin RT

## 2021-07-13 NOTE — PROGRESS NOTES
Visit attempted for patient in the ED for [facility]. Staff were providing care for the patient during the visit. There were no family members. Povided silent support and prayer. Chaplains will follow up if further referrals are requested. Chaplain Chandra Nagy M.Div.    can be reached by calling the  at [facility]  [phone]

## 2021-07-13 NOTE — ED TRIAGE NOTES
Having sob since about 11am, wife is on home O2 and shared 2L which did not improve symptoms. PTA pt 100% on 2L NC. Significant edema to L hand which is new. Tight, hard edema in BLE whic his chronic.  Denies pain

## 2021-07-13 NOTE — CONSULTS
Consult    NAME: Stacy Preston   :  1955   MRN:  [id number]     Date/Time:  2021 1:52 PM    Patient PCP: Britt Wayne MD  ________________________________________________________________________      Subjective:   CHIEF COMPLAINT: Worsening of shortness of breath and leg edema. HISTORY OF PRESENT ILLNESS:   Patient presented with the significant shortness of breath and leg edema and  left upper extremity edema. He was significantly hypoxic and is found to be in hypoxic respiratory failure. He denied any chest tightness however. His troponin I is minimally elevated and cardiology consultation is invited. Past Medical History:   Diagnosis Date    Chronic kidney disease     Hypertension       No past surgical history on file. No Known Allergies   Meds:  See below  Social History     Tobacco Use    Smoking status: Never Smoker    Smokeless tobacco: Never Used   Substance Use Topics    Alcohol use: Not on file   Negative for smoking drinking or drugs. No family history on file. FAMILY HISTORY: Mother  at the age of 58 from brain cancer and father also  in his 46s from a cancer.     PERSONAL HISTORY : Patient is single has 2 children    REVIEW OF SYSTEMS:     []         Unable to obtain  ROS due to ---   [x]         Total of 12 systems reviewed as follows:    Constitutional: negative fever, negative chills, negative weight loss  Eyes:   negative visual changes  ENT:   negative sore throat, tongue or lip swelling  Respiratory:  negative cough, significant for dyspnea  Cards:  negative for chest pain, palpitations, positive for extremity edema  GI:   negative for nausea, vomiting, diarrhea, and abdominal pain  Genitourinary: negative for frequency, dysuria  Integument:  negative for rash   Hematologic:  negative for easy bruising and gum/nose bleeding  Musculoskel: negative for myalgias,  back pain, left upper extremity edema  Neurological:  negative for headaches, dizziness, vertigo, weakness  Behavl/Psych: negative for feelings of anxiety, depression     Pertinent Positives include :    Objective:      Physical Exam:    Last 24hrs VS reviewed since prior progress note. Most recent are:    Visit Vitals  BP (!) 179/118   Pulse 85   Temp 98.2 °F (36.8 °C)   Resp 16   Ht 6' (1.829 m)   Wt 81.6 kg (180 lb)   SpO2 100%   BMI 24.41 kg/m²       Intake/Output Summary (Last 24 hours) at 7/13/2021 1352  Last data filed at 7/13/2021 0023  Gross per 24 hour   Intake 10 ml   Output --   Net 10 ml        General Appearance: Well developed, well nourished, alert & oriented x 3,    Patient is in mild respiratory distress and on BiPAP  Ears/Nose/Mouth/Throat: Pupils equal and round, Hearing grossly normal.  Neck: Supple. JVP within normal limits. Carotids good upstrokes, with no bruit. Chest: Lungs clear to auscultation bilaterally. Cardiovascular: Regular rate and rhythm, S1S2 normal, no murmur, rubs, gallops. Abdomen: Soft, non-tender, bowel sounds are active. No organomegaly. Extremities:  edema bilaterally. Left upper extremity edema evident also  Skin: Warm and dry. Neuro: CN II-XII grossly intact, Strength and sensation grossly intact. []         Post-cath site without hematoma, bruit, tenderness, or thrill. Distal pulses intact. Data:      Telemetry: Sinus rhythm    EKG:  []  No new EKG for review. Prior to Admission medications    Medication Sig Start Date End Date Taking? Authorizing Provider   hydrALAZINE (APRESOLINE) 50 mg tablet Take 1 Tab by mouth three (3) times daily. 1/13/21  Yes Ethelene Soulier, MD   sodium bicarbonate 650 mg tablet Take 1 Tab by mouth two (2) times a day.  1/13/21  Yes Ethelene Soulier, MD       Recent Results (from the past 24 hour(s))   LACTIC ACID    Collection Time: 07/12/21 10:40 PM   Result Value Ref Range    Lactic acid 2.6 (HH) 0.4 - 2.0 mmol/L   CBC WITH AUTOMATED DIFF    Collection Time: 07/12/21 10:40 PM   Result Value Ref Range    WBC 6.6 4.1 - 11.1 K/uL    RBC 2.77 (L) 4.10 - 5.70 M/uL    HGB 7.2 (L) 12.1 - 17.0 g/dL    HCT 25.1 (L) 36.6 - 50.3 %    MCV 90.6 80.0 - 99.0 FL    MCH 26.0 26.0 - 34.0 PG    MCHC 28.7 (L) 30.0 - 36.5 g/dL    RDW 16.5 (H) 11.5 - 14.5 %    PLATELET 003 379 - 365 K/uL    MPV 10.4 8.9 - 12.9 FL    NRBC 0.0 0.0  WBC    ABSOLUTE NRBC 0.00 0.00 - 0.01 K/uL    NEUTROPHILS 91 (H) 32 - 75 %    LYMPHOCYTES 3 (L) 12 - 49 %    MONOCYTES 5 5 - 13 %    EOSINOPHILS 0 0 - 7 %    BASOPHILS 1 0 - 1 %    IMMATURE GRANULOCYTES 0 0 - 0.5 %    ABS. NEUTROPHILS 6.0 1.8 - 8.0 K/UL    ABS. LYMPHOCYTES 0.2 (L) 0.8 - 3.5 K/UL    ABS. MONOCYTES 0.3 0.0 - 1.0 K/UL    ABS. EOSINOPHILS 0.0 0.0 - 0.4 K/UL    ABS. BASOPHILS 0.1 0.0 - 0.1 K/UL    ABS. IMM. GRANS. 0.0 0.00 - 0.04 K/UL    DF AUTOMATED     METABOLIC PANEL, COMPREHENSIVE    Collection Time: 07/12/21 10:40 PM   Result Value Ref Range    Sodium 139 136 - 145 mmol/L    Potassium 5.7 (H) 3.5 - 5.1 mmol/L    Chloride 111 (H) 97 - 108 mmol/L    CO2 17 (L) 21 - 32 mmol/L    Anion gap 11 5 - 15 mmol/L    Glucose 110 (H) 65 - 100 mg/dL    BUN 59 (H) 6 - 20 mg/dL    Creatinine 6.02 (H) 0.70 - 1.30 mg/dL    BUN/Creatinine ratio 10 (L) 12 - 20      GFR est AA 11 (L) >60 ml/min/1.73m2    GFR est non-AA 9 (L) >60 ml/min/1.73m2    Calcium 8.5 8.5 - 10.1 mg/dL    Bilirubin, total 0.7 0.2 - 1.0 mg/dL    AST (SGOT) 26 15 - 37 U/L    ALT (SGPT) 19 12 - 78 U/L    Alk.  phosphatase 76 45 - 117 U/L    Protein, total 7.2 6.4 - 8.2 g/dL    Albumin 3.7 3.5 - 5.0 g/dL    Globulin 3.5 2.0 - 4.0 g/dL    A-G Ratio 1.1 1.1 - 2.2     TROPONIN I    Collection Time: 07/12/21 10:40 PM   Result Value Ref Range    Troponin-I, Qt. 0.18 (H) <0.05 ng/mL   MAGNESIUM    Collection Time: 07/12/21 10:40 PM   Result Value Ref Range    Magnesium 2.8 (H) 1.6 - 2.4 mg/dL   BNP    Collection Time: 07/12/21 10:40 PM   Result Value Ref Range    NT pro-BNP >35,000 (H) <125 pg/mL   TYPE & SCREEN    Collection Time: 07/12/21 11:15 PM   Result Value Ref Range    Crossmatch Expiration 07/15/2021,2359     ABO/Rh(D) O Positive     Antibody screen Negative    LACTIC ACID    Collection Time: 07/13/21  3:00 AM   Result Value Ref Range    Lactic acid 1.5 0.4 - 2.0 mmol/L   TROPONIN I    Collection Time: 07/13/21  3:00 AM   Result Value Ref Range    Troponin-I, Qt. 0.18 (H) <0.05 ng/mL   COVID-19 RAPID TEST    Collection Time: 07/13/21  5:39 AM   Result Value Ref Range    Specimen source Nasopharyngeal      COVID-19 rapid test Not Detected Not Detected     PTT    Collection Time: 07/13/21  1:12 PM   Result Value Ref Range    aPTT 28.8 21.2 - 34.1 sec    aPTT, therapeutic range   82 - 109 sec   HGB & HCT    Collection Time: 07/13/21  1:12 PM   Result Value Ref Range    HGB 6.9 (L) 12.1 - 17.0 g/dL    HCT 23.5 (L) 36.6 - 50.3 %         Assessment:   Acute hypoxic respiratory failure. Minimal elevation of troponin I is probably not myocardial infarction. Renal failure. Left upper extremity DVT. Severe exogenous obesity. Heart failure. Anemia. 1.         Plan:   I will check serial EKG and enzymes. Will follow nephrology and pulmonary recommendations. Otherwise continue present care. Thank you.   1.       []        High complexity decision making was performed    Ronda Espinosa MD

## 2021-07-13 NOTE — CONSULTS
[facility]al Consult Note      NAME:  Jenn Anderson   :   1955   MRN:  [id number]     Requesting Physician Olayinka Willett MD   Reason for Consult:  Acute kidney injury, CKD     PCP:  Olayinka Willett MD     Date/Time:  2021 4:08 PM          Subjective:     CHIEF COMPLAINT: dyspnea    HISTORY OF PRESENT ILLNESS:     Mr. Karime Mir is a 76 y.o.  male who is admitted to the medical Service with dyspnea and increasing lower extremity edema. We are asked to evaluate for acute kidney injury. Patient well known to our service. Last  office visit in 2020. At that time his serum creatinine was about 3.77 He was CKD stage IV at baseline. History of hypertension greater than 25 years. History of recurrent diverticular bleed. Significant DJD of the left knee but has not been taking nonsteroidals. Presents with a several-day increase in shortness of breath with inability to lay flat. This was accompanied by lower extremity edema. Was hypoxic on presentation to the emergency room. He has noted decreasing urinary output. No nonsteroidal anti-inflammatory use. Found to have a left axillary vein DVT. Patient received IV Lasix in the emergency room with vomiting cc of urine output. He is currently on BiPAP. He is mildly hyperkalemic and acidotic. Past Medical History:   Diagnosis Date    Chronic kidney disease     Hypertension       No past surgical history on file. Social History     Tobacco Use    Smoking status: Never Smoker    Smokeless tobacco: Never Used   Substance Use Topics    Alcohol use: Not on file        No family history on file. No Known Allergies     Prior to Admission medications    Medication Sig Start Date End Date Taking? Authorizing Provider   hydrALAZINE (APRESOLINE) 50 mg tablet Take 1 Tab by mouth three (3) times daily. 21  Yes Olayinka Willett MD   sodium bicarbonate 650 mg tablet Take 1 Tab by mouth two (2) times a day. 1/13/21  Yes Haim Telles MD   From office chart:  amlodipine-atorvastatin 10-10 mg tablet Take 1 tablet by mouth once a day    aspirin 81 mg tablet,delayed release   Take 1 tablet by mouth once a day    hydralazine 100 mg tablet   Take 1 tablet by mouth every eight hours    Lasix (furosemide) 40 mg tablet  Take 1 tablet by mouth every morning   lisinopril 40 mg tablet    Take 1 tablet by mouth once a day    metoprolol succinate 50 mg tablet   Take 1 tablet by mouth at bedtime    minoxidil 2.5 mg tablet   Take 2 1/2 tablet by mouth once a day  Rayaldee (calcifediol) 30 mcg capsul  Take 1 capsule by mouth once a day 01/16/2018  sodium bicarbonate 650 mg tablet  Take 1 tablet by mouth three times a day 10/31/2019          Current Facility-Administered Medications:     furosemide (LASIX) injection 100 mg, 100 mg, IntraVENous, Q8H, Adelaida Reynolds MD, 100 mg at 07/13/21 1118    hydrALAZINE (APRESOLINE) 20 mg/mL injection 40 mg, 40 mg, IntraVENous, Q6H PRN, Adelaida Reynolds MD, 40 mg at 07/13/21 1128    cloNIDine (CATAPRES) 0.3 mg/24 hr patch 1 Patch, 1 Patch, TransDERmal, Q7D, Adelaida Reynolds MD    heparin 25,000 units in D5W 250 ml infusion, 18-36 Units/kg/hr (Adjusted), IntraVENous, TITRATE, Haim Telles MD, Last Rate: 14.3 mL/hr at 07/13/21 1437, 18 Units/kg/hr at 07/13/21 1437    furosemide (LASIX) injection 40 mg, 40 mg, IntraVENous, Q12H, Haim Telles MD    losartan (COZAAR) tablet 100 mg, 100 mg, Oral, DAILY, Haim Telels MD    Current Outpatient Medications:     hydrALAZINE (APRESOLINE) 50 mg tablet, Take 1 Tab by mouth three (3) times daily. , Disp: 90 Tab, Rfl: 0    sodium bicarbonate 650 mg tablet, Take 1 Tab by mouth two (2) times a day., Disp: 60 Tab, Rfl: 0      Review of Systems:  Constitutional: positive for fatigue and malaise, negative for fevers, chills and sweats  Eyes: negative  Respiratory: positive for cough or dyspnea on exertion, negative for sputum, wheezing or stridor  Cardiovascular: positive for dyspnea, orthopnea, lower extremity edema, tachypnea, dyspnea on exertion, negative for chest pain, palpitations, syncope, claudication  Gastrointestinal: negative for dysphagia, nausea, vomiting, melena, diarrhea and abdominal pain  Genitourinary:positive for nocturia, hesitancy and decreased stream, negative for dysuria, urinary incontinence and hematuria  Hematologic/lymphatic: negative for bleeding  Musculoskeletal:negative for arthralgias and muscle weakness  Neurological: negative       Objective:      VITALS:    Vital signs reviewed; most recent are:    Visit Vitals  BP (!) 144/95   Pulse 64   Temp 98.2 °F (36.8 °C)   Resp 12   Ht 6' (1.829 m)   Wt 81.6 kg (180 lb)   SpO2 100%   BMI 24.41 kg/m²     SpO2 Readings from Last 6 Encounters:   07/13/21 100%   01/13/21 100%    O2 Flow Rate (L/min): 2 l/min       Intake/Output Summary (Last 24 hours) at 7/13/2021 1608  Last data filed at 7/13/2021 1545  Gross per 24 hour   Intake 10 ml   Output 500 ml   Net -490 ml            Exam:   Physical Exam:General appearance: cooperative, distracted, no distress, appears stated age, fatigued  Head: Normocephalic, without obvious abnormality, atraumatic  Neck: supple, symmetrical, trachea midline, no adenopathy and has JVD  Back: negative, symmetric, no curvature. ROM normal. No CVA tenderness. Lungs: bibastlar rales without wheezing  Heart: regular rate and rhythm, no S3 or S4, no rub  Abdomen: soft, non-tender.  Bowel sounds normal. No masses,  no organomegaly  Extremities: edema bilateral lower extremity and left arm  Neurologic: Grossly normal      LABS:  Recent Labs     07/12/21  2240      K 5.7*   *   CO2 17*   BUN 59*   CREA 6.02*   CA 8.5   ALB 3.7   MG 2.8*     Recent Labs     07/13/21  1312 07/12/21  2240   WBC  --  6.6   HGB 6.9* 7.2*   HCT 23.5* 25.1*   PLT  --  193     Lab Results   Component Value Date/Time    Glucose (POC) 126 (H) 01/13/2021 11:21 AM    Glucose (POC) 134 (H) 01/12/2021 07:41 PM     No results for input(s): PH, PCO2, PO2, HCO3, FIO2 in the last 72 hours. No results for input(s): INR, INREXT in the last 72 hours. No results for input(s): HÉCTOR, KU, CLU, CREAU in the last 72 hours. No lab exists for component: PROU     Doppler left upper extremity 7-13-21  · Acute appearing thrombus noted in the left axillary and brachial vein(s). Renal ultrasound in January 30, 2019:  Right kidney is 10.5 cm and left is 13.0. Multiple cysts bilaterally, largest of 6 cm on the left. They're all simple. The kidneys are symmetric in size. There is no evidence of hydronephrosis, nephrolithiasis, cough scar or mass. Equivocal echogenicity is borderline. The aorta and IVC appeared normal as visualized. There is no evidence of bladder mass. Impressions: finding of early medical renal disease. Cyst noted. Prostate enlargement noted. Assessment:   Renal Specific Problems  CKD stage IV at baseline  Acute azotemia-rule out obstruction  Volume overload  Hyperkalemia  Metabolic acidosis  Acute anemia  Acute left axillary/brachial vein DVT      Plan:     Obtain/ Order: labs/cultures/radiology/procedures:  urine lytes and urine creatinine and spot urine protein and creatinine ratio, renal panel, CBC, Fe studies- FE, TIBC, Ferritin, retic count, CPK, iPTH and vitamin D2 level and renal ultrasound with bladder postvoid residual      Therapeutic:    Bladder scan very important. If obstructed then Quesada catheter should be placed. Obstruction could explain all of his symptoms. His kidney function has generally been stable over the last several years. There is nothing in his recent history that would explain the acute insult except obstruction.     Transfusion as needed  Continue IV diuretics Lasix at 80 mg q.12 IV  Stop ARB for the time being  Continue oral bicarbonate    Heparin per Dr. Margoth Granados of deterioration: medium      Total time spent with patient: 30 Minutes Discussed:  ER RN           ___________________________________________________    Attending Physician: Ria Diehl MD

## 2021-07-13 NOTE — CONSULTS
Consult  Pulmonary, Critical Care    Name: Jewels Berg MRN: [id number]   : 1955 Hospital: [address] [place] [address]   Date: 2021  Admission date: 2021 Hospital Day: 2       Subjective/Interval History:   Seen in the emergency room wearing noninvasive ventilator. Patient has underlying renal insufficiency developed worsening shortness of breath cough presented to the emergency room chest x-ray shows pulmonary edema. He was profoundly hypoxic is now on noninvasive ventilator and feels more comfortable. Has not had any significant urine output since he has been in the ER. He also complains of new onset left arm swelling    Patient Active Problem List   Diagnosis Code    GI bleed K92.2    Pulmonary edema J81.1       IMPRESSION:   1. Acute hypoxic respiratory failure  2. Acute pulmonary edema  3. Bilateral pleural effusions  4. Acute on chronic kidney disease  5. Severe hypertension  6. New onset left arm edema  7. Anemia  8. Troponin leak likely due to renal insufficiency  9. Body mass index is 24.41 kg/m². 10.       RECOMMENDATIONS/PLAN:   1. Will give high-dose IV Lasix to see if diuresis is induced  2. We will check bladder scan to make sure no signs of obstructive uropathy  3. Continue noninvasive ventilator until significant fluid removal as she  4. Renal consultation pending may need dialysis  5. Continue to monitor and anemia         Subjective/Initial History:       I was asked by Esau Tello MD to see Jewels Berg a 76 y.o.  male  in consultation for a chief complaint of shortness of breath pulmonary edema acute hypoxic respiratory failure        Patient PCP: Linda Pérez MD  PMH:  has a past medical history of Chronic kidney disease and Hypertension. PSH:   has no past surgical history on file. FHX: family history is not on file. SHX:  reports that he has never smoked.  He has never used smokeless tobacco.    Systemic review somewhat limited as he is on noninvasive ventilator remains short of breath although states he is feeling better  General he has not noted any worsening edema of his upper legs fever chills or sweats does complain of new onset swelling of his left hand and arm  Eyes no double vision or momentary blindness  ENT no facial pain over the sinuses  Endocrinologic no polyuria polydipsia  Musculoskeletal no swollen tender joints  Neurologic no history seizures or syncope  Gastrointestinal no nausea vomiting acid indigestion  Genitourinary states he does still pass fair amount of urine each day has not noted any decrease in that. Does not have to strain to urinate has no bleeding or drainage  Cardiovascular he is not aware of any underlying heart disease has not had chest pain diaphoresis has had worsening shortness of breath laying down and with exertion  Respiratory worsening shortness of breath over the last week or more no significant cough no sputum production no chills or sweats did have history COVID-19 pneumonitis January of this year    No Known Allergies   MEDS:   Current Facility-Administered Medications   Medication    cloNIDine HCL (CATAPRES) tablet 0.2 mg    furosemide (LASIX) injection 100 mg     Current Outpatient Medications   Medication Sig    hydrALAZINE (APRESOLINE) 50 mg tablet Take 1 Tab by mouth three (3) times daily.  sodium bicarbonate 650 mg tablet Take 1 Tab by mouth two (2) times a day. Current Facility-Administered Medications:     cloNIDine HCL (CATAPRES) tablet 0.2 mg, 0.2 mg, Oral, NOW, Nick Valentin H, DO    furosemide (LASIX) injection 100 mg, 100 mg, IntraVENous, Q8H, Kyle Monroe MD    Current Outpatient Medications:     hydrALAZINE (APRESOLINE) 50 mg tablet, Take 1 Tab by mouth three (3) times daily. , Disp: 90 Tab, Rfl: 0    sodium bicarbonate 650 mg tablet, Take 1 Tab by mouth two (2) times a day., Disp: 60 Tab, Rfl: 0      Objective:     Vital Signs: Telemetry: normal sinus rhythm Intake/Output:   Visit Vitals  BP (!) 140/93   Pulse 62   Temp 98.2 °F (36.8 °C)   Resp 16   Ht 6' (1.829 m)   Wt 81.6 kg (180 lb)   SpO2 100%   BMI 24.41 kg/m²       Temp (24hrs), Av.2 °F (36.8 °C), Min:98.2 °F (36.8 °C), Max:98.2 °F (36.8 °C)        O2 Device: BIPAP O2 Flow Rate (L/min): 2 l/min         Body mass index is 24.41 kg/m². Wt Readings from Last 4 Encounters:   21 81.6 kg (180 lb)   21 79.4 kg (175 lb)          Intake/Output Summary (Last 24 hours) at 2021 1042  Last data filed at 2021 0023  Gross per 24 hour   Intake 10 ml   Output --   Net 10 ml       Last shift:      No intake/output data recorded. Last 3 shifts:  1901 -  0700  In: 10 [I.V.:10]  Out: -        Hemodynamics:    CO:    CI:    CVP:    SVR:   PAP Systolic:    PAP Diastolic:    PVR:    DG60:       Ventilator Settings:      Mode Rate TV Press PEEP FiO2 PIP Min. Vent               28 %     11 l/min      Physical Exam:    General:  male; currently on noninvasive ventilator has mild distress  HEENT: NCAT, visible oral mucosa is moist and clear  Eyes: anicteric; conjunctiva clear extraocular movements intact  Neck: no nodes,, mild accessory MM use. Mild respiratory distress  Chest: no deformity,   Cardiac: Regular rate and rhythm  Lungs: distant breath sounds; diffuse rales laterally and posteriorly no wheezing  Abd: Soft positive bowel sounds no tenderness  Ext: Extensive edema pitting and lymphedema of the lower extremities with new onset significant edema of the left arm; no joint swelling;  No clubbing  : Scant clear urine  Neuro: Anxious awake currently on noninvasive ventilator does speak moves all four extremities  Psych-anxious, oriented to person;   Skin: warm, dry, no cyanosis;   Pulses: Brachial and radial pulses intact  Capillary:slow capillary refill      Labs:    Recent Labs     21  2240   WBC 6.6   HGB 7.2*        Recent Labs 07/13/21  0300 07/12/21  2240   NA  --  139   K  --  5.7*   CL  --  111*   CO2  --  17*   GLU  --  110*   BUN  --  59*   CREA  --  6.02*   CA  --  8.5   MG  --  2.8*   LAC 1.5 2.6*   ALB  --  3.7   ALT  --  19     currently on noninvasive ventilator inspiratory pressure 16 expiratory pressure six rate 16 FiO2 28% with oxygen saturation 96%   Recent Labs     07/13/21  0300 07/12/21 2240   TROIQ 0.18* 0.18*     BNP greater than 35,000  Lab Results   Component Value Date/Time    Culture result: No growth 6 days 01/05/2021 06:30 PM        Imaging:  I have personally reviewed the patients radiographs and have reviewed the reports:    CXR Results  (Last 48 hours)               07/12/21 2251  XR CHEST PORT Final result    Impression:  Findings/impression:       Cardiac silhouette is enlarged. Central vascular congestion and patchy central   airspace disease/edema. Suspect trace right pleural effusion. No evidence of pneumothorax. No acute osseous abnormality identified. Narrative:  Study: XR CHEST PORT       Clinical indication: sob       Comparison: None. To me chest x-ray consistent with cardiomegaly pulmonary edema and bilateral pleural effusions           Results from Hospital Encounter encounter on 07/12/21    XR CHEST PORT    Narrative  Study: XR CHEST PORT    Clinical indication: sob    Comparison: None. Impression  Findings/impression:    Cardiac silhouette is enlarged. Central vascular congestion and patchy central  airspace disease/edema. Suspect trace right pleural effusion. No evidence of pneumothorax. No acute osseous abnormality identified. Results from [place] encounter on 01/05/21    XR CHEST PORT    Narrative  Portable AP view at 1816 hours. Comparison 23 November 2008. Cardiac silhouette size is borderline enlarged. Arcuate lucency adjacent to the left side of the aortic knob margin could  reflect a tiny pneumomediastinum.   Mild pulmonary vascular congestion, without overt edema. Patchy densities in the lower lungs could be minimal infiltrate. Consider  follow-up if warranted clinically. Impression  IMPRESSION: Subtle but potentially clinically significant findings as above. Suggest radiographic follow-up. [facility] from [facility] 01[facility]21    CT CHEST WO CONT    Narrative  Images obtained without contrast include the abdomen and pelvis. Comparison portable chest radiograph earlier today. Dose Reduction:  All CT scans at this facility are performed using dose reduction optimization  techniques as appropriate to a performed exam including the following: Automated  exposure control, adjustments of the mA and/or kV according to patient size, or  use of iterative reconstruction technique. Subtle peripheral groundglass densities are noted in both lungs, could reflect  Covid pneumonia or other. No pneumothorax or pneumomediastinum. The radiographic findings suspicious for  pneumomediastinum probably was artifact, perhaps related to cardiac motion. No pleural effusion or adenopathy. Cardiomegaly again noted. Impression  IMPRESSION:  1. No pneumomediastinum or pneumothorax. 2. Cardiomegaly. 3. Subtle groundglass densities in both lungs might be pneumonia or other. Discussion patient with worsening renal insufficiency has developed pulmonary edema acute hypoxic respiratory failure. He states he still has urine output normally at home. We will give him high-dose IV Lasix to see if diuresis is induced. He very likely will need dialysis. He has minimal elevation in troponin probably troponin leak from hypoxia or just due to his renal insufficiency. He is not having any chest pain. Does have severe hypertension. Has been started on clonidine and hydralazine. Time of care 50 minutes       Thank you for allowing us to participate in the care of this patient.   We will follow along with you     Riana Browne MD

## 2021-07-14 ENCOUNTER — APPOINTMENT (OUTPATIENT)
Dept: GENERAL RADIOLOGY | Age: 66
DRG: 291 | End: 2021-07-14
Attending: INTERNAL MEDICINE
Payer: MEDICARE

## 2021-07-14 ENCOUNTER — APPOINTMENT (OUTPATIENT)
Dept: ULTRASOUND IMAGING | Age: 66
DRG: 291 | End: 2021-07-14
Attending: INTERNAL MEDICINE
Payer: MEDICARE

## 2021-07-14 LAB
25(OH)D3 SERPL-MCNC: 11.1 NG/ML (ref 30–100)
ALBUMIN SERPL-MCNC: 3.2 G/DL (ref 3.5–5)
ALBUMIN SERPL-MCNC: 3.2 G/DL (ref 3.5–5)
ALBUMIN/GLOB SERPL: 1.1 {RATIO} (ref 1.1–2.2)
ALP SERPL-CCNC: 64 U/L (ref 45–117)
ALT SERPL-CCNC: 14 U/L (ref 12–78)
ANION GAP SERPL CALC-SCNC: 12 MMOL/L (ref 5–15)
ANION GAP SERPL CALC-SCNC: 12 MMOL/L (ref 5–15)
APTT PPP: 54.9 SEC (ref 21.2–34.1)
APTT PPP: >153 SEC (ref 21.2–34.1)
AST SERPL W P-5'-P-CCNC: 12 U/L (ref 15–37)
BACTERIA SPEC CULT: NORMAL
BASOPHILS # BLD: 0.1 K/UL (ref 0–0.1)
BASOPHILS NFR BLD: 1 % (ref 0–1)
BILIRUB SERPL-MCNC: 0.5 MG/DL (ref 0.2–1)
BUN SERPL-MCNC: 71 MG/DL (ref 6–20)
BUN SERPL-MCNC: 72 MG/DL (ref 6–20)
BUN/CREAT SERPL: 11 (ref 12–20)
BUN/CREAT SERPL: 11 (ref 12–20)
CA-I BLD-MCNC: 8.2 MG/DL (ref 8.5–10.1)
CA-I BLD-MCNC: 8.2 MG/DL (ref 8.5–10.1)
CA-I BLD-MCNC: 8.3 MG/DL (ref 8.5–10.1)
CHLORIDE SERPL-SCNC: 113 MMOL/L (ref 97–108)
CHLORIDE SERPL-SCNC: 114 MMOL/L (ref 97–108)
CK SERPL-CCNC: 159 U/L (ref 39–308)
CO2 SERPL-SCNC: 16 MMOL/L (ref 21–32)
CO2 SERPL-SCNC: 17 MMOL/L (ref 21–32)
CREAT SERPL-MCNC: 6.36 MG/DL (ref 0.7–1.3)
CREAT SERPL-MCNC: 6.41 MG/DL (ref 0.7–1.3)
DIFFERENTIAL METHOD BLD: ABNORMAL
EOSINOPHIL # BLD: 0.2 K/UL (ref 0–0.4)
EOSINOPHIL NFR BLD: 3 % (ref 0–7)
ERYTHROCYTE [DISTWIDTH] IN BLOOD BY AUTOMATED COUNT: 16.6 % (ref 11.5–14.5)
GLOBULIN SER CALC-MCNC: 3 G/DL (ref 2–4)
GLUCOSE SERPL-MCNC: 91 MG/DL (ref 65–100)
GLUCOSE SERPL-MCNC: 91 MG/DL (ref 65–100)
HCT VFR BLD AUTO: 23.5 % (ref 36.6–50.3)
HGB BLD-MCNC: 6.7 G/DL (ref 12.1–17)
IMM GRANULOCYTES # BLD AUTO: 0 K/UL (ref 0–0.04)
IMM GRANULOCYTES NFR BLD AUTO: 0 % (ref 0–0.5)
IRON SATN MFR SERPL: 5 % (ref 20–50)
IRON SERPL-MCNC: 18 UG/DL (ref 35–150)
LYMPHOCYTES # BLD: 0.2 K/UL (ref 0.8–3.5)
LYMPHOCYTES NFR BLD: 5 % (ref 12–49)
MAGNESIUM SERPL-MCNC: 2.9 MG/DL (ref 1.6–2.4)
MCH RBC QN AUTO: 25.8 PG (ref 26–34)
MCHC RBC AUTO-ENTMCNC: 28.5 G/DL (ref 30–36.5)
MCV RBC AUTO: 90.4 FL (ref 80–99)
MONOCYTES # BLD: 0.6 K/UL (ref 0–1)
MONOCYTES NFR BLD: 11 % (ref 5–13)
NEUTS SEG # BLD: 3.9 K/UL (ref 1.8–8)
NEUTS SEG NFR BLD: 80 % (ref 32–75)
NRBC # BLD: 0 K/UL (ref 0–0.01)
NRBC BLD-RTO: 0 PER 100 WBC
PHOSPHATE SERPL-MCNC: 8.5 MG/DL (ref 2.6–4.7)
PLATELET # BLD AUTO: 173 K/UL (ref 150–400)
PMV BLD AUTO: 10.4 FL (ref 8.9–12.9)
POTASSIUM SERPL-SCNC: 6 MMOL/L (ref 3.5–5.1)
POTASSIUM SERPL-SCNC: 6 MMOL/L (ref 3.5–5.1)
PROT SERPL-MCNC: 6.2 G/DL (ref 6.4–8.2)
PTH-INTACT SERPL-MCNC: 450.3 PG/ML (ref 18.4–88)
RBC # BLD AUTO: 2.6 M/UL (ref 4.1–5.7)
SODIUM SERPL-SCNC: 142 MMOL/L (ref 136–145)
SODIUM SERPL-SCNC: 142 MMOL/L (ref 136–145)
SPECIAL REQUESTS,SREQ: NORMAL
THERAPEUTIC RANGE,PTTT: ABNORMAL SEC (ref 82–109)
THERAPEUTIC RANGE,PTTT: ABNORMAL SEC (ref 82–109)
TIBC SERPL-MCNC: 354 UG/DL (ref 250–450)
TROPONIN I SERPL-MCNC: 0.17 NG/ML
VANCOMYCIN SERPL-MCNC: 12.3 UG/ML
WBC # BLD AUTO: 4.9 K/UL (ref 4.1–11.1)

## 2021-07-14 PROCEDURE — 83540 ASSAY OF IRON: CPT

## 2021-07-14 PROCEDURE — 74011250636 HC RX REV CODE- 250/636: Performed by: INTERNAL MEDICINE

## 2021-07-14 PROCEDURE — 74011250637 HC RX REV CODE- 250/637: Performed by: INTERNAL MEDICINE

## 2021-07-14 PROCEDURE — 76770 US EXAM ABDO BACK WALL COMP: CPT

## 2021-07-14 PROCEDURE — 82550 ASSAY OF CK (CPK): CPT

## 2021-07-14 PROCEDURE — 83970 ASSAY OF PARATHORMONE: CPT

## 2021-07-14 PROCEDURE — 94660 CPAP INITIATION&MGMT: CPT

## 2021-07-14 PROCEDURE — 36415 COLL VENOUS BLD VENIPUNCTURE: CPT

## 2021-07-14 PROCEDURE — 77010033678 HC OXYGEN DAILY

## 2021-07-14 PROCEDURE — 85025 COMPLETE CBC W/AUTO DIFF WBC: CPT

## 2021-07-14 PROCEDURE — 71045 X-RAY EXAM CHEST 1 VIEW: CPT

## 2021-07-14 PROCEDURE — 80202 ASSAY OF VANCOMYCIN: CPT

## 2021-07-14 PROCEDURE — 83735 ASSAY OF MAGNESIUM: CPT

## 2021-07-14 PROCEDURE — 65270000029 HC RM PRIVATE

## 2021-07-14 PROCEDURE — 82306 VITAMIN D 25 HYDROXY: CPT

## 2021-07-14 PROCEDURE — 85730 THROMBOPLASTIN TIME PARTIAL: CPT

## 2021-07-14 PROCEDURE — 80069 RENAL FUNCTION PANEL: CPT

## 2021-07-14 PROCEDURE — 82272 OCCULT BLD FECES 1-3 TESTS: CPT

## 2021-07-14 PROCEDURE — 0T9B70Z DRAINAGE OF BLADDER WITH DRAINAGE DEVICE, VIA NATURAL OR ARTIFICIAL OPENING: ICD-10-PCS | Performed by: INTERNAL MEDICINE

## 2021-07-14 PROCEDURE — 84484 ASSAY OF TROPONIN QUANT: CPT

## 2021-07-14 PROCEDURE — 30233N1 TRANSFUSION OF NONAUTOLOGOUS RED BLOOD CELLS INTO PERIPHERAL VEIN, PERCUTANEOUS APPROACH: ICD-10-PCS | Performed by: INTERNAL MEDICINE

## 2021-07-14 PROCEDURE — P9016 RBC LEUKOCYTES REDUCED: HCPCS

## 2021-07-14 PROCEDURE — 80053 COMPREHEN METABOLIC PANEL: CPT

## 2021-07-14 PROCEDURE — 36430 TRANSFUSION BLD/BLD COMPNT: CPT

## 2021-07-14 RX ORDER — SODIUM POLYSTYRENE SULFONATE 15 G/60ML
30 SUSPENSION ORAL; RECTAL
Status: COMPLETED | OUTPATIENT
Start: 2021-07-14 | End: 2021-07-14

## 2021-07-14 RX ORDER — HYDRALAZINE HYDROCHLORIDE 50 MG/1
50 TABLET, FILM COATED ORAL 3 TIMES DAILY
Status: DISCONTINUED | OUTPATIENT
Start: 2021-07-14 | End: 2021-08-05 | Stop reason: HOSPADM

## 2021-07-14 RX ORDER — SEVELAMER CARBONATE 800 MG/1
800 TABLET, FILM COATED ORAL
Status: DISCONTINUED | OUTPATIENT
Start: 2021-07-14 | End: 2021-07-22

## 2021-07-14 RX ORDER — FUROSEMIDE 10 MG/ML
20 INJECTION INTRAMUSCULAR; INTRAVENOUS
Status: ACTIVE | OUTPATIENT
Start: 2021-07-14 | End: 2021-07-15

## 2021-07-14 RX ORDER — FUROSEMIDE 10 MG/ML
20 INJECTION INTRAMUSCULAR; INTRAVENOUS
Status: COMPLETED | OUTPATIENT
Start: 2021-07-14 | End: 2021-07-14

## 2021-07-14 RX ORDER — TAMSULOSIN HYDROCHLORIDE 0.4 MG/1
0.4 CAPSULE ORAL DAILY
Status: DISCONTINUED | OUTPATIENT
Start: 2021-07-14 | End: 2021-08-05 | Stop reason: HOSPADM

## 2021-07-14 RX ORDER — ERGOCALCIFEROL 1.25 MG/1
50000 CAPSULE ORAL
Status: COMPLETED | OUTPATIENT
Start: 2021-07-14 | End: 2021-08-04

## 2021-07-14 RX ORDER — FUROSEMIDE 10 MG/ML
80 INJECTION INTRAMUSCULAR; INTRAVENOUS EVERY 12 HOURS
Status: DISCONTINUED | OUTPATIENT
Start: 2021-07-14 | End: 2021-07-18

## 2021-07-14 RX ORDER — SODIUM CHLORIDE 9 MG/ML
250 INJECTION, SOLUTION INTRAVENOUS AS NEEDED
Status: DISCONTINUED | OUTPATIENT
Start: 2021-07-14 | End: 2021-08-05 | Stop reason: HOSPADM

## 2021-07-14 RX ORDER — CALCITRIOL 0.25 UG/1
0.25 CAPSULE ORAL DAILY
Status: DISCONTINUED | OUTPATIENT
Start: 2021-07-14 | End: 2021-08-05 | Stop reason: HOSPADM

## 2021-07-14 RX ORDER — SODIUM BICARBONATE 650 MG/1
650 TABLET ORAL 3 TIMES DAILY
Status: DISCONTINUED | OUTPATIENT
Start: 2021-07-14 | End: 2021-07-26

## 2021-07-14 RX ADMIN — HEPARIN SODIUM AND DEXTROSE 20 UNITS/KG/HR: 10000; 5 INJECTION INTRAVENOUS at 08:11

## 2021-07-14 RX ADMIN — CALCITRIOL CAPSULES 0.25 MCG 0.25 MCG: 0.25 CAPSULE ORAL at 18:41

## 2021-07-14 RX ADMIN — ERGOCALCIFEROL 50000 UNITS: 1.25 CAPSULE ORAL at 18:41

## 2021-07-14 RX ADMIN — SODIUM BICARBONATE 650 MG: 650 TABLET ORAL at 22:27

## 2021-07-14 RX ADMIN — HYDRALAZINE HYDROCHLORIDE 50 MG: 50 TABLET, FILM COATED ORAL at 22:27

## 2021-07-14 RX ADMIN — HEPARIN SODIUM 3170 UNITS: 1000 INJECTION INTRAVENOUS; SUBCUTANEOUS at 00:08

## 2021-07-14 RX ADMIN — SODIUM ZIRCONIUM CYCLOSILICATE 10 G: 10 POWDER, FOR SUSPENSION ORAL at 19:40

## 2021-07-14 RX ADMIN — FUROSEMIDE 20 MG: 10 INJECTION, SOLUTION INTRAMUSCULAR; INTRAVENOUS at 15:41

## 2021-07-14 RX ADMIN — SODIUM BICARBONATE 650 MG: 650 TABLET ORAL at 15:41

## 2021-07-14 RX ADMIN — FUROSEMIDE 100 MG: 10 INJECTION, SOLUTION INTRAMUSCULAR; INTRAVENOUS at 04:39

## 2021-07-14 RX ADMIN — HYDRALAZINE HYDROCHLORIDE 50 MG: 50 TABLET, FILM COATED ORAL at 10:16

## 2021-07-14 RX ADMIN — HEPARIN SODIUM AND DEXTROSE 17 UNITS/KG/HR: 10000; 5 INJECTION INTRAVENOUS at 14:26

## 2021-07-14 RX ADMIN — SEVELAMER CARBONATE 800 MG: 800 TABLET, FILM COATED ORAL at 18:41

## 2021-07-14 RX ADMIN — VANCOMYCIN HYDROCHLORIDE 750 MG: 750 INJECTION, POWDER, LYOPHILIZED, FOR SOLUTION INTRAVENOUS at 22:32

## 2021-07-14 RX ADMIN — TAMSULOSIN HYDROCHLORIDE 0.4 MG: 0.4 CAPSULE ORAL at 10:16

## 2021-07-14 RX ADMIN — HYDRALAZINE HYDROCHLORIDE 50 MG: 50 TABLET, FILM COATED ORAL at 16:41

## 2021-07-14 RX ADMIN — SODIUM POLYSTYRENE SULFONATE 30 G: 15 SUSPENSION ORAL; RECTAL at 12:03

## 2021-07-14 RX ADMIN — FUROSEMIDE 80 MG: 10 INJECTION, SOLUTION INTRAMUSCULAR; INTRAVENOUS at 22:31

## 2021-07-14 RX ADMIN — SODIUM BICARBONATE 650 MG: 650 TABLET ORAL at 10:16

## 2021-07-14 RX ADMIN — IRON SUCROSE 200 MG: 20 INJECTION, SOLUTION INTRAVENOUS at 19:40

## 2021-07-14 RX ADMIN — HEPARIN SODIUM AND DEXTROSE 17 UNITS/KG/HR: 10000; 5 INJECTION INTRAVENOUS at 09:59

## 2021-07-14 NOTE — PROGRESS NOTES
Progress Note      7/14/2021 9:54 AM  NAME: Marshall Castorena   MRN:  [id number]   Admit Diagnosis: Pulmonary edema [J81.1]      Subjective:   Chart reviewed. Patient feels better. Shortness of breath is somewhat better. Review of Systems:    Symptom Y/N Comments  Symptom Y/N Comments   Fever/Chills n   Chest Pain n    Poor Appetite    Edema     Cough    Abdominal Pain     Sputum    Joint Pain n    SOB/CARMONA y   Pruritis/Rash     Nausea/vomit    Other     Diarrhea         Constipation           Could NOT obtain due to:      Objective:          Physical Exam:    Last 24hrs VS reviewed since prior progress note. Most recent are:    Visit Vitals  BP (!) 167/106 (BP 1 Location: Right lower arm, BP Patient Position: Supine)   Pulse 81   Temp 97.7 °F (36.5 °C)   Resp 20   Ht 6' (1.829 m)   Wt 81.6 kg (180 lb)   SpO2 97%   BMI 24.41 kg/m²       Intake/Output Summary (Last 24 hours) at 7/14/2021 0954  Last data filed at 7/13/2021 1545  Gross per 24 hour   Intake --   Output 500 ml   Net -500 ml        General Appearance: Well developed, well nourished, alert & oriented x 3,    no acute distress. Ears/Nose/Mouth/Throat: Hearing grossly normal.  Neck: Supple. Chest: Lungs clear to auscultation bilaterally. Cardiovascular: Regular rate and rhythm, S1,S2 normal, no murmur. Abdomen: Soft, non-tender, bowel sounds are active. Extremities: No edema bilaterally. Skin: Warm and dry. []         Post-cath site without hematoma, bruit, tenderness, or thrill. Distal pulses intact. PMH/SH reviewed - no change compared to H&P    Data Review    Telemetry: normal sinus rhythm     EKG:   []  No new EKG for review    Lab Data Personally Reviewed:    Recent Labs     07/14/21  0720 07/13/21 2003 07/13/21  1312 07/12/21  2240   WBC 4.9  --   --  6.6   HGB 6.7* 7.1*   < > 7.2*   HCT 23.5* 24.8*   < > 25.1*     --   --  193    < > = values in this interval not displayed.      Recent Labs     07/14/21  0720 07/13/21 2003 07/13/21  1312   APTT >153.0* 72.3* 28.8      Recent Labs     07/14/21 0720 07/12/21  2240     142 139   K 6.0*  6.0* 5.7*   *  113* 111*   CO2 16*  17* 17*   BUN 72*  71* 59*   CREA 6.41*  6.36* 6.02*   GLU 91  91 110*   CA 8.2*  8.2* 8.5   MG 2.9* 2.8*     Recent Labs     07/14/21 0720 07/13/21 0300 07/12/21  2240     --   --    TROIQ 0.17* 0.18* 0.18*     No results found for: CHOL, CHOLX, CHLST, CHOLV, HDL, HDLP, LDL, LDLC, DLDLP, TGLX, TRIGL, TRIGP, CHHD, CHHDX    Recent Labs     07/14/21 0720 07/12/21  2240   AP 64 76   TP 6.2* 7.2   ALB 3.2*  3.2* 3.7   GLOB 3.0 3.5     No results for input(s): PH, PCO2, PO2 in the last 72 hours. Medications Personally Reviewed:    Current Facility-Administered Medications   Medication Dose Route Frequency    furosemide (LASIX) injection 80 mg  80 mg IntraVENous Q12H    sodium polystyrene (KAYEXALATE) 15 gram/60 mL oral suspension 30 g  30 g Oral NOW    tamsulosin (FLOMAX) capsule 0.4 mg  0.4 mg Oral DAILY    hydrALAZINE (APRESOLINE) tablet 50 mg  50 mg Oral TID    sodium bicarbonate tablet 650 mg  650 mg Oral TID    0.9% sodium chloride infusion 250 mL  250 mL IntraVENous PRN    hydrALAZINE (APRESOLINE) 20 mg/mL injection 40 mg  40 mg IntraVENous Q6H PRN    cloNIDine (CATAPRES) 0.3 mg/24 hr patch 1 Patch  1 Patch TransDERmal Q7D    heparin 25,000 units in D5W 250 ml infusion  18-36 Units/kg/hr (Adjusted) IntraVENous TITRATE    heparin (porcine) 1,000 unit/mL injection 6,340 Units  80 Units/kg (Adjusted) IntraVENous PRN    Or    heparin (porcine) 1,000 unit/mL injection 3,170 Units  40 Units/kg (Adjusted) IntraVENous PRN           Problem List:   Acute hypoxic respiratory failure and patient seems to be somewhat better. Severe anemia. Renal failure. Heart failure. Left upper extremity DVT. Minimal elevation of troponin I is probably not a presentation of myocardial infarction.   1. Assessment/Plan:   Discussed with the pulmonologist.  Nallely Pina with the transfusion. I will continue otherwise present care. Thank you. 1.          []       High complexity decision making was performed in this patient at high risk for decompensation with multiple organ involvement.     Solomon Guillen MD

## 2021-07-14 NOTE — PROGRESS NOTES
Spoke to TREVOR Herman via telephone. Informed him that bleeding from penis had stopped. Blood transfusion started. Patient tolerating well and in no acute distress. Per Dr. Vito Vázquez, no need to consult Dr. Chaparro Beaver at this time since the bleeding had stopped. Will continue to monitor. Dr. Eldon Herman stated he is on his way to see the patient now.

## 2021-07-14 NOTE — PROGRESS NOTES
This RN attempted to start new IV line x2 in right forearm without success. Patient tolerated well. Unable to palpate or visualize other possible venous access. This RN paged PICC team for consult. Unable to start blood transfusion at this time.

## 2021-07-14 NOTE — PROGRESS NOTES
Informed Dr. Atif Sandoval of positive blood cultures. Ordered for vancomycin per pharmacy dosing protocol.      Spoke to Carlos in the Pharmacy, informed of consult

## 2021-07-14 NOTE — PROGRESS NOTES
Spoke to Dr. Marcus Park via telephone. Informed him that we were unable to start IV line and requested PICC team to insert an additional access line for heparin/blood transfusions. This RN informed him that the Patient's penis was still actively bleeding. Dr. Wilbert Cleaning instructed to stop heparin drip and reevaluate patient in one hour for continued bleeding, if the patient is still bleeding consult Dr. Alexa Perdomo (Urology) regarding suspected trauma from urinary catheter insertion. Dr. Marcus Park also requested consult to Dr. Jon Dawkins regarding IVC filter for DVT in left arm. This RN requested consideration for ICU placement considering the level of care the patient required, Dr. Zelda Fang declined at this time. Stated he would be here at 1300 to evaluate the patient.

## 2021-07-14 NOTE — PROGRESS NOTES
Per Eduin Manjarrez in lab, unable to draw Vancomycin trough level while patient receiving a blood transfusion. Expected end time of transfusion, 1500. Rescheduled ptt draw and vancomycin tough at 1600.     This Rn informed Dr. Alison Jean and Cassy, Pharmacist.

## 2021-07-14 NOTE — PROGRESS NOTES
[facility]     Renal Daily Progress Note:     Admission Date: 2021     Subjective: patient seen around 12:10 PM-- feeling better, awaiting blood transfusion. Had significant bladder residual volume which was alleviated by Quesada catheter. However, he developed blood around the meatus and catheter was subsequently removed. Flomax started. Potassium was elevated and received a dose of Kayexalate. No longer short of breath, on nasal O2. No cough. No chest or abdominal pain. Left great toe with blood blister but no pain. Apparently stubbed his toe last week he did some furniture. Blood cultures show gram-positive cocci on Vanco.    Review of Systems  Pertinent items are noted in HPI.     Objective:     Visit Vitals  BP (!) 180/88 (BP 1 Location: Left leg, BP Patient Position: Supine)   Pulse 77   Temp 97.8 °F (36.6 °C)   Resp 20   Ht 6' (1.829 m)   Wt 81.6 kg (180 lb)   SpO2 100%   BMI 24.41 kg/m²     Temp (24hrs), Av.6 °F (36.4 °C), Min:97.4 °F (36.3 °C), Max:97.9 °F (36.6 °C)        Intake/Output Summary (Last 24 hours) at 2021 1714  Last data filed at 2021 1600  Gross per 24 hour   Intake 958.3 ml   Output 1800 ml   Net -841.7 ml     Current Facility-Administered Medications   Medication Dose Route Frequency    furosemide (LASIX) injection 80 mg  80 mg IntraVENous Q12H    tamsulosin (FLOMAX) capsule 0.4 mg  0.4 mg Oral DAILY    hydrALAZINE (APRESOLINE) tablet 50 mg  50 mg Oral TID    sodium bicarbonate tablet 650 mg  650 mg Oral TID    0.9% sodium chloride infusion 250 mL  250 mL IntraVENous PRN    VANCOMYCIN INFORMATION NOTE   Other Rx Dosing/Monitoring    furosemide (LASIX) injection 20 mg  20 mg IntraVENous ONCE PRN    hydrALAZINE (APRESOLINE) 20 mg/mL injection 40 mg  40 mg IntraVENous Q6H PRN    cloNIDine (CATAPRES) 0.3 mg/24 hr patch 1 Patch  1 Patch TransDERmal Q7D    heparin 25,000 units in D5W 250 ml infusion  18-36 Units/kg/hr (Adjusted) IntraVENous TITRATE    heparin (porcine) 1,000 unit/mL injection 6,340 Units  80 Units/kg (Adjusted) IntraVENous PRN    Or    heparin (porcine) 1,000 unit/mL injection 3,170 Units  40 Units/kg (Adjusted) IntraVENous PRN       Physical Exam:General appearance: alert, cooperative, no distress, appears stated age, fatigued appearing  Head: Normocephalic, without obvious abnormality, atraumatic, mild temporal wasting  Lungs: clear to auscultation bilaterally  Heart: regular rate and rhythm, no S3 or S4  Abdomen: soft, non-tender. Bowel sounds normal. No masses,  no organomegaly  Extremities: edema decreased lower extremity edema  Skin: left great toe with blood blister    Data Review:     LABS:  Recent Labs     07/14/21  0720 07/12/21  2240     142 139   K 6.0*  6.0* 5.7*   *  113* 111*   CO2 16*  17* 17*   BUN 72*  71* 59*   CREA 6.41*  6.36* 6.02*   CA 8.3*  8.2*  8.2* 8.5   ALB 3.2*  3.2* 3.7   PHOS 8.5*  --    MG 2.9* 2.8*   Vitamin D2 11.1  Intact   Recent Labs     07/14/21  0720 07/13/21 2003 07/13/21  1312 07/12/21  2240 07/12/21  2240   WBC 4.9  --   --   --  6.6   HGB 6.7* 7.1* 6.9*   < > 7.2*   HCT 23.5* 24.8* 23.5*   < > 25.1*     --   --   --  193    < > = values in this interval not displayed. iron saturation 5%    Recent Labs     07/13/21  1600   HÉCTOR 93   KU 21   CREAU 52.00  51.00   Blood Cultures 7-12-21  Two of four bottles have been flagged positive by instrument.  Bottles have been sent to [facility] laboratory to assess for possible growth. Gram Positive Cocci in clusters     Chest x-ray July 14, 2021:  1 view comparison to 7/12     Continued cardiomegaly and congestion. If There is mild interstitial edema, it  is unchanged. Right pleural effusion and adjacent atelectasis have improved. Retroperitoneal US 7-14-21  Left kidney is 12.2 cm and right kidney is 9.1. Right renal cortex is echogenic  but not the left. Multiple cysts on each side, including a large cyst is 7 cm on  the left.  No hydronephrosis. Aorta and IVC normals visualized. Prostatic  enlargement and diffuse bladder wall thickening. Moderate ascites     IMPRESSION  Medical renal disease on the right  Assessment:   Renal Specific Problems  CKD stage IV at baseline  Acute azotemia-exacerbated by bladder outlet obstruction  Volume overload- resolving  Hyperkalemia  Metabolic acidosis  Acute anemia with significant iron deficiency  Acute left axillary/brachial vein DVT  Vitamin D2 def  Secondary hyperparathyroid  Proteinuria - nephrotic range  Gram + bacteremia      Plan:     Obtain/ Order: labs/cultures/radiology/procedures:  renal panel, CBC in AM    Stool for occult blood  PSA  PLA2R Ab    Therapeutic:    IV iron to rapidly replete stores  Avoid Kayexalate if possible, use Lokelma  Continue sodium bicarbonate  Start calcitriol and ergocalciferol  Start PO4 binder--sevelamer  Flomax started  Continue diuretic and perhaps change to oral dosing in a.m.   Blood transfusion ordered  Antibiotics per primary team      Trixie Moritz, MD    [phone]

## 2021-07-14 NOTE — PROGRESS NOTES
Progress Note    Jacque Grider MD             Daily Progress Note: 7/14/2021      Subjective: The patient is seen for follow  up. Patient is looking better. Also he is feeling better. Patient denies any history of chest pain or shortness of breath. Patient had blood coming around his Quesada. At that time PTT was 150. We stopped heparin for 1 hour and then bleeding has stopped. Discussed with Dr. Hernan Erazo. Will restart heparin. Informed RN about this decision. Patient has a blister on his left great toe patient does not need any surgical intervention at present time. Patient has also blood cultures positive for gram-positive cocci in clusters I believe this is most likely contamination but prophylactically I will start patient on IV vancomycin first dose was given in ER.   Problem List:  Problem List as of 7/14/2021 Never Reviewed        Codes Class Noted - Resolved    Pulmonary edema ICD-10-CM: J81.1  ICD-9-CM: 578  7/13/2021 - Present        GI bleed ICD-10-CM: K92.2  ICD-9-CM: 578.9  1/5/2021 - Present              Medications reviewed  Current Facility-Administered Medications   Medication Dose Route Frequency    furosemide (LASIX) injection 80 mg  80 mg IntraVENous Q12H    tamsulosin (FLOMAX) capsule 0.4 mg  0.4 mg Oral DAILY    hydrALAZINE (APRESOLINE) tablet 50 mg  50 mg Oral TID    sodium bicarbonate tablet 650 mg  650 mg Oral TID    0.9% sodium chloride infusion 250 mL  250 mL IntraVENous PRN    VANCOMYCIN INFORMATION NOTE   Other Rx Dosing/Monitoring    [Held by provider] hydrALAZINE (APRESOLINE) 20 mg/mL injection 40 mg  40 mg IntraVENous Q6H PRN    cloNIDine (CATAPRES) 0.3 mg/24 hr patch 1 Patch  1 Patch TransDERmal Q7D    heparin 25,000 units in D5W 250 ml infusion  18-36 Units/kg/hr (Adjusted) IntraVENous TITRATE    [Held by provider] heparin (porcine) 1,000 unit/mL injection 6,340 Units  80 Units/kg (Adjusted) IntraVENous PRN    Or    [Held by provider] heparin (porcine) 1,000 unit/mL injection 3,170 Units  40 Units/kg (Adjusted) IntraVENous PRN       Review of Systems:   A comprehensive review of systems was negative except for that written in the HPI. Objective:   Physical Exam:     Visit Vitals  BP (!) 145/89 (BP 1 Location: Left leg, BP Patient Position: Supine)   Pulse 71   Temp 97.5 °F (36.4 °C)   Resp 24   Ht 6' (1.829 m)   Wt 81.6 kg (180 lb)   SpO2 99%   BMI 24.41 kg/m²    O2 Flow Rate (L/min): 2 l/min O2 Device: Nasal cannula    Temp (24hrs), Av.5 °F (36.4 °C), Min:96.5 °F (35.8 °C), Max:97.7 °F (36.5 °C)    701 - 1900  In: 300   Out: 9292 [YZZJT:2077]   1901 -  07  In: 10 [I.V.:10]  Out: 500 [Urine:500]    General:  Alert, cooperative, no distress, appears stated age. Lungs:   Clear to auscultation bilaterally. Chest wall:  No tenderness or deformity. Heart:  Regular rate and rhythm, S1, S2 normal, no murmur, click, rub or gallop. Abdomen:   Soft, non-tender. Bowel sounds normal. No masses,  No organomegaly. Extremities:  Edema on both the legs. Much better than yesterday. Has also left upper limb edema. Has a blister on left great toe medially. No cellulitis or infection   Pulses: 2+ and symmetric all extremities. Skin: Skin color, texture, turgor normal. No rashes or lesions   Neurologic: CNII-XII intact. No gross sensory or motor deficits     Data Review:       Recent Days:  Recent Labs     21  0720 21  1312 21  2240 21  2240   WBC 4.9  --   --   --  6.6   HGB 6.7* 7.1* 6.9*   < > 7.2*   HCT 23.5* 24.8* 23.5*   < > 25.1*     --   --   --  193    < > = values in this interval not displayed.      Recent Labs     21  0720 21  2240     142 139   K 6.0*  6.0* 5.7*   *  113* 111*   CO2 16*  17* 17*   GLU 91  91 110*   BUN 72*  71* 59*   CREA 6.41*  6.36* 6.02*   CA 8.3*  8.2*  8.2* 8.5   MG 2.9* 2.8*   PHOS 8.5*  --    ALB 3.2*  3.2* 3.7   TBILI 0.5 0. 7   ALT 14 19     No results for input(s): PH, PCO2, PO2, HCO3, FIO2 in the last 72 hours. Results     Procedure Component Value Units Date/Time    MRSA SCREEN - PCR (NASAL) [[id number]] Collected: 07/13/21 1730    Order Status: Completed Specimen: Swab Updated: 07/13/21 2123     MRSA by PCR, Nasal Not Detected       COVID-19 RAPID TEST [[id number]] Collected: 07/13/21 0539    Order Status: Completed Specimen: Nasopharyngeal Updated: 07/13/21 0655     Specimen source Nasopharyngeal        COVID-19 rapid test Not Detected        Comment: Rapid Abbott ID Now   Rapid NAAT:  The specimen is NEGATIVE for SARS-CoV-2, the novel coronavirus associated with COVID-19. Negative results should be treated as presumptive and, if inconsistent with clinical signs and symptoms or necessary for patient management, should be tested with an alternative molecular assay. Negative results do not preclude SARS-CoV-2 infection and should not be used as the sole basis for patient management decisions. This test has been authorized by the FDA under   an Emergency Use Authorization (EUA) for use by authorized laboratories. Fact sheet for Healthcare Providers: ConventionUpdate.co.nz Fact sheet for Patients: ConventionUpdate.co.nz   Methodology: Isothermal Nucleic Acid Amplification         CULTURE, BLOOD, PAIRED [[id number]] Collected: 07/12/21 2240    Order Status: Completed Specimen: Blood Updated: 07/14/21 1129     Special Requests: No Special Requests        Culture result:       Two of four bottles have been flagged positive by instrument. Bottles have been sent to [facility] laboratory to assess for possible growth.  Gram Positive Cocci in clusters CALLED TO AND READ BACK BY Jodi Easley r.n. [id number] 07/14/2021 by hanane               24 Hour Results:  Recent Results (from the past 24 hour(s))   URINALYSIS W/ REFLEX CULTURE    Collection Time: 07/13/21  4:00 PM    Specimen: Urine   Result Value Ref Range Color Yellow/Straw      Appearance Clear Clear      Specific gravity 1.009 1.003 - 1.030      pH (UA) 6.0 5.0 - 8.0      Protein >300 (A) Negative mg/dL    Glucose 50 (A) Negative mg/dL    Ketone Negative Negative mg/dL    Bilirubin Negative Negative      Blood Negative Negative      Urobilinogen 0.1 0.1 - 1.0 EU/dL    Nitrites Negative Negative      Leukocyte Esterase Negative Negative      UA:UC IF INDICATED Culture not indicated by UA result Culture not indicated by UA result      WBC 0-4 0 - 4 /hpf    RBC 0-5 0 - 5 /hpf    Bacteria Negative Negative /hpf   PROTEIN URINE, RANDOM    Collection Time: 07/13/21  4:00 PM   Result Value Ref Range    Protein, urine random 207 (H) 0.0 - 11.9 mg/dL   POTASSIUM, UR, RANDOM    Collection Time: 07/13/21  4:00 PM   Result Value Ref Range    Potassium urine, random 21 mmol/L   PROTEIN/CREATININE RATIO, URINE    Collection Time: 07/13/21  4:00 PM   Result Value Ref Range    Protein, urine random 209 (H) 0.0 - 11.9 mg/dL    Creatinine, urine 52.00 mg/dL    Protein/Creat.  urine Ratio 4.0     CREATININE, UR, RANDOM    Collection Time: 07/13/21  4:00 PM   Result Value Ref Range    Creatinine, urine 51.00 mg/dL   SODIUM, UR, RANDOM    Collection Time: 07/13/21  4:00 PM   Result Value Ref Range    Sodium,urine random 93 mmol/L   MRSA SCREEN - PCR (NASAL)    Collection Time: 07/13/21  5:30 PM   Result Value Ref Range    MRSA by PCR, Nasal Not Detected Not Detected     PTT    Collection Time: 07/13/21  8:03 PM   Result Value Ref Range    aPTT 72.3 (H) 21.2 - 34.1 sec    aPTT, therapeutic range   82 - 109 sec   HGB & HCT    Collection Time: 07/13/21  8:03 PM   Result Value Ref Range    HGB 7.1 (L) 12.1 - 17.0 g/dL    HCT 24.8 (L) 36.6 - 50.3 %   RENAL FUNCTION PANEL    Collection Time: 07/14/21  7:20 AM   Result Value Ref Range    Sodium 142 136 - 145 mmol/L    Potassium 6.0 (H) 3.5 - 5.1 mmol/L    Chloride 114 (H) 97 - 108 mmol/L    CO2 16 (L) 21 - 32 mmol/L    Anion gap 12 5 - 15 mmol/L    Glucose 91 65 - 100 mg/dL    BUN 72 (H) 6 - 20 mg/dL    Creatinine 6.41 (H) 0.70 - 1.30 mg/dL    BUN/Creatinine ratio 11 (L) 12 - 20      GFR est AA 11 (L) >60 ml/min/1.73m2    GFR est non-AA 9 (L) >60 ml/min/1.73m2    Calcium 8.2 (L) 8.5 - 10.1 mg/dL    Phosphorus 8.5 (H) 2.6 - 4.7 mg/dL    Albumin 3.2 (L) 3.5 - 5.0 g/dL   MAGNESIUM    Collection Time: 07/14/21  7:20 AM   Result Value Ref Range    Magnesium 2.9 (H) 1.6 - 2.4 mg/dL   TROPONIN I    Collection Time: 07/14/21  7:20 AM   Result Value Ref Range    Troponin-I, Qt. 0.17 (H) <0.05 ng/mL   CBC WITH AUTOMATED DIFF    Collection Time: 07/14/21  7:20 AM   Result Value Ref Range    WBC 4.9 4.1 - 11.1 K/uL    RBC 2.60 (L) 4.10 - 5.70 M/uL    HGB 6.7 (L) 12.1 - 17.0 g/dL    HCT 23.5 (L) 36.6 - 50.3 %    MCV 90.4 80.0 - 99.0 FL    MCH 25.8 (L) 26.0 - 34.0 PG    MCHC 28.5 (L) 30.0 - 36.5 g/dL    RDW 16.6 (H) 11.5 - 14.5 %    PLATELET 193 721 - 197 K/uL    MPV 10.4 8.9 - 12.9 FL    NRBC 0.0 0.0  WBC    ABSOLUTE NRBC 0.00 0.00 - 0.01 K/uL    NEUTROPHILS 80 (H) 32 - 75 %    LYMPHOCYTES 5 (L) 12 - 49 %    MONOCYTES 11 5 - 13 %    EOSINOPHILS 3 0 - 7 %    BASOPHILS 1 0 - 1 %    IMMATURE GRANULOCYTES 0 0 - 0.5 %    ABS. NEUTROPHILS 3.9 1.8 - 8.0 K/UL    ABS. LYMPHOCYTES 0.2 (L) 0.8 - 3.5 K/UL    ABS. MONOCYTES 0.6 0.0 - 1.0 K/UL    ABS. EOSINOPHILS 0.2 0.0 - 0.4 K/UL    ABS. BASOPHILS 0.1 0.0 - 0.1 K/UL    ABS. IMM.  GRANS. 0.0 0.00 - 0.04 K/UL    DF AUTOMATED     METABOLIC PANEL, COMPREHENSIVE    Collection Time: 07/14/21  7:20 AM   Result Value Ref Range    Sodium 142 136 - 145 mmol/L    Potassium 6.0 (H) 3.5 - 5.1 mmol/L    Chloride 113 (H) 97 - 108 mmol/L    CO2 17 (L) 21 - 32 mmol/L    Anion gap 12 5 - 15 mmol/L    Glucose 91 65 - 100 mg/dL    BUN 71 (H) 6 - 20 mg/dL    Creatinine 6.36 (H) 0.70 - 1.30 mg/dL    BUN/Creatinine ratio 11 (L) 12 - 20      GFR est AA 11 (L) >60 ml/min/1.73m2    GFR est non-AA 9 (L) >60 ml/min/1.73m2    Calcium 8.2 (L) 8.5 - 10.1 mg/dL    Bilirubin, total 0.5 0.2 - 1.0 mg/dL    AST (SGOT) 12 (L) 15 - 37 U/L    ALT (SGPT) 14 12 - 78 U/L    Alk. phosphatase 64 45 - 117 U/L    Protein, total 6.2 (L) 6.4 - 8.2 g/dL    Albumin 3.2 (L) 3.5 - 5.0 g/dL    Globulin 3.0 2.0 - 4.0 g/dL    A-G Ratio 1.1 1.1 - 2.2     CK    Collection Time: 07/14/21  7:20 AM   Result Value Ref Range     39 - 308 U/L   PTH INTACT    Collection Time: 07/14/21  7:20 AM   Result Value Ref Range    Calcium 8.3 (L) 8.5 - 10.1 mg/dL    PTH, Intact PENDING pg/mL   PTT    Collection Time: 07/14/21  7:20 AM   Result Value Ref Range    aPTT >153.0 (HH) 21.2 - 34.1 sec    aPTT, therapeutic range   82 - 109 sec       XR CHEST PORT   Final Result      US RETROPERITONEUM COMP   Final Result   Medical renal disease on the right      DUPLEX UPPER EXT VENOUS LEFT   Final Result      XR CHEST PORT   Final Result   Findings/impression:      Cardiac silhouette is enlarged. Central vascular congestion and patchy central   airspace disease/edema. Suspect trace right pleural effusion. No evidence of pneumothorax. No acute osseous abnormality identified. Assessment: Anemia of chronic disease [chronic renal failure]  CHF  Hypertension  Left upper limb venous clot  Blister on left great toe  Chronic renal failure  Hematuria          Plan: I have already explained patient has been started back on heparin. We will give Lasix 20 mg IV between the units of the blood. Transfusion is appropriately selected. Care Plan discussed with: Patient/Family as well as RN taking care of the patient go ahead    Total time spent with patient: 45 minutes.     Cain Garrett MD

## 2021-07-14 NOTE — PROGRESS NOTES
Clinton Norris stated she turned the heparin drip off at [phone]. Noted that the patient had small bleeding from the urinary catheter insertion site. This RN paged Dr. Cuba Mendez. This RN spoke with Fausto Mcclelland, informed her of stopping the heparin drip per protocol. Stated to consult Dr. Brittnee Wisdom for further instructions.

## 2021-07-14 NOTE — PROGRESS NOTES
Spoke to TRENT Renee RN in [address] [place] working with Dr. Fouzia Ojeda. Informed her of consult for this patient. Dr. Fouzia Ojeda currently in surgery,but will notify him when done.

## 2021-07-14 NOTE — PROGRESS NOTES
Per clearance from Dr. Atiya Piper, restarted heparin at 17 units/kg/hr (previous rate before being stopped). Patient in no acute distress, resting in bed. Will continue to monitor and assess.

## 2021-07-14 NOTE — ACP (ADVANCE CARE PLANNING)
Discussed advance directive with patient and he wants CPR and ventilation if needed. He states his sister, Isatu Shrestha as next of kin and decision maker if needed.  ([phone])    Cleveland Duncan BSN, [address] [place] [address]  [phone]

## 2021-07-14 NOTE — PROGRESS NOTES
Bleeding from penis/urinary meatus noted to be slowing down. Spotting present on cotton pad. Patient stated \"some blood comes out when I pee. \"    Will continue to assess.

## 2021-07-14 NOTE — PROGRESS NOTES
Spoke to Dr. Yahaira Goodwin, stated to continue with heparin drip protocol despite the drop in Hgb to 6.7 and small amount of bleeding from urinary catheter site. Also stated to call Dr. Yolanda Disla (who originally ordered Quesada Cath). This RN will assess the patient when he returns back to unit.

## 2021-07-14 NOTE — PROGRESS NOTES
Bedside shift report given to Corey Acevedo RN using SBAR and I trace. All wounds visualized. Plan of care, orders, and results reviewed with oncoming nurse. Patient in no acute distress. Opportunity for questions given, none at this time.     Transfer of care complete at [facility]

## 2021-07-14 NOTE — PROGRESS NOTES
Attempted to Call Dr. Robson Israel, went straight to voicemail twice. \"Voice mail box has not been set up. \"  Unable to leave message. Will try again later.

## 2021-07-14 NOTE — PROGRESS NOTES
PICC team at the bedside attempting to insert additional line. Patient still actively bleeding at the penis, small sanguinous. Unable to obtain vital at this time as patient has been sterile draped. Will assess when available.

## 2021-07-14 NOTE — PROGRESS NOTES
Reason for Admission:  Mary Ann Perez is a 76 y.o. male who presents with history of increased shortness of breath since last few days. History of increasing the weight. History of unable to lie down flat of the bed. History of waking up at night because of shortness of breath. No history of cough fever or chills. History of left upper limb swelling started recently. No history of chest pain. No history of nausea vomiting diarrhea abdominal pain or black stool. No history of increased frequency of micturition or painful micturition. No history of headache or dizziness. No history of loss of consciousness or seizures. No history of change in vision. No history of any joint pain or joint swelling.                        RUR Score:   18%                  Plan for utilizing home health:     no     PCP: First and Last name:  Olivia Hooper MD     Name of Practice:    Are you a current patient: Yes/No:    Approximate date of last visit: 7/13/21   Can you participate in a virtual visit with your PCP:                     Current Advanced Directive/Advance Care Plan: Discussed advance directive with patient and he wants CPR and ventilation if needed. He states his sister, Cary Duane as next of kin and decision maker if needed. ([phone])      Healthcare Decision Maker:   Click here to complete [address] including selection of the Healthcare Decision Maker Relationship (ie \"Primary\")                             Transition of Care Plan:   CM assessed patient at beside. Patient lives at [address] [place] [address], [address] [place] [address]. He lives in a home with is sister. He does drive. The best number to reach him at is [phone]. He receives social security benefits each month. He uses a cane at home. He has [employer] MEDICARE and is INPATIENT. 1st IMM letter was obtained by patient access rep. His pcp is Rylie Gavin who he last saw yesterday. He uses the [facility] pharmacy on [address].  He sees a Dr Darrick Jacobo for nephrology. He takes medications at home and his copay depends on the medications. Upon discharge he may need transport home. Cm: pcp follow up, 2nd IMM letter, transport home    Care Management Interventions  PCP Verified by CM: Yes  Last Visit to PCP: 07/13/21  Mode of Transport at Discharge:  Other (see comment) (may need ride)  Transition of Care Consult (CM Consult): Discharge Planning  Health Maintenance Reviewed: Yes  Current Support Network: Own Home  Confirm Follow Up Transport: Self  The Plan for Transition of Care is Related to the Following Treatment Goals : pcp follow up  The Patient and/or Patient Representative was Provided with a Choice of Provider and Agrees with the Discharge Plan?: Yes  Freedom of Choice List was Provided with Basic Dialogue that Supports the Patient's Individualized Plan of Care/Goals, Treatment Preferences and Shares the Quality Data Associated with the Providers?: Yes  Discharge Location  Discharge Placement: Home    GABRIELA Encarnacion, [address] [place] [address]  [phone]

## 2021-07-14 NOTE — PROGRESS NOTES
Blood transfusion complete at 1541. Patient tolerated well. Administered PRN lasix dose, held hydralazine for now (will re evaluate at 1630). Notified Van Quevedo, Phlebotomist.  Stated she needed to wait for 2 hours after completion of blood transfusion to draw vancomycin trough and ptt.

## 2021-07-14 NOTE — CONSULTS
Discussed with nursing staff, admitting would prefer ortho vs podiatry to service his patients. I will defer to the wishes of the admitting. Please re-consult if needed        Ramon Montero, [facility], [address] [place] [address] and Thomas [address] [place] [address] [address], [address] [place] [address]  O: [phone]  F: [phone]  C: [phone]

## 2021-07-14 NOTE — PROGRESS NOTES
Spoke to Dr. Yahaira Stoll regarding jordan use.   Agreed to discontinue jordan per protocol if patient is urinating appropriately

## 2021-07-14 NOTE — PROGRESS NOTES
During [facility] bath, this RN noted that patient had black, boggy wound to left big toe and distal foot. Patient stated that he has had that wound for a few days, thinks he may have kicked a piece of furniture when walking by accident at home. Bilateral lower legs edematous, leathery. Abrasion/scab noted to right anterior lower leg. Patient also states he has had that for a while. \"Doesn't bother me. \"    Left upper arm down to hand noted to also be red, swollen. Right arm also edematous. Appropriate color. No other skin break down noted. Four eye skin assessment completed by this RN and Jossy Nagy RN.

## 2021-07-14 NOTE — PROGRESS NOTES
Per discussion with Dr. Mat Briones, OK to restart heparin transfusion. Also per Dr. Catina Dorantes consult to Dr. Aida Vazquez. Patient is not a candidate for filter with upper extremity. Also, Per Dr. Catina Dorantes consult with Dr. Rico German. Per Dr. Christine Kim- unnecessary at this time.

## 2021-07-14 NOTE — PROGRESS NOTES
Received call from hematology, pateint's ptt was >153.0    Patient currently off the unit for ultrasound. This RN paged Dr. Mat Briones, awaiting call back.

## 2021-07-14 NOTE — PROGRESS NOTES
Spoke to Debora Mcdonald, RN regarding wound consult- stated she would come assess the wound later today. Romy Verma requested consult to Dr. Wei Schafer. Spoke to Gloria at Dr. Usman Ponce office regarding consult for left foot wound.

## 2021-07-14 NOTE — PROGRESS NOTES
Received call from Lab. Patient has gram positive cocci in clusters in blood culture. This RN paged Dr. Tami Ash.

## 2021-07-15 LAB
ALBUMIN SERPL-MCNC: 3.1 G/DL (ref 3.5–5)
ALBUMIN SERPL-MCNC: 3.1 G/DL (ref 3.5–5)
ALBUMIN/GLOB SERPL: 1 {RATIO} (ref 1.1–2.2)
ALP SERPL-CCNC: 68 U/L (ref 45–117)
ALT SERPL-CCNC: 15 U/L (ref 12–78)
ANION GAP SERPL CALC-SCNC: 10 MMOL/L (ref 5–15)
ANION GAP SERPL CALC-SCNC: 9 MMOL/L (ref 5–15)
APTT PPP: 98.7 SEC (ref 21.2–34.1)
APTT PPP: 99.6 SEC (ref 21.2–34.1)
AST SERPL W P-5'-P-CCNC: 15 U/L (ref 15–37)
BASOPHILS # BLD: 0 K/UL (ref 0–0.1)
BASOPHILS # BLD: 0 K/UL (ref 0–0.1)
BASOPHILS NFR BLD: 1 % (ref 0–1)
BASOPHILS NFR BLD: 1 % (ref 0–1)
BILIRUB SERPL-MCNC: 0.6 MG/DL (ref 0.2–1)
BNP SERPL-MCNC: ABNORMAL PG/ML
BUN SERPL-MCNC: 67 MG/DL (ref 6–20)
BUN SERPL-MCNC: 67 MG/DL (ref 6–20)
BUN/CREAT SERPL: 11 (ref 12–20)
BUN/CREAT SERPL: 11 (ref 12–20)
CA-I BLD-MCNC: 7.8 MG/DL (ref 8.5–10.1)
CA-I BLD-MCNC: 7.8 MG/DL (ref 8.5–10.1)
CHLORIDE SERPL-SCNC: 109 MMOL/L (ref 97–108)
CHLORIDE SERPL-SCNC: 110 MMOL/L (ref 97–108)
CO2 SERPL-SCNC: 21 MMOL/L (ref 21–32)
CO2 SERPL-SCNC: 22 MMOL/L (ref 21–32)
COLLECT DATE STL: NORMAL
CREAT SERPL-MCNC: 6.27 MG/DL (ref 0.7–1.3)
CREAT SERPL-MCNC: 6.31 MG/DL (ref 0.7–1.3)
DATE LAST DOSE: NORMAL
DIFFERENTIAL METHOD BLD: ABNORMAL
DIFFERENTIAL METHOD BLD: ABNORMAL
EOSINOPHIL # BLD: 0.2 K/UL (ref 0–0.4)
EOSINOPHIL # BLD: 0.2 K/UL (ref 0–0.4)
EOSINOPHIL NFR BLD: 4 % (ref 0–7)
EOSINOPHIL NFR BLD: 4 % (ref 0–7)
ERYTHROCYTE [DISTWIDTH] IN BLOOD BY AUTOMATED COUNT: 17 % (ref 11.5–14.5)
ERYTHROCYTE [DISTWIDTH] IN BLOOD BY AUTOMATED COUNT: 17.2 % (ref 11.5–14.5)
GLOBULIN SER CALC-MCNC: 3.2 G/DL (ref 2–4)
GLUCOSE SERPL-MCNC: 101 MG/DL (ref 65–100)
GLUCOSE SERPL-MCNC: 101 MG/DL (ref 65–100)
HCT VFR BLD AUTO: 24.7 % (ref 36.6–50.3)
HCT VFR BLD AUTO: 27.4 % (ref 36.6–50.3)
HEMOCCULT SP1 STL QL: NEGATIVE
HGB BLD-MCNC: 7.3 G/DL (ref 12.1–17)
HGB BLD-MCNC: 8.3 G/DL (ref 12.1–17)
IMM GRANULOCYTES # BLD AUTO: 0 K/UL (ref 0–0.04)
IMM GRANULOCYTES # BLD AUTO: 0 K/UL (ref 0–0.04)
IMM GRANULOCYTES NFR BLD AUTO: 0 % (ref 0–0.5)
IMM GRANULOCYTES NFR BLD AUTO: 0 % (ref 0–0.5)
LYMPHOCYTES # BLD: 0.2 K/UL (ref 0.8–3.5)
LYMPHOCYTES # BLD: 0.3 K/UL (ref 0.8–3.5)
LYMPHOCYTES NFR BLD: 4 % (ref 12–49)
LYMPHOCYTES NFR BLD: 5 % (ref 12–49)
MCH RBC QN AUTO: 26.3 PG (ref 26–34)
MCH RBC QN AUTO: 27.2 PG (ref 26–34)
MCHC RBC AUTO-ENTMCNC: 29.6 G/DL (ref 30–36.5)
MCHC RBC AUTO-ENTMCNC: 30.3 G/DL (ref 30–36.5)
MCV RBC AUTO: 88.8 FL (ref 80–99)
MCV RBC AUTO: 89.8 FL (ref 80–99)
MONOCYTES # BLD: 0.5 K/UL (ref 0–1)
MONOCYTES # BLD: 0.5 K/UL (ref 0–1)
MONOCYTES NFR BLD: 10 % (ref 5–13)
MONOCYTES NFR BLD: 10 % (ref 5–13)
NEUTS SEG # BLD: 4.2 K/UL (ref 1.8–8)
NEUTS SEG # BLD: 4.2 K/UL (ref 1.8–8)
NEUTS SEG NFR BLD: 80 % (ref 32–75)
NEUTS SEG NFR BLD: 81 % (ref 32–75)
NRBC # BLD: 0 K/UL (ref 0–0.01)
NRBC # BLD: 0 K/UL (ref 0–0.01)
NRBC BLD-RTO: 0 PER 100 WBC
NRBC BLD-RTO: 0 PER 100 WBC
PHOSPHATE SERPL-MCNC: 8.4 MG/DL (ref 2.6–4.7)
PLATELET # BLD AUTO: 157 K/UL (ref 150–400)
PLATELET # BLD AUTO: 171 K/UL (ref 150–400)
PMV BLD AUTO: 10.1 FL (ref 8.9–12.9)
PMV BLD AUTO: 10.2 FL (ref 8.9–12.9)
POTASSIUM SERPL-SCNC: 5 MMOL/L (ref 3.5–5.1)
POTASSIUM SERPL-SCNC: 5 MMOL/L (ref 3.5–5.1)
PROT SERPL-MCNC: 6.3 G/DL (ref 6.4–8.2)
PSA SERPL-MCNC: 5.2 NG/ML (ref 0.01–4)
RBC # BLD AUTO: 2.78 M/UL (ref 4.1–5.7)
RBC # BLD AUTO: 3.05 M/UL (ref 4.1–5.7)
REPORTED DOSE,DOSE: NORMAL UNITS
SODIUM SERPL-SCNC: 140 MMOL/L (ref 136–145)
SODIUM SERPL-SCNC: 141 MMOL/L (ref 136–145)
THERAPEUTIC RANGE,PTTT: ABNORMAL SEC (ref 82–109)
THERAPEUTIC RANGE,PTTT: ABNORMAL SEC (ref 82–109)
VANCOMYCIN SERPL-MCNC: 28.8 UG/ML
WBC # BLD AUTO: 5.2 K/UL (ref 4.1–11.1)
WBC # BLD AUTO: 5.2 K/UL (ref 4.1–11.1)

## 2021-07-15 PROCEDURE — 74011250636 HC RX REV CODE- 250/636: Performed by: INTERNAL MEDICINE

## 2021-07-15 PROCEDURE — 74011250637 HC RX REV CODE- 250/637: Performed by: INTERNAL MEDICINE

## 2021-07-15 PROCEDURE — 65270000029 HC RM PRIVATE

## 2021-07-15 PROCEDURE — 85730 THROMBOPLASTIN TIME PARTIAL: CPT

## 2021-07-15 PROCEDURE — 83516 IMMUNOASSAY NONANTIBODY: CPT

## 2021-07-15 PROCEDURE — 99253 IP/OBS CNSLTJ NEW/EST LOW 45: CPT | Performed by: UROLOGY

## 2021-07-15 PROCEDURE — 94760 N-INVAS EAR/PLS OXIMETRY 1: CPT

## 2021-07-15 PROCEDURE — 77010033678 HC OXYGEN DAILY

## 2021-07-15 PROCEDURE — 84153 ASSAY OF PSA TOTAL: CPT

## 2021-07-15 PROCEDURE — 80069 RENAL FUNCTION PANEL: CPT

## 2021-07-15 PROCEDURE — P9016 RBC LEUKOCYTES REDUCED: HCPCS

## 2021-07-15 PROCEDURE — 36430 TRANSFUSION BLD/BLD COMPNT: CPT

## 2021-07-15 PROCEDURE — 80202 ASSAY OF VANCOMYCIN: CPT

## 2021-07-15 PROCEDURE — 85025 COMPLETE CBC W/AUTO DIFF WBC: CPT

## 2021-07-15 PROCEDURE — 36415 COLL VENOUS BLD VENIPUNCTURE: CPT

## 2021-07-15 PROCEDURE — 83880 ASSAY OF NATRIURETIC PEPTIDE: CPT

## 2021-07-15 PROCEDURE — 80053 COMPREHEN METABOLIC PANEL: CPT

## 2021-07-15 PROCEDURE — 86022 PLATELET ANTIBODIES: CPT

## 2021-07-15 RX ORDER — AMLODIPINE BESYLATE 5 MG/1
10 TABLET ORAL DAILY
Status: DISCONTINUED | OUTPATIENT
Start: 2021-07-16 | End: 2021-07-15

## 2021-07-15 RX ORDER — AMLODIPINE BESYLATE 5 MG/1
10 TABLET ORAL DAILY
Status: DISCONTINUED | OUTPATIENT
Start: 2021-07-15 | End: 2021-07-15

## 2021-07-15 RX ORDER — MINOXIDIL 2.5 MG/1
2.5 TABLET ORAL 2 TIMES DAILY
Status: DISCONTINUED | OUTPATIENT
Start: 2021-07-15 | End: 2021-07-19

## 2021-07-15 RX ORDER — AMLODIPINE BESYLATE 5 MG/1
10 TABLET ORAL DAILY
Status: COMPLETED | OUTPATIENT
Start: 2021-07-15 | End: 2021-07-15

## 2021-07-15 RX ORDER — AMLODIPINE BESYLATE 5 MG/1
10 TABLET ORAL DAILY
Status: DISCONTINUED | OUTPATIENT
Start: 2021-07-16 | End: 2021-07-25

## 2021-07-15 RX ADMIN — SEVELAMER CARBONATE 800 MG: 800 TABLET, FILM COATED ORAL at 17:34

## 2021-07-15 RX ADMIN — TAMSULOSIN HYDROCHLORIDE 0.4 MG: 0.4 CAPSULE ORAL at 08:37

## 2021-07-15 RX ADMIN — CALCITRIOL CAPSULES 0.25 MCG 0.25 MCG: 0.25 CAPSULE ORAL at 08:37

## 2021-07-15 RX ADMIN — SODIUM BICARBONATE 650 MG: 650 TABLET ORAL at 20:56

## 2021-07-15 RX ADMIN — SEVELAMER CARBONATE 800 MG: 800 TABLET, FILM COATED ORAL at 13:24

## 2021-07-15 RX ADMIN — MINOXIDIL 2.5 MG: 2.5 TABLET ORAL at 20:56

## 2021-07-15 RX ADMIN — HYDRALAZINE HYDROCHLORIDE 50 MG: 50 TABLET, FILM COATED ORAL at 20:56

## 2021-07-15 RX ADMIN — FUROSEMIDE 80 MG: 10 INJECTION, SOLUTION INTRAMUSCULAR; INTRAVENOUS at 20:57

## 2021-07-15 RX ADMIN — HYDRALAZINE HYDROCHLORIDE 50 MG: 50 TABLET, FILM COATED ORAL at 08:37

## 2021-07-15 RX ADMIN — HYDRALAZINE HYDROCHLORIDE 40 MG: 20 INJECTION INTRAMUSCULAR; INTRAVENOUS at 15:52

## 2021-07-15 RX ADMIN — FUROSEMIDE 80 MG: 10 INJECTION, SOLUTION INTRAMUSCULAR; INTRAVENOUS at 08:48

## 2021-07-15 RX ADMIN — HYDRALAZINE HYDROCHLORIDE 40 MG: 20 INJECTION INTRAMUSCULAR; INTRAVENOUS at 02:04

## 2021-07-15 RX ADMIN — SODIUM BICARBONATE 650 MG: 650 TABLET ORAL at 08:37

## 2021-07-15 RX ADMIN — IRON SUCROSE 200 MG: 20 INJECTION, SOLUTION INTRAVENOUS at 23:05

## 2021-07-15 RX ADMIN — HEPARIN SODIUM AND DEXTROSE 17 UNITS/KG/HR: 10000; 5 INJECTION INTRAVENOUS at 08:36

## 2021-07-15 RX ADMIN — SEVELAMER CARBONATE 800 MG: 800 TABLET, FILM COATED ORAL at 08:36

## 2021-07-15 RX ADMIN — HYDRALAZINE HYDROCHLORIDE 50 MG: 50 TABLET, FILM COATED ORAL at 17:34

## 2021-07-15 RX ADMIN — SODIUM ZIRCONIUM CYCLOSILICATE 10 G: 10 POWDER, FOR SUSPENSION ORAL at 10:49

## 2021-07-15 RX ADMIN — AMLODIPINE BESYLATE 10 MG: 5 TABLET ORAL at 20:56

## 2021-07-15 RX ADMIN — SODIUM BICARBONATE 650 MG: 650 TABLET ORAL at 17:34

## 2021-07-15 NOTE — PROGRESS NOTES
Consult for Vancomycin Dosing by Pharmacy by Dr. Sumaya Otto provided for this 76y.o. year old male , for indication of bacteremia. Day of Therapy: 1  Goal of Level(s): 15-20mcg/dL    Significant Cultures:       Date                             Culture                                       Organism      7/12                              Blood x2                                     GPC    Serum Creatinine Creatinine   Date Value Ref Range Status   07/14/2021 6.41 (H) 0.70 - 1.30 mg/dL Final   07/14/2021 6.36 (H) 0.70 - 1.30 mg/dL Final   07/12/2021 6.02 (H) 0.70 - 1.30 mg/dL Final      Creatinine Clearance Estimated Creatinine Clearance: 12.4 mL/min (A) (based on SCr of 6.41 mg/dL (H)). BUN Lab Results   Component Value Date/Time    BUN 72 (H) 07/14/2021 07:20 AM    BUN 71 (H) 07/14/2021 07:20 AM      WBC Lab Results   Component Value Date/Time    WBC 4.9 07/14/2021 07:20 AM      Temp Temp Readings from Last 1 Encounters:   07/14/21 97.7 °F (36.5 °C)        Last Level: No results found for: VANCT   Vancomycin, random   Date/Time Value Ref Range Status   07/14/2021 05:48 PM 12.3 ug/mL Final     Comment:     No reference range has been established. Ht Readings from Last 1 Encounters:   07/12/21 182.9 cm (72\")        Wt Readings from Last 1 Encounters:   07/12/21 81.6 kg (180 lb)     Ideal body weight: 77.6 kg (171 lb 1.2 oz)  Adjusted ideal body weight: 79.2 kg (174 lb 10.3 oz)     Previous Regimen: 1500mg IV x1 (7/13)    New Regimen:   Patient is CKD stage IV. Start Vancomycin 750mg IV x1 today. Pharmacy will order a random level tomorrow to redose. Pharmacy to follow daily and will make changes to dose and/or frequency based on clinical status.   _________________________________     Pharmacist Cassy Paredes

## 2021-07-15 NOTE — PROGRESS NOTES
[facility]     Renal Daily Progress Note:     Admission Date: 2021     Subjective: feeling better, got blood transfusion. Had significant bladder residual volume which was alleviated by Quesada catheter. However, he developed blood around the meatus and catheter was subsequently removed. Flomax started. Potassium better. BP elevated, he had been on Monoxidil    No longer short of breath, on room air with O2 sat > 90%. No cough. No chest or abdominal pain. Left great toe with blood blister but no pain. Apparently stubbed his toe last week he did some furniture. Blood cultures show gram-positive cocci on Vanco.    Review of Systems  Pertinent items are noted in HPI.     Objective:     Visit Vitals  BP (!) 208/105 (BP 1 Location: Right lower arm)   Pulse 92   Temp 98.8 °F (37.1 °C)   Resp 18   Ht 6' (1.829 m)   Wt 82.4 kg (181 lb 10.5 oz)   SpO2 95%   BMI 24.64 kg/m²     Temp (24hrs), Av.2 °F (36.8 °C), Min:97.3 °F (36.3 °C), Max:98.8 °F (37.1 °C)        Intake/Output Summary (Last 24 hours) at 7/15/2021 1754  Last data filed at 7/15/2021 0835  Gross per 24 hour   Intake 790 ml   Output 850 ml   Net -60 ml     Current Facility-Administered Medications   Medication Dose Route Frequency    [START ON 2021] amLODIPine (NORVASC) tablet 10 mg  10 mg Oral DAILY    furosemide (LASIX) injection 80 mg  80 mg IntraVENous Q12H    tamsulosin (FLOMAX) capsule 0.4 mg  0.4 mg Oral DAILY    hydrALAZINE (APRESOLINE) tablet 50 mg  50 mg Oral TID    sodium bicarbonate tablet 650 mg  650 mg Oral TID    0.9% sodium chloride infusion 250 mL  250 mL IntraVENous PRN    calcitRIOL (ROCALTROL) capsule 0.25 mcg  0.25 mcg Oral DAILY    sevelamer carbonate (RENVELA) tab 800 mg  800 mg Oral TID WITH MEALS    ergocalciferol capsule 50,000 Units  50,000 Units Oral Q7D    iron sucrose (VENOFER) injection 200 mg  200 mg IntraVENous Q24H    hydrALAZINE (APRESOLINE) 20 mg/mL injection 40 mg  40 mg IntraVENous Q6H PRN    cloNIDine (CATAPRES) 0.3 mg/24 hr patch 1 Patch  1 Patch TransDERmal Q7D    heparin 25,000 units in D5W 250 ml infusion  18-36 Units/kg/hr (Adjusted) IntraVENous TITRATE    heparin (porcine) 1,000 unit/mL injection 6,340 Units  80 Units/kg (Adjusted) IntraVENous PRN    Or    heparin (porcine) 1,000 unit/mL injection 3,170 Units  40 Units/kg (Adjusted) IntraVENous PRN       Physical Exam:General appearance: alert, cooperative, no distress, appears stated age, fatigued appearing  Head: Normocephalic, without obvious abnormality, atraumatic, mild temporal wasting  Lungs: clear to auscultation bilaterally  Heart: regular rate and rhythm, no S3 or S4  Abdomen: soft, non-tender. Bowel sounds normal. No masses,  no organomegaly  Extremities: edema decreased lower extremity edema  Skin: left great toe with blood blister    Data Review:     LABS:  Recent Labs     07/15/21  0154 07/15/21  0145 07/14/21  0720 07/12/21  2240 07/12/21  2240    141 142  142   < > 139   K 5.0 5.0 6.0*  6.0*   < > 5.7*   * 110* 114*  113*   < > 111*   CO2 22 21 16*  17*   < > 17*   BUN 67* 67* 72*  71*   < > 59*   CREA 6.31* 6.27* 6.41*  6.36*   < > 6.02*   CA 7.8* 7.8* 8.3*  8.2*  8.2*   < > 8.5   ALB 3.1* 3.1* 3.2*  3.2*   < > 3.7   PHOS  --  8.4* 8.5*  --   --    MG  --   --  2.9*  --  2.8*    < > = values in this interval not displayed. Vitamin D2 11.1  Intact   PSA  5.2  Recent Labs     07/15/21  1048 07/15/21  0145 07/14/21  0720   WBC 5.2 5.2 4.9   HGB 8.3* 7.3* 6.7*   HCT 27.4* 24.7* 23.5*    171 173   iron saturation 5%    Recent Labs     07/13/21  1600   HÉCTOR 93   KU 21   CREAU 52.00  51.00   Blood Cultures 7-12-21  Two of four bottles have been flagged positive by instrument.  Bottles have been sent to [facility] laboratory to assess for possible growth. Gram Positive Cocci in clusters     Chest x-ray July 14, 2021:  1 view comparison to 7/12     Continued cardiomegaly and congestion.  If There is mild interstitial edema, it  is unchanged. Right pleural effusion and adjacent atelectasis have improved. Retroperitoneal US 7-14-21  Left kidney is 12.2 cm and right kidney is 9.1. Right renal cortex is echogenic  but not the left. Multiple cysts on each side, including a large cyst is 7 cm on  the left. No hydronephrosis. Aorta and IVC normals visualized. Prostatic  enlargement and diffuse bladder wall thickening. Moderate ascites     IMPRESSION  Medical renal disease on the right  Assessment:   Renal Specific Problems  CKD stage IV at baseline  Acute azotemia-exacerbated by bladder outlet obstruction  Hypertension not controlled  Volume overload- resolving  Hyperkalemia- corrected  Metabolic acidosis- resolved  Acute anemia with significant iron deficiency  Acute left axillary/brachial vein DVT  Vitamin D2 def  Secondary hyperparathyroid  Proteinuria - nephrotic range  Gram + bacteremia      Plan:     Obtain/ Order: labs/cultures/radiology/procedures:  renal panel, CBC in AM    Stool for occult blood  PLA2R Ab pending    Therapeutic:    Resume minoxidil 2.5 mg b.i.d  IV iron to rapidly replete stores  Avoid Kayexalate if possible, use Lokelma  Continue sodium bicarbonate  Startd calcitriol and ergocalciferol  Start PO4 binder--sevelamer  Flomax started  Continue diuretic and perhaps change to oral dosing in a.m.   Antibiotics per primary team      Trixie Moritz, MD    [phone]

## 2021-07-15 NOTE — ROUTINE PROCESS
Bedside shift report given to Ketan Whitlock RN  (oncoming nurse) by Mala Lui RN (off going nurse). Report to include SBAR, plan of care, recent results, discharge planning, and patient's questions and concerns.

## 2021-07-15 NOTE — PROGRESS NOTES
Progress Note    Jamaica Hughes MD             Daily Progress Note: 7/15/2021      Subjective: The patient is seen for follow  up. Patient does not complain but he looks short of breath on exertion he gets very very short of breath. His upper limbs seems to be more swollen than yesterday. Both the lower limbs are swollen. The hands and feet are darker than forearm and leg. Patient has a blister on his left great toe  Vascular surgeon is already on consult as well as neurosurgeon  Also I consulted hematologist today as patient has gross hematuria and he requires IV heparin.   Patient has fresh DVT in his left upper limb up to proximal brachial vein  Problem List:  Problem List as of 7/15/2021 Never Reviewed        Codes Class Noted - Resolved    Pulmonary edema ICD-10-CM: J81.1  ICD-9-CM: 919  7/13/2021 - Present        GI bleed ICD-10-CM: K92.2  ICD-9-CM: 578.9  1/5/2021 - Present              Medications reviewed  Current Facility-Administered Medications   Medication Dose Route Frequency    minoxidiL (LONITEN) tablet 2.5 mg  2.5 mg Oral BID    amLODIPine (NORVASC) tablet 10 mg  10 mg Oral DAILY    furosemide (LASIX) injection 80 mg  80 mg IntraVENous Q12H    tamsulosin (FLOMAX) capsule 0.4 mg  0.4 mg Oral DAILY    hydrALAZINE (APRESOLINE) tablet 50 mg  50 mg Oral TID    sodium bicarbonate tablet 650 mg  650 mg Oral TID    0.9% sodium chloride infusion 250 mL  250 mL IntraVENous PRN    calcitRIOL (ROCALTROL) capsule 0.25 mcg  0.25 mcg Oral DAILY    sevelamer carbonate (RENVELA) tab 800 mg  800 mg Oral TID WITH MEALS    ergocalciferol capsule 50,000 Units  50,000 Units Oral Q7D    iron sucrose (VENOFER) injection 200 mg  200 mg IntraVENous Q24H    hydrALAZINE (APRESOLINE) 20 mg/mL injection 40 mg  40 mg IntraVENous Q6H PRN    cloNIDine (CATAPRES) 0.3 mg/24 hr patch 1 Patch  1 Patch TransDERmal Q7D    heparin 25,000 units in D5W 250 ml infusion  18-36 Units/kg/hr (Adjusted) IntraVENous TITRATE    heparin (porcine) 1,000 unit/mL injection 6,340 Units  80 Units/kg (Adjusted) IntraVENous PRN    Or    heparin (porcine) 1,000 unit/mL injection 3,170 Units  40 Units/kg (Adjusted) IntraVENous PRN       Review of Systems:   Shortness of breath on exertion edema and on the legs as well as left upper limb    Objective:   Physical Exam:     Visit Vitals  BP (!) 180/88 (BP 1 Location: Right lower arm, BP Patient Position: Sitting)   Pulse 92   Temp 98.8 °F (37.1 °C)   Resp 18   Ht 6' (1.829 m)   Wt 82.4 kg (181 lb 10.5 oz)   SpO2 95%   BMI 24.64 kg/m²    O2 Flow Rate (L/min): 1 l/min O2 Device: Nasal cannula    Temp (24hrs), Av.2 °F (36.8 °C), Min:97.3 °F (36.3 °C), Max:98.8 °F (37.1 °C)    No intake/output data recorded.  07 - 07/15 1900  In: 1748.3 [P.O.:840]  Out: 2650 [Urine:2650]    General:  Alert, cooperative, no distress, appears stated age. Lungs:   Clear to auscultation bilaterally. Chest wall:  No tenderness or deformity. Heart:  Regular rate and rhythm, S1, S2 normal, no murmur, click, rub or gallop. Abdomen:   Soft, non-tender. Bowel sounds normal. No masses,  No organomegaly. Extremities:  Edema on the upper limb the left upper limb as well as both the lower limbs much better than admission   Pulses: 2+ and symmetric all extremities. Skin: Skin color, texture, turgor normal. No rashes or lesions   Neurologic: CNII-XII intact.  No gross sensory or motor deficits     Data Review:       Recent Days:  Recent Labs     07/15/21  1048 07/15/21  0145 21  0720   WBC 5.2 5.2 4.9   HGB 8.3* 7.3* 6.7*   HCT 27.4* 24.7* 23.5*    171 173     Recent Labs     07/15/21  0154 07/15/21  0145 21  0720 21  2240 21  2240    141 142  142   < > 139   K 5.0 5.0 6.0*  6.0*   < > 5.7*   * 110* 114*  113*   < > 111*   CO2 22 21 16*  17*   < > 17*   * 101* 91  91   < > 110*   BUN 67* 67* 72*  71*   < > 59*   CREA 6.31* 6.27* 6.41*  6.36*   < > 6.02*   CA 7.8* 7.8* 8.3*  8.2*  8.2*   < > 8.5   MG  --   --  2.9*  --  2.8*   PHOS  --  8.4* 8.5*  --   --    ALB 3.1* 3.1* 3.2*  3.2*   < > 3.7   TBILI 0.6  --  0.5  --  0.7   ALT 15  --  14  --  19    < > = values in this interval not displayed. No results for input(s): PH, PCO2, PO2, HCO3, FIO2 in the last 72 hours. Results     Procedure Component Value Units Date/Time    MRSA SCREEN - PCR (NASAL) [[id number]] Collected: 07/13/21 1730    Order Status: Completed Specimen: Swab Updated: 07/13/21 2123     MRSA by PCR, Nasal Not Detected       COVID-19 RAPID TEST [[id number]] Collected: 07/13/21 0539    Order Status: Completed Specimen: Nasopharyngeal Updated: 07/13/21 0655     Specimen source Nasopharyngeal        COVID-19 rapid test Not Detected        Comment: Rapid Abbott ID Now   Rapid NAAT:  The specimen is NEGATIVE for SARS-CoV-2, the novel coronavirus associated with COVID-19. Negative results should be treated as presumptive and, if inconsistent with clinical signs and symptoms or necessary for patient management, should be tested with an alternative molecular assay. Negative results do not preclude SARS-CoV-2 infection and should not be used as the sole basis for patient management decisions. This test has been authorized by the FDA under   an Emergency Use Authorization (EUA) for use by authorized laboratories. Fact sheet for Healthcare Providers: ConventionUpdate.co.nz Fact sheet for Patients: ConventionUpdate.co.nz   Methodology: Isothermal Nucleic Acid Amplification         CULTURE, BLOOD, PAIRED [[id number]] Collected: 07/12/21 2240    Order Status: Completed Specimen: Blood Updated: 07/15/21 0715     Special Requests: No Special Requests        Culture result:       Two of four bottles have been flagged positive by instrument. Bottles have been sent to [facility] laboratory to assess for possible growth.  Gram Positive Cocci in clusters CALLED TO AND READ BACK BY chiquita mtz r.n. [id number] 07/14/2021 by hanane                  Probable Staphylococcus species, coagulase negative growing in 2 of 4 bottles drawn (ONE AEROBIC, AND ONE ANAEROBIC BOTTLE               24 Hour Results:  Recent Results (from the past 24 hour(s))   RENAL FUNCTION PANEL    Collection Time: 07/15/21  1:45 AM   Result Value Ref Range    Sodium 141 136 - 145 mmol/L    Potassium 5.0 3.5 - 5.1 mmol/L    Chloride 110 (H) 97 - 108 mmol/L    CO2 21 21 - 32 mmol/L    Anion gap 10 5 - 15 mmol/L    Glucose 101 (H) 65 - 100 mg/dL    BUN 67 (H) 6 - 20 mg/dL    Creatinine 6.27 (H) 0.70 - 1.30 mg/dL    BUN/Creatinine ratio 11 (L) 12 - 20      GFR est AA 11 (L) >60 ml/min/1.73m2    GFR est non-AA 9 (L) >60 ml/min/1.73m2    Calcium 7.8 (L) 8.5 - 10.1 mg/dL    Phosphorus 8.4 (H) 2.6 - 4.7 mg/dL    Albumin 3.1 (L) 3.5 - 5.0 g/dL   CBC WITH AUTOMATED DIFF    Collection Time: 07/15/21  1:45 AM   Result Value Ref Range    WBC 5.2 4.1 - 11.1 K/uL    RBC 2.78 (L) 4.10 - 5.70 M/uL    HGB 7.3 (L) 12.1 - 17.0 g/dL    HCT 24.7 (L) 36.6 - 50.3 %    MCV 88.8 80.0 - 99.0 FL    MCH 26.3 26.0 - 34.0 PG    MCHC 29.6 (L) 30.0 - 36.5 g/dL    RDW 17.2 (H) 11.5 - 14.5 %    PLATELET 866 164 - 688 K/uL    MPV 10.1 8.9 - 12.9 FL    NRBC 0.0 0.0  WBC    ABSOLUTE NRBC 0.00 0.00 - 0.01 K/uL    NEUTROPHILS 80 (H) 32 - 75 %    LYMPHOCYTES 5 (L) 12 - 49 %    MONOCYTES 10 5 - 13 %    EOSINOPHILS 4 0 - 7 %    BASOPHILS 1 0 - 1 %    IMMATURE GRANULOCYTES 0 0 - 0.5 %    ABS. NEUTROPHILS 4.2 1.8 - 8.0 K/UL    ABS. LYMPHOCYTES 0.3 (L) 0.8 - 3.5 K/UL    ABS. MONOCYTES 0.5 0.0 - 1.0 K/UL    ABS. EOSINOPHILS 0.2 0.0 - 0.4 K/UL    ABS. BASOPHILS 0.0 0.0 - 0.1 K/UL    ABS. IMM.  GRANS. 0.0 0.00 - 0.04 K/UL    DF AUTOMATED     BNP    Collection Time: 07/15/21  1:45 AM   Result Value Ref Range    NT pro-BNP >35,000 (H) <125 pg/mL   PSA SCREENING (SCREENING)    Collection Time: 07/15/21  1:45 AM   Result Value Ref Range    Prostate Specific Ag 5.2 (H) 0.01 - 4.0 ng/mL   PTT    Collection Time: 07/15/21  1:45 AM   Result Value Ref Range    aPTT 99.6 (H) 21.2 - 34.1 sec    aPTT, therapeutic range   82 - 984 sec   METABOLIC PANEL, COMPREHENSIVE    Collection Time: 07/15/21  1:54 AM   Result Value Ref Range    Sodium 140 136 - 145 mmol/L    Potassium 5.0 3.5 - 5.1 mmol/L    Chloride 109 (H) 97 - 108 mmol/L    CO2 22 21 - 32 mmol/L    Anion gap 9 5 - 15 mmol/L    Glucose 101 (H) 65 - 100 mg/dL    BUN 67 (H) 6 - 20 mg/dL    Creatinine 6.31 (H) 0.70 - 1.30 mg/dL    BUN/Creatinine ratio 11 (L) 12 - 20      GFR est AA 11 (L) >60 ml/min/1.73m2    GFR est non-AA 9 (L) >60 ml/min/1.73m2    Calcium 7.8 (L) 8.5 - 10.1 mg/dL    Bilirubin, total 0.6 0.2 - 1.0 mg/dL    AST (SGOT) 15 15 - 37 U/L    ALT (SGPT) 15 12 - 78 U/L    Alk. phosphatase 68 45 - 117 U/L    Protein, total 6.3 (L) 6.4 - 8.2 g/dL    Albumin 3.1 (L) 3.5 - 5.0 g/dL    Globulin 3.2 2.0 - 4.0 g/dL    A-G Ratio 1.0 (L) 1.1 - 2.2     VANCOMYCIN, RANDOM    Collection Time: 07/15/21  1:54 AM   Result Value Ref Range    Vancomycin, random 28.8 ug/mL    Reported dose date Not provided      Reported dose: Not provided Units   PTT    Collection Time: 07/15/21 10:48 AM   Result Value Ref Range    aPTT 98.7 (H) 21.2 - 34.1 sec    aPTT, therapeutic range   82 - 109 sec   CBC WITH AUTOMATED DIFF    Collection Time: 07/15/21 10:48 AM   Result Value Ref Range    WBC 5.2 4.1 - 11.1 K/uL    RBC 3.05 (L) 4.10 - 5.70 M/uL    HGB 8.3 (L) 12.1 - 17.0 g/dL    HCT 27.4 (L) 36.6 - 50.3 %    MCV 89.8 80.0 - 99.0 FL    MCH 27.2 26.0 - 34.0 PG    MCHC 30.3 30.0 - 36.5 g/dL    RDW 17.0 (H) 11.5 - 14.5 %    PLATELET 494 198 - 310 K/uL    MPV 10.2 8.9 - 12.9 FL    NRBC 0.0 0.0  WBC    ABSOLUTE NRBC 0.00 0.00 - 0.01 K/uL    NEUTROPHILS 81 (H) 32 - 75 %    LYMPHOCYTES 4 (L) 12 - 49 %    MONOCYTES 10 5 - 13 %    EOSINOPHILS 4 0 - 7 %    BASOPHILS 1 0 - 1 %    IMMATURE GRANULOCYTES 0 0 - 0.5 %    ABS. NEUTROPHILS 4.2 1.8 - 8.0 K/UL    ABS. LYMPHOCYTES 0.2 (L) 0.8 - 3.5 K/UL    ABS. MONOCYTES 0.5 0.0 - 1.0 K/UL    ABS. EOSINOPHILS 0.2 0.0 - 0.4 K/UL    ABS. BASOPHILS 0.0 0.0 - 0.1 K/UL    ABS. IMM. GRANS. 0.0 0.00 - 0.04 K/UL    DF AUTOMATED         XR CHEST PORT   Final Result      US RETROPERITONEUM COMP   Final Result   Medical renal disease on the right      DUPLEX UPPER EXT VENOUS LEFT   Final Result      XR CHEST PORT   Final Result   Findings/impression:      Cardiac silhouette is enlarged. Central vascular congestion and patchy central   airspace disease/edema. Suspect trace right pleural effusion. No evidence of pneumothorax. No acute osseous abnormality identified. XR CHEST PORT    (Results Pending)        Assessment: Acute DVT  Acute pulmonary edema  Acute on chronic CHF  Acute on chronic renal failure  Severe anemia  Gross hematuria  Acute hypoxemic respiratory failure now resolved  Hypertension        Plan: Patient has been already started on Norvasc for uncontrolled hypertension  Continue IV Heparin  Urology is on consult  Will have vascular surgeon on consult as patient has cold feet as well as discoloration of the left hand. Care Plan discussed with: RN taking care of the patient as well as patient's son and patient himself    Total time spent with patient: 45 minutes.     Debra Narayan MD

## 2021-07-15 NOTE — PROGRESS NOTES
Progress Note      7/15/2021 9:54 AM  NAME: Marshall Castorena   MRN:  [id number]   Admit Diagnosis: Pulmonary edema [J81.1]      Subjective:   Chart reviewed. Patient feels better. Shortness of breath is somewhat better. Review of Systems:    Symptom Y/N Comments  Symptom Y/N Comments   Fever/Chills n   Chest Pain n    Poor Appetite    Edema     Cough    Abdominal Pain     Sputum    Joint Pain n    SOB/CARMONA y   Pruritis/Rash     Nausea/vomit    Other     Diarrhea         Constipation           Could NOT obtain due to:      Objective:          Physical Exam:    Last 24hrs VS reviewed since prior progress note. Most recent are:    Visit Vitals  BP (!) 193/105 (BP 1 Location: Right upper arm, BP Patient Position: At rest;Semi fowlers)   Pulse 89   Temp 98.1 °F (36.7 °C)   Resp 17   Ht 6' (1.829 m)   Wt 82.4 kg (181 lb 10.5 oz)   SpO2 97%   BMI 24.64 kg/m²       Intake/Output Summary (Last 24 hours) at 7/15/2021 1109  Last data filed at 7/15/2021 0835  Gross per 24 hour   Intake 1508.3 ml   Output 1050 ml   Net 458.3 ml        General Appearance: Well developed, well nourished, alert & oriented x 3,    no acute distress. Ears/Nose/Mouth/Throat: Hearing grossly normal.  Neck: Supple. Chest: Lungs clear to auscultation bilaterally. Cardiovascular: Regular rate and rhythm, S1,S2 normal, no murmur. Abdomen: Soft, non-tender, bowel sounds are active. Extremities: No edema bilaterally. Skin: Warm and dry. []         Post-cath site without hematoma, bruit, tenderness, or thrill. Distal pulses intact.     PMH/SH reviewed - no change compared to H&P    Data Review    Telemetry: normal sinus rhythm     EKG:   []  No new EKG for review    Lab Data Personally Reviewed:    Recent Labs     07/15/21  0145 07/14/21  0720   WBC 5.2 4.9   HGB 7.3* 6.7*   HCT 24.7* 23.5*    173     Recent Labs     07/15/21  0145 07/14/21  1748 07/14/21  0720   APTT 99.6* 54.9* >153.0*      Recent Labs     07/15/21  0154 07/15/21  0145 07/14/21 0720 07/12/21 2240 07/12/21 2240    141 142  142   < > 139   K 5.0 5.0 6.0*  6.0*   < > 5.7*   * 110* 114*  113*   < > 111*   CO2 22 21 16*  17*   < > 17*   BUN 67* 67* 72*  71*   < > 59*   CREA 6.31* 6.27* 6.41*  6.36*   < > 6.02*   * 101* 91  91   < > 110*   CA 7.8* 7.8* 8.3*  8.2*  8.2*   < > 8.5   MG  --   --  2.9*  --  2.8*    < > = values in this interval not displayed. Recent Labs     07/14/21 0720 07/13/21 0300 07/12/21 2240     --   --    TROIQ 0.17* 0.18* 0.18*     No results found for: CHOL, CHOLX, CHLST, CHOLV, HDL, HDLP, LDL, LDLC, DLDLP, Bernetta Rand, CHHD, CHHDX    Recent Labs     07/15/21  0154 07/15/21  0145 07/14/21 0720 07/12/21 2240 07/12/21  2240   AP 68  --  64  --  76   TP 6.3*  --  6.2*  --  7.2   ALB 3.1* 3.1* 3.2*  3.2*   < > 3.7   GLOB 3.2  --  3.0  --  3.5    < > = values in this interval not displayed. No results for input(s): PH, PCO2, PO2 in the last 72 hours.     Medications Personally Reviewed:    Current Facility-Administered Medications   Medication Dose Route Frequency    furosemide (LASIX) injection 80 mg  80 mg IntraVENous Q12H    tamsulosin (FLOMAX) capsule 0.4 mg  0.4 mg Oral DAILY    hydrALAZINE (APRESOLINE) tablet 50 mg  50 mg Oral TID    sodium bicarbonate tablet 650 mg  650 mg Oral TID    0.9% sodium chloride infusion 250 mL  250 mL IntraVENous PRN    furosemide (LASIX) injection 20 mg  20 mg IntraVENous ONCE PRN    calcitRIOL (ROCALTROL) capsule 0.25 mcg  0.25 mcg Oral DAILY    sevelamer carbonate (RENVELA) tab 800 mg  800 mg Oral TID WITH MEALS    ergocalciferol capsule 50,000 Units  50,000 Units Oral Q7D    iron sucrose (VENOFER) injection 200 mg  200 mg IntraVENous Q24H    hydrALAZINE (APRESOLINE) 20 mg/mL injection 40 mg  40 mg IntraVENous Q6H PRN    cloNIDine (CATAPRES) 0.3 mg/24 hr patch 1 Patch  1 Patch TransDERmal Q7D    heparin 25,000 units in D5W 250 ml infusion  18-36 Units/kg/hr (Adjusted) IntraVENous TITRATE    heparin (porcine) 1,000 unit/mL injection 6,340 Units  80 Units/kg (Adjusted) IntraVENous PRN    Or    heparin (porcine) 1,000 unit/mL injection 3,170 Units  40 Units/kg (Adjusted) IntraVENous PRN           Problem List:   Acute hypoxic respiratory failure and patient seems to be somewhat better. Severe anemia. Renal failure. Heart failure. Left upper extremity DVT. Minimal elevation of troponin I is probably not a presentation of myocardial infarction. Blood pressure still very high. 1.      Assessment/Plan:   Discussed with the pulmonologist.  Donald Way with the transfusion. I will adjust blood pressure medications. I will continue otherwise present care. Thank you. 1.          []       High complexity decision making was performed in this patient at high risk for decompensation with multiple organ involvement.     Helen Patel MD

## 2021-07-15 NOTE — CONSULTS
UROLOGY CONSULT    Immanuel Jean, [address] [facility] Office    Patient: Vinnie Brenner MRN: [id number]  SSN: [id number]    YOB: 1955  Age: 76 y.o. Sex: male          Date of Encounter:  July 15, 2021  ADMITTED: 2021 to Ethelene Soulier, MD by Neptali Ca MD for Pulmonary edema [J81.1]  Chief Complaint:  Swelling, SOB  Reason for consult:  Gross hematuria           History of Present Illness:  Patient is a 76 y.o. male admitted 2021 to the hospital for Pulmonary edema [J81.1]. He states he has CHF and CKD. He notes no difficulty voiding. He was thought to be in retention with a bladder scan >200cc. A jordan catheter was attempted and he had gross hematuria. He did have 500cc urine output. He is found to have ascites and anasarca. He denies a weak stream or incomplete emptying. No hematuria or dysuria. Past Medical History:  No Known Allergies   Prior to Admission medications    Medication Sig Start Date End Date Taking? Authorizing Provider   hydrALAZINE (APRESOLINE) 50 mg tablet Take 1 Tab by mouth three (3) times daily. 21  Yes Ethelene Soulier, MD   sodium bicarbonate 650 mg tablet Take 1 Tab by mouth two (2) times a day. 21  Yes Ethelene Soulier, MD      PMHx:  has a past medical history of Chronic kidney disease and Hypertension. PSurgHx:  has no past surgical history on file. PSocHx:  reports that he has never smoked. He has never used smokeless tobacco.   ROS:  Admission ROS by Neptali Ca MD from 2021 were reviewed with the patient and changes (other than per HPI) include: SOB, general edema none. All 12 systems were reviewed and were otherwise negative. Physical Exam:            Vitals[de-identified]    Temp (24hrs), Av.1 °F (36.7 °C), Min:97.3 °F (36.3 °C), Max:98.6 °F (37 °C)   Blood pressure (!) 179/103, pulse 91, temperature 98.6 °F (37 °C), resp. rate 18, height 6' (1.829 m), weight 181 lb 10.5 oz (82.4 kg), SpO2 94 %.    Estimated body mass index is 24.64 kg/m² as calculated from the following:    Height as of this encounter: 6' (1.829 m). Weight as of this encounter: 181 lb 10.5 oz (82.4 kg).              I&O's:    07/15 0701 - 07/15 1900  In: 56   Out: -    General Well developed, in NAD   Conjunctiva/Lids Normal without gross defects   Pupil/Iris Pupils equal, round, reactive, anicteric   External Ears/Nose No lesions or deformities   Hearing  Grossly intact   Neck Supple without obvious, masses   Lymphatic BUE/ BLE 3+ edema   Respiratory Effort Breathing easily, no audible wheezing, rhonchi, stridor   CV RRR   Abdomen / Flank Soft, non tender without guarding or rebound, without obvious masses, no CVA tenderness   Digits/ Nails No clubbing, cyanosis, petechiae      Blood at meatus   Skin Inspection Warm and dry, no obvious rashes   Neurologic Grossly normal without focal deficits   Judgement / Insight intact   Mood / Affect normal       Lab Results   Component Value Date/Time    WBC 5.2 07/15/2021 10:48 AM    HCT 27.4 (L) 07/15/2021 10:48 AM    PLATELET 429 63/96/4217 10:48 AM    Sodium 140 07/15/2021 01:54 AM    Potassium 5.0 07/15/2021 01:54 AM    Chloride 109 (H) 07/15/2021 01:54 AM    CO2 22 07/15/2021 01:54 AM    BUN 67 (H) 07/15/2021 01:54 AM    Creatinine 6.31 (H) 07/15/2021 01:54 AM    Glucose 101 (H) 07/15/2021 01:54 AM    Calcium 7.8 (L) 07/15/2021 01:54 AM    Magnesium 2.9 (H) 07/14/2021 07:20 AM    Prostate Specific Ag 5.2 (H) 07/15/2021 01:45 AM       UA:   Lab Results   Component Value Date/Time    Color Yellow/Straw 07/13/2021 04:00 PM    Appearance Clear 07/13/2021 04:00 PM    Specific gravity 1.009 07/13/2021 04:00 PM    pH (UA) 6.0 07/13/2021 04:00 PM    Protein >300 (A) 07/13/2021 04:00 PM    Glucose 50 (A) 07/13/2021 04:00 PM    Ketone Negative 07/13/2021 04:00 PM    Bilirubin Negative 07/13/2021 04:00 PM    Urobilinogen 0.1 07/13/2021 04:00 PM    Nitrites Negative 07/13/2021 04:00 PM    Leukocyte Esterase Negative 07/13/2021 04:00 PM    Bacteria Negative 07/13/2021 04:00 PM    WBC 0-4 07/13/2021 04:00 PM    RBC 0-5 07/13/2021 04:00 PM       Xrays:       Diagnoses and all orders for this visit:    1. Acute pulmonary edema (HCC)    2. Acute on chronic congestive heart failure, unspecified heart failure type ([facility]    3. Acute kidney injury ([facility]    4. Pneumonia due to infectious organism, unspecified laterality, unspecified part of lung    5. Septicemia ([facility]    6. Elevated troponin    7.  Acute hyperkalemia    Other orders  -     CULTURE, BLOOD, PAIRED; Standing  -     LACTIC ACID; Standing  -     VITAL SIGNS; Standing  -     SALINE LOCK IV; Standing  -     CBC WITH AUTOMATED DIFF; Standing  -     METABOLIC PANEL, COMPREHENSIVE; Standing  -     TROPONIN I; Standing  -     MAGNESIUM; Standing  -     XR CHEST PORT; Standing  -     BNP; Standing  -     EKG, 12 LEAD, INITIAL; Standing  -     HEMOGLOBIN A1C WITH EAG; Standing  -     TYPE & SCREEN; Standing  -     cefTRIAXone (ROCEPHIN) 1 g in sterile water (preservative free) 10 mL IV syringe  -     vancomycin (VANCOCIN) 1,500 mg in 0.9% sodium chloride 500 mL IVPB  -     hydrALAZINE (APRESOLINE) 20 mg/mL injection 20 mg  -     cloNIDine HCL (CATAPRES) tablet 0.1 mg  -     cloNIDine HCL (CATAPRES) tablet 0.2 mg  -     aspirin tablet 325 mg  -     LACTIC ACID; Standing  -     TROPONIN I; Standing  -     sodium bicarbonate 8.4 % (1 mEq/mL) injection 50 mEq  -     insulin regular (NOVOLIN R, HUMULIN R) injection 10 Units  -     dextrose (D50W) injection syrg 25 g  -     INITIAL PHYSICIAN ORDER: INPATIENT; Standing  -     CARDIAC MONITORING; Standing  -     COVID-19 RAPID TEST; Standing  -     IP CONSULT TO PULMONOLOGY; Standing  -     BLADDER SCAN POST-VOID; Standing  -     DUPLEX UPPER EXT VENOUS LEFT; Standing  -     XR CHEST PORT; Standing  -     RENAL FUNCTION PANEL; Standing  -     MAGNESIUM; Standing  -     TROPONIN I; Standing  -     hydrALAZINE (APRESOLINE) 20 mg/mL injection 40 mg  -     cloNIDine (CATAPRES) 0.3 mg/24 hr patch 1 Patch  -     IP CONSULT TO NEPHROLOGY; Standing  -     NOTIFY PROVIDER: LAB VALUES CHANGES; Standing  -     CBC WITH AUTOMATED DIFF; Standing  -     PTT; Standing  -     NOTIFY PROVIDER: SPECIFY; Standing  -     CBC WITH AUTOMATED DIFF; Standing  -     NOTIFY PROVIDER: LAB VALUES CHANGES; Standing  -     heparin 25,000 units in D5W 250 ml infusion  -     HGB & HCT; Standing  -     IP CONSULT TO CARDIOLOGY; Standing  -     CBC WITH AUTOMATED DIFF; Standing  -     METABOLIC PANEL, COMPREHENSIVE; Standing  -     URINALYSIS W/ REFLEX CULTURE; Standing  -     JONES CATHETER, INSERTION; Standing  -     BLADDER SCAN POST-VOID; Standing  -     CK; Standing  -     PROTEIN URINE, RANDOM; Standing  -     POTASSIUM, UR, RANDOM; Standing  -     PROTEIN/CREATININE RATIO, URINE; Standing  -     CREATININE, UR, RANDOM; Standing  -     SODIUM, UR, RANDOM; Standing  -     MRSA SCREEN - PCR (NASAL);  Standing  -     PTT; Standing  -     heparin (porcine) 1,000 unit/mL injection 6,340 Units  -     heparin (porcine) 1,000 unit/mL injection 3,170 Units  -     US RETROPERITONEUM COMP; Standing  -     PTH INTACT; Standing  -     IRON PROFILE; Standing  -     VITAMIN D, 25 HYDROXY; Standing  -     PTT; Standing  -     DIET ADULT; Standing  -     furosemide (LASIX) injection 80 mg  -     sodium polystyrene (KAYEXALATE) 15 gram/60 mL oral suspension 30 g  -     tamsulosin (FLOMAX) capsule 0.4 mg  -     hydrALAZINE (APRESOLINE) tablet 50 mg  -     sodium bicarbonate tablet 650 mg  -     0.9% sodium chloride infusion 250 mL  -     RBC, ALLOCATE; Standing  -     TRANSFUSE PACKED RBC'S; Standing  -     RENAL FUNCTION PANEL; Standing  -     CBC WITH AUTOMATED DIFF; Standing  -     BNP; Standing  -     IP CONSULT TO PICC TEAM; Standing  -     IP CONSULT TO WOUND CARE; Standing  -     PTT; Standing  -     furosemide (LASIX) injection 20 mg  -     furosemide (LASIX) injection 20 mg  -     VANCOMYCIN, RANDOM; Standing  -     PSA SCREENING (SCREENING); Standing  -     calcitRIOL (ROCALTROL) capsule 0.25 mcg  -     sodium zirconium cyclosilicate (LOKELMA) powder packet 10 g  -     sevelamer carbonate (RENVELA) tab 800 mg  -     ergocalciferol capsule 50,000 Units  -     OCCULT BLOOD, STOOL; Standing  -     iron sucrose (VENOFER) injection 200 mg  -     vancomycin (VANCOCIN) 750 mg in 0.9% sodium chloride 250 mL (VIAL-MATE)  -     VANCOMYCIN, RANDOM; Standing  -     PTT; Standing  -     PTT; Standing  -     RT--OVERNIGHT OXIMETRY; Standing  -     RT--OXYGEN CANNULA; Standing  -     CBC WITH AUTOMATED DIFF; Standing  -     XR CHEST PORT; Standing  -     RENAL FUNCTION PANEL; Standing  -     CBC WITH AUTOMATED DIFF; Standing  -     IP CONSULT TO UROLOGY; Standing  -     IP CONSULT TO HEMATOLOGY; Standing  -     amLODIPine (NORVASC) tablet 10 mg  -     PHOSPHOLIPASE A2 RECEPTOR AUTOANTIBODIES, IGG; Standing             Hospital Problems  Never Reviewed        Codes Class Noted POA    Pulmonary edema ICD-10-CM: J81.1  ICD-9-CM: 559  7/13/2021 Unknown              Assessment/Plan:  CHF, fluid overload, gross hematuria from traumatic jordan insertion. He is voiding now. I would hold on a jordan unless needed for fluid monitoring. I can follow.           Signed By: Vicky Preciado MD  - July 15, 2021

## 2021-07-15 NOTE — CONSULTS
Hematology Oncology Consultation    Reason for consult: Pulmonary emboli    Admitting Diagnosis: Pulmonary edema [J81.1]    Impression:   1. Acute symptomatic pulmonary emboli With DVT  -Presented with hypoxia requiring noninvasive ventilation  -currently on heparin drip  -Had bleeding as a consequence of Quesada insertion  -Renal insufficiency, eliquis at discharge once all bleeding stabilizes    2. Hematuria  -Related to traumatic Quesada only mild bleeding so far    Given severe life-threatening nature of pulmonary emboli does need long-term anticoagulation. Immobility and venous status in LE predisposing    Discussion: Elías Camarillo is a  76y.o. year old seen in consultation at the request of Dr. Stefanie Veloz for Anticoagulation management in the setting of pulmonary emboli. He presented for acute shortness of breath hypoxia was found to have a pulmonary emboli as well as DVTs in L arm. He was started on heparin. He did have some bleeding as a consequence for Quesada catheter inserted showing for management of renal insuffiency  Currently hematuria is controlled no other sites of bleeding falls or other changes.   He is substantially improved  Imaging:    Imaging reviewed    Labs:  Recent Results (from the past 24 hour(s))   PTT    Collection Time: 07/14/21  5:48 PM   Result Value Ref Range    aPTT 54.9 (H) 21.2 - 34.1 sec    aPTT, therapeutic range   82 - 109 sec   VANCOMYCIN, RANDOM    Collection Time: 07/14/21  5:48 PM   Result Value Ref Range    Vancomycin, random 12.3 ug/mL   OCCULT BLOOD, STOOL    Collection Time: 07/14/21  6:15 PM   Result Value Ref Range    Occult Blood,day 1 Negative Negative      Day 1 date: 7,152,021     RENAL FUNCTION PANEL    Collection Time: 07/15/21  1:45 AM   Result Value Ref Range    Sodium 141 136 - 145 mmol/L    Potassium 5.0 3.5 - 5.1 mmol/L    Chloride 110 (H) 97 - 108 mmol/L    CO2 21 21 - 32 mmol/L    Anion gap 10 5 - 15 mmol/L    Glucose 101 (H) 65 - 100 mg/dL    BUN 67 (H) 6 - 20 mg/dL    Creatinine 6.27 (H) 0.70 - 1.30 mg/dL    BUN/Creatinine ratio 11 (L) 12 - 20      GFR est AA 11 (L) >60 ml/min/1.73m2    GFR est non-AA 9 (L) >60 ml/min/1.73m2    Calcium 7.8 (L) 8.5 - 10.1 mg/dL    Phosphorus 8.4 (H) 2.6 - 4.7 mg/dL    Albumin 3.1 (L) 3.5 - 5.0 g/dL   CBC WITH AUTOMATED DIFF    Collection Time: 07/15/21  1:45 AM   Result Value Ref Range    WBC 5.2 4.1 - 11.1 K/uL    RBC 2.78 (L) 4.10 - 5.70 M/uL    HGB 7.3 (L) 12.1 - 17.0 g/dL    HCT 24.7 (L) 36.6 - 50.3 %    MCV 88.8 80.0 - 99.0 FL    MCH 26.3 26.0 - 34.0 PG    MCHC 29.6 (L) 30.0 - 36.5 g/dL    RDW 17.2 (H) 11.5 - 14.5 %    PLATELET 493 104 - 885 K/uL    MPV 10.1 8.9 - 12.9 FL    NRBC 0.0 0.0  WBC    ABSOLUTE NRBC 0.00 0.00 - 0.01 K/uL    NEUTROPHILS 80 (H) 32 - 75 %    LYMPHOCYTES 5 (L) 12 - 49 %    MONOCYTES 10 5 - 13 %    EOSINOPHILS 4 0 - 7 %    BASOPHILS 1 0 - 1 %    IMMATURE GRANULOCYTES 0 0 - 0.5 %    ABS. NEUTROPHILS 4.2 1.8 - 8.0 K/UL    ABS. LYMPHOCYTES 0.3 (L) 0.8 - 3.5 K/UL    ABS. MONOCYTES 0.5 0.0 - 1.0 K/UL    ABS. EOSINOPHILS 0.2 0.0 - 0.4 K/UL    ABS. BASOPHILS 0.0 0.0 - 0.1 K/UL    ABS. IMM.  GRANS. 0.0 0.00 - 0.04 K/UL    DF AUTOMATED     BNP    Collection Time: 07/15/21  1:45 AM   Result Value Ref Range    NT pro-BNP >35,000 (H) <125 pg/mL   PSA SCREENING (SCREENING)    Collection Time: 07/15/21  1:45 AM   Result Value Ref Range    Prostate Specific Ag 5.2 (H) 0.01 - 4.0 ng/mL   PTT    Collection Time: 07/15/21  1:45 AM   Result Value Ref Range    aPTT 99.6 (H) 21.2 - 34.1 sec    aPTT, therapeutic range   82 - 169 sec   METABOLIC PANEL, COMPREHENSIVE    Collection Time: 07/15/21  1:54 AM   Result Value Ref Range    Sodium 140 136 - 145 mmol/L    Potassium 5.0 3.5 - 5.1 mmol/L    Chloride 109 (H) 97 - 108 mmol/L    CO2 22 21 - 32 mmol/L    Anion gap 9 5 - 15 mmol/L    Glucose 101 (H) 65 - 100 mg/dL    BUN 67 (H) 6 - 20 mg/dL    Creatinine 6.31 (H) 0.70 - 1.30 mg/dL    BUN/Creatinine ratio 11 (L) 12 - 20 GFR est AA 11 (L) >60 ml/min/1.73m2    GFR est non-AA 9 (L) >60 ml/min/1.73m2    Calcium 7.8 (L) 8.5 - 10.1 mg/dL    Bilirubin, total 0.6 0.2 - 1.0 mg/dL    AST (SGOT) 15 15 - 37 U/L    ALT (SGPT) 15 12 - 78 U/L    Alk. phosphatase 68 45 - 117 U/L    Protein, total 6.3 (L) 6.4 - 8.2 g/dL    Albumin 3.1 (L) 3.5 - 5.0 g/dL    Globulin 3.2 2.0 - 4.0 g/dL    A-G Ratio 1.0 (L) 1.1 - 2.2     VANCOMYCIN, RANDOM    Collection Time: 07/15/21  1:54 AM   Result Value Ref Range    Vancomycin, random 28.8 ug/mL    Reported dose date Not provided      Reported dose: Not provided Units   PTT    Collection Time: 07/15/21 10:48 AM   Result Value Ref Range    aPTT 98.7 (H) 21.2 - 34.1 sec    aPTT, therapeutic range   82 - 109 sec   CBC WITH AUTOMATED DIFF    Collection Time: 07/15/21 10:48 AM   Result Value Ref Range    WBC 5.2 4.1 - 11.1 K/uL    RBC 3.05 (L) 4.10 - 5.70 M/uL    HGB 8.3 (L) 12.1 - 17.0 g/dL    HCT 27.4 (L) 36.6 - 50.3 %    MCV 89.8 80.0 - 99.0 FL    MCH 27.2 26.0 - 34.0 PG    MCHC 30.3 30.0 - 36.5 g/dL    RDW 17.0 (H) 11.5 - 14.5 %    PLATELET 898 965 - 277 K/uL    MPV 10.2 8.9 - 12.9 FL    NRBC 0.0 0.0  WBC    ABSOLUTE NRBC 0.00 0.00 - 0.01 K/uL    NEUTROPHILS 81 (H) 32 - 75 %    LYMPHOCYTES 4 (L) 12 - 49 %    MONOCYTES 10 5 - 13 %    EOSINOPHILS 4 0 - 7 %    BASOPHILS 1 0 - 1 %    IMMATURE GRANULOCYTES 0 0 - 0.5 %    ABS. NEUTROPHILS 4.2 1.8 - 8.0 K/UL    ABS. LYMPHOCYTES 0.2 (L) 0.8 - 3.5 K/UL    ABS. MONOCYTES 0.5 0.0 - 1.0 K/UL    ABS. EOSINOPHILS 0.2 0.0 - 0.4 K/UL    ABS. BASOPHILS 0.0 0.0 - 0.1 K/UL    ABS. IMM. GRANS. 0.0 0.00 - 0.04 K/UL    DF AUTOMATED         History:  Past Medical History:   Diagnosis Date    Chronic kidney disease     Hypertension       No past surgical history on file. Prior to Admission medications    Medication Sig Start Date End Date Taking? Authorizing Provider   hydrALAZINE (APRESOLINE) 50 mg tablet Take 1 Tab by mouth three (3) times daily.  1/13/21  Yes Scott Johns, MD   sodium bicarbonate 650 mg tablet Take 1 Tab by mouth two (2) times a day. 1/13/21  Yes Anuj Smith MD     No Known Allergies   Social History     Tobacco Use    Smoking status: Never Smoker    Smokeless tobacco: Never Used   Substance Use Topics    Alcohol use: Not on file      No family history on file. Current Medications:  Current Facility-Administered Medications   Medication Dose Route Frequency    [START ON 7/16/2021] amLODIPine (NORVASC) tablet 10 mg  10 mg Oral DAILY    furosemide (LASIX) injection 80 mg  80 mg IntraVENous Q12H    tamsulosin (FLOMAX) capsule 0.4 mg  0.4 mg Oral DAILY    hydrALAZINE (APRESOLINE) tablet 50 mg  50 mg Oral TID    sodium bicarbonate tablet 650 mg  650 mg Oral TID    0.9% sodium chloride infusion 250 mL  250 mL IntraVENous PRN    calcitRIOL (ROCALTROL) capsule 0.25 mcg  0.25 mcg Oral DAILY    sevelamer carbonate (RENVELA) tab 800 mg  800 mg Oral TID WITH MEALS    ergocalciferol capsule 50,000 Units  50,000 Units Oral Q7D    iron sucrose (VENOFER) injection 200 mg  200 mg IntraVENous Q24H    hydrALAZINE (APRESOLINE) 20 mg/mL injection 40 mg  40 mg IntraVENous Q6H PRN    cloNIDine (CATAPRES) 0.3 mg/24 hr patch 1 Patch  1 Patch TransDERmal Q7D    heparin 25,000 units in D5W 250 ml infusion  18-36 Units/kg/hr (Adjusted) IntraVENous TITRATE    heparin (porcine) 1,000 unit/mL injection 6,340 Units  80 Units/kg (Adjusted) IntraVENous PRN    Or    heparin (porcine) 1,000 unit/mL injection 3,170 Units  40 Units/kg (Adjusted) IntraVENous PRN         Review of Systems:  Constitutional No fevers, chills, night sweats, excessive fatigue or weight loss. Allergic/Immunologic No recent allergic reactions   Eyes No significant visual difficulties. No diplopia. ENMT No problems with hearing, no sore throat, no sinus drainage. Endocrine No hot flashes or night sweats.  No cold intolerance, polyuria, or polydipsia   Hematologic/Lymphatic No easy bruising or bleeding. The patient denies any tender or palpable lymph nodes   Breasts No abnormal masses of breast, nipple discharge or pain. Respiratory No dyspnea on exertion, orthopnea, chest pain, cough or hemoptysis. Cardiovascular No anginal chest pain, irregular heart beat, tachycardia, palpitations or orthopnea. Gastrointestinal No nausea, vomiting, diarrhea, constipation, cramping, dysphagia, reflux, heartburn, GI bleeding, or early satiety. No change in bowel habits. Genitourinary (M) No hematuria, dysuria, increased frequency, urgency, hesitancy or incontinence. Musculoskeletal No joint pain, swelling or redness. No decreased range of motion. Integumentary No chronic rashes, inflammation, ulcerations, pruritus, petechiae, purpura, ecchymoses, or skin changes. Neurologic No headache, blurred vision, and no areas of focal weakness or numbness. Normal gait. No sensory problems. Psychiatric No insomnia, depression, jaime or mood swings. No psychotropic drugs. Physical Exam:  Constitutional Alert, cooperative, oriented. Mood and affect appropriate. Appears close to chronological age. Well nourished. Well developed. Head Normocephalic; no scars   Eyes Conjunctivae and sclerae are clear and without icterus. Pupils are reactive and equal.   ENMT Sinuses are nontender. No oral exudates, ulcers, masses, thrush or mucositis. Oropharynx clear. Tongue normal.   Neck Supple without masses or thyromegaly. No jugular venous distension. Hematologic/Lymphatic No petechiae or purpura. No tender or palpable lymph nodes in the cervical, supraclavicular, axillary or inguinal area. Respiratory Lungs are clear to auscultation without rhonchi or wheezing. Cardiovascular Regular rate and rhythm of heart without murmurs, gallops or rubs. Chest / Line Site Chest is symmetric with no chest wall deformities. Abdomen Non-tender, non-distended, no masses, ascites or hepatosplenomegaly. Good bowel sounds.  No guarding or rebound tenderness. No pulsatile masses. Musculoskeletal No tenderness or swelling, normal range of motion without obvious weakness. Extremities No visible deformities, no cyanosis, clubbing or edema. Skin No rashes, scars, or lesions suggestive of malignancy. No petechiae, purpura, or ecchymoses. No excoriations. Neurologic No sensory or motor deficits, normal cerebellar function, normal gait, cranial nerves intact. Psychiatric Alert and oriented times three. Coherent speech. Verbalizes understanding of our discussions today.

## 2021-07-15 NOTE — PROGRESS NOTES
Consult  Pulmonary, Critical Care    Name: Giorgi Blanton MRN: [id number]   : 1955 Hospital: [address] [place] [address]   Date: 7/15/2021  Admission date: 2021 Hospital Day: 4       Subjective/Interval History:   Seen in the emergency room wearing noninvasive ventilator. Patient has underlying renal insufficiency developed worsening shortness of breath cough presented to the emergency room chest x-ray shows pulmonary edema. He was profoundly hypoxic is now on noninvasive ventilator and feels more comfortable. Has not had any significant urine output since he has been in the ER. He also complains of new onset left arm swelling   post void bladder scan showed greater than 200 cc urine retention and Quesada catheter placed with 500 cc removed. Overnight he has put out an additional 1200 cc. Respirations improved currently nonlabored on nasal oxygen. Duplex scan of the left arm did show left axillary and proximal brachial DVT and heparin was started. On heparin with Quesada catheter and he has had slight bleeding at the urethral meatus. Ultrasound of the abdomen is pending  7/15 ultrasound of the abdomen showed medical renal disease on the right with small kidney no hydronephrosis there was evidence of prostate enlargement. He is breathing easily at this point and on room air is running on oxygen saturation of 93%    Patient Active Problem List   Diagnosis Code    GI bleed K92.2    Pulmonary edema J81.1       IMPRESSION:   1. Acute hypoxic respiratory failure improved we will try on room air  2. Acute pulmonary edema clinically improved  3. 2 of 4 blood culture bottles with coagulase-negative staph aureus likely skin contaminant will discontinue vancomycin  4. Bilateral pleural effusions repeat chest x-ray in a.m.  5. Acute on chronic kidney disease abrupt worsening likely due to obstructive uropathy  6.  Obstructive uropathy Quesada catheter now in place with good urine output. 7. Severe hypertension currently on clonidine TTS 3 and hydralazine 50 mg 3 times daily  8. DVT left axillary and brachial currently on IV heparin  9. Anemia worsened yesterday transfusion ordered I cannot see any indication that it was given and hemoglobin is mildly improved today  10. Troponin leak likely due to renal insufficiency stable has not risen any further  Body mass index is 24.64 kg/m². 11.       RECOMMENDATIONS/PLAN:   1. Continue IV Lasix we will leave to renal to decide on switch to oral  2. Blood pressure remains a problem we will leave to renal to adjust medications  3. Continue Flomax for prostate enlargement although he denies frequency post void dribbling or small volume urine output  4. Continue sodium bicarb  5. We will place on room air and check overnight oximetry  6. Subjective/Initial History:       I was asked by Margarita Avitia MD to see Solitario Kay a 76 y.o.  male  in consultation for a chief complaint of shortness of breath pulmonary edema acute hypoxic respiratory failure        Patient PCP: Dede Caldwell MD  PMH:  has a past medical history of Chronic kidney disease and Hypertension. PSH:   has no past surgical history on file. FHX: family history is not on file. SHX:  reports that he has never smoked.  He has never used smokeless tobacco.    Systemic review somewhat limited as he is on noninvasive ventilator remains short of breath although states he is feeling better  General he has not noted any worsening edema of his upper legs fever chills or sweats does complain of new onset swelling of his left hand and arm  Eyes no double vision or momentary blindness  ENT no facial pain over the sinuses  Endocrinologic no polyuria polydipsia  Musculoskeletal no swollen tender joints  Neurologic no history seizures or syncope  Gastrointestinal no nausea vomiting acid indigestion  Genitourinary states he does still pass fair amount of urine each day has not noted any decrease in that.   Does not have to strain to urinate has no bleeding or drainage  Cardiovascular he is not aware of any underlying heart disease has not had chest pain diaphoresis has had worsening shortness of breath laying down and with exertion  Respiratory worsening shortness of breath over the last week or more no significant cough no sputum production no chills or sweats did have history COVID-19 pneumonitis January of this year    No Known Allergies   MEDS:   Current Facility-Administered Medications   Medication    VANCOMYCIN INFORMATION NOTE    furosemide (LASIX) injection 80 mg    tamsulosin (FLOMAX) capsule 0.4 mg    hydrALAZINE (APRESOLINE) tablet 50 mg    sodium bicarbonate tablet 650 mg    0.9% sodium chloride infusion 250 mL    VANCOMYCIN INFORMATION NOTE    furosemide (LASIX) injection 20 mg    calcitRIOL (ROCALTROL) capsule 0.25 mcg    sodium zirconium cyclosilicate (LOKELMA) powder packet 10 g    sevelamer carbonate (RENVELA) tab 800 mg    ergocalciferol capsule 50,000 Units    iron sucrose (VENOFER) injection 200 mg    hydrALAZINE (APRESOLINE) 20 mg/mL injection 40 mg    cloNIDine (CATAPRES) 0.3 mg/24 hr patch 1 Patch    heparin 25,000 units in D5W 250 ml infusion    heparin (porcine) 1,000 unit/mL injection 6,340 Units    Or    heparin (porcine) 1,000 unit/mL injection 3,170 Units        Current Facility-Administered Medications:     VANCOMYCIN INFORMATION NOTE, , Other, ONCE, Lindy Mckeon MD    furosemide (LASIX) injection 80 mg, 80 mg, IntraVENous, Q12H, Monica Crowder MD, 80 mg at 07/15/21 0848    tamsulosin (FLOMAX) capsule 0.4 mg, 0.4 mg, Oral, DAILY, Jacquie Driver MD, 0.4 mg at 07/15/21 1901    hydrALAZINE (APRESOLINE) tablet 50 mg, 50 mg, Oral, TID, Jacquie Driver MD, 50 mg at 07/15/21 0837    sodium bicarbonate tablet 650 mg, 650 mg, Oral, TID, Jacquie Driver MD, 650 mg at 07/15/21 0837    0.9% sodium chloride infusion 250 mL, 250 mL, IntraVENous, PRN, Vahe Schmitt MD    VANCOMYCIN INFORMATION NOTE, , Other, Rx Dosing/Monitoring, Kimberlee Councilman, MD    furosemide (LASIX) injection 20 mg, 20 mg, IntraVENous, ONCE PRN, Kimberlee Councilman, MD    calcitRIOL (ROCALTROL) capsule 0.25 mcg, 0.25 mcg, Oral, DAILY, Juan Manuel Almanzar MD, 0.25 mcg at 07/15/21 0837    sodium zirconium cyclosilicate (LOKELMA) powder packet 10 g, 10 g, Oral, DAILY, Juan Manuel Almanzar MD, 10 g at 21 1940    sevelamer carbonate (RENVELA) tab 800 mg, 800 mg, Oral, TID WITH MEALS, Juan Manuel Almanzar MD, 800 mg at 07/15/21 0836    ergocalciferol capsule 50,000 Units, 50,000 Units, Oral, Q7D, Juan Manuel Almanzar MD, 50,000 Units at 21 1841    iron sucrose (VENOFER) injection 200 mg, 200 mg, IntraVENous, Q24H, Juan Manuel Almanzar MD, 200 mg at 21 1940    hydrALAZINE (APRESOLINE) 20 mg/mL injection 40 mg, 40 mg, IntraVENous, Q6H PRN, Vahe Schmitt MD, 40 mg at 07/15/21 0204    cloNIDine (CATAPRES) 0.3 mg/24 hr patch 1 Patch, 1 Patch, TransDERmal, Q7D, Vahe Schmitt MD    heparin 25,000 units in D5W 250 ml infusion, 18-36 Units/kg/hr (Adjusted), IntraVENous, TITRATE, Kimberlee Councilman, MD, Last Rate: 13.5 mL/hr at 07/15/21 0836, 17 Units/kg/hr at 07/15/21 0836    heparin (porcine) 1,000 unit/mL injection 6,340 Units, 80 Units/kg (Adjusted), IntraVENous, PRN **OR** heparin (porcine) 1,000 unit/mL injection 3,170 Units, 40 Units/kg (Adjusted), IntraVENous, PRN, Kimberlee Councilman, MD, 3,170 Units at 21 0008      Objective:     Vital Signs: Telemetry:    normal sinus rhythm Intake/Output:   Visit Vitals  BP (!) 193/105 (BP 1 Location: Right upper arm, BP Patient Position: At rest;Semi fowlers)   Pulse 89   Temp 98.1 °F (36.7 °C)   Resp 17   Ht 6' (1.829 m)   Wt 82.4 kg (181 lb 10.5 oz)   SpO2 97%   BMI 24.64 kg/m²       Temp (24hrs), Av.9 °F (36.6 °C), Min:97.3 °F (36.3 °C), Max:98.6 °F (37 °C)        O2 Device: Nasal cannula O2 Flow Rate (L/min): 1 l/min Body mass index is 24.64 kg/m². Wt Readings from Last 4 Encounters:   07/15/21 82.4 kg (181 lb 10.5 oz)   01/05/21 79.4 kg (175 lb)          Intake/Output Summary (Last 24 hours) at 7/15/2021 1029  Last data filed at 7/15/2021 0835  Gross per 24 hour   Intake 1748.3 ml   Output 1050 ml   Net 698.3 ml       Last shift:      07/15 0701 - 07/15 1900  In: 310   Out: -   Last 3 shifts: 07/13 1901 - 07/15 0700  In: 1438.3 [P.O.:840]  Out: 2650 [Urine:2650]       Hemodynamics:    CO:    CI:    CVP:    SVR:   PAP Systolic:    PAP Diastolic:    PVR:    GJ53:       Ventilator Settings:      Mode Rate TV Press PEEP FiO2 PIP Min. Vent               28 %     9.7 l/min      Physical Exam:    General:  male; currently on nasal oxygen in no distress  HEENT: NCAT, visible oral mucosa is moist and clear  Eyes: anicteric; conjunctiva clear extraocular movements intact  Neck: no nodes,,no accessory MM use. no respiratory distress  Chest: no deformity,   Cardiac: Regular rate and rhythm  Lungs: distant breath sounds; clear anteriorly and laterally with rales in both bases  Abd: Soft positive bowel sounds no tenderness  Ext: Improvement edema pitting and lymphedema of the lower extremities with new onset  edema of the left arm; no joint swelling; No clubbing  : Cloudy urine  Neuro: Alert awake no distress speech is clear moves all 4 extremities  Psych-calm, oriented to person;   Skin: warm, dry, no cyanosis;   Pulses: Brachial and radial pulses intact  Capillary:slow capillary refill      Labs:    Recent Labs     07/15/21  0145 07/14/21  1748 07/14/21  0720 07/13/21 2003 07/13/21 2003 07/13/21  1312 07/12/21  2240   WBC 5.2  --  4.9  --   --   --  6.6   HGB 7.3*  --  6.7*  --  7.1*   < > 7.2*     --  173  --   --   --  193   APTT 99.6* 54.9* >153.0*   < > 72.3*   < >  --     < > = values in this interval not displayed.      Recent Labs     07/15/21  0154 07/15/21  0145 07/14/21  0720 07/13/21  0305 07/12/21  2240 07/12/21  2240    141 142  142  --    < > 139   K 5.0 5.0 6.0*  6.0*  --    < > 5.7*   * 110* 114*  113*  --    < > 111*   CO2 22 21 16*  17*  --    < > 17*   * 101* 91  91  --    < > 110*   BUN 67* 67* 72*  71*  --    < > 59*   CREA 6.31* 6.27* 6.41*  6.36*  --    < > 6.02*   CA 7.8* 7.8* 8.3*  8.2*  8.2*  --    < > 8.5   MG  --   --  2.9*  --   --  2.8*   PHOS  --  8.4* 8.5*  --   --   --    LAC  --   --   --  1.5  --  2.6*   ALB 3.1* 3.1* 3.2*  3.2*  --    < > 3.7   ALT 15  --  14  --   --  19    < > = values in this interval not displayed. 7/15 room air oxygen saturation 93%  currently on noninvasive ventilator inspiratory pressure 16 expiratory pressure six rate 16 FiO2 28% with oxygen saturation 96%   Recent Labs     07/14/21  0720 07/13/21  0300 07/12/21 2240     --   --    TROIQ 0.17* 0.18* 0.18*     BNP greater than 35,000  Lab Results   Component Value Date/Time    Culture result:  07/12/2021 10:40 PM     Two of four bottles have been flagged positive by instrument. Bottles have been sent to [facility] laboratory to assess for possible growth. Gram Positive Cocci in clusters CALLED TO AND READ BACK BY Taisha Wahl r.n. [phone] 07/14/2021 by dpw    Culture result:  07/12/2021 10:40 PM     Probable Staphylococcus species, coagulase negative growing in 2 of 4 bottles drawn (ONE AEROBIC, AND ONE ANAEROBIC BOTTLE    Culture result: No growth 6 days 01/05/2021 06:30 PM        Imaging:  I have personally reviewed the patients radiographs and have reviewed the reports:    CXR Results  (Last 48 hours)               07/12/21 2251  XR CHEST PORT Final result    Impression:  Findings/impression:       Cardiac silhouette is enlarged. Central vascular congestion and patchy central   airspace disease/edema. Suspect trace right pleural effusion. No evidence of pneumothorax. No acute osseous abnormality identified.            Narrative:  Study: XR CHEST PORT Clinical indication: sob       Comparison: None. To me chest x-ray consistent with cardiomegaly pulmonary edema and bilateral pleural effusions           Results from Hospital Encounter encounter on 07/12/21    XR CHEST PORT    Narrative  1 view comparison to 12    Continued cardiomegaly and congestion. If There is mild interstitial edema, it  is unchanged. Right pleural effusion and adjacent atelectasis have improved. XR CHEST PORT    Narrative  Study: XR CHEST PORT    Clinical indication: sob    Comparison: None. Impression  Findings/impression:    Cardiac silhouette is enlarged. Central vascular congestion and patchy central  airspace disease/edema. Suspect trace right pleural effusion. No evidence of pneumothorax. No acute osseous abnormality identified. Results from [place] encounter on 01/05/21    XR CHEST PORT    Narrative  Portable AP view at 1816 hours. Comparison 23 November 2008. Cardiac silhouette size is borderline enlarged. Arcuate lucency adjacent to the left side of the aortic knob margin could  reflect a tiny pneumomediastinum. Mild pulmonary vascular congestion, without overt edema. Patchy densities in the lower lungs could be minimal infiltrate. Consider  follow-up if warranted clinically. Impression  IMPRESSION: Subtle but potentially clinically significant findings as above. Suggest radiographic follow-up. Results from [place] encounter on 01/05/21    CT CHEST WO CONT    Narrative  Images obtained without contrast include the abdomen and pelvis. Comparison portable chest radiograph earlier today. Dose Reduction:  All CT scans at this facility are performed using dose reduction optimization  techniques as appropriate to a performed exam including the following: Automated  exposure control, adjustments of the mA and/or kV according to patient size, or  use of iterative reconstruction technique.     Subtle peripheral groundglass densities are noted in both lungs, could reflect  Covid pneumonia or other. No pneumothorax or pneumomediastinum. The radiographic findings suspicious for  pneumomediastinum probably was artifact, perhaps related to cardiac motion. No pleural effusion or adenopathy. Cardiomegaly again noted. Impression  IMPRESSION:  1. No pneumomediastinum or pneumothorax. 2. Cardiomegaly. 3. Subtle groundglass densities in both lungs might be pneumonia or other. Discussion patient with worsening renal insufficiency has developed pulmonary edema acute hypoxic respiratory failure. He states he still has urine output normally at home. We will give him high-dose IV Lasix to see if diuresis is induced. He very likely will need dialysis. He has minimal elevation in troponin probably troponin leak from hypoxia or just due to his renal insufficiency. He is not having any chest pain. Does have severe hypertension. Has been started on clonidine and hydralazine. 7/14 no distress today on nasal oxygen. Did have residual on post void bladder scan and Quesada catheter was placed with good diuresis overnight. Despite this potassium remains elevated. We will give 1 dose of Kayexalate. Despite diuresis hemoglobin is dropped to 6.7 will transfuse. He denies frequency small-volume urine or straining to urinate at home despite this with signs of obstruction we will add Flomax. Potassium 6 today we will give 1 dose of Kayexalate and continue diuresis  7/15 respirations nonlabored. Have placed on room air with oxygen saturation 93%. Will check overnight oximetry on room air. Blood pressure remains elevated. Will leave to renal to decide on further medications and switch Lasix to oral         Thank you for allowing us to participate in the care of this patient.   We will follow along with you     Daniel Gustafson MD

## 2021-07-16 ENCOUNTER — APPOINTMENT (OUTPATIENT)
Dept: GENERAL RADIOLOGY | Age: 66
DRG: 291 | End: 2021-07-16
Attending: INTERNAL MEDICINE
Payer: MEDICARE

## 2021-07-16 LAB
ABO + RH BLD: NORMAL
ALBUMIN SERPL-MCNC: 3.4 G/DL (ref 3.5–5)
ALBUMIN SERPL-MCNC: 3.5 G/DL (ref 3.5–5)
ALBUMIN/GLOB SERPL: 1 {RATIO} (ref 1.1–2.2)
ALP SERPL-CCNC: 69 U/L (ref 45–117)
ALT SERPL-CCNC: 16 U/L (ref 12–78)
ANION GAP SERPL CALC-SCNC: 12 MMOL/L (ref 5–15)
ANION GAP SERPL CALC-SCNC: 14 MMOL/L (ref 5–15)
APTT PPP: 128.8 SEC (ref 21.2–34.1)
APTT PPP: 76.3 SEC (ref 21.2–34.1)
APTT PPP: >153 SEC (ref 21.2–34.1)
AST SERPL W P-5'-P-CCNC: 18 U/L (ref 15–37)
BASOPHILS # BLD: 0 K/UL (ref 0–0.1)
BASOPHILS NFR BLD: 1 % (ref 0–1)
BILIRUB SERPL-MCNC: 0.7 MG/DL (ref 0.2–1)
BLD PROD TYP BPU: NORMAL
BLD PROD TYP BPU: NORMAL
BLOOD GROUP ANTIBODIES SERPL: NEGATIVE
BPU ID: NORMAL
BPU ID: NORMAL
BUN SERPL-MCNC: 67 MG/DL (ref 6–20)
BUN SERPL-MCNC: 67 MG/DL (ref 6–20)
BUN/CREAT SERPL: 10 (ref 12–20)
BUN/CREAT SERPL: 10 (ref 12–20)
CA-I BLD-MCNC: 8.2 MG/DL (ref 8.5–10.1)
CA-I BLD-MCNC: 8.3 MG/DL (ref 8.5–10.1)
CHLORIDE SERPL-SCNC: 109 MMOL/L (ref 97–108)
CHLORIDE SERPL-SCNC: 109 MMOL/L (ref 97–108)
CO2 SERPL-SCNC: 19 MMOL/L (ref 21–32)
CO2 SERPL-SCNC: 20 MMOL/L (ref 21–32)
CREAT SERPL-MCNC: 6.42 MG/DL (ref 0.7–1.3)
CREAT SERPL-MCNC: 6.43 MG/DL (ref 0.7–1.3)
CROSSMATCH RESULT,%XM: NORMAL
CROSSMATCH RESULT,%XM: NORMAL
DIFFERENTIAL METHOD BLD: ABNORMAL
EOSINOPHIL # BLD: 0.1 K/UL (ref 0–0.4)
EOSINOPHIL NFR BLD: 2 % (ref 0–7)
ERYTHROCYTE [DISTWIDTH] IN BLOOD BY AUTOMATED COUNT: 17.2 % (ref 11.5–14.5)
FOLATE SERPL-MCNC: 5.4 NG/ML (ref 5–21)
GLOBULIN SER CALC-MCNC: 3.4 G/DL (ref 2–4)
GLUCOSE SERPL-MCNC: 101 MG/DL (ref 65–100)
GLUCOSE SERPL-MCNC: 99 MG/DL (ref 65–100)
HCT VFR BLD AUTO: 28.8 % (ref 36.6–50.3)
HGB BLD-MCNC: 8.7 G/DL (ref 12.1–17)
IMM GRANULOCYTES # BLD AUTO: 0 K/UL (ref 0–0.04)
IMM GRANULOCYTES NFR BLD AUTO: 1 % (ref 0–0.5)
IRON SATN MFR SERPL: NORMAL % (ref 20–50)
IRON SERPL-MCNC: NORMAL UG/DL (ref 35–150)
LYMPHOCYTES # BLD: 0.3 K/UL (ref 0.8–3.5)
LYMPHOCYTES NFR BLD: 4 % (ref 12–49)
MCH RBC QN AUTO: 27.1 PG (ref 26–34)
MCHC RBC AUTO-ENTMCNC: 30.2 G/DL (ref 30–36.5)
MCV RBC AUTO: 89.7 FL (ref 80–99)
MONOCYTES # BLD: 0.7 K/UL (ref 0–1)
MONOCYTES NFR BLD: 10 % (ref 5–13)
NEUTS SEG # BLD: 5.4 K/UL (ref 1.8–8)
NEUTS SEG NFR BLD: 82 % (ref 32–75)
NRBC # BLD: 0 K/UL (ref 0–0.01)
NRBC BLD-RTO: 0 PER 100 WBC
PHOSPHATE SERPL-MCNC: 8.1 MG/DL (ref 2.6–4.7)
PLATELET # BLD AUTO: 174 K/UL (ref 150–400)
PMV BLD AUTO: 10.7 FL (ref 8.9–12.9)
POTASSIUM SERPL-SCNC: 4 MMOL/L (ref 3.5–5.1)
POTASSIUM SERPL-SCNC: 4 MMOL/L (ref 3.5–5.1)
PROT SERPL-MCNC: 6.9 G/DL (ref 6.4–8.2)
RBC # BLD AUTO: 3.21 M/UL (ref 4.1–5.7)
SODIUM SERPL-SCNC: 141 MMOL/L (ref 136–145)
SODIUM SERPL-SCNC: 142 MMOL/L (ref 136–145)
SPECIMEN EXP DATE BLD: NORMAL
STATUS OF UNIT,%ST: NORMAL
STATUS OF UNIT,%ST: NORMAL
THERAPEUTIC RANGE,PTTT: ABNORMAL SEC (ref 82–109)
TIBC SERPL-MCNC: NORMAL UG/DL (ref 250–450)
TRANSFUSION STATUS PATIENT QL: NORMAL
TRANSFUSION STATUS PATIENT QL: NORMAL
UNIT DIVISION, %UDIV: 0
UNIT DIVISION, %UDIV: 0
VIT B12 SERPL-MCNC: 544 PG/ML (ref 193–986)
WBC # BLD AUTO: 6.6 K/UL (ref 4.1–11.1)

## 2021-07-16 PROCEDURE — 85730 THROMBOPLASTIN TIME PARTIAL: CPT

## 2021-07-16 PROCEDURE — 83540 ASSAY OF IRON: CPT

## 2021-07-16 PROCEDURE — 71045 X-RAY EXAM CHEST 1 VIEW: CPT

## 2021-07-16 PROCEDURE — 82607 VITAMIN B-12: CPT

## 2021-07-16 PROCEDURE — 74011250636 HC RX REV CODE- 250/636: Performed by: INTERNAL MEDICINE

## 2021-07-16 PROCEDURE — 80069 RENAL FUNCTION PANEL: CPT

## 2021-07-16 PROCEDURE — 99232 SBSQ HOSP IP/OBS MODERATE 35: CPT | Performed by: UROLOGY

## 2021-07-16 PROCEDURE — 65270000029 HC RM PRIVATE

## 2021-07-16 PROCEDURE — 85025 COMPLETE CBC W/AUTO DIFF WBC: CPT

## 2021-07-16 PROCEDURE — 94762 N-INVAS EAR/PLS OXIMTRY CONT: CPT

## 2021-07-16 PROCEDURE — 94760 N-INVAS EAR/PLS OXIMETRY 1: CPT

## 2021-07-16 PROCEDURE — 74011250637 HC RX REV CODE- 250/637: Performed by: INTERNAL MEDICINE

## 2021-07-16 PROCEDURE — 80053 COMPREHEN METABOLIC PANEL: CPT

## 2021-07-16 PROCEDURE — 36415 COLL VENOUS BLD VENIPUNCTURE: CPT

## 2021-07-16 PROCEDURE — 83550 IRON BINDING TEST: CPT

## 2021-07-16 RX ADMIN — SODIUM BICARBONATE 650 MG: 650 TABLET ORAL at 08:32

## 2021-07-16 RX ADMIN — SODIUM BICARBONATE 650 MG: 650 TABLET ORAL at 21:32

## 2021-07-16 RX ADMIN — SEVELAMER CARBONATE 800 MG: 800 TABLET, FILM COATED ORAL at 17:19

## 2021-07-16 RX ADMIN — AMLODIPINE BESYLATE 10 MG: 5 TABLET ORAL at 08:33

## 2021-07-16 RX ADMIN — FUROSEMIDE 80 MG: 10 INJECTION, SOLUTION INTRAMUSCULAR; INTRAVENOUS at 21:32

## 2021-07-16 RX ADMIN — MINOXIDIL 2.5 MG: 2.5 TABLET ORAL at 21:32

## 2021-07-16 RX ADMIN — TAMSULOSIN HYDROCHLORIDE 0.4 MG: 0.4 CAPSULE ORAL at 08:33

## 2021-07-16 RX ADMIN — HEPARIN SODIUM 3170 UNITS: 1000 INJECTION INTRAVENOUS; SUBCUTANEOUS at 14:25

## 2021-07-16 RX ADMIN — SODIUM BICARBONATE 650 MG: 650 TABLET ORAL at 17:19

## 2021-07-16 RX ADMIN — SEVELAMER CARBONATE 800 MG: 800 TABLET, FILM COATED ORAL at 12:15

## 2021-07-16 RX ADMIN — HEPARIN SODIUM AND DEXTROSE 17 UNITS/KG/HR: 10000; 5 INJECTION INTRAVENOUS at 04:58

## 2021-07-16 RX ADMIN — FUROSEMIDE 80 MG: 10 INJECTION, SOLUTION INTRAMUSCULAR; INTRAVENOUS at 08:33

## 2021-07-16 RX ADMIN — HEPARIN SODIUM AND DEXTROSE 17 UNITS/KG/HR: 10000; 5 INJECTION INTRAVENOUS at 14:30

## 2021-07-16 RX ADMIN — HYDRALAZINE HYDROCHLORIDE 50 MG: 50 TABLET, FILM COATED ORAL at 08:33

## 2021-07-16 RX ADMIN — CALCITRIOL CAPSULES 0.25 MCG 0.25 MCG: 0.25 CAPSULE ORAL at 08:33

## 2021-07-16 RX ADMIN — HYDRALAZINE HYDROCHLORIDE 50 MG: 50 TABLET, FILM COATED ORAL at 17:19

## 2021-07-16 RX ADMIN — IRON SUCROSE 200 MG: 20 INJECTION, SOLUTION INTRAVENOUS at 21:32

## 2021-07-16 RX ADMIN — HEPARIN SODIUM AND DEXTROSE 15 UNITS/KG/HR: 10000; 5 INJECTION INTRAVENOUS at 06:40

## 2021-07-16 RX ADMIN — SEVELAMER CARBONATE 800 MG: 800 TABLET, FILM COATED ORAL at 08:33

## 2021-07-16 RX ADMIN — MINOXIDIL 2.5 MG: 2.5 TABLET ORAL at 08:33

## 2021-07-16 RX ADMIN — HYDRALAZINE HYDROCHLORIDE 50 MG: 50 TABLET, FILM COATED ORAL at 21:32

## 2021-07-16 NOTE — PROGRESS NOTES
UROLOGY Progress Note         [phone]      Daily Progress Note: 7/16/2021      Subjective: The patient is seen for UROLOGIC follow up secondary to urethral meatal bleeding after jordan insertion (on Heparin infusion). Patient is a 76 y.o. male admitted 7/12/2021 to the hospital for with pulmonary edema. He states he has CHF and CKD. He notes no difficulty voiding at baseline. He was thought to be in retention with a bladder scan >200cc. A jordan catheter was attempted and he had gross hematuria but more notable was the oozing from the meatus. He did have 500cc urine output. He is also found to have ascites and anasarca. US 7/14/21 shows medical renal disease, a large right sided renal cyst up to 7cm (other smaller cysts bilaterally), prostate enlargement and diffuse bladder wall thickening.     Problem List:  Patient Active Problem List   Diagnosis Code    GI bleed K92.2    Pulmonary edema J81.1         Medications reviewed  Current Facility-Administered Medications   Medication Dose Route Frequency    minoxidiL (LONITEN) tablet 2.5 mg  2.5 mg Oral BID    amLODIPine (NORVASC) tablet 10 mg  10 mg Oral DAILY    furosemide (LASIX) injection 80 mg  80 mg IntraVENous Q12H    tamsulosin (FLOMAX) capsule 0.4 mg  0.4 mg Oral DAILY    hydrALAZINE (APRESOLINE) tablet 50 mg  50 mg Oral TID    sodium bicarbonate tablet 650 mg  650 mg Oral TID    0.9% sodium chloride infusion 250 mL  250 mL IntraVENous PRN    calcitRIOL (ROCALTROL) capsule 0.25 mcg  0.25 mcg Oral DAILY    sevelamer carbonate (RENVELA) tab 800 mg  800 mg Oral TID WITH MEALS    ergocalciferol capsule 50,000 Units  50,000 Units Oral Q7D    iron sucrose (VENOFER) injection 200 mg  200 mg IntraVENous Q24H    hydrALAZINE (APRESOLINE) 20 mg/mL injection 40 mg  40 mg IntraVENous Q6H PRN    cloNIDine (CATAPRES) 0.3 mg/24 hr patch 1 Patch  1 Patch TransDERmal Q7D    heparin 25,000 units in D5W 250 ml infusion  18-36 Units/kg/hr (Adjusted) IntraVENous TITRATE    heparin (porcine) 1,000 unit/mL injection 6,340 Units  80 Units/kg (Adjusted) IntraVENous PRN    Or    heparin (porcine) 1,000 unit/mL injection 3,170 Units  40 Units/kg (Adjusted) IntraVENous PRN       Review of Systems:   Review of Systems   Constitutional: Negative for chills and fever. Respiratory: Positive for shortness of breath. Cardiovascular: Positive for leg swelling. Gastrointestinal: Negative for abdominal pain, nausea and vomiting. Genitourinary: Positive for hematuria. Negative for dysuria, frequency and urgency. Urine is slightly pink in urinal.           Objective:   Physical Exam  Vitals and nursing note reviewed. Genitourinary:     Penis: Normal.       Testes: Normal.      Comments: No ongoing bleeding or obvious meatal injury, no dysuria. Visit Vitals  BP (!) 176/100 (BP 1 Location: Right lower arm, BP Patient Position: At rest)   Pulse 79   Temp 97.6 °F (36.4 °C)   Resp 20   Ht 6' (1.829 m)   Wt 208 lb 8.9 oz (94.6 kg)   SpO2 96%   BMI 28.29 kg/m²         Data Review:       Recent Days:  Recent Labs     07/16/21  0346 07/15/21  1048 07/15/21  0145   WBC 6.6 5.2 5.2   HGB 8.7* 8.3* 7.3*   HCT 28.8* 27.4* 24.7*    157 171     Recent Labs     07/16/21  0346 07/15/21  0154 07/15/21  0145 07/14/21  0720 07/14/21  0720     142 140 141   < > 142  142   K 4.0  4.0 5.0 5.0   < > 6.0*  6.0*   *  109* 109* 110*   < > 114*  113*   CO2 20*  19* 22 21   < > 16*  17*   *  99 101* 101*   < > 91  91   BUN 67*  67* 67* 67*   < > 72*  71*   CREA 6.43*  6.42* 6.31* 6.27*   < > 6.41*  6.36*   CA 8.3*  8.2* 7.8* 7.8*   < > 8.3*  8.2*  8.2*   MG  --   --   --   --  2.9*   PHOS 8.1*  --  8.4*  --  8.5*   ALB 3.5  3.4* 3.1* 3.1*   < > 3.2*  3.2*   TBILI 0.7 0.6  --   --  0.5   ALT 16 15  --   --  14    < > = values in this interval not displayed.        Assessment/     Patient Active Problem List Diagnosis Code    GI bleed K92.2    Pulmonary edema J81.1       Plan:    GROSS HEMATURIA: thought to be related to traumatic jordan insertion. No obvious signs of ongoing bleeding. Will continue to monitor. BPH with RETENTION: On tamsulosin, continue. He is voiding now. Residual volumes on scans may reflect ascites. He denies voiding symptoms. DVT: acute upper extremity DVT x 2. On anticoagulation, necessary to continue. May exacerbate hematuria. Continue to observe at this time. RENAL CYST(S): bilateral cysts seen on US. No intervention necessary at this time. CKD: followed by Nephrology. Creatinine ~6.3. CHF: volume overload on admission, improving now. I personally had a face to face encounter with the patient and performed the history, physical, assessment and plan.    MD Cesar Chauhan NP

## 2021-07-16 NOTE — PROGRESS NOTES
Hematology Oncology Progress Note           Follow up for: Thrombosis  Chart notes reviewed since last visit. Continue to do well overnight no new shortness of breath or other changes no bruising bleeding falls rash cough brother issues      Patient Vitals for the past 24 hrs:   BP Temp Pulse Resp SpO2 Weight   07/16/21 1521 (!) 141/93 97.2 °F (36.2 °C) (!) 121 20 98 % --   07/16/21 0833 -- -- 91 -- 92 % --   07/16/21 0800 (!) 176/100 97.6 °F (36.4 °C) 79 20 96 % --   07/16/21 0345 -- -- -- -- -- 94.6 kg (208 lb 8.9 oz)   07/16/21 0234 (!) 163/103 97.7 °F (36.5 °C) 89 18 98 % --   07/15/21 2045 (!) 209/120 98.7 °F (37.1 °C) 92 18 96 % --       Review of Systems:    Constitutional No fevers, chills, night sweats, excessive fatigue or weight loss. Allergic/Immunologic No recent allergic reactions   Eyes No significant visual difficulties. No diplopia. ENMT No problems with hearing, no sore throat, no sinus drainage. Endocrine No hot flashes or night sweats. No cold intolerance, polyuria, or polydipsia   Hematologic/Lymphatic No easy bruising or bleeding. The patient denies any tender or palpable lymph nodes   Breasts No abnormal masses of breast, nipple discharge or pain. Respiratory No dyspnea on exertion, orthopnea, chest pain, cough or hemoptysis. Cardiovascular No anginal chest pain, irregular heart beat, tachycardia, palpitations or orthopnea. Gastrointestinal No nausea, vomiting, diarrhea, constipation, cramping, dysphagia, reflux, heartburn, GI bleeding, or early satiety. No change in bowel habits. Genitourinary (M) No hematuria, dysuria, increased frequency, urgency, hesitancy or incontinence. Musculoskeletal No joint pain, swelling or redness. No decreased range of motion. Integumentary No chronic rashes, inflammation, ulcerations, pruritus, petechiae, purpura, ecchymoses, or skin changes. Neurologic No headache, blurred vision, and no areas of focal weakness or numbness. Normal gait. No sensory problems. Psychiatric No insomnia, depression, jaime or mood swings. No psychotropic drugs       Physical Examination:  Constitutional Sedation to maintain respiratory support   Head Normocephalic; no scars   Eyes Conjunctivae and sclerae are clear and without icterus. Pupils are reactive and equal.   ENMT Sinuses are nontender. No oral exudates, ulcers, masses, thrush or mucositis. Oropharynx clear. Tongue normal.   Neck Supple without masses or thyromegaly. No jugular venous distension. Hematologic/Lymphatic No petechiae or purpura. No tender or palpable lymph nodes in the cervical, supraclavicular, axillary or inguinal area. Respiratory Lungs are clear to auscultation without rhonchi or wheezing. Cardiovascular Regular rate and rhythm of heart without murmurs, gallops or rubs. Chest / Line Site Chest is symmetric with no chest wall deformities. Abdomen Non-tender, non-distended, no masses, ascites or hepatosplenomegaly. Good bowel sounds. No guarding or rebound tenderness. No pulsatile masses. Musculoskeletal No tenderness or swelling, normal range of motion without obvious weakness. Extremities No visible deformities, no cyanosis, clubbing or edema. Skin No rashes, scars, or lesions suggestive of malignancy. No petechiae, purpura, or ecchymoses. No excoriations. Neurologic No sensory or motor deficits, normal cerebellar function, normal gait, cranial nerves intact. Psychiatric Alert and oriented times three. Coherent speech. Verbalizes understanding of our discussions today.        Labs:  Recent Results (from the past 24 hour(s))   CBC WITH AUTOMATED DIFF    Collection Time: 07/16/21  3:46 AM   Result Value Ref Range    WBC 6.6 4.1 - 11.1 K/uL    RBC 3.21 (L) 4.10 - 5.70 M/uL    HGB 8.7 (L) 12.1 - 17.0 g/dL    HCT 28.8 (L) 36.6 - 50.3 %    MCV 89.7 80.0 - 99.0 FL    MCH 27.1 26.0 - 34.0 PG    MCHC 30.2 30.0 - 36.5 g/dL    RDW 17.2 (H) 11.5 - 14.5 %    PLATELET 168 921 - 008 K/uL    MPV 10.7 8.9 - 12.9 FL    NRBC 0.0 0.0  WBC    ABSOLUTE NRBC 0.00 0.00 - 0.01 K/uL    NEUTROPHILS 82 (H) 32 - 75 %    LYMPHOCYTES 4 (L) 12 - 49 %    MONOCYTES 10 5 - 13 %    EOSINOPHILS 2 0 - 7 %    BASOPHILS 1 0 - 1 %    IMMATURE GRANULOCYTES 1 (H) 0 - 0.5 %    ABS. NEUTROPHILS 5.4 1.8 - 8.0 K/UL    ABS. LYMPHOCYTES 0.3 (L) 0.8 - 3.5 K/UL    ABS. MONOCYTES 0.7 0.0 - 1.0 K/UL    ABS. EOSINOPHILS 0.1 0.0 - 0.4 K/UL    ABS. BASOPHILS 0.0 0.0 - 0.1 K/UL    ABS. IMM. GRANS. 0.0 0.00 - 0.04 K/UL    DF AUTOMATED     METABOLIC PANEL, COMPREHENSIVE    Collection Time: 07/16/21  3:46 AM   Result Value Ref Range    Sodium 141 136 - 145 mmol/L    Potassium 4.0 3.5 - 5.1 mmol/L    Chloride 109 (H) 97 - 108 mmol/L    CO2 20 (L) 21 - 32 mmol/L    Anion gap 12 5 - 15 mmol/L    Glucose 101 (H) 65 - 100 mg/dL    BUN 67 (H) 6 - 20 mg/dL    Creatinine 6.43 (H) 0.70 - 1.30 mg/dL    BUN/Creatinine ratio 10 (L) 12 - 20      GFR est AA 11 (L) >60 ml/min/1.73m2    GFR est non-AA 9 (L) >60 ml/min/1.73m2    Calcium 8.3 (L) 8.5 - 10.1 mg/dL    Bilirubin, total 0.7 0.2 - 1.0 mg/dL    AST (SGOT) 18 15 - 37 U/L    ALT (SGPT) 16 12 - 78 U/L    Alk.  phosphatase 69 45 - 117 U/L    Protein, total 6.9 6.4 - 8.2 g/dL    Albumin 3.5 3.5 - 5.0 g/dL    Globulin 3.4 2.0 - 4.0 g/dL    A-G Ratio 1.0 (L) 1.1 - 2.2     RENAL FUNCTION PANEL    Collection Time: 07/16/21  3:46 AM   Result Value Ref Range    Sodium 142 136 - 145 mmol/L    Potassium 4.0 3.5 - 5.1 mmol/L    Chloride 109 (H) 97 - 108 mmol/L    CO2 19 (L) 21 - 32 mmol/L    Anion gap 14 5 - 15 mmol/L    Glucose 99 65 - 100 mg/dL    BUN 67 (H) 6 - 20 mg/dL    Creatinine 6.42 (H) 0.70 - 1.30 mg/dL    BUN/Creatinine ratio 10 (L) 12 - 20      GFR est AA 11 (L) >60 ml/min/1.73m2    GFR est non-AA 9 (L) >60 ml/min/1.73m2    Calcium 8.2 (L) 8.5 - 10.1 mg/dL    Phosphorus 8.1 (H) 2.6 - 4.7 mg/dL    Albumin 3.4 (L) 3.5 - 5.0 g/dL   IRON PROFILE    Collection Time: 07/16/21  3:46 AM   Result Value Ref Range    Iron SENT TO [facility] FOR TESTING 35 - 150 ug/dL    TIBC SENT TO [facility] FOR TESTING 250 - 450 ug/dL    Iron % saturation Cannot be calculated 20 - 50 %   VITAMIN B12 & FOLATE    Collection Time: 07/16/21  3:46 AM   Result Value Ref Range    Vitamin B12 544 193 - 986 pg/mL    Folate 5.4 5.0 - 21.0 ng/mL   PTT    Collection Time: 07/16/21  3:46 AM   Result Value Ref Range    aPTT 128.8 (HH) 21.2 - 34.1 sec    aPTT, therapeutic range   82 - 109 sec   PTT    Collection Time: 07/16/21  1:18 PM   Result Value Ref Range    aPTT 76.3 (H) 21.2 - 34.1 sec    aPTT, therapeutic range   82 - 109 sec       Imaging:    Assessment and Plan:   1. Acute symptomatic pulmonary emboli With DVT  -Presented with hypoxia requiring noninvasive ventilation  -currently on heparin drip  -Had bleeding as a consequence of Quesada insertion  -Renal insufficiency, eliquis at discharge once all bleeding stabilizes     2. Hematuria  -Related to traumatic Quesada only mild bleeding so far     Given severe life-threatening nature of pulmonary emboli does need long-term anticoagulation.  Immobility and venous status in LE predisposing    Will sign off; feel free to call with questions

## 2021-07-16 NOTE — PROGRESS NOTES
Progress Note      7/16/2021 9:54 AM  NAME: Elsie Coombs   MRN:  [id number]   Admit Diagnosis: Pulmonary edema [J81.1]      Subjective:   Chart reviewed. Patient feels better. Shortness of breath is somewhat better. Patient had a dialysis yesterday. Review of Systems:    Symptom Y/N Comments  Symptom Y/N Comments   Fever/Chills n   Chest Pain n    Poor Appetite    Edema y    Cough    Abdominal Pain     Sputum    Joint Pain n    SOB/CARMONA y  slowly improving. Pruritis/Rash     Nausea/vomit    Other     Diarrhea         Constipation           Could NOT obtain due to:      Objective:          Physical Exam:    Last 24hrs VS reviewed since prior progress note. Most recent are:    Visit Vitals  BP (!) 176/100 (BP 1 Location: Right lower arm, BP Patient Position: At rest)   Pulse 79   Temp 97.6 °F (36.4 °C)   Resp 20   Ht 6' (1.829 m)   Wt 94.6 kg (208 lb 8.9 oz)   SpO2 92%   BMI 28.29 kg/m²       Intake/Output Summary (Last 24 hours) at 7/16/2021 0839  Last data filed at 7/16/2021 0640  Gross per 24 hour   Intake --   Output 1350 ml   Net -1350 ml        General Appearance: Well developed, well nourished, alert & oriented x 3,    no acute distress. Ears/Nose/Mouth/Throat: Hearing grossly normal.  Neck: Supple. Chest: Lungs clear to auscultation bilaterally. Cardiovascular: Regular rate and rhythm, S1,S2 normal, no murmur. Abdomen: Soft, non-tender, bowel sounds are active. Extremities: Lower extremity edema left upper extremity edema evident  Skin: Warm and dry. []         Post-cath site without hematoma, bruit, tenderness, or thrill. Distal pulses intact.     PMH/SH reviewed - no change compared to H&P    Data Review    Telemetry: normal sinus rhythm     EKG:   []  No new EKG for review    Lab Data Personally Reviewed:    Recent Labs     07/16/21  0346 07/15/21  1048   WBC 6.6 5.2   HGB 8.7* 8.3*   HCT 28.8* 27.4*    157     Recent Labs     07/16/21  0346 07/15/21  1048 07/15/21  0145 APTT 128.8* 98.7* 99.6*      Recent Labs     07/16/21  0346 07/15/21  0154 07/15/21  0145 07/14/21 0720 07/14/21 0720     142 140 141   < > 142  142   K 4.0  4.0 5.0 5.0   < > 6.0*  6.0*   *  109* 109* 110*   < > 114*  113*   CO2 20*  19* 22 21   < > 16*  17*   BUN 67*  67* 67* 67*   < > 72*  71*   CREA 6.43*  6.42* 6.31* 6.27*   < > 6.41*  6.36*   *  99 101* 101*   < > 91  91   CA 8.3*  8.2* 7.8* 7.8*   < > 8.3*  8.2*  8.2*   MG  --   --   --   --  2.9*    < > = values in this interval not displayed. Recent Labs     07/14/21 0720      TROIQ 0.17*     No results found for: CHOL, CHOLX, CHLST, CHOLV, HDL, HDLP, LDL, LDLC, DLDLP, Karine Mellow, CHHD, CHHDX    Recent Labs     07/16/21  0346 07/15/21  0154 07/15/21  0145 07/14/21  0720 07/14/21 0720   AP 69 68  --   --  64   TP 6.9 6.3*  --   --  6.2*   ALB 3.5  3.4* 3.1* 3.1*   < > 3.2*  3.2*   GLOB 3.4 3.2  --   --  3.0    < > = values in this interval not displayed. No results for input(s): PH, PCO2, PO2 in the last 72 hours.     Medications Personally Reviewed:    Current Facility-Administered Medications   Medication Dose Route Frequency    minoxidiL (LONITEN) tablet 2.5 mg  2.5 mg Oral BID    amLODIPine (NORVASC) tablet 10 mg  10 mg Oral DAILY    furosemide (LASIX) injection 80 mg  80 mg IntraVENous Q12H    tamsulosin (FLOMAX) capsule 0.4 mg  0.4 mg Oral DAILY    hydrALAZINE (APRESOLINE) tablet 50 mg  50 mg Oral TID    sodium bicarbonate tablet 650 mg  650 mg Oral TID    0.9% sodium chloride infusion 250 mL  250 mL IntraVENous PRN    calcitRIOL (ROCALTROL) capsule 0.25 mcg  0.25 mcg Oral DAILY    sevelamer carbonate (RENVELA) tab 800 mg  800 mg Oral TID WITH MEALS    ergocalciferol capsule 50,000 Units  50,000 Units Oral Q7D    iron sucrose (VENOFER) injection 200 mg  200 mg IntraVENous Q24H    hydrALAZINE (APRESOLINE) 20 mg/mL injection 40 mg  40 mg IntraVENous Q6H PRN    cloNIDine (CATAPRES) 0.3 mg/24 hr patch 1 Patch  1 Patch TransDERmal Q7D    heparin 25,000 units in D5W 250 ml infusion  18-36 Units/kg/hr (Adjusted) IntraVENous TITRATE    heparin (porcine) 1,000 unit/mL injection 6,340 Units  80 Units/kg (Adjusted) IntraVENous PRN    Or    heparin (porcine) 1,000 unit/mL injection 3,170 Units  40 Units/kg (Adjusted) IntraVENous PRN           Problem List:   Acute hypoxic respiratory failure and patient seems to be somewhat better. Severe anemia. Renal failure. Heart failure. Left upper extremity DVT. Minimal elevation of troponin I is probably not a presentation of myocardial infarction. Blood pressure still very high. 1.      Assessment/Plan:   Discussed with the pulmonologist.  Tip Checo with the transfusion. I will adjust blood pressure medications. I will continue otherwise present care. Thank you. 1.          []       High complexity decision making was performed in this patient at high risk for decompensation with multiple organ involvement.     Alessandra Joyner MD

## 2021-07-16 NOTE — PROGRESS NOTES
Chart reviewed, patient continues to have no CM needs at this time. CM continues to monitor chart for needs.

## 2021-07-16 NOTE — PROGRESS NOTES
Progress Note    Shan Lovelace MD             Daily Progress Note: 7/16/2021      Subjective: The patient is seen for follow  up. Patient is lying in the bed comfortably. Gets short of breath on minimal exertion. But not as bad as yesterday. No history of cough fever or chills. No more gross hematuria. Problem List:  Problem List as of 7/16/2021 Never Reviewed        Codes Class Noted - Resolved    Pulmonary edema ICD-10-CM: J81.1  ICD-9-CM: 619  7/13/2021 - Present        GI bleed ICD-10-CM: K92.2  ICD-9-CM: 578.9  1/5/2021 - Present              Medications reviewed  Current Facility-Administered Medications   Medication Dose Route Frequency    minoxidiL (LONITEN) tablet 2.5 mg  2.5 mg Oral BID    amLODIPine (NORVASC) tablet 10 mg  10 mg Oral DAILY    furosemide (LASIX) injection 80 mg  80 mg IntraVENous Q12H    tamsulosin (FLOMAX) capsule 0.4 mg  0.4 mg Oral DAILY    hydrALAZINE (APRESOLINE) tablet 50 mg  50 mg Oral TID    sodium bicarbonate tablet 650 mg  650 mg Oral TID    0.9% sodium chloride infusion 250 mL  250 mL IntraVENous PRN    calcitRIOL (ROCALTROL) capsule 0.25 mcg  0.25 mcg Oral DAILY    sevelamer carbonate (RENVELA) tab 800 mg  800 mg Oral TID WITH MEALS    ergocalciferol capsule 50,000 Units  50,000 Units Oral Q7D    iron sucrose (VENOFER) injection 200 mg  200 mg IntraVENous Q24H    hydrALAZINE (APRESOLINE) 20 mg/mL injection 40 mg  40 mg IntraVENous Q6H PRN    cloNIDine (CATAPRES) 0.3 mg/24 hr patch 1 Patch  1 Patch TransDERmal Q7D    heparin 25,000 units in D5W 250 ml infusion  18-36 Units/kg/hr (Adjusted) IntraVENous TITRATE    heparin (porcine) 1,000 unit/mL injection 6,340 Units  80 Units/kg (Adjusted) IntraVENous PRN    Or    heparin (porcine) 1,000 unit/mL injection 3,170 Units  40 Units/kg (Adjusted) IntraVENous PRN       Review of Systems:   A comprehensive review of systems was negative except for that written in the HPI.     Objective:   Physical Exam: Visit Vitals  BP (!) 141/93 (BP 1 Location: Right lower arm, BP Patient Position: At rest)   Pulse (!) 121   Temp 97.2 °F (36.2 °C)   Resp 20   Ht 6' (1.829 m)   Wt 94.6 kg (208 lb 8.9 oz)   SpO2 98%   BMI 28.29 kg/m²    O2 Flow Rate (L/min): 1 l/min O2 Device: Nasal cannula    Temp (24hrs), Av.8 °F (36.6 °C), Min:97.2 °F (36.2 °C), Max:98.7 °F (37.1 °C)    701 - 1900  In: -   Out: 600 [Urine:600]   1901 - 700  In: 790 [P.O.:480]  Out: 2200 [Urine:2200]    General:  Alert, cooperative, no distress, appears stated age. Lungs:   Clear to auscultation bilaterally. Chest wall:  No tenderness or deformity. Heart:  Regular rate and rhythm, S1, S2 normal, no murmur, click, rub or gallop. Abdomen:   Soft, non-tender. Bowel sounds normal. No masses,  No organomegaly. Extremities:  Edema on both lower extremities are getting better. Has swelling on the left upper limb. The blister on the left great toe is also getting dried up. .   Pulses: 2+ and symmetric all extremities. Skin: Skin color, texture, turgor normal. No rashes or lesions   Neurologic: CNII-XII intact.  No gross sensory or motor deficits     Data Review:       Recent Days:  Recent Labs     21  0346 07/15/21  1048 07/15/21  0145   WBC 6.6 5.2 5.2   HGB 8.7* 8.3* 7.3*   HCT 28.8* 27.4* 24.7*    157 171     Recent Labs     21  0346 07/15/21  0154 07/15/21  0145 21  0720 21  0720     142 140 141   < > 142  142   K 4.0  4.0 5.0 5.0   < > 6.0*  6.0*   *  109* 109* 110*   < > 114*  113*   CO2 20*  19* 22 21   < > 16*  17*   *  99 101* 101*   < > 91  91   BUN 67*  67* 67* 67*   < > 72*  71*   CREA 6.43*  6.42* 6.31* 6.27*   < > 6.41*  6.36*   CA 8.3*  8.2* 7.8* 7.8*   < > 8.3*  8.2*  8.2*   MG  --   --   --   --  2.9*   PHOS 8.1*  --  8.4*  --  8.5*   ALB 3.5  3.4* 3.1* 3.1*   < > 3.2*  3.2*   TBILI 0.7 0.6  --   --  0.5   ALT 16 15  --   --  14    < > = values in this interval not displayed. No results for input(s): PH, PCO2, PO2, HCO3, FIO2 in the last 72 hours. Results     Procedure Component Value Units Date/Time    MRSA SCREEN - PCR (NASAL) [[id number]] Collected: 07/13/21 1730    Order Status: Completed Specimen: Swab Updated: 07/13/21 2123     MRSA by PCR, Nasal Not Detected       COVID-19 RAPID TEST [[id number]] Collected: 07/13/21 0539    Order Status: Completed Specimen: Nasopharyngeal Updated: 07/13/21 0655     Specimen source Nasopharyngeal        COVID-19 rapid test Not Detected        Comment: Rapid Abbott ID Now   Rapid NAAT:  The specimen is NEGATIVE for SARS-CoV-2, the novel coronavirus associated with COVID-19. Negative results should be treated as presumptive and, if inconsistent with clinical signs and symptoms or necessary for patient management, should be tested with an alternative molecular assay. Negative results do not preclude SARS-CoV-2 infection and should not be used as the sole basis for patient management decisions. This test has been authorized by the FDA under   an Emergency Use Authorization (EUA) for use by authorized laboratories. Fact sheet for Healthcare Providers: ConventionUpdate.co.nz Fact sheet for Patients: ConventionUpdate.co.nz   Methodology: Isothermal Nucleic Acid Amplification         CULTURE, BLOOD, PAIRED [[id number]] Collected: 07/12/21 2240    Order Status: Completed Specimen: Blood Updated: 07/16/21 1346     Special Requests: No Special Requests        Culture result:       Two of four bottles have been flagged positive by instrument. Bottles have been sent to [facility] laboratory to assess for possible growth.  Gram Positive Cocci in clusters CALLED TO AND READ BACK BY chiquita mtz r.n. 52Nena 07/14/2021 by hanane                  Staphylococcus species, coagulase negative GROWING IN THE 1ST OF 4 BOTTLES DRAWN                  Staphylococcus species, coagulase negative (2nd colony type/strain) GROWING IN THE 2ND OF 4 BOTTLES DRAWN               24 Hour Results:  Recent Results (from the past 24 hour(s))   CBC WITH AUTOMATED DIFF    Collection Time: 07/16/21  3:46 AM   Result Value Ref Range    WBC 6.6 4.1 - 11.1 K/uL    RBC 3.21 (L) 4.10 - 5.70 M/uL    HGB 8.7 (L) 12.1 - 17.0 g/dL    HCT 28.8 (L) 36.6 - 50.3 %    MCV 89.7 80.0 - 99.0 FL    MCH 27.1 26.0 - 34.0 PG    MCHC 30.2 30.0 - 36.5 g/dL    RDW 17.2 (H) 11.5 - 14.5 %    PLATELET 181 995 - 600 K/uL    MPV 10.7 8.9 - 12.9 FL    NRBC 0.0 0.0  WBC    ABSOLUTE NRBC 0.00 0.00 - 0.01 K/uL    NEUTROPHILS 82 (H) 32 - 75 %    LYMPHOCYTES 4 (L) 12 - 49 %    MONOCYTES 10 5 - 13 %    EOSINOPHILS 2 0 - 7 %    BASOPHILS 1 0 - 1 %    IMMATURE GRANULOCYTES 1 (H) 0 - 0.5 %    ABS. NEUTROPHILS 5.4 1.8 - 8.0 K/UL    ABS. LYMPHOCYTES 0.3 (L) 0.8 - 3.5 K/UL    ABS. MONOCYTES 0.7 0.0 - 1.0 K/UL    ABS. EOSINOPHILS 0.1 0.0 - 0.4 K/UL    ABS. BASOPHILS 0.0 0.0 - 0.1 K/UL    ABS. IMM. GRANS. 0.0 0.00 - 0.04 K/UL    DF AUTOMATED     METABOLIC PANEL, COMPREHENSIVE    Collection Time: 07/16/21  3:46 AM   Result Value Ref Range    Sodium 141 136 - 145 mmol/L    Potassium 4.0 3.5 - 5.1 mmol/L    Chloride 109 (H) 97 - 108 mmol/L    CO2 20 (L) 21 - 32 mmol/L    Anion gap 12 5 - 15 mmol/L    Glucose 101 (H) 65 - 100 mg/dL    BUN 67 (H) 6 - 20 mg/dL    Creatinine 6.43 (H) 0.70 - 1.30 mg/dL    BUN/Creatinine ratio 10 (L) 12 - 20      GFR est AA 11 (L) >60 ml/min/1.73m2    GFR est non-AA 9 (L) >60 ml/min/1.73m2    Calcium 8.3 (L) 8.5 - 10.1 mg/dL    Bilirubin, total 0.7 0.2 - 1.0 mg/dL    AST (SGOT) 18 15 - 37 U/L    ALT (SGPT) 16 12 - 78 U/L    Alk.  phosphatase 69 45 - 117 U/L    Protein, total 6.9 6.4 - 8.2 g/dL    Albumin 3.5 3.5 - 5.0 g/dL    Globulin 3.4 2.0 - 4.0 g/dL    A-G Ratio 1.0 (L) 1.1 - 2.2     RENAL FUNCTION PANEL    Collection Time: 07/16/21  3:46 AM   Result Value Ref Range    Sodium 142 136 - 145 mmol/L    Potassium 4.0 3.5 - 5.1 mmol/L Chloride 109 (H) 97 - 108 mmol/L    CO2 19 (L) 21 - 32 mmol/L    Anion gap 14 5 - 15 mmol/L    Glucose 99 65 - 100 mg/dL    BUN 67 (H) 6 - 20 mg/dL    Creatinine 6.42 (H) 0.70 - 1.30 mg/dL    BUN/Creatinine ratio 10 (L) 12 - 20      GFR est AA 11 (L) >60 ml/min/1.73m2    GFR est non-AA 9 (L) >60 ml/min/1.73m2    Calcium 8.2 (L) 8.5 - 10.1 mg/dL    Phosphorus 8.1 (H) 2.6 - 4.7 mg/dL    Albumin 3.4 (L) 3.5 - 5.0 g/dL   IRON PROFILE    Collection Time: 07/16/21  3:46 AM   Result Value Ref Range    Iron SENT TO [facility] FOR TESTING 35 - 150 ug/dL    TIBC SENT TO [facility] FOR TESTING 250 - 450 ug/dL    Iron % saturation Cannot be calculated 20 - 50 %   VITAMIN B12 & FOLATE    Collection Time: 07/16/21  3:46 AM   Result Value Ref Range    Vitamin B12 544 193 - 986 pg/mL    Folate 5.4 5.0 - 21.0 ng/mL   PTT    Collection Time: 07/16/21  3:46 AM   Result Value Ref Range    aPTT 128.8 (HH) 21.2 - 34.1 sec    aPTT, therapeutic range   82 - 109 sec   PTT    Collection Time: 07/16/21  1:18 PM   Result Value Ref Range    aPTT 76.3 (H) 21.2 - 34.1 sec    aPTT, therapeutic range   82 - 109 sec       XR CHEST PORT   Final Result   Findings/impression: Stable cardiomediastinal silhouette. Similar central   pulmonary vascular congestion and bilateral patchy airspace opacities. No   significant pleural effusion. No pneumothorax. Findings are not significantly changed. XR CHEST PORT   Final Result      US RETROPERITONEUM COMP   Final Result   Medical renal disease on the right      DUPLEX UPPER EXT VENOUS LEFT   Final Result      XR CHEST PORT   Final Result   Findings/impression:      Cardiac silhouette is enlarged. Central vascular congestion and patchy central   airspace disease/edema. Suspect trace right pleural effusion. No evidence of pneumothorax. No acute osseous abnormality identified.               Assessment: Acute pulmonary edema  Acute DVT left upper limb  Hypertension  Acute on chronic renal failure  Anemia  Gross hematuria resolved        Plan: Patient's hemoglobin is still stable. Looks like his urine is clearing up. I will still wait before I start him on oral anticoagulants. Care Plan discussed with: Patient/Family as well as RN taking care of the patient    Total time spent with patient: 30 minutes.     David Viera MD

## 2021-07-16 NOTE — WOUND CARE
IP WOUND CONSULT    [facility]  RECORD NUMBER:  [id number]  AGE: 76 y.o. GENDER: male  : 1955  TODAY'S DATE:  2021    GENERAL     [] Follow-up   [x] New Consult    Laila Patterson is a 76 y.o. male referred by:   [x] Physician  [] Nursing  [] Other:         PAST MEDICAL HISTORY    Past Medical History:   Diagnosis Date    Chronic kidney disease     Hypertension         PAST SURGICAL HISTORY    No past surgical history on file. FAMILY HISTORY    No family history on file. ALLERGIES    No Known Allergies    MEDICATIONS    No current facility-administered medications on file prior to encounter. Current Outpatient Medications on File Prior to Encounter   Medication Sig Dispense Refill    hydrALAZINE (APRESOLINE) 50 mg tablet Take 1 Tab by mouth three (3) times daily. 90 Tab 0    sodium bicarbonate 650 mg tablet Take 1 Tab by mouth two (2) times a day.  60 Tab 0         [unfilled]  Visit Vitals  BP (!) 141/93 (BP 1 Location: Right lower arm, BP Patient Position: At rest)   Pulse (!) 121   Temp 97.2 °F (36.2 °C)   Resp 20   Ht 6' (1.829 m)   Wt 94.6 kg (208 lb 8.9 oz)   SpO2 98%   BMI 28.29 kg/m²       ASSESSMENT     Wound Identification & Type: Trauma to left 1st toe   Dressing change: NA  Verbal consent for picture: Yes    Contributing Factors: anticoagulation therapy and decreased mobility    Wound Toe (Comment  which one) Left Dry eschar, intact 21 (Active)   Wound Image   21 1608   Wound Etiology Traumatic 21 1608   Cleansed Cleansed with saline 21 1608   Dressing/Treatment Open to air 21 1608   Wound Length (cm) 3.3 cm 21 1608   Wound Width (cm) 5 cm 21 1608   Wound Depth (cm) 0 cm 21 1608   Wound Surface Area (cm^2) 16.5 cm^2 21 1608   Wound Volume (cm^3) 0 cm^3 21 1608   Wound Assessment Eschar dry 21 1608   Drainage Amount None 21 1608   Wound Odor None 21 1608 Sera-Wound/Incision Assessment Dry/flaky 07/16/21 1608   Edges Attached edges 07/16/21 1608   Number of days: 0          PLAN     Skin Care & Pressure Relief Recommendations  Minimize layers of linen  Turn/reposition approximately every 2 hours    Alfa Koroma  Blood Glucose: 101 on 7/16/21                             Albumin: 3.5 on 7/16/21  WBCs: 6.6 on 7/16/21    Support Surface: Gel mattress    Physician/Provider notified:   Recommendations: Per patient, he hit his toe on something at home a week or 2 ago. Did not come to ER when it occurred. Eschar present but no signs of infection, no erythema or odor. Non-tender per the patient. Dry eschar feels a bit fluctuant. Unable to determine if any fluid is collecting beneath the eschar. Continue to monitor and re-consult [facility] if wound condition changes. Patient moves independently in the bed as witnessed. Ensure turning q2h at 30 degree angle. Emptied urinal of 600 mLs, charted in I&O flowchart and informed [address] [place] [address] Road.         Teaching completed with:   [] Patient           [] Family member       [] Caregiver          [] Nursing  [] Other    Patient/Caregiver Teaching:  Level of patient/caregiver understanding able to:   [] Indicates understanding       [] Needs reinforcement  [] Unsuccessful      [] Verbal Understanding  [] Demonstrated understanding       [] No evidence of learning  [] Refused teaching         [] N/A       Electronically signed by Flaco Velazquez RN on 7/16/2021 at 4:11 PM

## 2021-07-17 ENCOUNTER — APPOINTMENT (OUTPATIENT)
Dept: NON INVASIVE DIAGNOSTICS | Age: 66
DRG: 291 | End: 2021-07-17
Attending: SURGERY
Payer: MEDICARE

## 2021-07-17 LAB
ALBUMIN SERPL-MCNC: 3.3 G/DL (ref 3.5–5)
ALBUMIN/GLOB SERPL: 1 {RATIO} (ref 1.1–2.2)
ALP SERPL-CCNC: 67 U/L (ref 45–117)
ALT SERPL-CCNC: 15 U/L (ref 12–78)
ANION GAP SERPL CALC-SCNC: 10 MMOL/L (ref 5–15)
APTT PPP: 52.7 SEC (ref 21.2–34.1)
APTT PPP: 87.6 SEC (ref 21.2–34.1)
AST SERPL W P-5'-P-CCNC: 15 U/L (ref 15–37)
BASOPHILS # BLD: 0 K/UL (ref 0–0.1)
BASOPHILS NFR BLD: 1 % (ref 0–1)
BILIRUB SERPL-MCNC: 0.5 MG/DL (ref 0.2–1)
BUN SERPL-MCNC: 78 MG/DL (ref 6–20)
BUN/CREAT SERPL: 11 (ref 12–20)
CA-I BLD-MCNC: 8.2 MG/DL (ref 8.5–10.1)
CHLORIDE SERPL-SCNC: 110 MMOL/L (ref 97–108)
CO2 SERPL-SCNC: 23 MMOL/L (ref 21–32)
CREAT SERPL-MCNC: 6.79 MG/DL (ref 0.7–1.3)
DIFFERENTIAL METHOD BLD: ABNORMAL
EOSINOPHIL # BLD: 0.2 K/UL (ref 0–0.4)
EOSINOPHIL NFR BLD: 3 % (ref 0–7)
ERYTHROCYTE [DISTWIDTH] IN BLOOD BY AUTOMATED COUNT: 17.6 % (ref 11.5–14.5)
GLOBULIN SER CALC-MCNC: 3.2 G/DL (ref 2–4)
GLUCOSE SERPL-MCNC: 119 MG/DL (ref 65–100)
HCT VFR BLD AUTO: 27.6 % (ref 36.6–50.3)
HGB BLD-MCNC: 8.2 G/DL (ref 12.1–17)
IMM GRANULOCYTES # BLD AUTO: 0 K/UL (ref 0–0.04)
IMM GRANULOCYTES NFR BLD AUTO: 0 % (ref 0–0.5)
LYMPHOCYTES # BLD: 0.3 K/UL (ref 0.8–3.5)
LYMPHOCYTES NFR BLD: 5 % (ref 12–49)
MCH RBC QN AUTO: 26.9 PG (ref 26–34)
MCHC RBC AUTO-ENTMCNC: 29.7 G/DL (ref 30–36.5)
MCV RBC AUTO: 90.5 FL (ref 80–99)
MONOCYTES # BLD: 0.6 K/UL (ref 0–1)
MONOCYTES NFR BLD: 12 % (ref 5–13)
NEUTS SEG # BLD: 4.1 K/UL (ref 1.8–8)
NEUTS SEG NFR BLD: 79 % (ref 32–75)
NRBC # BLD: 0 K/UL (ref 0–0.01)
NRBC BLD-RTO: 0 PER 100 WBC
PLATELET # BLD AUTO: 168 K/UL (ref 150–400)
PMV BLD AUTO: 10.9 FL (ref 8.9–12.9)
POTASSIUM SERPL-SCNC: 3.9 MMOL/L (ref 3.5–5.1)
PROT SERPL-MCNC: 6.5 G/DL (ref 6.4–8.2)
RBC # BLD AUTO: 3.05 M/UL (ref 4.1–5.7)
SODIUM SERPL-SCNC: 143 MMOL/L (ref 136–145)
THERAPEUTIC RANGE,PTTT: ABNORMAL SEC (ref 82–109)
THERAPEUTIC RANGE,PTTT: ABNORMAL SEC (ref 82–109)
WBC # BLD AUTO: 5.2 K/UL (ref 4.1–11.1)

## 2021-07-17 PROCEDURE — 93923 UPR/LXTR ART STDY 3+ LVLS: CPT | Performed by: SURGERY

## 2021-07-17 PROCEDURE — 74011250637 HC RX REV CODE- 250/637: Performed by: INTERNAL MEDICINE

## 2021-07-17 PROCEDURE — 93970 EXTREMITY STUDY: CPT | Performed by: SURGERY

## 2021-07-17 PROCEDURE — 85025 COMPLETE CBC W/AUTO DIFF WBC: CPT

## 2021-07-17 PROCEDURE — 93970 EXTREMITY STUDY: CPT

## 2021-07-17 PROCEDURE — 74011250636 HC RX REV CODE- 250/636: Performed by: INTERNAL MEDICINE

## 2021-07-17 PROCEDURE — 36415 COLL VENOUS BLD VENIPUNCTURE: CPT

## 2021-07-17 PROCEDURE — 99222 1ST HOSP IP/OBS MODERATE 55: CPT | Performed by: SURGERY

## 2021-07-17 PROCEDURE — 65270000029 HC RM PRIVATE

## 2021-07-17 PROCEDURE — 93923 UPR/LXTR ART STDY 3+ LVLS: CPT

## 2021-07-17 PROCEDURE — 85730 THROMBOPLASTIN TIME PARTIAL: CPT

## 2021-07-17 PROCEDURE — 80053 COMPREHEN METABOLIC PANEL: CPT

## 2021-07-17 RX ORDER — HEPARIN SODIUM 1000 [USP'U]/ML
3168 INJECTION, SOLUTION INTRAVENOUS; SUBCUTANEOUS
Status: COMPLETED | OUTPATIENT
Start: 2021-07-17 | End: 2021-07-17

## 2021-07-17 RX ADMIN — FUROSEMIDE 80 MG: 10 INJECTION, SOLUTION INTRAMUSCULAR; INTRAVENOUS at 09:22

## 2021-07-17 RX ADMIN — FUROSEMIDE 80 MG: 10 INJECTION, SOLUTION INTRAMUSCULAR; INTRAVENOUS at 20:54

## 2021-07-17 RX ADMIN — MINOXIDIL 2.5 MG: 2.5 TABLET ORAL at 20:53

## 2021-07-17 RX ADMIN — HEPARIN SODIUM 3168 UNITS: 1000 INJECTION INTRAVENOUS; SUBCUTANEOUS at 19:11

## 2021-07-17 RX ADMIN — SODIUM BICARBONATE 650 MG: 650 TABLET ORAL at 16:34

## 2021-07-17 RX ADMIN — AMLODIPINE BESYLATE 10 MG: 5 TABLET ORAL at 09:23

## 2021-07-17 RX ADMIN — HYDRALAZINE HYDROCHLORIDE 50 MG: 50 TABLET, FILM COATED ORAL at 09:23

## 2021-07-17 RX ADMIN — HEPARIN SODIUM AND DEXTROSE 14 UNITS/KG/HR: 10000; 5 INJECTION INTRAVENOUS at 04:58

## 2021-07-17 RX ADMIN — MINOXIDIL 2.5 MG: 2.5 TABLET ORAL at 09:22

## 2021-07-17 RX ADMIN — SEVELAMER CARBONATE 800 MG: 800 TABLET, FILM COATED ORAL at 09:23

## 2021-07-17 RX ADMIN — SEVELAMER CARBONATE 800 MG: 800 TABLET, FILM COATED ORAL at 16:36

## 2021-07-17 RX ADMIN — HYDRALAZINE HYDROCHLORIDE 50 MG: 50 TABLET, FILM COATED ORAL at 16:34

## 2021-07-17 RX ADMIN — IRON SUCROSE 200 MG: 20 INJECTION, SOLUTION INTRAVENOUS at 20:54

## 2021-07-17 RX ADMIN — CALCITRIOL CAPSULES 0.25 MCG 0.25 MCG: 0.25 CAPSULE ORAL at 09:23

## 2021-07-17 RX ADMIN — SODIUM BICARBONATE 650 MG: 650 TABLET ORAL at 09:23

## 2021-07-17 RX ADMIN — SEVELAMER CARBONATE 800 MG: 800 TABLET, FILM COATED ORAL at 14:20

## 2021-07-17 RX ADMIN — TAMSULOSIN HYDROCHLORIDE 0.4 MG: 0.4 CAPSULE ORAL at 09:22

## 2021-07-17 NOTE — PROGRESS NOTES
Progress Note    Eleni Felty, MD             Daily Progress Note: 7/17/2021      Subjective: The patient is seen for follow  up. Offers no complaints. Patient had arterial duplex studies today both the upper limbs as well as lower limbs. Patient is no more on oxygen. Denies any chest pain cough fever or chills. No history of nausea vomiting diarrhea abdominal pain or black stool.   Patient's urine has been clear  Problem List:  Problem List as of 7/17/2021 Never Reviewed        Codes Class Noted - Resolved    Pulmonary edema ICD-10-CM: J81.1  ICD-9-CM: 743  7/13/2021 - Present        GI bleed ICD-10-CM: K92.2  ICD-9-CM: 578.9  1/5/2021 - Present              Medications reviewed  Current Facility-Administered Medications   Medication Dose Route Frequency    minoxidiL (LONITEN) tablet 2.5 mg  2.5 mg Oral BID    amLODIPine (NORVASC) tablet 10 mg  10 mg Oral DAILY    furosemide (LASIX) injection 80 mg  80 mg IntraVENous Q12H    tamsulosin (FLOMAX) capsule 0.4 mg  0.4 mg Oral DAILY    hydrALAZINE (APRESOLINE) tablet 50 mg  50 mg Oral TID    sodium bicarbonate tablet 650 mg  650 mg Oral TID    0.9% sodium chloride infusion 250 mL  250 mL IntraVENous PRN    calcitRIOL (ROCALTROL) capsule 0.25 mcg  0.25 mcg Oral DAILY    sevelamer carbonate (RENVELA) tab 800 mg  800 mg Oral TID WITH MEALS    ergocalciferol capsule 50,000 Units  50,000 Units Oral Q7D    iron sucrose (VENOFER) injection 200 mg  200 mg IntraVENous Q24H    hydrALAZINE (APRESOLINE) 20 mg/mL injection 40 mg  40 mg IntraVENous Q6H PRN    cloNIDine (CATAPRES) 0.3 mg/24 hr patch 1 Patch  1 Patch TransDERmal Q7D    heparin 25,000 units in D5W 250 ml infusion  18-36 Units/kg/hr (Adjusted) IntraVENous TITRATE    heparin (porcine) 1,000 unit/mL injection 6,340 Units  80 Units/kg (Adjusted) IntraVENous PRN    Or    heparin (porcine) 1,000 unit/mL injection 3,170 Units  40 Units/kg (Adjusted) IntraVENous PRN       Review of Systems:   A comprehensive review of systems was negative except for that written in the HPI. Objective:   Physical Exam:     Visit Vitals  /67 (BP 1 Location: Left upper arm, BP Patient Position: At rest;Supine)   Pulse 77   Temp 97.4 °F (36.3 °C)   Resp 18   Ht 6' (1.829 m)   Wt 94.6 kg (208 lb 8.9 oz)   SpO2 94%   BMI 28.29 kg/m²    O2 Flow Rate (L/min): 1 l/min O2 Device: None (Room air) (O2 off for overnight oximetry study)    Temp (24hrs), Av.8 °F (36.6 °C), Min:97.2 °F (36.2 °C), Max:98.8 °F (37.1 °C)    701 -  190  In: 150 [P.O.:150]  Out: 200 [Urine:200]   07/15 190 -  0700  In: -   Out: 3500 [Urine:3500]    General:  Alert, cooperative, no distress, appears stated age. Lungs:   Clear to auscultation bilaterally. Chest wall:  No tenderness or deformity. Heart:  Regular rate and rhythm, S1, S2 normal, no murmur, click, rub or gallop. Abdomen:   Soft, non-tender. Bowel sounds normal. No masses,  No organomegaly. Extremities:  Edema on left upper limb has been improved compared to before. Also both the lower limb edema has been improved  Patient's blister on the left great toe has also been drying off   Pulses:  2+ in both the upper limbs   Skin: Skin color, texture, turgor normal. No rashes or lesions   Neurologic: CNII-XII intact.  No gross sensory or motor deficits     Data Review:       Recent Days:  Recent Labs     21  0655 21  0346 07/15/21  1048   WBC 5.2 6.6 5.2   HGB 8.2* 8.7* 8.3*   HCT 27.6* 28.8* 27.4*    174 157     Recent Labs     21  0655 21  0346 07/15/21  0154 07/15/21  0145 07/15/21  0145    141  142 140   < > 141   K 3.9 4.0  4.0 5.0   < > 5.0   * 109*  109* 109*   < > 110*   CO2 23 20*  19* 22   < > 21   * 101*  99 101*   < > 101*   BUN 78* 67*  67* 67*   < > 67*   CREA 6.79* 6.43*  6.42* 6.31*   < > 6.27*   CA 8.2* 8.3*  8.2* 7.8*   < > 7.8*   PHOS  --  8.1*  --   --  8.4*   ALB 3.3* 3.5  3.4* 3.1*   < > 3.1*   TBILI 0.5 0.7 0.6  --   --    ALT 15 16 15  --   --     < > = values in this interval not displayed. No results for input(s): PH, PCO2, PO2, HCO3, FIO2 in the last 72 hours. Results     Procedure Component Value Units Date/Time    MRSA SCREEN - PCR (NASAL) [[id number]] Collected: 07/13/21 1730    Order Status: Completed Specimen: Swab Updated: 07/13/21 2123     MRSA by PCR, Nasal Not Detected       COVID-19 RAPID TEST [[id number]] Collected: 07/13/21 0539    Order Status: Completed Specimen: Nasopharyngeal Updated: 07/13/21 0655     Specimen source Nasopharyngeal        COVID-19 rapid test Not Detected        Comment: Rapid Abbott ID Now   Rapid NAAT:  The specimen is NEGATIVE for SARS-CoV-2, the novel coronavirus associated with COVID-19. Negative results should be treated as presumptive and, if inconsistent with clinical signs and symptoms or necessary for patient management, should be tested with an alternative molecular assay. Negative results do not preclude SARS-CoV-2 infection and should not be used as the sole basis for patient management decisions. This test has been authorized by the FDA under   an Emergency Use Authorization (EUA) for use by authorized laboratories. Fact sheet for Healthcare Providers: iTendency.uy Fact sheet for Patients: iTendency.uy   Methodology: Isothermal Nucleic Acid Amplification         CULTURE, BLOOD, PAIRED [[id number]] Collected: 07/12/21 2240    Order Status: Completed Specimen: Blood Updated: 07/16/21 1346     Special Requests: No Special Requests        Culture result:       Two of four bottles have been flagged positive by instrument. Bottles have been sent to [address] [place] [address] laboratory to assess for possible growth.  Gram Positive Cocci in clusters CALLED TO AND READ BACK BY chiquita mtz r.n. 1239 07/14/2021 by hanane                  Staphylococcus species, coagulase negative GROWING IN THE 1ST OF 4 BOTTLES DRAWN Staphylococcus species, coagulase negative (2nd colony type/strain) GROWING IN THE 2ND OF 4 BOTTLES DRAWN               24 Hour Results:  Recent Results (from the past 24 hour(s))   PTT    Collection Time: 07/16/21  1:18 PM   Result Value Ref Range    aPTT 76.3 (H) 21.2 - 34.1 sec    aPTT, therapeutic range   82 - 109 sec   PTT    Collection Time: 07/16/21  9:42 PM   Result Value Ref Range    aPTT >153.0 (HH) 21.2 - 34.1 sec    aPTT, therapeutic range   82 - 109 sec   CBC WITH AUTOMATED DIFF    Collection Time: 07/17/21  6:55 AM   Result Value Ref Range    WBC 5.2 4.1 - 11.1 K/uL    RBC 3.05 (L) 4.10 - 5.70 M/uL    HGB 8.2 (L) 12.1 - 17.0 g/dL    HCT 27.6 (L) 36.6 - 50.3 %    MCV 90.5 80.0 - 99.0 FL    MCH 26.9 26.0 - 34.0 PG    MCHC 29.7 (L) 30.0 - 36.5 g/dL    RDW 17.6 (H) 11.5 - 14.5 %    PLATELET 520 688 - 803 K/uL    MPV 10.9 8.9 - 12.9 FL    NRBC 0.0 0.0  WBC    ABSOLUTE NRBC 0.00 0.00 - 0.01 K/uL    NEUTROPHILS 79 (H) 32 - 75 %    LYMPHOCYTES 5 (L) 12 - 49 %    MONOCYTES 12 5 - 13 %    EOSINOPHILS 3 0 - 7 %    BASOPHILS 1 0 - 1 %    IMMATURE GRANULOCYTES 0 0 - 0.5 %    ABS. NEUTROPHILS 4.1 1.8 - 8.0 K/UL    ABS. LYMPHOCYTES 0.3 (L) 0.8 - 3.5 K/UL    ABS. MONOCYTES 0.6 0.0 - 1.0 K/UL    ABS. EOSINOPHILS 0.2 0.0 - 0.4 K/UL    ABS. BASOPHILS 0.0 0.0 - 0.1 K/UL    ABS. IMM.  GRANS. 0.0 0.00 - 0.04 K/UL    DF AUTOMATED     METABOLIC PANEL, COMPREHENSIVE    Collection Time: 07/17/21  6:55 AM   Result Value Ref Range    Sodium 143 136 - 145 mmol/L    Potassium 3.9 3.5 - 5.1 mmol/L    Chloride 110 (H) 97 - 108 mmol/L    CO2 23 21 - 32 mmol/L    Anion gap 10 5 - 15 mmol/L    Glucose 119 (H) 65 - 100 mg/dL    BUN 78 (H) 6 - 20 mg/dL    Creatinine 6.79 (H) 0.70 - 1.30 mg/dL    BUN/Creatinine ratio 11 (L) 12 - 20      GFR est AA 10 (L) >60 ml/min/1.73m2    GFR est non-AA 8 (L) >60 ml/min/1.73m2    Calcium 8.2 (L) 8.5 - 10.1 mg/dL    Bilirubin, total 0.5 0.2 - 1.0 mg/dL    AST (SGOT) 15 15 - 37 U/L ALT (SGPT) 15 12 - 78 U/L    Alk. phosphatase 67 45 - 117 U/L    Protein, total 6.5 6.4 - 8.2 g/dL    Albumin 3.3 (L) 3.5 - 5.0 g/dL    Globulin 3.2 2.0 - 4.0 g/dL    A-G Ratio 1.0 (L) 1.1 - 2.2     PTT    Collection Time: 07/17/21 10:15 AM   Result Value Ref Range    aPTT 87.6 (H) 21.2 - 34.1 sec    aPTT, therapeutic range   82 - 109 sec   LOWER EXT ART PVR MULT LEVEL SEG PRESSURES    Collection Time: 07/17/21 12:05 PM   Result Value Ref Range    Right arm  mmHg    Left posterior tibial 168 mmHg    Right posterior tibial 187 mmHg    Left anterior tibial 167 mmHg    Right anterior tibial 186 mmHg    Left JAVON 0.97     Right JAVON 1.07     Left toe pressure 169 mmHg    Right toe pressure 167 mmHg    Left TBI 0.97     Right TBI 0.96        LOWER EXT ART PVR MULT LEVEL SEG PRESSURES         XR CHEST PORT   Final Result   Findings/impression: Stable cardiomediastinal silhouette. Similar central   pulmonary vascular congestion and bilateral patchy airspace opacities. No   significant pleural effusion. No pneumothorax. Findings are not significantly changed. XR CHEST PORT   Final Result      US RETROPERITONEUM COMP   Final Result   Medical renal disease on the right      DUPLEX UPPER EXT VENOUS LEFT   Final Result      XR CHEST PORT   Final Result   Findings/impression:      Cardiac silhouette is enlarged. Central vascular congestion and patchy central   airspace disease/edema. Suspect trace right pleural effusion. No evidence of pneumothorax. No acute osseous abnormality identified. DUPLEX LOWER EXT VENOUS BILAT    (Results Pending)        Assessment: Acute flash pulmonary edema  Now improved  Acute on chronic renal failure now stabilized  Hypertension  Anemia  Gross hematuria now resolved        Plan: We will continue heparin drip. Patient had bilateral arterial duplex study of all the limbs.         Care Plan discussed with: Patient/Family    Total time spent with patient: 30 minutes.     Sunni Galvan MD

## 2021-07-17 NOTE — PROGRESS NOTES
Consult  Pulmonary, Critical Care    Name: Magdalena Kimble MRN: [id number]   : 1955 Hospital: [address] [place] [address]   Date: 2021  Admission date: 2021 Hospital Day: 6       Subjective/Interval History:   Seen in the emergency room wearing noninvasive ventilator. Patient has underlying renal insufficiency developed worsening shortness of breath cough presented to the emergency room chest x-ray shows pulmonary edema. He was profoundly hypoxic is now on noninvasive ventilator and feels more comfortable. Has not had any significant urine output since he has been in the ER. He also complains of new onset left arm swelling   post void bladder scan showed greater than 200 cc urine retention and Quesada catheter placed with 500 cc removed. Overnight he has put out an additional 1200 cc. Respirations improved currently nonlabored on nasal oxygen. Duplex scan of the left arm did show left axillary and proximal brachial DVT and heparin was started. On heparin with Quesada catheter and he has had slight bleeding at the urethral meatus. Ultrasound of the abdomen is pending  7/15 ultrasound of the abdomen showed medical renal disease on the right with small kidney no hydronephrosis there was evidence of prostate enlargement. He is breathing easily at this point and on room air is running on oxygen saturation of 93%   respirations nonlabored on nasal oxygen. Quesada catheter is out he states he has been voiding frequent small amounts without straining   no specific complaints breathing easily. Overnight oximetry showed minimal but significant desaturation while on room air 8 minutes 40 seconds with low of 71%    Patient Active Problem List   Diagnosis Code    GI bleed K92.2    Pulmonary edema J81.1       IMPRESSION:   1. Acute hypoxic respiratory failure tolerating nasal oxygen 1 L  2.  Acute pulmonary edema radiographically unchanged we will repeat chest x-ray Monday  3. 2 of  blood culture bottles with coagulase-negative staph aureus likely skin contaminant   4. Bilateral pleural effusions repeat chest x-ray unchanged  5. Acute on chronic kidney disease abrupt worsening likely due to obstructive uropathy  6. Obstructive uropathy Quesada catheter has been removed good urine output recorded yesterday  7. Severe hypertension improved   8. DVT left axillary and brachial currently on IV heparin  9. Anemia worsened yesterday transfusion 7/14 I cannot see any indication that it was given and hemoglobin able  10. Troponin leak likely due to renal insufficiency stable has not risen any further  Body mass index is 28.29 kg/m². 11.       RECOMMENDATIONS/PLAN:   1. Continue IV Lasix we will leave to renal to decide on switch to oral  2. Blood pressure remains a problem we will leave to renal to adjust medications  3. Continue Flomax for prostate enlargement although he denies frequency post void dribbling or small volume urine output   4. Continue nasal oxygen repeat overnight oximetry Sunday night on room air  5. Subjective/Initial History:       I was asked by Margarita Avitia MD to see Solitario Kay a 76 y.o.  male  in consultation for a chief complaint of shortness of breath pulmonary edema acute hypoxic respiratory failure        Patient PCP: Dede Caldwell MD  PMH:  has a past medical history of Chronic kidney disease and Hypertension. PSH:   has no past surgical history on file. FHX: family history is not on file. SHX:  reports that he has never smoked.  He has never used smokeless tobacco.    Systemic review somewhat limited as he is on noninvasive ventilator remains short of breath although states he is feeling better  General he has not noted any worsening edema of his upper legs fever chills or sweats does complain of new onset swelling of his left hand and arm  Eyes no double vision or momentary blindness  ENT no facial pain over the sinuses  Endocrinologic no polyuria polydipsia  Musculoskeletal no swollen tender joints  Neurologic no history seizures or syncope  Gastrointestinal no nausea vomiting acid indigestion  Genitourinary states he does still pass fair amount of urine each day has not noted any decrease in that.   Does not have to strain to urinate has no bleeding or drainage  Cardiovascular he is not aware of any underlying heart disease has not had chest pain diaphoresis has had worsening shortness of breath laying down and with exertion  Respiratory worsening shortness of breath over the last week or more no significant cough no sputum production no chills or sweats did have history COVID-19 pneumonitis January of this year    No Known Allergies   MEDS:   Current Facility-Administered Medications   Medication    minoxidiL (LONITEN) tablet 2.5 mg    amLODIPine (NORVASC) tablet 10 mg    furosemide (LASIX) injection 80 mg    tamsulosin (FLOMAX) capsule 0.4 mg    hydrALAZINE (APRESOLINE) tablet 50 mg    sodium bicarbonate tablet 650 mg    0.9% sodium chloride infusion 250 mL    calcitRIOL (ROCALTROL) capsule 0.25 mcg    sevelamer carbonate (RENVELA) tab 800 mg    ergocalciferol capsule 50,000 Units    iron sucrose (VENOFER) injection 200 mg    hydrALAZINE (APRESOLINE) 20 mg/mL injection 40 mg    cloNIDine (CATAPRES) 0.3 mg/24 hr patch 1 Patch    heparin 25,000 units in D5W 250 ml infusion    heparin (porcine) 1,000 unit/mL injection 6,340 Units    Or    heparin (porcine) 1,000 unit/mL injection 3,170 Units        Current Facility-Administered Medications:     minoxidiL (LONITEN) tablet 2.5 mg, 2.5 mg, Oral, BID, Rebecca Vidal MD, 2.5 mg at 07/17/21 0966    amLODIPine (NORVASC) tablet 10 mg, 10 mg, Oral, DAILY, Ethelene Soulier, MD, 10 mg at 07/17/21 0923    furosemide (LASIX) injection 80 mg, 80 mg, IntraVENous, Q12H, Ethelene Soulier, MD, 80 mg at 07/17/21 0922    tamsulosin (FLOMAX) capsule 0.4 mg, 0.4 mg, Oral, Carl Barreto MD, 0.4 mg at 21 8277    hydrALAZINE (APRESOLINE) tablet 50 mg, 50 mg, Oral, TID, Chip Dallas MD, 50 mg at 21 1124    sodium bicarbonate tablet 650 mg, 650 mg, Oral, TID, Chip Dallas MD, 650 mg at 21 0923    0.9% sodium chloride infusion 250 mL, 250 mL, IntraVENous, PRN, Chip Dallas MD    calcitRIOL (ROCALTROL) capsule 0.25 mcg, 0.25 mcg, Oral, DAILY, Katherine Hernandes MD, 0.25 mcg at 21 9718    sevelamer carbonate (RENVELA) tab 800 mg, 800 mg, Oral, TID WITH MEALS, Katherine Hernandes MD, 800 mg at 21 1407    ergocalciferol capsule 50,000 Units, 50,000 Units, Oral, Q7D, Katherine Hernandes MD, 50,000 Units at 21 1841    iron sucrose (VENOFER) injection 200 mg, 200 mg, IntraVENous, Q24H, Katherine Hernandes MD, 200 mg at 21 2132    hydrALAZINE (APRESOLINE) 20 mg/mL injection 40 mg, 40 mg, IntraVENous, Q6H PRN, Chip Dallas MD, 40 mg at 07/15/21 1552    cloNIDine (CATAPRES) 0.3 mg/24 hr patch 1 Patch, 1 Patch, TransDERmal, Q7D, Chip Dallas MD    heparin 25,000 units in D5W 250 ml infusion, 18-36 Units/kg/hr (Adjusted), IntraVENous, TITRATE, Paddy Vázquez MD, Last Rate: 11.1 mL/hr at 21 0458, 14 Units/kg/hr at 21 0458    heparin (porcine) 1,000 unit/mL injection 6,340 Units, 80 Units/kg (Adjusted), IntraVENous, PRN **OR** heparin (porcine) 1,000 unit/mL injection 3,170 Units, 40 Units/kg (Adjusted), IntraVENous, PRN, Paddy Vázquez MD, 3,170 Units at 21 1425      Objective:     Vital Signs: Telemetry:    normal sinus rhythm Intake/Output:   Visit Vitals  BP (!) 172/87 (BP 1 Location: Right lower arm, BP Patient Position: Sitting)   Pulse 94   Temp 97.8 °F (36.6 °C)   Resp 20   Ht 6' (1.829 m)   Wt 94.6 kg (208 lb 8.9 oz)   SpO2 95%   BMI 28.29 kg/m²       Temp (24hrs), Av.9 °F (36.6 °C), Min:97.2 °F (36.2 °C), Max:98.8 °F (37.1 °C)        O2 Device: None (Room air) (O2 off for overnight oximetry study) O2 Flow Rate (L/min): 1 l/min         Body mass index is 28.29 kg/m². Wt Readings from Last 4 Encounters:   07/16/21 94.6 kg (208 lb 8.9 oz)   01/05/21 79.4 kg (175 lb)          Intake/Output Summary (Last 24 hours) at 7/17/2021 1021  Last data filed at 7/17/2021 0858  Gross per 24 hour   Intake 150 ml   Output 2350 ml   Net -2200 ml       Last shift:      07/17 0701 - 07/17 1900  In: 150 [P.O.:150]  Out: 200 [Urine:200]  Last 3 shifts: 07/15 1901 - 07/17 0700  In: -   Out: 3500 [Urine:3500]       Hemodynamics:    CO:    CI:    CVP:    SVR:   PAP Systolic:    PAP Diastolic:    PVR:    AH64:       Ventilator Settings:      Mode Rate TV Press PEEP FiO2 PIP Min. Vent               24 %     9.7 l/min      Physical Exam:    General:  male; currently on nasal oxygen in no distress  HEENT: NCAT, visible oral mucosa is moist and clear  Eyes: anicteric; conjunctiva clear extraocular movements intact  Neck: no nodes,,no accessory MM use. no respiratory distress  Chest: no deformity,   Cardiac: Regular rate and rhythm  Lungs: distant breath sounds; clear anteriorly and laterally with rales in both bases  Abd: Soft positive bowel sounds no tenderness  Ext: Improvement edema pitting and lymphedema of the lower extremities with new onset  edema of the left arm; no joint swelling; No clubbing  : Cloudy urine  Neuro: Alert awake no distress speech is clear moves all 4 extremities  Psych-calm, oriented to person;   Skin: warm, dry, no cyanosis;   Pulses: Brachial and radial pulses intact  Capillary:slow capillary refill      Labs:    Recent Labs     07/17/21  0655 07/16/21  2142 07/16/21  1318 07/16/21  0346 07/15/21  1048 07/15/21  1048   WBC 5.2  --   --  6.6  --  5.2   HGB 8.2*  --   --  8.7*  --  8.3*     --   --  174  --  157   APTT  --  >153.0* 76.3* 128.8*   < > 98.7*    < > = values in this interval not displayed.      Recent Labs     07/17/21  0655 07/16/21  0346 07/15/21  0154 07/15/21  0145 07/15/21  0145    141  142 140   < > 141   K 3.9 4.0  4.0 5.0   < > 5.0   * 109*  109* 109*   < > 110*   CO2 23 20*  19* 22   < > 21   * 101*  99 101*   < > 101*   BUN 78* 67*  67* 67*   < > 67*   CREA 6.79* 6.43*  6.42* 6.31*   < > 6.27*   CA 8.2* 8.3*  8.2* 7.8*   < > 7.8*   PHOS  --  8.1*  --   --  8.4*   ALB 3.3* 3.5  3.4* 3.1*   < > 3.1*   ALT 15 16 15  --   --     < > = values in this interval not displayed. 7/17 overnight oximetry shows low oxygen saturation 71% with 8 minutes 40 seconds below 88% from 1 AM to 6 AM   7/16 overnight oximetry on 1 L shows only 2 minutes 20 seconds below 88% with a low oxygen saturation of 83%  7/15 room air oxygen saturation 93%  currently on noninvasive ventilator inspiratory pressure 16 expiratory pressure six rate 16 FiO2 28% with oxygen saturation 96%   No results for input(s): CPK, CKNDX, TROIQ in the last 72 hours. No lab exists for component: CPKMB  BNP greater than 35,000  Lab Results   Component Value Date/Time    Culture result:  07/12/2021 10:40 PM     Two of four bottles have been flagged positive by instrument. Bottles have been sent to [facility] laboratory to assess for possible growth. Gram Positive Cocci in clusters CALLED TO AND READ BACK BY Rupert Lama r.n. 36 07/14/2021 by dpw    Culture result:  07/12/2021 10:40 PM     Staphylococcus species, coagulase negative GROWING IN THE 1ST OF 4 BOTTLES DRAWN    Culture result:  07/12/2021 10:40 PM     Staphylococcus species, coagulase negative (2nd colony type/strain) GROWING IN THE 2ND OF 4 BOTTLES DRAWN        Imaging:  I have personally reviewed the patients radiographs and have reviewed the reports:    CXR Results  (Last 48 hours)  7/16 chest x-ray with continued mild pulmonary edema and small pleural effusions unchanged               07/12/21 2251  XR CHEST PORT Final result    Impression:  Findings/impression:       Cardiac silhouette is enlarged.  Central vascular congestion and patchy central   airspace disease/edema. Suspect trace right pleural effusion. No evidence of pneumothorax. No acute osseous abnormality identified. Narrative:  Study: XR CHEST PORT       Clinical indication: sob       Comparison: None. To me chest x-ray consistent with cardiomegaly pulmonary edema and bilateral pleural effusions           Results from Hospital Encounter encounter on 07/12/21    XR CHEST PORT    Narrative  Exam: Portable upright chest radiograph    COMPARISON: One day prior. Impression  Findings/impression: Stable cardiomediastinal silhouette. Similar central  pulmonary vascular congestion and bilateral patchy airspace opacities. No  significant pleural effusion. No pneumothorax. Findings are not significantly changed. XR CHEST PORT    Narrative  1 view comparison to 12    Continued cardiomegaly and congestion. If There is mild interstitial edema, it  is unchanged. Right pleural effusion and adjacent atelectasis have improved. XR CHEST PORT    Narrative  Study: XR CHEST PORT    Clinical indication: sob    Comparison: None. Impression  Findings/impression:    Cardiac silhouette is enlarged. Central vascular congestion and patchy central  airspace disease/edema. Suspect trace right pleural effusion. No evidence of pneumothorax. No acute osseous abnormality identified. Results from [place] encounter on 01/05/21    CT CHEST WO CONT    Narrative  Images obtained without contrast include the abdomen and pelvis. Comparison portable chest radiograph earlier today. Dose Reduction:  All CT scans at this facility are performed using dose reduction optimization  techniques as appropriate to a performed exam including the following: Automated  exposure control, adjustments of the mA and/or kV according to patient size, or  use of iterative reconstruction technique.     Subtle peripheral groundglass densities are noted in both lungs, could reflect  Covid pneumonia or other. No pneumothorax or pneumomediastinum. The radiographic findings suspicious for  pneumomediastinum probably was artifact, perhaps related to cardiac motion. No pleural effusion or adenopathy. Cardiomegaly again noted. Impression  IMPRESSION:  1. No pneumomediastinum or pneumothorax. 2. Cardiomegaly. 3. Subtle groundglass densities in both lungs might be pneumonia or other. Discussion patient with worsening renal insufficiency has developed pulmonary edema acute hypoxic respiratory failure. He states he still has urine output normally at home. We will give him high-dose IV Lasix to see if diuresis is induced. He very likely will need dialysis. He has minimal elevation in troponin probably troponin leak from hypoxia or just due to his renal insufficiency. He is not having any chest pain. Does have severe hypertension. Has been started on clonidine and hydralazine. 7/14 no distress today on nasal oxygen. Did have residual on post void bladder scan and Quesada catheter was placed with good diuresis overnight. Despite this potassium remains elevated. We will give 1 dose of Kayexalate. Despite diuresis hemoglobin is dropped to 6.7 will transfuse. He denies frequency small-volume urine or straining to urinate at home despite this with signs of obstruction we will add Flomax. Potassium 6 today we will give 1 dose of Kayexalate and continue diuresis  7/16 respirations nonlabored. Was placed back on 1 L nasal oxygen yesterday overnight oximetry shows well-maintained oxygen saturation. Will check overnight tonight on and off oxygen. Quesada catheter has been removed. Chest x-ray continues to show mild pulmonary edema  7/17 overnight oximetry shows desaturation when on room air. Will maintain oxygen and repeat overnight oximetry I[place]hima night         Thank you for allowing us to participate in the care of this patient.   We will follow along with you     [place] Edgar Ortiz MD

## 2021-07-17 NOTE — PROGRESS NOTES
Progress Note      7/17/2021 9:54 AM  NAME: Hyacinth Johnson   MRN:  [id number]   Admit Diagnosis: Pulmonary edema [J81.1]      Subjective:   Chart reviewed. Patient feels better. Shortness of breath is somewhat better. Vascular surgery note appreciated. Review of Systems:    Symptom Y/N Comments  Symptom Y/N Comments   Fever/Chills n   Chest Pain n    Poor Appetite    Edema y    Cough    Abdominal Pain     Sputum    Joint Pain n    SOB/CARMONA y  slowly improving. Pruritis/Rash     Nausea/vomit    Other     Diarrhea         Constipation           Could NOT obtain due to:      Objective:          Physical Exam:    Last 24hrs VS reviewed since prior progress note. Most recent are:    Visit Vitals  BP (!) 172/87 (BP 1 Location: Right lower arm, BP Patient Position: Sitting)   Pulse 94   Temp 97.8 °F (36.6 °C)   Resp 20   Ht 6' (1.829 m)   Wt 94.6 kg (208 lb 8.9 oz)   SpO2 95%   BMI 28.29 kg/m²       Intake/Output Summary (Last 24 hours) at 7/17/2021 1140  Last data filed at 7/17/2021 0858  Gross per 24 hour   Intake 150 ml   Output 2350 ml   Net -2200 ml        General Appearance: Well developed, well nourished, alert & oriented x 3,    no acute distress. Ears/Nose/Mouth/Throat: Hearing grossly normal.  Neck: Supple. Chest: Lungs clear to auscultation bilaterally. Cardiovascular: Regular rate and rhythm, S1,S2 normal, no murmur. Abdomen: Soft, non-tender, bowel sounds are active. Extremities: Lower extremity edema left upper extremity edema evident  Skin: Lower extremity blisters evident    []         Post-cath site without hematoma, bruit, tenderness, or thrill. Distal pulses intact.     PMH/SH reviewed - no change compared to H&P    Data Review    Telemetry: normal sinus rhythm     EKG:   []  No new EKG for review    Lab Data Personally Reviewed:    Recent Labs     07/17/21  0655 07/16/21  0346   WBC 5.2 6.6   HGB 8.2* 8.7*   HCT 27.6* 28.8*    174     Recent Labs     07/17/21  1015 07/16/21  2142 07/16/21  1318   APTT 87.6* >153.0* 76.3*      Recent Labs     07/17/21  0655 07/16/21  0346 07/15/21  0154    141  142 140   K 3.9 4.0  4.0 5.0   * 109*  109* 109*   CO2 23 20*  19* 22   BUN 78* 67*  67* 67*   CREA 6.79* 6.43*  6.42* 6.31*   * 101*  99 101*   CA 8.2* 8.3*  8.2* 7.8*     No results for input(s): CPK, CKNDX, TROIQ in the last 72 hours. No lab exists for component: CPKMB  No results found for: CHOL, CHOLX, CHLST, CHOLV, HDL, HDLP, LDL, LDLC, DLDLP, Carrolyn Specter, CHHD, CHHDX    Recent Labs     07/17/21  0655 07/16/21  0346 07/15/21  0154   AP 67 69 68   TP 6.5 6.9 6.3*   ALB 3.3* 3.5  3.4* 3.1*   GLOB 3.2 3.4 3.2     No results for input(s): PH, PCO2, PO2 in the last 72 hours.     Medications Personally Reviewed:    Current Facility-Administered Medications   Medication Dose Route Frequency    minoxidiL (LONITEN) tablet 2.5 mg  2.5 mg Oral BID    amLODIPine (NORVASC) tablet 10 mg  10 mg Oral DAILY    furosemide (LASIX) injection 80 mg  80 mg IntraVENous Q12H    tamsulosin (FLOMAX) capsule 0.4 mg  0.4 mg Oral DAILY    hydrALAZINE (APRESOLINE) tablet 50 mg  50 mg Oral TID    sodium bicarbonate tablet 650 mg  650 mg Oral TID    0.9% sodium chloride infusion 250 mL  250 mL IntraVENous PRN    calcitRIOL (ROCALTROL) capsule 0.25 mcg  0.25 mcg Oral DAILY    sevelamer carbonate (RENVELA) tab 800 mg  800 mg Oral TID WITH MEALS    ergocalciferol capsule 50,000 Units  50,000 Units Oral Q7D    iron sucrose (VENOFER) injection 200 mg  200 mg IntraVENous Q24H    hydrALAZINE (APRESOLINE) 20 mg/mL injection 40 mg  40 mg IntraVENous Q6H PRN    cloNIDine (CATAPRES) 0.3 mg/24 hr patch 1 Patch  1 Patch TransDERmal Q7D    heparin 25,000 units in D5W 250 ml infusion  18-36 Units/kg/hr (Adjusted) IntraVENous TITRATE    heparin (porcine) 1,000 unit/mL injection 6,340 Units  80 Units/kg (Adjusted) IntraVENous PRN    Or    heparin (porcine) 1,000 unit/mL injection 3,170 Units  40 Units/kg (Adjusted) IntraVENous PRN           Problem List:   Acute hypoxic respiratory failure and patient seems to be somewhat better. Severe anemia. Renal failure. Heart failure. Left upper extremity DVT. Minimal elevation of troponin I is probably not a presentation of myocardial infarction. Blood pressure still very high. 1.      Assessment/Plan:   Discussed with the pulmonologist.  Aiyana Coates with the transfusion. I will adjust blood pressure medications. We will follow nephrology recommendations and vascular surgery recommendations. I will continue otherwise present care. Thank you. 1.          []       High complexity decision making was performed in this patient at high risk for decompensation with multiple organ involvement.     Mathew Skinner MD

## 2021-07-18 LAB
ALBUMIN SERPL-MCNC: 3.2 G/DL (ref 3.5–5)
ALBUMIN/GLOB SERPL: 1.1 {RATIO} (ref 1.1–2.2)
ALP SERPL-CCNC: 57 U/L (ref 45–117)
ALT SERPL-CCNC: 14 U/L (ref 12–78)
ANION GAP SERPL CALC-SCNC: 12 MMOL/L (ref 5–15)
APTT PPP: 138.3 SEC (ref 21.2–34.1)
APTT PPP: 75.8 SEC (ref 21.2–34.1)
APTT PPP: >153 SEC (ref 21.2–34.1)
AST SERPL W P-5'-P-CCNC: 18 U/L (ref 15–37)
BASOPHILS # BLD: 0 K/UL (ref 0–0.1)
BASOPHILS NFR BLD: 1 % (ref 0–1)
BILIRUB SERPL-MCNC: 0.4 MG/DL (ref 0.2–1)
BUN SERPL-MCNC: 72 MG/DL (ref 6–20)
BUN/CREAT SERPL: 11 (ref 12–20)
CA-I BLD-MCNC: 8.3 MG/DL (ref 8.5–10.1)
CHLORIDE SERPL-SCNC: 107 MMOL/L (ref 97–108)
CO2 SERPL-SCNC: 22 MMOL/L (ref 21–32)
CREAT SERPL-MCNC: 6.78 MG/DL (ref 0.7–1.3)
DIFFERENTIAL METHOD BLD: ABNORMAL
EOSINOPHIL # BLD: 0.2 K/UL (ref 0–0.4)
EOSINOPHIL NFR BLD: 5 % (ref 0–7)
ERYTHROCYTE [DISTWIDTH] IN BLOOD BY AUTOMATED COUNT: 17.9 % (ref 11.5–14.5)
GLOBULIN SER CALC-MCNC: 2.8 G/DL (ref 2–4)
GLUCOSE SERPL-MCNC: 91 MG/DL (ref 65–100)
HCT VFR BLD AUTO: 26.7 % (ref 36.6–50.3)
HGB BLD-MCNC: 8 G/DL (ref 12.1–17)
IMM GRANULOCYTES # BLD AUTO: 0 K/UL (ref 0–0.04)
IMM GRANULOCYTES NFR BLD AUTO: 1 % (ref 0–0.5)
IRON SATN MFR SERPL: >96 %
IRON,IRN: 385 UG/DL
LEFT ABI: 0.97
LEFT ANTERIOR TIBIAL: 167 MMHG
LEFT POSTERIOR TIBIAL: 168 MMHG
LEFT TBI: 0.97
LEFT TOE PRESSURE: 169 MMHG
LYMPHOCYTES # BLD: 0.3 K/UL (ref 0.8–3.5)
LYMPHOCYTES NFR BLD: 6 % (ref 12–49)
MCH RBC QN AUTO: 26.5 PG (ref 26–34)
MCHC RBC AUTO-ENTMCNC: 30 G/DL (ref 30–36.5)
MCV RBC AUTO: 88.4 FL (ref 80–99)
MONOCYTES # BLD: 0.7 K/UL (ref 0–1)
MONOCYTES NFR BLD: 14 % (ref 5–13)
NEUTS SEG # BLD: 3.3 K/UL (ref 1.8–8)
NEUTS SEG NFR BLD: 73 % (ref 32–75)
NRBC # BLD: 0 K/UL (ref 0–0.01)
NRBC BLD-RTO: 0 PER 100 WBC
PLATELET # BLD AUTO: 170 K/UL (ref 150–400)
PMV BLD AUTO: 11.3 FL (ref 8.9–12.9)
POTASSIUM SERPL-SCNC: 3.6 MMOL/L (ref 3.5–5.1)
PROT SERPL-MCNC: 6 G/DL (ref 6.4–8.2)
RBC # BLD AUTO: 3.02 M/UL (ref 4.1–5.7)
RIGHT ABI: 1.07
RIGHT ANTERIOR TIBIAL: 186 MMHG
RIGHT ARM BP: 174 MMHG
RIGHT POSTERIOR TIBIAL: 187 MMHG
RIGHT TBI: 0.96
RIGHT TOE PRESSURE: 167 MMHG
SODIUM SERPL-SCNC: 141 MMOL/L (ref 136–145)
THERAPEUTIC RANGE,PTTT: ABNORMAL SEC (ref 82–109)
TIBC SERPL-MCNC: <402 UG/DL
UIBC SERPL-MCNC: <17 UG/DL
WBC # BLD AUTO: 4.6 K/UL (ref 4.1–11.1)

## 2021-07-18 PROCEDURE — 74011250637 HC RX REV CODE- 250/637: Performed by: INTERNAL MEDICINE

## 2021-07-18 PROCEDURE — 99232 SBSQ HOSP IP/OBS MODERATE 35: CPT | Performed by: SURGERY

## 2021-07-18 PROCEDURE — 85730 THROMBOPLASTIN TIME PARTIAL: CPT

## 2021-07-18 PROCEDURE — 65270000029 HC RM PRIVATE

## 2021-07-18 PROCEDURE — 36415 COLL VENOUS BLD VENIPUNCTURE: CPT

## 2021-07-18 PROCEDURE — 80053 COMPREHEN METABOLIC PANEL: CPT

## 2021-07-18 PROCEDURE — 74011250636 HC RX REV CODE- 250/636: Performed by: INTERNAL MEDICINE

## 2021-07-18 PROCEDURE — 94762 N-INVAS EAR/PLS OXIMTRY CONT: CPT

## 2021-07-18 PROCEDURE — 85025 COMPLETE CBC W/AUTO DIFF WBC: CPT

## 2021-07-18 RX ORDER — FUROSEMIDE 40 MG/1
80 TABLET ORAL 2 TIMES DAILY
Status: DISCONTINUED | OUTPATIENT
Start: 2021-07-18 | End: 2021-07-22

## 2021-07-18 RX ADMIN — FUROSEMIDE 80 MG: 10 INJECTION, SOLUTION INTRAMUSCULAR; INTRAVENOUS at 08:26

## 2021-07-18 RX ADMIN — HEPARIN SODIUM AND DEXTROSE 16 UNITS/KG/HR: 10000; 5 INJECTION INTRAVENOUS at 05:10

## 2021-07-18 RX ADMIN — AMLODIPINE BESYLATE 10 MG: 5 TABLET ORAL at 08:25

## 2021-07-18 RX ADMIN — FUROSEMIDE 80 MG: 40 TABLET ORAL at 20:54

## 2021-07-18 RX ADMIN — HEPARIN SODIUM 3170 UNITS: 1000 INJECTION INTRAVENOUS; SUBCUTANEOUS at 14:40

## 2021-07-18 RX ADMIN — HYDRALAZINE HYDROCHLORIDE 50 MG: 50 TABLET, FILM COATED ORAL at 17:18

## 2021-07-18 RX ADMIN — CALCITRIOL CAPSULES 0.25 MCG 0.25 MCG: 0.25 CAPSULE ORAL at 08:25

## 2021-07-18 RX ADMIN — SODIUM BICARBONATE 650 MG: 650 TABLET ORAL at 08:25

## 2021-07-18 RX ADMIN — HYDRALAZINE HYDROCHLORIDE 50 MG: 50 TABLET, FILM COATED ORAL at 08:25

## 2021-07-18 RX ADMIN — MINOXIDIL 2.5 MG: 2.5 TABLET ORAL at 20:54

## 2021-07-18 RX ADMIN — IRON SUCROSE 200 MG: 20 INJECTION, SOLUTION INTRAVENOUS at 20:55

## 2021-07-18 RX ADMIN — SODIUM BICARBONATE 650 MG: 650 TABLET ORAL at 17:18

## 2021-07-18 RX ADMIN — MINOXIDIL 2.5 MG: 2.5 TABLET ORAL at 08:25

## 2021-07-18 RX ADMIN — SEVELAMER CARBONATE 800 MG: 800 TABLET, FILM COATED ORAL at 08:25

## 2021-07-18 RX ADMIN — SODIUM BICARBONATE 650 MG: 650 TABLET ORAL at 20:54

## 2021-07-18 RX ADMIN — HYDRALAZINE HYDROCHLORIDE 50 MG: 50 TABLET, FILM COATED ORAL at 20:54

## 2021-07-18 RX ADMIN — SEVELAMER CARBONATE 800 MG: 800 TABLET, FILM COATED ORAL at 11:46

## 2021-07-18 RX ADMIN — TAMSULOSIN HYDROCHLORIDE 0.4 MG: 0.4 CAPSULE ORAL at 08:25

## 2021-07-18 RX ADMIN — SEVELAMER CARBONATE 800 MG: 800 TABLET, FILM COATED ORAL at 17:18

## 2021-07-18 NOTE — PROGRESS NOTES
[facility] KIDNEY     Renal Daily Progress Note:     Admission Date: 2021     Subjective: feeling better, got blood transfusion. Had significant bladder residual volume which was alleviated by Quesada catheter. However, he developed blood around the meatus and catheter was subsequently removed. Flomax started. Potassium better. BP elevated, he had been on Monoxidil    No longer short of breath, on room air with O2 sat > 90%. No cough. No chest or abdominal pain. Left great toe with blood blister but no pain. Apparently stubbed his toe last week he did some furniture. Blood cultures show gram-positive cocci on Vanco.        Lila --> 21 --> F/U ESME, CKD    No new complaints were offered       Tonyayady Daniel --> 21 --> F/U ESEM, CKD     It seems he had a restful day. There were no new complaints.       Tonya Daniel --> 21       No new complaints were offered        Objective:     Visit Vitals  /66 (BP 1 Location: Left upper arm, BP Patient Position: At rest;Sitting)   Pulse 78   Temp 97.4 °F (36.3 °C)   Resp 18   Ht 6' (1.829 m)   Wt 94.6 kg (208 lb 8.9 oz)   SpO2 95%   BMI 28.29 kg/m²     Temp (24hrs), Av.7 °F (36.5 °C), Min:97.4 °F (36.3 °C), Max:98.7 °F (37.1 °C)        Intake/Output Summary (Last 24 hours) at 2021 1818  Last data filed at 2021 0752  Gross per 24 hour   Intake 390 ml   Output 850 ml   Net -460 ml     Current Facility-Administered Medications   Medication Dose Route Frequency    furosemide (LASIX) tablet 80 mg  80 mg Oral BID    minoxidiL (LONITEN) tablet 2.5 mg  2.5 mg Oral BID    amLODIPine (NORVASC) tablet 10 mg  10 mg Oral DAILY    tamsulosin (FLOMAX) capsule 0.4 mg  0.4 mg Oral DAILY    hydrALAZINE (APRESOLINE) tablet 50 mg  50 mg Oral TID    sodium bicarbonate tablet 650 mg  650 mg Oral TID    0.9% sodium chloride infusion 250 mL  250 mL IntraVENous PRN    calcitRIOL (ROCALTROL) capsule 0.25 mcg  0.25 mcg Oral DAILY    sevelamer carbonate (RENVELA) tab 800 mg 800 mg Oral TID WITH MEALS    ergocalciferol capsule 50,000 Units  50,000 Units Oral Q7D    iron sucrose (VENOFER) injection 200 mg  200 mg IntraVENous Q24H    hydrALAZINE (APRESOLINE) 20 mg/mL injection 40 mg  40 mg IntraVENous Q6H PRN    cloNIDine (CATAPRES) 0.3 mg/24 hr patch 1 Patch  1 Patch TransDERmal Q7D    heparin 25,000 units in D5W 250 ml infusion  18-36 Units/kg/hr (Adjusted) IntraVENous TITRATE    heparin (porcine) 1,000 unit/mL injection 6,340 Units  80 Units/kg (Adjusted) IntraVENous PRN    Or    heparin (porcine) 1,000 unit/mL injection 3,170 Units  40 Units/kg (Adjusted) IntraVENous PRN       Physical Exam:    Seen in Room 470    General appearance: Chronically ill-appearing patient lying in bed comfortably. Head: Normocephalic    Lungs: clear to auscultation , no wheezes, no rales    Heart:  No S3 gallop , No pericardial rub. Abdomen: Not distended    Extremities:  Lower extremity edema     Neuro : Awake and responding appropriately. Data Review:     LABS:  Recent Labs     07/18/21 0342 07/17/21  0655 07/16/21  0346    143 141  142   K 3.6 3.9 4.0  4.0    110* 109*  109*   CO2 22 23 20*  19*   BUN 72* 78* 67*  67*   CREA 6.78* 6.79* 6.43*  6.42*   CA 8.3* 8.2* 8.3*  8.2*   ALB 3.2* 3.3* 3.5  3.4*   PHOS  --   --  8.1*   Vitamin D2 11.1  Intact   PSA  5.2  Recent Labs     07/18/21 0342 07/17/21  0655 07/16/21  0346   WBC 4.6 5.2 6.6   HGB 8.0* 8.2* 8.7*   HCT 26.7* 27.6* 28.8*    168 174   iron saturation 5%    No results for input(s): HÉCTOR, KU, CLU, CREAU in the last 72 hours. No lab exists for component: PROUBlood Cultures 7-12-21  Two of four bottles have been flagged positive by instrument.  Bottles have been sent to [facility] laboratory to assess for possible growth. Gram Positive Cocci in clusters     Chest x-ray July 14, 2021:  1 view comparison to 7/12     Continued cardiomegaly and congestion.  If There is mild interstitial edema, it  is unchanged. Right pleural effusion and adjacent atelectasis have improved. Retroperitoneal US 7-14-21  Left kidney is 12.2 cm and right kidney is 9.1. Right renal cortex is echogenic  but not the left. Multiple cysts on each side, including a large cyst is 7 cm on  the left. No hydronephrosis. Aorta and IVC normals visualized. Prostatic  enlargement and diffuse bladder wall thickening. Moderate ascites     IMPRESSION  Medical renal disease on the right    Assessment:   Renal Specific Problems      CKD stage IV/V   Acute azotemia-exacerbated by bladder outlet obstruction  Hypertension - not controlled  Volume overload- resolving  Hyperkalemia- corrected  Metabolic acidosis- on bicarb  Acute anemia with significant iron deficiency  Acute left axillary/brachial vein DVT  Vitamin D2 def  Secondary hyperparathyroid  Proteinuria - nephrotic range  Gram + bacteremia      Plan:     Obtain/ Order: labs/cultures/radiology/procedures:  renal panel, CBC in AM    Stool for occult blood  PLA2R Ab pending    Therapeutic:    Contnue minoxidil 2.5 mg b.i.d  IV iron to rapidly replete stores  Avoid Kayexalate if possible, use Lokelma  Continue sodium bicarbonate. The target bicarb level is 22/23  Calcitriol and ergocalciferol   PO4 binder--sevelamer  Flomax   Continue IV lasix  Antibiotics per primary team    Dr. Jenni Clark will follow tomorrow (7/19).       Remington Borges MD  [phone]

## 2021-07-18 NOTE — PROGRESS NOTES
Pulmonary, Critical Care    Name: Jewels Berg MRN: [id number]   : 1955 Hospital: [address] [place] [address]   Date: 2021  Admission date: 2021 Hospital Day: 7       Subjective/Interval History:   Seen in the emergency room wearing noninvasive ventilator. Patient has underlying renal insufficiency developed worsening shortness of breath cough presented to the emergency room chest x-ray shows pulmonary edema. He was profoundly hypoxic is now on noninvasive ventilator and feels more comfortable. Has not had any significant urine output since he has been in the ER. He also complains of new onset left arm swelling   post void bladder scan showed greater than 200 cc urine retention and Quesada catheter placed with 500 cc removed. Overnight he has put out an additional 1200 cc. Respirations improved currently nonlabored on nasal oxygen. Duplex scan of the left arm did show left axillary and proximal brachial DVT and heparin was started. On heparin with Quesada catheter and he has had slight bleeding at the urethral meatus. Ultrasound of the abdomen is pending  7/15 ultrasound of the abdomen showed medical renal disease on the right with small kidney no hydronephrosis there was evidence of prostate enlargement. He is breathing easily at this point and on room air is running on oxygen saturation of 93%   respirations nonlabored on nasal oxygen. Quesada catheter is out he states he has been voiding frequent small amounts without straining   no specific complaints breathing easily. Overnight oximetry showed minimal but significant desaturation while on room air 8 minutes 40 seconds with low of 71%    Patient Active Problem List   Diagnosis Code    GI bleed K92.2    Pulmonary edema J81.1       IMPRESSION:   1. Acute hypoxic respiratory failure tolerating nasal oxygen 1 L on room air at present  2.  Acute pulmonary edema radiographically unchanged we will repeat chest x-ray Monday  3. 2 of 4 blood culture bottles with coagulase-negative staph aureus likely skin contaminant   4. Bilateral pleural effusions repeat chest x-ray unchanged  5. Acute on chronic kidney disease abrupt worsening likely due to obstructive uropathy  6. Obstructive uropathy Quesada catheter has been removed good urine output recorded yesterday  7. Severe hypertension improved   8. DVT left axillary and brachial currently on IV heparin  9. Anemia worsened yesterday transfusion 7/14 I cannot see any indication that it was given and hemoglobin able  10. Troponin leak likely due to renal insufficiency stable has not risen any further  Body mass index is 28.29 kg/m². RECOMMENDATIONS/PLAN:   1. Will change lasix from IV to oral  2. Blood pressure remains a problem we will leave to renal to adjust medications  3. Continue Flomax for prostate enlargement although he denies frequency post void dribbling or small volume urine output   4. Continue nasal oxygen repeat overnight oximetry Ibrahima night on room air         Subjective/Initial History:       I was asked by Yaima Ball MD to see Olivia Anderson a 76 y.o.  male  in consultation for a chief complaint of shortness of breath pulmonary edema acute hypoxic respiratory failure        Patient PCP: Paddy Vázquez MD  PMH:  has a past medical history of Chronic kidney disease and Hypertension. PSH:   has no past surgical history on file. FHX: family history is not on file. SHX:  reports that he has never smoked.  He has never used smokeless tobacco.    Systemic review somewhat limited as he is on noninvasive ventilator remains short of breath although states he is feeling better  General he has not noted any worsening edema of his upper legs fever chills or sweats does complain of new onset swelling of his left hand and arm  Eyes no double vision or momentary blindness  ENT no facial pain over the sinuses  Endocrinologic no polyuria polydipsia  Musculoskeletal no swollen tender joints  Neurologic no history seizures or syncope  Gastrointestinal no nausea vomiting acid indigestion  Genitourinary states he does still pass fair amount of urine each day has not noted any decrease in that.   Does not have to strain to urinate has no bleeding or drainage  Cardiovascular he is not aware of any underlying heart disease has not had chest pain diaphoresis has had worsening shortness of breath laying down and with exertion  Respiratory worsening shortness of breath over the last week or more no significant cough no sputum production no chills or sweats did have history COVID-19 pneumonitis January of this year    No Known Allergies   MEDS:   Current Facility-Administered Medications   Medication    minoxidiL (LONITEN) tablet 2.5 mg    amLODIPine (NORVASC) tablet 10 mg    furosemide (LASIX) injection 80 mg    tamsulosin (FLOMAX) capsule 0.4 mg    hydrALAZINE (APRESOLINE) tablet 50 mg    sodium bicarbonate tablet 650 mg    0.9% sodium chloride infusion 250 mL    calcitRIOL (ROCALTROL) capsule 0.25 mcg    sevelamer carbonate (RENVELA) tab 800 mg    ergocalciferol capsule 50,000 Units    iron sucrose (VENOFER) injection 200 mg    hydrALAZINE (APRESOLINE) 20 mg/mL injection 40 mg    cloNIDine (CATAPRES) 0.3 mg/24 hr patch 1 Patch    heparin 25,000 units in D5W 250 ml infusion    heparin (porcine) 1,000 unit/mL injection 6,340 Units    Or    heparin (porcine) 1,000 unit/mL injection 3,170 Units        Current Facility-Administered Medications:     minoxidiL (LONITEN) tablet 2.5 mg, 2.5 mg, Oral, BID, Kieran Moise MD, 2.5 mg at 07/18/21 0825    amLODIPine (NORVASC) tablet 10 mg, 10 mg, Oral, DAILY, Shilpi Slaughter MD, 10 mg at 07/18/21 0825    furosemide (LASIX) injection 80 mg, 80 mg, IntraVENous, Q12H, Shilpi Slaughter MD, 80 mg at 07/18/21 0826    tamsulosin (FLOMAX) capsule 0.4 mg, 0.4 mg, Oral, DAILY, Noelle Lugo MD, 0.4 mg at 21 0825    hydrALAZINE (APRESOLINE) tablet 50 mg, 50 mg, Oral, TID, Steven Brar MD, 50 mg at 21 08    sodium bicarbonate tablet 650 mg, 650 mg, Oral, TID, Steven Brar MD, 650 mg at 21 0825    0.9% sodium chloride infusion 250 mL, 250 mL, IntraVENous, PRN, Steven Brar MD    calcitRIOL (ROCALTROL) capsule 0.25 mcg, 0.25 mcg, Oral, DAILY, Esperanza Hoyos MD, 0.25 mcg at 21 0825    sevelamer carbonate (RENVELA) tab 800 mg, 800 mg, Oral, TID WITH MEALS, Esperanza Hoyos MD, 800 mg at 21 08    ergocalciferol capsule 50,000 Units, 50,000 Units, Oral, Q7D, Esperanza Hoyos MD, 50,000 Units at 21 184    iron sucrose (VENOFER) injection 200 mg, 200 mg, IntraVENous, Q24H, Esperanza Hoyos MD, 200 mg at 21    hydrALAZINE (APRESOLINE) 20 mg/mL injection 40 mg, 40 mg, IntraVENous, Q6H PRN, Steven Brar MD, 40 mg at 07/15/21 155    cloNIDine (CATAPRES) 0.3 mg/24 hr patch 1 Patch, 1 Patch, TransDERmal, Q7D, Steven Brar MD    heparin 25,000 units in D5W 250 ml infusion, 18-36 Units/kg/hr (Adjusted), IntraVENous, TITRATE, Kyle Sarabia MD, Last Rate: 12.7 mL/hr at 21 0510, 16 Units/kg/hr at 21 0510    heparin (porcine) 1,000 unit/mL injection 6,340 Units, 80 Units/kg (Adjusted), IntraVENous, PRN **OR** heparin (porcine) 1,000 unit/mL injection 3,170 Units, 40 Units/kg (Adjusted), IntraVENous, PRN, Kyle Sarabia MD, 3,170 Units at 21 1425      Objective:     Vital Signs: Telemetry:    normal sinus rhythm Intake/Output:   Visit Vitals  /74 (BP 1 Location: Right upper arm, BP Patient Position: At rest;Supine)   Pulse 87   Temp 97.6 °F (36.4 °C)   Resp 18   Ht 6' (1.829 m)   Wt 94.6 kg (208 lb 8.9 oz)   SpO2 94%   BMI 28.29 kg/m²       Temp (24hrs), Av.7 °F (36.5 °C), Min:97.4 °F (36.3 °C), Max:98.7 °F (37.1 °C)        O2 Device: None (Room air) O2 Flow Rate (L/min): 1 l/min         Body mass index is 28.29 kg/m². Wt Readings from Last 4 Encounters:   07/16/21 94.6 kg (208 lb 8.9 oz)   01/05/21 79.4 kg (175 lb)          Intake/Output Summary (Last 24 hours) at 7/18/2021 1022  Last data filed at 7/18/2021 0752  Gross per 24 hour   Intake 590 ml   Output 1100 ml   Net -510 ml       Last shift:      07/18 0701 - 07/18 1900  In: -   Out: 150 [Urine:150]  Last 3 shifts: 07/16 1901 - 07/18 0700  In: 740 [P.O.:740]  Out: 2700 [Urine:2700]       Hemodynamics:    CO:    CI:    CVP:    SVR:   PAP Systolic:    PAP Diastolic:    PVR:    UX92:       Ventilator Settings:      Mode Rate TV Press PEEP FiO2 PIP Min. Vent               21 %     9.7 l/min      Physical Exam:    General:  male; currently on nasal oxygen in no distress  HEENT: NCAT, visible oral mucosa is moist and clear  Eyes: anicteric; conjunctiva clear extraocular movements intact  Neck: no nodes,,no accessory MM use. no respiratory distress  Chest: no deformity,   Cardiac: Regular rate and rhythm  Lungs: distant breath sounds; clear anteriorly and laterally with rales in both bases  Abd: Soft positive bowel sounds no tenderness  Ext: Improvement edema pitting and lymphedema of the lower extremities with new onset  edema of the left arm; no joint swelling; No clubbing  : Cloudy urine  Neuro: Alert awake no distress speech is clear moves all 4 extremities  Psych-calm, oriented to person;   Skin: warm, dry, no cyanosis;   Pulses: Brachial and radial pulses intact  Capillary:slow capillary refill      Labs:    Recent Labs     07/18/21  0352 07/18/21  0342 07/17/21  1610 07/17/21  1015 07/17/21  0655 07/16/21  2142 07/16/21  1318 07/16/21  0346   WBC  --  4.6  --   --  5.2  --   --  6.6   HGB  --  8.0*  --   --  8.2*  --   --  8.7*   PLT  --  170  --   --  168  --   --  174   APTT 138.3*  --  52.7* 87.6*  --    < >   < > 128.8*    < > = values in this interval not displayed.      Recent Labs     07/18/21  0342 07/17/21  0655 07/16/21  0346    143 141  142   K 3.6 3.9 4.0  4.0    110* 109*  109*   CO2 22 23 20*  19*   GLU 91 119* 101*  99   BUN 72* 78* 67*  67*   CREA 6.78* 6.79* 6.43*  6.42*   CA 8.3* 8.2* 8.3*  8.2*   PHOS  --   --  8.1*   ALB 3.2* 3.3* 3.5  3.4*   ALT 14 15 16   7/17 overnight oximetry shows low oxygen saturation 71% with 8 minutes 40 seconds below 88% from 1 AM to 6 AM   7/16 overnight oximetry on 1 L shows only 2 minutes 20 seconds below 88% with a low oxygen saturation of 83%  7/15 room air oxygen saturation 93%  currently on noninvasive ventilator inspiratory pressure 16 expiratory pressure six rate 16 FiO2 28% with oxygen saturation 96%   No results for input(s): CPK, CKNDX, TROIQ in the last 72 hours. No lab exists for component: CPKMB  BNP greater than 35,000  Lab Results   Component Value Date/Time    Culture result:  07/12/2021 10:40 PM     Two of four bottles have been flagged positive by instrument. Bottles have been sent to [facility] laboratory to assess for possible growth. Gram Positive Cocci in clusters CALLED TO AND READ BACK BY Taisha Wahl r.n. [phone] 07/14/2021 by dpw    Culture result:  07/12/2021 10:40 PM     Staphylococcus species, coagulase negative GROWING IN THE 1ST OF 4 BOTTLES DRAWN    Culture result:  07/12/2021 10:40 PM     Staphylococcus species, coagulase negative (2nd colony type/strain) GROWING IN THE 2ND OF 4 BOTTLES DRAWN        Imaging:  I have personally reviewed the patients radiographs and have reviewed the reports:    CXR Results  (Last 48 hours)  7/16 chest x-ray with continued mild pulmonary edema and small pleural effusions unchanged               07/12/21 2251  XR CHEST PORT Final result    Impression:  Findings/impression:       Cardiac silhouette is enlarged. Central vascular congestion and patchy central   airspace disease/edema. Suspect trace right pleural effusion. No evidence of pneumothorax. No acute osseous abnormality identified.            Narrative:  Study: XR CHEST PORT       Clinical indication: sob       Comparison: None. To me chest x-ray consistent with cardiomegaly pulmonary edema and bilateral pleural effusions           Results from Hospital Encounter encounter on 07/12/21    XR CHEST PORT    Narrative  Exam: Portable upright chest radiograph    COMPARISON: One day prior. Impression  Findings/impression: Stable cardiomediastinal silhouette. Similar central  pulmonary vascular congestion and bilateral patchy airspace opacities. No  significant pleural effusion. No pneumothorax. Findings are not significantly changed. XR CHEST PORT    Narrative  1 view comparison to 12    Continued cardiomegaly and congestion. If There is mild interstitial edema, it  is unchanged. Right pleural effusion and adjacent atelectasis have improved. XR CHEST PORT    Narrative  Study: XR CHEST PORT    Clinical indication: sob    Comparison: None. Impression  Findings/impression:    Cardiac silhouette is enlarged. Central vascular congestion and patchy central  airspace disease/edema. Suspect trace right pleural effusion. No evidence of pneumothorax. No acute osseous abnormality identified. Results from [place] encounter on 01/05/21    CT CHEST WO CONT    Narrative  Images obtained without contrast include the abdomen and pelvis. Comparison portable chest radiograph earlier today. Dose Reduction:  All CT scans at this facility are performed using dose reduction optimization  techniques as appropriate to a performed exam including the following: Automated  exposure control, adjustments of the mA and/or kV according to patient size, or  use of iterative reconstruction technique. Subtle peripheral groundglass densities are noted in both lungs, could reflect  Covid pneumonia or other. No pneumothorax or pneumomediastinum. The radiographic findings suspicious for  pneumomediastinum probably was artifact, perhaps related to cardiac motion.   No pleural effusion or adenopathy. Cardiomegaly again noted. Impression  IMPRESSION:  1. No pneumomediastinum or pneumothorax. 2. Cardiomegaly. 3. Subtle groundglass densities in both lungs might be pneumonia or other. Discussion patient with worsening renal insufficiency has developed pulmonary edema acute hypoxic respiratory failure. He states he still has urine output normally at home. We will give him high-dose IV Lasix to see if diuresis is induced. He very likely will need dialysis. He has minimal elevation in troponin probably troponin leak from hypoxia or just due to his renal insufficiency. He is not having any chest pain. Does have severe hypertension. Has been started on clonidine and hydralazine. 7/14 no distress today on nasal oxygen. Did have residual on post void bladder scan and Quesdaa catheter was placed with good diuresis overnight. Despite this potassium remains elevated. We will give 1 dose of Kayexalate. Despite diuresis hemoglobin is dropped to 6.7 will transfuse. He denies frequency small-volume urine or straining to urinate at home despite this with signs of obstruction we will add Flomax. Potassium 6 today we will give 1 dose of Kayexalate and continue diuresis  7/16 respirations nonlabored. Was placed back on 1 L nasal oxygen yesterday overnight oximetry shows well-maintained oxygen saturation. Will check overnight tonight on and off oxygen. Quesada catheter has been removed. Chest x-ray continues to show mild pulmonary edema  7/17 overnight oximetry shows desaturation when on room air. Will maintain oxygen and repeat overnight oximetry Ibrahima night         Thank you for allowing us to participate in the care of this patient.   We will follow along with you     Candace Johnson MD

## 2021-07-18 NOTE — PROGRESS NOTES
[facility] KIDNEY     Renal Daily Progress Note:     Admission Date: 2021     Subjective: feeling better, got blood transfusion. Had significant bladder residual volume which was alleviated by Quesada catheter. However, he developed blood around the meatus and catheter was subsequently removed. Flomax started. Potassium better. BP elevated, he had been on Monoxidil    No longer short of breath, on room air with O2 sat > 90%. No cough. No chest or abdominal pain. Left great toe with blood blister but no pain. Apparently stubbed his toe last week he did some furniture. Blood cultures show gram-positive cocci on Vanco.        Lila --> 21 --> F/U ESME, CKD    No new complaints were offered       Andreas Paul --> 21 --> F/U AI, CKD     It seems he had a restful day. There were no new complaints.           Objective:     Visit Vitals  BP (!) 119/57 (BP 1 Location: Left upper arm, BP Patient Position: At rest;Supine)   Pulse 83   Temp 97.5 °F (36.4 °C)   Resp 18   Ht 6' (1.829 m)   Wt 94.6 kg (208 lb 8.9 oz)   SpO2 96%   BMI 28.29 kg/m²     Temp (24hrs), Av.9 °F (36.6 °C), Min:97.4 °F (36.3 °C), Max:98.8 °F (37.1 °C)        Intake/Output Summary (Last 24 hours) at 2021  Last data filed at 2021  Gross per 24 hour   Intake 740 ml   Output 2200 ml   Net -1460 ml     Current Facility-Administered Medications   Medication Dose Route Frequency    minoxidiL (LONITEN) tablet 2.5 mg  2.5 mg Oral BID    amLODIPine (NORVASC) tablet 10 mg  10 mg Oral DAILY    furosemide (LASIX) injection 80 mg  80 mg IntraVENous Q12H    tamsulosin (FLOMAX) capsule 0.4 mg  0.4 mg Oral DAILY    hydrALAZINE (APRESOLINE) tablet 50 mg  50 mg Oral TID    sodium bicarbonate tablet 650 mg  650 mg Oral TID    0.9% sodium chloride infusion 250 mL  250 mL IntraVENous PRN    calcitRIOL (ROCALTROL) capsule 0.25 mcg  0.25 mcg Oral DAILY    sevelamer carbonate (RENVELA) tab 800 mg  800 mg Oral TID WITH MEALS    ergocalciferol capsule 50,000 Units  50,000 Units Oral Q7D    iron sucrose (VENOFER) injection 200 mg  200 mg IntraVENous Q24H    hydrALAZINE (APRESOLINE) 20 mg/mL injection 40 mg  40 mg IntraVENous Q6H PRN    cloNIDine (CATAPRES) 0.3 mg/24 hr patch 1 Patch  1 Patch TransDERmal Q7D    heparin 25,000 units in D5W 250 ml infusion  18-36 Units/kg/hr (Adjusted) IntraVENous TITRATE    heparin (porcine) 1,000 unit/mL injection 6,340 Units  80 Units/kg (Adjusted) IntraVENous PRN    Or    heparin (porcine) 1,000 unit/mL injection 3,170 Units  40 Units/kg (Adjusted) IntraVENous PRN       Physical Exam:    Seen in Room 470  A member of his Care team was in attendance. General appearance: Chronically ill-appearing patient lying in bed comfortably. Head: Normocephalic    Lungs: clear to auscultation , no wheezes, no rales    Heart:  No S3 gallop , No pericardial rub. Abdomen: Not distended    Extremities:  Lower extremity edema --> Mild    Neuro : Awake and responding appropriately. Data Review:     LABS:  Recent Labs     07/17/21  0655 07/16/21  0346 07/15/21  0154 07/15/21  0145 07/15/21  0145    141  142 140   < > 141   K 3.9 4.0  4.0 5.0   < > 5.0   * 109*  109* 109*   < > 110*   CO2 23 20*  19* 22   < > 21   BUN 78* 67*  67* 67*   < > 67*   CREA 6.79* 6.43*  6.42* 6.31*   < > 6.27*   CA 8.2* 8.3*  8.2* 7.8*   < > 7.8*   ALB 3.3* 3.5  3.4* 3.1*   < > 3.1*   PHOS  --  8.1*  --   --  8.4*    < > = values in this interval not displayed. Vitamin D2 11.1  Intact   PSA  5.2  Recent Labs     07/17/21  0655 07/16/21  0346 07/15/21  1048   WBC 5.2 6.6 5.2   HGB 8.2* 8.7* 8.3*   HCT 27.6* 28.8* 27.4*    174 157   iron saturation 5%    No results for input(s): HÉCTOR, KU, CLU, CREAU in the last 72 hours.     No lab exists for component: PROUBlood Cultures 7-12-21  Two of four bottles have been flagged positive by instrument.  Bottles have been sent to [facility] laboratory to assess for possible growth. Gram Positive Cocci in clusters     Chest x-ray July 14, 2021:  1 view comparison to 7/12     Continued cardiomegaly and congestion. If There is mild interstitial edema, it  is unchanged. Right pleural effusion and adjacent atelectasis have improved. Retroperitoneal US 7-14-21  Left kidney is 12.2 cm and right kidney is 9.1. Right renal cortex is echogenic  but not the left. Multiple cysts on each side, including a large cyst is 7 cm on  the left. No hydronephrosis. Aorta and IVC normals visualized. Prostatic  enlargement and diffuse bladder wall thickening. Moderate ascites     IMPRESSION  Medical renal disease on the right    Assessment:   Renal Specific Problems      CKD stage IV/V   Acute azotemia-exacerbated by bladder outlet obstruction  Hypertension - not controlled  Volume overload- resolving  Hyperkalemia- corrected  Metabolic acidosis- on bicarb  Acute anemia with significant iron deficiency  Acute left axillary/brachial vein DVT  Vitamin D2 def  Secondary hyperparathyroid  Proteinuria - nephrotic range  Gram + bacteremia      Plan:     Obtain/ Order: labs/cultures/radiology/procedures:  renal panel, CBC in AM    Stool for occult blood  PLA2R Ab pending    Therapeutic:    Contnue minoxidil 2.5 mg b.i.d  IV iron to rapidly replete stores  Avoid Kayexalate if possible, use Lokelma  Continue sodium bicarbonate.  The target bicarb level is 22/23  Calcitriol and ergocalciferol   PO4 binder--sevelamer  Flomax   Continue IV lasix  Antibiotics per primary team      Redmond Soulier, MD  [phone]

## 2021-07-18 NOTE — PROGRESS NOTES
Progress Note    Jamaica Hughes MD             Daily Progress Note: 7/18/2021      Subjective: The patient is seen for follow  up. Offers no complaints. Except patient says that when he eats he gets short of breath requesting him to eat while sitting on the side of the bed or in the chair. Problem List:  Problem List as of 7/18/2021 Never Reviewed        Codes Class Noted - Resolved    Pulmonary edema ICD-10-CM: J81.1  ICD-9-CM: 644  7/13/2021 - Present        GI bleed ICD-10-CM: K92.2  ICD-9-CM: 578.9  1/5/2021 - Present              Medications reviewed  Current Facility-Administered Medications   Medication Dose Route Frequency    furosemide (LASIX) tablet 80 mg  80 mg Oral BID    minoxidiL (LONITEN) tablet 2.5 mg  2.5 mg Oral BID    amLODIPine (NORVASC) tablet 10 mg  10 mg Oral DAILY    tamsulosin (FLOMAX) capsule 0.4 mg  0.4 mg Oral DAILY    hydrALAZINE (APRESOLINE) tablet 50 mg  50 mg Oral TID    sodium bicarbonate tablet 650 mg  650 mg Oral TID    0.9% sodium chloride infusion 250 mL  250 mL IntraVENous PRN    calcitRIOL (ROCALTROL) capsule 0.25 mcg  0.25 mcg Oral DAILY    sevelamer carbonate (RENVELA) tab 800 mg  800 mg Oral TID WITH MEALS    ergocalciferol capsule 50,000 Units  50,000 Units Oral Q7D    iron sucrose (VENOFER) injection 200 mg  200 mg IntraVENous Q24H    hydrALAZINE (APRESOLINE) 20 mg/mL injection 40 mg  40 mg IntraVENous Q6H PRN    cloNIDine (CATAPRES) 0.3 mg/24 hr patch 1 Patch  1 Patch TransDERmal Q7D    heparin 25,000 units in D5W 250 ml infusion  18-36 Units/kg/hr (Adjusted) IntraVENous TITRATE    heparin (porcine) 1,000 unit/mL injection 6,340 Units  80 Units/kg (Adjusted) IntraVENous PRN    Or    heparin (porcine) 1,000 unit/mL injection 3,170 Units  40 Units/kg (Adjusted) IntraVENous PRN       Review of Systems:   A comprehensive review of systems was negative except for that written in the HPI.     Objective:   Physical Exam:     Visit Vitals  /69 (BP 1 Location: Right upper arm, BP Patient Position: At rest;Supine)   Pulse 82   Temp 97.5 °F (36.4 °C)   Resp 18   Ht 6' (1.829 m)   Wt 94.6 kg (208 lb 8.9 oz)   SpO2 96%   BMI 28.29 kg/m²    O2 Flow Rate (L/min): 1 l/min O2 Device: None (Room air)    Temp (24hrs), Av.7 °F (36.5 °C), Min:97.5 °F (36.4 °C), Max:98.7 °F (37.1 °C)    701 - 1900  In: -   Out: 150 [Urine:150]   1901 -  0700  In: 740 [P.O.:740]  Out: 2700 [Urine:2700]    General:  Alert, cooperative, no distress, appears stated age. Lungs:   Clear to auscultation bilaterally. Chest wall:  No tenderness or deformity. Heart:  Regular rate and rhythm, S1, S2 normal, no murmur, click, rub or gallop. Abdomen:   Soft, non-tender. Bowel sounds normal. No masses,  No organomegaly. Extremities: Extremities normal, atraumatic, no cyanosis or edema. Also left upper limb edema is much better but seems patient has midline in the right upper limb and that is little bit swollen   Pulses: 2+ and symmetric all extremities. Patient's blister on his left great toe has been shrinking down nicely. Much improved edema of the legs. Skin: Skin color, texture, turgor normal. No rashes or lesions   Neurologic: CNII-XII intact. No gross sensory or motor deficits     Data Review:       Recent Days:  Recent Labs     21  0342 21  0655 21  0346   WBC 4.6 5.2 6.6   HGB 8.0* 8.2* 8.7*   HCT 26.7* 27.6* 28.8*    168 174     Recent Labs     21  0342 21  0655 21  0346    143 141  142   K 3.6 3.9 4.0  4.0    110* 109*  109*   CO2 22 23 20*  19*   GLU 91 119* 101*  99   BUN 72* 78* 67*  67*   CREA 6.78* 6.79* 6.43*  6.42*   CA 8.3* 8.2* 8.3*  8.2*   PHOS  --   --  8.1*   ALB 3.2* 3.3* 3.5  3.4*   TBILI 0.4 0.5 0.7   ALT 14 15 16     No results for input(s): PH, PCO2, PO2, HCO3, FIO2 in the last 72 hours.   Results     Procedure Component Value Units Date/Time    MRSA SCREEN - PCR (NASAL) [[id number]] Collected: 07/13/21 1730    Order Status: Completed Specimen: Swab Updated: 07/13/21 2123     MRSA by PCR, Nasal Not Detected       COVID-19 RAPID TEST [[id number]] Collected: 07/13/21 0539    Order Status: Completed Specimen: Nasopharyngeal Updated: 07/13/21 0655     Specimen source Nasopharyngeal        COVID-19 rapid test Not Detected        Comment: Rapid Abbott ID Now   Rapid NAAT:  The specimen is NEGATIVE for SARS-CoV-2, the novel coronavirus associated with COVID-19. Negative results should be treated as presumptive and, if inconsistent with clinical signs and symptoms or necessary for patient management, should be tested with an alternative molecular assay. Negative results do not preclude SARS-CoV-2 infection and should not be used as the sole basis for patient management decisions. This test has been authorized by the FDA under   an Emergency Use Authorization (EUA) for use by authorized laboratories. Fact sheet for Healthcare Providers: [employer]Pion[employer]date[employer]co[employer]nz Fact sheet for Patients: [employer]nz   Methodology: Isothermal Nucleic Acid Amplification         CULTURE, BLOOD, PAIRED [[id number]] Collected: 07/12/21 2240    Order Status: Completed Specimen: Blood Updated: 07/16/21 1346     Special Requests: No Special Requests        Culture result:       Two of four bottles have been flagged positive by instrument. Bottles have been sent to [facility] laboratory to assess for possible growth.  Gram Positive Cocci in clusters CALLED TO AND READ BACK BY chiquita mtz r.n. [id number] 07/14/2021 by hanane                  Staphylococcus species, coagulase negative GROWING IN THE 1ST OF 4 BOTTLES DRAWN                  Staphylococcus species, coagulase negative (2nd colony type/strain) GROWING IN THE 2ND OF 4 BOTTLES DRAWN               24 Hour Results:  Recent Results (from the past 24 hour(s))   PTT    Collection Time: 07/17/21  4:10 PM   Result Value Ref Range    aPTT 52.7 (H) 21.2 - 34.1 sec    aPTT, therapeutic range   82 - 109 sec   CBC WITH AUTOMATED DIFF    Collection Time: 07/18/21  3:42 AM   Result Value Ref Range    WBC 4.6 4.1 - 11.1 K/uL    RBC 3.02 (L) 4.10 - 5.70 M/uL    HGB 8.0 (L) 12.1 - 17.0 g/dL    HCT 26.7 (L) 36.6 - 50.3 %    MCV 88.4 80.0 - 99.0 FL    MCH 26.5 26.0 - 34.0 PG    MCHC 30.0 30.0 - 36.5 g/dL    RDW 17.9 (H) 11.5 - 14.5 %    PLATELET 021 288 - 467 K/uL    MPV 11.3 8.9 - 12.9 FL    NRBC 0.0 0.0  WBC    ABSOLUTE NRBC 0.00 0.00 - 0.01 K/uL    NEUTROPHILS 73 32 - 75 %    LYMPHOCYTES 6 (L) 12 - 49 %    MONOCYTES 14 (H) 5 - 13 %    EOSINOPHILS 5 0 - 7 %    BASOPHILS 1 0 - 1 %    IMMATURE GRANULOCYTES 1 (H) 0 - 0.5 %    ABS. NEUTROPHILS 3.3 1.8 - 8.0 K/UL    ABS. LYMPHOCYTES 0.3 (L) 0.8 - 3.5 K/UL    ABS. MONOCYTES 0.7 0.0 - 1.0 K/UL    ABS. EOSINOPHILS 0.2 0.0 - 0.4 K/UL    ABS. BASOPHILS 0.0 0.0 - 0.1 K/UL    ABS. IMM. GRANS. 0.0 0.00 - 0.04 K/UL    DF AUTOMATED     METABOLIC PANEL, COMPREHENSIVE    Collection Time: 07/18/21  3:42 AM   Result Value Ref Range    Sodium 141 136 - 145 mmol/L    Potassium 3.6 3.5 - 5.1 mmol/L    Chloride 107 97 - 108 mmol/L    CO2 22 21 - 32 mmol/L    Anion gap 12 5 - 15 mmol/L    Glucose 91 65 - 100 mg/dL    BUN 72 (H) 6 - 20 mg/dL    Creatinine 6.78 (H) 0.70 - 1.30 mg/dL    BUN/Creatinine ratio 11 (L) 12 - 20      GFR est AA 10 (L) >60 ml/min/1.73m2    GFR est non-AA 8 (L) >60 ml/min/1.73m2    Calcium 8.3 (L) 8.5 - 10.1 mg/dL    Bilirubin, total 0.4 0.2 - 1.0 mg/dL    AST (SGOT) 18 15 - 37 U/L    ALT (SGPT) 14 12 - 78 U/L    Alk.  phosphatase 57 45 - 117 U/L    Protein, total 6.0 (L) 6.4 - 8.2 g/dL    Albumin 3.2 (L) 3.5 - 5.0 g/dL    Globulin 2.8 2.0 - 4.0 g/dL    A-G Ratio 1.1 1.1 - 2.2     PTT    Collection Time: 07/18/21  3:52 AM   Result Value Ref Range    aPTT 138.3 (HH) 21.2 - 34.1 sec    aPTT, therapeutic range   82 - 109 sec   PTT    Collection Time: 07/18/21 12:50 PM   Result Value Ref Range aPTT 75.8 (H) 21.2 - 34.1 sec    aPTT, therapeutic range   82 - 109 sec       LOWER EXT ART PVR MULT LEVEL SEG PRESSURES         DUPLEX LOWER EXT VENOUS BILAT   Final Result      XR CHEST PORT   Final Result   Findings/impression: Stable cardiomediastinal silhouette. Similar central   pulmonary vascular congestion and bilateral patchy airspace opacities. No   significant pleural effusion. No pneumothorax. Findings are not significantly changed. XR CHEST PORT   Final Result      US RETROPERITONEUM COMP   Final Result   Medical renal disease on the right      DUPLEX UPPER EXT VENOUS LEFT   Final Result      XR CHEST PORT   Final Result   Findings/impression:      Cardiac silhouette is enlarged. Central vascular congestion and patchy central   airspace disease/edema. Suspect trace right pleural effusion. No evidence of pneumothorax. No acute osseous abnormality identified. Left upper limb DVT    Assessment: Acute pulmonary edema  Acute on chronic renal failure  Hypertension  Anemia now stabilized          Plan: I will have physical therapy and Occupational Therapy evaluation. Advised patient to sit in the chair and eat. Needs to be walking around. Since hematuria has been cleared of I would like to start patient on oral anticoagulant will need advice from hematologist.        Care Plan discussed with: Patient/Family and RN taking care of patient. Total time spent with patient: 30 minutes.     Cain Garrett MD

## 2021-07-18 NOTE — PROGRESS NOTES
PROGRESS NOTE      Chief Complaints:  Patient voices no new complaints. HPI and  Objective:    Patient states swelling of the arms and legs going down as well. Patient denies chest pain shortness breath fever chills denies any nausea abdominal pain denies generalized weakness. Review of Systems:  I reviewed the rest of the organ system presently negative. EXAM:  Visit Vitals  /74 (BP 1 Location: Right upper arm, BP Patient Position: At rest;Supine)   Pulse 87   Temp 97.6 °F (36.4 °C)   Resp 18   Ht 6' (1.829 m)   Wt 208 lb 8.9 oz (94.6 kg)   SpO2 94%   BMI 28.29 kg/m²     Patient is awake and alert  Head and neck atraumatic normocephalic  Cardiac is regular rate  Pulmonary no audible wheeze  Abdomen soft  Bilateral swelling is described improved. No changes blisters on the left great toe.   Recent Results (from the past 24 hour(s))   PTT    Collection Time: 07/17/21 10:15 AM   Result Value Ref Range    aPTT 87.6 (H) 21.2 - 34.1 sec    aPTT, therapeutic range   82 - 109 sec   LOWER EXT ART PVR MULT LEVEL SEG PRESSURES    Collection Time: 07/17/21 12:05 PM   Result Value Ref Range    Right arm  mmHg    Left posterior tibial 168 mmHg    Right posterior tibial 187 mmHg    Left anterior tibial 167 mmHg    Right anterior tibial 186 mmHg    Left JAVON 0.97     Right JAVON 1.07     Left toe pressure 169 mmHg    Right toe pressure 167 mmHg    Left TBI 0.97     Right TBI 0.96    PTT    Collection Time: 07/17/21  4:10 PM   Result Value Ref Range    aPTT 52.7 (H) 21.2 - 34.1 sec    aPTT, therapeutic range   82 - 109 sec   CBC WITH AUTOMATED DIFF    Collection Time: 07/18/21  3:42 AM   Result Value Ref Range    WBC 4.6 4.1 - 11.1 K/uL    RBC 3.02 (L) 4.10 - 5.70 M/uL    HGB 8.0 (L) 12.1 - 17.0 g/dL    HCT 26.7 (L) 36.6 - 50.3 %    MCV 88.4 80.0 - 99.0 FL    MCH 26.5 26.0 - 34.0 PG    MCHC 30.0 30.0 - 36.5 g/dL    RDW 17.9 (H) 11.5 - 14.5 %    PLATELET 858 814 - 764 K/uL    MPV 11.3 8.9 - 12.9 FL    NRBC 0.0 0.0 PER 100 WBC    ABSOLUTE NRBC 0.00 0.00 - 0.01 K/uL    NEUTROPHILS 73 32 - 75 %    LYMPHOCYTES 6 (L) 12 - 49 %    MONOCYTES 14 (H) 5 - 13 %    EOSINOPHILS 5 0 - 7 %    BASOPHILS 1 0 - 1 %    IMMATURE GRANULOCYTES 1 (H) 0 - 0.5 %    ABS. NEUTROPHILS 3.3 1.8 - 8.0 K/UL    ABS. LYMPHOCYTES 0.3 (L) 0.8 - 3.5 K/UL    ABS. MONOCYTES 0.7 0.0 - 1.0 K/UL    ABS. EOSINOPHILS 0.2 0.0 - 0.4 K/UL    ABS. BASOPHILS 0.0 0.0 - 0.1 K/UL    ABS. IMM. GRANS. 0.0 0.00 - 0.04 K/UL    DF AUTOMATED     METABOLIC PANEL, COMPREHENSIVE    Collection Time: 07/18/21  3:42 AM   Result Value Ref Range    Sodium 141 136 - 145 mmol/L    Potassium 3.6 3.5 - 5.1 mmol/L    Chloride 107 97 - 108 mmol/L    CO2 22 21 - 32 mmol/L    Anion gap 12 5 - 15 mmol/L    Glucose 91 65 - 100 mg/dL    BUN 72 (H) 6 - 20 mg/dL    Creatinine 6.78 (H) 0.70 - 1.30 mg/dL    BUN/Creatinine ratio 11 (L) 12 - 20      GFR est AA 10 (L) >60 ml/min/1.73m2    GFR est non-AA 8 (L) >60 ml/min/1.73m2    Calcium 8.3 (L) 8.5 - 10.1 mg/dL    Bilirubin, total 0.4 0.2 - 1.0 mg/dL    AST (SGOT) 18 15 - 37 U/L    ALT (SGPT) 14 12 - 78 U/L    Alk. phosphatase 57 45 - 117 U/L    Protein, total 6.0 (L) 6.4 - 8.2 g/dL    Albumin 3.2 (L) 3.5 - 5.0 g/dL    Globulin 2.8 2.0 - 4.0 g/dL    A-G Ratio 1.1 1.1 - 2.2     PTT    Collection Time: 07/18/21  3:52 AM   Result Value Ref Range    aPTT 138.3 (HH) 21.2 - 34.1 sec    aPTT, therapeutic range   82 - 109 sec       ASSESSMENT:   Patient is 76 y.o. with diagnosis of : Active Problems:    Pulmonary edema (7/13/2021)        PLAN:                 I reviewed the JAVON examination, JAVON exam looks pretty good normal findings with normal PVR and the PPG. Venous duplex ultrasound does not show any deep vein thrombosis other than pulsatile venous flow. I do not personally feel that patient's left great toe wound issues related to venous conditions or peripheral artery disease.   I do recommend a couple great toe area Xeroform gauze and wound should be healed as expectantly. I will continue monitor his progress. This is an addendum:  I reviewed the imaging studies of the JAVON examination. There is a biphasic signal in the monophasic signal noted in the left common femoral artery popliteal level, possible left iliac atherosclerotic disease process. However JAVON numbers with normal limits. If wound left great toe does not seems to improving, angiographic examination left leg may be an option.

## 2021-07-18 NOTE — PROGRESS NOTES
Problem: Falls - Risk of  Goal: *Absence of Falls  Description: Document Fiona Hutchinson Fall Risk and appropriate interventions in the flowsheet.   Outcome: Progressing Towards Goal  Note: Fall Risk Interventions:  Mobility Interventions: Assess mobility with egress test, Bed/chair exit alarm, Communicate number of staff needed for ambulation/transfer         Medication Interventions: Assess postural VS orthostatic hypotension, Bed/chair exit alarm, Evaluate medications/consider consulting pharmacy, Patient to call before getting OOB    Elimination Interventions: Bed/chair exit alarm, Call light in reach, Elevated toilet seat, Patient to call for help with toileting needs              Problem: Patient Education: Go to Patient Education Activity  Goal: Patient/Family Education  Outcome: Progressing Towards Goal

## 2021-07-19 ENCOUNTER — APPOINTMENT (OUTPATIENT)
Dept: NON INVASIVE DIAGNOSTICS | Age: 66
DRG: 291 | End: 2021-07-19
Attending: INTERNAL MEDICINE
Payer: MEDICARE

## 2021-07-19 ENCOUNTER — APPOINTMENT (OUTPATIENT)
Dept: GENERAL RADIOLOGY | Age: 66
DRG: 291 | End: 2021-07-19
Attending: INTERNAL MEDICINE
Payer: MEDICARE

## 2021-07-19 LAB
ALBUMIN SERPL-MCNC: 3.1 G/DL (ref 3.5–5)
ALBUMIN/GLOB SERPL: 1.1 {RATIO} (ref 1.1–2.2)
ALP SERPL-CCNC: 59 U/L (ref 45–117)
ALT SERPL-CCNC: 19 U/L (ref 12–78)
ANION GAP SERPL CALC-SCNC: 12 MMOL/L (ref 5–15)
APTT PPP: 71 SEC (ref 21.2–34.1)
APTT PPP: 79.8 SEC (ref 21.2–34.1)
AST SERPL W P-5'-P-CCNC: 26 U/L (ref 15–37)
BILIRUB SERPL-MCNC: 0.4 MG/DL (ref 0.2–1)
BNP SERPL-MCNC: ABNORMAL PG/ML
BUN SERPL-MCNC: 78 MG/DL (ref 6–20)
BUN/CREAT SERPL: 11 (ref 12–20)
CA-I BLD-MCNC: 9.1 MG/DL (ref 8.5–10.1)
CHLORIDE SERPL-SCNC: 108 MMOL/L (ref 97–108)
CK SERPL-CCNC: 227 NG/ML (ref 39–308)
CO2 SERPL-SCNC: 22 MMOL/L (ref 21–32)
CREAT SERPL-MCNC: 6.95 MG/DL (ref 0.7–1.3)
GLOBULIN SER CALC-MCNC: 2.9 G/DL (ref 2–4)
GLUCOSE SERPL-MCNC: 105 MG/DL (ref 65–100)
INR PPP: 1.1 (ref 0.9–1.1)
MAGNESIUM SERPL-MCNC: 3.1 MG/DL (ref 1.6–2.4)
PHOSPHATE SERPL-MCNC: 8.4 MG/DL (ref 2.6–4.7)
POTASSIUM SERPL-SCNC: 3.5 MMOL/L (ref 3.5–5.1)
PROT SERPL-MCNC: 6 G/DL (ref 6.4–8.2)
PROTHROMBIN TIME: 13.8 SEC (ref 11.9–14.7)
SODIUM SERPL-SCNC: 142 MMOL/L (ref 136–145)
THERAPEUTIC RANGE,PTTT: ABNORMAL SEC (ref 82–109)
THERAPEUTIC RANGE,PTTT: ABNORMAL SEC (ref 82–109)
TROPONIN I SERPL-MCNC: 0.15 NG/ML
TSH SERPL DL<=0.05 MIU/L-ACNC: 1.91 UIU/ML (ref 0.36–3.74)
URATE SERPL-MCNC: 7.4 MG/DL (ref 3.5–7.2)

## 2021-07-19 PROCEDURE — 36415 COLL VENOUS BLD VENIPUNCTURE: CPT

## 2021-07-19 PROCEDURE — 74011250636 HC RX REV CODE- 250/636: Performed by: INTERNAL MEDICINE

## 2021-07-19 PROCEDURE — 82550 ASSAY OF CK (CPK): CPT

## 2021-07-19 PROCEDURE — 65610000006 HC RM INTENSIVE CARE

## 2021-07-19 PROCEDURE — 84100 ASSAY OF PHOSPHORUS: CPT

## 2021-07-19 PROCEDURE — 83735 ASSAY OF MAGNESIUM: CPT

## 2021-07-19 PROCEDURE — 85610 PROTHROMBIN TIME: CPT

## 2021-07-19 PROCEDURE — 80053 COMPREHEN METABOLIC PANEL: CPT

## 2021-07-19 PROCEDURE — 77010033678 HC OXYGEN DAILY

## 2021-07-19 PROCEDURE — 74011250637 HC RX REV CODE- 250/637: Performed by: INTERNAL MEDICINE

## 2021-07-19 PROCEDURE — 84550 ASSAY OF BLOOD/URIC ACID: CPT

## 2021-07-19 PROCEDURE — 99232 SBSQ HOSP IP/OBS MODERATE 35: CPT | Performed by: UROLOGY

## 2021-07-19 PROCEDURE — 83880 ASSAY OF NATRIURETIC PEPTIDE: CPT

## 2021-07-19 PROCEDURE — 74011000258 HC RX REV CODE- 258: Performed by: INTERNAL MEDICINE

## 2021-07-19 PROCEDURE — 85730 THROMBOPLASTIN TIME PARTIAL: CPT

## 2021-07-19 PROCEDURE — 51798 US URINE CAPACITY MEASURE: CPT

## 2021-07-19 PROCEDURE — 93306 TTE W/DOPPLER COMPLETE: CPT

## 2021-07-19 PROCEDURE — 84439 ASSAY OF FREE THYROXINE: CPT

## 2021-07-19 PROCEDURE — 5A2204Z RESTORATION OF CARDIAC RHYTHM, SINGLE: ICD-10-PCS | Performed by: INTERNAL MEDICINE

## 2021-07-19 PROCEDURE — 84484 ASSAY OF TROPONIN QUANT: CPT

## 2021-07-19 PROCEDURE — 93005 ELECTROCARDIOGRAM TRACING: CPT

## 2021-07-19 PROCEDURE — 71045 X-RAY EXAM CHEST 1 VIEW: CPT

## 2021-07-19 PROCEDURE — 74011250636 HC RX REV CODE- 250/636: Performed by: HOSPITALIST

## 2021-07-19 PROCEDURE — 84443 ASSAY THYROID STIM HORMONE: CPT

## 2021-07-19 PROCEDURE — 74011000250 HC RX REV CODE- 250: Performed by: HOSPITALIST

## 2021-07-19 RX ORDER — CALCIUM CHLORIDE INJECTION 100 MG/ML
INJECTION, SOLUTION INTRAVENOUS
Status: COMPLETED | OUTPATIENT
Start: 2021-07-19 | End: 2021-07-19

## 2021-07-19 RX ORDER — SODIUM CHLORIDE 9 MG/ML
50 INJECTION, SOLUTION INTRAVENOUS CONTINUOUS
Status: DISCONTINUED | OUTPATIENT
Start: 2021-07-20 | End: 2021-07-20

## 2021-07-19 RX ORDER — AMIODARONE HYDROCHLORIDE 150 MG/3ML
INJECTION, SOLUTION INTRAVENOUS
Status: COMPLETED | OUTPATIENT
Start: 2021-07-19 | End: 2021-07-19

## 2021-07-19 RX ORDER — AMIODARONE HYDROCHLORIDE 150 MG/3ML
INJECTION, SOLUTION INTRAVENOUS
Status: DISCONTINUED
Start: 2021-07-19 | End: 2021-07-19 | Stop reason: WASHOUT

## 2021-07-19 RX ORDER — POTASSIUM CHLORIDE 750 MG/1
40 TABLET, FILM COATED, EXTENDED RELEASE ORAL
Status: COMPLETED | OUTPATIENT
Start: 2021-07-19 | End: 2021-07-19

## 2021-07-19 RX ORDER — MAGNESIUM SULFATE HEPTAHYDRATE 40 MG/ML
INJECTION, SOLUTION INTRAVENOUS
Status: COMPLETED | OUTPATIENT
Start: 2021-07-19 | End: 2021-07-19

## 2021-07-19 RX ORDER — AMIODARONE HYDROCHLORIDE 150 MG/3ML
INJECTION, SOLUTION INTRAVENOUS
Status: DISPENSED
Start: 2021-07-19 | End: 2021-07-19

## 2021-07-19 RX ADMIN — POTASSIUM CHLORIDE 40 MEQ: 750 TABLET, FILM COATED, EXTENDED RELEASE ORAL at 10:33

## 2021-07-19 RX ADMIN — MAGNESIUM SULFATE HEPTAHYDRATE 2 G: 2 INJECTION, SOLUTION INTRAVENOUS at 07:00

## 2021-07-19 RX ADMIN — SODIUM BICARBONATE 650 MG: 650 TABLET ORAL at 18:08

## 2021-07-19 RX ADMIN — SODIUM BICARBONATE 650 MG: 650 TABLET ORAL at 10:00

## 2021-07-19 RX ADMIN — CALCITRIOL CAPSULES 0.25 MCG 0.25 MCG: 0.25 CAPSULE ORAL at 09:00

## 2021-07-19 RX ADMIN — TAMSULOSIN HYDROCHLORIDE 0.4 MG: 0.4 CAPSULE ORAL at 10:00

## 2021-07-19 RX ADMIN — CALCITRIOL CAPSULES 0.25 MCG 0.25 MCG: 0.25 CAPSULE ORAL at 10:00

## 2021-07-19 RX ADMIN — SEVELAMER CARBONATE 800 MG: 800 TABLET, FILM COATED ORAL at 18:08

## 2021-07-19 RX ADMIN — AMIODARONE HYDROCHLORIDE 150 MG: 50 INJECTION, SOLUTION INTRAVENOUS at 06:58

## 2021-07-19 RX ADMIN — SODIUM BICARBONATE 650 MG: 650 TABLET ORAL at 22:34

## 2021-07-19 RX ADMIN — CALCIUM CHLORIDE 1 G: 100 INJECTION, SOLUTION INTRAVENOUS at 07:00

## 2021-07-19 RX ADMIN — CALCIUM CHLORIDE 1 G: 100 INJECTION, SOLUTION INTRAVENOUS at 06:59

## 2021-07-19 RX ADMIN — AMIODARONE HYDROCHLORIDE 0.5 MG/MIN: 50 INJECTION, SOLUTION INTRAVENOUS at 20:22

## 2021-07-19 RX ADMIN — AMIODARONE HYDROCHLORIDE 1 MG/MIN: 50 INJECTION, SOLUTION INTRAVENOUS at 10:01

## 2021-07-19 NOTE — PROGRESS NOTES
[facility] KIDNEY     Renal Daily Progress Note:     Admission Date: 2021     Subjective: feeling ok, had burst of V-tach, moved to ICU. BP variable but responded to volume bolus. Persistent creatinine elevation, making urine. Had significant bladder residual volume which was alleviated by Quesada catheter. Now with increased urinary freq and small volumes. Potassium low. BP low, will stop  Monoxidil    No longer short of breath. No cough. No chest or abdominal pain. Left great toe with blood blister but no pain. Apparently stubbed his toe last week he did some furniture.  Blood cultures show gram-positive cocci on Vanco.      Objective:     Visit Vitals  BP (!) 81/61   Pulse 88   Temp 98.2 °F (36.8 °C)   Resp 26   Ht 6' (1.829 m)   Wt 94.6 kg (208 lb 8.9 oz)   SpO2 100%   BMI 28.29 kg/m²     Temp (24hrs), Av.8 °F (36.6 °C), Min:97.4 °F (36.3 °C), Max:98.4 °F (36.9 °C)      No intake or output data in the 24 hours ending 21 1128  Current Facility-Administered Medications   Medication Dose Route Frequency    amiodarone (CORDARONE) 375 mg in dextrose 5% 250 mL infusion  0.5-1 mg/min IntraVENous TITRATE    amiodarone (CORDARONE) 50 mg/mL injection        [Held by provider] furosemide (LASIX) tablet 80 mg  80 mg Oral BID    [Held by provider] minoxidiL (LONITEN) tablet 2.5 mg  2.5 mg Oral BID    [Held by provider] amLODIPine (NORVASC) tablet 10 mg  10 mg Oral DAILY    tamsulosin (FLOMAX) capsule 0.4 mg  0.4 mg Oral DAILY    [Held by provider] hydrALAZINE (APRESOLINE) tablet 50 mg  50 mg Oral TID    sodium bicarbonate tablet 650 mg  650 mg Oral TID    0.9% sodium chloride infusion 250 mL  250 mL IntraVENous PRN    calcitRIOL (ROCALTROL) capsule 0.25 mcg  0.25 mcg Oral DAILY    sevelamer carbonate (RENVELA) tab 800 mg  800 mg Oral TID WITH MEALS    ergocalciferol capsule 50,000 Units  50,000 Units Oral Q7D    hydrALAZINE (APRESOLINE) 20 mg/mL injection 40 mg  40 mg IntraVENous Q6H PRN    [Held by provider] cloNIDine (CATAPRES) 0.3 mg/24 hr patch 1 Patch  1 Patch TransDERmal Q7D    heparin 25,000 units in D5W 250 ml infusion  18-36 Units/kg/hr (Adjusted) IntraVENous TITRATE    heparin (porcine) 1,000 unit/mL injection 6,340 Units  80 Units/kg (Adjusted) IntraVENous PRN    Or    heparin (porcine) 1,000 unit/mL injection 3,170 Units  40 Units/kg (Adjusted) IntraVENous PRN       Physical Exam:    Seen in Room 470    General appearance: Chronically ill-appearing patient lying in bed comfortably. Head: Normocephalic    Lungs: clear to auscultation , no wheezes, no rales, poor air movement    Heart:  No S3 gallop , No pericardial rub. Abdomen: Not distended    Extremities:  Lower extremity edema has resolved    Neuro : Awake and responding appropriately. Data Review:     LABS:  Recent Labs     07/19/21  0726 07/18/21  0342 07/17/21  0655    141 143   K 3.5 3.6 3.9    107 110*   CO2 22 22 23   BUN 78* 72* 78*   CREA 6.95* 6.78* 6.79*   CA 9.1 8.3* 8.2*   ALB 3.1* 3.2* 3.3*   PHOS 8.4*  --   --    MG 3.1*  --   --    Vitamin D2 11.1  Intact   PSA  5.2  Recent Labs     07/18/21  0342 07/17/21  0655   WBC 4.6 5.2   HGB 8.0* 8.2*   HCT 26.7* 27.6*    168   iron saturation 5%    No results for input(s): HÉCTOR, KU, CLU, CREAU in the last 72 hours. No lab exists for component: PROUBlood Cultures 7-12-21  Two of four bottles have been flagged positive by instrument.  Bottles have been sent to [facility] laboratory to assess for possible growth. Gram Positive Cocci in clusters     Chest x-ray 7-19-21  Chest single view.     Comparison single view chest July 16, 2021.     Advanced patchy central and basilar reticular markings through the lungs. Consider pneumonia. Cardiac and mediastinal structures magnified on this AP  view. No pneumothorax or sizable pleural effusion. CXR July 14, 2021:  1 view comparison to 7/12     Continued cardiomegaly and congestion.  If There is mild interstitial edema, it  is unchanged. Right pleural effusion and adjacent atelectasis have improved. Retroperitoneal US 7-14-21  Left kidney is 12.2 cm and right kidney is 9.1. Right renal cortex is echogenic  but not the left. Multiple cysts on each side, including a large cyst is 7 cm on  the left. No hydronephrosis. Aorta and IVC normals visualized. Prostatic  enlargement and diffuse bladder wall thickening. Moderate ascites     IMPRESSION  Medical renal disease on the right    Assessment:   Renal Specific Problems    CKD stage IV/V   Acute azotemia-exacerbated by bladder outlet obstruction  Hypertension - not controlled  Volume overload- resolving  Hyperkalemia- corrected  Metabolic acidosis- on bicarb  Acute anemia with significant iron deficiency  Acute left axillary/brachial vein DVT  Vitamin D2 def  Secondary hyperparathyroid  Proteinuria - nephrotic range  Gram + bacteremia      Plan:     Obtain/ Order: labs/cultures/radiology/procedures:  renal panel, CBC in AM    Stool for occult blood  PLA2R Ab pending    Therapeutic:    D/c minoxidil   IV iron to rapidly replete stores --cpmpleted  Continue sodium bicarbonate.  The target bicarb level is 22/23  Calcitriol and ergocalciferol   PO4 binder--sevelamer  Flomax   Check bladder scan, if large residual then replace jordan  Antibiotics per primary team    May need cardiac cath, will try to optimize renal fx pre cath    [phone]

## 2021-07-19 NOTE — PROGRESS NOTES
Report gotten from transferring nurse. Pt s/p shock for symptomatic VTACH. Patient was also given magnesium, calcium, an amio bolus and started on an amio drip at 1 mg.

## 2021-07-19 NOTE — PROGRESS NOTES
PT eval order received and acknowledged, however, patient transferred to ICU  due to medical decline prior to completion of evaluation. PT eval orders will be discontinued and will need new PT eval order once pt medically stable for evaluation. Thank you.

## 2021-07-19 NOTE — PROGRESS NOTES
[address] [place], [address] [facility] Office    Patient: Ariana De La Garza MRN: [id number]  SSN: [id number]    YOB: 1955  Age: 76 y.o. Sex: male          Date of Encounter:  2021  ADMITTED: 2021 Chief Complaint:  Swelling, SOB  Reason for consult:  Gross hematuria       S:     Patient is a 76 y.o. male admitted 2021 to the hospital for Pulmonary edema [J81.1]. He states he has CHF and CKD. He notes no difficulty voiding. He was thought to be in retention with a bladder scan >200cc. A jordan catheter was attempted and he had gross hematuria. He did have 500cc urine output. He is found to have ascites and anasarca. He denies a weak stream or incomplete emptying. No hematuria or dysuria. Events noted. V-tach this AM, s/p shock. He states he feels great now. No diffuculty with urination. Past Medical History:  No Known Allergies   Prior to Admission medications    Medication Sig Start Date End Date Taking? Authorizing Provider   hydrALAZINE (APRESOLINE) 50 mg tablet Take 1 Tab by mouth three (3) times daily. 21  Yes Leila Chatman MD   sodium bicarbonate 650 mg tablet Take 1 Tab by mouth two (2) times a day. 21  Yes Leila Chatman MD      PMHx:  has a past medical history of Chronic kidney disease and Hypertension. PSurgHx:  has no past surgical history on file. PSocHx:  reports that he has never smoked. He has never used smokeless tobacco.   ROS:  Admission ROS by Sunni Galvan MD from 2021 were reviewed with the patient and changes (other than per HPI) include: SOB, general edema none. All 12 systems were reviewed and were otherwise negative. Physical Exam:            Vitals[de-identified]    Temp (24hrs), Av.1 °F (36.7 °C), Min:97.4 °F (36.3 °C), Max:98.7 °F (37.1 °C)   Blood pressure (!) 81/61, pulse 88, temperature 98.7 °F (37.1 °C), resp. rate 26, height 6' (1.829 m), weight 208 lb 8.9 oz (94.6 kg), SpO2 100 %.    Estimated body mass index is 28.29 kg/m² as calculated from the following:    Height as of this encounter: 6' (1.829 m). Weight as of this encounter: 208 lb 8.9 oz (94.6 kg). I&O's:    No intake/output data recorded.    General Well developed, in NAD               Hearing  Grossly intact           Respiratory Effort Breathing easily, no audible wheezing, rhonchi, stridor   CV RRR with skipped or early beats   Abdomen / Flank Soft, non tender without guarding or rebound, without obvious masses, no CVA tenderness   Digits/ Nails No clubbing, cyanosis, petechiae       Skin Inspection Warm and dry, no obvious rashes   Neurologic Grossly normal without focal deficits   Judgement / Insight intact   Mood / Affect normal       Lab Results   Component Value Date/Time    WBC 4.6 07/18/2021 03:42 AM    HCT 26.7 (L) 07/18/2021 03:42 AM    PLATELET 605 97/92/9201 03:42 AM    Sodium 142 07/19/2021 07:26 AM    Potassium 3.5 07/19/2021 07:26 AM    Chloride 108 07/19/2021 07:26 AM    CO2 22 07/19/2021 07:26 AM    BUN 78 (H) 07/19/2021 07:26 AM    Creatinine 6.95 (H) 07/19/2021 07:26 AM    Glucose 105 (H) 07/19/2021 07:26 AM    Calcium 9.1 07/19/2021 07:26 AM    Magnesium 3.1 (H) 07/19/2021 07:26 AM    INR 1.1 07/19/2021 07:25 AM    Prostate Specific Ag 5.2 (H) 07/15/2021 01:45 AM       UA:   Lab Results   Component Value Date/Time    Color Yellow/Straw 07/13/2021 04:00 PM    Appearance Clear 07/13/2021 04:00 PM    Specific gravity 1.009 07/13/2021 04:00 PM    pH (UA) 6.0 07/13/2021 04:00 PM    Protein >300 (A) 07/13/2021 04:00 PM    Glucose 50 (A) 07/13/2021 04:00 PM    Ketone Negative 07/13/2021 04:00 PM    Bilirubin Negative 07/13/2021 04:00 PM    Urobilinogen 0.1 07/13/2021 04:00 PM    Nitrites Negative 07/13/2021 04:00 PM    Leukocyte Esterase Negative 07/13/2021 04:00 PM    Bacteria Negative 07/13/2021 04:00 PM    WBC 0-4 07/13/2021 04:00 PM    RBC 0-5 07/13/2021 04:00 PM       Xrays:       Diagnoses and all orders for this visit: 1. Acute pulmonary edema (HCC)    2. Acute on chronic congestive heart failure, unspecified heart failure type ([facility]    3. Acute kidney injury ([facility]    4. Pneumonia due to infectious organism, unspecified laterality, unspecified part of lung    5. Septicemia ([facility]    6. Elevated troponin    7. Acute hyperkalemia    8. Incomplete emptying of bladder    9. Deep vein thrombosis (DVT) of lower extremity, unspecified chronicity, unspecified laterality, unspecified vein (HCC)    10. PVD (peripheral vascular disease) ([facility]    11.  V-tach [facility])  -     CARDIAC PROCEDURE; Standing    Other orders  -     CULTURE, BLOOD, PAIRED; Standing  -     LACTIC ACID; Standing  -     VITAL SIGNS; Standing  -     SALINE LOCK IV; Standing  -     CBC WITH AUTOMATED DIFF; Standing  -     METABOLIC PANEL, COMPREHENSIVE; Standing  -     TROPONIN I; Standing  -     MAGNESIUM; Standing  -     XR CHEST PORT; Standing  -     BNP; Standing  -     EKG, 12 LEAD, INITIAL; Standing  -     HEMOGLOBIN A1C WITH EAG; Standing  -     TYPE & SCREEN; Standing  -     cefTRIAXone (ROCEPHIN) 1 g in sterile water (preservative free) 10 mL IV syringe  -     vancomycin (VANCOCIN) 1,500 mg in 0.9% sodium chloride 500 mL IVPB  -     hydrALAZINE (APRESOLINE) 20 mg/mL injection 20 mg  -     cloNIDine HCL (CATAPRES) tablet 0.1 mg  -     cloNIDine HCL (CATAPRES) tablet 0.2 mg  -     aspirin tablet 325 mg  -     LACTIC ACID; Standing  -     TROPONIN I; Standing  -     sodium bicarbonate 8.4 % (1 mEq/mL) injection 50 mEq  -     insulin regular (NOVOLIN R, HUMULIN R) injection 10 Units  -     dextrose (D50W) injection syrg 25 g  -     INITIAL PHYSICIAN ORDER: INPATIENT; Standing  -     CARDIAC MONITORING; Standing  -     COVID-19 RAPID TEST; Standing  -     IP CONSULT TO PULMONOLOGY; Standing  -     DUPLEX UPPER EXT VENOUS LEFT; Standing  -     XR CHEST PORT; Standing  -     RENAL FUNCTION PANEL; Standing  -     MAGNESIUM; Standing  -     TROPONIN I; Standing  - hydrALAZINE (APRESOLINE) 20 mg/mL injection 40 mg  -     IP CONSULT TO NEPHROLOGY; Standing  -     NOTIFY PROVIDER: LAB VALUES CHANGES; Standing  -     CBC WITH AUTOMATED DIFF; Standing  -     NOTIFY PROVIDER: SPECIFY; Standing  -     NOTIFY PROVIDER: LAB VALUES CHANGES; Standing  -     heparin 25,000 units in D5W 250 ml infusion  -     HGB & HCT; Standing  -     IP CONSULT TO CARDIOLOGY; Standing  -     CBC WITH AUTOMATED DIFF; Standing  -     METABOLIC PANEL, COMPREHENSIVE; Standing  -     URINALYSIS W/ REFLEX CULTURE; Standing  -     JONES CATHETER, INSERTION; Standing  -     BLADDER SCAN POST-VOID; Standing  -     CK; Standing  -     PROTEIN URINE, RANDOM; Standing  -     POTASSIUM, UR, RANDOM; Standing  -     PROTEIN/CREATININE RATIO, URINE; Standing  -     CREATININE, UR, RANDOM; Standing  -     SODIUM, UR, RANDOM; Standing  -     MRSA SCREEN - PCR (NASAL); Standing  -     PTT; Standing  -     heparin (porcine) 1,000 unit/mL injection 6,340 Units  -     heparin (porcine) 1,000 unit/mL injection 3,170 Units  -     US RETROPERITONEUM COMP; Standing  -     PTH INTACT; Standing  -     IRON PROFILE; Standing  -     VITAMIN D, 25 HYDROXY; Standing  -     PTT; Standing  -     DIET ADULT; Standing  -     sodium polystyrene (KAYEXALATE) 15 gram/60 mL oral suspension 30 g  -     tamsulosin (FLOMAX) capsule 0.4 mg  -     hydrALAZINE (APRESOLINE) tablet 50 mg  -     sodium bicarbonate tablet 650 mg  -     0.9% sodium chloride infusion 250 mL  -     RBC, ALLOCATE; Standing  -     TRANSFUSE PACKED RBC'S; Standing  -     RENAL FUNCTION PANEL; Standing  -     CBC WITH AUTOMATED DIFF; Standing  -     BNP; Standing  -     IP CONSULT TO PICC TEAM; Standing  -     IP CONSULT TO WOUND CARE; Standing  -     PTT; Standing  -     furosemide (LASIX) injection 20 mg  -     furosemide (LASIX) injection 20 mg  -     VANCOMYCIN, RANDOM; Standing  -     PSA SCREENING (SCREENING);  Standing  -     calcitRIOL (ROCALTROL) capsule 0.25 mcg  - sodium zirconium cyclosilicate (LOKELMA) powder packet 10 g  -     sevelamer carbonate (RENVELA) tab 800 mg  -     ergocalciferol capsule 50,000 Units  -     OCCULT BLOOD, STOOL; Standing  -     iron sucrose (VENOFER) injection 200 mg  -     vancomycin (VANCOCIN) 750 mg in 0.9% sodium chloride 250 mL (VIAL-MATE)  -     VANCOMYCIN, RANDOM; Standing  -     PTT; Standing  -     PTT; Standing  -     RT--OVERNIGHT OXIMETRY; Standing  -     CBC WITH AUTOMATED DIFF; Standing  -     XR CHEST PORT; Standing  -     RENAL FUNCTION PANEL; Standing  -     IP CONSULT TO UROLOGY; Standing  -     IP CONSULT TO HEMATOLOGY; Standing  -     PHOSPHOLIPASE A2 RECEPTOR AUTOANTIBODIES, IGG; Standing  -     IRON PROFILE; Standing  -     VITAMIN B12 & FOLATE; Standing  -     amLODIPine (NORVASC) tablet 10 mg  -     amLODIPine (NORVASC) tablet 10 mg  -     PTT; Standing  -     IP CONSULT TO VASCULAR SURGERY; Standing  -     PTT; Standing  -     IP CONSULT TO RESPIRATORY CARE; Standing  -     RT--OVERNIGHT OXIMETRY; Standing  -     PTT; Standing  -     PTT; Standing  -     LOWER EXT ART PVR MULT LEVEL SEG PRESSURES; Standing  -     DUPLEX LOWER EXT VENOUS BILAT; Standing  -     ELASTIC TUBULAR SUPPORT BANDAGE; Standing  -     RT--OVERNIGHT OXIMETRY; Standing  -     PTT; Standing  -     heparin (porcine) 1,000 unit/mL injection 3,168 Units  -     PTT; Standing  -     PTT; Standing  -     IRON & IRON BINDING; Standing  -     furosemide (LASIX) tablet 80 mg  -     PTT; Standing  -     IP CONSULT TO PHYSICAL THERAPY; Standing  -     IP CONSULT TO OCCUPATIONAL THERAPY; Standing  -     PTT; Standing  -     TRANSFER PATIENT; Standing  -     METABOLIC PANEL, COMPREHENSIVE; Standing  -     PHOSPHORUS; Standing  -     URIC ACID; Standing  -     MAGNESIUM; Standing  -     TROPONIN I; Standing  -     TSH 3RD GENERATION; Standing  -     PTT; Standing  -     PROTHROMBIN TIME + INR; Standing  -     CK W/ REFLX CKMB; Standing  -     amiodarone (CORDARONE) 375 mg in dextrose 5% 250 mL infusion  -     amiodarone (CORDARONE) 50 mg/mL injection  -     EKG, 12 LEAD, INITIAL; Standing  -     amiodarone (CORDARONE) injection  -     calcium chloride injection  -     magnesium sulfate 2 g/50 ml IVPB (premix or compounded)  -     calcium chloride injection  -     XR CHEST PORT; Standing  -     BNP; Standing  -     RENAL FUNCTION PANEL; Standing  -     CBC WITH AUTOMATED DIFF; Standing  -     MAGNESIUM; Standing  -     potassium chloride SR (KLOR-CON 10) tablet 40 mEq  -     EKG, 12 LEAD, SUBSEQUENT; Standing  -     ECHO ADULT COMPLETE; Standing  -     T4, FREE; Standing  -     TSH 3RD GENERATION; Standing  -     RT--OXYGEN CANNULA; Standing             Hospital Problems  Never Reviewed        Codes Class Noted POA    Pulmonary edema ICD-10-CM: J81.1  ICD-9-CM: 392  7/13/2021 Unknown              Assessment/Plan:  CHF, fluid overload, ESME/CKD from medical renal disease, gross hematuria from traumatic jordan insertion. He is voiding now normally per him. I would hold on a jordan unless needed for fluid monitoring. V tach this AM, now in ICU  No urinary complaints today. Please call with urologic concerns.           Signed By: Zina Hooper MD  - July 19, 2021

## 2021-07-19 NOTE — PROGRESS NOTES
Progress Note      7/19/2021 9:54 AM  NAME: Ariana De La Garza   MRN:  [id number]   Admit Diagnosis: Pulmonary edema [J81.1]      Subjective:   Chart reviewed. Patient has had a symptomatic ventricular tachycardia and was shocked. Discussed with the nurse and with the nephrologist.  Vascular surgery note appreciated. Review of Systems:    Symptom Y/N Comments  Symptom Y/N Comments   Fever/Chills n   Chest Pain y    Poor Appetite    Edema y    Cough    Abdominal Pain     Sputum    Joint Pain n    SOB/CARMONA n   Pruritis/Rash     Nausea/vomit    Other     Diarrhea         Constipation           Could NOT obtain due to:      Objective:          Physical Exam:    Last 24hrs VS reviewed since prior progress note. Most recent are:    Visit Vitals  BP (!) 81/61   Pulse 88   Temp 98.2 °F (36.8 °C)   Resp 26   Ht 6' (1.829 m)   Wt 94.6 kg (208 lb 8.9 oz)   SpO2 100%   BMI 28.29 kg/m²     No intake or output data in the 24 hours ending 07/19/21 1141     General Appearance: Well developed, well nourished, alert & oriented x 3,    no acute distress. Ears/Nose/Mouth/Throat: Hearing grossly normal.  Neck: Supple. Chest: Lungs clear to auscultation bilaterally. Cardiovascular: Regular rate and rhythm, S1,S2 normal, no murmur. Abdomen: Soft, non-tender, bowel sounds are active. Extremities: Lower extremity edema left upper extremity edema evident  Skin: Lower extremity blisters evident    []         Post-cath site without hematoma, bruit, tenderness, or thrill. Distal pulses intact. PMH/SH reviewed - no change compared to H&P    Data Review    Telemetry: Patient had a ventricular tachycardia and was shocked and had episode of atrial fibrillation and now is in sinus rhythm with PVCs.     EKG:   []  No new EKG for review    Lab Data Personally Reviewed:    Recent Labs     07/18/21  0342 07/17/21  0655   WBC 4.6 5.2   HGB 8.0* 8.2*   HCT 26.7* 27.6*    168     Recent Labs     07/19/21  0725 07/19/21  0545 07/18/21  2131   INR 1.1  --   --    PTP 13.8  --   --    APTT 79.8* 71.0* >153.0*      Recent Labs     07/19/21  0726 07/18/21  0342 07/17/21  0655    141 143   K 3.5 3.6 3.9    107 110*   CO2 22 22 23   BUN 78* 72* 78*   CREA 6.95* 6.78* 6.79*   * 91 119*   CA 9.1 8.3* 8.2*   MG 3.1*  --   --      Recent Labs     07/19/21  0726   TROIQ 0.15*     No results found for: CHOL, CHOLX, CHLST, CHOLV, HDL, HDLP, LDL, LDLC, DLDLP, Hulan Eveline, CHHD, CHHDX    Recent Labs     07/19/21  0726 07/18/21  0342 07/17/21  0655   AP 59 57 67   TP 6.0* 6.0* 6.5   ALB 3.1* 3.2* 3.3*   GLOB 2.9 2.8 3.2     No results for input(s): PH, PCO2, PO2 in the last 72 hours.     Medications Personally Reviewed:    Current Facility-Administered Medications   Medication Dose Route Frequency    amiodarone (CORDARONE) 375 mg in dextrose 5% 250 mL infusion  0.5-1 mg/min IntraVENous TITRATE    amiodarone (CORDARONE) 50 mg/mL injection        [Held by provider] furosemide (LASIX) tablet 80 mg  80 mg Oral BID    [Held by provider] minoxidiL (LONITEN) tablet 2.5 mg  2.5 mg Oral BID    [Held by provider] amLODIPine (NORVASC) tablet 10 mg  10 mg Oral DAILY    tamsulosin (FLOMAX) capsule 0.4 mg  0.4 mg Oral DAILY    [Held by provider] hydrALAZINE (APRESOLINE) tablet 50 mg  50 mg Oral TID    sodium bicarbonate tablet 650 mg  650 mg Oral TID    0.9% sodium chloride infusion 250 mL  250 mL IntraVENous PRN    calcitRIOL (ROCALTROL) capsule 0.25 mcg  0.25 mcg Oral DAILY    sevelamer carbonate (RENVELA) tab 800 mg  800 mg Oral TID WITH MEALS    ergocalciferol capsule 50,000 Units  50,000 Units Oral Q7D    hydrALAZINE (APRESOLINE) 20 mg/mL injection 40 mg  40 mg IntraVENous Q6H PRN    [Held by provider] cloNIDine (CATAPRES) 0.3 mg/24 hr patch 1 Patch  1 Patch TransDERmal Q7D    heparin 25,000 units in D5W 250 ml infusion  18-36 Units/kg/hr (Adjusted) IntraVENous TITRATE    heparin (porcine) 1,000 unit/mL injection 6,340 Units  80 Units/kg (Adjusted) IntraVENous PRN    Or    heparin (porcine) 1,000 unit/mL injection 3,170 Units  40 Units/kg (Adjusted) IntraVENous PRN           Problem List:   Acute hypoxic respiratory failure and patient seems to be somewhat better. Severe anemia. Renal failure. Heart failure. Left upper extremity DVT. Minimal elevation of troponin I is probably not a presentation of myocardial infarction. Patient has had ventricular tachycardia and has been shocked. 1.      Assessment/Plan:   Discussed with the patient and with nurse and with the nephrologist and probably will consider cardiac catheterization because of ventricular tachycardia. I will also check echocardiogram.  I will consider cardiac catheterization and possible PCI and I explained this to the patient and also discussed about this with nephrologist because of his kidney situation. Thank you. We will follow nephrology recommendations and vascular surgery recommendations. I will continue otherwise present care. Thank you. 1.          []       High complexity decision making was performed in this patient at high risk for decompensation with multiple organ involvement.     Francis Ward MD

## 2021-07-19 NOTE — PROGRESS NOTES
Dr. Yolanda Disla aware of patients BP 81/51 after 1 liter of  NS. Also aware if K 3.5.  To hold BP meds and lasix this AM.

## 2021-07-19 NOTE — PROGRESS NOTES
OT eval order received and acknowledged, however, patient transferred to ICU from [address] [place] [address] due to medical decline prior to completion of evaluation. OT eval orders will be discontinued and will need new OT eval order once pt medically stable for evaluation. Thank you.

## 2021-07-19 NOTE — PROGRESS NOTES
Rapid response called at 0638 on patient for HR sustaining in the 200's. Patient transferred to ICU. Per patient request sister who he lives with not called due to her being ill and not wanting her to worry.

## 2021-07-19 NOTE — PROGRESS NOTES
Pulmonary, Critical Care    Name: Jun Stovall MRN: [id number]   : 1955 Hospital: [address] [place] [address]   Date: 2021  Admission date: 2021 Hospital Day: 8       Subjective/Interval History:   Seen in the emergency room wearing noninvasive ventilator. Patient has underlying renal insufficiency developed worsening shortness of breath cough presented to the emergency room chest x-ray shows pulmonary edema. He was profoundly hypoxic is now on noninvasive ventilator and feels more comfortable. Has not had any significant urine output since he has been in the ER. He also complains of new onset left arm swelling   post void bladder scan showed greater than 200 cc urine retention and Quesada catheter placed with 500 cc removed. Overnight he has put out an additional 1200 cc. Respirations improved currently nonlabored on nasal oxygen. Duplex scan of the left arm did show left axillary and proximal brachial DVT and heparin was started. On heparin with Quesada catheter and he has had slight bleeding at the urethral meatus. Ultrasound of the abdomen is pending  7/15 ultrasound of the abdomen showed medical renal disease on the right with small kidney no hydronephrosis there was evidence of prostate enlargement. He is breathing easily at this point and on room air is running on oxygen saturation of 93%   respirations nonlabored on nasal oxygen. Quesada catheter is out he states he has been voiding frequent small amounts without straining   no specific complaints breathing easily. Overnight oximetry showed minimal but significant desaturation while on room air 8 minutes 40 seconds with low of 71%  . Developed V. tach this morning given IV amiodarone IV magnesium and transferred to the unit. Currently heart rate  alternating between A. fib and sinus with PAC.   He denies any discomfort is breathing easily  Patient Active Problem List   Diagnosis Code    GI bleed K92.2    Pulmonary edema J81.1       IMPRESSION:   1. Ventricular tachycardia has been started on amiodarone  2. Acute hypoxic respiratory failure tolerating nasal oxygen 1 L  3. Chronic hypoxic respiratory failure overnight oximetry shows continued desaturation at night  4. Acute pulmonary edema radiographically unchanged 7/16 we will repeat x-ray  5. 2 of 4 blood culture bottles with coagulase-negative staph aureus likely skin contaminant   6. Bilateral pleural effusions last chest x-ray unchanged  7. Acute on chronic kidney disease abrupt worsening likely due to obstructive uropathy  8. Obstructive uropathy Quesada catheter has been removed good urine according to patient  9. Severe hypertension corrected   10. DVT left axillary and brachial currently on IV heparin  11. Anemia last hemoglobin stable repeat today pending  12. Troponin leak likely due to renal insufficiency stable has not risen any further  Body mass index is 28.29 kg/m². RECOMMENDATIONS/PLAN:   1. New onset V. tach and A. fib currently on amiodarone  2. Blood pressure well controlled at this point  3. On Flomax for prostate enlargement   4. Continue nasal oxygen overnight oximetry shows continued desaturation at night         Subjective/Initial History:       I was asked by Laxmi Juarez MD to see Stacy Preston a 76 y.o.  male  in consultation for a chief complaint of shortness of breath pulmonary edema acute hypoxic respiratory failure        Patient PCP: Britt Wayne MD  PMH:  has a past medical history of Chronic kidney disease and Hypertension. PSH:   has no past surgical history on file. FHX: family history is not on file. SHX:  reports that he has never smoked.  He has never used smokeless tobacco.    Systemic review somewhat limited as he is on noninvasive ventilator remains short of breath although states he is feeling better  General he has not noted any worsening edema of his upper legs fever chills or sweats does complain of new onset swelling of his left hand and arm  Eyes no double vision or momentary blindness  ENT no facial pain over the sinuses  Endocrinologic no polyuria polydipsia  Musculoskeletal no swollen tender joints  Neurologic no history seizures or syncope  Gastrointestinal no nausea vomiting acid indigestion  Genitourinary states he does still pass fair amount of urine each day has not noted any decrease in that.   Does not have to strain to urinate has no bleeding or drainage  Cardiovascular he is not aware of any underlying heart disease has not had chest pain diaphoresis has had worsening shortness of breath laying down and with exertion  Respiratory worsening shortness of breath over the last week or more no significant cough no sputum production no chills or sweats did have history COVID-19 pneumonitis January of this year    No Known Allergies   MEDS:   Current Facility-Administered Medications   Medication    amiodarone (CORDARONE) 375 mg in dextrose 5% 250 mL infusion    amiodarone (CORDARONE) 50 mg/mL injection    amiodarone (CORDARONE) injection    calcium chloride injection    magnesium sulfate 2 g/50 ml IVPB (premix or compounded)    calcium chloride injection    furosemide (LASIX) tablet 80 mg    minoxidiL (LONITEN) tablet 2.5 mg    amLODIPine (NORVASC) tablet 10 mg    tamsulosin (FLOMAX) capsule 0.4 mg    hydrALAZINE (APRESOLINE) tablet 50 mg    sodium bicarbonate tablet 650 mg    0.9% sodium chloride infusion 250 mL    calcitRIOL (ROCALTROL) capsule 0.25 mcg    sevelamer carbonate (RENVELA) tab 800 mg    ergocalciferol capsule 50,000 Units    hydrALAZINE (APRESOLINE) 20 mg/mL injection 40 mg    cloNIDine (CATAPRES) 0.3 mg/24 hr patch 1 Patch    heparin 25,000 units in D5W 250 ml infusion    heparin (porcine) 1,000 unit/mL injection 6,340 Units    Or    heparin (porcine) 1,000 unit/mL injection 3,170 Units        Current Facility-Administered Medications:    amiodarone (CORDARONE) 375 mg in dextrose 5% 250 mL infusion, 0.5-1 mg/min, IntraVENous, TITRATE, Rizwana Min MD    amiodarone (CORDARONE) 50 mg/mL injection, , , ,     amiodarone (CORDARONE) injection, , IntraVENous, CODE Zhang JARQUIN MD, 150 mg at 07/19/21 0658    calcium chloride injection, , IntraVENous, Zhang MERCHANT MD, 1 g at 07/19/21 0659    magnesium sulfate 2 g/50 ml IVPB (premix or compounded), , IntraVENous, CODE BLUE INFUSION, Zhang Haro MD, Last Rate: 10 mL/hr at 07/19/21 0700, 2 g at 07/19/21 0700    calcium chloride injection, , IntraVENous, Zhang MERCHANT MD, 1 g at 07/19/21 0700    furosemide (LASIX) tablet 80 mg, 80 mg, Oral, BID, Juan Ha MD, 80 mg at 07/18/21 2054    minoxidiL (LONITEN) tablet 2.5 mg, 2.5 mg, Oral, BID, Roz Givesn MD, 2.5 mg at 07/18/21 2054    amLODIPine (NORVASC) tablet 10 mg, 10 mg, Oral, DAILY, Rizwana Min MD, 10 mg at 07/18/21 0825    tamsulosin (FLOMAX) capsule 0.4 mg, 0.4 mg, Oral, DAILY, Richa Zhang MD, 0.4 mg at 07/18/21 0825    hydrALAZINE (APRESOLINE) tablet 50 mg, 50 mg, Oral, TID, Richa Zhang MD, 50 mg at 07/18/21 2054    sodium bicarbonate tablet 650 mg, 650 mg, Oral, TID, Richa Zhang MD, 650 mg at 07/18/21 2054    0.9% sodium chloride infusion 250 mL, 250 mL, IntraVENous, PRN, Richa Zhang MD    calcitRIOL (ROCALTROL) capsule 0.25 mcg, 0.25 mcg, Oral, DAILY, Roz Givens MD, 0.25 mcg at 07/18/21 0825    sevelamer carbonate (RENVELA) tab 800 mg, 800 mg, Oral, TID WITH MEALS, Roz Givens MD, 800 mg at 07/18/21 1718    ergocalciferol capsule 50,000 Units, 50,000 Units, Oral, Q7D, Roz Givens MD, 50,000 Units at 07/14/21 1841    hydrALAZINE (APRESOLINE) 20 mg/mL injection 40 mg, 40 mg, IntraVENous, Q6H PRN, Richa Zhang MD, 40 mg at 07/15/21 1552    cloNIDine (CATAPRES) 0.3 mg/24 hr patch 1 Patch, 1 Patch, TransDERmal, Q7D, Renny Mcleod MD    heparin 25,000 units in D5W 250 ml infusion, 18-36 Units/kg/hr (Adjusted), IntraVENous, TITRATE, Scott Johns MD, Last Rate: 9.5 mL/hr at 21 2334, 12 Units/kg/hr at 21 2334    heparin (porcine) 1,000 unit/mL injection 6,340 Units, 80 Units/kg (Adjusted), IntraVENous, PRN **OR** heparin (porcine) 1,000 unit/mL injection 3,170 Units, 40 Units/kg (Adjusted), IntraVENous, PRN, Scott Johns MD, 3,170 Units at 21 1440      Objective:     Vital Signs: Telemetry:    normal sinus rhythm Intake/Output:   Visit Vitals  /78   Pulse (!) 21   Temp 98.4 °F (36.9 °C)   Resp 26   Ht 6' (1.829 m)   Wt 94.6 kg (208 lb 8.9 oz)   SpO2 99%   BMI 28.29 kg/m²       Temp (24hrs), Av.8 °F (36.6 °C), Min:97.4 °F (36.3 °C), Max:98.4 °F (36.9 °C)        O2 Device: None (Room air) O2 Flow Rate (L/min): 1 l/min         Body mass index is 28.29 kg/m². Wt Readings from Last 4 Encounters:   21 94.6 kg (208 lb 8.9 oz)   21 79.4 kg (175 lb)        No intake or output data in the 24 hours ending 21 0812    Last shift:      No intake/output data recorded. Last 3 shifts:  1901 -  0700  In: 240 [P.O.:240]  Out: 150 [Urine:150]       Hemodynamics:    CO:    CI:    CVP:    SVR:   PAP Systolic:    PAP Diastolic:    PVR:    DF09:       Ventilator Settings:      Mode Rate TV Press PEEP FiO2 PIP Min. Vent               21 %     9.7 l/min      Physical Exam:    General:  male; currently on nasal oxygen in no distress  HEENT: NCAT, visible oral mucosa is moist and clear  Eyes: anicteric; conjunctiva clear extraocular movements intact  Neck: no nodes,,no accessory MM use.   no respiratory distress  Chest: no deformity,   Cardiac: Regular rhythm and rate now controlled  Lungs: distant breath sounds; clear anteriorly and laterally with rales in both bases  Abd: Soft positive bowel sounds no tenderness  Ext: Improvement edema pitting and lymphedema of the lower extremities with new onset  edema of the left arm; no joint swelling; No clubbing  : Clear urine  Neuro: Alert awake no distress speech is clear moves all 4 extremities  Psych-calm, oriented to person;   Skin: warm, dry, no cyanosis;   Pulses: Brachial and radial pulses intact  Capillary:slow capillary refill      Labs:    Recent Labs     07/19/21  0545 07/18/21  2131 07/18/21  1250 07/18/21  0352 07/18/21  0342 07/17/21  1015 07/17/21  0655   WBC  --   --   --   --  4.6  --  5.2   HGB  --   --   --   --  8.0*  --  8.2*   PLT  --   --   --   --  170  --  168   APTT 71.0* >153.0* 75.8*   < >  --    < >  --     < > = values in this interval not displayed. Recent Labs     07/18/21  0342 07/17/21  0655    143   K 3.6 3.9    110*   CO2 22 23   GLU 91 119*   BUN 72* 78*   CREA 6.78* 6.79*   CA 8.3* 8.2*   ALB 3.2* 3.3*   ALT 14 15   7/19 overnight oximetry shows 14 minutes 26 seconds below 88% with low oxygen saturation 75%  7/17 overnight oximetry shows low oxygen saturation 71% with 8 minutes 40 seconds below 88% from 1 AM to 6 AM   7/16 overnight oximetry on 1 L shows only 2 minutes 20 seconds below 88% with a low oxygen saturation of 83%  7/15 room air oxygen saturation 93%  currently on noninvasive ventilator inspiratory pressure 16 expiratory pressure six rate 16 FiO2 28% with oxygen saturation 96%   BNP greater than 35,000  Lab Results   Component Value Date/Time    Culture result:  07/12/2021 10:40 PM     Two of four bottles have been flagged positive by instrument. Bottles have been sent to [facility] laboratory to assess for possible growth.  Gram Positive Cocci in clusters CALLED TO AND READ BACK BY Mian Johnson r.n. [id number] 07/14/2021 by dpw    Culture result:  07/12/2021 10:40 PM     Staphylococcus species, coagulase negative GROWING IN THE 1ST OF 4 BOTTLES DRAWN    Culture result:  07/12/2021 10:40 PM     Staphylococcus species, coagulase negative (2nd colony type/strain) GROWING IN THE 2ND OF 4 BOTTLES DRAWN        Imaging:  I have personally reviewed the patients radiographs and have reviewed the reports:    CXR Results  (Last 48 hours)  7/16 chest x-ray with continued mild pulmonary edema and small pleural effusions unchanged               07/12/21 2251  XR CHEST PORT Final result    Impression:  Findings/impression:       Cardiac silhouette is enlarged. Central vascular congestion and patchy central   airspace disease/edema. Suspect trace right pleural effusion. No evidence of pneumothorax. No acute osseous abnormality identified. Narrative:  Study: XR CHEST PORT       Clinical indication: sob       Comparison: None. To me chest x-ray consistent with cardiomegaly pulmonary edema and bilateral pleural effusions           Results from Hospital Encounter encounter on 07/12/21    XR CHEST PORT    Narrative  Exam: Portable upright chest radiograph    COMPARISON: One day prior. Impression  Findings/impression: Stable cardiomediastinal silhouette. Similar central  pulmonary vascular congestion and bilateral patchy airspace opacities. No  significant pleural effusion. No pneumothorax. Findings are not significantly changed. XR CHEST PORT    Narrative  1 view comparison to 12    Continued cardiomegaly and congestion. If There is mild interstitial edema, it  is unchanged. Right pleural effusion and adjacent atelectasis have improved. XR CHEST PORT    Narrative  Study: XR CHEST PORT    Clinical indication: sob    Comparison: None. Impression  Findings/impression:    Cardiac silhouette is enlarged. Central vascular congestion and patchy central  airspace disease/edema. Suspect trace right pleural effusion. No evidence of pneumothorax. No acute osseous abnormality identified. Results from [place] encounter on 01/05/21    CT CHEST WO CONT    Narrative  Images obtained without contrast include the abdomen and pelvis.   Comparison portable chest radiograph earlier today. Dose Reduction:  All CT scans at this facility are performed using dose reduction optimization  techniques as appropriate to a performed exam including the following: Automated  exposure control, adjustments of the mA and/or kV according to patient size, or  use of iterative reconstruction technique. Subtle peripheral groundglass densities are noted in both lungs, could reflect  Covid pneumonia or other. No pneumothorax or pneumomediastinum. The radiographic findings suspicious for  pneumomediastinum probably was artifact, perhaps related to cardiac motion. No pleural effusion or adenopathy. Cardiomegaly again noted. Impression  IMPRESSION:  1. No pneumomediastinum or pneumothorax. 2. Cardiomegaly. 3. Subtle groundglass densities in both lungs might be pneumonia or other. Discussion patient with worsening renal insufficiency has developed pulmonary edema acute hypoxic respiratory failure. He states he still has urine output normally at home. We will give him high-dose IV Lasix to see if diuresis is induced. He very likely will need dialysis. He has minimal elevation in troponin probably troponin leak from hypoxia or just due to his renal insufficiency. He is not having any chest pain. Does have severe hypertension. Has been started on clonidine and hydralazine. 7/14 no distress today on nasal oxygen. Did have residual on post void bladder scan and Quesada catheter was placed with good diuresis overnight. Despite this potassium remains elevated. We will give 1 dose of Kayexalate. Despite diuresis hemoglobin is dropped to 6.7 will transfuse. He denies frequency small-volume urine or straining to urinate at home despite this with signs of obstruction we will add Flomax. Potassium 6 today we will give 1 dose of Kayexalate and continue diuresis  7/16 respirations nonlabored.   Was placed back on 1 L nasal oxygen yesterday overnight oximetry shows well-maintained oxygen saturation. Will check overnight tonight on and off oxygen. Quesada catheter has been removed. Chest x-ray continues to show mild pulmonary edema  7/17 overnight oximetry shows desaturation when on room air. Will maintain oxygen and repeat overnight oximetry Ibrahima night  7/19 developed V. tach overnight and now in the ICU on IV amiodarone with rhythm showing alternating sinus with PAC and A. fib. Rate . Overnight oximetry continued to show desaturation on room air and he is back on nasal oxygen. He has no specific complaint  Time of care 30 minutes         Thank you for allowing us to participate in the care of this patient.   We will follow along with you     Cary Brown MD

## 2021-07-20 LAB
ALBUMIN SERPL-MCNC: 3.2 G/DL (ref 3.5–5)
ANION GAP SERPL CALC-SCNC: 9 MMOL/L (ref 5–15)
APTT PPP: 74.9 SEC (ref 21.2–34.1)
APTT PPP: >153 SEC (ref 21.2–34.1)
ATRIAL RATE: 197 BPM
ATRIAL RATE: 78 BPM
BASOPHILS # BLD: 0.1 K/UL (ref 0–0.1)
BASOPHILS NFR BLD: 1 % (ref 0–1)
BUN SERPL-MCNC: 80 MG/DL (ref 6–20)
BUN/CREAT SERPL: 11 (ref 12–20)
CA-I BLD-MCNC: 8 MG/DL (ref 8.5–10.1)
CALCULATED P AXIS, ECG09: 61 DEGREES
CALCULATED R AXIS, ECG10: 101 DEGREES
CALCULATED R AXIS, ECG10: 99 DEGREES
CALCULATED T AXIS, ECG11: -102 DEGREES
CALCULATED T AXIS, ECG11: -113 DEGREES
CHLORIDE SERPL-SCNC: 108 MMOL/L (ref 97–108)
CO2 SERPL-SCNC: 23 MMOL/L (ref 21–32)
CREAT SERPL-MCNC: 7.42 MG/DL (ref 0.7–1.3)
DIAGNOSIS, 93000: NORMAL
DIAGNOSIS, 93000: NORMAL
DIFFERENTIAL METHOD BLD: ABNORMAL
ECHO AO ROOT DIAM: 3.6 CM
ECHO AR MAX VEL PISA: 300 CM/S
ECHO AV AREA PEAK VELOCITY: 3.05 CM2
ECHO AV AREA/BSA PEAK VELOCITY: 1.4 CM2/M2
ECHO AV PEAK GRADIENT: 17 MMHG
ECHO AV PEAK VELOCITY: 204 CM/S
ECHO AV REGURGITANT PHT: 763 MS
ECHO LA AREA 4C: 35.98 CM2
ECHO LA MAJOR AXIS: 5.7 CM
ECHO LA MINOR AXIS: 2.63 CM
ECHO LV E' SEPTAL VELOCITY: 5.26 CM/S
ECHO LV EDV A2C: 165 CM3
ECHO LV EJECTION FRACTION BIPLANE: 63.2 % (ref 55–100)
ECHO LV ESV A2C: 45.9 CM3
ECHO LV INTERNAL DIMENSION DIASTOLIC: 5.48 CM (ref 4.2–5.9)
ECHO LV INTERNAL DIMENSION SYSTOLIC: 3.58 CM
ECHO LV IVSD: 2.17 CM (ref 0.6–1)
ECHO LV MASS 2D: 577 G (ref 88–224)
ECHO LV MASS INDEX 2D: 265.9 G/M2 (ref 49–115)
ECHO LV POSTERIOR WALL DIASTOLIC: 1.85 CM (ref 0.6–1)
ECHO LVOT DIAM: 2.1 CM
ECHO LVOT PEAK GRADIENT: 13 MMHG
ECHO LVOT PEAK VELOCITY: 180 CM/S
ECHO MV A VELOCITY: 77.1 CM/S
ECHO MV E DECELERATION TIME (DT): 235 MS
ECHO MV E VELOCITY: 117 CM/S
ECHO MV E/A RATIO: 1.52
ECHO MV E/E' SEPTAL: 22.24
ECHO MV MAX VELOCITY: 157 CM/S
ECHO MV MEAN GRADIENT: 3 MMHG
ECHO MV PEAK GRADIENT: 10 MMHG
ECHO MV VTI: 38.8 CM
ECHO PV MAX VELOCITY: 179 CM/S
ECHO PV PEAK INSTANTANEOUS GRADIENT SYSTOLIC: 10 MMHG
ECHO PV PEAK INSTANTANEOUS GRADIENT SYSTOLIC: 13 MMHG
ECHO PV REGURGITANT MAX VELOCITY: 155 CM/S
ECHO RA AREA 4C: 30.88 CM2
ECHO RV INTERNAL DIMENSION: 2.82 CM
ECHO RV TAPSE: 2.5 CM (ref 1.5–2)
ECHO TV MAX VELOCITY: 351 CM/S
ECHO TV REGURGITANT PEAK GRADIENT: 49 MMHG
EOSINOPHIL # BLD: 0.3 K/UL (ref 0–0.4)
EOSINOPHIL NFR BLD: 7 % (ref 0–7)
ERYTHROCYTE [DISTWIDTH] IN BLOOD BY AUTOMATED COUNT: 18.5 % (ref 11.5–14.5)
GLUCOSE SERPL-MCNC: 97 MG/DL (ref 65–100)
HCT VFR BLD AUTO: 25.2 % (ref 36.6–50.3)
HGB BLD-MCNC: 7.6 G/DL (ref 12.1–17)
IMM GRANULOCYTES # BLD AUTO: 0 K/UL (ref 0–0.04)
IMM GRANULOCYTES NFR BLD AUTO: 1 % (ref 0–0.5)
LYMPHOCYTES # BLD: 0.4 K/UL (ref 0.8–3.5)
LYMPHOCYTES NFR BLD: 9 % (ref 12–49)
MAGNESIUM SERPL-MCNC: 2.8 MG/DL (ref 1.6–2.4)
MCH RBC QN AUTO: 27 PG (ref 26–34)
MCHC RBC AUTO-ENTMCNC: 30.2 G/DL (ref 30–36.5)
MCV RBC AUTO: 89.7 FL (ref 80–99)
MONOCYTES # BLD: 0.7 K/UL (ref 0–1)
MONOCYTES NFR BLD: 15 % (ref 5–13)
NEUTS SEG # BLD: 3 K/UL (ref 1.8–8)
NEUTS SEG NFR BLD: 67 % (ref 32–75)
NRBC # BLD: 0 K/UL (ref 0–0.01)
NRBC BLD-RTO: 0 PER 100 WBC
P-R INTERVAL, ECG05: 188 MS
PHOSPHATE SERPL-MCNC: 8.7 MG/DL (ref 2.6–4.7)
PLATELET # BLD AUTO: 165 K/UL (ref 150–400)
PMV BLD AUTO: 10.3 FL (ref 8.9–12.9)
POTASSIUM SERPL-SCNC: 4.1 MMOL/L (ref 3.5–5.1)
Q-T INTERVAL, ECG07: 260 MS
Q-T INTERVAL, ECG07: 448 MS
QRS DURATION, ECG06: 104 MS
QRS DURATION, ECG06: 144 MS
QTC CALCULATION (BEZET), ECG08: 477 MS
QTC CALCULATION (BEZET), ECG08: 510 MS
RBC # BLD AUTO: 2.81 M/UL (ref 4.1–5.7)
SODIUM SERPL-SCNC: 140 MMOL/L (ref 136–145)
T4 FREE SERPL-MCNC: 1.3 NG/DL (ref 0.8–1.5)
THERAPEUTIC RANGE,PTTT: ABNORMAL SEC (ref 82–109)
THERAPEUTIC RANGE,PTTT: ABNORMAL SEC (ref 82–109)
TSH SERPL DL<=0.05 MIU/L-ACNC: 2.28 UIU/ML (ref 0.36–3.74)
VENTRICULAR RATE, ECG03: 203 BPM
VENTRICULAR RATE, ECG03: 78 BPM
WBC # BLD AUTO: 4.5 K/UL (ref 4.1–11.1)

## 2021-07-20 PROCEDURE — 65610000006 HC RM INTENSIVE CARE

## 2021-07-20 PROCEDURE — 84443 ASSAY THYROID STIM HORMONE: CPT

## 2021-07-20 PROCEDURE — 74011250637 HC RX REV CODE- 250/637: Performed by: INTERNAL MEDICINE

## 2021-07-20 PROCEDURE — 74011250636 HC RX REV CODE- 250/636: Performed by: INTERNAL MEDICINE

## 2021-07-20 PROCEDURE — 80069 RENAL FUNCTION PANEL: CPT

## 2021-07-20 PROCEDURE — 51798 US URINE CAPACITY MEASURE: CPT

## 2021-07-20 PROCEDURE — 85730 THROMBOPLASTIN TIME PARTIAL: CPT

## 2021-07-20 PROCEDURE — 65270000029 HC RM PRIVATE

## 2021-07-20 PROCEDURE — 83735 ASSAY OF MAGNESIUM: CPT

## 2021-07-20 PROCEDURE — 85025 COMPLETE CBC W/AUTO DIFF WBC: CPT

## 2021-07-20 PROCEDURE — 36415 COLL VENOUS BLD VENIPUNCTURE: CPT

## 2021-07-20 RX ORDER — METOPROLOL TARTRATE 25 MG/1
50 TABLET, FILM COATED ORAL EVERY 12 HOURS
Status: DISCONTINUED | OUTPATIENT
Start: 2021-07-20 | End: 2021-07-21

## 2021-07-20 RX ADMIN — CALCITRIOL CAPSULES 0.25 MCG 0.25 MCG: 0.25 CAPSULE ORAL at 09:56

## 2021-07-20 RX ADMIN — HEPARIN SODIUM 5000 UNITS: 1000 INJECTION, SOLUTION INTRAVENOUS; SUBCUTANEOUS at 09:54

## 2021-07-20 RX ADMIN — TAMSULOSIN HYDROCHLORIDE 0.4 MG: 0.4 CAPSULE ORAL at 09:56

## 2021-07-20 RX ADMIN — SEVELAMER CARBONATE 800 MG: 800 TABLET, FILM COATED ORAL at 09:56

## 2021-07-20 RX ADMIN — SODIUM BICARBONATE 650 MG: 650 TABLET ORAL at 16:26

## 2021-07-20 RX ADMIN — METOPROLOL TARTRATE 25 MG: 25 TABLET, FILM COATED ORAL at 16:26

## 2021-07-20 RX ADMIN — HEPARIN SODIUM AND DEXTROSE 14 UNITS/KG/HR: 10000; 5 INJECTION INTRAVENOUS at 09:54

## 2021-07-20 RX ADMIN — SODIUM BICARBONATE 650 MG: 650 TABLET ORAL at 21:42

## 2021-07-20 RX ADMIN — SEVELAMER CARBONATE 800 MG: 800 TABLET, FILM COATED ORAL at 16:26

## 2021-07-20 RX ADMIN — SODIUM BICARBONATE 650 MG: 650 TABLET ORAL at 09:56

## 2021-07-20 RX ADMIN — SEVELAMER CARBONATE 800 MG: 800 TABLET, FILM COATED ORAL at 16:00

## 2021-07-20 NOTE — PROGRESS NOTES
Patient's son Colletta Brush called and asked that his name be put on the chart to contact.  # [phone]

## 2021-07-20 NOTE — PROGRESS NOTES
Bedside and Verbal shift change report given to Lesley Hall RN (oncoming nurse) by Jaleesa Perez RN(offgoing nurse). Report included the following information SBAR, Procedure Summary, Intake/Output, MAR, Recent Results and Dual Neuro Assessmentcardiac rhythm.

## 2021-07-20 NOTE — PROGRESS NOTES
Cath lab nurses came to get patient. Pt stated that he did not want cath done. Cath lab nurses to inform Dr. Attila Valdez. Keila leavitt'd as per protocol.

## 2021-07-20 NOTE — PROGRESS NOTES
Progress Note      7/20/2021 9:54 AM  NAME: Olivia Anderson   MRN:  [id number]   Admit Diagnosis: Pulmonary edema [J81.1]      Subjective:   Chart reviewed. Patient has had a symptomatic ventricular tachycardia and was shocked. Discussed with the nurse and with the nephrologist.  Vascular surgery note appreciated. Echocardiogram results explained. He feels much better today. Review of Systems:    Symptom Y/N Comments  Symptom Y/N Comments   Fever/Chills n   Chest Pain n    Poor Appetite    Edema y    Cough    Abdominal Pain     Sputum    Joint Pain n    SOB/CARMONA n   Pruritis/Rash     Nausea/vomit    Other     Diarrhea         Constipation           Could NOT obtain due to:      Objective:          Physical Exam:    Last 24hrs VS reviewed since prior progress note. Most recent are:    Visit Vitals  /82 (BP 1 Location: Right upper arm, BP Patient Position: At rest)   Pulse 74   Temp 97.4 °F (36.3 °C)   Resp 16   Ht 6' (1.829 m)   Wt 95.5 kg (210 lb 8.6 oz)   SpO2 99%   BMI 28.55 kg/m²       Intake/Output Summary (Last 24 hours) at 7/20/2021 0908  Last data filed at 7/20/2021 0810  Gross per 24 hour   Intake 2490.66 ml   Output 300 ml   Net 2190.66 ml        General Appearance: Well developed, well nourished, alert & oriented x 3,    no acute distress. Ears/Nose/Mouth/Throat: Hearing grossly normal.  Neck: Supple. Chest: Lungs clear to auscultation bilaterally. Cardiovascular: Regular rate and rhythm, S1,S2 normal, no murmur. Abdomen: Soft, non-tender, bowel sounds are active. Extremities: Lower extremity edema left upper extremity edema evident  Skin: Lower extremity blisters evident    []         Post-cath site without hematoma, bruit, tenderness, or thrill. Distal pulses intact.     PMH/SH reviewed - no change compared to H&P    Data Review    Telemetry: Patient had a ventricular tachycardia and was shocked and had episode of atrial fibrillation and now is in sinus rhythm with PVCs.    EKG:   []  No new EKG for review    Lab Data Personally Reviewed:    Recent Labs     07/20/21 0430 07/18/21 0342   WBC 4.5 4.6   HGB 7.6* 8.0*   HCT 25.2* 26.7*    170     Recent Labs     07/20/21 0430 07/19/21  0725 07/19/21  0545   INR  --  1.1  --    PTP  --  13.8  --    APTT 74.9* 79.8* 71.0*      Recent Labs     07/20/21 0430 07/19/21  0726 07/18/21 0342    142 141   K 4.1 3.5 3.6    108 107   CO2 23 22 22   BUN 80* 78* 72*   CREA 7.42* 6.95* 6.78*   GLU 97 105* 91   CA 8.0* 9.1 8.3*   MG 2.8* 3.1*  --      Recent Labs     07/19/21 0726   TROIQ 0.15*     No results found for: CHOL, CHOLX, CHLST, CHOLV, HDL, HDLP, LDL, LDLC, DLDLP, TGLX, TRIGL, TRIGP, CHHD, CHHDX    Recent Labs     07/20/21 0430 07/19/21 0726 07/18/21 0342   AP  --  59 57   TP  --  6.0* 6.0*   ALB 3.2* 3.1* 3.2*   GLOB  --  2.9 2.8     No results for input(s): PH, PCO2, PO2 in the last 72 hours.     Medications Personally Reviewed:    Current Facility-Administered Medications   Medication Dose Route Frequency    amiodarone (CORDARONE) 375 mg in dextrose 5% 250 mL infusion  0.5-1 mg/min IntraVENous TITRATE    [Held by provider] furosemide (LASIX) tablet 80 mg  80 mg Oral BID    [Held by provider] amLODIPine (NORVASC) tablet 10 mg  10 mg Oral DAILY    tamsulosin (FLOMAX) capsule 0.4 mg  0.4 mg Oral DAILY    [Held by provider] hydrALAZINE (APRESOLINE) tablet 50 mg  50 mg Oral TID    sodium bicarbonate tablet 650 mg  650 mg Oral TID    0.9% sodium chloride infusion 250 mL  250 mL IntraVENous PRN    calcitRIOL (ROCALTROL) capsule 0.25 mcg  0.25 mcg Oral DAILY    sevelamer carbonate (RENVELA) tab 800 mg  800 mg Oral TID WITH MEALS    ergocalciferol capsule 50,000 Units  50,000 Units Oral Q7D    hydrALAZINE (APRESOLINE) 20 mg/mL injection 40 mg  40 mg IntraVENous Q6H PRN    heparin 25,000 units in D5W 250 ml infusion  18-36 Units/kg/hr (Adjusted) IntraVENous TITRATE    heparin (porcine) 1,000 unit/mL injection 6,340 Units  80 Units/kg (Adjusted) IntraVENous PRN    Or    heparin (porcine) 1,000 unit/mL injection 3,170 Units  40 Units/kg (Adjusted) IntraVENous PRN           Problem List:   Acute hypoxic respiratory failure and patient seems to be somewhat better. Severe anemia. Renal failure. Heart failure. Left upper extremity DVT. Minimal elevation of troponin I is probably not a presentation of myocardial infarction. Patient has had ventricular tachycardia and has been shocked. Electrophysiology note appreciated. 1.      Assessment/Plan:   Patient was scheduled for cardiac catheterization but today he says he wants to change his mind and does not want to go for catheterization. Thank you. We will follow nephrology recommendations and vascular surgery recommendations. I will continue otherwise present care. Thank you. 1.          []       High complexity decision making was performed in this patient at high risk for decompensation with multiple organ involvement.     Alessandra Joyner MD

## 2021-07-20 NOTE — PROGRESS NOTES
Progress Note    Laxmi Juarez MD             Daily Progress Note: 7/19/2021      Subjective: The patient is seen for follow  up. All the incidences are noted. Patient had ventricular tachycardia with hypotension in the morning so patient was shocked. Now patient is on amiodarone and patient has reasonably good blood pressure as well as pulse rate. Patient was transferred in the morning to ICU. I found out it when I saw patient today at noontime.     Problem List:  Problem List as of 7/19/2021 Never Reviewed        Codes Class Noted - Resolved    Pulmonary edema ICD-10-CM: J81.1  ICD-9-CM: 764  7/13/2021 - Present        GI bleed ICD-10-CM: K92.2  ICD-9-CM: 578.9  1/5/2021 - Present              Medications reviewed  Current Facility-Administered Medications   Medication Dose Route Frequency    amiodarone (CORDARONE) 375 mg in dextrose 5% 250 mL infusion  0.5-1 mg/min IntraVENous TITRATE    [Held by provider] furosemide (LASIX) tablet 80 mg  80 mg Oral BID    [Held by provider] amLODIPine (NORVASC) tablet 10 mg  10 mg Oral DAILY    tamsulosin (FLOMAX) capsule 0.4 mg  0.4 mg Oral DAILY    [Held by provider] hydrALAZINE (APRESOLINE) tablet 50 mg  50 mg Oral TID    sodium bicarbonate tablet 650 mg  650 mg Oral TID    0.9% sodium chloride infusion 250 mL  250 mL IntraVENous PRN    calcitRIOL (ROCALTROL) capsule 0.25 mcg  0.25 mcg Oral DAILY    sevelamer carbonate (RENVELA) tab 800 mg  800 mg Oral TID WITH MEALS    ergocalciferol capsule 50,000 Units  50,000 Units Oral Q7D    hydrALAZINE (APRESOLINE) 20 mg/mL injection 40 mg  40 mg IntraVENous Q6H PRN    heparin 25,000 units in D5W 250 ml infusion  18-36 Units/kg/hr (Adjusted) IntraVENous TITRATE    heparin (porcine) 1,000 unit/mL injection 6,340 Units  80 Units/kg (Adjusted) IntraVENous PRN    Or    heparin (porcine) 1,000 unit/mL injection 3,170 Units  40 Units/kg (Adjusted) IntraVENous PRN       Review of Systems:   Interval patient had V. tach at present he does not have any complaints    Objective:   Physical Exam:     Visit Vitals  /83 (BP 1 Location: Right upper arm, BP Patient Position: At rest)   Pulse 69   Temp 97.3 °F (36.3 °C)   Resp 12   Ht 6' (1.829 m)   Wt 94.6 kg (208 lb 8.9 oz)   SpO2 99%   BMI 28.29 kg/m²    O2 Flow Rate (L/min): 2 l/min O2 Device: None (Room air)    Temp (24hrs), Av.2 °F (36.8 °C), Min:97.3 °F (36.3 °C), Max:98.7 °F (37.1 °C)    1901 -  0700  In: -   Out: 100 [Urine:100]   701 - 1900  In: 2394.3 [P.O.:1000; I.V.:1394.3]  Out: 250 [Urine:250]  HEENT-normocephalic atraumatic skull pupils are bilaterally equal reactive to light sclera nonicteric conjunctive are pink no edema of the eyelids no yellow nasal discharge tongue is pink no ulcer positive gag reflex no pharyngeal inflammation extraocular movements are intact  General:  Alert, cooperative, no distress, appears stated age. Lungs:   Clear to auscultation bilaterally. Chest wall:  No tenderness or deformity. Heart:  Regular rate and rhythm, S1, S2 normal, no murmur, click, rub or gallop. Abdomen:   Soft, non-tender. Bowel sounds normal. No masses,  No organomegaly. Extremities: Extremities normal, atraumatic, no cyanosis much improved edema on both upper limbs as well as lower limb the blister on the left great toe has been drying up nicely   Pulses: 2+ and symmetric all extremities. Skin: Skin color, texture, turgor normal. No rashes or lesions   Neurologic: CNII-XII intact.  No gross sensory or motor deficits     Data Review:       Recent Days:  Recent Labs     21  0342 21  0655   WBC 4.6 5.2   HGB 8.0* 8.2*   HCT 26.7* 27.6*    168     Recent Labs     21  0726 21  0725 21  0342 21  0655     --  141 143   K 3.5  --  3.6 3.9     --  107 110*   CO2 22  --  22 23   *  --  91 119*   BUN 78*  --  72* 78*   CREA 6.95*  --  6.78* 6.79*   CA 9.1  --  8.3* 8.2*   MG 3.1*  --   --   --    PHOS 8.4*  --   --   --    ALB 3.1*  --  3.2* 3.3*   TBILI 0.4  --  0.4 0.5   ALT 19  --  14 15   INR  --  1.1  --   --      No results for input(s): PH, PCO2, PO2, HCO3, FIO2 in the last 72 hours. Results     Procedure Component Value Units Date/Time    MRSA SCREEN - PCR (NASAL) [[id number]] Collected: 07/13/21 1730    Order Status: Completed Specimen: Swab Updated: 07/13/21 2123     MRSA by PCR, Nasal Not Detected       COVID-19 RAPID TEST [[id number]] Collected: 07/13/21 0539    Order Status: Completed Specimen: Nasopharyngeal Updated: 07/13/21 0655     Specimen source Nasopharyngeal        COVID-19 rapid test Not Detected        Comment: Rapid Abbott ID Now   Rapid NAAT:  The specimen is NEGATIVE for SARS-CoV-2, the novel coronavirus associated with COVID-19. Negative results should be treated as presumptive and, if inconsistent with clinical signs and symptoms or necessary for patient management, should be tested with an alternative molecular assay. Negative results do not preclude SARS-CoV-2 infection and should not be used as the sole basis for patient management decisions. This test has been authorized by the FDA under   an Emergency Use Authorization (EUA) for use by authorized laboratories. Fact sheet for Healthcare Providers: ConventionUpdate.co.nz Fact sheet for Patients: ConventionUpdate.co.nz   Methodology: Isothermal Nucleic Acid Amplification         CULTURE, BLOOD, PAIRED [[id number]] Collected: 07/12/21 2240    Order Status: Completed Specimen: Blood Updated: 07/16/21 1346     Special Requests: No Special Requests        Culture result:       Two of four bottles have been flagged positive by instrument. Bottles have been sent to [facility] laboratory to assess for possible growth.  Gram Positive Cocci in clusters CALLED TO AND READ BACK BY chiquita mtz r.n. [id number] 07/14/2021 by hanane                  Staphylococcus species, coagulase negative GROWING IN THE 1ST OF 4 BOTTLES DRAWN                  Staphylococcus species, coagulase negative (2nd colony type/strain) GROWING IN THE 2ND OF 4 BOTTLES DRAWN               24 Hour Results:  Recent Results (from the past 24 hour(s))   PTT    Collection Time: 07/18/21  9:31 PM   Result Value Ref Range    aPTT >153.0 (HH) 21.2 - 34.1 sec    aPTT, therapeutic range   82 - 109 sec   PTT    Collection Time: 07/19/21  5:45 AM   Result Value Ref Range    aPTT 71.0 (H) 21.2 - 34.1 sec    aPTT, therapeutic range   82 - 109 sec   PTT    Collection Time: 07/19/21  7:25 AM   Result Value Ref Range    aPTT 79.8 (H) 21.2 - 34.1 sec    aPTT, therapeutic range   82 - 109 sec   PROTHROMBIN TIME + INR    Collection Time: 07/19/21  7:25 AM   Result Value Ref Range    Prothrombin time 13.8 11.9 - 14.7 sec    INR 1.1 0.9 - 1.1     CK W/ REFLX CKMB    Collection Time: 07/19/21  7:25 AM   Result Value Ref Range    .0 39 - 816 ng/mL   METABOLIC PANEL, COMPREHENSIVE    Collection Time: 07/19/21  7:26 AM   Result Value Ref Range    Sodium 142 136 - 145 mmol/L    Potassium 3.5 3.5 - 5.1 mmol/L    Chloride 108 97 - 108 mmol/L    CO2 22 21 - 32 mmol/L    Anion gap 12 5 - 15 mmol/L    Glucose 105 (H) 65 - 100 mg/dL    BUN 78 (H) 6 - 20 mg/dL    Creatinine 6.95 (H) 0.70 - 1.30 mg/dL    BUN/Creatinine ratio 11 (L) 12 - 20      GFR est AA 10 (L) >60 ml/min/1.73m2    GFR est non-AA 8 (L) >60 ml/min/1.73m2    Calcium 9.1 8.5 - 10.1 mg/dL    Bilirubin, total 0.4 0.2 - 1.0 mg/dL    AST (SGOT) 26 15 - 37 U/L    ALT (SGPT) 19 12 - 78 U/L    Alk.  phosphatase 59 45 - 117 U/L    Protein, total 6.0 (L) 6.4 - 8.2 g/dL    Albumin 3.1 (L) 3.5 - 5.0 g/dL    Globulin 2.9 2.0 - 4.0 g/dL    A-G Ratio 1.1 1.1 - 2.2     PHOSPHORUS    Collection Time: 07/19/21  7:26 AM   Result Value Ref Range    Phosphorus 8.4 (H) 2.6 - 4.7 mg/dL   URIC ACID    Collection Time: 07/19/21  7:26 AM   Result Value Ref Range    Uric acid 7.4 (H) 3.5 - 7.2 mg/dL   MAGNESIUM Collection Time: 07/19/21  7:26 AM   Result Value Ref Range    Magnesium 3.1 (H) 1.6 - 2.4 mg/dL   TROPONIN I    Collection Time: 07/19/21  7:26 AM   Result Value Ref Range    Troponin-I, Qt. 0.15 (H) <0.05 ng/mL   TSH 3RD GENERATION    Collection Time: 07/19/21  7:26 AM   Result Value Ref Range    TSH 1.91 0.36 - 3.74 uIU/mL   BNP    Collection Time: 07/19/21  7:26 AM   Result Value Ref Range    NT pro-BNP 32,264 (H) <125 pg/mL   EKG, 12 LEAD, SUBSEQUENT    Collection Time: 07/19/21 10:53 AM   Result Value Ref Range    Ventricular Rate 78 BPM    Atrial Rate 78 BPM    P-R Interval 188 ms    QRS Duration 104 ms    Q-T Interval 448 ms    QTC Calculation (Bezet) 510 ms    Calculated P Axis 61 degrees    Calculated R Axis 101 degrees    Calculated T Axis -102 degrees    Diagnosis       Sinus rhythm with frequent Premature ventricular complexes and Premature   atrial complexes  Possible Left atrial enlargement  Rightward axis  T wave abnormality, consider inferior ischemia  Prolonged QT  Abnormal ECG  When compared with ECG of 19-JUL-2021 06:43, (Unconfirmed)  Significant changes have occurred     ECHO ADULT COMPLETE    Collection Time: 07/19/21  4:41 PM   Result Value Ref Range    Aortic Regurgitant Pressure Half-time 763.00 ms    AR Max Alex 300.00 cm/s    Aortic Valve Systolic Peak Velocity 961.95 cm/s    AoV PG 17.00 mmHg    Aortic Valve Area by Continuity of Peak Velocity 3.05 cm2    Ao Root D 3.60 cm    IVSd 2.17 (A) 0.60 - 1.00 cm    LVIDd 5.48 4.20 - 5.90 cm    LVIDs 3.58 cm    LVOT d 2.10 cm    LVOT Peak Velocity 180.00 cm/s    LVOT Peak Gradient 13.00 mmHg    LVPWd 1.85 (A) 0.60 - 1.00 cm    LV E' Septal Velocity 5.26 cm/s    LV ED Vol A2C 165.00 cm3    LV ES Vol A2C 45.90 cm3    E/E' septal 22.24     Left Atrium Major Axis 5.70 cm    Mitral Valve Max Velocity 157.00 cm/s    MV Peak Gradient 10.00 mmHg    Mitral Valve E Wave Deceleration Time 235.00 ms    MV A Alex 77.10 cm/s    MV E Alex 117.00 cm/s    MV Mean Gradient 3.00 mmHg    Mitral Valve Annulus Velocity Time Integral 38.80 cm    MV E/A 1.52     Pulmonic Regurgitant End Max Velocity 155.00 cm/s    Pulmonic Valve Systolic Peak Instantaneous Gradient 10.00 mmHg    Pulmonic Valve Max Velocity 179.00 cm/s    Pulmonic Valve Systolic Peak Instantaneous Gradient 13.00 mmHg    RVIDd 2.82 cm    Tricuspid Valve Max Velocity 351.00 cm/s    Triscuspid Valve Regurgitation Peak Gradient 49.00 mmHg    Tapse 2.50 (A) 1.50 - 2.00 cm    Right Atrial Area 4C 30.88 cm2    LA Area 4C 35.98 cm2    BP EF 63.2 55.0 - 100.0 %    LV Mass .0 88.0 - 224.0 g    LV Mass AL Index 265.9 49.0 - 115.0 g/m2    Left Atrium Minor Axis 2.63 cm    JAK/BSA Pk Alex 1.4 cm2/m2       XR CHEST PORT   Final Result      LOWER EXT ART PVR MULT LEVEL SEG PRESSURES   Final Result      DUPLEX LOWER EXT VENOUS BILAT   Final Result      XR CHEST PORT   Final Result   Findings/impression: Stable cardiomediastinal silhouette. Similar central   pulmonary vascular congestion and bilateral patchy airspace opacities. No   significant pleural effusion. No pneumothorax. Findings are not significantly changed. XR CHEST PORT   Final Result      US RETROPERITONEUM COMP   Final Result   Medical renal disease on the right      DUPLEX UPPER EXT VENOUS LEFT   Final Result      XR CHEST PORT   Final Result   Findings/impression:      Cardiac silhouette is enlarged. Central vascular congestion and patchy central   airspace disease/edema. Suspect trace right pleural effusion. No evidence of pneumothorax. No acute osseous abnormality identified. Assessment: Symptomatic ventricular tachycardia  Hypertension  Acute on chronic renal failure  Anemia now resolved  Left upper limb DVT          Plan: I will check patient's thyroid function as a baseline as patient has been started on amiodarone. We will follow recommendations from cardiologist as well as pulmonologist and nephrology.   Patient may go for cardiac catheterization so we will continue on heparin at present        Care Plan discussed with: Patient himself    Total time spent with patient: 30 minutes.     Jamaica Hughes MD

## 2021-07-20 NOTE — CONSULTS
Lester Daley M.D. and [facility]. Brandan Sandoval M.D.  [address], [address] [place] [address]  [phone]   [email or web address]      Date of  Admission: 7/12/2021 10:27 PM     Assessment and Plan:     Marshall Castorena is admitted with dyspnea. 1. VT  2. CHF, acute, probable diastolic  3. ESME  4. Probable hypertrophic cardiomyopathy  5. HTN    He had sustained VT this morning, requiring defibrillation. Now in sinus with multiple ectopic beats. Echo read pending, but my prelim eval is normal LVEF with severe LVH. He is scheduled for cath for further evaluation. Pending results of cath, may need to consider ICD for secondary prevention. Recommend beta blocker for AF, CHF, HCM. Thank you for this consultation. Please call with questions. REASON FOR CONSULT:  Wide complex tachycardia  Primary Cardiologist:  Dr. Jarvis Kenny    HPI:  76year old man, history of HTN, admitted with dyspnea and orthopnea. Also ESME with Cr above 6, and severe HTN. Has been treated medically. Has had UOP with [place], and ESME thought to be due to obstruction. However Cr continues to worsen. Nephrology following. Also noted is LUE DVT. This morning about 630 AM, had WCT with HR in 200s. Was defibrillated and started on amiodarone. Currently awake and alert. Review of WCT shows significant irregularity, but appears to have AV dissociation and QRS criteria for VT. Patient Active Problem List    Diagnosis Date Noted    Pulmonary edema 07/13/2021    GI bleed 01/05/2021      Moiz Munson MD  Past Medical History:   Diagnosis Date    Chronic kidney disease     Hypertension       No past surgical history on file. No Known Allergies   No family history on file.    Social History     Socioeconomic History    Marital status: SINGLE     Spouse name: Not on file    Number of children: Not on file    Years of education: Not on file    Highest education level: Not on file   Occupational History    Not on file   Tobacco Use    Smoking status: Never Smoker    Smokeless tobacco: Never Used   Substance and Sexual Activity    Alcohol use: Not on file    Drug use: Not on file    Sexual activity: Not on file   Other Topics Concern    Not on file   Social History Narrative    Not on file     Social Determinants of Health     Financial Resource Strain:     Difficulty of Paying Living Expenses:    Food Insecurity:     Worried About Running Out of Food in the Last Year:     [address] in the Last Year:    Transportation Needs:     Lack of Transportation (Medical):      Lack of Transportation (Non-Medical):    Physical Activity:     Days of Exercise per Week:     Minutes of Exercise per Session:    Stress:     Feeling of Stress :    Social Connections:     Frequency of Communication with Friends and Family:     Frequency of Social Gatherings with Friends and Family:     Attends [employer] Services:     Active Member of Clubs or Organizations:     Attends Club or Organization Meetings:     Marital Status:    Intimate Partner Violence:     Fear of Current or Ex-Partner:     Emotionally Abused:     Physically Abused:     Sexually Abused:      Current Facility-Administered Medications   Medication Dose Route Frequency    amiodarone (CORDARONE) 375 mg in dextrose 5% 250 mL infusion  0.5-1 mg/min IntraVENous TITRATE    [Held by provider] furosemide (LASIX) tablet 80 mg  80 mg Oral BID    [Held by provider] amLODIPine (NORVASC) tablet 10 mg  10 mg Oral DAILY    tamsulosin (FLOMAX) capsule 0.4 mg  0.4 mg Oral DAILY    [Held by provider] hydrALAZINE (APRESOLINE) tablet 50 mg  50 mg Oral TID    sodium bicarbonate tablet 650 mg  650 mg Oral TID    0.9% sodium chloride infusion 250 mL  250 mL IntraVENous PRN    calcitRIOL (ROCALTROL) capsule 0.25 mcg  0.25 mcg Oral DAILY    sevelamer carbonate (RENVELA) tab 800 mg  800 mg Oral TID WITH MEALS    ergocalciferol capsule 50,000 Units 50,000 Units Oral Q7D    hydrALAZINE (APRESOLINE) 20 mg/mL injection 40 mg  40 mg IntraVENous Q6H PRN    heparin 25,000 units in D5W 250 ml infusion  18-36 Units/kg/hr (Adjusted) IntraVENous TITRATE    heparin (porcine) 1,000 unit/mL injection 6,340 Units  80 Units/kg (Adjusted) IntraVENous PRN    Or    heparin (porcine) 1,000 unit/mL injection 3,170 Units  40 Units/kg (Adjusted) IntraVENous PRN           Review of Symptoms:  A comprehensive review of systems was negative in 11 points other than stated above in HPI. Physical Exam    Visit Vitals  /77 (BP 1 Location: Right upper arm, BP Patient Position: At rest)   Pulse 70   Temp 97.3 °F (36.3 °C)   Resp 12   Ht 6' (1.829 m)   Wt 94.6 kg (208 lb 8.9 oz)   SpO2 98%   BMI 28.29 kg/m²     lethargic  Skin warm and dry  HEENT: WNL  Oropharynx without exudate. Neck supple  Lungs decrease BS  Heart - RRR, 2/6 holosystolic murmur  Abdomen soft and non tender,   Pulses 2+ throughout   Neuro:  Normal facial grimace,  Moves all extremities.    AAAO   Psych: unanxious  Left great toe necrotic    Labs:   Recent Results (from the past 24 hour(s))   PTT    Collection Time: 07/19/21  5:45 AM   Result Value Ref Range    aPTT 71.0 (H) 21.2 - 34.1 sec    aPTT, therapeutic range   82 - 109 sec   PTT    Collection Time: 07/19/21  7:25 AM   Result Value Ref Range    aPTT 79.8 (H) 21.2 - 34.1 sec    aPTT, therapeutic range   82 - 109 sec   PROTHROMBIN TIME + INR    Collection Time: 07/19/21  7:25 AM   Result Value Ref Range    Prothrombin time 13.8 11.9 - 14.7 sec    INR 1.1 0.9 - 1.1     CK W/ REFLX CKMB    Collection Time: 07/19/21  7:25 AM   Result Value Ref Range    .0 39 - 341 ng/mL   METABOLIC PANEL, COMPREHENSIVE    Collection Time: 07/19/21  7:26 AM   Result Value Ref Range    Sodium 142 136 - 145 mmol/L    Potassium 3.5 3.5 - 5.1 mmol/L    Chloride 108 97 - 108 mmol/L    CO2 22 21 - 32 mmol/L    Anion gap 12 5 - 15 mmol/L    Glucose 105 (H) 65 - 100 mg/dL    BUN 78 (H) 6 - 20 mg/dL    Creatinine 6.95 (H) 0.70 - 1.30 mg/dL    BUN/Creatinine ratio 11 (L) 12 - 20      GFR est AA 10 (L) >60 ml/min/1.73m2    GFR est non-AA 8 (L) >60 ml/min/1.73m2    Calcium 9.1 8.5 - 10.1 mg/dL    Bilirubin, total 0.4 0.2 - 1.0 mg/dL    AST (SGOT) 26 15 - 37 U/L    ALT (SGPT) 19 12 - 78 U/L    Alk.  phosphatase 59 45 - 117 U/L    Protein, total 6.0 (L) 6.4 - 8.2 g/dL    Albumin 3.1 (L) 3.5 - 5.0 g/dL    Globulin 2.9 2.0 - 4.0 g/dL    A-G Ratio 1.1 1.1 - 2.2     PHOSPHORUS    Collection Time: 07/19/21  7:26 AM   Result Value Ref Range    Phosphorus 8.4 (H) 2.6 - 4.7 mg/dL   URIC ACID    Collection Time: 07/19/21  7:26 AM   Result Value Ref Range    Uric acid 7.4 (H) 3.5 - 7.2 mg/dL   MAGNESIUM    Collection Time: 07/19/21  7:26 AM   Result Value Ref Range    Magnesium 3.1 (H) 1.6 - 2.4 mg/dL   TROPONIN I    Collection Time: 07/19/21  7:26 AM   Result Value Ref Range    Troponin-I, Qt. 0.15 (H) <0.05 ng/mL   TSH 3RD GENERATION    Collection Time: 07/19/21  7:26 AM   Result Value Ref Range    TSH 1.91 0.36 - 3.74 uIU/mL   BNP    Collection Time: 07/19/21  7:26 AM   Result Value Ref Range    NT pro-BNP 32,264 (H) <125 pg/mL   EKG, 12 LEAD, SUBSEQUENT    Collection Time: 07/19/21 10:53 AM   Result Value Ref Range    Ventricular Rate 78 BPM    Atrial Rate 78 BPM    P-R Interval 188 ms    QRS Duration 104 ms    Q-T Interval 448 ms    QTC Calculation (Bezet) 510 ms    Calculated P Axis 61 degrees    Calculated R Axis 101 degrees    Calculated T Axis -102 degrees    Diagnosis       Sinus rhythm with frequent Premature ventricular complexes and Premature   atrial complexes  Possible Left atrial enlargement  Rightward axis  T wave abnormality, consider inferior ischemia  Prolonged QT  Abnormal ECG  When compared with ECG of 19-JUL-2021 06:43, (Unconfirmed)  Significant changes have occurred     ECHO ADULT COMPLETE    Collection Time: 07/19/21  4:41 PM   Result Value Ref Range    Aortic Regurgitant Pressure Half-time 763.00 ms    AR Max Alex 300.00 cm/s    Aortic Valve Systolic Peak Velocity 175.00 cm/s    AoV PG 17.00 mmHg    Aortic Valve Area by Continuity of Peak Velocity 3.05 cm2    Ao Root D 3.60 cm    IVSd 2.17 (A) 0.60 - 1.00 cm    LVIDd 5.48 4.20 - 5.90 cm    LVIDs 3.58 cm    LVOT d 2.10 cm    LVOT Peak Velocity 180.00 cm/s    LVOT Peak Gradient 13.00 mmHg    LVPWd 1.85 (A) 0.60 - 1.00 cm    LV E' Septal Velocity 5.26 cm/s    LV ED Vol A2C 165.00 cm3    LV ES Vol A2C 45.90 cm3    E/E' septal 22.24     Left Atrium Major Axis 5.70 cm    Mitral Valve Max Velocity 157.00 cm/s    MV Peak Gradient 10.00 mmHg    Mitral Valve E Wave Deceleration Time 235.00 ms    MV A Alex 77.10 cm/s    MV E Alex 117.00 cm/s    MV Mean Gradient 3.00 mmHg    Mitral Valve Annulus Velocity Time Integral 38.80 cm    MV E/A 1.52     Pulmonic Regurgitant End Max Velocity 155.00 cm/s    Pulmonic Valve Systolic Peak Instantaneous Gradient 10.00 mmHg    Pulmonic Valve Max Velocity 179.00 cm/s    Pulmonic Valve Systolic Peak Instantaneous Gradient 13.00 mmHg    RVIDd 2.82 cm    Tricuspid Valve Max Velocity 351.00 cm/s    Triscuspid Valve Regurgitation Peak Gradient 49.00 mmHg    Tapse 2.50 (A) 1.50 - 2.00 cm    Right Atrial Area 4C 30.88 cm2    LA Area 4C 35.98 cm2    BP EF 63.2 55.0 - 100.0 %    LV Mass .0 88.0 - 224.0 g    LV Mass AL Index 265.9 49.0 - 115.0 g/m2    Left Atrium Minor Axis 2.63 cm    JAK/BSA Pk Alex 1.4 cm2/m2       Cardiographics    ECG: sinus with PVCs, nonspecific ST abnormality  Echocardiogram: pending

## 2021-07-20 NOTE — PROGRESS NOTES
Pulmonary, Critical Care    Name: Antonina Silverio MRN: [id number]   : 1955 Hospital: [address] [place] [address]   Date: 2021  Admission date: 2021 Hospital Day: 9       Subjective/Interval History:   Seen in the emergency room wearing noninvasive ventilator. Patient has underlying renal insufficiency developed worsening shortness of breath cough presented to the emergency room chest x-ray shows pulmonary edema. He was profoundly hypoxic is now on noninvasive ventilator and feels more comfortable. Has not had any significant urine output since he has been in the ER. He also complains of new onset left arm swelling   post void bladder scan showed greater than 200 cc urine retention and Quesada catheter placed with 500 cc removed. Overnight he has put out an additional 1200 cc. Respirations improved currently nonlabored on nasal oxygen. Duplex scan of the left arm did show left axillary and proximal brachial DVT and heparin was started. On heparin with Quesada catheter and he has had slight bleeding at the urethral meatus. Ultrasound of the abdomen is pending  7/15 ultrasound of the abdomen showed medical renal disease on the right with small kidney no hydronephrosis there was evidence of prostate enlargement. He is breathing easily at this point and on room air is running on oxygen saturation of 93%   respirations nonlabored on nasal oxygen. Quesada catheter is out he states he has been voiding frequent small amounts without straining   no specific complaints breathing easily. Overnight oximetry showed minimal but significant desaturation while on room air 8 minutes 40 seconds with low of 71%  . Developed V. tach this morning given IV amiodarone IV magnesium and transferred to the unit. Currently heart rate  alternating between A. fib and sinus with PAC. He denies any discomfort is breathing easily   now in sinus rhythm with frequent PVCs.   Considering cardiac cath but he has refused this morning. Despite marked fluid administration yesterday (3448ml) creatinine has worsened and urine output is minimal  Patient Active Problem List   Diagnosis Code    GI bleed K92.2    Pulmonary edema J81.1       IMPRESSION:   1. Ventricular tachycardia none further seen but having frequent PVCs  2. Atrial fibrillation converted to sinus  3. Hypotension corrected with IV fluids and holding antihypertensive medications  4. Acute hypoxic respiratory failure tolerating room air but previous overnight oximetry did show desaturation  5. Chronic hypoxic respiratory failure overnight oximetry shows continued desaturation at night  6. Acute pulmonary edema radiographically unimproved  7. 2 of 4 blood culture bottles with coagulase-negative staph aureus likely skin contaminant   8. Bilateral pleural effusions  chest x-ray unchanged  9. Acute on chronic kidney disease has worsened further despite IV fluids  10. Obstructive uropathy Quesada catheter has been removed with no residual urine on bladder scanning  11. Severe hypertension corrected   12. DVT left axillary and brachial currently on IV heparin  13. Anemia last hemoglobin down mildly likely due to fluid administration  14. Troponin leak likely due to renal insufficiency stable has not risen any further  Body mass index is 28.55 kg/m². RECOMMENDATIONS/PLAN:     1. Back in sinus rhythm  2. Blood pressure maintained off antihypertensives  3. Continue Flomax for prostate enlargement   4. Continue nasal oxygen overnight oximetry shows continued desaturation at night         Subjective/Initial History:       I was asked by Stef Bob MD to see Elías Camarillo a 76 y.o.  male  in consultation for a chief complaint of shortness of breath pulmonary edema acute hypoxic respiratory failure        Patient PCP: Ophelia Monaco MD  PMH:  has a past medical history of Chronic kidney disease and Hypertension.   PSH: has no past surgical history on file. FHX: family history is not on file. SHX:  reports that he has never smoked. He has never used smokeless tobacco.    Systemic review somewhat limited as he is on noninvasive ventilator remains short of breath although states he is feeling better  General he has not noted any worsening edema of his upper legs fever chills or sweats does complain of new onset swelling of his left hand and arm  Eyes no double vision or momentary blindness  ENT no facial pain over the sinuses  Endocrinologic no polyuria polydipsia  Musculoskeletal no swollen tender joints  Neurologic no history seizures or syncope  Gastrointestinal no nausea vomiting acid indigestion  Genitourinary states he does still pass fair amount of urine each day has not noted any decrease in that.   Does not have to strain to urinate has no bleeding or drainage  Cardiovascular he is not aware of any underlying heart disease has not had chest pain diaphoresis has had worsening shortness of breath laying down and with exertion  Respiratory worsening shortness of breath over the last week or more no significant cough no sputum production no chills or sweats did have history COVID-19 pneumonitis January of this year    No Known Allergies   MEDS:   Current Facility-Administered Medications   Medication    amiodarone (CORDARONE) 375 mg in dextrose 5% 250 mL infusion    [Held by provider] furosemide (LASIX) tablet 80 mg    [Held by provider] amLODIPine (NORVASC) tablet 10 mg    tamsulosin (FLOMAX) capsule 0.4 mg    [Held by provider] hydrALAZINE (APRESOLINE) tablet 50 mg    sodium bicarbonate tablet 650 mg    0.9% sodium chloride infusion 250 mL    calcitRIOL (ROCALTROL) capsule 0.25 mcg    sevelamer carbonate (RENVELA) tab 800 mg    ergocalciferol capsule 50,000 Units    hydrALAZINE (APRESOLINE) 20 mg/mL injection 40 mg    heparin 25,000 units in D5W 250 ml infusion    heparin (porcine) 1,000 unit/mL injection 6,340 Units    Or    heparin (porcine) 1,000 unit/mL injection 3,170 Units        Current Facility-Administered Medications:     amiodarone (CORDARONE) 375 mg in dextrose 5% 250 mL infusion, 0.5-1 mg/min, IntraVENous, TITRATE, Dede Caldwell MD, IV Completed at 07/20/21 0810    [Held by provider] furosemide (LASIX) tablet 80 mg, 80 mg, Oral, BID, Juan Ha MD, 80 mg at 07/18/21 2054    [Held by provider] amLODIPine (NORVASC) tablet 10 mg, 10 mg, Oral, DAILY, Dede Caldwell MD, 10 mg at 07/18/21 0825    tamsulosin (FLOMAX) capsule 0.4 mg, 0.4 mg, Oral, DAILY, Marshal Preciado MD, 0.4 mg at 07/19/21 1000    [Held by provider] hydrALAZINE (APRESOLINE) tablet 50 mg, 50 mg, Oral, TID, Marshal Preciado MD, 50 mg at 07/18/21 2054    sodium bicarbonate tablet 650 mg, 650 mg, Oral, TID, Marshal Preciado MD, 650 mg at 07/19/21 2234    0.9% sodium chloride infusion 250 mL, 250 mL, IntraVENous, PRN, Marshal Preciado MD    calcitRIOL (ROCALTROL) capsule 0.25 mcg, 0.25 mcg, Oral, DAILY, Gala Florentino MD, 0.25 mcg at 07/19/21 1000    sevelamer carbonate (RENVELA) tab 800 mg, 800 mg, Oral, TID WITH MEALS, Gala Florentino MD, 800 mg at 07/19/21 1808    ergocalciferol capsule 50,000 Units, 50,000 Units, Oral, Q7D, Gala Florentino MD, 50,000 Units at 07/14/21 1841    hydrALAZINE (APRESOLINE) 20 mg/mL injection 40 mg, 40 mg, IntraVENous, Q6H PRN, Marshal Preciado MD, 40 mg at 07/15/21 1552    heparin 25,000 units in D5W 250 ml infusion, 18-36 Units/kg/hr (Adjusted), IntraVENous, TITRATE, Dede Caldwell MD, Last Rate: 9.5 mL/hr at 07/20/21 0810, 12 Units/kg/hr at 07/20/21 0810    heparin (porcine) 1,000 unit/mL injection 6,340 Units, 80 Units/kg (Adjusted), IntraVENous, PRN **OR** heparin (porcine) 1,000 unit/mL injection 3,170 Units, 40 Units/kg (Adjusted), IntraVENous, PRN, Dede Caldwell MD, 3,170 Units at 07/18/21 1440      Objective:     Vital Signs: Telemetry:    normal sinus rhythm Intake/Output:   Visit Vitals  /82 (BP 1 Location: Right upper arm, BP Patient Position: At rest)   Pulse 74   Temp 97.4 °F (36.3 °C)   Resp 16   Ht 6' (1.829 m)   Wt 95.5 kg (210 lb 8.6 oz)   SpO2 99%   BMI 28.55 kg/m²       Temp (24hrs), Av °F (36.7 °C), Min:97.3 °F (36.3 °C), Max:98.7 °F (37.1 °C)        O2 Device: None (Room air) O2 Flow Rate (L/min): 2 l/min         Body mass index is 28.55 kg/m². Wt Readings from Last 4 Encounters:   21 95.5 kg (210 lb 8.6 oz)   21 79.4 kg (175 lb)          Intake/Output Summary (Last 24 hours) at 2021 0827  Last data filed at 2021 0810  Gross per 24 hour   Intake 2990.66 ml   Output 400 ml   Net 2590.66 ml       Last shift:      701 -  1900  In: 42.3 [I.V.:42.3]  Out: -   Last 3 shifts: 1901 -  0700  In: 3448.3 [P.O.:1300; I.V.:2148.3]  Out: 400 [Urine:400]       Hemodynamics:    CO:    CI:    CVP:    SVR:   PAP Systolic:    PAP Diastolic:    PVR:    CA24:       Ventilator Settings:      Mode Rate TV Press PEEP FiO2 PIP Min. Vent               28 %     9.7 l/min      Physical Exam:    General:  male; no distress room air saturation 98%  HEENT: NCAT, visible oral mucosa is moist and clear  Eyes: anicteric; conjunctiva clear extraocular movements intact  Neck: no nodes,,no accessory MM use. no respiratory distress  Chest: no deformity,   Cardiac: Regular rhythm and rate now controlled  Lungs: distant breath sounds; clear anteriorly and laterally with rales in the bases  Abd: Soft positive bowel sounds no tenderness  Ext: Improvement in edema  of the lower extremities with continued edema of the left arm; no joint swelling;  No clubbing  : Clear urine  Neuro: Alert awake no distress speech is clear moves all 4 extremities  Psych-calm, oriented to person;   Skin: warm, dry, no cyanosis;   Pulses: Brachial and radial pulses intact  Capillary:slow capillary refill      Labs:    Recent Labs     21  0430 21  8061 07/19/21  0545 07/18/21  0352 07/18/21  0342   WBC 4.5  --   --   --  4.6   HGB 7.6*  --   --   --  8.0*     --   --   --  170   INR  --  1.1  --   --   --    APTT 74.9* 79.8* 71.0*   < >  --     < > = values in this interval not displayed. Recent Labs     07/20/21  0430 07/19/21  0726 07/18/21  0342    142 141   K 4.1 3.5 3.6    108 107   CO2 23 22 22   GLU 97 105* 91   BUN 80* 78* 72*   CREA 7.42* 6.95* 6.78*   CA 8.0* 9.1 8.3*   MG 2.8* 3.1*  --    PHOS 8.7* 8.4*  --    ALB 3.2* 3.1* 3.2*   ALT  --  19 14 7/19 overnight oximetry shows 14 minutes 26 seconds below 88% with low oxygen saturation 75%  7/17 overnight oximetry shows low oxygen saturation 71% with 8 minutes 40 seconds below 88% from 1 AM to 6 AM   7/16 overnight oximetry on 1 L shows only 2 minutes 20 seconds below 88% with a low oxygen saturation of 83%  7/15 room air oxygen saturation 93%  currently on noninvasive ventilator inspiratory pressure 16 expiratory pressure six rate 16 FiO2 28% with oxygen saturation 96%   BNP 32,264, previous greater than 35,000  Lab Results   Component Value Date/Time    Culture result:  07/12/2021 10:40 PM     Two of four bottles have been flagged positive by instrument. Bottles have been sent to [facility] laboratory to assess for possible growth.  Gram Positive Cocci in clusters CALLED TO AND READ BACK BY Aleja Cano r.n. [phone] 07/14/2021 by dpw    Culture result:  07/12/2021 10:40 PM     Staphylococcus species, coagulase negative GROWING IN THE 1ST OF 4 BOTTLES DRAWN    Culture result:  07/12/2021 10:40 PM     Staphylococcus species, coagulase negative (2nd colony type/strain) GROWING IN THE 2ND OF 4 BOTTLES DRAWN        Imaging:  I have personally reviewed the patients radiographs and have reviewed the reports:    CXR Results  (Last 48 hours)  7/16 chest x-ray with continued mild pulmonary edema and small pleural effusions unchanged               07/12/21 2251  XR CHEST PORT Final result Impression:  Findings/impression:       Cardiac silhouette is enlarged. Central vascular congestion and patchy central   airspace disease/edema. Suspect trace right pleural effusion. No evidence of pneumothorax. No acute osseous abnormality identified. Narrative:  Study: XR CHEST PORT       Clinical indication: sob       Comparison: None. To me chest x-ray consistent with cardiomegaly pulmonary edema and bilateral pleural effusions           Results from Hospital Encounter encounter on 07/12/21    XR CHEST PORT    Narrative  Chest single view. Comparison single view chest July 16, 2021. Advanced patchy central and basilar reticular markings through the lungs. Consider pneumonia. Cardiac and mediastinal structures magnified on this AP  view. No pneumothorax or sizable pleural effusion. XR CHEST PORT    Narrative  Exam: Portable upright chest radiograph    COMPARISON: One day prior. Impression  Findings/impression: Stable cardiomediastinal silhouette. Similar central  pulmonary vascular congestion and bilateral patchy airspace opacities. No  significant pleural effusion. No pneumothorax. Findings are not significantly changed. XR CHEST PORT    Narrative  1 view comparison to 12    Continued cardiomegaly and congestion. If There is mild interstitial edema, it  is unchanged. Right pleural effusion and adjacent atelectasis have improved. Results from [place] encounter on 01/05/21    CT CHEST WO CONT    Narrative  Images obtained without contrast include the abdomen and pelvis. Comparison portable chest radiograph earlier today. Dose Reduction:  All CT scans at this facility are performed using dose reduction optimization  techniques as appropriate to a performed exam including the following: Automated  exposure control, adjustments of the mA and/or kV according to patient size, or  use of iterative reconstruction technique.     Subtle peripheral groundglass densities are noted in both lungs, could reflect  Covid pneumonia or other. No pneumothorax or pneumomediastinum. The radiographic findings suspicious for  pneumomediastinum probably was artifact, perhaps related to cardiac motion. No pleural effusion or adenopathy. Cardiomegaly again noted. Impression  IMPRESSION:  1. No pneumomediastinum or pneumothorax. 2. Cardiomegaly. 3. Subtle groundglass densities in both lungs might be pneumonia or other. Discussion patient with worsening renal insufficiency has developed pulmonary edema acute hypoxic respiratory failure. He states he still has urine output normally at home. We will give him high-dose IV Lasix to see if diuresis is induced. He very likely will need dialysis. He has minimal elevation in troponin probably troponin leak from hypoxia or just due to his renal insufficiency. He is not having any chest pain. Does have severe hypertension. Has been started on clonidine and hydralazine. 7/14 no distress today on nasal oxygen. Did have residual on post void bladder scan and Quesada catheter was placed with good diuresis overnight. Despite this potassium remains elevated. We will give 1 dose of Kayexalate. Despite diuresis hemoglobin is dropped to 6.7 will transfuse. He denies frequency small-volume urine or straining to urinate at home despite this with signs of obstruction we will add Flomax. Potassium 6 today we will give 1 dose of Kayexalate and continue diuresis  7/16 respirations nonlabored. Was placed back on 1 L nasal oxygen yesterday overnight oximetry shows well-maintained oxygen saturation. Will check overnight tonight on and off oxygen. Quesada catheter has been removed. Chest x-ray continues to show mild pulmonary edema  7/17 overnight oximetry shows desaturation when on room air.   Will maintain oxygen and repeat overnight oximetry Sunday night  7/19 developed V. tach overnight and now in the ICU on IV amiodarone with rhythm showing alternating sinus with PAC and A. fib. Rate . Overnight oximetry continued to show desaturation on room air and he is back on nasal oxygen. He has no specific complaint  7/20 now in sinus rhythm with frequent PVCs. Cardiology is recommending cardiac cath but he is reluctant to undergo that. Creatinine has worsened despite IV fluid administration yesterday although blood pressure is improved. Currently oxygen saturation well-maintained on room air but last overnight oximetry showed desaturation will maintain oxygen at 1 L for now           Thank you for allowing us to participate in the care of this patient.   We will follow along with you     Jonathan Macias MD

## 2021-07-20 NOTE — PROGRESS NOTES
Comprehensive Nutrition Assessment    Type and Reason for Visit: RD nutrition re-screen/LOS    Nutrition Recommendations/Plan:   Continue current diet  Nursing to monitor BM, I/Os, %PO     Nutrition Assessment:  Admitted 2/2 Pulmonary edema. Current appetite reported as good. Per pt 95%PO Carbohydrate Control Restriction: 3 carb choices (45 gm/meal)Sodium Restriction: Low Sodium (2 gm)Fluid restriction: 1500 ml. Nutrition intervention: RD to add renal diet order, easy to chew/chopped meats. Labs: BUN 80. Creat 7.42. Ca 8. Phos 8.7. Alb 3.2. TP 6. HH 7.6/25.2. Meds reviewed. Malnutrition Assessment:  Malnutrition Status:  No malnutrition      Nutrition Related Findings:  No NFPE performed, appears nourished. Denies N/V/C/D. BM reports x3 days ago. Reports C/S issues and would benefit from easy to chew diet order. Edema: 2+ generalized, 3+ LUE, 2+ RUE, 2+ sacral, 3+ RLE/LLE. Wounds:     (left toe- dry eschar)       Current Nutrition Therapies:  ADULT DIET Easy to Chew; 3 carb choices (45 gm/meal); Low Sodium (2 gm); Low Potassium (Less than 3000 mg/day); Low Phosphorus (Less than 1000 mg); 1500 ml    Anthropometric Measures:  · Height:  6' (182.9 cm)  · Current Body Wt:  95.5 kg (210 lb 8.6 oz)   · Admission Body Wt:  180 lb    · Usual Body Wt:   (URI)     · Ideal Body Wt:  178 lbs:  118.3 %   · BMI Category: Overweight (BMI 25.0-29. 9)     Wt Readings from Last 3 Encounters:   07/20/21 95.5 kg (210 lb 8.6 oz)   01/05/21 79.4 kg (175 lb)   ·   Nutrition Interventions:   Food and/or Nutrient Delivery: Continue current diet  Nutrition Education and Counseling: No recommendations at this time  Coordination of Nutrition Care: No recommendation at this time    Nutrition Monitoring and Evaluation:   Behavioral-Environmental Outcomes: None identified  Food/Nutrient Intake Outcomes: Food and nutrient intake  Physical Signs/Symptoms Outcomes: Weight    Discharge Planning:    Continue current diet     Electronically signed by Savannah Cunningham RD on 7/20/2021 at 2:45 PM    Contact: Ext [phone]

## 2021-07-20 NOTE — PROGRESS NOTES
[facility]     Renal Daily Progress Note:     Admission Date: 2021     Subjective: feeling ok, no further V-tach, remains in ICU. BP low compared to baseline but is rising off meds. Increasing creatinine with scant urine output. Patient refused cardiac cath      Not short of breath. No cough. No chest or abdominal pain. Left great toe with blood blister but no pain. Apparently stubbed his toe last week he did some furniture.  Blood cultures show gram-positive cocci on Vanco.      Objective:     Visit Vitals  BP (!) 104/56 (BP 1 Location: Right upper arm)   Pulse 74   Temp 97.4 °F (36.3 °C)   Resp 14   Ht 6' (1.829 m)   Wt 95.5 kg (210 lb 8.6 oz)   SpO2 98%   BMI 28.55 kg/m²     Temp (24hrs), Av.8 °F (36.6 °C), Min:97.3 °F (36.3 °C), Max:98.5 °F (36.9 °C)        Intake/Output Summary (Last 24 hours) at 2021 1208  Last data filed at 2021 0810  Gross per 24 hour   Intake 2490.66 ml   Output 300 ml   Net 2190.66 ml     Current Facility-Administered Medications   Medication Dose Route Frequency    amiodarone (CORDARONE) 375 mg in dextrose 5% 250 mL infusion  0.5-1 mg/min IntraVENous TITRATE    [Held by provider] furosemide (LASIX) tablet 80 mg  80 mg Oral BID    [Held by provider] amLODIPine (NORVASC) tablet 10 mg  10 mg Oral DAILY    tamsulosin (FLOMAX) capsule 0.4 mg  0.4 mg Oral DAILY    [Held by provider] hydrALAZINE (APRESOLINE) tablet 50 mg  50 mg Oral TID    sodium bicarbonate tablet 650 mg  650 mg Oral TID    0.9% sodium chloride infusion 250 mL  250 mL IntraVENous PRN    calcitRIOL (ROCALTROL) capsule 0.25 mcg  0.25 mcg Oral DAILY    sevelamer carbonate (RENVELA) tab 800 mg  800 mg Oral TID WITH MEALS    ergocalciferol capsule 50,000 Units  50,000 Units Oral Q7D    hydrALAZINE (APRESOLINE) 20 mg/mL injection 40 mg  40 mg IntraVENous Q6H PRN    heparin 25,000 units in D5W 250 ml infusion  18-36 Units/kg/hr (Adjusted) IntraVENous TITRATE    heparin (porcine) 1,000 unit/mL injection 6,340 Units  80 Units/kg (Adjusted) IntraVENous PRN    Or    heparin (porcine) 1,000 unit/mL injection 3,170 Units  40 Units/kg (Adjusted) IntraVENous PRN       Physical Exam:    Seen in Room 470    General appearance: Chronically ill-appearing patient lying in bed comfortably. Head: Normocephalic    Lungs: clear to auscultation , no wheezes, no rales, poor air movement    Heart:  No S3 gallop , No pericardial rub. Abdomen: Not distended    Extremities:  Lower extremity edema has resolved    Neuro : Awake and responding appropriately. Data Review:     LABS:  Recent Labs     07/20/21  0430 07/19/21  0726 07/18/21  0342    142 141   K 4.1 3.5 3.6    108 107   CO2 23 22 22   BUN 80* 78* 72*   CREA 7.42* 6.95* 6.78*   CA 8.0* 9.1 8.3*   ALB 3.2* 3.1* 3.2*   PHOS 8.7* 8.4*  --    MG 2.8* 3.1*  --    Vitamin D2 11.1  Intact   PSA  5.2  Recent Labs     07/20/21  0430 07/18/21  0342   WBC 4.5 4.6   HGB 7.6* 8.0*   HCT 25.2* 26.7*    170   iron saturation 5%    No results for input(s): HÉCTOR, KU, CLU, CREAU in the last 72 hours. No lab exists for component: PROUBlood Cultures 7-12-21  Two of four bottles have been flagged positive by instrument.  Bottles have been sent to [address] [place] [address] laboratory to assess for possible growth. Gram Positive Cocci in clusters     Chest x-ray 7-19-21  Chest single view.     Comparison single view chest July 16, 2021.     Advanced patchy central and basilar reticular markings through the lungs. Consider pneumonia. Cardiac and mediastinal structures magnified on this AP  view. No pneumothorax or sizable pleural effusion. CXR July 14, 2021:  1 view comparison to 7/12     Continued cardiomegaly and congestion. If There is mild interstitial edema, it  is unchanged. Right pleural effusion and adjacent atelectasis have improved. Retroperitoneal US 7-14-21  Left kidney is 12.2 cm and right kidney is 9.1. Right renal cortex is echogenic  but not the left. Multiple cysts on each side, including a large cyst is 7 cm on  the left. No hydronephrosis. Aorta and IVC normals visualized. Prostatic  enlargement and diffuse bladder wall thickening. Moderate ascites     IMPRESSION  Medical renal disease on the right    Assessment:   Renal Specific Problems    CKD stage IV/V   Acute azotemia-exacerbated by bladder outlet obstruction and relative hypotension with poor perfusion  Hypertension - over-controlled  Volume overload- resolving  Hyperkalemia- corrected  Metabolic acidosis- on bicarb  Acute anemia with significant iron deficiency  Acute left axillary/brachial vein DVT  Vitamin D2 def  Secondary hyperparathyroid  Proteinuria - nephrotic range        Plan:     Obtain/ Order: labs/cultures/radiology/procedures:  renal panel, CBC in AM    Stool for occult blood  PLA2R Ab pending    Therapeutic:    Epogen  IV iron to rapidly replete stores --completed  Continue sodium bicarbonate.  The target bicarb level is 22/23  Calcitriol and ergocalciferol   PO4 binder--sevelamer  Flomax   Add metoprolol for rate control

## 2021-07-20 NOTE — PROGRESS NOTES
Tried to paged x 2 Dr. Damon Ferguson. Re: heparin drip and lab results. (pt for cardiac cath today)  No response

## 2021-07-20 NOTE — PROGRESS NOTES
Renal addendum    Patient may undergo cardiac cath in AM. In anticipation we will start 0.9NS at 50 cc/hr at midnight. Bladder scan this AM showed no significant bladder residual. Lasix, amlodipine on hold, minoxidil stopped.     Sarahi Waldron MD

## 2021-07-20 NOTE — PROGRESS NOTES
Progress Note    Neptali Ca MD             Daily Progress Note: 7/20/2021      Subjective: The patient is seen for follow  up. Patient is lying in bed. Comfortable. Denies any history of chest pain or increased shortness of breath. No history of nausea vomiting. Patient is of antihypertensives but blood pressure is under good control. Agree with nephrologist decision to start on metoprolol. Patient is a known cardiac catheterization at present time.   Problem List:  Problem List as of 7/20/2021 Never Reviewed        Codes Class Noted - Resolved    Pulmonary edema ICD-10-CM: J81.1  ICD-9-CM: 817  7/13/2021 - Present        GI bleed ICD-10-CM: K92.2  ICD-9-CM: 578.9  1/5/2021 - Present              Medications reviewed  Current Facility-Administered Medications   Medication Dose Route Frequency    metoprolol tartrate (LOPRESSOR) tablet 50 mg  50 mg Oral Q12H    amiodarone (CORDARONE) 375 mg in dextrose 5% 250 mL infusion  0.5-1 mg/min IntraVENous TITRATE    [Held by provider] furosemide (LASIX) tablet 80 mg  80 mg Oral BID    [Held by provider] amLODIPine (NORVASC) tablet 10 mg  10 mg Oral DAILY    tamsulosin (FLOMAX) capsule 0.4 mg  0.4 mg Oral DAILY    [Held by provider] hydrALAZINE (APRESOLINE) tablet 50 mg  50 mg Oral TID    sodium bicarbonate tablet 650 mg  650 mg Oral TID    0.9% sodium chloride infusion 250 mL  250 mL IntraVENous PRN    calcitRIOL (ROCALTROL) capsule 0.25 mcg  0.25 mcg Oral DAILY    sevelamer carbonate (RENVELA) tab 800 mg  800 mg Oral TID WITH MEALS    ergocalciferol capsule 50,000 Units  50,000 Units Oral Q7D    hydrALAZINE (APRESOLINE) 20 mg/mL injection 40 mg  40 mg IntraVENous Q6H PRN    heparin 25,000 units in D5W 250 ml infusion  18-36 Units/kg/hr (Adjusted) IntraVENous TITRATE    heparin (porcine) 1,000 unit/mL injection 6,340 Units  80 Units/kg (Adjusted) IntraVENous PRN    Or    heparin (porcine) 1,000 unit/mL injection 3,170 Units  40 Units/kg (Adjusted) IntraVENous PRN       Review of Systems:   A comprehensive review of systems was negative except for that written in the HPI. Objective:   Physical Exam:     Visit Vitals  BP (!) 104/56 (BP 1 Location: Right upper arm)   Pulse 74   Temp 97.4 °F (36.3 °C)   Resp 14   Ht 6' (1.829 m)   Wt 95.5 kg (210 lb 8.6 oz)   SpO2 98%   BMI 28.55 kg/m²    O2 Flow Rate (L/min): 2 l/min O2 Device: None (Room air)    Temp (24hrs), Av.8 °F (36.6 °C), Min:97.3 °F (36.3 °C), Max:98.5 °F (36.9 °C)    701 - 1900  In: 42.3 [I.V.:42.3]  Out: -    1901 -  0700  In: 3448.3 [P.O.:1300; I.V.:2148.3]  Out: 400 [Urine:400]  HEENT-normocephalic atraumatic skull pupils are bilaterally equal reactive to light sclera nonicteric conjunctive are pink no edema of the eyelids no yellow nasal discharge tongue is pink no ulcer positive gag reflex no pharyngeal inflammation extraocular movements are intact  General:  Alert, cooperative, no distress, appears stated age. Lungs:   Clear to auscultation bilaterally. Chest wall:  No tenderness or deformity. Heart:  Regular rate and rhythm, S1, S2 normal, no murmur, click, rub or gallop. Abdomen:   Soft, non-tender. Bowel sounds normal. No masses,  No organomegaly. Extremities: Extremities normal, atraumatic, no cyanosis or edema. Patient's edema has been improved tremendously has dried blister on the left great toe   Pulses: 2+ and symmetric all extremities. Skin: Skin color, texture, turgor normal. No rashes or lesions   Neurologic: CNII-XII intact.  No gross sensory or motor deficits     Data Review:       Recent Days:  Recent Labs     21  0430 21  0342   WBC 4.5 4.6   HGB 7.6* 8.0*   HCT 25.2* 26.7*    170     Recent Labs     21  0430 21  0726 21  0725 21  6732    142  --  141   K 4.1 3.5  --  3.6    108  --  107   CO2 23 22  --  22   GLU 97 105*  --  91   BUN 80* 78*  --  72*   CREA 7.42* 6.95*  -- 6.78*   CA 8.0* 9.1  --  8.3*   MG 2.8* 3.1*  --   --    PHOS 8.7* 8.4*  --   --    ALB 3.2* 3.1*  --  3.2*   TBILI  --  0.4  --  0.4   ALT  --  19  --  14   INR  --   --  1.1  --      No results for input(s): PH, PCO2, PO2, HCO3, FIO2 in the last 72 hours. Results     Procedure Component Value Units Date/Time    MRSA SCREEN - PCR (NASAL) [[id number]] Collected: 07/13/21 1730    Order Status: Completed Specimen: Swab Updated: 07/13/21 2123     MRSA by PCR, Nasal Not Detected       COVID-19 RAPID TEST [[id number]] Collected: 07/13/21 0539    Order Status: Completed Specimen: Nasopharyngeal Updated: 07/13/21 0655     Specimen source Nasopharyngeal        COVID-19 rapid test Not Detected        Comment: Rapid Abbott ID Now   Rapid NAAT:  The specimen is NEGATIVE for SARS-CoV-2, the novel coronavirus associated with COVID-19. Negative results should be treated as presumptive and, if inconsistent with clinical signs and symptoms or necessary for patient management, should be tested with an alternative molecular assay. Negative results do not preclude SARS-CoV-2 infection and should not be used as the sole basis for patient management decisions. This test has been authorized by the FDA under   an Emergency Use Authorization (EUA) for use by authorized laboratories. Fact sheet for Healthcare Providers: ConventionUpdate.co.nz Fact sheet for Patients: ConventionUpdate.co.nz   Methodology: Isothermal Nucleic Acid Amplification         CULTURE, BLOOD, PAIRED [[id number]] Collected: 07/12/21 2240    Order Status: Completed Specimen: Blood Updated: 07/16/21 1346     Special Requests: No Special Requests        Culture result:       Two of four bottles have been flagged positive by instrument. Bottles have been sent to [facility] laboratory to assess for possible growth.  Gram Positive Cocci in clusters CALLED TO AND READ BACK BY chiquita mtz r.n. 2712 07/14/2021 by hanane Staphylococcus species, coagulase negative GROWING IN THE 1ST OF 4 BOTTLES DRAWN                  Staphylococcus species, coagulase negative (2nd colony type/strain) GROWING IN THE 2ND OF 4 BOTTLES DRAWN               24 Hour Results:  Recent Results (from the past 24 hour(s))   ECHO ADULT COMPLETE    Collection Time: 07/19/21  4:41 PM   Result Value Ref Range    Aortic Regurgitant Pressure Half-time 763.00 ms    AR Max Alex 300.00 cm/s    Aortic Valve Systolic Peak Velocity 815.69 cm/s    AoV PG 17.00 mmHg    Aortic Valve Area by Continuity of Peak Velocity 3.05 cm2    Ao Root D 3.60 cm    IVSd 2.17 (A) 0.60 - 1.00 cm    LVIDd 5.48 4.20 - 5.90 cm    LVIDs 3.58 cm    LVOT d 2.10 cm    LVOT Peak Velocity 180.00 cm/s    LVOT Peak Gradient 13.00 mmHg    LVPWd 1.85 (A) 0.60 - 1.00 cm    LV E' Septal Velocity 5.26 cm/s    LV ED Vol A2C 165.00 cm3    LV ES Vol A2C 45.90 cm3    E/E' septal 22.24     Left Atrium Major Axis 5.70 cm    Mitral Valve Max Velocity 157.00 cm/s    MV Peak Gradient 10.00 mmHg    Mitral Valve E Wave Deceleration Time 235.00 ms    MV A Alex 77.10 cm/s    MV E Alex 117.00 cm/s    MV Mean Gradient 3.00 mmHg    Mitral Valve Annulus Velocity Time Integral 38.80 cm    MV E/A 1.52     Pulmonic Regurgitant End Max Velocity 155.00 cm/s    Pulmonic Valve Systolic Peak Instantaneous Gradient 10.00 mmHg    Pulmonic Valve Max Velocity 179.00 cm/s    Pulmonic Valve Systolic Peak Instantaneous Gradient 13.00 mmHg    RVIDd 2.82 cm    Tricuspid Valve Max Velocity 351.00 cm/s    Triscuspid Valve Regurgitation Peak Gradient 49.00 mmHg    Tapse 2.50 (A) 1.50 - 2.00 cm    Right Atrial Area 4C 30.88 cm2    LA Area 4C 35.98 cm2    BP EF 63.2 55.0 - 100.0 %    LV Mass .0 88.0 - 224.0 g    LV Mass AL Index 265.9 49.0 - 115.0 g/m2    Left Atrium Minor Axis 2.63 cm    JAK/BSA Pk Alex 1.4 cm2/m2   RENAL FUNCTION PANEL    Collection Time: 07/20/21  4:30 AM   Result Value Ref Range    Sodium 140 136 - 145 mmol/L    Potassium 4.1 3.5 - 5.1 mmol/L    Chloride 108 97 - 108 mmol/L    CO2 23 21 - 32 mmol/L    Anion gap 9 5 - 15 mmol/L    Glucose 97 65 - 100 mg/dL    BUN 80 (H) 6 - 20 mg/dL    Creatinine 7.42 (H) 0.70 - 1.30 mg/dL    BUN/Creatinine ratio 11 (L) 12 - 20      GFR est AA 9 (L) >60 ml/min/1.73m2    GFR est non-AA 7 (L) >60 ml/min/1.73m2    Calcium 8.0 (L) 8.5 - 10.1 mg/dL    Phosphorus 8.7 (H) 2.6 - 4.7 mg/dL    Albumin 3.2 (L) 3.5 - 5.0 g/dL   CBC WITH AUTOMATED DIFF    Collection Time: 07/20/21  4:30 AM   Result Value Ref Range    WBC 4.5 4.1 - 11.1 K/uL    RBC 2.81 (L) 4.10 - 5.70 M/uL    HGB 7.6 (L) 12.1 - 17.0 g/dL    HCT 25.2 (L) 36.6 - 50.3 %    MCV 89.7 80.0 - 99.0 FL    MCH 27.0 26.0 - 34.0 PG    MCHC 30.2 30.0 - 36.5 g/dL    RDW 18.5 (H) 11.5 - 14.5 %    PLATELET 376 931 - 974 K/uL    MPV 10.3 8.9 - 12.9 FL    NRBC 0.0 0.0  WBC    ABSOLUTE NRBC 0.00 0.00 - 0.01 K/uL    NEUTROPHILS 67 32 - 75 %    LYMPHOCYTES 9 (L) 12 - 49 %    MONOCYTES 15 (H) 5 - 13 %    EOSINOPHILS 7 0 - 7 %    BASOPHILS 1 0 - 1 %    IMMATURE GRANULOCYTES 1 (H) 0 - 0.5 %    ABS. NEUTROPHILS 3.0 1.8 - 8.0 K/UL    ABS. LYMPHOCYTES 0.4 (L) 0.8 - 3.5 K/UL    ABS. MONOCYTES 0.7 0.0 - 1.0 K/UL    ABS. EOSINOPHILS 0.3 0.0 - 0.4 K/UL    ABS. BASOPHILS 0.1 0.0 - 0.1 K/UL    ABS. IMM. GRANS. 0.0 0.00 - 0.04 K/UL    DF AUTOMATED     MAGNESIUM    Collection Time: 07/20/21  4:30 AM   Result Value Ref Range    Magnesium 2.8 (H) 1.6 - 2.4 mg/dL   TSH 3RD GENERATION    Collection Time: 07/20/21  4:30 AM   Result Value Ref Range    TSH 2.28 0.36 - 3.74 uIU/mL   PTT    Collection Time: 07/20/21  4:30 AM   Result Value Ref Range    aPTT 74.9 (H) 21.2 - 34.1 sec    aPTT, therapeutic range   82 - 109 sec       XR CHEST PORT   Final Result      LOWER EXT ART PVR MULT LEVEL SEG PRESSURES   Final Result      DUPLEX LOWER EXT VENOUS BILAT   Final Result      XR CHEST PORT   Final Result   Findings/impression: Stable cardiomediastinal silhouette.  Similar central pulmonary vascular congestion and bilateral patchy airspace opacities. No   significant pleural effusion. No pneumothorax. Findings are not significantly changed. XR CHEST PORT   Final Result      US RETROPERITONEUM COMP   Final Result   Medical renal disease on the right      DUPLEX UPPER EXT VENOUS LEFT   Final Result      XR CHEST PORT   Final Result   Findings/impression:      Cardiac silhouette is enlarged. Central vascular congestion and patchy central   airspace disease/edema. Suspect trace right pleural effusion. No evidence of pneumothorax. No acute osseous abnormality identified. XR CHEST PORT    (Results Pending)        Assessment: Patient has an episode of ventricular tachycardia with hypotension yesterday he has been revived back  Now he is in regular sinus rhythm  Anemia has improved  Acute on chronic renal failure  Hypertension at present not on any antihypertensives except metoprolol for rate control  Acute CHF now controlled well  Acute left upper limb DVT        Plan: I will transfer patient to 4 W. I would like to place patient on newer oral anticoagulants  Would like any input from hematologist.        Care Plan discussed with: Patient/Family    Total time spent with patient: 30 minutes.     Rose Carranza MD

## 2021-07-21 ENCOUNTER — APPOINTMENT (OUTPATIENT)
Dept: GENERAL RADIOLOGY | Age: 66
DRG: 291 | End: 2021-07-21
Attending: INTERNAL MEDICINE
Payer: MEDICARE

## 2021-07-21 LAB
ALBUMIN SERPL-MCNC: 3.1 G/DL (ref 3.5–5)
ANION GAP SERPL CALC-SCNC: 12 MMOL/L (ref 5–15)
APTT PPP: 47.8 SEC (ref 21.2–34.1)
APTT PPP: 56.1 SEC (ref 21.2–34.1)
APTT PPP: >153 SEC (ref 21.2–34.1)
BASOPHILS # BLD: 0.1 K/UL (ref 0–0.1)
BASOPHILS NFR BLD: 1 % (ref 0–1)
BUN SERPL-MCNC: 80 MG/DL (ref 6–20)
BUN/CREAT SERPL: 11 (ref 12–20)
CA-I BLD-MCNC: 8.1 MG/DL (ref 8.5–10.1)
CHLORIDE SERPL-SCNC: 105 MMOL/L (ref 97–108)
CO2 SERPL-SCNC: 22 MMOL/L (ref 21–32)
CREAT SERPL-MCNC: 7.45 MG/DL (ref 0.7–1.3)
DIFFERENTIAL METHOD BLD: ABNORMAL
EOSINOPHIL # BLD: 0.3 K/UL (ref 0–0.4)
EOSINOPHIL NFR BLD: 8 % (ref 0–7)
ERYTHROCYTE [DISTWIDTH] IN BLOOD BY AUTOMATED COUNT: 18.6 % (ref 11.5–14.5)
GLUCOSE SERPL-MCNC: 91 MG/DL (ref 65–100)
GLYCOPROTEIN IV AB, PLTGIV: NEGATIVE
HCT VFR BLD AUTO: 25.4 % (ref 36.6–50.3)
HGB BLD-MCNC: 7.4 G/DL (ref 12.1–17)
HLA AB SER QL IA: NEGATIVE
IMM GRANULOCYTES # BLD AUTO: 0 K/UL (ref 0–0.04)
IMM GRANULOCYTES NFR BLD AUTO: 0 % (ref 0–0.5)
LYMPHOCYTES # BLD: 0.4 K/UL (ref 0.8–3.5)
LYMPHOCYTES NFR BLD: 10 % (ref 12–49)
MCH RBC QN AUTO: 26.7 PG (ref 26–34)
MCHC RBC AUTO-ENTMCNC: 29.1 G/DL (ref 30–36.5)
MCV RBC AUTO: 91.7 FL (ref 80–99)
MONOCYTES # BLD: 0.6 K/UL (ref 0–1)
MONOCYTES NFR BLD: 15 % (ref 5–13)
NEUTS SEG # BLD: 2.6 K/UL (ref 1.8–8)
NEUTS SEG NFR BLD: 66 % (ref 32–75)
NRBC # BLD: 0 K/UL (ref 0–0.01)
NRBC BLD-RTO: 0 PER 100 WBC
PHOSPHATE SERPL-MCNC: 8.5 MG/DL (ref 2.6–4.7)
PLAT GP IA/IIA AB SER QL IA: NEGATIVE
PLAT GP IB/IX AB SER QL IA: NEGATIVE
PLAT GP IIB/IIIA AB SER QL IA: NEGATIVE
PLATELET # BLD AUTO: 161 K/UL (ref 150–400)
PMV BLD AUTO: 10.8 FL (ref 8.9–12.9)
POTASSIUM SERPL-SCNC: 4.2 MMOL/L (ref 3.5–5.1)
RBC # BLD AUTO: 2.77 M/UL (ref 4.1–5.7)
SODIUM SERPL-SCNC: 139 MMOL/L (ref 136–145)
THERAPEUTIC RANGE,PTTT: ABNORMAL SEC (ref 82–109)
WBC # BLD AUTO: 3.9 K/UL (ref 4.1–11.1)

## 2021-07-21 PROCEDURE — 85025 COMPLETE CBC W/AUTO DIFF WBC: CPT

## 2021-07-21 PROCEDURE — 80069 RENAL FUNCTION PANEL: CPT

## 2021-07-21 PROCEDURE — 71045 X-RAY EXAM CHEST 1 VIEW: CPT

## 2021-07-21 PROCEDURE — 65270000029 HC RM PRIVATE

## 2021-07-21 PROCEDURE — 36415 COLL VENOUS BLD VENIPUNCTURE: CPT

## 2021-07-21 PROCEDURE — 86901 BLOOD TYPING SEROLOGIC RH(D): CPT

## 2021-07-21 PROCEDURE — 36430 TRANSFUSION BLD/BLD COMPNT: CPT

## 2021-07-21 PROCEDURE — 82272 OCCULT BLD FECES 1-3 TESTS: CPT

## 2021-07-21 PROCEDURE — 74011250637 HC RX REV CODE- 250/637: Performed by: INTERNAL MEDICINE

## 2021-07-21 PROCEDURE — P9016 RBC LEUKOCYTES REDUCED: HCPCS

## 2021-07-21 PROCEDURE — 74011250636 HC RX REV CODE- 250/636: Performed by: INTERNAL MEDICINE

## 2021-07-21 PROCEDURE — 85730 THROMBOPLASTIN TIME PARTIAL: CPT

## 2021-07-21 RX ORDER — SODIUM CHLORIDE 9 MG/ML
250 INJECTION, SOLUTION INTRAVENOUS AS NEEDED
Status: DISCONTINUED | OUTPATIENT
Start: 2021-07-21 | End: 2021-08-05 | Stop reason: HOSPADM

## 2021-07-21 RX ORDER — METOPROLOL TARTRATE 25 MG/1
25 TABLET, FILM COATED ORAL DAILY
Status: DISCONTINUED | OUTPATIENT
Start: 2021-07-22 | End: 2021-08-05 | Stop reason: HOSPADM

## 2021-07-21 RX ADMIN — TAMSULOSIN HYDROCHLORIDE 0.4 MG: 0.4 CAPSULE ORAL at 08:37

## 2021-07-21 RX ADMIN — SODIUM BICARBONATE 650 MG: 650 TABLET ORAL at 20:43

## 2021-07-21 RX ADMIN — HEPARIN SODIUM 3170 UNITS: 1000 INJECTION INTRAVENOUS; SUBCUTANEOUS at 15:41

## 2021-07-21 RX ADMIN — HEPARIN SODIUM AND DEXTROSE 13 UNITS/KG/HR: 10000; 5 INJECTION INTRAVENOUS at 08:37

## 2021-07-21 RX ADMIN — HEPARIN SODIUM 5000 UNITS: 1000 INJECTION INTRAVENOUS; SUBCUTANEOUS at 07:06

## 2021-07-21 RX ADMIN — SODIUM BICARBONATE 650 MG: 650 TABLET ORAL at 08:37

## 2021-07-21 RX ADMIN — SODIUM BICARBONATE 650 MG: 650 TABLET ORAL at 16:47

## 2021-07-21 RX ADMIN — SEVELAMER CARBONATE 800 MG: 800 TABLET, FILM COATED ORAL at 11:50

## 2021-07-21 RX ADMIN — ERGOCALCIFEROL 50000 UNITS: 1.25 CAPSULE ORAL at 17:11

## 2021-07-21 RX ADMIN — SEVELAMER CARBONATE 800 MG: 800 TABLET, FILM COATED ORAL at 16:47

## 2021-07-21 RX ADMIN — SEVELAMER CARBONATE 800 MG: 800 TABLET, FILM COATED ORAL at 08:37

## 2021-07-21 RX ADMIN — HEPARIN SODIUM AND DEXTROSE 15 UNITS/KG/HR: 10000; 5 INJECTION INTRAVENOUS at 15:43

## 2021-07-21 RX ADMIN — CALCITRIOL CAPSULES 0.25 MCG 0.25 MCG: 0.25 CAPSULE ORAL at 08:36

## 2021-07-21 NOTE — PROGRESS NOTES
Bedside and Verbal shift change report given to Yee Beth RN (oncoming nurse) by Kandi Abraham RN(offgoing nurse). Report included the following information SBAR, Kardex, Intake/Output, MAR, Recent Results and Dual Neuro Assessment, cardiac rhythm.

## 2021-07-21 NOTE — PROGRESS NOTES
Progress Note    Stef Bob MD             Daily Progress Note: 7/21/2021      Subjective: The patient is seen for follow  up. Patient is lying in bed comfortably. Would like him to sit in the chair also. Denies any history of chest pain or shortness of breath. Patient is in regular sinus rhythm as well as his blood pressure is under good control. Problem List:  Problem List as of 7/21/2021 Never Reviewed        Codes Class Noted - Resolved    Pulmonary edema ICD-10-CM: J81.1  ICD-9-CM: 717  7/13/2021 - Present        GI bleed ICD-10-CM: K92.2  ICD-9-CM: 578.9  1/5/2021 - Present              Medications reviewed  Current Facility-Administered Medications   Medication Dose Route Frequency    metoprolol tartrate (LOPRESSOR) tablet 50 mg  50 mg Oral Q12H    [Held by provider] furosemide (LASIX) tablet 80 mg  80 mg Oral BID    [Held by provider] amLODIPine (NORVASC) tablet 10 mg  10 mg Oral DAILY    tamsulosin (FLOMAX) capsule 0.4 mg  0.4 mg Oral DAILY    [Held by provider] hydrALAZINE (APRESOLINE) tablet 50 mg  50 mg Oral TID    sodium bicarbonate tablet 650 mg  650 mg Oral TID    0.9% sodium chloride infusion 250 mL  250 mL IntraVENous PRN    calcitRIOL (ROCALTROL) capsule 0.25 mcg  0.25 mcg Oral DAILY    sevelamer carbonate (RENVELA) tab 800 mg  800 mg Oral TID WITH MEALS    ergocalciferol capsule 50,000 Units  50,000 Units Oral Q7D    hydrALAZINE (APRESOLINE) 20 mg/mL injection 40 mg  40 mg IntraVENous Q6H PRN    heparin 25,000 units in D5W 250 ml infusion  18-36 Units/kg/hr (Adjusted) IntraVENous TITRATE    heparin (porcine) 1,000 unit/mL injection 6,340 Units  80 Units/kg (Adjusted) IntraVENous PRN    Or    heparin (porcine) 1,000 unit/mL injection 3,170 Units  40 Units/kg (Adjusted) IntraVENous PRN       Review of Systems:   A comprehensive review of systems was negative except for that written in the HPI.     Objective:   Physical Exam:     Visit Vitals  /75 (BP 1 Location: Right upper arm, BP Patient Position: At rest)   Pulse 61   Temp 97.6 °F (36.4 °C)   Resp 14   Ht 6' (1.829 m)   Wt 97.8 kg (215 lb 9.8 oz)   SpO2 98%   BMI 29.24 kg/m²    O2 Flow Rate (L/min): 2 l/min O2 Device: Nasal cannula    Temp (24hrs), Av.8 °F (36.6 °C), Min:97.3 °F (36.3 °C), Max:98.4 °F (36.9 °C)    701 - 1900  In: -   Out: 150 [Urine:150]   1901 - 700  In: 2568.9 [P.O.:1540; I.V.:1028.9]  Out: 1100 [Urine:1100]  HEENT-normocephalic atraumatic skull pupils are bilaterally equal reactive to light sclerae nonicteric conjunctive are pink blood about the eyelids no ear or nasal discharge tongue is pink no ulcer positive gag reflex no pharyngeal formation extraocular movements are intact  General:  Alert, cooperative, no distress, appears stated age. Lungs:   Clear to auscultation bilaterally. Chest wall:  No tenderness or deformity. Heart:  Regular rate and rhythm, S1, S2 normal, no murmur, click, rub or gallop. Abdomen:   Soft, non-tender. Bowel sounds normal. No masses,  No organomegaly. Extremities: Extremities normal, atraumatic, no cyanosis or edema. Pulses: 2+ and symmetric all extremities. Skin: Skin color, texture, turgor normal. No rashes or lesions   Neurologic: CNII-XII intact. No gross sensory or motor deficits     Data Review:       Recent Days:  Recent Labs     21  0400 21  0430   WBC 3.9* 4.5   HGB 7.4* 7.6*   HCT 25.4* 25.2*    165     Recent Labs     21  0400 21  0430 21  0726 21  0725    140 142  --    K 4.2 4.1 3.5  --     108 108  --    CO2 22 23 22  --    GLU 91 97 105*  --    BUN 80* 80* 78*  --    CREA 7.45* 7.42* 6.95*  --    CA 8.1* 8.0* 9.1  --    MG  --  2.8* 3.1*  --    PHOS 8.5* 8.7* 8.4*  --    ALB 3.1* 3.2* 3.1*  --    TBILI  --   --  0.4  --    ALT  --   --  19  --    INR  --   --   --  1.1     No results for input(s): PH, PCO2, PO2, HCO3, FIO2 in the last 72 hours.   Results Procedure Component Value Units Date/Time    MRSA SCREEN - PCR (NASAL) [[id number]] Collected: 07/13/21 1730    Order Status: Completed Specimen: Swab Updated: 07/13/21 2123     MRSA by PCR, Nasal Not Detected       COVID-19 RAPID TEST [[id number]] Collected: 07/13/21 0539    Order Status: Completed Specimen: Nasopharyngeal Updated: 07/13/21 0655     Specimen source Nasopharyngeal        COVID-19 rapid test Not Detected        Comment: Rapid Abbott ID Now   Rapid NAAT:  The specimen is NEGATIVE for SARS-CoV-2, the novel coronavirus associated with COVID-19. Negative results should be treated as presumptive and, if inconsistent with clinical signs and symptoms or necessary for patient management, should be tested with an alternative molecular assay. Negative results do not preclude SARS-CoV-2 infection and should not be used as the sole basis for patient management decisions. This test has been authorized by the FDA under   an Emergency Use Authorization (EUA) for use by authorized laboratories. Fact sheet for Healthcare Providers: ConventionUpdate.co.nz Fact sheet for Patients: ConventionUpdate.co.nz   Methodology: Isothermal Nucleic Acid Amplification         CULTURE, BLOOD, PAIRED [[id number]] Collected: 07/12/21 2240    Order Status: Completed Specimen: Blood Updated: 07/16/21 1346     Special Requests: No Special Requests        Culture result:       Two of four bottles have been flagged positive by instrument. Bottles have been sent to [facility] laboratory to assess for possible growth.  Gram Positive Cocci in clusters CALLED TO AND READ BACK BY chiquita mtz r.n. 1623 07/14/2021 by hanane                  Staphylococcus species, coagulase negative GROWING IN THE 1ST OF 4 BOTTLES DRAWN                  Staphylococcus species, coagulase negative (2nd colony type/strain) GROWING IN THE 2ND OF 4 BOTTLES DRAWN               24 Hour Results:  Recent Results (from the past 24 hour(s)) PTT    Collection Time: 07/20/21  4:30 PM   Result Value Ref Range    aPTT >153.0 (HH) 21.2 - 34.1 sec    aPTT, therapeutic range   82 - 109 sec   RENAL FUNCTION PANEL    Collection Time: 07/21/21  4:00 AM   Result Value Ref Range    Sodium 139 136 - 145 mmol/L    Potassium 4.2 3.5 - 5.1 mmol/L    Chloride 105 97 - 108 mmol/L    CO2 22 21 - 32 mmol/L    Anion gap 12 5 - 15 mmol/L    Glucose 91 65 - 100 mg/dL    BUN 80 (H) 6 - 20 mg/dL    Creatinine 7.45 (H) 0.70 - 1.30 mg/dL    BUN/Creatinine ratio 11 (L) 12 - 20      GFR est AA 9 (L) >60 ml/min/1.73m2    GFR est non-AA 7 (L) >60 ml/min/1.73m2    Calcium 8.1 (L) 8.5 - 10.1 mg/dL    Phosphorus 8.5 (H) 2.6 - 4.7 mg/dL    Albumin 3.1 (L) 3.5 - 5.0 g/dL   CBC WITH AUTOMATED DIFF    Collection Time: 07/21/21  4:00 AM   Result Value Ref Range    WBC 3.9 (L) 4.1 - 11.1 K/uL    RBC 2.77 (L) 4.10 - 5.70 M/uL    HGB 7.4 (L) 12.1 - 17.0 g/dL    HCT 25.4 (L) 36.6 - 50.3 %    MCV 91.7 80.0 - 99.0 FL    MCH 26.7 26.0 - 34.0 PG    MCHC 29.1 (L) 30.0 - 36.5 g/dL    RDW 18.6 (H) 11.5 - 14.5 %    PLATELET 927 484 - 253 K/uL    MPV 10.8 8.9 - 12.9 FL    NRBC 0.0 0.0  WBC    ABSOLUTE NRBC 0.00 0.00 - 0.01 K/uL    NEUTROPHILS 66 32 - 75 %    LYMPHOCYTES 10 (L) 12 - 49 %    MONOCYTES 15 (H) 5 - 13 %    EOSINOPHILS 8 (H) 0 - 7 %    BASOPHILS 1 0 - 1 %    IMMATURE GRANULOCYTES 0 0 - 0.5 %    ABS. NEUTROPHILS 2.6 1.8 - 8.0 K/UL    ABS. LYMPHOCYTES 0.4 (L) 0.8 - 3.5 K/UL    ABS. MONOCYTES 0.6 0.0 - 1.0 K/UL    ABS. EOSINOPHILS 0.3 0.0 - 0.4 K/UL    ABS. BASOPHILS 0.1 0.0 - 0.1 K/UL    ABS. IMM.  GRANS. 0.0 0.00 - 0.04 K/UL    DF AUTOMATED     PTT    Collection Time: 07/21/21  4:00 AM   Result Value Ref Range    aPTT 47.8 (H) 21.2 - 34.1 sec    aPTT, therapeutic range   82 - 109 sec       XR CHEST PORT   Final Result      XR CHEST PORT   Final Result      LOWER EXT ART PVR MULT LEVEL SEG PRESSURES   Final Result      DUPLEX LOWER EXT VENOUS BILAT   Final Result      XR CHEST PORT Final Result   Findings/impression: Stable cardiomediastinal silhouette. Similar central   pulmonary vascular congestion and bilateral patchy airspace opacities. No   significant pleural effusion. No pneumothorax. Findings are not significantly changed. XR CHEST PORT   Final Result      US RETROPERITONEUM COMP   Final Result   Medical renal disease on the right      DUPLEX UPPER EXT VENOUS LEFT   Final Result      XR CHEST PORT   Final Result   Findings/impression:      Cardiac silhouette is enlarged. Central vascular congestion and patchy central   airspace disease/edema. Suspect trace right pleural effusion. No evidence of pneumothorax. No acute osseous abnormality identified. Assessment: Acute left upper limb DVT  Status post ventricular tachycardia with hypotension  Paroxysmal atrial fibrillation  CHF  Acute on chronic renal failure  Hypertension  Anemia        Plan: Patient's hemoglobin has gradually gone down up to 7.6 will transfuse him 2 units of PRBCs. Requested RN to inform hematologist whether patient should be on oral anticoagulants or not. [facility]. Will transfuse 2 units of PRBCs. Care Plan discussed with: Patient himself and RN taking care of the patient    Total time spent with patient: 45 minutes.     Kumar Gustafson MD

## 2021-07-21 NOTE — PROGRESS NOTES
Primary Nurse Anita Gilliland, VENKATA and Sherren Mutters, RN performed a dual skin assessment on this patient Impairment noted- see wound doc flow sheet  Bilateral leg dry and scaly with pigmentation  Left big toe black but intact.  Generalized edema noted  Alfa score is 15

## 2021-07-21 NOTE — CONSULTS
Hematology/Oncology Consult    Patient: Billy Tanner MRN: [id number]     YOB: 1955  Age: 76 y.o. Sex: male      HPI      Billy Tanner is a 76 y.o. male who is being seen for LUE DVT. Mr. Laure Andre with stage IV/V CKD was admitted on 7/13/21 with worsening shortness of breath and LUE swelling. Venous doppler showed left axillary and brachial vein DVT. He was also noted to have pulmonary edema on admission. He also developed Vtach which needed cardioversion. He is being followed by cardiology, nephrology and pulmonology. He has needed multiple units of blood transfusion since admission. There is no report any external bleeding. Reportedly had a colonoscopy last year at [facility] which showed a polyp. The patient was seen in the ICU. H/o COVID pneumonitis in 1/2021. Past Medical History:   Diagnosis Date    Chronic kidney disease     Hypertension      No past surgical history on file. No family history on file.   Social History     Tobacco Use    Smoking status: Never Smoker    Smokeless tobacco: Never Used   Substance Use Topics    Alcohol use: Not on file      Current Facility-Administered Medications   Medication Dose Route Frequency Provider Last Rate Last Admin    0.9% sodium chloride infusion 250 mL  250 mL IntraVENous PRN Rizwana Min MD        0.9% sodium chloride infusion 250 mL  250 mL IntraVENous PRN Rizwana Min MD        [START ON 7/22/2021] metoprolol tartrate (LOPRESSOR) tablet 25 mg  25 mg Oral DAILY Roz Givens MD        [Held by provider] furosemide (LASIX) tablet 80 mg  80 mg Oral BID Eda Ha MD   80 mg at 07/18/21 2054    [Held by provider] amLODIPine (NORVASC) tablet 10 mg  10 mg Oral DAILY Rizwana Min MD   10 mg at 07/18/21 0825    tamsulosin (FLOMAX) capsule 0.4 mg  0.4 mg Oral DAILY Richa Zhang MD   0.4 mg at 07/21/21 8078    [Held by provider] hydrALAZINE (APRESOLINE) tablet 50 mg  50 mg Oral TID Yury Crowe MD SANTANA   50 mg at 07/18/21 2054    sodium bicarbonate tablet 650 mg  650 mg Oral TID See Rees MD   650 mg at 07/21/21 1647    0.9% sodium chloride infusion 250 mL  250 mL IntraVENous PRN See Rees MD        calcitRIOL (ROCALTROL) capsule 0.25 mcg  0.25 mcg Oral DAILY Vianca Graham MD   0.25 mcg at 07/21/21 0836    sevelamer carbonate (RENVELA) tab 800 mg  800 mg Oral TID WITH MEALS Vianca Graham MD   800 mg at 07/21/21 1647    ergocalciferol capsule 50,000 Units  50,000 Units Oral Q7D Vianca Graham MD   50,000 Units at 07/21/21 1711    hydrALAZINE (APRESOLINE) 20 mg/mL injection 40 mg  40 mg IntraVENous Q6H PRN See Rees MD   40 mg at 07/15/21 1552    heparin 25,000 units in D5W 250 ml infusion  18-36 Units/kg/hr (Adjusted) IntraVENous TITRATE Luisa Dallas MD 11.9 mL/hr at 07/21/21 1543 15 Units/kg/hr at 07/21/21 1543    heparin (porcine) 1,000 unit/mL injection 6,340 Units  80 Units/kg (Adjusted) IntraVENous PRN Luisa Dallas MD   5,000 Units at 07/20/21 0954    Or    heparin (porcine) 1,000 unit/mL injection 3,170 Units  40 Units/kg (Adjusted) IntraVENous PRN Luisa Dallas MD   3,170 Units at 07/21/21 1541        No Known Allergies    Review of Systems:  Constitutional No fevers, chills, night sweats, excessive fatigue or weight loss. Allergic/Immunologic No recent allergic reactions   Eyes No significant visual difficulties. No diplopia. ENMT No problems with hearing, no sore throat, no sinus drainage. Endocrine No hot flashes or night sweats. No cold intolerance, polyuria, or polydipsia   Hematologic/Lymphatic No easy bruising or bleeding. The patient denies any tender or palpable lymph nodes   Breasts No abnormal masses of breast, nipple discharge or pain. Respiratory No dyspnea on exertion, orthopnea, chest pain, cough or hemoptysis. Cardiovascular No anginal chest pain, irregular heart beat, tachycardia, palpitations or orthopnea.    Gastrointestinal No nausea, vomiting, diarrhea, constipation, cramping, dysphagia, reflux, heartburn, GI bleeding, or early satiety. No change in bowel habits. Genitourinary (M) No hematuria, dysuria, increased frequency, urgency, hesitancy or incontinence. Musculoskeletal No joint pain, swelling or redness. No decreased range of motion. Integumentary No chronic rashes, inflammation, ulcerations, pruritus, petechiae, purpura, ecchymoses, or skin changes. Neurologic No headache, blurred vision, and no areas of focal weakness or numbness. Normal gait. No sensory problems. Psychiatric No insomnia, depression, jaime or mood swings. No psychotropic drugs. Objective:     Vitals:    07/21/21 1703 07/21/21 1716 07/21/21 1755 07/21/21 1827   BP: 117/85 112/77 121/80 115/77   Pulse: 67 65 67 68   Resp: 21 14 18 17   Temp: 97.6 °F (36.4 °C)   97.5 °F (36.4 °C)   SpO2: 96% 96% 96% 100%   Weight:       Height:            Physical Exam:  Constitutional Alert, cooperative, oriented. Mood and affect appropriate. Appears close to chronological age. Well nourished. Well developed. Head Normocephalic; no scars   Eyes Conjunctivae and sclerae are clear and without icterus. Pupils are reactive and equal.   ENMT Sinuses are nontender. No oral exudates, ulcers, masses, thrush or mucositis. Oropharynx clear. Tongue normal.   Neck Supple without masses or thyromegaly. No jugular venous distension. Hematologic/Lymphatic No petechiae or purpura. No tender or palpable lymph nodes in the cervical, supraclavicular, axillary or inguinal area. Respiratory Lungs are clear to auscultation without rhonchi or wheezing. Cardiovascular Regular rate and rhythm of heart without murmurs, gallops or rubs. Chest / Line Site Chest is symmetric with no chest wall deformities. Abdomen Non-tender, non-distended, no masses, ascites or hepatosplenomegaly. Good bowel sounds. No guarding or rebound tenderness. No pulsatile masses.    Musculoskeletal LUE swelling +   Extremities No visible deformities, no cyanosis, clubbing or edema. Skin No rashes, scars, or lesions suggestive of malignancy. No petechiae, purpura, or ecchymoses. No excoriations. Neurologic No sensory or motor deficits, normal cerebellar function, normal gait, cranial nerves intact. Psychiatric Alert and oriented times three. Coherent speech. Verbalizes understanding of our discussions today. Lab/Data Review:  Recent Labs     07/21/21 0400 07/20/21  0430   WBC 3.9* 4.5   HGB 7.4* 7.6*   HCT 25.4* 25.2*    165     Recent Labs     07/21/21  0400 07/20/21  0430 07/19/21  0726 07/19/21  0725    140 142  --    K 4.2 4.1 3.5  --     108 108  --    CO2 22 23 22  --    GLU 91 97 105*  --    BUN 80* 80* 78*  --    CREA 7.45* 7.42* 6.95*  --    CA 8.1* 8.0* 9.1  --    MG  --  2.8* 3.1*  --    PHOS 8.5* 8.7* 8.4*  --    ALB 3.1* 3.2* 3.1*  --    TBILI  --   --  0.4  --    ALT  --   --  19  --    INR  --   --   --  1.1     No results for input(s): PH, PCO2, PO2, HCO3, FIO2 in the last 72 hours.   Recent Results (from the past 24 hour(s))   RENAL FUNCTION PANEL    Collection Time: 07/21/21  4:00 AM   Result Value Ref Range    Sodium 139 136 - 145 mmol/L    Potassium 4.2 3.5 - 5.1 mmol/L    Chloride 105 97 - 108 mmol/L    CO2 22 21 - 32 mmol/L    Anion gap 12 5 - 15 mmol/L    Glucose 91 65 - 100 mg/dL    BUN 80 (H) 6 - 20 mg/dL    Creatinine 7.45 (H) 0.70 - 1.30 mg/dL    BUN/Creatinine ratio 11 (L) 12 - 20      GFR est AA 9 (L) >60 ml/min/1.73m2    GFR est non-AA 7 (L) >60 ml/min/1.73m2    Calcium 8.1 (L) 8.5 - 10.1 mg/dL    Phosphorus 8.5 (H) 2.6 - 4.7 mg/dL    Albumin 3.1 (L) 3.5 - 5.0 g/dL   CBC WITH AUTOMATED DIFF    Collection Time: 07/21/21  4:00 AM   Result Value Ref Range    WBC 3.9 (L) 4.1 - 11.1 K/uL    RBC 2.77 (L) 4.10 - 5.70 M/uL    HGB 7.4 (L) 12.1 - 17.0 g/dL    HCT 25.4 (L) 36.6 - 50.3 %    MCV 91.7 80.0 - 99.0 FL    MCH 26.7 26.0 - 34.0 PG    MCHC 29.1 (L) 30.0 - 36.5 g/dL    RDW 18.6 (H) 11.5 - 14.5 %    PLATELET 123 714 - 562 K/uL    MPV 10.8 8.9 - 12.9 FL    NRBC 0.0 0.0  WBC    ABSOLUTE NRBC 0.00 0.00 - 0.01 K/uL    NEUTROPHILS 66 32 - 75 %    LYMPHOCYTES 10 (L) 12 - 49 %    MONOCYTES 15 (H) 5 - 13 %    EOSINOPHILS 8 (H) 0 - 7 %    BASOPHILS 1 0 - 1 %    IMMATURE GRANULOCYTES 0 0 - 0.5 %    ABS. NEUTROPHILS 2.6 1.8 - 8.0 K/UL    ABS. LYMPHOCYTES 0.4 (L) 0.8 - 3.5 K/UL    ABS. MONOCYTES 0.6 0.0 - 1.0 K/UL    ABS. EOSINOPHILS 0.3 0.0 - 0.4 K/UL    ABS. BASOPHILS 0.1 0.0 - 0.1 K/UL    ABS. IMM. GRANS. 0.0 0.00 - 0.04 K/UL    DF AUTOMATED     PTT    Collection Time: 07/21/21  4:00 AM   Result Value Ref Range    aPTT 47.8 (H) 21.2 - 34.1 sec    aPTT, therapeutic range   82 - 109 sec   TYPE & SCREEN    Collection Time: 07/21/21  1:55 PM   Result Value Ref Range    Crossmatch Expiration 07/24/2021,2359     ABO/Rh(D) O Positive     Antibody screen Negative     Unit number [id number]     Blood component type  LR,2     Unit division 00     Status of unit [address] to transfuse     Crossmatch result PENDING     Unit number [id number]     Blood component type  LR,1     Unit division 00     Status of unit Allocated     TRANSFUSION STATUS Ok to transfuse     Crossmatch result PENDING    PTT    Collection Time: 07/21/21  1:55 PM   Result Value Ref Range    aPTT >153.0 (HH) 21.2 - 34.1 sec    aPTT, therapeutic range   82 - 109 sec   PTT    Collection Time: 07/21/21  3:15 PM   Result Value Ref Range    aPTT 56.1 (H) 21.2 - 34.1 sec    aPTT, therapeutic range   82 - 109 sec        US RETROPERITONEUM COMP    Result Date: 7/14/2021  Medical renal disease on the right    XR CHEST PORT    Result Date: 7/16/2021  Findings/impression: Stable cardiomediastinal silhouette. Similar central pulmonary vascular congestion and bilateral patchy airspace opacities. No significant pleural effusion. No pneumothorax. Findings are not significantly changed.     XR CHEST PORT    Result Date: 7/12/2021  Findings/impression: Cardiac silhouette is enlarged. Central vascular congestion and patchy central airspace disease/edema. Suspect trace right pleural effusion. No evidence of pneumothorax. No acute osseous abnormality identified. Assessment and Plan:     Hospital Problems  Never Reviewed        Codes Class Noted POA    Pulmonary edema ICD-10-CM: J81.1  ICD-9-CM: 932  7/13/2021 Unknown              Left Upper Ext DVT:  - Venous doppler from 7/13 showed DVT of the left axillary and brachial veins.  - Currently on Heparin gtt. - Given ongoing drop in Hb needing blood transfusion, would recommend continuing Heparin drip for now. - Can transition to DOACs when Hb stabilizes. Anemia:  - Partly due to CKD. - Need to r/o GI bleed. - Check stool for occult blood. - Further work-up will be sent. - Patient reports last colonoscopy in 2020 at [facility].    CKD:  - Nephrology following. Thank you for the consult.     Signed By: Coty Yarbrough MD     July 21, 2021

## 2021-07-21 NOTE — PROGRESS NOTES
Pulmonary, Critical Care    Name: Billy Tanner MRN: [id number]   : 1955 Hospital: [facility]   Date: 2021  Admission date: 2021 Hospital Day: 10       Subjective/Interval History:   Seen in the emergency room wearing noninvasive ventilator. Patient has underlying renal insufficiency developed worsening shortness of breath cough presented to the emergency room chest x-ray shows pulmonary edema. He was profoundly hypoxic is now on noninvasive ventilator and feels more comfortable. Has not had any significant urine output since he has been in the ER. He also complains of new onset left arm swelling   post void bladder scan showed greater than 200 cc urine retention and Quesada catheter placed with 500 cc removed. Overnight he has put out an additional 1200 cc. Respirations improved currently nonlabored on nasal oxygen. Duplex scan of the left arm did show left axillary and proximal brachial DVT and heparin was started. On heparin with Quesada catheter and he has had slight bleeding at the urethral meatus. Ultrasound of the abdomen is pending  7/15 ultrasound of the abdomen showed medical renal disease on the right with small kidney no hydronephrosis there was evidence of prostate enlargement. He is breathing easily at this point and on room air is running on oxygen saturation of 93%   respirations nonlabored on nasal oxygen. Quesada catheter is out he states he has been voiding frequent small amounts without straining   no specific complaints breathing easily. Overnight oximetry showed minimal but significant desaturation while on room air 8 minutes 40 seconds with low of 71%  . Developed V. tach this morning given IV amiodarone IV magnesium and transferred to the unit. Currently heart rate  alternating between A. fib and sinus with PAC. He denies any discomfort is breathing easily   remains sinus rhythm with PVCs.   Had worsening hypoxia during the night and placed on oxygen he feels comfortable at this point. Urine output mildly improved yesterday but input remains greater than output  Patient Active Problem List   Diagnosis Code    GI bleed K92.2    Pulmonary edema J81.1       IMPRESSION:   1. Ventricular tachycardia none further seen but having frequent PVCs  2. Atrial fibrillation converted to sinus  3. Hypotension corrected with IV fluids and holding antihypertensive medications  4. Acute hypoxic respiratory failure back on oxygen 2 L  5. Chronic hypoxic respiratory failure last overnight oximetry shows continued desaturation at night  6. Acute pulmonary edema radiographically no change  7. 2 of 4 blood culture bottles with coagulase-negative staph aureus likely skin contaminant   8. Bilateral pleural effusions  chest x-ray unchanged  9. Acute on chronic kidney disease has worsened further despite IV fluids  10. Obstructive uropathy Quesada catheter has been removed with no residual urine on bladder scanning  11. Severe hypertension corrected   12. DVT left axillary and brachial currently on IV heparin  13. Anemia last hemoglobin down mildly likely due to fluid administration  14. Troponin leak likely due to renal insufficiency stable has not risen any further  Body mass index is 29.24 kg/m². RECOMMENDATIONS/PLAN:     1. Remains in sinus rhythm  2. Blood pressure has risen metoprolol was started but now has bradycardia  3. Continue Flomax for prostate enlargement   4. Continue nasal oxygen overnight oximetry shows continued desaturation at night         Subjective/Initial History:       I was asked by Rancho Thorpe MD to see Loreta Mcnulty a 76 y.o.  male  in consultation for a chief complaint of shortness of breath pulmonary edema acute hypoxic respiratory failure        Patient PCP: Luisa Dallas MD  PMH:  has a past medical history of Chronic kidney disease and Hypertension.   PSH:   has no past surgical history on file. FHX: family history is not on file. SHX:  reports that he has never smoked. He has never used smokeless tobacco.    Systemic review somewhat limited as he is on noninvasive ventilator remains short of breath although states he is feeling better  General he has not noted any worsening edema of his upper legs fever chills or sweats does complain of new onset swelling of his left hand and arm  Eyes no double vision or momentary blindness  ENT no facial pain over the sinuses  Endocrinologic no polyuria polydipsia  Musculoskeletal no swollen tender joints  Neurologic no history seizures or syncope  Gastrointestinal no nausea vomiting acid indigestion  Genitourinary states he does still pass fair amount of urine each day has not noted any decrease in that.   Does not have to strain to urinate has no bleeding or drainage  Cardiovascular he is not aware of any underlying heart disease has not had chest pain diaphoresis has had worsening shortness of breath laying down and with exertion  Respiratory worsening shortness of breath over the last week or more no significant cough no sputum production no chills or sweats did have history COVID-19 pneumonitis January of this year    No Known Allergies   MEDS:   Current Facility-Administered Medications   Medication    metoprolol tartrate (LOPRESSOR) tablet 50 mg    [Held by provider] furosemide (LASIX) tablet 80 mg    [Held by provider] amLODIPine (NORVASC) tablet 10 mg    tamsulosin (FLOMAX) capsule 0.4 mg    [Held by provider] hydrALAZINE (APRESOLINE) tablet 50 mg    sodium bicarbonate tablet 650 mg    0.9% sodium chloride infusion 250 mL    calcitRIOL (ROCALTROL) capsule 0.25 mcg    sevelamer carbonate (RENVELA) tab 800 mg    ergocalciferol capsule 50,000 Units    hydrALAZINE (APRESOLINE) 20 mg/mL injection 40 mg    heparin 25,000 units in D5W 250 ml infusion    heparin (porcine) 1,000 unit/mL injection 6,340 Units    Or    heparin (porcine) 1,000 unit/mL injection 3,170 Units        Current Facility-Administered Medications:     metoprolol tartrate (LOPRESSOR) tablet 50 mg, 50 mg, Oral, Q12H, Pk Araujo MD, 25 mg at 07/20/21 1626    [Held by provider] furosemide (LASIX) tablet 80 mg, 80 mg, Oral, BID, Juan Ha MD, 80 mg at 07/18/21 2054    [Held by provider] amLODIPine (NORVASC) tablet 10 mg, 10 mg, Oral, DAILY, Ophelia Monaco MD, 10 mg at 07/18/21 0825    tamsulosin (FLOMAX) capsule 0.4 mg, 0.4 mg, Oral, DAILY, Jeana Lovett MD, 0.4 mg at 07/20/21 3807    [Held by provider] hydrALAZINE (APRESOLINE) tablet 50 mg, 50 mg, Oral, TID, Jeana Lovett MD, 50 mg at 07/18/21 2054    sodium bicarbonate tablet 650 mg, 650 mg, Oral, TID, Jeana Lovett MD, 650 mg at 07/20/21 2142    0.9% sodium chloride infusion 250 mL, 250 mL, IntraVENous, PRN, Jeana Lovett MD    calcitRIOL (ROCALTROL) capsule 0.25 mcg, 0.25 mcg, Oral, DAILY, Pk Araujo MD, 0.25 mcg at 07/20/21 3861    sevelamer carbonate (RENVELA) tab 800 mg, 800 mg, Oral, TID WITH MEALS, Pk Araujo MD, 800 mg at 07/20/21 1626    ergocalciferol capsule 50,000 Units, 50,000 Units, Oral, Q7D, Pk Araujo MD, 50,000 Units at 07/14/21 1841    hydrALAZINE (APRESOLINE) 20 mg/mL injection 40 mg, 40 mg, IntraVENous, Q6H PRN, Jeana Lovett MD, 40 mg at 07/15/21 1552    heparin 25,000 units in D5W 250 ml infusion, 18-36 Units/kg/hr (Adjusted), IntraVENous, TITRATE, Ophelia Monaco MD, Last Rate: 10.3 mL/hr at 07/21/21 0703, 13 Units/kg/hr at 07/21/21 0703    heparin (porcine) 1,000 unit/mL injection 6,340 Units, 80 Units/kg (Adjusted), IntraVENous, PRN, 5,000 Units at 07/20/21 0954 **OR** heparin (porcine) 1,000 unit/mL injection 3,170 Units, 40 Units/kg (Adjusted), IntraVENous, PRN, Ophelia Monaco MD, 5,000 Units at 07/21/21 5272      Objective:     Vital Signs: Telemetry:    normal sinus rhythm Intake/Output:   Visit Vitals  /73 (BP 1 Location: Right upper arm, BP Patient Position: At rest)   Pulse 64   Temp 97.3 °F (36.3 °C)   Resp 14   Ht 6' (1.829 m)   Wt 97.8 kg (215 lb 9.8 oz)   SpO2 99%   BMI 29.24 kg/m²       Temp (24hrs), Av.8 °F (36.6 °C), Min:97.3 °F (36.3 °C), Max:98.4 °F (36.9 °C)        O2 Device: Nasal cannula O2 Flow Rate (L/min): 2 l/min         Body mass index is 29.24 kg/m². Wt Readings from Last 4 Encounters:   21 97.8 kg (215 lb 9.8 oz)   21 79.4 kg (175 lb)          Intake/Output Summary (Last 24 hours) at 2021 0840  Last data filed at 2021 4710  Gross per 24 hour   Intake 1472.55 ml   Output 800 ml   Net 672.55 ml       Last shift:      No intake/output data recorded. Last 3 shifts:  1901 -  0700  In: 2568.9 [P.O.:1540; I.V.:1028.9]  Out: 1100 [Urine:1100]       Hemodynamics:    CO:    CI:    CVP:    SVR:   PAP Systolic:    PAP Diastolic:    PVR:    OM67:       Ventilator Settings:      Mode Rate TV Press PEEP FiO2 PIP Min. Vent               28 %     9.7 l/min      Physical Exam:    General:  male; no distress now on 2 L nasal oxygen  HEENT: NCAT, visible oral mucosa is moist and clear  Eyes: anicteric; conjunctiva clear extraocular movements intact  Neck: no nodes,,no accessory MM use. no respiratory distress  Chest: no deformity,   Cardiac: Regular rhythm and rate now controlled  Lungs: distant breath sounds; clear anteriorly and laterally with rales in the bases  Abd: Soft positive bowel sounds no tenderness  Ext: Improvement in edema  of the lower extremities with continued edema of the left arm; no joint swelling;  No clubbing  : Clear urine  Neuro: Alert awake no distress speech is clear moves all 4 extremities  Psych-calm, oriented to person;   Skin: warm, dry, no cyanosis;   Pulses: Brachial and radial pulses intact  Capillary:slow capillary refill      Labs:    Recent Labs     21  0400 21  1630 21  0430 21  0725 21  0725   WBC 3.9*  --  4.5  --   -- HGB 7.4*  --  7.6*  --   --      --  165  --   --    INR  --   --   --   --  1.1   APTT 47.8* >153.0* 74.9*   < > 79.8*    < > = values in this interval not displayed. Recent Labs     07/21/21  0400 07/20/21  0430 07/19/21  0726    140 142   K 4.2 4.1 3.5    108 108   CO2 22 23 22   GLU 91 97 105*   BUN 80* 80* 78*   CREA 7.45* 7.42* 6.95*   CA 8.1* 8.0* 9.1   MG  --  2.8* 3.1*   PHOS 8.5* 8.7* 8.4*   ALB 3.1* 3.2* 3.1*   ALT  --   --  19 7/19 overnight oximetry shows 14 minutes 26 seconds below 88% with low oxygen saturation 75%  7/17 overnight oximetry shows low oxygen saturation 71% with 8 minutes 40 seconds below 88% from 1 AM to 6 AM   7/16 overnight oximetry on 1 L shows only 2 minutes 20 seconds below 88% with a low oxygen saturation of 83%  7/15 room air oxygen saturation 93%  currently on noninvasive ventilator inspiratory pressure 16 expiratory pressure six rate 16 FiO2 28% with oxygen saturation 96%   BNP 32,264, previous greater than 35,000  Lab Results   Component Value Date/Time    Culture result:  07/12/2021 10:40 PM     Two of four bottles have been flagged positive by instrument. Bottles have been sent to [facility] laboratory to assess for possible growth.  Gram Positive Cocci in clusters CALLED TO AND READ BACK BY Falguni Langley r.n. [phone] 07/14/2021 by dpw    Culture result:  07/12/2021 10:40 PM     Staphylococcus species, coagulase negative GROWING IN THE 1ST OF 4 BOTTLES DRAWN    Culture result:  07/12/2021 10:40 PM     Staphylococcus species, coagulase negative (2nd colony type/strain) GROWING IN THE 2ND OF 4 BOTTLES DRAWN        Imaging:  I have personally reviewed the patients radiographs and have reviewed the reports:    CXR Results  (Last 48 hours)  7/21 chest x-ray with continued mild pulmonary edema pattern small bilateral pleural effusions there may be improved inspiratory effort  7/16 chest x-ray with continued mild pulmonary edema and small pleural effusions unchanged               07/12/21 2251  XR CHEST PORT Final result    Impression:  Findings/impression:       Cardiac silhouette is enlarged. Central vascular congestion and patchy central   airspace disease/edema. Suspect trace right pleural effusion. No evidence of pneumothorax. No acute osseous abnormality identified. Narrative:  Study: XR CHEST PORT       Clinical indication: sob       Comparison: None. To me chest x-ray consistent with cardiomegaly pulmonary edema and bilateral pleural effusions           Results from Hospital Encounter encounter on 07/12/21    XR CHEST PORT    Narrative  Chest single view. Comparison single view chest July 19, 2021. Patchy reticular markings through the lungs, same distribution. Those through  lung bases appear less dense; suspect mild degree improved aeration. Cardiac and  mediastinal structures unchanged. No pneumothorax or sizable pleural effusion. XR CHEST PORT    Narrative  Chest single view. Comparison single view chest July 16, 2021. Advanced patchy central and basilar reticular markings through the lungs. Consider pneumonia. Cardiac and mediastinal structures magnified on this AP  view. No pneumothorax or sizable pleural effusion. XR CHEST PORT    Narrative  Exam: Portable upright chest radiograph    COMPARISON: One day prior. Impression  Findings/impression: Stable cardiomediastinal silhouette. Similar central  pulmonary vascular congestion and bilateral patchy airspace opacities. No  significant pleural effusion. No pneumothorax. Findings are not significantly changed. Results from [place] encounter on 01/05/21    CT CHEST WO CONT    Narrative  Images obtained without contrast include the abdomen and pelvis. Comparison portable chest radiograph earlier today.     Dose Reduction:  All CT scans at this facility are performed using dose reduction optimization  techniques as appropriate to a performed exam including the following: Automated  exposure control, adjustments of the mA and/or kV according to patient size, or  use of iterative reconstruction technique. Subtle peripheral groundglass densities are noted in both lungs, could reflect  Covid pneumonia or other. No pneumothorax or pneumomediastinum. The radiographic findings suspicious for  pneumomediastinum probably was artifact, perhaps related to cardiac motion. No pleural effusion or adenopathy. Cardiomegaly again noted. Impression  IMPRESSION:  1. No pneumomediastinum or pneumothorax. 2. Cardiomegaly. 3. Subtle groundglass densities in both lungs might be pneumonia or other. Discussion patient with worsening renal insufficiency has developed pulmonary edema acute hypoxic respiratory failure. He states he still has urine output normally at home. We will give him high-dose IV Lasix to see if diuresis is induced. He very likely will need dialysis. He has minimal elevation in troponin probably troponin leak from hypoxia or just due to his renal insufficiency. He is not having any chest pain. Does have severe hypertension. Has been started on clonidine and hydralazine. 7/14 no distress today on nasal oxygen. Did have residual on post void bladder scan and Quesada catheter was placed with good diuresis overnight. Despite this potassium remains elevated. We will give 1 dose of Kayexalate. Despite diuresis hemoglobin is dropped to 6.7 will transfuse. He denies frequency small-volume urine or straining to urinate at home despite this with signs of obstruction we will add Flomax. Potassium 6 today we will give 1 dose of Kayexalate and continue diuresis  7/16 respirations nonlabored. Was placed back on 1 L nasal oxygen yesterday overnight oximetry shows well-maintained oxygen saturation. Will check overnight tonight on and off oxygen. Quesada catheter has been removed.   Chest x-ray continues to show mild pulmonary edema  7/17 overnight oximetry shows desaturation when on room air. Will maintain oxygen and repeat overnight oximetry Ibrahima night  7/19 developed V. tach overnight and now in the ICU on IV amiodarone with rhythm showing alternating sinus with PAC and A. fib. Rate . Overnight oximetry continued to show desaturation on room air and he is back on nasal oxygen. He has no specific complaint  7/20 now in sinus rhythm with frequent PVCs. Cardiology is recommending cardiac cath but he is reluctant to undergo that. Creatinine has worsened despite IV fluid administration yesterday although blood pressure is improved. Currently oxygen saturation well-maintained on room air but last overnight oximetry showed desaturation will maintain oxygen at 1 L for now  7/21 had hypoxia yesterday afternoon and placed back on oxygen during the daytime as well as the night. He feels comfortable at this point. Chest x-ray is unchanged still has mild pulmonary edema with small effusions. It does appear that he took a deeper breath today. Creatinine remains markedly elevated. He is on heparin for left axillary DVT. Holding switching to oral anticoagulation until sure no instrumentation is needed           Thank you for allowing us to participate in the care of this patient.   We will follow along with you     Mili Gill MD

## 2021-07-21 NOTE — PROGRESS NOTES
[facility] KIDNEY     Renal Daily Progress Note:     Admission Date: 2021     Subjective: feeling ok, no further V-tach, remains in ICU. BP low compared to baseline but is rising off meds. Heart rate lower post starting metoprolol. Increasing creatinine has plateaued with an increase in urine output. Patient refused cardiac cath      Not short of breath. No cough. No chest or abdominal pain. Left great toe with blood blister but no pain.      Objective:     Visit Vitals  /85 (BP 1 Location: Left upper arm, BP Patient Position: At rest)   Pulse 67   Temp 97.6 °F (36.4 °C)   Resp 21   Ht 6' (1.829 m)   Wt 97.8 kg (215 lb 9.8 oz)   SpO2 96%   BMI 29.24 kg/m²     Temp (24hrs), Av.7 °F (36.5 °C), Min:97.3 °F (36.3 °C), Max:98.1 °F (36.7 °C)        Intake/Output Summary (Last 24 hours) at 2021 1715  Last data filed at 2021 1648  Gross per 24 hour   Intake 2192.55 ml   Output 1100 ml   Net 1092.55 ml     Current Facility-Administered Medications   Medication Dose Route Frequency    0.9% sodium chloride infusion 250 mL  250 mL IntraVENous PRN    0.9% sodium chloride infusion 250 mL  250 mL IntraVENous PRN    metoprolol tartrate (LOPRESSOR) tablet 50 mg  50 mg Oral Q12H    [Held by provider] furosemide (LASIX) tablet 80 mg  80 mg Oral BID    [Held by provider] amLODIPine (NORVASC) tablet 10 mg  10 mg Oral DAILY    tamsulosin (FLOMAX) capsule 0.4 mg  0.4 mg Oral DAILY    [Held by provider] hydrALAZINE (APRESOLINE) tablet 50 mg  50 mg Oral TID    sodium bicarbonate tablet 650 mg  650 mg Oral TID    0.9% sodium chloride infusion 250 mL  250 mL IntraVENous PRN    calcitRIOL (ROCALTROL) capsule 0.25 mcg  0.25 mcg Oral DAILY    sevelamer carbonate (RENVELA) tab 800 mg  800 mg Oral TID WITH MEALS    ergocalciferol capsule 50,000 Units  50,000 Units Oral Q7D    hydrALAZINE (APRESOLINE) 20 mg/mL injection 40 mg  40 mg IntraVENous Q6H PRN    heparin 25,000 units in D5W 250 ml infusion  18-36 Units/kg/hr (Adjusted) IntraVENous TITRATE    heparin (porcine) 1,000 unit/mL injection 6,340 Units  80 Units/kg (Adjusted) IntraVENous PRN    Or    heparin (porcine) 1,000 unit/mL injection 3,170 Units  40 Units/kg (Adjusted) IntraVENous PRN       Physical Exam:    Seen in Room 470    General appearance: Chronically ill-appearing patient lying in bed comfortably. Head: Normocephalic    Lungs: clear to auscultation , no wheezes, no rales, poor air movement    Heart:  No S3 gallop , No pericardial rub. Abdomen: Not distended    Extremities:  Lower extremity edema has resolved    Neuro : Awake and responding appropriately. Data Review:     LABS:  Recent Labs     07/21/21  0400 07/20/21  0430 07/19/21  0726    140 142   K 4.2 4.1 3.5    108 108   CO2 22 23 22   BUN 80* 80* 78*   CREA 7.45* 7.42* 6.95*   CA 8.1* 8.0* 9.1   ALB 3.1* 3.2* 3.1*   PHOS 8.5* 8.7* 8.4*   MG  --  2.8* 3.1*   Vitamin D2 11.1  Intact   PSA  5.2  Recent Labs     07/21/21  0400 07/20/21  0430   WBC 3.9* 4.5   HGB 7.4* 7.6*   HCT 25.4* 25.2*    165   iron saturation 5%    No results for input(s): HÉCTOR, KU, CLU, CREAU in the last 72 hours. No lab exists for component: PROUBlood Cultures 7-12-21  Two of four bottles have been flagged positive by instrument.  Bottles have been sent to [facility] laboratory to assess for possible growth. Gram Positive Cocci in clusters     Chest x-ray 7-19-21  Chest single view.     Comparison single view chest July 16, 2021.     Advanced patchy central and basilar reticular markings through the lungs. Consider pneumonia. Cardiac and mediastinal structures magnified on this AP  view. No pneumothorax or sizable pleural effusion. CXR July 14, 2021:  1 view comparison to 7/12     Continued cardiomegaly and congestion. If There is mild interstitial edema, it  is unchanged. Right pleural effusion and adjacent atelectasis have improved.     Retroperitoneal US 7-14-21  Left kidney is 12.2 cm and right kidney is 9.1. Right renal cortex is echogenic  but not the left. Multiple cysts on each side, including a large cyst is 7 cm on  the left. No hydronephrosis. Aorta and IVC normals visualized. Prostatic  enlargement and diffuse bladder wall thickening. Moderate ascites     IMPRESSION  Medical renal disease on the right    Assessment:   Renal Specific Problems    CKD stage IV/V   Acute azotemia-exacerbated by bladder outlet obstruction and relative hypotension with poor perfusion  Hypertension - over-controlled  Volume overload- resolving  Hyperkalemia- corrected  Metabolic acidosis- on bicarb  Acute anemia with significant iron deficiency  Acute left axillary/brachial vein DVT  Vitamin D2 def  Secondary hyperparathyroid  Proteinuria - nephrotic range        Plan:     Obtain/ Order: labs/cultures/radiology/procedures:  renal panel, CBC in AM    Stool for occult blood  PLA2R Ab pending    Therapeutic:    Epogen  IV iron to rapidly replete stores --completed  Continue sodium bicarbonate.  The target bicarb level is 22/23  Calcitriol and ergocalciferol   PO4 binder--sevelamer  Flomax   change metoprolol to daily, hold today----d/w ICU RN, Kenneth Sheth

## 2021-07-21 NOTE — PROGRESS NOTES
PTT WAS DRAWN EARLIER FROM SITE THAT HEPARIN WAS INFUSING; RESULTED GREATER THAN 153 (IV FLUSHED TWICE PRIOR TO DRAW)  NURSE RECOLLECTED IT FROM SALINE LOCK WHICH WAS STARTED APPROX 45 MIN AGO AND SENT TO LAB TO BE RESULTED STAT.

## 2021-07-22 LAB
ALBUMIN SERPL-MCNC: 3 G/DL (ref 3.5–5)
ANION GAP SERPL CALC-SCNC: 10 MMOL/L (ref 5–15)
APTT PPP: 57 SEC (ref 21.2–34.1)
APTT PPP: >153 SEC (ref 21.2–34.1)
BASOPHILS # BLD: 0 K/UL (ref 0–0.1)
BASOPHILS NFR BLD: 1 % (ref 0–1)
BUN SERPL-MCNC: 99 MG/DL (ref 6–20)
BUN/CREAT SERPL: 13 (ref 12–20)
CA-I BLD-MCNC: 7.8 MG/DL (ref 8.5–10.1)
CHLORIDE SERPL-SCNC: 106 MMOL/L (ref 97–108)
CO2 SERPL-SCNC: 23 MMOL/L (ref 21–32)
COLLECT DATE STL: NORMAL
CREAT SERPL-MCNC: 7.65 MG/DL (ref 0.7–1.3)
DIFFERENTIAL METHOD BLD: ABNORMAL
EOSINOPHIL # BLD: 0.3 K/UL (ref 0–0.4)
EOSINOPHIL NFR BLD: 7 % (ref 0–7)
ERYTHROCYTE [DISTWIDTH] IN BLOOD BY AUTOMATED COUNT: 17.7 % (ref 11.5–14.5)
FERRITIN SERPL-MCNC: 560 NG/ML (ref 26–388)
GLUCOSE SERPL-MCNC: 83 MG/DL (ref 65–100)
HCT VFR BLD AUTO: 27.7 % (ref 36.6–50.3)
HEMOCCULT SP1 STL QL: NEGATIVE
HGB BLD-MCNC: 8.5 G/DL (ref 12.1–17)
IMM GRANULOCYTES # BLD AUTO: 0 K/UL (ref 0–0.04)
IMM GRANULOCYTES NFR BLD AUTO: 1 % (ref 0–0.5)
LDH SERPL L TO P-CCNC: 244 U/L (ref 85–241)
LYMPHOCYTES # BLD: 0.4 K/UL (ref 0.8–3.5)
LYMPHOCYTES NFR BLD: 8 % (ref 12–49)
MCH RBC QN AUTO: 28.2 PG (ref 26–34)
MCHC RBC AUTO-ENTMCNC: 30.7 G/DL (ref 30–36.5)
MCV RBC AUTO: 92 FL (ref 80–99)
MONOCYTES # BLD: 0.6 K/UL (ref 0–1)
MONOCYTES NFR BLD: 14 % (ref 5–13)
NEUTS SEG # BLD: 3.1 K/UL (ref 1.8–8)
NEUTS SEG NFR BLD: 69 % (ref 32–75)
NRBC # BLD: 0.02 K/UL (ref 0–0.01)
NRBC BLD-RTO: 0.5 PER 100 WBC
PHOSPHATE SERPL-MCNC: 8.6 MG/DL (ref 2.6–4.7)
PLATELET # BLD AUTO: 155 K/UL (ref 150–400)
PMV BLD AUTO: 10.4 FL (ref 8.9–12.9)
POTASSIUM SERPL-SCNC: 4.4 MMOL/L (ref 3.5–5.1)
RBC # BLD AUTO: 3.01 M/UL (ref 4.1–5.7)
SODIUM SERPL-SCNC: 139 MMOL/L (ref 136–145)
THERAPEUTIC RANGE,PTTT: ABNORMAL SEC (ref 82–109)
THERAPEUTIC RANGE,PTTT: ABNORMAL SEC (ref 82–109)
WBC # BLD AUTO: 4.4 K/UL (ref 4.1–11.1)

## 2021-07-22 PROCEDURE — 36415 COLL VENOUS BLD VENIPUNCTURE: CPT

## 2021-07-22 PROCEDURE — 77010033678 HC OXYGEN DAILY

## 2021-07-22 PROCEDURE — 65270000029 HC RM PRIVATE

## 2021-07-22 PROCEDURE — 82784 ASSAY IGA/IGD/IGG/IGM EACH: CPT

## 2021-07-22 PROCEDURE — 97165 OT EVAL LOW COMPLEX 30 MIN: CPT

## 2021-07-22 PROCEDURE — 97530 THERAPEUTIC ACTIVITIES: CPT

## 2021-07-22 PROCEDURE — 74011250637 HC RX REV CODE- 250/637: Performed by: INTERNAL MEDICINE

## 2021-07-22 PROCEDURE — 85730 THROMBOPLASTIN TIME PARTIAL: CPT

## 2021-07-22 PROCEDURE — 82728 ASSAY OF FERRITIN: CPT

## 2021-07-22 PROCEDURE — 74011250636 HC RX REV CODE- 250/636: Performed by: INTERNAL MEDICINE

## 2021-07-22 PROCEDURE — 80069 RENAL FUNCTION PANEL: CPT

## 2021-07-22 PROCEDURE — 97161 PT EVAL LOW COMPLEX 20 MIN: CPT

## 2021-07-22 PROCEDURE — 83615 LACTATE (LD) (LDH) ENZYME: CPT

## 2021-07-22 PROCEDURE — 85025 COMPLETE CBC W/AUTO DIFF WBC: CPT

## 2021-07-22 PROCEDURE — 83883 ASSAY NEPHELOMETRY NOT SPEC: CPT

## 2021-07-22 PROCEDURE — 84165 PROTEIN E-PHORESIS SERUM: CPT

## 2021-07-22 RX ORDER — FUROSEMIDE 40 MG/1
80 TABLET ORAL DAILY
Status: DISCONTINUED | OUTPATIENT
Start: 2021-07-23 | End: 2021-07-26

## 2021-07-22 RX ORDER — SEVELAMER CARBONATE 800 MG/1
1600 TABLET, FILM COATED ORAL
Status: DISCONTINUED | OUTPATIENT
Start: 2021-07-23 | End: 2021-08-05 | Stop reason: HOSPADM

## 2021-07-22 RX ADMIN — HEPARIN SODIUM AND DEXTROSE 12 UNITS/KG/HR: 10000; 5 INJECTION INTRAVENOUS at 08:22

## 2021-07-22 RX ADMIN — CALCITRIOL CAPSULES 0.25 MCG 0.25 MCG: 0.25 CAPSULE ORAL at 08:19

## 2021-07-22 RX ADMIN — HEPARIN SODIUM AND DEXTROSE 14 UNITS/KG/HR: 10000; 5 INJECTION INTRAVENOUS at 17:53

## 2021-07-22 RX ADMIN — SODIUM BICARBONATE 650 MG: 650 TABLET ORAL at 21:45

## 2021-07-22 RX ADMIN — SEVELAMER CARBONATE 800 MG: 800 TABLET, FILM COATED ORAL at 11:45

## 2021-07-22 RX ADMIN — SODIUM BICARBONATE 650 MG: 650 TABLET ORAL at 16:13

## 2021-07-22 RX ADMIN — SEVELAMER CARBONATE 800 MG: 800 TABLET, FILM COATED ORAL at 16:13

## 2021-07-22 RX ADMIN — SODIUM BICARBONATE 650 MG: 650 TABLET ORAL at 08:19

## 2021-07-22 RX ADMIN — METOPROLOL TARTRATE 25 MG: 25 TABLET, FILM COATED ORAL at 08:26

## 2021-07-22 RX ADMIN — TAMSULOSIN HYDROCHLORIDE 0.4 MG: 0.4 CAPSULE ORAL at 08:19

## 2021-07-22 RX ADMIN — SEVELAMER CARBONATE 800 MG: 800 TABLET, FILM COATED ORAL at 08:19

## 2021-07-22 NOTE — PROGRESS NOTES
Progress Note    Kami Pearson MD             Daily Progress Note: 7/22/2021      Subjective: The patient is seen for follow  up. Offers no complaints. Patient received 2 units of PRBC yesterday. Yesterday stool for occult blood is negative I will also order 2 more stool for occult blood test.  No chest pain or shortness of breath no history of cough fever chills no history of nausea vomiting or black stool.   Problem List:  Problem List as of 7/22/2021 Never Reviewed        Codes Class Noted - Resolved    Pulmonary edema ICD-10-CM: J81.1  ICD-9-CM: 026  7/13/2021 - Present        GI bleed ICD-10-CM: K92.2  ICD-9-CM: 578.9  1/5/2021 - Present              Medications reviewed  Current Facility-Administered Medications   Medication Dose Route Frequency    0.9% sodium chloride infusion 250 mL  250 mL IntraVENous PRN    0.9% sodium chloride infusion 250 mL  250 mL IntraVENous PRN    metoprolol tartrate (LOPRESSOR) tablet 25 mg  25 mg Oral DAILY    [Held by provider] furosemide (LASIX) tablet 80 mg  80 mg Oral BID    [Held by provider] amLODIPine (NORVASC) tablet 10 mg  10 mg Oral DAILY    tamsulosin (FLOMAX) capsule 0.4 mg  0.4 mg Oral DAILY    [Held by provider] hydrALAZINE (APRESOLINE) tablet 50 mg  50 mg Oral TID    sodium bicarbonate tablet 650 mg  650 mg Oral TID    0.9% sodium chloride infusion 250 mL  250 mL IntraVENous PRN    calcitRIOL (ROCALTROL) capsule 0.25 mcg  0.25 mcg Oral DAILY    sevelamer carbonate (RENVELA) tab 800 mg  800 mg Oral TID WITH MEALS    ergocalciferol capsule 50,000 Units  50,000 Units Oral Q7D    hydrALAZINE (APRESOLINE) 20 mg/mL injection 40 mg  40 mg IntraVENous Q6H PRN    heparin 25,000 units in D5W 250 ml infusion  18-36 Units/kg/hr (Adjusted) IntraVENous TITRATE    heparin (porcine) 1,000 unit/mL injection 6,340 Units  80 Units/kg (Adjusted) IntraVENous PRN    Or    heparin (porcine) 1,000 unit/mL injection 3,170 Units  40 Units/kg (Adjusted) IntraVENous PRN       Review of Systems:   A comprehensive review of systems was negative except for that written in the HPI. Objective:   Physical Exam:     Visit Vitals  /84 (BP 1 Location: Right upper arm, BP Patient Position: At rest)   Pulse 62   Temp 98 °F (36.7 °C)   Resp 13   Ht 6' (1.829 m)   Wt 97.8 kg (215 lb 9.8 oz)   SpO2 98%   BMI 29.24 kg/m²    O2 Flow Rate (L/min): 2 l/min O2 Device: Nasal cannula    Temp (24hrs), Av °F (36.7 °C), Min:97.5 °F (36.4 °C), Max:98.7 °F (37.1 °C)    No intake/output data recorded.  1901 -  0700  In: 2208.2 [P.O.:1160; I.V.:104.4]  Out: 800 [Urine:800]  HEENT-normocephalic atraumatic skull pupils are bilaterally equal reacting to light sclerae nonicteric conjunctiva pink no edema of the eyelids no yellow nasal discharge tongue is pink no ulcer positive gag reflex no pharyngeal inflammation extraocular movements are intact  General:  Alert, cooperative, no distress, appears stated age. Lungs:   Clear to auscultation bilaterally. Chest wall:  No tenderness or deformity. Heart:  Regular rate and rhythm, S1, S2 normal, no murmur, click, rub or gallop. Abdomen:   Soft, non-tender. Bowel sounds normal. No masses,  No organomegaly. Extremities: Extremities normal, atraumatic, no cyanosis or edema. Pulses: 2+ and symmetric all extremities. Skin: Skin color, texture, turgor normal. No rashes or lesions   Neurologic: CNII-XII intact.  No gross sensory or motor deficits     Data Review:       Recent Days:  Recent Labs     21  0422 21  0400 21  0430   WBC 4.4 3.9* 4.5   HGB 8.5* 7.4* 7.6*   HCT 27.7* 25.4* 25.2*    161 165     Recent Labs     21  0422 21  0400 21  0430    139 140   K 4.4 4.2 4.1    105 108   CO2 23 22 23   GLU 83 91 97   BUN 99* 80* 80*   CREA 7.65* 7.45* 7.42*   CA 7.8* 8.1* 8.0*   MG  --   --  2.8*   PHOS 8.6* 8.5* 8.7*   ALB 3.0* 3.1* 3.2*     No results for input(s): PH, PCO2, PO2, HCO3, FIO2 in the last 72 hours. Results     Procedure Component Value Units Date/Time    MRSA SCREEN - PCR (NASAL) [[id number]] Collected: 07/13/21 1730    Order Status: Completed Specimen: Swab Updated: 07/13/21 2123     MRSA by PCR, Nasal Not Detected       COVID-19 RAPID TEST [[id number]] Collected: 07/13/21 0539    Order Status: Completed Specimen: Nasopharyngeal Updated: 07/13/21 0655     Specimen source Nasopharyngeal        COVID-19 rapid test Not Detected        Comment: Rapid Abbott ID Now   Rapid NAAT:  The specimen is NEGATIVE for SARS-CoV-2, the novel coronavirus associated with COVID-19. Negative results should be treated as presumptive and, if inconsistent with clinical signs and symptoms or necessary for patient management, should be tested with an alternative molecular assay. Negative results do not preclude SARS-CoV-2 infection and should not be used as the sole basis for patient management decisions. This test has been authorized by the FDA under   an Emergency Use Authorization (EUA) for use by authorized laboratories. Fact sheet for Healthcare Providers: ConventionUpdate.co.nz Fact sheet for Patients: ConventionUpdate.co.nz   Methodology: Isothermal Nucleic Acid Amplification         CULTURE, BLOOD, PAIRED [[id number]] Collected: 07/12/21 2240    Order Status: Completed Specimen: Blood Updated: 07/16/21 1346     Special Requests: No Special Requests        Culture result:       Two of four bottles have been flagged positive by instrument. Bottles have been sent to [facility] laboratory to assess for possible growth.  Gram Positive Cocci in clusters CALLED TO AND READ BACK BY chiquita Bell 07/14/2021 by hanane                  Staphylococcus species, coagulase negative GROWING IN THE 1ST OF 4 BOTTLES DRAWN                  Staphylococcus species, coagulase negative (2nd colony type/strain) GROWING IN THE 2ND OF 4 BOTTLES DRAWN               24 Hour Results:  Recent Results (from the past 24 hour(s))   TYPE & SCREEN    Collection Time: 07/21/21  1:55 PM   Result Value Ref Range    Crossmatch Expiration 07/24/2021,2359     ABO/Rh(D) Orifiliberto Regis Positive     Antibody screen Negative     Unit number [id number]     Blood component type  LR,2     Unit division 00     Status of unit [address] to transfuse     Crossmatch result PENDING     Unit number [id number]     Blood component type RC LR,1     Unit division 00     Status of unit [address] to transfuse     Crossmatch result PENDING    PTT    Collection Time: 07/21/21  1:55 PM   Result Value Ref Range    aPTT >153.0 (HH) 21.2 - 34.1 sec    aPTT, therapeutic range   82 - 109 sec   PTT    Collection Time: 07/21/21  3:15 PM   Result Value Ref Range    aPTT 56.1 (H) 21.2 - 34.1 sec    aPTT, therapeutic range   82 - 109 sec   OCCULT BLOOD, STOOL    Collection Time: 07/21/21  5:15 PM   Result Value Ref Range    Occult Blood,day 1 Negative Negative      Day 1 date: 7,222,021     CBC WITH AUTOMATED DIFF    Collection Time: 07/22/21  4:22 AM   Result Value Ref Range    WBC 4.4 4.1 - 11.1 K/uL    RBC 3.01 (L) 4.10 - 5.70 M/uL    HGB 8.5 (L) 12.1 - 17.0 g/dL    HCT 27.7 (L) 36.6 - 50.3 %    MCV 92.0 80.0 - 99.0 FL    MCH 28.2 26.0 - 34.0 PG    MCHC 30.7 30.0 - 36.5 g/dL    RDW 17.7 (H) 11.5 - 14.5 %    PLATELET 397 578 - 903 K/uL    MPV 10.4 8.9 - 12.9 FL    NRBC 0.5 (H) 0.0  WBC    ABSOLUTE NRBC 0.02 (H) 0.00 - 0.01 K/uL    NEUTROPHILS 69 32 - 75 %    LYMPHOCYTES 8 (L) 12 - 49 %    MONOCYTES 14 (H) 5 - 13 %    EOSINOPHILS 7 0 - 7 %    BASOPHILS 1 0 - 1 %    IMMATURE GRANULOCYTES 1 (H) 0 - 0.5 %    ABS. NEUTROPHILS 3.1 1.8 - 8.0 K/UL    ABS. LYMPHOCYTES 0.4 (L) 0.8 - 3.5 K/UL    ABS. MONOCYTES 0.6 0.0 - 1.0 K/UL    ABS. EOSINOPHILS 0.3 0.0 - 0.4 K/UL    ABS. BASOPHILS 0.0 0.0 - 0.1 K/UL    ABS. IMM.  GRANS. 0.0 0.00 - 0.04 K/UL    DF AUTOMATED     RENAL FUNCTION PANEL Collection Time: 07/22/21  4:22 AM   Result Value Ref Range    Sodium 139 136 - 145 mmol/L    Potassium 4.4 3.5 - 5.1 mmol/L    Chloride 106 97 - 108 mmol/L    CO2 23 21 - 32 mmol/L    Anion gap 10 5 - 15 mmol/L    Glucose 83 65 - 100 mg/dL    BUN 99 (H) 6 - 20 mg/dL    Creatinine 7.65 (H) 0.70 - 1.30 mg/dL    BUN/Creatinine ratio 13 12 - 20      GFR est AA 9 (L) >60 ml/min/1.73m2    GFR est non-AA 7 (L) >60 ml/min/1.73m2    Calcium 7.8 (L) 8.5 - 10.1 mg/dL    Phosphorus 8.6 (H) 2.6 - 4.7 mg/dL    Albumin 3.0 (L) 3.5 - 5.0 g/dL   FERRITIN    Collection Time: 07/22/21  4:22 AM   Result Value Ref Range    Ferritin 560 (H) 26 - 388 ng/mL   LD    Collection Time: 07/22/21  4:22 AM   Result Value Ref Range     (H) 85 - 241 U/L   PTT    Collection Time: 07/22/21  4:22 AM   Result Value Ref Range    aPTT >153.0 (HH) 21.2 - 34.1 sec    aPTT, therapeutic range   82 - 109 sec       XR CHEST PORT   Final Result      XR CHEST PORT   Final Result      LOWER EXT ART PVR MULT LEVEL SEG PRESSURES   Final Result      DUPLEX LOWER EXT VENOUS BILAT   Final Result      XR CHEST PORT   Final Result   Findings/impression: Stable cardiomediastinal silhouette. Similar central   pulmonary vascular congestion and bilateral patchy airspace opacities. No   significant pleural effusion. No pneumothorax. Findings are not significantly changed. XR CHEST PORT   Final Result      US RETROPERITONEUM COMP   Final Result   Medical renal disease on the right      DUPLEX UPPER EXT VENOUS LEFT   Final Result      XR CHEST PORT   Final Result   Findings/impression:      Cardiac silhouette is enlarged. Central vascular congestion and patchy central   airspace disease/edema. Suspect trace right pleural effusion. No evidence of pneumothorax. No acute osseous abnormality identified.          XR CHEST PORT    (Results Pending)        Assessment: Acute left upper limb DVT  Acute CHF  Acute on chronic renal failure  Hypertension  Anemia        Plan: 1 sample of stool is negative for blood. Will await for 2 more results if it is negative will start patient on oral anticoagulants        Care Plan discussed with: Patient/Family as well as RN taking care of the patient    Total time spent with patient: 30 minutes.     Justin Gaxiola MD

## 2021-07-22 NOTE — PROGRESS NOTES
OCCUPATIONAL THERAPY EVALUATION  Patient: Ariana De La Garza (88 y.o. male)  Date: 7/22/2021  Primary Diagnosis: Pulmonary edema [J81.1]  Procedure(s) (LRB):  LEFT HEART CATH / CORONARY ANGIOGRAPHY (N/A)     Precautions: fall     ASSESSMENT  Pt is a 77 yo male admitted on 7/12/2021 for increased SOB x several days; currently being treated for decompensated CHF, left upper limb DVT, acute hypoxemic respiratory failure, acute chronic renal failure, and anemia. PMH: CKD and HTN. Pt A&O x 4. Per per pt report pt resides with sister in a 1 story home with 3 KATHY, bilateral handrails, pt was I for ADLS/IADLS, SPC with mobility prior to admission. DME: SPC. Pt drove prior to admission. Based on the objective data described below, the patient presents with decreased standing balance using RW for mobility, decreased generalized strength, new O2 use, generalized deconditioning, and increased need for A with self care and functional mobility/transfers. Pt semi-supine in bed upon OT/PT arrival, agreeable to evaluation. Pt required min A for bed mobility, min A with additional time supine <> sit, min to mod Ax2 sit <> stand transfers. Pt amb with gt belt, SPC initially to commode, RW from commode to chair, and min to mod Ax2. Patient total A LE dressing sitting EOB and once sitting in recliner setup A grooming to wash face and brush teeth. Pt did fair with session today with c/o SOB with mobility, stats % throughout session. Patient would benefit from continued skilled OT services to address above deficits and improve safety and independence with self care and functional mobility/transfers. Recommend discharge to IRF when medically appropriate. Current Level of Function Impacting Discharge (ADLs/self-care): setup A grooming, total A LE dressing.     Other factors to consider for discharge: time since onset, severity of deficits, PLOF        PLAN :  Recommendations and Planned Interventions: self care training, functional mobility training, therapeutic exercise, balance training, therapeutic activities, endurance activities, patient education and family training/education    Frequency/Duration: Patient will be followed by occupational therapy 5 times a week to address goals. Recommendation for discharge: (in order for the patient to meet his/her long term goals)  IRF    This discharge recommendation:  Has been made in collaboration with the attending provider and/or case management    IF patient discharges home will need the following DME: TBD       SUBJECTIVE:   Patient stated This is so much harder than normal.    OBJECTIVE DATA SUMMARY:   HISTORY:   Past Medical History:   Diagnosis Date    Chronic kidney disease     Hypertension    No past surgical history on file. Expanded or extensive additional review of patient history:     Home Situation  Home Environment: Private residence  # Steps to Enter: 3  Rails to Enter: Yes  Hand Rails : Bilateral  One/Two Story Residence: One story  Living Alone: No  Support Systems: Other (comments) (sister)  Current DME Used/Available at Home: Cane, straight    PLOF: Pt I for ADLS/IADLS, mod I with mobility prior to admission. Hand dominance: Right    EXAMINATION OF PERFORMANCE DEFICITS:  Cognitive/Behavioral Status:  Neurologic State: Alert  Orientation Level: Oriented X4  Cognition: Appropriate decision making; Appropriate safety awareness; Appropriate for age attention/concentration    Skin: open area on anterior left shin    Edema: LUE and LLE    Hearing:   Auditory  Auditory Impairment: None    Vision/Perceptual:    No deficits noted at this time    Range of Motion:  AROM: Generally decreased, functional    Strength:  Strength: Generally decreased, functional     RUE Strength  Observation: grossly observed to be 3+/5     LUE Strength  Observation: grossly observed to be 3+/5    Coordination:  Generally intact    Tone & Sensation:  Tone: Normal    Balance:  Sitting: Intact; Without support  Standing: Impaired; Without support  Standing - Static: Fair;Occasional  Standing - Dynamic : Poor;Constant support    Functional Mobility and Transfers for ADLs:  Bed Mobility:  Rolling: Minimum assistance  Supine to Sit: Minimum assistance  Scooting: Contact guard assistance    Transfers:  Sit to Stand: Moderate assistance;Assist x2  Stand to Sit: Moderate assistance;Assist x2  Bed to Chair: Moderate assistance;Assist x2  Toilet Transfer : Moderate assistance;Assist x2  Assistive Device : Gait Belt;Walker, rolling;Cane, straight    ADL Assessment:    Oral Facial Hygiene/Grooming: Setup    Lower Body Dressing: Total assistance    ADL Intervention and task modifications:    Grooming  Grooming Assistance: Set-up  Position Performed: Seated in chair  Washing Face: Set-up  Brushing Teeth: Set-up    Lower Body Dressing Assistance  Dressing Assistance: Total assistance(dependent)  Socks: Total assistance (dependent)  Position Performed: Seated edge of bed    Therapeutic Exercise:  Patient may benefit from UE HEP, initiate at next session as able. Functional Measure:    [address] [place] [id number] AM-PACTM \"6 Clicks\"                                                       Daily Activity Inpatient Short Form  How much help from another person does the patient currently need. .. Total; A Lot A Little None   1. Putting on and taking off regular lower body clothing? [x]  1 []  2 []  3 []  4   2. Bathing (including washing, rinsing, drying)? []  1 [x]  2 []  3 []  4   3. Toileting, which includes using toilet, bedpan or urinal? [] 1 []  2 [x]  3 []  4   4. Putting on and taking off regular upper body clothing? []  1 []  2 [x]  3 []  4   5. Taking care of personal grooming such as brushing teeth? []  1 []  2 [x]  3 []  4   6. Eating meals? []  1 []  2 []  3 [x]  4   © 2007, Trustees of [address] [place] [address] [address] [id number], under license to [employer].  All rights reserved     Score: 16/24     Interpretation of Tool: Represents clinically-significant functional categories (i.e. Activities of daily living). Percentage of Impairment CH    0%   CI    1-19% CJ    20-39% CK    40-59% CL    60-79% CM    80-99% CN     100%   [facility]  Score 6-24 24 23 20-22 15-19 10-14 7-9 6        Occupational Therapy Evaluation Charge Determination   History Examination Decision-Making   LOW Complexity : Brief history review  MEDIUM Complexity : 3-5 performance deficits relating to physical, cognitive , or psychosocial skils that result in activity limitations and / or participation restrictions LOW Complexity : No comorbidities that affect functional and no verbal or physical assistance needed to complete eval tasks       Based on the above components, the patient evaluation is determined to be of the following complexity level: LOW     Pain Ratin/10    Activity Tolerance:   Fair, SpO2 stable on RA, requires rest breaks and observed SOB with activity    After treatment patient left in no apparent distress:    Sitting in chair and Call bell within reach    COMMUNICATION/EDUCATION:   The patients plan of care was discussed with: Physical therapist and Registered nurse. Patient/family have participated as able in goal setting and plan of care. and Patient/family agree to work toward stated goals and plan of care. This patients plan of care is appropriate for delegation to [facility].     OT/PT sessions occurred together for increased patient and clinician safety as pt requires A of 2 for mobility at this time    Problem: Self Care Deficits Care Plan (Adult)  Goal: *Acute Goals and Plan of Care (Insert Text)  Description: Pt will be mod I sup <> sit in prep for EOB ADLs  Pt will be mod I grooming sitting EOB  Pt will be mod I LE dressing sitting EOB/long sit  Pt will be mod I sit <>  prep for toileting LRAD  Pt will be mod I toileting/toilet transfer/cloth mgmt LRAD  Pt will be I following UE HEP in prep for self care tasks   Outcome: Not Met     Thank you for this referral.  Brandan Soni, OTR/L  Time Calculation: 29 mins

## 2021-07-22 NOTE — PROGRESS NOTES
Progress Note      7/22/2021 9:54 AM  NAME: Mary Ann Perez   MRN:  [id number]   Admit Diagnosis: Pulmonary edema [J81.1]      Subjective:   Chart reviewed. Patient has had a symptomatic ventricular tachycardia and was shocked. Discussed with the nurse and with the nephrologist.  Vascular surgery note appreciated. Echocardiogram results explained. He feels much better today. Review of Systems:    Symptom Y/N Comments  Symptom Y/N Comments   Fever/Chills n   Chest Pain n    Poor Appetite    Edema y    Cough    Abdominal Pain     Sputum    Joint Pain n    SOB/CARMONA n   Pruritis/Rash     Nausea/vomit    Other     Diarrhea         Constipation           Could NOT obtain due to:      Objective:          Physical Exam:    Last 24hrs VS reviewed since prior progress note. Most recent are:    Visit Vitals  /84 (BP 1 Location: Right upper arm, BP Patient Position: At rest)   Pulse 62   Temp 98 °F (36.7 °C)   Resp 13   Ht 6' (1.829 m)   Wt 97.8 kg (215 lb 9.8 oz)   SpO2 98%   BMI 29.24 kg/m²       Intake/Output Summary (Last 24 hours) at 7/22/2021 1255  Last data filed at 7/22/2021 0300  Gross per 24 hour   Intake 1123.8 ml   Output 450 ml   Net 673.8 ml        General Appearance: Well developed, well nourished, alert & oriented x 3,    no acute distress. Ears/Nose/Mouth/Throat: Hearing grossly normal.  Neck: Supple. Chest: Lungs clear to auscultation bilaterally. Cardiovascular: Regular rate and rhythm, S1,S2 normal, no murmur. Abdomen: Soft, non-tender, bowel sounds are active. Extremities: Lower extremity edema left upper extremity edema evident  Skin: Lower extremity blisters evident    []         Post-cath site without hematoma, bruit, tenderness, or thrill. Distal pulses intact. PMH/SH reviewed - no change compared to H&P    Data Review    Telemetry: Patient had a ventricular tachycardia and was shocked and had episode of atrial fibrillation and now is in sinus rhythm with PVCs.     EKG: []  No new EKG for review    Lab Data Personally Reviewed:    Recent Labs     07/22/21  0422 07/21/21  0400   WBC 4.4 3.9*   HGB 8.5* 7.4*   HCT 27.7* 25.4*    161     Recent Labs     07/22/21  0422 07/21/21  1515 07/21/21  1355   APTT >153.0* 56.1* >153.0*      Recent Labs     07/22/21  0422 07/21/21  0400 07/20/21  0430    139 140   K 4.4 4.2 4.1    105 108   CO2 23 22 23   BUN 99* 80* 80*   CREA 7.65* 7.45* 7.42*   GLU 83 91 97   CA 7.8* 8.1* 8.0*   MG  --   --  2.8*     No results for input(s): CPK, CKNDX, TROIQ in the last 72 hours. No lab exists for component: CPKMB  No results found for: CHOL, CHOLX, CHLST, CHOLV, HDL, HDLP, LDL, LDLC, DLDLP, TGLX, TRIGL, TRIGP, CHHD, CHHDX    Recent Labs     07/22/21  0422 07/21/21  0400 07/20/21  0430   ALB 3.0* 3.1* 3.2*     No results for input(s): PH, PCO2, PO2 in the last 72 hours.     Medications Personally Reviewed:    Current Facility-Administered Medications   Medication Dose Route Frequency    0.9% sodium chloride infusion 250 mL  250 mL IntraVENous PRN    0.9% sodium chloride infusion 250 mL  250 mL IntraVENous PRN    metoprolol tartrate (LOPRESSOR) tablet 25 mg  25 mg Oral DAILY    [Held by provider] furosemide (LASIX) tablet 80 mg  80 mg Oral BID    [Held by provider] amLODIPine (NORVASC) tablet 10 mg  10 mg Oral DAILY    tamsulosin (FLOMAX) capsule 0.4 mg  0.4 mg Oral DAILY    [Held by provider] hydrALAZINE (APRESOLINE) tablet 50 mg  50 mg Oral TID    sodium bicarbonate tablet 650 mg  650 mg Oral TID    0.9% sodium chloride infusion 250 mL  250 mL IntraVENous PRN    calcitRIOL (ROCALTROL) capsule 0.25 mcg  0.25 mcg Oral DAILY    sevelamer carbonate (RENVELA) tab 800 mg  800 mg Oral TID WITH MEALS    ergocalciferol capsule 50,000 Units  50,000 Units Oral Q7D    hydrALAZINE (APRESOLINE) 20 mg/mL injection 40 mg  40 mg IntraVENous Q6H PRN    heparin 25,000 units in D5W 250 ml infusion  18-36 Units/kg/hr (Adjusted) IntraVENous TITRATE    heparin (porcine) 1,000 unit/mL injection 6,340 Units  80 Units/kg (Adjusted) IntraVENous PRN    Or    heparin (porcine) 1,000 unit/mL injection 3,170 Units  40 Units/kg (Adjusted) IntraVENous PRN           Problem List:   Acute hypoxic respiratory failure and patient seems to be somewhat better. Severe anemia. Renal failure. Heart failure. Left upper extremity DVT. Minimal elevation of troponin I is probably not a presentation of myocardial infarction. Patient has had ventricular tachycardia and has been shocked. Electrophysiology note appreciated. 1.      Assessment/Plan:   Patient was scheduled for cardiac catheterization but today he says he wants to change his mind and does not want to go for catheterization. Thank you. We will follow nephrology recommendations and vascular surgery recommendations. I will continue otherwise present care. Thank you. 1.          []       High complexity decision making was performed in this patient at high risk for decompensation with multiple organ involvement.     Solomon Guillen MD

## 2021-07-22 NOTE — PROGRESS NOTES
PHYSICAL THERAPY EVALUATION  Patient: Stacy Preston (83 y.o. male)  Date: 7/22/2021  Primary Diagnosis: Pulmonary edema [J81.1]  Procedure(s) (LRB):  LEFT HEART CATH / CORONARY ANGIOGRAPHY (N/A)     Precautions: falls       ASSESSMENT  Pt is a 77 yo male admitted on 7/12/2021 for increased SOB x several days; currently being treated for decompensated CHF, left upper limb DVT, acute hypoxemic respiratory failure, acute chronic renal failure, and anemia. PMH: CKD and HTN. Pt A&O x 4. Per per pt report pt resides with sister in a 1 story home with 3 KATHY, bilateral handrails, pt was I for ADLS/IADLS, SPC with mobility prior to admission. DME: SPC. Pt drove prior to admission. Based on the objective data described below, the patient presents with generalized weakness, impaired functional mobility, impaired amb, impaired balance, and decreased activity tolerance. Pt semi-supine in bed upon PT arrival, agreeable to evaluation. Pt required min A for bed mobility, min A with additional time supine <> sit, min to mod Ax2 sit <> stand transfers. Pt amb 8 feet with gt belt, SPC initially to commode, RW from commode to chair, and min to mod Ax2; demonstrating WBOS with short, shuffling, unsteady gt pattern with unsteadiness noted which did improve with use of RW when amb from commode to bedside recliner. Pt did fair with session today with c/o SOB with mobility, stats % throughout session. Pt will benefit from continued skilled PT to address above deficits and return to PLOF. Current PT DC recommendation IRF due to pt being I at baseline with complicated medical stay.      Current Level of Function Impacting Discharge (mobility/balance): min to mod A    Other factors to consider for discharge: severity of deficits, LOS      PLAN :  Recommendations and Planned Interventions: bed mobility training, transfer training, gait training, therapeutic exercises, neuromuscular re-education, patient and family training/education and therapeutic activities      Frequency/Duration: Patient will be followed by physical therapy:  3-5 times a week to address goals. Recommendation for discharge: (in order for the patient to meet his/her long term goals)  IRF    This discharge recommendation:  Has been made in collaboration with the attending provider and/or case management    IF patient discharges home will need the following DME: none         SUBJECTIVE:   Patient stated Lesia Belt that was hard.  after transferring EOB from semi-supine position. OBJECTIVE DATA SUMMARY:   HISTORY:    Past Medical History:   Diagnosis Date    Chronic kidney disease     Hypertension    No past surgical history on file. Home Situation  Home Environment: Private residence  # Steps to Enter: 3  Rails to Enter: Yes  Hand Rails : Bilateral  One/Two Story Residence: One story  Living Alone: No  Support Systems: Other (comments) (sister)  Current DME Used/Available at Home: Cane, straight    EXAMINATION/PRESENTATION/DECISION MAKING:   Critical Behavior:  Neurologic State: Alert  Orientation Level: Oriented X4  Cognition: Appropriate decision making, Appropriate safety awareness, Appropriate for age attention/concentration     Hearing: Auditory  Auditory Impairment: None  Skin:  Open area anterior left shin  Edema: left UE and LE  Range Of Motion:  AROM: Generally decreased, functional                       Strength:    Strength: Generally decreased, functional                    Tone & Sensation:   Tone: Normal                              Coordination:     Vision:      Functional Mobility:  Bed Mobility:  Rolling: Minimum assistance  Supine to Sit: Minimum assistance     Scooting: Contact guard assistance  Transfers:  Sit to Stand: Moderate assistance;Assist x2  Stand to Sit: Moderate assistance;Assist x2        Bed to Chair: Moderate assistance;Assist x2              Balance:   Sitting: Intact; Without support  Standing: Impaired; Without support  Standing - Static: Fair;Occasional  Standing - Dynamic : Poor;Constant support  Ambulation/Gait Training:  Distance (ft): 5 Feet (ft)  Assistive Device: Gait belt;Cane, straight;Walker, rolling  Ambulation - Level of Assistance: Minimal assistance; Moderate assistance;Assist x2     Gait Description (WDL): Exceptions to WDL          Therapeutic Exercises:   Not completed this session    Functional Measure:  [address] Short Form  How much difficulty does the patient currently have. .. Unable A Lot A Little None   1. Turning over in bed (including adjusting bedclothes, sheets and blankets)? [] 1   [] 2   [x] 3   [] 4   2. Sitting down on and standing up from a chair with arms ( e.g., wheelchair, bedside commode, etc.)   [] 1   [x] 2   [] 3   [] 4   3. Moving from lying on back to sitting on the side of the bed? [] 1   [] 2   [x] 3   [] 4          How much help from another person does the patient currently need. .. Total A Lot A Little None   4. Moving to and from a bed to a chair (including a wheelchair)? [] 1   [x] 2   [] 3   [] 4   5. Need to walk in hospital room? [] 1   [x] 2   [] 3   [] 4   6. Climbing 3-5 steps with a railing? [] 1   [x] 2   [] 3   [] 4   © 2007, Trustees of [address] [place] [id number], under license to [employer]. All rights reserved     Score:  Initial: 14/24 Most Recent: X (Date: 7/22/21 )   Interpretation of Tool:  Represents activities that are increasingly more difficult (i.e. Bed mobility, Transfers, Gait).   Score 24 23 22-20 19-15 14-10 9-7 6   Modifier CH CI CJ CK CL CM CN         Physical Therapy Evaluation Charge Determination   History Examination Presentation Decision-Making   HIGH Complexity :3+ comorbidities / personal factors will impact the outcome/ POC  HIGH Complexity : 4+ Standardized tests and measures addressing body structure, function, activity limitation and / or participation in recreation  LOW Complexity : Stable, uncomplicated  Other outcome measures [facility] 6  mod      Based on the above components, the patient evaluation is determined to be of the following complexity level: LOW     Pain Ratin/10 reported    Activity Tolerance:   Fair and observed SOB with activity    After treatment patient left in no apparent distress:   Sitting in chair and Call bell within reach and nsg updated. GOALS:    Problem: Mobility Impaired (Adult and Pediatric)  Goal: *Acute Goals and Plan of Care (Insert Text)  Description: Pt will be I with LE HEP in 7 days. Pt will perform bed mobility with mod I in 7 days. Pt will perform transfers with mod I in 7 days. Pt will amb 25-50 feet with LRAD safely with mod I in 7 days. Outcome: Not Met       COMMUNICATION/EDUCATION:   The patients plan of care was discussed with: Occupational therapist and Case management. Fall prevention education was provided and the patient/caregiver indicated understanding., Patient/family have participated as able in goal setting and plan of care. , and Patient/family agree to work toward stated goals and plan of care. PT/OT sessions occurred together for increased safety of pt and clinician.       Thank you for this referral.  Destinee Childress, PT, DPT   Time Calculation: 28 mins

## 2021-07-22 NOTE — PROGRESS NOTES
[facility]     Renal Daily Progress Note:     Admission Date: 2021     Subjective: feeling ok, no further V-tach, remains in ICU awaiting transfer to floor. BP low compared to baseline but is rising off meds. Heart rate> 60 bpm. Increasing creatinine but rate of rise diminished, expect it to plateau. Urine not accurately quantified. Patient refused cardiac cath      Not short of breath. No cough. No chest or abdominal pain. Left great toe with blood blister but no pain. Seen with PT earlier today, able to ambulate with walker.     Objective:     Visit Vitals  /86 (BP 1 Location: Right upper arm, BP Patient Position: Sitting)   Pulse 65   Temp 97.7 °F (36.5 °C)   Resp 17   Ht 6' (1.829 m)   Wt 97.8 kg (215 lb 9.8 oz)   SpO2 98%   BMI 29.24 kg/m²     Temp (24hrs), Av °F (36.7 °C), Min:97.5 °F (36.4 °C), Max:98.7 °F (37.1 °C)        Intake/Output Summary (Last 24 hours) at 2021 1759  Last data filed at 2021 0300  Gross per 24 hour   Intake 643.8 ml   Output 300 ml   Net 343.8 ml     Current Facility-Administered Medications   Medication Dose Route Frequency    0.9% sodium chloride infusion 250 mL  250 mL IntraVENous PRN    0.9% sodium chloride infusion 250 mL  250 mL IntraVENous PRN    metoprolol tartrate (LOPRESSOR) tablet 25 mg  25 mg Oral DAILY    [Held by provider] furosemide (LASIX) tablet 80 mg  80 mg Oral BID    [Held by provider] amLODIPine (NORVASC) tablet 10 mg  10 mg Oral DAILY    tamsulosin (FLOMAX) capsule 0.4 mg  0.4 mg Oral DAILY    [Held by provider] hydrALAZINE (APRESOLINE) tablet 50 mg  50 mg Oral TID    sodium bicarbonate tablet 650 mg  650 mg Oral TID    0.9% sodium chloride infusion 250 mL  250 mL IntraVENous PRN    calcitRIOL (ROCALTROL) capsule 0.25 mcg  0.25 mcg Oral DAILY    sevelamer carbonate (RENVELA) tab 800 mg  800 mg Oral TID WITH MEALS    ergocalciferol capsule 50,000 Units  50,000 Units Oral Q7D    hydrALAZINE (APRESOLINE) 20 mg/mL injection 40 mg  40 mg IntraVENous Q6H PRN    heparin 25,000 units in D5W 250 ml infusion  18-36 Units/kg/hr (Adjusted) IntraVENous TITRATE    heparin (porcine) 1,000 unit/mL injection 6,340 Units  80 Units/kg (Adjusted) IntraVENous PRN    Or    heparin (porcine) 1,000 unit/mL injection 3,170 Units  40 Units/kg (Adjusted) IntraVENous PRN       Physical Exam:    Seen in Room 470    General appearance: Chronically ill-appearing patient sitting up on side of bed- comfortable. Head: Normocephalic    Lungs: clear to auscultation , no wheezes, no rales, poor air movement    Heart:  No S3 gallop , No pericardial rub. Abdomen: Not distended    Extremities:  Lower extremity edema has resolved. There is residual edema in dependent thighs. Neuro : Awake and responding appropriately. Data Review:     LABS:  Recent Labs     07/22/21 0422 07/21/21  0400 07/20/21  0430    139 140   K 4.4 4.2 4.1    105 108   CO2 23 22 23   BUN 99* 80* 80*   CREA 7.65* 7.45* 7.42*   CA 7.8* 8.1* 8.0*   ALB 3.0* 3.1* 3.2*   PHOS 8.6* 8.5* 8.7*   MG  --   --  2.8*   Vitamin D2 11.1  Intact   PSA  5.2  Recent Labs     07/22/21 0422 07/21/21  0400 07/20/21  0430   WBC 4.4 3.9* 4.5   HGB 8.5* 7.4* 7.6*   HCT 27.7* 25.4* 25.2*    161 165   iron saturation 5%    No results for input(s): HÉCTOR, KU, CLU, CREAU in the last 72 hours. No lab exists for component: PROUBlood Cultures 7-12-21  Two of four bottles have been flagged positive by instrument.  Bottles have been sent to [facility] laboratory to assess for possible growth. Gram Positive Cocci in clusters     Chest x-ray 7-21-21  Chest single view.     Comparison single view chest July 19, 2021.     Patchy reticular markings through the lungs, same distribution. Those through  lung bases appear less dense; suspect mild degree improved aeration. Cardiac and  mediastinal structures unchanged. No pneumothorax or sizable pleural effusion.     CXR 7-19-21     Comparison single view chest July 16, 2021.     Advanced patchy central and basilar reticular markings through the lungs. Consider pneumonia. Cardiac and mediastinal structures magnified on this AP  view. No pneumothorax or sizable pleural effusion. CXR July 14, 2021:  1 view comparison to 7/12     Continued cardiomegaly and congestion. If There is mild interstitial edema, it  is unchanged. Right pleural effusion and adjacent atelectasis have improved. Retroperitoneal US 7-14-21  Left kidney is 12.2 cm and right kidney is 9.1. Right renal cortex is echogenic  but not the left. Multiple cysts on each side, including a large cyst is 7 cm on  the left. No hydronephrosis. Aorta and IVC normals visualized. Prostatic  enlargement and diffuse bladder wall thickening. Moderate ascites     IMPRESSION  Medical renal disease on the right    Assessment:   Renal Specific Problems    CKD stage IV/V   Acute azotemia-exacerbated by bladder outlet obstruction and relative hypotension with poor perfusion  Hypertension - over-controlled  Volume overload- resolving  Hyperkalemia- corrected  Metabolic acidosis- on bicarb  Acute anemia with significant iron deficiency  Acute left axillary/brachial vein DVT  Vitamin D2 def  Secondary hyperparathyroid  Proteinuria - nephrotic range        Plan:     Obtain/ Order: labs/cultures/radiology/procedures:  renal panel, CBC in AM      PLA2R Ab pending    Therapeutic:    Epogen  IV iron to rapidly replete stores --completed  Continue sodium bicarbonate.  The target bicarb level is 22--23  Calcitriol and ergocalciferol   PO4 binder--will increase sevelamer to 2 pc  Flomax   change metoprolol to daily  Restart lasix daily

## 2021-07-22 NOTE — PROGRESS NOTES
Hematology/Oncology   Progress Note    Patient: Kan Abad MRN: [id number]     YOB: 1955  Age: 76 y.o. Sex: male      Admit Date: 7/12/2021    LOS: 9 days     Chief Complaint: Admitted with shortness of breath    Subjective:     Patient was seen in ICU. Feels well. No bleeding reported. Constitutional No fevers, chills, night sweats, excessive fatigue or weight loss. Allergic/Immunologic No recent allergic reactions   Eyes No significant visual difficulties. No diplopia. ENMT No problems with hearing, no sore throat, no sinus drainage. Endocrine No hot flashes or night sweats. No cold intolerance, polyuria, or polydipsia   Hematologic/Lymphatic No easy bruising or bleeding. The patient denies any tender or palpable lymph nodes   Breasts No abnormal masses of breast, nipple discharge or pain. Respiratory No dyspnea on exertion, orthopnea, chest pain, cough or hemoptysis. Cardiovascular No anginal chest pain, irregular heart beat, tachycardia, palpitations or orthopnea. Gastrointestinal No nausea, vomiting, diarrhea, constipation, cramping, dysphagia, reflux, heartburn, GI bleeding, or early satiety. No change in bowel habits. Genitourinary (M) No hematuria, dysuria, increased frequency, urgency, hesitancy or incontinence. Musculoskeletal No joint pain, swelling or redness. No decreased range of motion. Integumentary No chronic rashes, inflammation, ulcerations, pruritus, petechiae, purpura, ecchymoses, or skin changes. Neurologic No headache, blurred vision, and no areas of focal weakness or numbness. Normal gait. No sensory problems. Psychiatric No insomnia, depression, jaime or mood swings. No psychotropic drugs.         Current Facility-Administered Medications:     0.9% sodium chloride infusion 250 mL, 250 mL, IntraVENous, PRN, Kimberlee Councilman, MD    0.9% sodium chloride infusion 250 mL, 250 mL, IntraVENous, PRN, Kimberlee Councilman, MD    metoprolol tartrate (LOPRESSOR) tablet 25 mg, 25 mg, Oral, DAILY, Roz Givens MD, 25 mg at 07/22/21 8911    [Held by provider] furosemide (LASIX) tablet 80 mg, 80 mg, Oral, BID, Juan Ha MD, 80 mg at 07/18/21 2054    [Held by provider] amLODIPine (NORVASC) tablet 10 mg, 10 mg, Oral, DAILY, Rizwana Min MD, 10 mg at 07/18/21 0825    tamsulosin (FLOMAX) capsule 0.4 mg, 0.4 mg, Oral, DAILY, Richa Zhang MD, 0.4 mg at 07/22/21 0979    [Held by provider] hydrALAZINE (APRESOLINE) tablet 50 mg, 50 mg, Oral, TID, Richa Zhang MD, 50 mg at 07/18/21 2054    sodium bicarbonate tablet 650 mg, 650 mg, Oral, TID, Richa Zhang MD, 650 mg at 07/22/21 1613    0.9% sodium chloride infusion 250 mL, 250 mL, IntraVENous, PRN, Richa Zhang MD    calcitRIOL (ROCALTROL) capsule 0.25 mcg, 0.25 mcg, Oral, DAILY, Roz Givens MD, 0.25 mcg at 07/22/21 2894    sevelamer carbonate (RENVELA) tab 800 mg, 800 mg, Oral, TID WITH MEALS, Roz Givens MD, 800 mg at 07/22/21 1613    ergocalciferol capsule 50,000 Units, 50,000 Units, Oral, Q7D, Roz Givens MD, 50,000 Units at 07/21/21 1711    hydrALAZINE (APRESOLINE) 20 mg/mL injection 40 mg, 40 mg, IntraVENous, Q6H PRN, Richa Zhang MD, 40 mg at 07/15/21 1552    heparin 25,000 units in D5W 250 ml infusion, 18-36 Units/kg/hr (Adjusted), IntraVENous, TITRATE, Rizwana Min MD, Last Rate: 9.5 mL/hr at 07/22/21 0822, 12 Units/kg/hr at 07/22/21 0822    heparin (porcine) 1,000 unit/mL injection 6,340 Units, 80 Units/kg (Adjusted), IntraVENous, PRN, 5,000 Units at 07/20/21 0954 **OR** heparin (porcine) 1,000 unit/mL injection 3,170 Units, 40 Units/kg (Adjusted), IntraVENous, PRN, Rizwana Min MD, 3,170 Units at 07/21/21 1541     Objective:     Vitals:    07/22/21 0700 07/22/21 0800 07/22/21 1100 07/22/21 1500   BP: (!) 142/85  120/84 125/86   Pulse: 66  62 65   Resp: 13   17   Temp: 98.7 °F (37.1 °C)  98 °F (36.7 °C)    SpO2: 98% 98%     Weight:       Height: Physical Exam:   Constitutional Alert, cooperative, oriented. Mood and affect appropriate. Appears close to chronological age. Well nourished. Well developed. Head Normocephalic; no scars   Eyes Conjunctivae and sclerae are clear and without icterus. Pupils are reactive and equal.   ENMT Sinuses are nontender. No oral exudates, ulcers, masses, thrush or mucositis. Oropharynx clear. Tongue normal.   Neck Supple without masses or thyromegaly. No jugular venous distension. Hematologic/Lymphatic No petechiae or purpura. No tender or palpable lymph nodes in the cervical, supraclavicular, axillary or inguinal area. Respiratory Lungs are clear to auscultation without rhonchi or wheezing. Cardiovascular Regular rate and rhythm of heart without murmurs, gallops or rubs. Chest / Line Site Chest is symmetric with no chest wall deformities. Abdomen Non-tender, non-distended, no masses, ascites or hepatosplenomegaly. Good bowel sounds. No guarding or rebound tenderness. No pulsatile masses. Musculoskeletal No tenderness or swelling, normal range of motion without obvious weakness. Extremities No visible deformities, no cyanosis, clubbing or edema. Skin No rashes, scars, or lesions suggestive of malignancy. No petechiae, purpura, or ecchymoses. No excoriations. Neurologic No sensory or motor deficits, normal cerebellar function, normal gait, cranial nerves intact. Psychiatric Alert and oriented times three. Coherent speech. Verbalizes understanding of our discussions today.        Lab/Data Review:  Recent Labs     07/22/21  0422 07/21/21  0400 07/20/21  0430   WBC 4.4 3.9* 4.5   HGB 8.5* 7.4* 7.6*   HCT 27.7* 25.4* 25.2*    161 165     Recent Labs     07/22/21  0422 07/21/21  0400 07/20/21  0430    139 140   K 4.4 4.2 4.1    105 108   CO2 23 22 23   GLU 83 91 97   BUN 99* 80* 80*   CREA 7.65* 7.45* 7.42*   CA 7.8* 8.1* 8.0*   MG  --   --  2.8*   PHOS 8.6* 8.5* 8.7*   ALB 3.0* 3. 1* 3.2*     No results for input(s): PH, PCO2, PO2, HCO3, FIO2 in the last 72 hours. Recent Results (from the past 24 hour(s))   OCCULT BLOOD, STOOL    Collection Time: 07/21/21  5:15 PM   Result Value Ref Range    Occult Blood,day 1 Negative Negative      Day 1 date: 7,222,021     CBC WITH AUTOMATED DIFF    Collection Time: 07/22/21  4:22 AM   Result Value Ref Range    WBC 4.4 4.1 - 11.1 K/uL    RBC 3.01 (L) 4.10 - 5.70 M/uL    HGB 8.5 (L) 12.1 - 17.0 g/dL    HCT 27.7 (L) 36.6 - 50.3 %    MCV 92.0 80.0 - 99.0 FL    MCH 28.2 26.0 - 34.0 PG    MCHC 30.7 30.0 - 36.5 g/dL    RDW 17.7 (H) 11.5 - 14.5 %    PLATELET 928 653 - 126 K/uL    MPV 10.4 8.9 - 12.9 FL    NRBC 0.5 (H) 0.0  WBC    ABSOLUTE NRBC 0.02 (H) 0.00 - 0.01 K/uL    NEUTROPHILS 69 32 - 75 %    LYMPHOCYTES 8 (L) 12 - 49 %    MONOCYTES 14 (H) 5 - 13 %    EOSINOPHILS 7 0 - 7 %    BASOPHILS 1 0 - 1 %    IMMATURE GRANULOCYTES 1 (H) 0 - 0.5 %    ABS. NEUTROPHILS 3.1 1.8 - 8.0 K/UL    ABS. LYMPHOCYTES 0.4 (L) 0.8 - 3.5 K/UL    ABS. MONOCYTES 0.6 0.0 - 1.0 K/UL    ABS. EOSINOPHILS 0.3 0.0 - 0.4 K/UL    ABS. BASOPHILS 0.0 0.0 - 0.1 K/UL    ABS. IMM.  GRANS. 0.0 0.00 - 0.04 K/UL    DF AUTOMATED     RENAL FUNCTION PANEL    Collection Time: 07/22/21  4:22 AM   Result Value Ref Range    Sodium 139 136 - 145 mmol/L    Potassium 4.4 3.5 - 5.1 mmol/L    Chloride 106 97 - 108 mmol/L    CO2 23 21 - 32 mmol/L    Anion gap 10 5 - 15 mmol/L    Glucose 83 65 - 100 mg/dL    BUN 99 (H) 6 - 20 mg/dL    Creatinine 7.65 (H) 0.70 - 1.30 mg/dL    BUN/Creatinine ratio 13 12 - 20      GFR est AA 9 (L) >60 ml/min/1.73m2    GFR est non-AA 7 (L) >60 ml/min/1.73m2    Calcium 7.8 (L) 8.5 - 10.1 mg/dL    Phosphorus 8.6 (H) 2.6 - 4.7 mg/dL    Albumin 3.0 (L) 3.5 - 5.0 g/dL   FERRITIN    Collection Time: 07/22/21  4:22 AM   Result Value Ref Range    Ferritin 560 (H) 26 - 388 ng/mL   LD    Collection Time: 07/22/21  4:22 AM   Result Value Ref Range     (H) 85 - 241 U/L   PTT Collection Time: 07/22/21  4:22 AM   Result Value Ref Range    aPTT >153.0 (HH) 21.2 - 34.1 sec    aPTT, therapeutic range   82 - 109 sec        Radiology:   CT Results  (Last 48 hours)    None           Assessment and Plan: Active Problems:    Pulmonary edema (7/13/2021)      Left Upper Ext DVT:  - Venous doppler from 7/13 showed DVT of the left axillary and brachial veins.  - Currently on Heparin gtt. - Given ongoing drop in Hb needing blood transfusion, would recommend continuing Heparin drip for now. - Hb is improved at 8.5 after 2 units of PRBC. - Oral anticoagulant choices include Coumadin or DOACs. - Among DOACS- would recommend Eliquis 2.5 mg BID given renal impairment.     Anemia:  - Partly due to CKD. S/p IV Iron. On epogen per Nephrology.  - Need to r/o GI bleed. - Stool for occult blood is negative. - Patient reports last colonoscopy in 2020 at [facility].     CKD:  - Nephrology following.     Signed By: Yee Bruno MD     July 22, 2021

## 2021-07-22 NOTE — PROGRESS NOTES
Pulmonary, Critical Care    Name: Billy Tanner MRN: [id number]   : 1955 Hospital: [address] [place] [address]   Date: 2021  Admission date: 2021 Hospital Day: 11       Subjective/Interval History:   Seen in the emergency room wearing noninvasive ventilator. Patient has underlying renal insufficiency developed worsening shortness of breath cough presented to the emergency room chest x-ray shows pulmonary edema. He was profoundly hypoxic is now on noninvasive ventilator and feels more comfortable. Has not had any significant urine output since he has been in the ER. He also complains of new onset left arm swelling   post void bladder scan showed greater than 200 cc urine retention and Quesada catheter placed with 500 cc removed. Overnight he has put out an additional 1200 cc. Respirations improved currently nonlabored on nasal oxygen. Duplex scan of the left arm did show left axillary and proximal brachial DVT and heparin was started. On heparin with Quesada catheter and he has had slight bleeding at the urethral meatus. Ultrasound of the abdomen is pending  7/15 ultrasound of the abdomen showed medical renal disease on the right with small kidney no hydronephrosis there was evidence of prostate enlargement. He is breathing easily at this point and on room air is running on oxygen saturation of 93%   respirations nonlabored on nasal oxygen. Quesada catheter is out he states he has been voiding frequent small amounts without straining   no specific complaints breathing easily. Overnight oximetry showed minimal but significant desaturation while on room air 8 minutes 40 seconds with low of 71%  . Developed V. tach this morning given IV amiodarone IV magnesium and transferred to the unit. Currently heart rate  alternating between A. fib and sinus with PAC. He denies any discomfort is breathing easily   remains sinus rhythm with PVCs.   Had worsening hypoxia during the night and placed on oxygen he feels comfortable at this point. Urine output mildly improved yesterday but input remains greater than output  7/22 no specific complaints respirations nonlabored currently room air with saturation 97%  Patient Active Problem List   Diagnosis Code    GI bleed K92.2    Pulmonary edema J81.1       IMPRESSION:   1. Ventricular tachycardia none further seen   2. Atrial fibrillation converted to sinus  3. Hypotension corrected with IV fluids and holding antihypertensive medications  4. Acute hypoxic respiratory failure room air oxygen saturation maintained while awake will continue nocturnal oxygen  5. Chronic hypoxic respiratory failure last overnight oximetry shows continued desaturation at night  6. Acute pulmonary edema radiographically no change  7. 2 of 4 blood culture bottles with coagulase-negative staph aureus likely skin contaminant   8. Bilateral pleural effusions  chest x-ray unchanged  9. Acute on chronic kidney disease has worsened further despite IV fluids  10. Obstructive uropathy Quesada catheter has been removed with no residual urine on bladder scanning  11. Severe hypertension corrected   12. DVT left axillary and brachial remains on IV heparin  13. Anemia hemoglobin improved after last transfusion 7/21  14. Troponin leak likely due to renal insufficiency stable has not risen any further  Body mass index is 29.24 kg/m². RECOMMENDATIONS/PLAN:     1. Remains in sinus rhythm  2. Blood pressure well controlled now on metoprolol low-dose heart rate stable  3. Continue Flomax for prostate enlargement   4. Continue nasal oxygen overnight oximetry shows continued desaturation at night         Subjective/Initial History:       I was asked by David Viera MD to see Spencer Sportsman a 76 y.o.    male  in consultation for a chief complaint of shortness of breath pulmonary edema acute hypoxic respiratory failure        Patient PCP: Fred Adorno, MD  PMH:  has a past medical history of Chronic kidney disease and Hypertension. PSH:   has no past surgical history on file. FHX: family history is not on file. SHX:  reports that he has never smoked. He has never used smokeless tobacco.    Systemic review somewhat limited as he is on noninvasive ventilator remains short of breath although states he is feeling better  General he has not noted any worsening edema of his upper legs fever chills or sweats does complain of new onset swelling of his left hand and arm  Eyes no double vision or momentary blindness  ENT no facial pain over the sinuses  Endocrinologic no polyuria polydipsia  Musculoskeletal no swollen tender joints  Neurologic no history seizures or syncope  Gastrointestinal no nausea vomiting acid indigestion  Genitourinary states he does still pass fair amount of urine each day has not noted any decrease in that.   Does not have to strain to urinate has no bleeding or drainage  Cardiovascular he is not aware of any underlying heart disease has not had chest pain diaphoresis has had worsening shortness of breath laying down and with exertion  Respiratory worsening shortness of breath over the last week or more no significant cough no sputum production no chills or sweats did have history COVID-19 pneumonitis January of this year    No Known Allergies   MEDS:   Current Facility-Administered Medications   Medication    0.9% sodium chloride infusion 250 mL    0.9% sodium chloride infusion 250 mL    metoprolol tartrate (LOPRESSOR) tablet 25 mg    [Held by provider] furosemide (LASIX) tablet 80 mg    [Held by provider] amLODIPine (NORVASC) tablet 10 mg    tamsulosin (FLOMAX) capsule 0.4 mg    [Held by provider] hydrALAZINE (APRESOLINE) tablet 50 mg    sodium bicarbonate tablet 650 mg    0.9% sodium chloride infusion 250 mL    calcitRIOL (ROCALTROL) capsule 0.25 mcg    sevelamer carbonate (RENVELA) tab 800 mg    ergocalciferol capsule 50,000 Units    hydrALAZINE (APRESOLINE) 20 mg/mL injection 40 mg    heparin 25,000 units in D5W 250 ml infusion    heparin (porcine) 1,000 unit/mL injection 6,340 Units    Or    heparin (porcine) 1,000 unit/mL injection 3,170 Units        Current Facility-Administered Medications:     0.9% sodium chloride infusion 250 mL, 250 mL, IntraVENous, PRN, Anuj Smith MD    0.9% sodium chloride infusion 250 mL, 250 mL, IntraVENous, PRN, Anuj Smith MD    metoprolol tartrate (LOPRESSOR) tablet 25 mg, 25 mg, Oral, DAILY, Faisal Nuñez MD, 25 mg at 07/22/21 2979    [Held by provider] furosemide (LASIX) tablet 80 mg, 80 mg, Oral, BID, Juan Ha MD, 80 mg at 07/18/21 2054    [Held by provider] amLODIPine (NORVASC) tablet 10 mg, 10 mg, Oral, DAILY, Anuj Smith MD, 10 mg at 07/18/21 0825    tamsulosin (FLOMAX) capsule 0.4 mg, 0.4 mg, Oral, DAILY, Marleny Orosco MD, 0.4 mg at 07/22/21 3210    [Held by provider] hydrALAZINE (APRESOLINE) tablet 50 mg, 50 mg, Oral, TID, Marleny Orosco MD, 50 mg at 07/18/21 2054    sodium bicarbonate tablet 650 mg, 650 mg, Oral, TID, Marleny Orosco MD, 650 mg at 07/22/21 0819    0.9% sodium chloride infusion 250 mL, 250 mL, IntraVENous, PRN, Marleny Orosco MD    calcitRIOL (ROCALTROL) capsule 0.25 mcg, 0.25 mcg, Oral, DAILY, Faisal Nuñez MD, 0.25 mcg at 07/22/21 6231    sevelamer carbonate (RENVELA) tab 800 mg, 800 mg, Oral, TID WITH MEALS, Faisal Nuñez MD, 800 mg at 07/22/21 0188    ergocalciferol capsule 50,000 Units, 50,000 Units, Oral, Q7D, Faisal Nuñez MD, 50,000 Units at 07/21/21 1711    hydrALAZINE (APRESOLINE) 20 mg/mL injection 40 mg, 40 mg, IntraVENous, Q6H PRN, Marleny Orosco MD, 40 mg at 07/15/21 1552    heparin 25,000 units in D5W 250 ml infusion, 18-36 Units/kg/hr (Adjusted), IntraVENous, TITRATE, Anuj Smith MD, Last Rate: 9.5 mL/hr at 07/22/21 0822, 12 Units/kg/hr at 07/22/21 0822    heparin (porcine) 1,000 unit/mL injection 6,340 Units, 80 Units/kg (Adjusted), IntraVENous, PRN, 5,000 Units at 21 0954 **OR** heparin (porcine) 1,000 unit/mL injection 3,170 Units, 40 Units/kg (Adjusted), IntraVENous, PRN, Kasey Díaz MD, 3,170 Units at 21 1541      Objective:     Vital Signs: Telemetry:    normal sinus rhythm Intake/Output:   Visit Vitals  BP (!) 142/85 (BP 1 Location: Right upper arm, BP Patient Position: At rest)   Pulse 66   Temp 98.7 °F (37.1 °C)   Resp 13   Ht 6' (1.829 m)   Wt 97.8 kg (215 lb 9.8 oz)   SpO2 98%   BMI 29.24 kg/m²       Temp (24hrs), Av °F (36.7 °C), Min:97.5 °F (36.4 °C), Max:98.7 °F (37.1 °C)        O2 Device: Nasal cannula O2 Flow Rate (L/min): 2 l/min         Body mass index is 29.24 kg/m². Wt Readings from Last 4 Encounters:   21 97.8 kg (215 lb 9.8 oz)   21 79.4 kg (175 lb)          Intake/Output Summary (Last 24 hours) at 2021 1004  Last data filed at 2021 0300  Gross per 24 hour   Intake 1123.8 ml   Output 600 ml   Net 523.8 ml       Last shift:      No intake/output data recorded. Last 3 shifts:  1901 -  0700  In: 2208.2 [P.O.:1160; I.V.:104.4]  Out: 800 [Urine:800]       Hemodynamics:    CO:    CI:    CVP:    SVR:   PAP Systolic:    PAP Diastolic:    PVR:    PE88:       Ventilator Settings:      Mode Rate TV Press PEEP FiO2 PIP Min. Vent               28 %     9.7 l/min      Physical Exam:    General:  male; no distress now on 2 L nasal oxygen  HEENT: NCAT, visible oral mucosa is moist and clear  Eyes: anicteric; conjunctiva clear extraocular movements intact  Neck: no nodes,,no accessory MM use. no respiratory distress  Chest: no deformity,   Cardiac: Regular rhythm and rate now controlled  Lungs: distant breath sounds; clear anteriorly and laterally with rales in the bases  Abd: Soft positive bowel sounds no tenderness  Ext: Improvement in edema  of the lower extremities with continued edema of the left arm; no joint swelling;  No clubbing  : Clear urine  Neuro: Alert awake no distress speech is clear moves all 4 extremities  Psych-calm, oriented to person;   Skin: warm, dry, no cyanosis;   Pulses: Brachial and radial pulses intact  Capillary:slow capillary refill      Labs:    Recent Labs     07/22/21  0422 07/21/21  1515 07/21/21  1355 07/21/21  0400 07/21/21  0400 07/20/21  1630 07/20/21  0430   WBC 4.4  --   --   --  3.9*  --  4.5   HGB 8.5*  --   --   --  7.4*  --  7.6*     --   --   --  161  --  165   APTT >153.0* 56.1* >153.0*   < > 47.8*   < > 74.9*    < > = values in this interval not displayed. Recent Labs     07/22/21  0422 07/21/21  0400 07/20/21  0430    139 140   K 4.4 4.2 4.1    105 108   CO2 23 22 23   GLU 83 91 97   BUN 99* 80* 80*   CREA 7.65* 7.45* 7.42*   CA 7.8* 8.1* 8.0*   MG  --   --  2.8*   PHOS 8.6* 8.5* 8.7*   ALB 3.0* 3.1* 3.2*   7/19 overnight oximetry shows 14 minutes 26 seconds below 88% with low oxygen saturation 75%  7/17 overnight oximetry shows low oxygen saturation 71% with 8 minutes 40 seconds below 88% from 1 AM to 6 AM   7/16 overnight oximetry on 1 L shows only 2 minutes 20 seconds below 88% with a low oxygen saturation of 83%  7/15 room air oxygen saturation 93%  currently on noninvasive ventilator inspiratory pressure 16 expiratory pressure six rate 16 FiO2 28% with oxygen saturation 96%   BNP 32,264, previous greater than 35,000  Lab Results   Component Value Date/Time    Culture result:  07/12/2021 10:40 PM     Two of four bottles have been flagged positive by instrument. Bottles have been sent to [facility] laboratory to assess for possible growth.  Gram Positive Cocci in clusters CALLED TO AND READ BACK BY Willa Castaneda r.n. [id number] 07/14/2021 by dpw    Culture result:  07/12/2021 10:40 PM     Staphylococcus species, coagulase negative GROWING IN THE 1ST OF 4 BOTTLES DRAWN    Culture result:  07/12/2021 10:40 PM     Staphylococcus species, coagulase negative (2nd colony type/strain) GROWING IN THE 2ND OF 4 BOTTLES DRAWN        Imaging:  I have personally reviewed the patients radiographs and have reviewed the reports:    CXR Results  (Last 48 hours)  7/21 chest x-ray with continued mild pulmonary edema pattern small bilateral pleural effusions there may be improved inspiratory effort  7/16 chest x-ray with continued mild pulmonary edema and small pleural effusions unchanged               07/12/21 2251  XR CHEST PORT Final result    Impression:  Findings/impression:       Cardiac silhouette is enlarged. Central vascular congestion and patchy central   airspace disease/edema. Suspect trace right pleural effusion. No evidence of pneumothorax. No acute osseous abnormality identified. Narrative:  Study: XR CHEST PORT       Clinical indication: sob       Comparison: None. To me chest x-ray consistent with cardiomegaly pulmonary edema and bilateral pleural effusions           Results from Hospital Encounter encounter on 07/12/21    XR CHEST PORT    Narrative  Chest single view. Comparison single view chest July 19, 2021. Patchy reticular markings through the lungs, same distribution. Those through  lung bases appear less dense; suspect mild degree improved aeration. Cardiac and  mediastinal structures unchanged. No pneumothorax or sizable pleural effusion. XR CHEST PORT    Narrative  Chest single view. Comparison single view chest July 16, 2021. Advanced patchy central and basilar reticular markings through the lungs. Consider pneumonia. Cardiac and mediastinal structures magnified on this AP  view. No pneumothorax or sizable pleural effusion. XR CHEST PORT    Narrative  Exam: Portable upright chest radiograph    COMPARISON: One day prior. Impression  Findings/impression: Stable cardiomediastinal silhouette. Similar central  pulmonary vascular congestion and bilateral patchy airspace opacities. No  significant pleural effusion.  No pneumothorax. Findings are not significantly changed. Results from [place] encounter on 01/05/21    CT CHEST WO CONT    Narrative  Images obtained without contrast include the abdomen and pelvis. Comparison portable chest radiograph earlier today. Dose Reduction:  All CT scans at this facility are performed using dose reduction optimization  techniques as appropriate to a performed exam including the following: Automated  exposure control, adjustments of the mA and/or kV according to patient size, or  use of iterative reconstruction technique. Subtle peripheral groundglass densities are noted in both lungs, could reflect  Covid pneumonia or other. No pneumothorax or pneumomediastinum. The radiographic findings suspicious for  pneumomediastinum probably was artifact, perhaps related to cardiac motion. No pleural effusion or adenopathy. Cardiomegaly again noted. Impression  IMPRESSION:  1. No pneumomediastinum or pneumothorax. 2. Cardiomegaly. 3. Subtle groundglass densities in both lungs might be pneumonia or other. Discussion patient with worsening renal insufficiency has developed pulmonary edema acute hypoxic respiratory failure. He states he still has urine output normally at home. We will give him high-dose IV Lasix to see if diuresis is induced. He very likely will need dialysis. He has minimal elevation in troponin probably troponin leak from hypoxia or just due to his renal insufficiency. He is not having any chest pain. Does have severe hypertension. Has been started on clonidine and hydralazine. 7/14 no distress today on nasal oxygen. Did have residual on post void bladder scan and Quesada catheter was placed with good diuresis overnight. Despite this potassium remains elevated. We will give 1 dose of Kayexalate. Despite diuresis hemoglobin is dropped to 6.7 will transfuse.   He denies frequency small-volume urine or straining to urinate at home despite this with signs of obstruction we will add Flomax. Potassium 6 today we will give 1 dose of Kayexalate and continue diuresis  7/16 respirations nonlabored. Was placed back on 1 L nasal oxygen yesterday overnight oximetry shows well-maintained oxygen saturation. Will check overnight tonight on and off oxygen. Quesada catheter has been removed. Chest x-ray continues to show mild pulmonary edema  7/17 overnight oximetry shows desaturation when on room air. Will maintain oxygen and repeat overnight oximetry Ibrahima night  7/19 developed V. tach overnight and now in the ICU on IV amiodarone with rhythm showing alternating sinus with PAC and A. fib. Rate . Overnight oximetry continued to show desaturation on room air and he is back on nasal oxygen. He has no specific complaint  7/20 now in sinus rhythm with frequent PVCs. Cardiology is recommending cardiac cath but he is reluctant to undergo that. Creatinine has worsened despite IV fluid administration yesterday although blood pressure is improved. Currently oxygen saturation well-maintained on room air but last overnight oximetry showed desaturation will maintain oxygen at 1 L for now  7/21 had hypoxia yesterday afternoon and placed back on oxygen during the daytime as well as the night. He feels comfortable at this point. Chest x-ray is unchanged still has mild pulmonary edema with small effusions. It does appear that he took a deeper breath today. Creatinine remains markedly elevated. He is on heparin for left axillary DVT. Holding switching to oral anticoagulation until sure no instrumentation is needed   7/22 unchanged remains on IV heparin. Did require transfusion yesterday. We will plan to repeat chest x-ray in a.m. Thank you for allowing us to participate in the care of this patient.   We will follow along with you     Daniel Gustafson MD

## 2021-07-23 ENCOUNTER — APPOINTMENT (OUTPATIENT)
Dept: GENERAL RADIOLOGY | Age: 66
DRG: 291 | End: 2021-07-23
Attending: INTERNAL MEDICINE
Payer: MEDICARE

## 2021-07-23 LAB
ALBUMIN SERPL-MCNC: 3 G/DL (ref 3.5–5)
ANION GAP SERPL CALC-SCNC: 10 MMOL/L (ref 5–15)
APTT PPP: >153 SEC (ref 21.2–34.1)
BASOPHILS # BLD: 0 K/UL (ref 0–0.1)
BASOPHILS NFR BLD: 1 % (ref 0–1)
BUN SERPL-MCNC: 91 MG/DL (ref 6–20)
BUN/CREAT SERPL: 12 (ref 12–20)
CA-I BLD-MCNC: 8 MG/DL (ref 8.5–10.1)
CHLORIDE SERPL-SCNC: 104 MMOL/L (ref 97–108)
CO2 SERPL-SCNC: 23 MMOL/L (ref 21–32)
CREAT SERPL-MCNC: 7.32 MG/DL (ref 0.7–1.3)
DIFFERENTIAL METHOD BLD: ABNORMAL
EOSINOPHIL # BLD: 0.4 K/UL (ref 0–0.4)
EOSINOPHIL NFR BLD: 8 % (ref 0–7)
ERYTHROCYTE [DISTWIDTH] IN BLOOD BY AUTOMATED COUNT: 17.9 % (ref 11.5–14.5)
GLUCOSE SERPL-MCNC: 92 MG/DL (ref 65–100)
HCT VFR BLD AUTO: 28.1 % (ref 36.6–50.3)
HGB BLD-MCNC: 8.5 G/DL (ref 12.1–17)
IMM GRANULOCYTES # BLD AUTO: 0 K/UL (ref 0–0.04)
IMM GRANULOCYTES NFR BLD AUTO: 0 % (ref 0–0.5)
KAPPA LC FREE SER-MCNC: 121.7 MG/L (ref 3.3–19.4)
KAPPA LC FREE/LAMBDA FREE SER: 2.09 {RATIO} (ref 0.26–1.65)
LAMBDA LC FREE SERPL-MCNC: 58.2 MG/L (ref 5.7–26.3)
LYMPHOCYTES # BLD: 0.3 K/UL (ref 0.8–3.5)
LYMPHOCYTES NFR BLD: 7 % (ref 12–49)
MCH RBC QN AUTO: 27.6 PG (ref 26–34)
MCHC RBC AUTO-ENTMCNC: 30.2 G/DL (ref 30–36.5)
MCV RBC AUTO: 91.2 FL (ref 80–99)
MONOCYTES # BLD: 0.7 K/UL (ref 0–1)
MONOCYTES NFR BLD: 15 % (ref 5–13)
NEUTS SEG # BLD: 3.1 K/UL (ref 1.8–8)
NEUTS SEG NFR BLD: 69 % (ref 32–75)
NRBC # BLD: 0 K/UL (ref 0–0.01)
NRBC BLD-RTO: 0 PER 100 WBC
PHOSPHATE SERPL-MCNC: 8.3 MG/DL (ref 2.6–4.7)
PLATELET # BLD AUTO: 143 K/UL (ref 150–400)
PMV BLD AUTO: 10.1 FL (ref 8.9–12.9)
POTASSIUM SERPL-SCNC: 4.7 MMOL/L (ref 3.5–5.1)
RBC # BLD AUTO: 3.08 M/UL (ref 4.1–5.7)
SODIUM SERPL-SCNC: 137 MMOL/L (ref 136–145)
THERAPEUTIC RANGE,PTTT: ABNORMAL SEC (ref 82–109)
WBC # BLD AUTO: 4.5 K/UL (ref 4.1–11.1)

## 2021-07-23 PROCEDURE — 74011250637 HC RX REV CODE- 250/637: Performed by: INTERNAL MEDICINE

## 2021-07-23 PROCEDURE — 71045 X-RAY EXAM CHEST 1 VIEW: CPT

## 2021-07-23 PROCEDURE — 65270000029 HC RM PRIVATE

## 2021-07-23 PROCEDURE — 36415 COLL VENOUS BLD VENIPUNCTURE: CPT

## 2021-07-23 PROCEDURE — 85730 THROMBOPLASTIN TIME PARTIAL: CPT

## 2021-07-23 PROCEDURE — 85025 COMPLETE CBC W/AUTO DIFF WBC: CPT

## 2021-07-23 PROCEDURE — 80069 RENAL FUNCTION PANEL: CPT

## 2021-07-23 RX ADMIN — APIXABAN 2.5 MG: 2.5 TABLET, FILM COATED ORAL at 16:04

## 2021-07-23 RX ADMIN — FUROSEMIDE 80 MG: 40 TABLET ORAL at 08:21

## 2021-07-23 RX ADMIN — SODIUM BICARBONATE 650 MG: 650 TABLET ORAL at 16:04

## 2021-07-23 RX ADMIN — TAMSULOSIN HYDROCHLORIDE 0.4 MG: 0.4 CAPSULE ORAL at 08:21

## 2021-07-23 RX ADMIN — SEVELAMER CARBONATE 1600 MG: 800 TABLET, FILM COATED ORAL at 08:21

## 2021-07-23 RX ADMIN — SODIUM BICARBONATE 650 MG: 650 TABLET ORAL at 21:44

## 2021-07-23 RX ADMIN — METOPROLOL TARTRATE 25 MG: 25 TABLET, FILM COATED ORAL at 08:21

## 2021-07-23 RX ADMIN — SODIUM BICARBONATE 650 MG: 650 TABLET ORAL at 08:21

## 2021-07-23 RX ADMIN — SEVELAMER CARBONATE 1600 MG: 800 TABLET, FILM COATED ORAL at 16:04

## 2021-07-23 RX ADMIN — SEVELAMER CARBONATE 1600 MG: 800 TABLET, FILM COATED ORAL at 12:15

## 2021-07-23 RX ADMIN — CALCITRIOL CAPSULES 0.25 MCG 0.25 MCG: 0.25 CAPSULE ORAL at 08:21

## 2021-07-23 RX ADMIN — APIXABAN 2.5 MG: 2.5 TABLET, FILM COATED ORAL at 21:44

## 2021-07-23 NOTE — PROGRESS NOTES
Progress Note      7/23/2021 9:54 AM  NAME: Olivia Anderson   MRN:  [id number]   Admit Diagnosis: Pulmonary edema [J81.1]      Subjective:   Chart reviewed. Patient has had a symptomatic ventricular tachycardia and was shocked. Discussed with the nurse and with the nephrologist.  Vascular surgery note appreciated. Echocardiogram results explained. He feels much better today. Patient was offered option of cardiac catheterization but he has decided not to pursue that. Review of Systems:    Symptom Y/N Comments  Symptom Y/N Comments   Fever/Chills n   Chest Pain n    Poor Appetite    Edema y    Cough    Abdominal Pain     Sputum    Joint Pain n    SOB/CARMONA n   Pruritis/Rash     Nausea/vomit    Other     Diarrhea         Constipation           Could NOT obtain due to:      Objective:          Physical Exam:    Last 24hrs VS reviewed since prior progress note. Most recent are:    Visit Vitals  /80 (BP 1 Location: Right upper arm, BP Patient Position: At rest)   Pulse (!) 55   Temp 97.5 °F (36.4 °C)   Resp 15   Ht 6' (1.829 m)   Wt 98.2 kg (216 lb 7.9 oz)   SpO2 96%   BMI 29.36 kg/m²     No intake or output data in the 24 hours ending 07/23/21 1425     General Appearance: Well developed, well nourished, alert & oriented x 3,    no acute distress. Ears/Nose/Mouth/Throat: Hearing grossly normal.  Neck: Supple. Chest: Lungs clear to auscultation bilaterally. Cardiovascular: Regular rate and rhythm, S1,S2 normal, no murmur. Abdomen: Soft, non-tender, bowel sounds are active. Extremities: Lower extremity edema left upper extremity edema evident  Skin: Lower extremity blisters evident    []         Post-cath site without hematoma, bruit, tenderness, or thrill. Distal pulses intact. PMH/SH reviewed - no change compared to H&P    Data Review    Telemetry: Patient had a ventricular tachycardia and was shocked and had episode of atrial fibrillation and now is in sinus rhythm with PVCs.     EKG:   [] No new EKG for review    Lab Data Personally Reviewed:    Recent Labs     07/23/21  0400 07/22/21  0422   WBC 4.5 4.4   HGB 8.5* 8.5*   HCT 28.1* 27.7*   * 155     Recent Labs     07/23/21  0400 07/22/21  1600 07/22/21 0422   APTT >153.0* 57.0* >153.0*      Recent Labs     07/23/21  0400 07/22/21  0422 07/21/21  0400    139 139   K 4.7 4.4 4.2    106 105   CO2 23 23 22   BUN 91* 99* 80*   CREA 7.32* 7.65* 7.45*   GLU 92 83 91   CA 8.0* 7.8* 8.1*     No results for input(s): CPK, CKNDX, TROIQ in the last 72 hours. No lab exists for component: CPKMB  No results found for: CHOL, CHOLX, CHLST, CHOLV, HDL, HDLP, LDL, LDLC, DLDLP, TGLX, TRIGL, TRIGP, CHHD, CHHDX    Recent Labs     07/23/21  0400 07/22/21  0422 07/21/21  0400   ALB 3.0* 3.0* 3.1*     No results for input(s): PH, PCO2, PO2 in the last 72 hours.     Medications Personally Reviewed:    Current Facility-Administered Medications   Medication Dose Route Frequency    apixaban (ELIQUIS) tablet 2.5 mg  2.5 mg Oral BID    sevelamer carbonate (RENVELA) tab 1,600 mg  1,600 mg Oral TID WITH MEALS    furosemide (LASIX) tablet 80 mg  80 mg Oral DAILY    0.9% sodium chloride infusion 250 mL  250 mL IntraVENous PRN    0.9% sodium chloride infusion 250 mL  250 mL IntraVENous PRN    metoprolol tartrate (LOPRESSOR) tablet 25 mg  25 mg Oral DAILY    [Held by provider] amLODIPine (NORVASC) tablet 10 mg  10 mg Oral DAILY    tamsulosin (FLOMAX) capsule 0.4 mg  0.4 mg Oral DAILY    [Held by provider] hydrALAZINE (APRESOLINE) tablet 50 mg  50 mg Oral TID    sodium bicarbonate tablet 650 mg  650 mg Oral TID    0.9% sodium chloride infusion 250 mL  250 mL IntraVENous PRN    calcitRIOL (ROCALTROL) capsule 0.25 mcg  0.25 mcg Oral DAILY    ergocalciferol capsule 50,000 Units  50,000 Units Oral Q7D    hydrALAZINE (APRESOLINE) 20 mg/mL injection 40 mg  40 mg IntraVENous Q6H PRN    heparin (porcine) 1,000 unit/mL injection 6,340 Units  80 Units/kg (Adjusted) IntraVENous PRN    Or    heparin (porcine) 1,000 unit/mL injection 3,170 Units  40 Units/kg (Adjusted) IntraVENous PRN           Problem List:   Acute hypoxic respiratory failure and patient seems to be somewhat better. Severe anemia. Renal failure. Heart failure. Left upper extremity DVT. Minimal elevation of troponin I is probably not a presentation of myocardial infarction. Patient has had ventricular tachycardia and has been shocked. Electrophysiology note appreciated. 1.      Assessment/Plan:   Patient was scheduled for cardiac catheterization but he says he does not want to go for catheterization. Thank you. We will follow nephrology recommendations and vascular surgery recommendations. I will continue otherwise present care. Thank you. 1.          []       High complexity decision making was performed in this patient at high risk for decompensation with multiple organ involvement.     Joe Hidalgo MD

## 2021-07-23 NOTE — PROGRESS NOTES
[facility]     Renal Daily Progress Note:     Admission Date: 2021     Subjective: feeling ok, no further V-tach, remains in ICU awaiting transfer to floor. BP back to baseline. Heart rate around 60 bpm. Hopefully creatinine has plateaued. Urine not accurately quantified. Patient refused cardiac cath. He would like his diet liberlized      Not short of breath. No cough. No chest or abdominal pain. Left great toe with blood blister but no pain. Seen with PT earlier today, able to ambulate with walker.     Objective:     Visit Vitals  BP (!) 148/94 (BP 1 Location: Right upper arm, BP Patient Position: At rest)   Pulse (!) 59   Temp 97.4 °F (36.3 °C)   Resp 12   Ht 6' (1.829 m)   Wt 98.2 kg (216 lb 7.9 oz)   SpO2 96%   BMI 29.36 kg/m²     Temp (24hrs), Av.7 °F (36.5 °C), Min:97.4 °F (36.3 °C), Max:98.1 °F (36.7 °C)      No intake or output data in the 24 hours ending 21 0050  Current Facility-Administered Medications   Medication Dose Route Frequency    apixaban (ELIQUIS) tablet 2.5 mg  2.5 mg Oral BID    sevelamer carbonate (RENVELA) tab 1,600 mg  1,600 mg Oral TID WITH MEALS    furosemide (LASIX) tablet 80 mg  80 mg Oral DAILY    0.9% sodium chloride infusion 250 mL  250 mL IntraVENous PRN    0.9% sodium chloride infusion 250 mL  250 mL IntraVENous PRN    metoprolol tartrate (LOPRESSOR) tablet 25 mg  25 mg Oral DAILY    [Held by provider] amLODIPine (NORVASC) tablet 10 mg  10 mg Oral DAILY    tamsulosin (FLOMAX) capsule 0.4 mg  0.4 mg Oral DAILY    [Held by provider] hydrALAZINE (APRESOLINE) tablet 50 mg  50 mg Oral TID    sodium bicarbonate tablet 650 mg  650 mg Oral TID    0.9% sodium chloride infusion 250 mL  250 mL IntraVENous PRN    calcitRIOL (ROCALTROL) capsule 0.25 mcg  0.25 mcg Oral DAILY    ergocalciferol capsule 50,000 Units  50,000 Units Oral Q7D    hydrALAZINE (APRESOLINE) 20 mg/mL injection 40 mg  40 mg IntraVENous Q6H PRN       Physical Exam:    Seen in Room 470    General appearance: Chronically ill-appearing patient sitting up on side of bed- comfortable. Head: Normocephalic    Lungs: clear to auscultation , no wheezes, no rales, poor air movement    Heart:  No S3 gallop , No pericardial rub. Abdomen: Not distended    Extremities:  Lower extremity edema has resolved. There is residual edema in dependent thighs. Neuro : Awake and responding appropriately. Data Review:     LABS:  Recent Labs     07/23/21  0400 07/22/21  0422 07/21/21  0400    139 139   K 4.7 4.4 4.2    106 105   CO2 23 23 22   BUN 91* 99* 80*   CREA 7.32* 7.65* 7.45*   CA 8.0* 7.8* 8.1*   ALB 3.0* 3.0* 3.1*   PHOS 8.3* 8.6* 8.5*   Vitamin D2 11.1  Intact   PSA  5.2  Recent Labs     07/23/21  0400 07/22/21  0422 07/21/21  0400   WBC 4.5 4.4 3.9*   HGB 8.5* 8.5* 7.4*   HCT 28.1* 27.7* 25.4*   * 155 161   iron saturation 5%    No results for input(s): HÉCTOR, KU, CLU, CREAU in the last 72 hours. No lab exists for component: PROUBlood Cultures 7-12-21  Two of four bottles have been flagged positive by instrument.  Bottles have been sent to [facility] laboratory to assess for possible growth. Gram Positive Cocci in clusters     Chest x-ray 7-21-21  Chest single view.     Comparison single view chest July 19, 2021.     Patchy reticular markings through the lungs, same distribution. Those through  lung bases appear less dense; suspect mild degree improved aeration. Cardiac and  mediastinal structures unchanged. No pneumothorax or sizable pleural effusion. CXR 7-19-21     Comparison single view chest July 16, 2021.     Advanced patchy central and basilar reticular markings through the lungs. Consider pneumonia. Cardiac and mediastinal structures magnified on this AP  view. No pneumothorax or sizable pleural effusion. CXR July 14, 2021:  1 view comparison to 7/12     Continued cardiomegaly and congestion. If There is mild interstitial edema, it  is unchanged.  Right pleural effusion and adjacent atelectasis have improved. Retroperitoneal US 7-14-21  Left kidney is 12.2 cm and right kidney is 9.1. Right renal cortex is echogenic  but not the left. Multiple cysts on each side, including a large cyst is 7 cm on  the left. No hydronephrosis. Aorta and IVC normals visualized. Prostatic  enlargement and diffuse bladder wall thickening. Moderate ascites     IMPRESSION  Medical renal disease on the right    Assessment:   Renal Specific Problems    CKD stage IV/V   Acute azotemia-exacerbated by bladder outlet obstruction and relative hypotension with poor perfusion-may be resolving  Hypertension - over-controlled  Volume overload- resolving  Hyperkalemia- corrected  Metabolic acidosis- on bicarb  Acute anemia with significant iron deficiency  Acute left axillary/brachial vein DVT  Vitamin D2 def  Secondary hyperparathyroid  Proteinuria - nephrotic range        Plan:     Obtain/ Order: labs/cultures/radiology/procedures:  renal panel, CBC in AM      PLA2R Ab pending    Therapeutic:    Liberalize diet  Epogen  IV iron to rapidly replete stores --completed  Continue sodium bicarbonate.  The target bicarb level is 22--23  Calcitriol and ergocalciferol   PO4 binder--will increase sevelamer to 2 pc  Flomax   change metoprolol to daily  Contt lasix daily

## 2021-07-23 NOTE — PROGRESS NOTES
Progress Note    Win Burciaga MD             Daily Progress Note: 7/23/2021      Subjective: The patient is seen for follow  up.   j we should be is eating his lunch sitting in a chair. Able to walk around with a walker without getting short of breath or chest pain. Patient second stool for occult blood test came out negative his hemoglobin is stable at 8.5. As per hematologist patient should be started on anticoagulants oral if hemoglobin is stable and stool for occult blood is negative.   I will start patient on Eliquis 2.5 mg twice a day  Problem List:  Problem List as of 7/23/2021 Never Reviewed        Codes Class Noted - Resolved    Pulmonary edema ICD-10-CM: J81.1  ICD-9-CM: 890  7/13/2021 - Present        GI bleed ICD-10-CM: K92.2  ICD-9-CM: 578.9  1/5/2021 - Present              Medications reviewed  Current Facility-Administered Medications   Medication Dose Route Frequency    apixaban (ELIQUIS) tablet 2.5 mg  2.5 mg Oral BID    sevelamer carbonate (RENVELA) tab 1,600 mg  1,600 mg Oral TID WITH MEALS    furosemide (LASIX) tablet 80 mg  80 mg Oral DAILY    0.9% sodium chloride infusion 250 mL  250 mL IntraVENous PRN    0.9% sodium chloride infusion 250 mL  250 mL IntraVENous PRN    metoprolol tartrate (LOPRESSOR) tablet 25 mg  25 mg Oral DAILY    [Held by provider] amLODIPine (NORVASC) tablet 10 mg  10 mg Oral DAILY    tamsulosin (FLOMAX) capsule 0.4 mg  0.4 mg Oral DAILY    [Held by provider] hydrALAZINE (APRESOLINE) tablet 50 mg  50 mg Oral TID    sodium bicarbonate tablet 650 mg  650 mg Oral TID    0.9% sodium chloride infusion 250 mL  250 mL IntraVENous PRN    calcitRIOL (ROCALTROL) capsule 0.25 mcg  0.25 mcg Oral DAILY    ergocalciferol capsule 50,000 Units  50,000 Units Oral Q7D    hydrALAZINE (APRESOLINE) 20 mg/mL injection 40 mg  40 mg IntraVENous Q6H PRN    heparin 25,000 units in D5W 250 ml infusion  18-36 Units/kg/hr (Adjusted) IntraVENous TITRATE    heparin (porcine) 1,000 unit/mL injection 6,340 Units  80 Units/kg (Adjusted) IntraVENous PRN    Or    heparin (porcine) 1,000 unit/mL injection 3,170 Units  40 Units/kg (Adjusted) IntraVENous PRN       Review of Systems:   A comprehensive review of systems was negative except for that written in the HPI. Objective:   Physical Exam:     Visit Vitals  /80 (BP 1 Location: Right upper arm, BP Patient Position: At rest)   Pulse (!) 55   Temp 97.4 °F (36.3 °C)   Resp 15   Ht 6' (1.829 m)   Wt 98.2 kg (216 lb 7.9 oz)   SpO2 96%   BMI 29.36 kg/m²    O2 Flow Rate (L/min): 2 l/min O2 Device: None (Room air)    Temp (24hrs), Av.8 °F (36.6 °C), Min:97.4 °F (36.3 °C), Max:98.1 °F (36.7 °C)    No intake/output data recorded.  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 Lungs:   Clear to auscultation bilaterally. Chest wall:  No tenderness or deformity. Heart:  Regular rate and rhythm, S1, S2 normal, no murmur, click, rub or gallop. Abdomen:   Soft, non-tender. Bowel sounds normal. No masses,  No organomegaly. Extremities: Extremities normal, atraumatic, no cyanosis bilateral leg edema much better than before. The left great toe blister has been completely healed neck  Left upper limb edema   Pulses: 2+ and symmetric all extremities. Skin: Skin color, texture, turgor normal. No rashes or lesions   Neurologic: CNII-XII intact.  No gross sensory or motor deficits     Data Review:       Recent Days:  Recent Labs     21  0400 21  0422 21  0400   WBC 4.5 4.4 3.9*   HGB 8.5* 8.5* 7.4*   HCT 28.1* 27.7* 25.4*   * 155 161     Recent Labs 07/23/21  0400 07/22/21  0422 07/21/21  0400    139 139   K 4.7 4.4 4.2    106 105   CO2 23 23 22   GLU 92 83 91   BUN 91* 99* 80*   CREA 7.32* 7.65* 7.45*   CA 8.0* 7.8* 8.1*   PHOS 8.3* 8.6* 8.5*   ALB 3.0* 3.0* 3.1*     No results for input(s): PH, PCO2, PO2, HCO3, FIO2 in the last 72 hours. Results     Procedure Component Value Units Date/Time    MRSA SCREEN - PCR (NASAL) [[id number]] Collected: 07/13/21 1730    Order Status: Completed Specimen: Swab Updated: 07/13/21 2123     MRSA by PCR, Nasal Not Detected       COVID-19 RAPID TEST [[id number]] Collected: 07/13/21 0539    Order Status: Completed Specimen: Nasopharyngeal Updated: 07/13/21 0655     Specimen source Nasopharyngeal        COVID-19 rapid test Not Detected        Comment: Rapid Abbott ID Now   Rapid NAAT:  The specimen is NEGATIVE for SARS-CoV-2, the novel coronavirus associated with COVID-19. Negative results should be treated as presumptive and, if inconsistent with clinical signs and symptoms or necessary for patient management, should be tested with an alternative molecular assay. Negative results do not preclude SARS-CoV-2 infection and should not be used as the sole basis for patient management decisions. This test has been authorized by the FDA under   an Emergency Use Authorization (EUA) for use by authorized laboratories. Fact sheet for Healthcare Providers: ConventionUpdate.co.nz Fact sheet for Patients: ConventionUpdate.co.nz   Methodology: Isothermal Nucleic Acid Amplification         CULTURE, BLOOD, PAIRED [[id number]] Collected: 07/12/21 2240    Order Status: Completed Specimen: Blood Updated: 07/16/21 1346     Special Requests: No Special Requests        Culture result:       Two of four bottles have been flagged positive by instrument. Bottles have been sent to [facility] laboratory to assess for possible growth. Gram Positive Cocci in clusters CALLED TO AND READ BACK BY chiquita mtz r.n. 1130 07/14/2021 by hanane                  Staphylococcus species, coagulase negative GROWING IN THE 1ST OF 4 BOTTLES DRAWN                  Staphylococcus species, coagulase negative (2nd colony type/strain) GROWING IN THE 2ND OF 4 BOTTLES DRAWN               24 Hour Results:  Recent Results (from the past 24 hour(s))   PTT    Collection Time: 07/22/21  4:00 PM   Result Value Ref Range    aPTT 57.0 (H) 21.2 - 34.1 sec    aPTT, therapeutic range   82 - 109 sec   CBC WITH AUTOMATED DIFF    Collection Time: 07/23/21  4:00 AM   Result Value Ref Range    WBC 4.5 4.1 - 11.1 K/uL    RBC 3.08 (L) 4.10 - 5.70 M/uL    HGB 8.5 (L) 12.1 - 17.0 g/dL    HCT 28.1 (L) 36.6 - 50.3 %    MCV 91.2 80.0 - 99.0 FL    MCH 27.6 26.0 - 34.0 PG    MCHC 30.2 30.0 - 36.5 g/dL    RDW 17.9 (H) 11.5 - 14.5 %    PLATELET 379 (L) 119 - 400 K/uL    MPV 10.1 8.9 - 12.9 FL    NRBC 0.0 0.0  WBC    ABSOLUTE NRBC 0.00 0.00 - 0.01 K/uL    NEUTROPHILS 69 32 - 75 %    LYMPHOCYTES 7 (L) 12 - 49 %    MONOCYTES 15 (H) 5 - 13 %    EOSINOPHILS 8 (H) 0 - 7 %    BASOPHILS 1 0 - 1 %    IMMATURE GRANULOCYTES 0 0 - 0.5 %    ABS. NEUTROPHILS 3.1 1.8 - 8.0 K/UL    ABS. LYMPHOCYTES 0.3 (L) 0.8 - 3.5 K/UL    ABS. MONOCYTES 0.7 0.0 - 1.0 K/UL    ABS. EOSINOPHILS 0.4 0.0 - 0.4 K/UL    ABS. BASOPHILS 0.0 0.0 - 0.1 K/UL    ABS. IMM.  GRANS. 0.0 0.00 - 0.04 K/UL    DF AUTOMATED     RENAL FUNCTION PANEL    Collection Time: 07/23/21  4:00 AM   Result Value Ref Range    Sodium 137 136 - 145 mmol/L    Potassium 4.7 3.5 - 5.1 mmol/L    Chloride 104 97 - 108 mmol/L    CO2 23 21 - 32 mmol/L    Anion gap 10 5 - 15 mmol/L    Glucose 92 65 - 100 mg/dL    BUN 91 (H) 6 - 20 mg/dL    Creatinine 7.32 (H) 0.70 - 1.30 mg/dL    BUN/Creatinine ratio 12 12 - 20      GFR est AA 9 (L) >60 ml/min/1.73m2    GFR est non-AA 8 (L) >60 ml/min/1.73m2    Calcium 8.0 (L) 8.5 - 10.1 mg/dL    Phosphorus 8.3 (H) 2.6 - 4.7 mg/dL    Albumin 3.0 (L) 3.5 - 5.0 g/dL   PTT    Collection Time: 07/23/21  4:00 AM Result Value Ref Range    aPTT >153.0 () 21.2 - 34.1 sec    aPTT, therapeutic range   82 - 109 sec       XR CHEST PORT   Final Result   There is continued moderate pulmonary interstitial and alveolar   edema with a slight interval improvement within the right upper lobe. The   remainder of this examination is unchanged. XR CHEST PORT   Final Result      XR CHEST PORT   Final Result      LOWER EXT ART PVR MULT LEVEL SEG PRESSURES   Final Result      DUPLEX LOWER EXT VENOUS BILAT   Final Result      XR CHEST PORT   Final Result   Findings/impression: Stable cardiomediastinal silhouette. Similar central   pulmonary vascular congestion and bilateral patchy airspace opacities. No   significant pleural effusion. No pneumothorax. Findings are not significantly changed. XR CHEST PORT   Final Result      US RETROPERITONEUM COMP   Final Result   Medical renal disease on the right      DUPLEX UPPER EXT VENOUS LEFT   Final Result      XR CHEST PORT   Final Result   Findings/impression:      Cardiac silhouette is enlarged. Central vascular congestion and patchy central   airspace disease/edema. Suspect trace right pleural effusion. No evidence of pneumothorax. No acute osseous abnormality identified. Assessment: Acute DVT  Acute renal failure on chronic renal failure  Anemia  Hypertension  Ventricular tachycardia s/p atrial fibrillation now in regular sinus rhythm          Plan: As mentioned before patient will be now on oral anticoagulants. Hemoglobin has been stable. Stool for occult blood is negative        Care Plan discussed with: Patient/Family and RN taking care of the patient    Total time spent with patient: 30 minutes.     Jesu Adkins MD

## 2021-07-23 NOTE — PROGRESS NOTES
Pulmonary, Critical Care    Name: Ilda Lamas MRN: [id number]   : 1955 Hospital: [address] [place] [address]   Date: 2021  Admission date: 2021 Hospital Day: 12       Subjective/Interval History:   Seen in the emergency room wearing noninvasive ventilator. Patient has underlying renal insufficiency developed worsening shortness of breath cough presented to the emergency room chest x-ray shows pulmonary edema. He was profoundly hypoxic is now on noninvasive ventilator and feels more comfortable. Has not had any significant urine output since he has been in the ER. He also complains of new onset left arm swelling   post void bladder scan showed greater than 200 cc urine retention and Quesada catheter placed with 500 cc removed. Overnight he has put out an additional 1200 cc. Respirations improved currently nonlabored on nasal oxygen. Duplex scan of the left arm did show left axillary and proximal brachial DVT and heparin was started. On heparin with Quesada catheter and he has had slight bleeding at the urethral meatus. Ultrasound of the abdomen is pending  7/15 ultrasound of the abdomen showed medical renal disease on the right with small kidney no hydronephrosis there was evidence of prostate enlargement. He is breathing easily at this point and on room air is running on oxygen saturation of 93%   respirations nonlabored on nasal oxygen. Quesada catheter is out he states he has been voiding frequent small amounts without straining   no specific complaints breathing easily. Overnight oximetry showed minimal but significant desaturation while on room air 8 minutes 40 seconds with low of 71%  . Developed V. tach this morning given IV amiodarone IV magnesium and transferred to the unit. Currently heart rate  alternating between A. fib and sinus with PAC. He denies any discomfort is breathing easily   remains sinus rhythm with PVCs.   Had worsening hypoxia during the night and placed on oxygen he feels comfortable at this point. Urine output mildly improved yesterday but input remains greater than output  7/23 no specific complaints respirations nonlabored currently room air with saturation 97% Lasix been resumed today  Patient Active Problem List   Diagnosis Code    GI bleed K92.2    Pulmonary edema J81.1       IMPRESSION:   1. Ventricular tachycardia none further seen   2. Atrial fibrillation converted to sinus  3. Hypotension corrected with IV fluids and holding antihypertensive medications  4. Acute hypoxic respiratory failure room air oxygen saturation maintained while awake will continue nocturnal oxygen  5. Chronic hypoxic respiratory failure last overnight oximetry shows continued desaturation at night  6. Acute pulmonary edema radiographically no change 7/23  7. 2 of 4 blood culture bottles with coagulase-negative staph aureus likely skin contaminant   8. Bilateral pleural effusions  chest x-ray unchanged  9. Acute on chronic kidney disease creatinine improved slightly  10. Obstructive uropathy Quesada catheter has been removed with no residual urine on bladder scanning  11. Severe hypertension corrected   12. DVT left axillary and brachial remains on IV heparin  13. Anemia hemoglobin improved after last transfusion 7/21  14. Troponin leak likely due to renal insufficiency stable has not risen any further  Body mass index is 29.36 kg/m². RECOMMENDATIONS/PLAN:     1. Remains in sinus rhythm  2. Blood pressure well controlled now on metoprolol low-dose heart rate stable  3. Continue Flomax for prostate enlargement   4. Continue nasal oxygen overnight oximetry shows continued desaturation at night         Subjective/Initial History:       I was asked by Jamaica Hughes MD to see Zina Diez a 76 y.o.    male  in consultation for a chief complaint of shortness of breath pulmonary edema acute hypoxic respiratory failure        Patient PCP: Shilpi Slaughter MD  PMH:  has a past medical history of Chronic kidney disease and Hypertension. PSH:   has no past surgical history on file. FHX: family history is not on file. SHX:  reports that he has never smoked. He has never used smokeless tobacco.    Systemic review somewhat limited as he is on noninvasive ventilator remains short of breath although states he is feeling better  General he has not noted any worsening edema of his upper legs fever chills or sweats does complain of new onset swelling of his left hand and arm  Eyes no double vision or momentary blindness  ENT no facial pain over the sinuses  Endocrinologic no polyuria polydipsia  Musculoskeletal no swollen tender joints  Neurologic no history seizures or syncope  Gastrointestinal no nausea vomiting acid indigestion  Genitourinary states he does still pass fair amount of urine each day has not noted any decrease in that.   Does not have to strain to urinate has no bleeding or drainage  Cardiovascular he is not aware of any underlying heart disease has not had chest pain diaphoresis has had worsening shortness of breath laying down and with exertion  Respiratory worsening shortness of breath over the last week or more no significant cough no sputum production no chills or sweats did have history COVID-19 pneumonitis January of this year    No Known Allergies   MEDS:   Current Facility-Administered Medications   Medication    sevelamer carbonate (RENVELA) tab 1,600 mg    furosemide (LASIX) tablet 80 mg    0.9% sodium chloride infusion 250 mL    0.9% sodium chloride infusion 250 mL    metoprolol tartrate (LOPRESSOR) tablet 25 mg    [Held by provider] amLODIPine (NORVASC) tablet 10 mg    tamsulosin (FLOMAX) capsule 0.4 mg    [Held by provider] hydrALAZINE (APRESOLINE) tablet 50 mg    sodium bicarbonate tablet 650 mg    0.9% sodium chloride infusion 250 mL    calcitRIOL (ROCALTROL) capsule 0.25 mcg    ergocalciferol capsule 50,000 Units    hydrALAZINE (APRESOLINE) 20 mg/mL injection 40 mg    heparin 25,000 units in D5W 250 ml infusion    heparin (porcine) 1,000 unit/mL injection 6,340 Units    Or    heparin (porcine) 1,000 unit/mL injection 3,170 Units        Current Facility-Administered Medications:     sevelamer carbonate (RENVELA) tab 1,600 mg, 1,600 mg, Oral, TID WITH MEALS, Pk Araujo MD, 1,600 mg at 07/23/21 6428    furosemide (LASIX) tablet 80 mg, 80 mg, Oral, DAILY, Pk Araujo MD, 80 mg at 07/23/21 2461    0.9% sodium chloride infusion 250 mL, 250 mL, IntraVENous, PRN, Ophelia Monaco MD    0.9% sodium chloride infusion 250 mL, 250 mL, IntraVENous, PRN, Ophelia Monaco MD    metoprolol tartrate (LOPRESSOR) tablet 25 mg, 25 mg, Oral, DAILY, Pk Araujo MD, 25 mg at 07/23/21 1218    [Held by provider] amLODIPine (NORVASC) tablet 10 mg, 10 mg, Oral, DAILY, Ophelia Monaco MD, 10 mg at 07/18/21 0825    tamsulosin (FLOMAX) capsule 0.4 mg, 0.4 mg, Oral, DAILY, Jeana Lovett MD, 0.4 mg at 07/23/21 0748    [Held by provider] hydrALAZINE (APRESOLINE) tablet 50 mg, 50 mg, Oral, TID, Jeana Lovett MD, 50 mg at 07/18/21 2054    sodium bicarbonate tablet 650 mg, 650 mg, Oral, TID, Jeana Lovett MD, 650 mg at 07/23/21 6466    0.9% sodium chloride infusion 250 mL, 250 mL, IntraVENous, PRN, Jeana Lovett MD    calcitRIOL (ROCALTROL) capsule 0.25 mcg, 0.25 mcg, Oral, DAILY, Pk Araujo MD, 0.25 mcg at 07/23/21 6916    ergocalciferol capsule 50,000 Units, 50,000 Units, Oral, Q7D, Pk Araujo MD, 50,000 Units at 07/21/21 1711    hydrALAZINE (APRESOLINE) 20 mg/mL injection 40 mg, 40 mg, IntraVENous, Q6H PRN, Jeana Lovett MD, 40 mg at 07/15/21 1552    heparin 25,000 units in D5W 250 ml infusion, 18-36 Units/kg/hr (Adjusted), IntraVENous, TITRATE, Ophelia Monaco MD, Last Rate: 11.1 mL/hr at 07/22/21 1753, 14 Units/kg/hr at 07/22/21 1753    heparin (porcine) 1,000 unit/mL injection 6,340 Units, 80 Units/kg (Adjusted), IntraVENous, PRN, 5,000 Units at 21 0954 **OR** heparin (porcine) 1,000 unit/mL injection 3,170 Units, 40 Units/kg (Adjusted), IntraVENous, PRN, Olayinka Willett MD, 3,170 Units at 21 1541      Objective:     Vital Signs: Telemetry:    normal sinus rhythm Intake/Output:   Visit Vitals  BP (!) 147/88 (BP 1 Location: Right upper arm, BP Patient Position: At rest)   Pulse (!) 58   Temp 97.4 °F (36.3 °C)   Resp 13   Ht 6' (1.829 m)   Wt 98.2 kg (216 lb 7.9 oz)   SpO2 92%   BMI 29.36 kg/m²       Temp (24hrs), Av.8 °F (36.6 °C), Min:97.4 °F (36.3 °C), Max:98.1 °F (36.7 °C)        O2 Device: None (Room air) O2 Flow Rate (L/min): 2 l/min         Body mass index is 29.36 kg/m². Wt Readings from Last 4 Encounters:   21 98.2 kg (216 lb 7.9 oz)   21 79.4 kg (175 lb)        No intake or output data in the 24 hours ending 21 0954    Last shift:      No intake/output data recorded. Last 3 shifts:  1901 -  0700  In: 643.8   Out: 300 [Urine:300]       Hemodynamics:    CO:    CI:    CVP:    SVR:   PAP Systolic:    PAP Diastolic:    PVR:    [place]03:       Ventilator Settings:      Mode Rate TV Press PEEP FiO2 PIP Min. Vent               28 %     9.7 l/min      Physical Exam:    General:  male; no distress now on 2 L nasal oxygen  HEENT: NCAT, visible oral mucosa is moist and clear  Eyes: anicteric; conjunctiva clear extraocular movements intact  Neck: no nodes,,no accessory MM use. no respiratory distress  Chest: no deformity,   Cardiac: Regular rhythm and rate now controlled  Lungs: distant breath sounds; clear anteriorly and laterally with rales in the bases  Abd: Soft positive bowel sounds no tenderness  Ext: Improvement in edema  of the lower extremities with continued edema of the left arm; no joint swelling;  No clubbing  : Clear urine  Neuro: Alert awake no distress speech is clear moves all 4 extremities  Psych-calm, oriented to person; Skin: warm, dry, no cyanosis;   Pulses: Brachial and radial pulses intact  Capillary:slow capillary refill      Labs:    Recent Labs     07/23/21  0400 07/22/21  1600 07/22/21  0422 07/21/21  1355 07/21/21  0400   WBC 4.5  --  4.4  --  3.9*   HGB 8.5*  --  8.5*  --  7.4*   *  --  155  --  161   APTT >153.0* 57.0* >153.0*   < > 47.8*    < > = values in this interval not displayed. Recent Labs     07/23/21  0400 07/22/21  0422 07/21/21  0400    139 139   K 4.7 4.4 4.2    106 105   CO2 23 23 22   GLU 92 83 91   BUN 91* 99* 80*   CREA 7.32* 7.65* 7.45*   CA 8.0* 7.8* 8.1*   PHOS 8.3* 8.6* 8.5*   ALB 3.0* 3.0* 3.1*   7/19 overnight oximetry shows 14 minutes 26 seconds below 88% with low oxygen saturation 75%  7/17 overnight oximetry shows low oxygen saturation 71% with 8 minutes 40 seconds below 88% from 1 AM to 6 AM   7/16 overnight oximetry on 1 L shows only 2 minutes 20 seconds below 88% with a low oxygen saturation of 83%  7/15 room air oxygen saturation 93%  currently on noninvasive ventilator inspiratory pressure 16 expiratory pressure six rate 16 FiO2 28% with oxygen saturation 96%   BNP 32,264, previous greater than 35,000  Lab Results   Component Value Date/Time    Culture result:  07/12/2021 10:40 PM     Two of four bottles have been flagged positive by instrument. Bottles have been sent to [facility] laboratory to assess for possible growth.  Gram Positive Cocci in clusters CALLED TO AND READ BACK BY Lenin Vieira r.n. [phone] 07/14/2021 by dpw    Culture result:  07/12/2021 10:40 PM     Staphylococcus species, coagulase negative GROWING IN THE 1ST OF 4 BOTTLES DRAWN    Culture result:  07/12/2021 10:40 PM     Staphylococcus species, coagulase negative (2nd colony type/strain) GROWING IN THE 2ND OF 4 BOTTLES DRAWN        Imaging:  I have personally reviewed the patients radiographs and have reviewed the reports:    CXR Results  (Last 48 hours)  7/23 chest x-ray mild pulmonary congestion tiny effusions unchanged from last x-ray although right upper lobe lung may have slightly less congestion  7/21 chest x-ray with continued mild pulmonary edema pattern small bilateral pleural effusions there may be improved inspiratory effort  7/16 chest x-ray with continued mild pulmonary edema and small pleural effusions unchanged               07/12/21 2251  XR CHEST PORT Final result    Impression:  Findings/impression:       Cardiac silhouette is enlarged. Central vascular congestion and patchy central   airspace disease/edema. Suspect trace right pleural effusion. No evidence of pneumothorax. No acute osseous abnormality identified. Narrative:  Study: XR CHEST PORT       Clinical indication: sob       Comparison: None. To me chest x-ray consistent with cardiomegaly pulmonary edema and bilateral pleural effusions           Results from Hospital Encounter encounter on 07/12/21    XR CHEST PORT    Narrative  This study is a portable AP radiograph of the chest dated 7/23/2021 obtained at  2:52 AM.    HISTORY: Pulmonary edema. COMPARISON: 7/21/2021. FINDINGS: There is moderate enlargement of the cardiac silhouette. There is  associated distention of the pulmonary vessels and perivascular ill definition  consistent with interstitial edema. There also is perivascular airspace disease  compatible with alveolar edema. There has been a decrease in the alveolar edema  within the right upper lobe. This examination is negative for larger pleural effusions. The remainder of the examination is unchanged. Impression  There is continued moderate pulmonary interstitial and alveolar  edema with a slight interval improvement within the right upper lobe. The  remainder of this examination is unchanged. XR CHEST PORT    Narrative  Chest single view. Comparison single view chest July 19, 2021. Patchy reticular markings through the lungs, same distribution.  Those through  lung bases appear less dense; suspect mild degree improved aeration. Cardiac and  mediastinal structures unchanged. No pneumothorax or sizable pleural effusion. XR CHEST PORT    Narrative  Chest single view. Comparison single view chest July 16, 2021. Advanced patchy central and basilar reticular markings through the lungs. Consider pneumonia. Cardiac and mediastinal structures magnified on this AP  view. No pneumothorax or sizable pleural effusion. Results from [facility] encounter on 01/05/21    CT CHEST WO CONT    Narrative  Images obtained without contrast include the abdomen and pelvis. Comparison portable chest radiograph earlier today. Dose Reduction:  All CT scans at this facility are performed using dose reduction optimization  techniques as appropriate to a performed exam including the following: Automated  exposure control, adjustments of the mA and/or kV according to patient size, or  use of iterative reconstruction technique. Subtle peripheral groundglass densities are noted in both lungs, could reflect  Covid pneumonia or other. No pneumothorax or pneumomediastinum. The radiographic findings suspicious for  pneumomediastinum probably was artifact, perhaps related to cardiac motion. No pleural effusion or adenopathy. Cardiomegaly again noted. Impression  IMPRESSION:  1. No pneumomediastinum or pneumothorax. 2. Cardiomegaly. 3. Subtle groundglass densities in both lungs might be pneumonia or other. Discussion patient with worsening renal insufficiency has developed pulmonary edema acute hypoxic respiratory failure. He states he still has urine output normally at home. We will give him high-dose IV Lasix to see if diuresis is induced. He very likely will need dialysis. He has minimal elevation in troponin probably troponin leak from hypoxia or just due to his renal insufficiency. He is not having any chest pain. Does have severe hypertension.   Has been started on clonidine and hydralazine. 7/14 no distress today on nasal oxygen. Did have residual on post void bladder scan and Quesada catheter was placed with good diuresis overnight. Despite this potassium remains elevated. We will give 1 dose of Kayexalate. Despite diuresis hemoglobin is dropped to 6.7 will transfuse. He denies frequency small-volume urine or straining to urinate at home despite this with signs of obstruction we will add Flomax. Potassium 6 today we will give 1 dose of Kayexalate and continue diuresis  7/16 respirations nonlabored. Was placed back on 1 L nasal oxygen yesterday overnight oximetry shows well-maintained oxygen saturation. Will check overnight tonight on and off oxygen. Quesada catheter has been removed. Chest x-ray continues to show mild pulmonary edema  7/17 overnight oximetry shows desaturation when on room air. Will maintain oxygen and repeat overnight oximetry Ibrahima night  7/19 developed V. tach overnight and now in the ICU on IV amiodarone with rhythm showing alternating sinus with PAC and A. fib. Rate . Overnight oximetry continued to show desaturation on room air and he is back on nasal oxygen. He has no specific complaint  7/20 now in sinus rhythm with frequent PVCs. Cardiology is recommending cardiac cath but he is reluctant to undergo that. Creatinine has worsened despite IV fluid administration yesterday although blood pressure is improved. Currently oxygen saturation well-maintained on room air but last overnight oximetry showed desaturation will maintain oxygen at 1 L for now  7/21 had hypoxia yesterday afternoon and placed back on oxygen during the daytime as well as the night. He feels comfortable at this point. Chest x-ray is unchanged still has mild pulmonary edema with small effusions. It does appear that he took a deeper breath today. Creatinine remains markedly elevated. He is on heparin for left axillary DVT.   Holding switching to oral anticoagulation until sure no instrumentation is needed   7/23 unchanged remains on IV heparin. Oral Lasix being resumed did require transfusion 7/21. Repeat chest x-ray no worsening           Thank you for allowing us to participate in the care of this patient.   We will follow along with you     Riana Browne MD

## 2021-07-23 NOTE — PROGRESS NOTES
CM met with patient at bedside to discuss DC planning and therapy recommendations for IRF. Patient does want to go to IRF and chooses [facility]. CM sent referral. He will not need authorization.

## 2021-07-24 LAB
ALBUMIN SERPL-MCNC: 3.2 G/DL (ref 3.5–5)
ANION GAP SERPL CALC-SCNC: 8 MMOL/L (ref 5–15)
BASOPHILS # BLD: 0 K/UL (ref 0–0.1)
BASOPHILS NFR BLD: 1 % (ref 0–1)
BUN SERPL-MCNC: 96 MG/DL (ref 6–20)
BUN/CREAT SERPL: 13 (ref 12–20)
CA-I BLD-MCNC: 8.1 MG/DL (ref 8.5–10.1)
CHLORIDE SERPL-SCNC: 107 MMOL/L (ref 97–108)
CO2 SERPL-SCNC: 23 MMOL/L (ref 21–32)
CREAT SERPL-MCNC: 7.26 MG/DL (ref 0.7–1.3)
DIFFERENTIAL METHOD BLD: ABNORMAL
EOSINOPHIL # BLD: 0.3 K/UL (ref 0–0.4)
EOSINOPHIL NFR BLD: 7 % (ref 0–7)
ERYTHROCYTE [DISTWIDTH] IN BLOOD BY AUTOMATED COUNT: 18.3 % (ref 11.5–14.5)
GLUCOSE SERPL-MCNC: 91 MG/DL (ref 65–100)
HCT VFR BLD AUTO: 29.8 % (ref 36.6–50.3)
HGB BLD-MCNC: 8.9 G/DL (ref 12.1–17)
IMM GRANULOCYTES # BLD AUTO: 0 K/UL (ref 0–0.04)
IMM GRANULOCYTES NFR BLD AUTO: 0 % (ref 0–0.5)
LYMPHOCYTES # BLD: 0.4 K/UL (ref 0.8–3.5)
LYMPHOCYTES NFR BLD: 8 % (ref 12–49)
MCH RBC QN AUTO: 27.8 PG (ref 26–34)
MCHC RBC AUTO-ENTMCNC: 29.9 G/DL (ref 30–36.5)
MCV RBC AUTO: 93.1 FL (ref 80–99)
MONOCYTES # BLD: 0.8 K/UL (ref 0–1)
MONOCYTES NFR BLD: 17 % (ref 5–13)
NEUTS SEG # BLD: 3 K/UL (ref 1.8–8)
NEUTS SEG NFR BLD: 67 % (ref 32–75)
NRBC # BLD: 0 K/UL (ref 0–0.01)
NRBC BLD-RTO: 0 PER 100 WBC
PHOSPHATE SERPL-MCNC: 8.2 MG/DL (ref 2.6–4.7)
PLATELET # BLD AUTO: 144 K/UL (ref 150–400)
PMV BLD AUTO: 10.8 FL (ref 8.9–12.9)
POTASSIUM SERPL-SCNC: 4.9 MMOL/L (ref 3.5–5.1)
RBC # BLD AUTO: 3.2 M/UL (ref 4.1–5.7)
SODIUM SERPL-SCNC: 138 MMOL/L (ref 136–145)
WBC # BLD AUTO: 4.6 K/UL (ref 4.1–11.1)

## 2021-07-24 PROCEDURE — 65270000029 HC RM PRIVATE

## 2021-07-24 PROCEDURE — 80069 RENAL FUNCTION PANEL: CPT

## 2021-07-24 PROCEDURE — 85025 COMPLETE CBC W/AUTO DIFF WBC: CPT

## 2021-07-24 PROCEDURE — 74011250637 HC RX REV CODE- 250/637: Performed by: INTERNAL MEDICINE

## 2021-07-24 PROCEDURE — 36415 COLL VENOUS BLD VENIPUNCTURE: CPT

## 2021-07-24 RX ADMIN — METOPROLOL TARTRATE 25 MG: 25 TABLET, FILM COATED ORAL at 10:07

## 2021-07-24 RX ADMIN — SEVELAMER CARBONATE 1600 MG: 800 TABLET, FILM COATED ORAL at 12:18

## 2021-07-24 RX ADMIN — SEVELAMER CARBONATE 1600 MG: 800 TABLET, FILM COATED ORAL at 10:07

## 2021-07-24 RX ADMIN — APIXABAN 2.5 MG: 2.5 TABLET, FILM COATED ORAL at 21:48

## 2021-07-24 RX ADMIN — SODIUM BICARBONATE 650 MG: 650 TABLET ORAL at 21:48

## 2021-07-24 RX ADMIN — CALCITRIOL CAPSULES 0.25 MCG 0.25 MCG: 0.25 CAPSULE ORAL at 10:07

## 2021-07-24 RX ADMIN — TAMSULOSIN HYDROCHLORIDE 0.4 MG: 0.4 CAPSULE ORAL at 10:07

## 2021-07-24 RX ADMIN — FUROSEMIDE 80 MG: 40 TABLET ORAL at 10:07

## 2021-07-24 RX ADMIN — APIXABAN 2.5 MG: 2.5 TABLET, FILM COATED ORAL at 10:07

## 2021-07-24 RX ADMIN — SODIUM BICARBONATE 650 MG: 650 TABLET ORAL at 16:01

## 2021-07-24 RX ADMIN — SODIUM BICARBONATE 650 MG: 650 TABLET ORAL at 10:07

## 2021-07-24 RX ADMIN — SEVELAMER CARBONATE 1600 MG: 800 TABLET, FILM COATED ORAL at 16:01

## 2021-07-24 NOTE — ROUTINE PROCESS
Transfer skin assessment done with Robinson Feliz RN. Black, dry, leathery scab noted to left great toe. Bilateral lower legs extremely edematous, dry, and flaky. Abrasion noted to left shin. Left arm swollen and tight from elbow down to hand.

## 2021-07-24 NOTE — PROGRESS NOTES
Hematology/Oncology   Progress Note    Patient: Grisel Vergara MRN: [id number]     YOB: 1955  Age: 76 y.o. Sex: male      Admit Date: 7/12/2021    LOS: 11 days     Chief Complaint: Admitted with shortness of breath    Subjective:     Out of ICU. Feels well. No bleeding reported. Constitutional No fevers, chills, night sweats, excessive fatigue or weight loss. Allergic/Immunologic No recent allergic reactions   Eyes No significant visual difficulties. No diplopia. ENMT No problems with hearing, no sore throat, no sinus drainage. Endocrine No hot flashes or night sweats. No cold intolerance, polyuria, or polydipsia   Hematologic/Lymphatic No easy bruising or bleeding. The patient denies any tender or palpable lymph nodes   Breasts No abnormal masses of breast, nipple discharge or pain. Respiratory No dyspnea on exertion, orthopnea, chest pain, cough or hemoptysis. Cardiovascular No anginal chest pain, irregular heart beat, tachycardia, palpitations or orthopnea. Gastrointestinal No nausea, vomiting, diarrhea, constipation, cramping, dysphagia, reflux, heartburn, GI bleeding, or early satiety. No change in bowel habits. Genitourinary (M) No hematuria, dysuria, increased frequency, urgency, hesitancy or incontinence. Musculoskeletal No joint pain, swelling or redness. No decreased range of motion. Integumentary No chronic rashes, inflammation, ulcerations, pruritus, petechiae, purpura, ecchymoses, or skin changes. Neurologic No headache, blurred vision, and no areas of focal weakness or numbness. Normal gait. No sensory problems. Psychiatric No insomnia, depression, jaime or mood swings. No psychotropic drugs.         Current Facility-Administered Medications:     apixaban (ELIQUIS) tablet 2.5 mg, 2.5 mg, Oral, BID, Jailyn Walton MD, 2.5 mg at 07/24/21 1007    sevelamer carbonate (RENVELA) tab 1,600 mg, 1,600 mg, Oral, TID WITH MEALS, Massiel Barajas MD, 1,600 mg at 07/24/21 1601    furosemide (LASIX) tablet 80 mg, 80 mg, Oral, DAILY, Tab Chan MD, 80 mg at 07/24/21 1007    0.9% sodium chloride infusion 250 mL, 250 mL, IntraVENous, PRN, Germania Estrada MD    0.9% sodium chloride infusion 250 mL, 250 mL, IntraVENous, PRN, Germania Estrada MD    metoprolol tartrate (LOPRESSOR) tablet 25 mg, 25 mg, Oral, DAILY, Tab Chan MD, 25 mg at 07/24/21 1007    [Held by provider] amLODIPine (NORVASC) tablet 10 mg, 10 mg, Oral, DAILY, Germania Estrada MD, 10 mg at 07/18/21 0825    tamsulosin (FLOMAX) capsule 0.4 mg, 0.4 mg, Oral, DAILY, Sara Lopez MD, 0.4 mg at 07/24/21 1007    [Held by provider] hydrALAZINE (APRESOLINE) tablet 50 mg, 50 mg, Oral, TID, Sara Lopez MD, 50 mg at 07/18/21 2054    sodium bicarbonate tablet 650 mg, 650 mg, Oral, TID, Sara Lopez MD, 650 mg at 07/24/21 1601    0.9% sodium chloride infusion 250 mL, 250 mL, IntraVENous, PRN, Sara Lopez MD    calcitRIOL (ROCALTROL) capsule 0.25 mcg, 0.25 mcg, Oral, DAILY, Tab Chan MD, 0.25 mcg at 07/24/21 1007    ergocalciferol capsule 50,000 Units, 50,000 Units, Oral, Q7D, Tab Chan MD, 50,000 Units at 07/21/21 1711    hydrALAZINE (APRESOLINE) 20 mg/mL injection 40 mg, 40 mg, IntraVENous, Q6H PRN, Sara Lopez MD, 40 mg at 07/15/21 1552     Objective:     Vitals:    07/24/21 0851 07/24/21 1009 07/24/21 1236 07/24/21 1545   BP: (!) 168/98  (!) 155/92 (!) 142/91   Pulse: 67 69 (!) 54 (!) 58   Resp: 16  18 22   Temp: 98.1 °F (36.7 °C)  97.4 °F (36.3 °C) 97.2 °F (36.2 °C)   SpO2: 96%  94% 95%   Weight:       Height:              Physical Exam:   Constitutional Alert, cooperative, oriented. Mood and affect appropriate. Appears close to chronological age. Well nourished. Well developed. Head Normocephalic; no scars   Eyes Conjunctivae and sclerae are clear and without icterus. Pupils are reactive and equal.   ENMT Sinuses are nontender.   No oral exudates, ulcers, masses, thrush or mucositis. Oropharynx clear. Tongue normal.   Neck Supple without masses or thyromegaly. No jugular venous distension. Hematologic/Lymphatic No petechiae or purpura. No tender or palpable lymph nodes in the cervical, supraclavicular, axillary or inguinal area. Respiratory Lungs are clear to auscultation without rhonchi or wheezing. Cardiovascular Regular rate and rhythm of heart without murmurs, gallops or rubs. Chest / Line Site Chest is symmetric with no chest wall deformities. Abdomen Non-tender, non-distended, no masses, ascites or hepatosplenomegaly. Good bowel sounds. No guarding or rebound tenderness. No pulsatile masses. Musculoskeletal No tenderness or swelling, normal range of motion without obvious weakness. Extremities No visible deformities, no cyanosis, clubbing or edema. Skin No rashes, scars, or lesions suggestive of malignancy. No petechiae, purpura, or ecchymoses. No excoriations. Neurologic No sensory or motor deficits, normal cerebellar function, normal gait, cranial nerves intact. Psychiatric Alert and oriented times three. Coherent speech. Verbalizes understanding of our discussions today. Lab/Data Review:  Recent Labs     07/24/21  0814 07/23/21  0400 07/22/21  0422   WBC 4.6 4.5 4.4   HGB 8.9* 8.5* 8.5*   HCT 29.8* 28.1* 27.7*   * 143* 155     Recent Labs     07/24/21  0814 07/23/21  0400 07/22/21  0422    137 139   K 4.9 4.7 4.4    104 106   CO2 23 23 23   GLU 91 92 83   BUN 96* 91* 99*   CREA 7.26* 7.32* 7.65*   CA 8.1* 8.0* 7.8*   PHOS 8.2* 8.3* 8.6*   ALB 3.2* 3.0* 3.0*     No results for input(s): PH, PCO2, PO2, HCO3, FIO2 in the last 72 hours.   Recent Results (from the past 24 hour(s))   CBC WITH AUTOMATED DIFF    Collection Time: 07/24/21  8:14 AM   Result Value Ref Range    WBC 4.6 4.1 - 11.1 K/uL    RBC 3.20 (L) 4.10 - 5.70 M/uL    HGB 8.9 (L) 12.1 - 17.0 g/dL    HCT 29.8 (L) 36.6 - 50.3 %    MCV 93.1 80.0 - 99.0 FL    MCH 27.8 26.0 - 34.0 PG    MCHC 29.9 (L) 30.0 - 36.5 g/dL    RDW 18.3 (H) 11.5 - 14.5 %    PLATELET 236 (L) 471 - 400 K/uL    MPV 10.8 8.9 - 12.9 FL    NRBC 0.0 0.0  WBC    ABSOLUTE NRBC 0.00 0.00 - 0.01 K/uL    NEUTROPHILS 67 32 - 75 %    LYMPHOCYTES 8 (L) 12 - 49 %    MONOCYTES 17 (H) 5 - 13 %    EOSINOPHILS 7 0 - 7 %    BASOPHILS 1 0 - 1 %    IMMATURE GRANULOCYTES 0 0 - 0.5 %    ABS. NEUTROPHILS 3.0 1.8 - 8.0 K/UL    ABS. LYMPHOCYTES 0.4 (L) 0.8 - 3.5 K/UL    ABS. MONOCYTES 0.8 0.0 - 1.0 K/UL    ABS. EOSINOPHILS 0.3 0.0 - 0.4 K/UL    ABS. BASOPHILS 0.0 0.0 - 0.1 K/UL    ABS. IMM. GRANS. 0.0 0.00 - 0.04 K/UL    DF AUTOMATED     RENAL FUNCTION PANEL    Collection Time: 07/24/21  8:14 AM   Result Value Ref Range    Sodium 138 136 - 145 mmol/L    Potassium 4.9 3.5 - 5.1 mmol/L    Chloride 107 97 - 108 mmol/L    CO2 23 21 - 32 mmol/L    Anion gap 8 5 - 15 mmol/L    Glucose 91 65 - 100 mg/dL    BUN 96 (H) 6 - 20 mg/dL    Creatinine 7.26 (H) 0.70 - 1.30 mg/dL    BUN/Creatinine ratio 13 12 - 20      GFR est AA 9 (L) >60 ml/min/1.73m2    GFR est non-AA 8 (L) >60 ml/min/1.73m2    Calcium 8.1 (L) 8.5 - 10.1 mg/dL    Phosphorus 8.2 (H) 2.6 - 4.7 mg/dL    Albumin 3.2 (L) 3.5 - 5.0 g/dL        Radiology:   CT Results  (Last 48 hours)    None           Assessment and Plan: Active Problems:    Pulmonary edema (7/13/2021)      Left Upper Ext DVT:  - Venous doppler from 7/13 showed DVT of the left axillary and brachial veins.  - Currently on Heparin gtt. - Given ongoing drop in Hb needing blood transfusion, would recommend continuing Heparin drip for now. - Hb is improved and stable at 8.9 after 2 units of PRBC. - Agree with Eliquis 2.5 mg BID. Recommend 3 months of anticoagulation.     Anemia:  - Partly due to CKD. S/p IV Iron. On epogen per Nephrology. - Stool for occult blood is negative. - Patient reports last colonoscopy in 2020 at [facility].     CKD:  - Nephrology following.     Signed By: Marcelo Martino MD July 24, 2021

## 2021-07-24 NOTE — PROGRESS NOTES
Hematology/Oncology   Progress Note    Patient: Kanika Maddox MRN: [id number]     YOB: 1955  Age: 76 y.o. Sex: male      Admit Date: 7/12/2021    LOS: 10 days     Chief Complaint: Admitted with shortness of breath    Subjective:     Patient was seen in ICU. Feels well. No bleeding reported. Constitutional No fevers, chills, night sweats, excessive fatigue or weight loss. Allergic/Immunologic No recent allergic reactions   Eyes No significant visual difficulties. No diplopia. ENMT No problems with hearing, no sore throat, no sinus drainage. Endocrine No hot flashes or night sweats. No cold intolerance, polyuria, or polydipsia   Hematologic/Lymphatic No easy bruising or bleeding. The patient denies any tender or palpable lymph nodes   Breasts No abnormal masses of breast, nipple discharge or pain. Respiratory No dyspnea on exertion, orthopnea, chest pain, cough or hemoptysis. Cardiovascular No anginal chest pain, irregular heart beat, tachycardia, palpitations or orthopnea. Gastrointestinal No nausea, vomiting, diarrhea, constipation, cramping, dysphagia, reflux, heartburn, GI bleeding, or early satiety. No change in bowel habits. Genitourinary (M) No hematuria, dysuria, increased frequency, urgency, hesitancy or incontinence. Musculoskeletal No joint pain, swelling or redness. No decreased range of motion. Integumentary No chronic rashes, inflammation, ulcerations, pruritus, petechiae, purpura, ecchymoses, or skin changes. Neurologic No headache, blurred vision, and no areas of focal weakness or numbness. Normal gait. No sensory problems. Psychiatric No insomnia, depression, jaime or mood swings. No psychotropic drugs.         Current Facility-Administered Medications:     apixaban (ELIQUIS) tablet 2.5 mg, 2.5 mg, Oral, BID, Georgia Ledbetter MD, 2.5 mg at 07/23/21 1604    sevelamer carbonate (RENVELA) tab 1,600 mg, 1,600 mg, Oral, TID WITH MEALS, Salvador AVILA MD, 1,600 mg at 07/23/21 1604    furosemide (LASIX) tablet 80 mg, 80 mg, Oral, DAILY, Irene Munoz MD, 80 mg at 07/23/21 7933    0.9% sodium chloride infusion 250 mL, 250 mL, IntraVENous, PRN, Vianca Schafer MD    0.9% sodium chloride infusion 250 mL, 250 mL, IntraVENous, PRN, Vianca Schafer MD    metoprolol tartrate (LOPRESSOR) tablet 25 mg, 25 mg, Oral, DAILY, Irene Munoz MD, 25 mg at 07/23/21 3476    [Held by provider] amLODIPine (NORVASC) tablet 10 mg, 10 mg, Oral, DAILY, Vianca Schafer MD, 10 mg at 07/18/21 0825    tamsulosin (FLOMAX) capsule 0.4 mg, 0.4 mg, Oral, DAILY, Kyle Monroe MD, 0.4 mg at 07/23/21 6320    [Held by provider] hydrALAZINE (APRESOLINE) tablet 50 mg, 50 mg, Oral, TID, Kyle Monroe MD, 50 mg at 07/18/21 2054    sodium bicarbonate tablet 650 mg, 650 mg, Oral, TID, Kyle Monroe MD, 650 mg at 07/23/21 1604    0.9% sodium chloride infusion 250 mL, 250 mL, IntraVENous, PRN, Kyle Monroe MD    calcitRIOL (ROCALTROL) capsule 0.25 mcg, 0.25 mcg, Oral, DAILY, Irene Munoz MD, 0.25 mcg at 07/23/21 9432    ergocalciferol capsule 50,000 Units, 50,000 Units, Oral, Q7D, Irene Munoz MD, 50,000 Units at 07/21/21 1711    hydrALAZINE (APRESOLINE) 20 mg/mL injection 40 mg, 40 mg, IntraVENous, Q6H PRN, Kyle Monroe MD, 40 mg at 07/15/21 1552     Objective:     Vitals:    07/23/21 1100 07/23/21 1500 07/23/21 1551 07/23/21 1800   BP: 130/80  (!) 148/94 (!) 132/92   Pulse: (!) 55  (!) 59 (!) 54   Resp: 15  12 11   Temp: 97.5 °F (36.4 °C) 97.4 °F (36.3 °C)     SpO2: 96%  96% 96%   Weight:       Height:              Physical Exam:   Constitutional Alert, cooperative, oriented. Mood and affect appropriate. Appears close to chronological age. Well nourished. Well developed. Head Normocephalic; no scars   Eyes Conjunctivae and sclerae are clear and without icterus. Pupils are reactive and equal.   ENMT Sinuses are nontender.   No oral exudates, ulcers, masses, thrush or mucositis. Oropharynx clear. Tongue normal.   Neck Supple without masses or thyromegaly. No jugular venous distension. Hematologic/Lymphatic No petechiae or purpura. No tender or palpable lymph nodes in the cervical, supraclavicular, axillary or inguinal area. Respiratory Lungs are clear to auscultation without rhonchi or wheezing. Cardiovascular Regular rate and rhythm of heart without murmurs, gallops or rubs. Chest / Line Site Chest is symmetric with no chest wall deformities. Abdomen Non-tender, non-distended, no masses, ascites or hepatosplenomegaly. Good bowel sounds. No guarding or rebound tenderness. No pulsatile masses. Musculoskeletal No tenderness or swelling, normal range of motion without obvious weakness. Extremities No visible deformities, no cyanosis, clubbing or edema. Skin No rashes, scars, or lesions suggestive of malignancy. No petechiae, purpura, or ecchymoses. No excoriations. Neurologic No sensory or motor deficits, normal cerebellar function, normal gait, cranial nerves intact. Psychiatric Alert and oriented times three. Coherent speech. Verbalizes understanding of our discussions today. Lab/Data Review:  Recent Labs     07/23/21  0400 07/22/21  0422 07/21/21  0400   WBC 4.5 4.4 3.9*   HGB 8.5* 8.5* 7.4*   HCT 28.1* 27.7* 25.4*   * 155 161     Recent Labs     07/23/21  0400 07/22/21  0422 07/21/21  0400    139 139   K 4.7 4.4 4.2    106 105   CO2 23 23 22   GLU 92 83 91   BUN 91* 99* 80*   CREA 7.32* 7.65* 7.45*   CA 8.0* 7.8* 8.1*   PHOS 8.3* 8.6* 8.5*   ALB 3.0* 3.0* 3.1*     No results for input(s): PH, PCO2, PO2, HCO3, FIO2 in the last 72 hours.   Recent Results (from the past 24 hour(s))   CBC WITH AUTOMATED DIFF    Collection Time: 07/23/21  4:00 AM   Result Value Ref Range    WBC 4.5 4.1 - 11.1 K/uL    RBC 3.08 (L) 4.10 - 5.70 M/uL    HGB 8.5 (L) 12.1 - 17.0 g/dL    HCT 28.1 (L) 36.6 - 50.3 %    MCV 91.2 80.0 - 99.0 FL    MCH 27.6 26.0 - 34.0 PG    MCHC 30.2 30.0 - 36.5 g/dL    RDW 17.9 (H) 11.5 - 14.5 %    PLATELET 360 (L) 515 - 400 K/uL    MPV 10.1 8.9 - 12.9 FL    NRBC 0.0 0.0  WBC    ABSOLUTE NRBC 0.00 0.00 - 0.01 K/uL    NEUTROPHILS 69 32 - 75 %    LYMPHOCYTES 7 (L) 12 - 49 %    MONOCYTES 15 (H) 5 - 13 %    EOSINOPHILS 8 (H) 0 - 7 %    BASOPHILS 1 0 - 1 %    IMMATURE GRANULOCYTES 0 0 - 0.5 %    ABS. NEUTROPHILS 3.1 1.8 - 8.0 K/UL    ABS. LYMPHOCYTES 0.3 (L) 0.8 - 3.5 K/UL    ABS. MONOCYTES 0.7 0.0 - 1.0 K/UL    ABS. EOSINOPHILS 0.4 0.0 - 0.4 K/UL    ABS. BASOPHILS 0.0 0.0 - 0.1 K/UL    ABS. IMM. GRANS. 0.0 0.00 - 0.04 K/UL    DF AUTOMATED     RENAL FUNCTION PANEL    Collection Time: 07/23/21  4:00 AM   Result Value Ref Range    Sodium 137 136 - 145 mmol/L    Potassium 4.7 3.5 - 5.1 mmol/L    Chloride 104 97 - 108 mmol/L    CO2 23 21 - 32 mmol/L    Anion gap 10 5 - 15 mmol/L    Glucose 92 65 - 100 mg/dL    BUN 91 (H) 6 - 20 mg/dL    Creatinine 7.32 (H) 0.70 - 1.30 mg/dL    BUN/Creatinine ratio 12 12 - 20      GFR est AA 9 (L) >60 ml/min/1.73m2    GFR est non-AA 8 (L) >60 ml/min/1.73m2    Calcium 8.0 (L) 8.5 - 10.1 mg/dL    Phosphorus 8.3 (H) 2.6 - 4.7 mg/dL    Albumin 3.0 (L) 3.5 - 5.0 g/dL   PTT    Collection Time: 07/23/21  4:00 AM   Result Value Ref Range    aPTT >153.0 (HH) 21.2 - 34.1 sec    aPTT, therapeutic range   82 - 109 sec        Radiology:   CT Results  (Last 48 hours)    None           Assessment and Plan: Active Problems:    Pulmonary edema (7/13/2021)      Left Upper Ext DVT:  - Venous doppler from 7/13 showed DVT of the left axillary and brachial veins.  - Currently on Heparin gtt. - Given ongoing drop in Hb needing blood transfusion, would recommend continuing Heparin drip for now. - Hb is improved at 8.5 after 2 units of PRBC. - Agree with Eliquis 2.5 mg BID     Anemia:  - Partly due to CKD. S/p IV Iron. On epogen per Nephrology. - Stool for occult blood is negative.   - Patient reports last colonoscopy in 2020 at [facility].     CKD:  - Nephrology following.     Signed By: Deyanira Manning MD     July 23, 2021

## 2021-07-24 NOTE — PROGRESS NOTES
Progress Note      7/24/2021 9:54 AM  NAME: Jenn Anderson   MRN:  [id number]   Admit Diagnosis: Pulmonary edema [J81.1]      Subjective:   Chart reviewed. Patient has had a symptomatic ventricular tachycardia and was shocked. Vascular surgery note appreciated. Echocardiogram results explained. He feels much better today. Patient was offered option of cardiac catheterization but he has decided not to pursue that. Review of Systems:    Symptom Y/N Comments  Symptom Y/N Comments   Fever/Chills n   Chest Pain n    Poor Appetite    Edema y    Cough    Abdominal Pain     Sputum    Joint Pain n    SOB/CARMONA n   Pruritis/Rash     Nausea/vomit    Other     Diarrhea         Constipation           Could NOT obtain due to:      Objective:          Physical Exam:    Last 24hrs VS reviewed since prior progress note. Most recent are:    Visit Vitals  BP (!) 155/92 (BP 1 Location: Right upper arm)   Pulse (!) 54   Temp 97.4 °F (36.3 °C)   Resp 18   Ht 6' (1.829 m)   Wt 98.2 kg (216 lb 7.9 oz)   SpO2 94%   BMI 29.36 kg/m²       Intake/Output Summary (Last 24 hours) at 7/24/2021 1501  Last data filed at 7/24/2021 1236  Gross per 24 hour   Intake --   Output 575 ml   Net -575 ml        General Appearance: Well developed, well nourished, alert & oriented x 3,    no acute distress. Ears/Nose/Mouth/Throat: Hearing grossly normal.  Neck: Supple. Chest: Lungs clear to auscultation bilaterally. Cardiovascular: Regular rate and rhythm, S1,S2 normal, no murmur. Abdomen: Soft, non-tender, bowel sounds are active. Extremities: Lower extremity edema left upper extremity edema evident  Skin: Lower extremity blisters evident    []         Post-cath site without hematoma, bruit, tenderness, or thrill. Distal pulses intact.     PMH/SH reviewed - no change compared to H&P    Data Review    Telemetry: Patient had a ventricular tachycardia and was shocked and had episode of atrial fibrillation and now is in sinus rhythm with PVCs.    EKG:   []  No new EKG for review    Lab Data Personally Reviewed:    Recent Labs     07/24/21  0814 07/23/21  0400   WBC 4.6 4.5   HGB 8.9* 8.5*   HCT 29.8* 28.1*   * 143*     Recent Labs     07/23/21  0400 07/22/21  1600 07/22/21  0422   APTT >153.0* 57.0* >153.0*      Recent Labs     07/24/21  0814 07/23/21  0400 07/22/21  0422    137 139   K 4.9 4.7 4.4    104 106   CO2 23 23 23   BUN 96* 91* 99*   CREA 7.26* 7.32* 7.65*   GLU 91 92 83   CA 8.1* 8.0* 7.8*     No results for input(s): CPK, CKNDX, TROIQ in the last 72 hours. No lab exists for component: CPKMB  No results found for: CHOL, CHOLX, CHLST, CHOLV, HDL, HDLP, LDL, LDLC, DLDLP, Carrolyn Specter, CHHD, CHHDX    Recent Labs     07/24/21  0814 07/23/21  0400 07/22/21  0422   ALB 3.2* 3.0* 3.0*     No results for input(s): PH, PCO2, PO2 in the last 72 hours.     Medications Personally Reviewed:    Current Facility-Administered Medications   Medication Dose Route Frequency    apixaban (ELIQUIS) tablet 2.5 mg  2.5 mg Oral BID    sevelamer carbonate (RENVELA) tab 1,600 mg  1,600 mg Oral TID WITH MEALS    furosemide (LASIX) tablet 80 mg  80 mg Oral DAILY    0.9% sodium chloride infusion 250 mL  250 mL IntraVENous PRN    0.9% sodium chloride infusion 250 mL  250 mL IntraVENous PRN    metoprolol tartrate (LOPRESSOR) tablet 25 mg  25 mg Oral DAILY    [Held by provider] amLODIPine (NORVASC) tablet 10 mg  10 mg Oral DAILY    tamsulosin (FLOMAX) capsule 0.4 mg  0.4 mg Oral DAILY    [Held by provider] hydrALAZINE (APRESOLINE) tablet 50 mg  50 mg Oral TID    sodium bicarbonate tablet 650 mg  650 mg Oral TID    0.9% sodium chloride infusion 250 mL  250 mL IntraVENous PRN    calcitRIOL (ROCALTROL) capsule 0.25 mcg  0.25 mcg Oral DAILY    ergocalciferol capsule 50,000 Units  50,000 Units Oral Q7D    hydrALAZINE (APRESOLINE) 20 mg/mL injection 40 mg  40 mg IntraVENous Q6H PRN           Problem List:   Acute hypoxic respiratory failure and patient seems to be somewhat better. Severe anemia. Renal failure. Heart failure. Left upper extremity DVT. Minimal elevation of troponin I is probably not a presentation of myocardial infarction. Patient has had ventricular tachycardia and has been shocked. Electrophysiology note appreciated. 1.      Assessment/Plan:   Patient was scheduled for cardiac catheterization but he says he does not want to go for catheterization. We will follow nephrology recommendations and vascular surgery recommendations. I will continue otherwise present care. Thank you. 1.          []       High complexity decision making was performed in this patient at high risk for decompensation with multiple organ involvement.     Keshia Conde MD

## 2021-07-24 NOTE — PROGRESS NOTES
Pulmonary, Critical Care    Name: Deloris Garcia MRN: [id number]   : 1955 Hospital: [address] [place] [address]   Date: 2021  Admission date: 2021 Hospital Day: 13       Subjective/Interval History:   Seen in the emergency room wearing noninvasive ventilator. Patient has underlying renal insufficiency developed worsening shortness of breath cough presented to the emergency room chest x-ray shows pulmonary edema. He was profoundly hypoxic is now on noninvasive ventilator and feels more comfortable. Has not had any significant urine output since he has been in the ER. He also complains of new onset left arm swelling   post void bladder scan showed greater than 200 cc urine retention and Quesada catheter placed with 500 cc removed. Overnight he has put out an additional 1200 cc. Respirations improved currently nonlabored on nasal oxygen. Duplex scan of the left arm did show left axillary and proximal brachial DVT and heparin was started. On heparin with Quesada catheter and he has had slight bleeding at the urethral meatus. Ultrasound of the abdomen is pending  7/15 ultrasound of the abdomen showed medical renal disease on the right with small kidney no hydronephrosis there was evidence of prostate enlargement. He is breathing easily at this point and on room air is running on oxygen saturation of 93%   respirations nonlabored on nasal oxygen. Quesada catheter is out he states he has been voiding frequent small amounts without straining   no specific complaints breathing easily. Overnight oximetry showed minimal but significant desaturation while on room air 8 minutes 40 seconds with low of 71%  . Developed V. tach this morning given IV amiodarone IV magnesium and transferred to the unit. Currently heart rate  alternating between A. fib and sinus with PAC. He denies any discomfort is breathing easily   remains sinus rhythm with PVCs.   Had worsening hypoxia during the night and placed on oxygen he feels comfortable at this point. Urine output mildly improved yesterday but input remains greater than output  7/24 no specific complaints respirations nonlabored currently room air with saturation 97% Lasix been resumed 7/23. Urine output not accurately recorded but he states he has been passing a lot of urine  Patient Active Problem List   Diagnosis Code    GI bleed K92.2    Pulmonary edema J81.1       IMPRESSION:   1. Ventricular tachycardia none further seen   2. Atrial fibrillation converted to sinus  3. Hypotension corrected with IV fluids and holding antihypertensive medications  4. Acute hypoxic respiratory failure room air oxygen saturation maintained while awake will repeat overnight oximetry  5. Chronic hypoxic respiratory failure last overnight oximetry showed continued desaturation at night will repeat study  6. Acute pulmonary edema radiographically no change 7/23  7. 2 of 4 blood culture bottles with coagulase-negative staph aureus likely skin contaminant   8. Bilateral pleural effusions  chest x-ray unchanged  9. Acute on chronic kidney disease creatinine improved slightly  10. Obstructive uropathy Quesada catheter has been removed with no residual urine on bladder scanning  11. Severe hypertension corrected   12. DVT left axillary and brachial remains on IV heparin  13. Anemia hemoglobin improved after last transfusion 7/21  14. Troponin leak likely due to renal insufficiency stable has not risen any further  Body mass index is 29.36 kg/m². RECOMMENDATIONS/PLAN:     1. Remains in sinus rhythm  2. Blood pressure well controlled now on metoprolol low-dose heart rate stable  3. Continue Flomax for prostate enlargement   4. Continue nasal oxygen overnight oximetry shows continued desaturation at night         Subjective/Initial History:       I was asked by Kami Pearson MD to see Mary Ann Perez a 76 y.o.    male  in consultation for a chief complaint of shortness of breath pulmonary edema acute hypoxic respiratory failure        Patient PCP: Ophelia Monaco MD  PMH:  has a past medical history of Chronic kidney disease and Hypertension. PSH:   has no past surgical history on file. FHX: family history is not on file. SHX:  reports that he has never smoked. He has never used smokeless tobacco.    Systemic review somewhat limited as he is on noninvasive ventilator remains short of breath although states he is feeling better  General he has not noted any worsening edema of his upper legs fever chills or sweats does complain of new onset swelling of his left hand and arm  Eyes no double vision or momentary blindness  ENT no facial pain over the sinuses  Endocrinologic no polyuria polydipsia  Musculoskeletal no swollen tender joints  Neurologic no history seizures or syncope  Gastrointestinal no nausea vomiting acid indigestion  Genitourinary states he does still pass fair amount of urine each day has not noted any decrease in that.   Does not have to strain to urinate has no bleeding or drainage  Cardiovascular he is not aware of any underlying heart disease has not had chest pain diaphoresis has had worsening shortness of breath laying down and with exertion  Respiratory worsening shortness of breath over the last week or more no significant cough no sputum production no chills or sweats did have history COVID-19 pneumonitis January of this year    No Known Allergies   MEDS:   Current Facility-Administered Medications   Medication    apixaban (ELIQUIS) tablet 2.5 mg    sevelamer carbonate (RENVELA) tab 1,600 mg    furosemide (LASIX) tablet 80 mg    0.9% sodium chloride infusion 250 mL    0.9% sodium chloride infusion 250 mL    metoprolol tartrate (LOPRESSOR) tablet 25 mg    [Held by provider] amLODIPine (NORVASC) tablet 10 mg    tamsulosin (FLOMAX) capsule 0.4 mg    [Held by provider] hydrALAZINE (APRESOLINE) tablet 50 mg    sodium bicarbonate tablet 650 mg    0.9% sodium chloride infusion 250 mL    calcitRIOL (ROCALTROL) capsule 0.25 mcg    ergocalciferol capsule 50,000 Units    hydrALAZINE (APRESOLINE) 20 mg/mL injection 40 mg        Current Facility-Administered Medications:     apixaban (ELIQUIS) tablet 2.5 mg, 2.5 mg, Oral, BID, Rajesh DILLON MD, 2.5 mg at 07/24/21 1007    sevelamer carbonate (RENVELA) tab 1,600 mg, 1,600 mg, Oral, TID WITH MEALS, Tab Chan MD, 1,600 mg at 07/24/21 1007    furosemide (LASIX) tablet 80 mg, 80 mg, Oral, DAILY, Tab Chan MD, 80 mg at 07/24/21 1007    0.9% sodium chloride infusion 250 mL, 250 mL, IntraVENous, PRN, Germania Estrada MD    0.9% sodium chloride infusion 250 mL, 250 mL, IntraVENous, PRN, Germania Estrada MD    metoprolol tartrate (LOPRESSOR) tablet 25 mg, 25 mg, Oral, DAILY, Tab Chan MD, 25 mg at 07/24/21 1007    [Held by provider] amLODIPine (NORVASC) tablet 10 mg, 10 mg, Oral, DAILY, Germania Estrada MD, 10 mg at 07/18/21 0825    tamsulosin (FLOMAX) capsule 0.4 mg, 0.4 mg, Oral, DAILY, Sara Lopez MD, 0.4 mg at 07/24/21 1007    [Held by provider] hydrALAZINE (APRESOLINE) tablet 50 mg, 50 mg, Oral, TID, Sara Lopez MD, 50 mg at 07/18/21 2054    sodium bicarbonate tablet 650 mg, 650 mg, Oral, TID, Sara Lopez MD, 650 mg at 07/24/21 1007    0.9% sodium chloride infusion 250 mL, 250 mL, IntraVENous, PRN, Sara Lopez MD    calcitRIOL (ROCALTROL) capsule 0.25 mcg, 0.25 mcg, Oral, DAILY, Tab Chan MD, 0.25 mcg at 07/24/21 1007    ergocalciferol capsule 50,000 Units, 50,000 Units, Oral, Q7D, Tab Chan MD, 50,000 Units at 07/21/21 1711    hydrALAZINE (APRESOLINE) 20 mg/mL injection 40 mg, 40 mg, IntraVENous, Q6H PRN, Sara Lopez MD, 40 mg at 07/15/21 1552      Objective:     Vital Signs: Telemetry:    normal sinus rhythm Intake/Output:   Visit Vitals  BP (!) 168/98 (BP 1 Location: Left upper arm, BP Patient Position:  At rest;Semi fowlers)   Pulse 69   Temp 98.1 °F (36.7 °C)   Resp 16   Ht 6' (1.829 m)   Wt 98.2 kg (216 lb 7.9 oz)   SpO2 96%   BMI 29.36 kg/m²       Temp (24hrs), Av.6 °F (36.4 °C), Min:97.3 °F (36.3 °C), Max:98.1 °F (36.7 °C)        O2 Device: None (Room air) O2 Flow Rate (L/min): 2 l/min         Body mass index is 29.36 kg/m². Wt Readings from Last 4 Encounters:   21 98.2 kg (216 lb 7.9 oz)   21 79.4 kg (175 lb)          Intake/Output Summary (Last 24 hours) at 2021 1212  Last data filed at 2021 0851  Gross per 24 hour   Intake --   Output 375 ml   Net -375 ml       Last shift:      701 - 1900  In: -   Out: 200 [Urine:200]  Last 3 shifts: 1901 -  07  In: -   Out: 175 [Urine:175]       Hemodynamics:    CO:    CI:    CVP:    SVR:   PAP Systolic:    PAP Diastolic:    PVR:    DA61:       Ventilator Settings:      Mode Rate TV Press PEEP FiO2 PIP Min. Vent               28 %     9.7 l/min      Physical Exam:    General:  male; no distress now on room air  HEENT: NCAT, visible oral mucosa is moist and clear  Eyes: anicteric; conjunctiva clear extraocular movements intact  Neck: no nodes,,no accessory MM use. no respiratory distress  Chest: no deformity,   Cardiac: Regular rhythm and rate now controlled  Lungs: distant breath sounds; clear anteriorly and laterally with rales in the bases  Abd: Soft positive bowel sounds no tenderness  Ext: Improvement in edema  of the lower extremities with continued edema of the left arm; no joint swelling;  No clubbing  : Clear urine  Neuro: Alert awake no distress speech is clear moves all 4 extremities  Psych-calm, oriented to person;   Skin: warm, dry, no cyanosis;   Pulses: Brachial and radial pulses intact  Capillary:slow capillary refill      Labs:    Recent Labs     21  0814 21  0400 21  1600 21  0422   WBC 4.6 4.5  --  4.4   HGB 8.9* 8.5*  --  8.5*   * 143*  --  155   APTT  -- >153.0* 57.0* >153.0*     Recent Labs     07/24/21  0814 07/23/21  0400 07/22/21  0422    137 139   K 4.9 4.7 4.4    104 106   CO2 23 23 23   GLU 91 92 83   BUN 96* 91* 99*   CREA 7.26* 7.32* 7.65*   CA 8.1* 8.0* 7.8*   PHOS 8.2* 8.3* 8.6*   ALB 3.2* 3.0* 3.0*   7/19 overnight oximetry shows 14 minutes 26 seconds below 88% with low oxygen saturation 75%  7/17 overnight oximetry shows low oxygen saturation 71% with 8 minutes 40 seconds below 88% from 1 AM to 6 AM   7/16 overnight oximetry on 1 L shows only 2 minutes 20 seconds below 88% with a low oxygen saturation of 83%  7/15 room air oxygen saturation 93%  currently on noninvasive ventilator inspiratory pressure 16 expiratory pressure six rate 16 FiO2 28% with oxygen saturation 96%   BNP 32,264, previous greater than 35,000  Lab Results   Component Value Date/Time    Culture result:  07/12/2021 10:40 PM     Two of four bottles have been flagged positive by instrument. Bottles have been sent to [facility] laboratory to assess for possible growth.  Gram Positive Cocci in clusters CALLED TO AND READ BACK BY Sherri Powell r.n. 2259 07/14/2021 by dpw    Culture result:  07/12/2021 10:40 PM     Staphylococcus species, coagulase negative GROWING IN THE 1ST OF 4 BOTTLES DRAWN    Culture result:  07/12/2021 10:40 PM     Staphylococcus species, coagulase negative (2nd colony type/strain) GROWING IN THE 2ND OF 4 BOTTLES DRAWN        Imaging:  I have personally reviewed the patients radiographs and have reviewed the reports:    CXR Results  (Last 48 hours)  7/23 chest x-ray mild pulmonary congestion tiny effusions unchanged from last x-ray although right upper lobe lung may have slightly less congestion  7/21 chest x-ray with continued mild pulmonary edema pattern small bilateral pleural effusions there may be improved inspiratory effort  7/16 chest x-ray with continued mild pulmonary edema and small pleural effusions unchanged               07/12/21 2251  XR CHEST PORT Final result    Impression:  Findings/impression:       Cardiac silhouette is enlarged. Central vascular congestion and patchy central   airspace disease/edema. Suspect trace right pleural effusion. No evidence of pneumothorax. No acute osseous abnormality identified. Narrative:  Study: XR CHEST PORT       Clinical indication: sob       Comparison: None. To me chest x-ray consistent with cardiomegaly pulmonary edema and bilateral pleural effusions           Results from Hospital Encounter encounter on 07/12/21    XR CHEST PORT    Narrative  This study is a portable AP radiograph of the chest dated 7/23/2021 obtained at  2:52 AM.    HISTORY: Pulmonary edema. COMPARISON: 7/21/2021. FINDINGS: There is moderate enlargement of the cardiac silhouette. There is  associated distention of the pulmonary vessels and perivascular ill definition  consistent with interstitial edema. There also is perivascular airspace disease  compatible with alveolar edema. There has been a decrease in the alveolar edema  within the right upper lobe. This examination is negative for larger pleural effusions. The remainder of the examination is unchanged. Impression  There is continued moderate pulmonary interstitial and alveolar  edema with a slight interval improvement within the right upper lobe. The  remainder of this examination is unchanged. XR CHEST PORT    Narrative  Chest single view. Comparison single view chest July 19, 2021. Patchy reticular markings through the lungs, same distribution. Those through  lung bases appear less dense; suspect mild degree improved aeration. Cardiac and  mediastinal structures unchanged. No pneumothorax or sizable pleural effusion. XR CHEST PORT    Narrative  Chest single view. Comparison single view chest July 16, 2021. Advanced patchy central and basilar reticular markings through the lungs. Consider pneumonia.  Cardiac and mediastinal structures magnified on this AP  view. No pneumothorax or sizable pleural effusion. Results from [facility] encounter on 01/05/21    CT CHEST WO CONT    Narrative  Images obtained without contrast include the abdomen and pelvis. Comparison portable chest radiograph earlier today. Dose Reduction:  All CT scans at this facility are performed using dose reduction optimization  techniques as appropriate to a performed exam including the following: Automated  exposure control, adjustments of the mA and/or kV according to patient size, or  use of iterative reconstruction technique. Subtle peripheral groundglass densities are noted in both lungs, could reflect  Covid pneumonia or other. No pneumothorax or pneumomediastinum. The radiographic findings suspicious for  pneumomediastinum probably was artifact, perhaps related to cardiac motion. No pleural effusion or adenopathy. Cardiomegaly again noted. Impression  IMPRESSION:  1. No pneumomediastinum or pneumothorax. 2. Cardiomegaly. 3. Subtle groundglass densities in both lungs might be pneumonia or other. Discussion patient with worsening renal insufficiency has developed pulmonary edema acute hypoxic respiratory failure. He states he still has urine output normally at home. We will give him high-dose IV Lasix to see if diuresis is induced. He very likely will need dialysis. He has minimal elevation in troponin probably troponin leak from hypoxia or just due to his renal insufficiency. He is not having any chest pain. Does have severe hypertension. Has been started on clonidine and hydralazine. 7/14 no distress today on nasal oxygen. Did have residual on post void bladder scan and Quesada catheter was placed with good diuresis overnight. Despite this potassium remains elevated. We will give 1 dose of Kayexalate. Despite diuresis hemoglobin is dropped to 6.7 will transfuse.   He denies frequency small-volume urine or straining to urinate at home despite this with signs of obstruction we will add Flomax. Potassium 6 today we will give 1 dose of Kayexalate and continue diuresis  7/16 respirations nonlabored. Was placed back on 1 L nasal oxygen yesterday overnight oximetry shows well-maintained oxygen saturation. Will check overnight tonight on and off oxygen. Quesada catheter has been removed. Chest x-ray continues to show mild pulmonary edema  7/17 overnight oximetry shows desaturation when on room air. Will maintain oxygen and repeat overnight oximetry Ibrahima night  7/19 developed V. tach overnight and now in the ICU on IV amiodarone with rhythm showing alternating sinus with PAC and A. fib. Rate . Overnight oximetry continued to show desaturation on room air and he is back on nasal oxygen. He has no specific complaint  7/20 now in sinus rhythm with frequent PVCs. Cardiology is recommending cardiac cath but he is reluctant to undergo that. Creatinine has worsened despite IV fluid administration yesterday although blood pressure is improved. Currently oxygen saturation well-maintained on room air but last overnight oximetry showed desaturation will maintain oxygen at 1 L for now  7/21 had hypoxia yesterday afternoon and placed back on oxygen during the daytime as well as the night. He feels comfortable at this point. Chest x-ray is unchanged still has mild pulmonary edema with small effusions. It does appear that he took a deeper breath today. Creatinine remains markedly elevated. He is on heparin for left axillary DVT. Holding switching to oral anticoagulation until sure no instrumentation is needed   7/23 unchanged remains on IV heparin. Oral Lasix being resumed did require transfusion 7/21. Repeat chest x-ray no worsening  7/24 he states good urine output with the Lasix yesterday although it was not recorded. Oxygen was not placed on him last night and no desaturation was noted.   Will check overnight oximetry Thank you for allowing us to participate in the care of this patient.   We will follow along with you     Daniel Gustafson MD

## 2021-07-24 NOTE — PROGRESS NOTES
Transfer report called to [facility]. Pt transferred to [facility], pt stated he will call his brother with his new room number.

## 2021-07-24 NOTE — PROGRESS NOTES
Problem: Falls - Risk of  Goal: *Absence of Falls  Description: Document Humberto Weeks Fall Risk and appropriate interventions in the flowsheet. Outcome: Progressing Towards Goal  Note: Fall Risk Interventions:  Mobility Interventions: Bed/chair exit alarm, Patient to call before getting OOB, Utilize walker, cane, or other assistive device         Medication Interventions: Bed/chair exit alarm, Patient to call before getting OOB, Teach patient to arise slowly    Elimination Interventions: Bed/chair exit alarm, Call light in reach, Urinal in reach              Problem: Patient Education: Go to Patient Education Activity  Goal: Patient/Family Education  Outcome: Progressing Towards Goal     Problem: Pressure Injury - Risk of  Goal: *Prevention of pressure injury  Description: Document Alfa Scale and appropriate interventions in the flowsheet.   Outcome: Progressing Towards Goal  Note: Pressure Injury Interventions:  Sensory Interventions: Keep linens dry and wrinkle-free, Maintain/enhance activity level, Minimize linen layers    Moisture Interventions: Absorbent underpads, Apply protective barrier, creams and emollients, Check for incontinence Q2 hours and as needed    Activity Interventions: PT/OT evaluation, Increase time out of bed    Mobility Interventions: PT/OT evaluation, HOB 30 degrees or less    Nutrition Interventions: Offer support with meals,snacks and hydration, Document food/fluid/supplement intake    Friction and Shear Interventions: Apply protective barrier, creams and emollients, HOB 30 degrees or less, Minimize layers                Problem: Patient Education: Go to Patient Education Activity  Goal: Patient/Family Education  Outcome: Progressing Towards Goal

## 2021-07-24 NOTE — PROGRESS NOTES
Progress Note    Jacque Grider MD             Daily Progress Note: 2021      Subjective: The patient is seen for follow  up. Offers no complaints  Problem List:  Problem List as of 2021 Never Reviewed        Codes Class Noted - Resolved    Pulmonary edema ICD-10-CM: J81.1  ICD-9-CM: 949  2021 - Present        GI bleed ICD-10-CM: K92.2  ICD-9-CM: 578.9  2021 - Present              Medications reviewed  Current Facility-Administered Medications   Medication Dose Route Frequency    apixaban (ELIQUIS) tablet 2.5 mg  2.5 mg Oral BID    sevelamer carbonate (RENVELA) tab 1,600 mg  1,600 mg Oral TID WITH MEALS    furosemide (LASIX) tablet 80 mg  80 mg Oral DAILY    0.9% sodium chloride infusion 250 mL  250 mL IntraVENous PRN    0.9% sodium chloride infusion 250 mL  250 mL IntraVENous PRN    metoprolol tartrate (LOPRESSOR) tablet 25 mg  25 mg Oral DAILY    [Held by provider] amLODIPine (NORVASC) tablet 10 mg  10 mg Oral DAILY    tamsulosin (FLOMAX) capsule 0.4 mg  0.4 mg Oral DAILY    [Held by provider] hydrALAZINE (APRESOLINE) tablet 50 mg  50 mg Oral TID    sodium bicarbonate tablet 650 mg  650 mg Oral TID    0.9% sodium chloride infusion 250 mL  250 mL IntraVENous PRN    calcitRIOL (ROCALTROL) capsule 0.25 mcg  0.25 mcg Oral DAILY    ergocalciferol capsule 50,000 Units  50,000 Units Oral Q7D    hydrALAZINE (APRESOLINE) 20 mg/mL injection 40 mg  40 mg IntraVENous Q6H PRN       Review of Systems:   A comprehensive review of systems was negative except for that written in the HPI.     Objective:   Physical Exam:     Visit Vitals  BP (!) 155/92 (BP 1 Location: Right upper arm)   Pulse (!) 54   Temp 97.4 °F (36.3 °C)   Resp 18   Ht 6' (1.829 m)   Wt 98.2 kg (216 lb 7.9 oz)   SpO2 94%   BMI 29.36 kg/m²    O2 Flow Rate (L/min): 2 l/min O2 Device: None (Room air)    Temp (24hrs), Av.6 °F (36.4 °C), Min:97.3 °F (36.3 °C), Max:98.1 °F (36.7 °C)    701 -  830  In: -   Out: 400 [Urine:400]   07/22 1901 - 07/24 0700  In: -   Out: 175 [Urine:175]    General:  Alert, cooperative, no distress, appears stated age. Lungs:   Clear to auscultation bilaterally. Chest wall:  No tenderness or deformity. Heart:  Regular rate and rhythm, S1, S2 normal, no murmur, click, rub or gallop. Abdomen:   Soft, non-tender. Bowel sounds normal. No masses,  No organomegaly. Extremities: Extremities normal, atraumatic, no cyanosis edema 2+ edema on left upper limb   Pulses: 2+ and symmetric all extremities. Skin: Skin color, texture, turgor normal. No rashes or lesions   Neurologic: CNII-XII intact. No gross sensory or motor deficits     Data Review:       Recent Days:  Recent Labs     07/24/21  0814 07/23/21  0400 07/22/21  0422   WBC 4.6 4.5 4.4   HGB 8.9* 8.5* 8.5*   HCT 29.8* 28.1* 27.7*   * 143* 155     Recent Labs     07/24/21  0814 07/23/21  0400 07/22/21  0422    137 139   K 4.9 4.7 4.4    104 106   CO2 23 23 23   GLU 91 92 83   BUN 96* 91* 99*   CREA 7.26* 7.32* 7.65*   CA 8.1* 8.0* 7.8*   PHOS 8.2* 8.3* 8.6*   ALB 3.2* 3.0* 3.0*     No results for input(s): PH, PCO2, PO2, HCO3, FIO2 in the last 72 hours. Results     Procedure Component Value Units Date/Time    MRSA SCREEN - PCR (NASAL) [[id number]] Collected: 07/13/21 1730    Order Status: Completed Specimen: Swab Updated: 07/13/21 2123     MRSA by PCR, Nasal Not Detected       COVID-19 RAPID TEST [[id number]] Collected: 07/13/21 0539    Order Status: Completed Specimen: Nasopharyngeal Updated: 07/13/21 0655     Specimen source Nasopharyngeal        COVID-19 rapid test Not Detected        Comment: Rapid Abbott ID Now   Rapid NAAT:  The specimen is NEGATIVE for SARS-CoV-2, the novel coronavirus associated with COVID-19. Negative results should be treated as presumptive and, if inconsistent with clinical signs and symptoms or necessary for patient management, should be tested with an alternative molecular assay.  Negative results do not preclude SARS-CoV-2 infection and should not be used as the sole basis for patient management decisions. This test has been authorized by the FDA under   an Emergency Use Authorization (EUA) for use by authorized laboratories. Fact sheet for Healthcare Providers: ConventionUpdate.co.nz Fact sheet for Patients: ConventionUpdate.co.nz   Methodology: Isothermal Nucleic Acid Amplification         CULTURE, BLOOD, PAIRED [[id number]] Collected: 07/12/21 2240    Order Status: Completed Specimen: Blood Updated: 07/16/21 1346     Special Requests: No Special Requests        Culture result:       Two of four bottles have been flagged positive by instrument. Bottles have been [facility] to [facility] to assess for possible growth. Gram Positive Cocci in clusters CALLED TO AND READ BACK BY chiquita mtz r.n. 0350 07/14/2021 by hanane                  Staphylococcus species, coagulase negative GROWING IN THE 1ST OF 4 BOTTLES DRAWN                  Staphylococcus species, coagulase negative (2nd colony type/strain) GROWING IN THE 2ND OF 4 BOTTLES DRAWN               24 Hour Results:  Recent Results (from the past 24 hour(s))   CBC WITH AUTOMATED DIFF    Collection Time: 07/24/21  8:14 AM   Result Value Ref Range    WBC 4.6 4.1 - 11.1 K/uL    RBC 3.20 (L) 4.10 - 5.70 M/uL    HGB 8.9 (L) 12.1 - 17.0 g/dL    HCT 29.8 (L) 36.6 - 50.3 %    MCV 93.1 80.0 - 99.0 FL    MCH 27.8 26.0 - 34.0 PG    MCHC 29.9 (L) 30.0 - 36.5 g/dL    RDW 18.3 (H) 11.5 - 14.5 %    PLATELET 017 (L) 050 - 400 K/uL    MPV 10.8 8.9 - 12.9 FL    NRBC 0.0 0.0  WBC    ABSOLUTE NRBC 0.00 0.00 - 0.01 K/uL    NEUTROPHILS 67 32 - 75 %    LYMPHOCYTES 8 (L) 12 - 49 %    MONOCYTES 17 (H) 5 - 13 %    EOSINOPHILS 7 0 - 7 %    BASOPHILS 1 0 - 1 %    IMMATURE GRANULOCYTES 0 0 - 0.5 %    ABS. NEUTROPHILS 3.0 1.8 - 8.0 K/UL    ABS. LYMPHOCYTES 0.4 (L) 0.8 - 3.5 K/UL    ABS. MONOCYTES 0.8 0.0 - 1.0 K/UL    ABS.  EOSINOPHILS 0.3 0.0 - 0.4 K/UL    ABS. BASOPHILS 0.0 0.0 - 0.1 K/UL    ABS. IMM. GRANS. 0.0 0.00 - 0.04 K/UL    DF AUTOMATED     RENAL FUNCTION PANEL    Collection Time: 07/24/21  8:14 AM   Result Value Ref Range    Sodium 138 136 - 145 mmol/L    Potassium 4.9 3.5 - 5.1 mmol/L    Chloride 107 97 - 108 mmol/L    CO2 23 21 - 32 mmol/L    Anion gap 8 5 - 15 mmol/L    Glucose 91 65 - 100 mg/dL    BUN 96 (H) 6 - 20 mg/dL    Creatinine 7.26 (H) 0.70 - 1.30 mg/dL    BUN/Creatinine ratio 13 12 - 20      GFR est AA 9 (L) >60 ml/min/1.73m2    GFR est non-AA 8 (L) >60 ml/min/1.73m2    Calcium 8.1 (L) 8.5 - 10.1 mg/dL    Phosphorus 8.2 (H) 2.6 - 4.7 mg/dL    Albumin 3.2 (L) 3.5 - 5.0 g/dL       XR CHEST PORT   Final Result   There is continued moderate pulmonary interstitial and alveolar   edema with a slight interval improvement within the right upper lobe. The   remainder of this examination is unchanged. XR CHEST PORT   Final Result      XR CHEST PORT   Final Result      LOWER EXT ART PVR MULT LEVEL SEG PRESSURES   Final Result      DUPLEX LOWER EXT VENOUS BILAT   Final Result      XR CHEST PORT   Final Result   Findings/impression: Stable cardiomediastinal silhouette. Similar central   pulmonary vascular congestion and bilateral patchy airspace opacities. No   significant pleural effusion. No pneumothorax. Findings are not significantly changed. XR CHEST PORT   Final Result      US RETROPERITONEUM COMP   Final Result   Medical renal disease on the right      DUPLEX UPPER EXT VENOUS LEFT   Final Result      XR CHEST PORT   Final Result   Findings/impression:      Cardiac silhouette is enlarged. Central vascular congestion and patchy central   airspace disease/edema. Suspect trace right pleural effusion. No evidence of pneumothorax. No acute osseous abnormality identified.               Assessment: Acute CHF  Acute DVT left upper limb  Hypertension  Acute on chronic renal failure now resolved  Anemia        Plan: Now on Eliquis doing well hemoglobin has come up to 8.9 if it continues will discharge in the morning also patient advised to walk around if he is able to walk 50 feet then he does not need to go for rehab. Care Plan discussed with: Patient himself    Total time spent with patient: 30 minutes.     Neptali Ca MD

## 2021-07-25 LAB
IRON SATN MFR SERPL: 27 % (ref 20–50)
IRON SERPL-MCNC: 88 UG/DL (ref 35–150)
TIBC SERPL-MCNC: 322 UG/DL (ref 250–450)

## 2021-07-25 PROCEDURE — 83540 ASSAY OF IRON: CPT

## 2021-07-25 PROCEDURE — 74011250637 HC RX REV CODE- 250/637: Performed by: INTERNAL MEDICINE

## 2021-07-25 PROCEDURE — 94762 N-INVAS EAR/PLS OXIMTRY CONT: CPT

## 2021-07-25 PROCEDURE — 36415 COLL VENOUS BLD VENIPUNCTURE: CPT

## 2021-07-25 PROCEDURE — 65270000029 HC RM PRIVATE

## 2021-07-25 RX ORDER — AMLODIPINE BESYLATE 5 MG/1
5 TABLET ORAL DAILY
Status: DISCONTINUED | OUTPATIENT
Start: 2021-07-26 | End: 2021-07-25

## 2021-07-25 RX ORDER — AMLODIPINE BESYLATE 5 MG/1
5 TABLET ORAL DAILY
Status: DISCONTINUED | OUTPATIENT
Start: 2021-07-25 | End: 2021-08-05 | Stop reason: HOSPADM

## 2021-07-25 RX ADMIN — SEVELAMER CARBONATE 1600 MG: 800 TABLET, FILM COATED ORAL at 16:09

## 2021-07-25 RX ADMIN — SODIUM BICARBONATE 650 MG: 650 TABLET ORAL at 21:45

## 2021-07-25 RX ADMIN — FUROSEMIDE 80 MG: 40 TABLET ORAL at 09:26

## 2021-07-25 RX ADMIN — AMLODIPINE BESYLATE 5 MG: 5 TABLET ORAL at 16:09

## 2021-07-25 RX ADMIN — SODIUM BICARBONATE 650 MG: 650 TABLET ORAL at 16:09

## 2021-07-25 RX ADMIN — SODIUM BICARBONATE 650 MG: 650 TABLET ORAL at 09:26

## 2021-07-25 RX ADMIN — METOPROLOL TARTRATE 25 MG: 25 TABLET, FILM COATED ORAL at 09:26

## 2021-07-25 RX ADMIN — SEVELAMER CARBONATE 1600 MG: 800 TABLET, FILM COATED ORAL at 11:40

## 2021-07-25 RX ADMIN — TAMSULOSIN HYDROCHLORIDE 0.4 MG: 0.4 CAPSULE ORAL at 09:26

## 2021-07-25 RX ADMIN — CALCITRIOL CAPSULES 0.25 MCG 0.25 MCG: 0.25 CAPSULE ORAL at 09:26

## 2021-07-25 RX ADMIN — SEVELAMER CARBONATE 1600 MG: 800 TABLET, FILM COATED ORAL at 09:26

## 2021-07-25 RX ADMIN — APIXABAN 2.5 MG: 2.5 TABLET, FILM COATED ORAL at 09:26

## 2021-07-25 RX ADMIN — APIXABAN 2.5 MG: 2.5 TABLET, FILM COATED ORAL at 21:46

## 2021-07-25 NOTE — PROGRESS NOTES
Name: Jenn Anderson MRN: [id number]   : 1955 Hospital: [address] [place] [address]   Date: 2021        IMPRESSION:   · ESME on stage 4 CKD. Patient was over diuresed. His GFR is stable now. Patient is non oliguric. He is asymptomatic. · HTN - BP is above target  · Chronic CHF  · 2 hyperparathyroidism with CKD related hyperphosphatemia  · Anemia of CKD      PLAN:   · No RRT is planned. · Continue present care and check the renal panel again  · Strict I's and O's  · PO4 binders, renal diet  · Anemia management Fe profile, Retacrit  · Will need close outpatient f/u     Subjective/Interval History:   I have reviewed the flowsheet and previous days notes. ROS:A comprehensive review of systems was negative. Objective:   Vital Signs:    Visit Vitals  BP (!) 163/102 (BP 1 Location: Right upper arm, BP Patient Position: Semi fowlers)   Pulse 72   Temp 98.4 °F (36.9 °C)   Resp 18   Ht 6' (1.829 m)   Wt 98.2 kg (216 lb 7.9 oz)   SpO2 95%   BMI 29.36 kg/m²       O2 Device: None (Room air)   O2 Flow Rate (L/min): 2 l/min   Temp (24hrs), Av.5 °F (36.4 °C), Min:97.2 °F (36.2 °C), Max:98.4 °F (36.9 °C)       Intake/Output:   Last shift:      701 - 1900  In: -   Out: 200 [Urine:200]  Last 3 shifts: 1901 -  07  In: -   Out: 675 [Urine:675]    Intake/Output Summary (Last 24 hours) at 2021 0913  Last data filed at 2021 2159  Gross per 24 hour   Intake --   Output 500 ml   Net -500 ml        Physical Exam:  General:    Alert, cooperative, no distress, appears stated age. Head:   Normocephalic, without obvious abnormality, atraumatic. Eyes:   Conjunctivae/corneas clear. Nose:  Nares normal. No drainage or sinus tenderness. Throat:    Lips, mucosa, and tongue normal.  No Thrush  Neck:  Supple, symmetrical,  no adenopathy, thyroid: non tender    no carotid bruit and no JVD. Lungs:   Clear to auscultation bilaterally. No Wheezing or Rhonchi.  No rales.  Chest wall:  No tenderness or deformity. No Accessory muscle use. Heart:   Regular rate and rhythm,  no murmur, rub or gallop. Abdomen:   Soft, non-tender. Not distended. Bowel sounds normal. No masses  Extremities: Leathery skin with chronic trophic changes. , No cyanosis. 1+ edema. No clubbing  Skin:     Texture, turgor normal. No rashes or lesions. Not Jaundiced  Psych:  Fair insight. Not depressed. Not anxious or agitated. Neurologic:  Alert and oriented X 3. DATA:  Labs:  Recent Labs     07/24/21  0814 07/23/21  0400    137   K 4.9 4.7    104   CO2 23 23   BUN 96* 91*   CREA 7.26* 7.32*   CA 8.1* 8.0*   ALB 3.2* 3.0*   PHOS 8.2* 8.3*     Recent Labs     07/24/21  0814 07/23/21  0400   WBC 4.6 4.5   HGB 8.9* 8.5*   HCT 29.8* 28.1*   * 143*     No results for input(s): HÉCTOR, KU, CLU, CREAU in the last 72 hours.     No lab exists for component: PROU    Total time spent with patient:  35 minutes    [] Critical Care Provided    Care Plan discussed with:   Jacklyn De Oliveira MD

## 2021-07-25 NOTE — PROGRESS NOTES
Pulmonary, Critical Care    Name: Jenn Anderson MRN: [id number]   : 1955 Hospital: [address] [place] [address]   Date: 2021  Admission date: 2021 Hospital Day: 14       Subjective/Interval History:   Seen in the emergency room wearing noninvasive ventilator. Patient has underlying renal insufficiency developed worsening shortness of breath cough presented to the emergency room chest x-ray shows pulmonary edema. He was profoundly hypoxic is now on noninvasive ventilator and feels more comfortable. Has not had any significant urine output since he has been in the ER. He also complains of new onset left arm swelling   post void bladder scan showed greater than 200 cc urine retention and Quesada catheter placed with 500 cc removed. Overnight he has put out an additional 1200 cc. Respirations improved currently nonlabored on nasal oxygen. Duplex scan of the left arm did show left axillary and proximal brachial DVT and heparin was started. On heparin with Quesada catheter and he has had slight bleeding at the urethral meatus. Ultrasound of the abdomen is pending  7/15 ultrasound of the abdomen showed medical renal disease on the right with small kidney no hydronephrosis there was evidence of prostate enlargement. He is breathing easily at this point and on room air is running on oxygen saturation of 93%   respirations nonlabored on nasal oxygen. Quesada catheter is out he states he has been voiding frequent small amounts without straining   no specific complaints breathing easily. Overnight oximetry showed minimal but significant desaturation while on room air 8 minutes 40 seconds with low of 71%  . Developed V. tach this morning given IV amiodarone IV magnesium and transferred to the unit. Currently heart rate  alternating between A. fib and sinus with PAC. He denies any discomfort is breathing easily   remains sinus rhythm with PVCs.   Had worsening hypoxia during the night and placed on oxygen he feels comfortable at this point. Urine output mildly improved yesterday but input remains greater than output  7/24 no specific complaints respirations nonlabored currently room air with saturation 97% Lasix been resumed 7/23. Urine output not accurately recorded but he states he has been passing a lot of urine  7/25 feels fine denies shortness of breath on room air. Oxygen saturation 97% on room air while awake. He states he is putting out good urine from his oral Lasix although its not being recorded in the chart  Patient Active Problem List   Diagnosis Code    GI bleed K92.2    Pulmonary edema J81.1       IMPRESSION:   1. Ventricular tachycardia none further seen   2. Atrial fibrillation converted to sinus  3. Hypotension corrected with IV fluids and holding antihypertensive medications  4. Acute hypoxic respiratory failure room air oxygen saturation maintained while awake  5. Chronic hypoxic respiratory failure overnight oximetry continues to show desaturation at night  6. Acute pulmonary edema radiographically no change 7/23 will repeat in a.m.  7. 2 of 4 blood culture bottles with coagulase-negative staph aureus likely skin contaminant   8. Bilateral pleural effusions  chest x-ray unchanged  9. Acute on chronic kidney disease creatinine improved slightly  10. Obstructive uropathy Quesada catheter has been removed with no residual urine on bladder scanning  11. Severe hypertension corrected   12. DVT left axillary and brachial remains on IV heparin  13. Anemia hemoglobin improved after last transfusion 7/21  14. Troponin leak likely due to renal insufficiency stable has not risen any further  Body mass index is 29.36 kg/m². RECOMMENDATIONS/PLAN:     1. Remains in sinus rhythm  2. Blood pressure well controlled now on metoprolol low-dose heart rate stable  3. Continue Flomax for prostate enlargement   4.  Overnight oximetry continues to show desaturation will place on oxygen at night  5. We will repeat chest x-ray in a.m. Subjective/Initial History:       I was asked by Nixon Holland MD to see Kathy Vale a 76 y.o.  male  in consultation for a chief complaint of shortness of breath pulmonary edema acute hypoxic respiratory failure        Patient PCP: Crystal Hickey MD  PMH:  has a past medical history of Chronic kidney disease and Hypertension. PSH:   has no past surgical history on file. FHX: family history is not on file. SHX:  reports that he has never smoked. He has never used smokeless tobacco.    Systemic review somewhat limited as he is on noninvasive ventilator remains short of breath although states he is feeling better  General he has not noted any worsening edema of his upper legs fever chills or sweats does complain of new onset swelling of his left hand and arm  Eyes no double vision or momentary blindness  ENT no facial pain over the sinuses  Endocrinologic no polyuria polydipsia  Musculoskeletal no swollen tender joints  Neurologic no history seizures or syncope  Gastrointestinal no nausea vomiting acid indigestion  Genitourinary states he does still pass fair amount of urine each day has not noted any decrease in that.   Does not have to strain to urinate has no bleeding or drainage  Cardiovascular he is not aware of any underlying heart disease has not had chest pain diaphoresis has had worsening shortness of breath laying down and with exertion  Respiratory worsening shortness of breath over the last week or more no significant cough no sputum production no chills or sweats did have history COVID-19 pneumonitis January of this year    No Known Allergies   MEDS:   Current Facility-Administered Medications   Medication    apixaban (ELIQUIS) tablet 2.5 mg    sevelamer carbonate (RENVELA) tab 1,600 mg    furosemide (LASIX) tablet 80 mg    0.9% sodium chloride infusion 250 mL    0.9% sodium chloride infusion 250 mL  metoprolol tartrate (LOPRESSOR) tablet 25 mg    [Held by provider] amLODIPine (NORVASC) tablet 10 mg    tamsulosin (FLOMAX) capsule 0.4 mg    [Held by provider] hydrALAZINE (APRESOLINE) tablet 50 mg    sodium bicarbonate tablet 650 mg    0.9% sodium chloride infusion 250 mL    calcitRIOL (ROCALTROL) capsule 0.25 mcg    ergocalciferol capsule 50,000 Units    hydrALAZINE (APRESOLINE) 20 mg/mL injection 40 mg        Current Facility-Administered Medications:     apixaban (ELIQUIS) tablet 2.5 mg, 2.5 mg, Oral, BID, Estefany Bedolla MD, 2.5 mg at 07/25/21 6903    sevelamer carbonate (RENVELA) tab 1,600 mg, 1,600 mg, Oral, TID WITH MEALS, Maria Esther Cyr MD, 1,600 mg at 07/25/21 1140    furosemide (LASIX) tablet 80 mg, 80 mg, Oral, DAILY, Maria Esther Cyr MD, 80 mg at 07/25/21 0926    0.9% sodium chloride infusion 250 mL, 250 mL, IntraVENous, PRN, Estefany Bedolla MD    0.9% sodium chloride infusion 250 mL, 250 mL, IntraVENous, PRN, Estefany Bedolla MD    metoprolol tartrate (LOPRESSOR) tablet 25 mg, 25 mg, Oral, DAILY, Maria Esther Cyr MD, 25 mg at 07/25/21 5787    [Held by provider] amLODIPine (NORVASC) tablet 10 mg, 10 mg, Oral, DAILY, Estefany Bedolla MD, 10 mg at 07/18/21 0825    tamsulosin (FLOMAX) capsule 0.4 mg, 0.4 mg, Oral, DAILY, Sahil Graf MD, 0.4 mg at 07/25/21 1915    [Held by provider] hydrALAZINE (APRESOLINE) tablet 50 mg, 50 mg, Oral, TID, Sahil Graf MD, 50 mg at 07/18/21 2054    sodium bicarbonate tablet 650 mg, 650 mg, Oral, TID, Sahil Graf MD, 650 mg at 07/25/21 0926    0.9% sodium chloride infusion 250 mL, 250 mL, IntraVENous, PRN, Sahil Graf MD    calcitRIOL (ROCALTROL) capsule 0.25 mcg, 0.25 mcg, Oral, DAILY, Maria Esther Cyr MD, 0.25 mcg at 07/25/21 3232    ergocalciferol capsule 50,000 Units, 50,000 Units, Oral, Q7D, Maria Esther Cyr MD, 50,000 Units at 07/21/21 1711    hydrALAZINE (APRESOLINE) 20 mg/mL injection 40 mg, 40 mg, IntraVENous, Q6H PRN, Renny Mcleod MD, 40 mg at 07/15/21 6702      Objective:     Vital Signs: Telemetry:    normal sinus rhythm Intake/Output:   Visit Vitals  BP (!) 140/98 (BP 1 Location: Right upper arm)   Pulse 63   Temp 98.6 °F (37 °C)   Resp 20   Ht 6' (1.829 m)   Wt 98.2 kg (216 lb 7.9 oz)   SpO2 97%   BMI 29.36 kg/m²       Temp (24hrs), Av.7 °F (36.5 °C), Min:97.2 °F (36.2 °C), Max:98.6 °F (37 °C)        O2 Device: None (Room air) O2 Flow Rate (L/min): 2 l/min         Body mass index is 29.36 kg/m². Wt Readings from Last 4 Encounters:   21 98.2 kg (216 lb 7.9 oz)   21 79.4 kg (175 lb)          Intake/Output Summary (Last 24 hours) at 2021 1226  Last data filed at 2021 0826  Gross per 24 hour   Intake --   Output 500 ml   Net -500 ml       Last shift:      701 - 1900  In: -   Out: 200 [Urine:200]  Last 3 shifts: 1901 -  07  In: -   Out: 099 [Urine:675]       Hemodynamics:    CO:    CI:    CVP:    SVR:   PAP Systolic:    PAP Diastolic:    PVR:    EZ56:       Ventilator Settings:      Mode Rate TV Press PEEP FiO2 PIP Min. Vent               28 %     9.7 l/min      Physical Exam:    General:  male; no distress now on room air  HEENT: NCAT, visible oral mucosa is moist and clear  Eyes: anicteric; conjunctiva clear extraocular movements intact  Neck: no nodes,,no accessory MM use. no respiratory distress  Chest: no deformity,   Cardiac: Regular rhythm and rate now controlled  Lungs: distant breath sounds; clear anteriorly and laterally with rales in the bases  Abd: Soft positive bowel sounds no tenderness  Ext: Improvement in edema  of the lower extremities with continued edema of the left arm; no joint swelling;  No clubbing  : Clear urine  Neuro: Alert awake no distress speech is clear moves all 4 extremities  Psych-calm, oriented to person;   Skin: warm, dry, no cyanosis;   Pulses: Brachial and radial pulses intact  Capillary:slow capillary refill      Labs:    Recent Labs     07/24/21  0814 07/23/21  0400 07/22/21  1600   WBC 4.6 4.5  --    HGB 8.9* 8.5*  --    * 143*  --    APTT  --  >153.0* 57.0*     Recent Labs     07/24/21  0814 07/23/21  0400    137   K 4.9 4.7    104   CO2 23 23   GLU 91 92   BUN 96* 91*   CREA 7.26* 7.32*   CA 8.1* 8.0*   PHOS 8.2* 8.3*   ALB 3.2* 3.0*   7/25 overnight oximetry with 1 hour 13 minutes below 88% with low oxygen saturation 56%  7/19 overnight oximetry shows 14 minutes 26 seconds below 88% with low oxygen saturation 75%  7/17 overnight oximetry shows low oxygen saturation 71% with 8 minutes 40 seconds below 88% from 1 AM to 6 AM   7/16 overnight oximetry on 1 L shows only 2 minutes 20 seconds below 88% with a low oxygen saturation of 83%  7/15 room air oxygen saturation 93%  currently on noninvasive ventilator inspiratory pressure 16 expiratory pressure six rate 16 FiO2 28% with oxygen saturation 96%   BNP 32,264, previous greater than 35,000  Lab Results   Component Value Date/Time    Culture result:  07/12/2021 10:40 PM     Two of four bottles have been flagged positive by instrument. Bottles have been sent to [facility] laboratory to assess for possible growth.  Gram Positive Cocci in clusters CALLED TO AND READ BACK BY Bell Espinal r.n. [phone] 07/14/2021 by dpw    Culture result:  07/12/2021 10:40 PM     Staphylococcus species, coagulase negative GROWING IN THE 1ST OF 4 BOTTLES DRAWN    Culture result:  07/12/2021 10:40 PM     Staphylococcus species, coagulase negative (2nd colony type/strain) GROWING IN THE 2ND OF 4 BOTTLES DRAWN        Imaging:  I have personally reviewed the patients radiographs and have reviewed the reports:    CXR Results  (Last 48 hours)  7/23 chest x-ray mild pulmonary congestion tiny effusions unchanged from last x-ray although right upper lobe lung may have slightly less congestion  7/21 chest x-ray with continued mild pulmonary edema pattern small bilateral pleural effusions there may be improved inspiratory effort  7/16 chest x-ray with continued mild pulmonary edema and small pleural effusions unchanged               07/12/21 2251  XR CHEST PORT Final result    Impression:  Findings/impression:       Cardiac silhouette is enlarged. Central vascular congestion and patchy central   airspace disease/edema. Suspect trace right pleural effusion. No evidence of pneumothorax. No acute osseous abnormality identified. Narrative:  Study: XR CHEST PORT       Clinical indication: sob       Comparison: None. To me chest x-ray consistent with cardiomegaly pulmonary edema and bilateral pleural effusions           Results from Hospital Encounter encounter on 07/12/21    XR CHEST PORT    Narrative  This study is a portable AP radiograph of the chest dated 7/23/2021 obtained at  2:52 AM.    HISTORY: Pulmonary edema. COMPARISON: 7/21/2021. FINDINGS: There is moderate enlargement of the cardiac silhouette. There is  associated distention of the pulmonary vessels and perivascular ill definition  consistent with interstitial edema. There also is perivascular airspace disease  compatible with alveolar edema. There has been a decrease in the alveolar edema  within the right upper lobe. This examination is negative for larger pleural effusions. The remainder of the examination is unchanged. Impression  There is continued moderate pulmonary interstitial and alveolar  edema with a slight interval improvement within the right upper lobe. The  remainder of this examination is unchanged. XR CHEST PORT    Narrative  Chest single view. Comparison single view chest July 19, 2021. Patchy reticular markings through the lungs, same distribution. Those through  lung bases appear less dense; suspect mild degree improved aeration. Cardiac and  mediastinal structures unchanged. No pneumothorax or sizable pleural effusion.       XR CHEST PORT    Narrative  Chest single view. Comparison single view chest July 16, 2021. Advanced patchy central and basilar reticular markings through the lungs. Consider pneumonia. Cardiac and mediastinal structures magnified on this AP  view. No pneumothorax or sizable pleural effusion. Results from [facility] encounter on 01/05/21    CT CHEST WO CONT    Narrative  Images obtained without contrast include the abdomen and pelvis. Comparison portable chest radiograph earlier today. Dose Reduction:  All CT scans at this facility are performed using dose reduction optimization  techniques as appropriate to a performed exam including the following: Automated  exposure control, adjustments of the mA and/or kV according to patient size, or  use of iterative reconstruction technique. Subtle peripheral groundglass densities are noted in both lungs, could reflect  Covid pneumonia or other. No pneumothorax or pneumomediastinum. The radiographic findings suspicious for  pneumomediastinum probably was artifact, perhaps related to cardiac motion. No pleural effusion or adenopathy. Cardiomegaly again noted. Impression  IMPRESSION:  1. No pneumomediastinum or pneumothorax. 2. Cardiomegaly. 3. Subtle groundglass densities in both lungs might be pneumonia or other. Discussion patient with worsening renal insufficiency has developed pulmonary edema acute hypoxic respiratory failure. He states he still has urine output normally at home. We will give him high-dose IV Lasix to see if diuresis is induced. He very likely will need dialysis. He has minimal elevation in troponin probably troponin leak from hypoxia or just due to his renal insufficiency. He is not having any chest pain. Does have severe hypertension. Has been started on clonidine and hydralazine. 7/14 no distress today on nasal oxygen. Did have residual on post void bladder scan and Quesada catheter was placed with good diuresis overnight.   Despite this potassium remains elevated. We will give 1 dose of Kayexalate. Despite diuresis hemoglobin is dropped to 6.7 will transfuse. He denies frequency small-volume urine or straining to urinate at home despite this with signs of obstruction we will add Flomax. Potassium 6 today we will give 1 dose of Kayexalate and continue diuresis  7/16 respirations nonlabored. Was placed back on 1 L nasal oxygen yesterday overnight oximetry shows well-maintained oxygen saturation. Will check overnight tonight on and off oxygen. Quesada catheter has been removed. Chest x-ray continues to show mild pulmonary edema  7/17 overnight oximetry shows desaturation when on room air. Will maintain oxygen and repeat overnight oximetry Ibrahima night  7/19 developed V. tach overnight and now in the ICU on IV amiodarone with rhythm showing alternating sinus with PAC and A. fib. Rate . Overnight oximetry continued to show desaturation on room air and he is back on nasal oxygen. He has no specific complaint  7/20 now in sinus rhythm with frequent PVCs. Cardiology is recommending cardiac cath but he is reluctant to undergo that. Creatinine has worsened despite IV fluid administration yesterday although blood pressure is improved. Currently oxygen saturation well-maintained on room air but last overnight oximetry showed desaturation will maintain oxygen at 1 L for now  7/21 had hypoxia yesterday afternoon and placed back on oxygen during the daytime as well as the night. He feels comfortable at this point. Chest x-ray is unchanged still has mild pulmonary edema with small effusions. It does appear that he took a deeper breath today. Creatinine remains markedly elevated. He is on heparin for left axillary DVT. Holding switching to oral anticoagulation until sure no instrumentation is needed   7/23 unchanged remains on IV heparin. Oral Lasix being resumed did require transfusion 7/21.   Repeat chest x-ray no worsening  7/25 overnight oximetry continues to show desaturation. Urine output is not being recorded but he states he is having good urine output. We will repeat chest x-ray in a.m. Thank you for allowing us to participate in the care of this patient.   We will follow along with you     Wilver Miller MD

## 2021-07-25 NOTE — PROGRESS NOTES
Problem: Falls - Risk of  Goal: *Absence of Falls  Description: Document Josh Qiuspe Fall Risk and appropriate interventions in the flowsheet.   Outcome: Progressing Towards Goal  Note: Fall Risk Interventions:  Mobility Interventions: Bed/chair exit alarm, OT consult for ADLs, Patient to call before getting OOB, PT Consult for mobility concerns         Medication Interventions: Bed/chair exit alarm, Patient to call before getting OOB, Teach patient to arise slowly    Elimination Interventions: Bed/chair exit alarm, Call light in reach, Patient to call for help with toileting needs, Toileting schedule/hourly rounds, Urinal in reach              Problem: Patient Education: Go to Patient Education Activity  Goal: Patient/Family Education  Outcome: Progressing Towards Goal

## 2021-07-25 NOTE — PROGRESS NOTES
Progress Note      7/25/2021 9:54 AM  NAME: Johanna Walters   MRN:  [id number]   Admit Diagnosis: Pulmonary edema [J81.1]      Subjective:   Chart reviewed. Patient has had a symptomatic ventricular tachycardia and was shocked. Vascular surgery note appreciated. Echocardiogram results explained. He feels much better today. Patient was offered option of cardiac catheterization but he has decided not to pursue that. Review of Systems:    Symptom Y/N Comments  Symptom Y/N Comments   Fever/Chills n   Chest Pain n    Poor Appetite    Edema y    Cough    Abdominal Pain     Sputum    Joint Pain n    SOB/CARMONA n   Pruritis/Rash     Nausea/vomit    Other     Diarrhea         Constipation           Could NOT obtain due to:      Objective:          Physical Exam:    Last 24hrs VS reviewed since prior progress note. Most recent are:    Visit Vitals  BP (!) 140/98 (BP 1 Location: Right upper arm)   Pulse 63   Temp 98.6 °F (37 °C)   Resp 20   Ht 6' (1.829 m)   Wt 98.2 kg (216 lb 7.9 oz)   SpO2 97%   BMI 29.36 kg/m²       Intake/Output Summary (Last 24 hours) at 7/25/2021 1431  Last data filed at 7/25/2021 8918  Gross per 24 hour   Intake --   Output 300 ml   Net -300 ml        General Appearance: Well developed, well nourished, alert & oriented x 3,    no acute distress. Ears/Nose/Mouth/Throat: Hearing grossly normal.  Neck: Supple. Chest: Lungs clear to auscultation bilaterally. Cardiovascular: Regular rate and rhythm, S1,S2 normal, no murmur. Abdomen: Soft, non-tender, bowel sounds are active. Extremities: Lower extremity edema left upper extremity edema evident  Skin: Lower extremity blisters evident    []         Post-cath site without hematoma, bruit, tenderness, or thrill. Distal pulses intact.     PMH/SH reviewed - no change compared to H&P    Data Review    Telemetry: Patient had a ventricular tachycardia and was shocked and had episode of atrial fibrillation and now is in sinus rhythm with PVCs.    EKG:   []  No new EKG for review    Lab Data Personally Reviewed:    Recent Labs     07/24/21  0814 07/23/21  0400   WBC 4.6 4.5   HGB 8.9* 8.5*   HCT 29.8* 28.1*   * 143*     Recent Labs     07/23/21  0400 07/22/21  1600   APTT >153.0* 57.0*      Recent Labs     07/24/21  0814 07/23/21  0400    137   K 4.9 4.7    104   CO2 23 23   BUN 96* 91*   CREA 7.26* 7.32*   GLU 91 92   CA 8.1* 8.0*     No results for input(s): CPK, CKNDX, TROIQ in the last 72 hours. No lab exists for component: CPKMB  No results found for: CHOL, CHOLX, CHLST, CHOLV, HDL, HDLP, LDL, LDLC, DLDLP, TGLX, TRIGL, TRIGP, CHHD, CHHDX    Recent Labs     07/24/21  0814 07/23/21  0400   ALB 3.2* 3.0*     No results for input(s): PH, PCO2, PO2 in the last 72 hours. Medications Personally Reviewed:    Current Facility-Administered Medications   Medication Dose Route Frequency    apixaban (ELIQUIS) tablet 2.5 mg  2.5 mg Oral BID    sevelamer carbonate (RENVELA) tab 1,600 mg  1,600 mg Oral TID WITH MEALS    furosemide (LASIX) tablet 80 mg  80 mg Oral DAILY    0.9% sodium chloride infusion 250 mL  250 mL IntraVENous PRN    0.9% sodium chloride infusion 250 mL  250 mL IntraVENous PRN    metoprolol tartrate (LOPRESSOR) tablet 25 mg  25 mg Oral DAILY    [Held by provider] amLODIPine (NORVASC) tablet 10 mg  10 mg Oral DAILY    tamsulosin (FLOMAX) capsule 0.4 mg  0.4 mg Oral DAILY    [Held by provider] hydrALAZINE (APRESOLINE) tablet 50 mg  50 mg Oral TID    sodium bicarbonate tablet 650 mg  650 mg Oral TID    0.9% sodium chloride infusion 250 mL  250 mL IntraVENous PRN    calcitRIOL (ROCALTROL) capsule 0.25 mcg  0.25 mcg Oral DAILY    ergocalciferol capsule 50,000 Units  50,000 Units Oral Q7D    hydrALAZINE (APRESOLINE) 20 mg/mL injection 40 mg  40 mg IntraVENous Q6H PRN           Problem List:   Acute hypoxic respiratory failure and patient seems to be somewhat better. Severe anemia. Renal failure.   Heart failure. Left upper extremity DVT. Minimal elevation of troponin I is probably not a presentation of myocardial infarction. Patient has had ventricular tachycardia and has been shocked. Patient feels somewhat weak. 1.      Assessment/Plan:   Patient was scheduled for cardiac catheterization but he says he does not want to go for catheterization. We will follow nephrology recommendations and vascular surgery recommendations. Probably physical therapy is of value. I will continue otherwise present care. Thank you. 1.          []       High complexity decision making was performed in this patient at high risk for decompensation with multiple organ involvement.     Sumanth Galindo MD

## 2021-07-26 ENCOUNTER — APPOINTMENT (OUTPATIENT)
Dept: GENERAL RADIOLOGY | Age: 66
DRG: 291 | End: 2021-07-26
Attending: INTERNAL MEDICINE
Payer: MEDICARE

## 2021-07-26 LAB
ALBUMIN SERPL ELPH-MCNC: 3.3 G/DL (ref 2.9–4.4)
ALBUMIN SERPL-MCNC: 3.2 G/DL (ref 3.5–5)
ALBUMIN/GLOB SERPL: 1.4 {RATIO} (ref 0.7–1.7)
ALPHA1 GLOB SERPL ELPH-MCNC: 0.3 G/DL (ref 0–0.4)
ALPHA2 GLOB SERPL ELPH-MCNC: 0.6 G/DL (ref 0.4–1)
ANION GAP SERPL CALC-SCNC: 9 MMOL/L (ref 5–15)
B-GLOBULIN SERPL ELPH-MCNC: 0.8 G/DL (ref 0.7–1.3)
BUN SERPL-MCNC: 99 MG/DL (ref 6–20)
BUN/CREAT SERPL: 14 (ref 12–20)
CA-I BLD-MCNC: 8.3 MG/DL (ref 8.5–10.1)
CHLORIDE SERPL-SCNC: 106 MMOL/L (ref 97–108)
CO2 SERPL-SCNC: 24 MMOL/L (ref 21–32)
CREAT SERPL-MCNC: 6.98 MG/DL (ref 0.7–1.3)
GAMMA GLOB SERPL ELPH-MCNC: 0.7 G/DL (ref 0.4–1.8)
GLOBULIN SER CALC-MCNC: 2.3 G/DL (ref 2.2–3.9)
GLUCOSE SERPL-MCNC: 83 MG/DL (ref 65–100)
IGA SERPL-MCNC: 128 MG/DL (ref 61–437)
IGG SERPL-MCNC: 732 MG/DL (ref 603–1613)
IGM SERPL-MCNC: 52 MG/DL (ref 20–172)
M PROTEIN SERPL ELPH-MCNC: ABNORMAL G/DL
PHOSPHATE SERPL-MCNC: 8.2 MG/DL (ref 2.6–4.7)
POTASSIUM SERPL-SCNC: 5.1 MMOL/L (ref 3.5–5.1)
PROT PATTERN SERPL IFE-IMP: NORMAL
PROT SERPL-MCNC: 5.6 G/DL (ref 6–8.5)
SODIUM SERPL-SCNC: 139 MMOL/L (ref 136–145)

## 2021-07-26 PROCEDURE — 36415 COLL VENOUS BLD VENIPUNCTURE: CPT

## 2021-07-26 PROCEDURE — 74011250637 HC RX REV CODE- 250/637: Performed by: INTERNAL MEDICINE

## 2021-07-26 PROCEDURE — 65270000029 HC RM PRIVATE

## 2021-07-26 PROCEDURE — 97116 GAIT TRAINING THERAPY: CPT

## 2021-07-26 PROCEDURE — 80069 RENAL FUNCTION PANEL: CPT

## 2021-07-26 PROCEDURE — 97530 THERAPEUTIC ACTIVITIES: CPT

## 2021-07-26 PROCEDURE — 97110 THERAPEUTIC EXERCISES: CPT

## 2021-07-26 PROCEDURE — 71046 X-RAY EXAM CHEST 2 VIEWS: CPT

## 2021-07-26 RX ORDER — SODIUM BICARBONATE 650 MG/1
650 TABLET ORAL 2 TIMES DAILY
Status: DISCONTINUED | OUTPATIENT
Start: 2021-07-26 | End: 2021-08-05 | Stop reason: HOSPADM

## 2021-07-26 RX ORDER — FUROSEMIDE 40 MG/1
40 TABLET ORAL 2 TIMES DAILY
Status: DISCONTINUED | OUTPATIENT
Start: 2021-07-26 | End: 2021-08-02

## 2021-07-26 RX ADMIN — SODIUM BICARBONATE 650 MG: 650 TABLET ORAL at 08:54

## 2021-07-26 RX ADMIN — SEVELAMER CARBONATE 1600 MG: 800 TABLET, FILM COATED ORAL at 13:17

## 2021-07-26 RX ADMIN — AMLODIPINE BESYLATE 5 MG: 5 TABLET ORAL at 08:54

## 2021-07-26 RX ADMIN — TAMSULOSIN HYDROCHLORIDE 0.4 MG: 0.4 CAPSULE ORAL at 08:54

## 2021-07-26 RX ADMIN — METOPROLOL TARTRATE 25 MG: 25 TABLET, FILM COATED ORAL at 08:54

## 2021-07-26 RX ADMIN — CALCITRIOL CAPSULES 0.25 MCG 0.25 MCG: 0.25 CAPSULE ORAL at 08:54

## 2021-07-26 RX ADMIN — SEVELAMER CARBONATE 1600 MG: 800 TABLET, FILM COATED ORAL at 17:56

## 2021-07-26 RX ADMIN — SODIUM BICARBONATE 650 MG: 650 TABLET ORAL at 22:50

## 2021-07-26 RX ADMIN — APIXABAN 2.5 MG: 2.5 TABLET, FILM COATED ORAL at 08:54

## 2021-07-26 RX ADMIN — FUROSEMIDE 80 MG: 40 TABLET ORAL at 08:54

## 2021-07-26 RX ADMIN — APIXABAN 2.5 MG: 2.5 TABLET, FILM COATED ORAL at 22:50

## 2021-07-26 RX ADMIN — SEVELAMER CARBONATE 1600 MG: 800 TABLET, FILM COATED ORAL at 08:53

## 2021-07-26 RX ADMIN — FUROSEMIDE 40 MG: 40 TABLET ORAL at 17:57

## 2021-07-26 NOTE — PROGRESS NOTES
Pulmonary, Critical Care    Name: Billy Tanner MRN: [id number]   : 1955 Hospital: [facility]   Date: 2021  Admission date: 2021 Hospital Day: 15       Subjective/Interval History:   Seen in the emergency room wearing noninvasive ventilator. Patient has underlying renal insufficiency developed worsening shortness of breath cough presented to the emergency room chest x-ray shows pulmonary edema. He was profoundly hypoxic is now on noninvasive ventilator and feels more comfortable. Has not had any significant urine output since he has been in the ER. He also complains of new onset left arm swelling   post void bladder scan showed greater than 200 cc urine retention and Quesaad catheter placed with 500 cc removed. Overnight he has put out an additional 1200 cc. Respirations improved currently nonlabored on nasal oxygen. Duplex scan of the left arm did show left axillary and proximal brachial DVT and heparin was started. On heparin with Quesada catheter and he has had slight bleeding at the urethral meatus. Ultrasound of the abdomen is pending  7/15 ultrasound of the abdomen showed medical renal disease on the right with small kidney no hydronephrosis there was evidence of prostate enlargement. He is breathing easily at this point and on room air is running on oxygen saturation of 93%   respirations nonlabored on nasal oxygen. Quesada catheter is out he states he has been voiding frequent small amounts without straining   no specific complaints breathing easily. Overnight oximetry showed minimal but significant desaturation while on room air 8 minutes 40 seconds with low of 71%  . Developed V. tach this morning given IV amiodarone IV magnesium and transferred to the unit. Currently heart rate  alternating between A. fib and sinus with PAC. He denies any discomfort is breathing easily   remains sinus rhythm with PVCs.   Had worsening hypoxia during the night and placed on oxygen he feels comfortable at this point. Urine output mildly improved yesterday but input remains greater than output  7/24 no specific complaints respirations nonlabored currently room air with saturation 97% Lasix been resumed 7/23. Urine output not accurately recorded but he states he has been passing a lot of urine  7/25 feels fine denies shortness of breath on room air. Oxygen saturation 97% on room air while awake. He states he is putting out good urine from his oral Lasix although its not being recorded in the chart  7/26 on room air during the daytime. I have ordered oxygen for nocturnal use but it has not been set up. I will with respiratory and have it set up again. We will repeat overnight oximetry tonight to keep him qualified for home oxygen  Patient Active Problem List   Diagnosis Code    GI bleed K92.2    Pulmonary edema J81.1       IMPRESSION:   1. Ventricular tachycardia none further seen   2. Atrial fibrillation converted to sinus  3. Hypotension corrected with IV fluids and holding antihypertensive medications  4. Acute hypoxic respiratory failure room air oxygen saturation maintained while awake  5. Chronic hypoxic respiratory failure overnight oximetry continues to show desaturation at night  6. Acute pulmonary edema radiographically no change 7/26   7. 2 of 4 blood culture bottles with coagulase-negative staph aureus likely skin contaminant   8. Bilateral pleural effusions  chest x-ray unchanged  9. Acute on chronic kidney disease creatinine improved slightly  10. Obstructive uropathy Quesada catheter has been removed with no residual urine on bladder scanning  11. Severe hypertension corrected   12. DVT left axillary and brachial now on Eliquis  13. Anemia hemoglobin improved after last transfusion 7/21  14. Troponin leak likely due to renal insufficiency stable has not risen any further  Body mass index is 29.36 kg/m².       RECOMMENDATIONS/PLAN: 1. Remains in sinus rhythm  2. Blood pressure well controlled now on metoprolol low-dose and Norvasc  3. Continue Flomax for prostate enlargement   4. We will repeat overnight oximetry  5. Subjective/Initial History:       I was asked by Justin Gaxiola MD to see Danii Saunders a 76 y.o.  male  in consultation for a chief complaint of shortness of breath pulmonary edema acute hypoxic respiratory failure        Patient PCP: Tyrone Boyd MD  PMH:  has a past medical history of Chronic kidney disease and Hypertension. PSH:   has no past surgical history on file. FHX: family history is not on file. SHX:  reports that he has never smoked. He has never used smokeless tobacco.    Systemic review somewhat limited as he is on noninvasive ventilator remains short of breath although states he is feeling better  General he has not noted any worsening edema of his upper legs fever chills or sweats does complain of new onset swelling of his left hand and arm  Eyes no double vision or momentary blindness  ENT no facial pain over the sinuses  Endocrinologic no polyuria polydipsia  Musculoskeletal no swollen tender joints  Neurologic no history seizures or syncope  Gastrointestinal no nausea vomiting acid indigestion  Genitourinary states he does still pass fair amount of urine each day has not noted any decrease in that.   Does not have to strain to urinate has no bleeding or drainage  Cardiovascular he is not aware of any underlying heart disease has not had chest pain diaphoresis has had worsening shortness of breath laying down and with exertion  Respiratory worsening shortness of breath over the last week or more no significant cough no sputum production no chills or sweats did have history COVID-19 pneumonitis January of this year    No Known Allergies   MEDS:   Current Facility-Administered Medications   Medication    amLODIPine (NORVASC) tablet 5 mg    apixaban (ELIQUIS) tablet 2.5 mg    sevelamer carbonate (RENVELA) tab 1,600 mg    furosemide (LASIX) tablet 80 mg    0.9% sodium chloride infusion 250 mL    0.9% sodium chloride infusion 250 mL    metoprolol tartrate (LOPRESSOR) tablet 25 mg    tamsulosin (FLOMAX) capsule 0.4 mg    [Held by provider] hydrALAZINE (APRESOLINE) tablet 50 mg    sodium bicarbonate tablet 650 mg    0.9% sodium chloride infusion 250 mL    calcitRIOL (ROCALTROL) capsule 0.25 mcg    ergocalciferol capsule 50,000 Units    hydrALAZINE (APRESOLINE) 20 mg/mL injection 40 mg        Current Facility-Administered Medications:     amLODIPine (NORVASC) tablet 5 mg, 5 mg, Oral, DAILY, Sabi Paulson MD, 5 mg at 07/26/21 0854    apixaban (ELIQUIS) tablet 2.5 mg, 2.5 mg, Oral, BID, Malcolm Stevenson MD, 2.5 mg at 07/26/21 0854    sevelamer carbonate (RENVELA) tab 1,600 mg, 1,600 mg, Oral, TID WITH MEALS, Ratna Chavarria MD, 1,600 mg at 07/26/21 0853    furosemide (LASIX) tablet 80 mg, 80 mg, Oral, DAILY, Ratna Chavarria MD, 80 mg at 07/26/21 0854    0.9% sodium chloride infusion 250 mL, 250 mL, IntraVENous, PRN, Malcolm Stevenson MD    0.9% sodium chloride infusion 250 mL, 250 mL, IntraVENous, PRN, Malcolm Stevenson MD    metoprolol tartrate (LOPRESSOR) tablet 25 mg, 25 mg, Oral, DAILY, Ratna Chavarria MD, 25 mg at 07/26/21 0854    tamsulosin (FLOMAX) capsule 0.4 mg, 0.4 mg, Oral, DAILY, Jose Stephenson MD, 0.4 mg at 07/26/21 0854    [Held by provider] hydrALAZINE (APRESOLINE) tablet 50 mg, 50 mg, Oral, TID, Jose Stephenson MD, 50 mg at 07/18/21 2054    sodium bicarbonate tablet 650 mg, 650 mg, Oral, TID, Jose Stephenson MD, 650 mg at 07/26/21 0854    0.9% sodium chloride infusion 250 mL, 250 mL, IntraVENous, PRN, Jose Stephenson MD    calcitRIOL (ROCALTROL) capsule 0.25 mcg, 0.25 mcg, Oral, DAILY, Ratna Chavarria MD, 0.25 mcg at 07/26/21 0854    ergocalciferol capsule 50,000 Units, 50,000 Units, Oral, Q7D, Ratna Chavarria MD, 50,000 Units at 07/21/21 1711    hydrALAZINE (APRESOLINE) 20 mg/mL injection 40 mg, 40 mg, IntraVENous, Q6H PRN, Richa Zhang MD, 40 mg at 07/15/21 1552      Objective:     Vital Signs: Telemetry:    normal sinus rhythm Intake/Output:   Visit Vitals  /88 (BP 1 Location: Right upper arm, BP Patient Position: At rest;Sitting)   Pulse 61   Temp 98.3 °F (36.8 °C)   Resp 18   Ht 6' (1.829 m)   Wt 98.2 kg (216 lb 7.9 oz)   SpO2 97%   BMI 29.36 kg/m²       Temp (24hrs), Av.6 °F (36.4 °C), Min:97.3 °F (36.3 °C), Max:98.3 °F (36.8 °C)        O2 Device: None (Room air) O2 Flow Rate (L/min): 2 l/min         Body mass index is 29.36 kg/m². Wt Readings from Last 4 Encounters:   21 98.2 kg (216 lb 7.9 oz)   21 79.4 kg (175 lb)          Intake/Output Summary (Last 24 hours) at 2021 1155  Last data filed at 2021 0908  Gross per 24 hour   Intake 240 ml   Output 600 ml   Net -360 ml       Last shift:      701 - 1900  In: 240 [P.O.:240]  Out: 300 [Urine:300]  Last 3 shifts: 1901 -  07  In: -   Out: 500 [Urine:500]       Hemodynamics:    CO:    CI:    CVP:    SVR:   PAP Systolic:    PAP Diastolic:    PVR:    VI16:       Ventilator Settings:      Mode Rate TV Press PEEP FiO2 PIP Min. Vent               28 %     9.7 l/min      Physical Exam:    General:  male; no distress now on room air  HEENT: NCAT, visible oral mucosa is moist and clear  Eyes: anicteric; conjunctiva clear extraocular movements intact  Neck: no nodes,,no accessory MM use. no respiratory distress  Chest: no deformity,   Cardiac: Regular rhythm and rate now controlled  Lungs: distant breath sounds; clear anteriorly and laterally with rales in the bases  Abd: Soft positive bowel sounds no tenderness  Ext: Improvement in edema  of the lower extremities with continued edema of the left arm; no joint swelling;  No clubbing  : Clear urine  Neuro: Alert awake no distress speech is clear moves all 4 extremities  Psych-calm, oriented to person;   Skin: warm, dry, no cyanosis;   Pulses: Brachial and radial pulses intact  Capillary:slow capillary refill      Labs:    Recent Labs     07/24/21  0814   WBC 4.6   HGB 8.9*   *     Recent Labs     07/26/21  0623 07/24/21  0814    138   K 5.1 4.9    107   CO2 24 23   GLU 83 91   BUN 99* 96*   CREA 6.98* 7.26*   CA 8.3* 8.1*   PHOS 8.2* 8.2*   ALB 3.2* 3.2*   7/25 overnight oximetry with 1 hour 13 minutes below 88% with low oxygen saturation 56%  7/19 overnight oximetry shows 14 minutes 26 seconds below 88% with low oxygen saturation 75%  7/17 overnight oximetry shows low oxygen saturation 71% with 8 minutes 40 seconds below 88% from 1 AM to 6 AM   7/16 overnight oximetry on 1 L shows only 2 minutes 20 seconds below 88% with a low oxygen saturation of 83%  7/15 room air oxygen saturation 93%  currently on noninvasive ventilator inspiratory pressure 16 expiratory pressure six rate 16 FiO2 28% with oxygen saturation 96%   BNP 32,264, previous greater than 35,000  Lab Results   Component Value Date/Time    Culture result:  07/12/2021 10:40 PM     Two of four bottles have been flagged positive by instrument. Bottles have been sent to [facility] laboratory to assess for possible growth.  Gram Positive Cocci in clusters CALLED TO AND READ BACK BY Willa Castaneda r.n. [phone] 07/14/2021 by dpw    Culture result:  07/12/2021 10:40 PM     Staphylococcus species, coagulase negative GROWING IN THE 1ST OF 4 BOTTLES DRAWN    Culture result:  07/12/2021 10:40 PM     Staphylococcus species, coagulase negative (2nd colony type/strain) GROWING IN THE 2ND OF 4 BOTTLES DRAWN        Imaging:  I have personally reviewed the patients radiographs and have reviewed the reports:    CXR Results  (Last 48 hours)  7/23 chest x-ray mild pulmonary congestion tiny effusions unchanged from last x-ray although right upper lobe lung may have slightly less congestion  7/21 chest x-ray with continued mild pulmonary edema pattern small bilateral pleural effusions there may be improved inspiratory effort  7/16 chest x-ray with continued mild pulmonary edema and small pleural effusions unchanged               07/12/21 2251  XR CHEST PORT Final result    Impression:  Findings/impression:       Cardiac silhouette is enlarged. Central vascular congestion and patchy central   airspace disease/edema. Suspect trace right pleural effusion. No evidence of pneumothorax. No acute osseous abnormality identified. Narrative:  Study: XR CHEST PORT       Clinical indication: sob       Comparison: None. To me chest x-ray consistent with cardiomegaly pulmonary edema and bilateral pleural effusions           Results from Hospital Encounter encounter on 07/12/21    XR CHEST PA LAT    Narrative  Chest, 2 views, 7/26/2021    History: Pulmonary edema. Pleural effusions. Comparison: Including chest 7/23/2021. Findings: The cardiac silhouette remains enlarged. The lungs are adequately  expanded. There is pulmonary vascular congestion and hydrostatic edema, not  significantly changed as compared to chest 7/23/2021. Small to moderate pleural  effusions and associated bibasilar atelectasis persist.  No pneumothorax is  identified. Degenerative changes are present in the spine. Impression  Hydrostatic edema. Pleural effusions and associated bibasilar  atelectasis. No significant interval change. XR CHEST PORT    Narrative  This study is a portable AP radiograph of the chest dated 7/23/2021 obtained at  2:52 AM.    HISTORY: Pulmonary edema. COMPARISON: 7/21/2021. FINDINGS: There is moderate enlargement of the cardiac silhouette. There is  associated distention of the pulmonary vessels and perivascular ill definition  consistent with interstitial edema. There also is perivascular airspace disease  compatible with alveolar edema.  There has been a decrease in the alveolar edema  within the right upper lobe. This examination is negative for larger pleural effusions. The remainder of the examination is unchanged. Impression  There is continued moderate pulmonary interstitial and alveolar  edema with a slight interval improvement within the right upper lobe. The  remainder of this examination is unchanged. XR CHEST PORT    Narrative  Chest single view. Comparison single view chest July 19, 2021. Patchy reticular markings through the lungs, same distribution. Those through  lung bases appear less dense; suspect mild degree improved aeration. Cardiac and  mediastinal structures unchanged. No pneumothorax or sizable pleural effusion. Results from [facility] encounter on 01/05/21    CT CHEST WO CONT    Narrative  Images obtained without contrast include the abdomen and pelvis. Comparison portable chest radiograph earlier today. Dose Reduction:  All CT scans at this facility are performed using dose reduction optimization  techniques as appropriate to a performed exam including the following: Automated  exposure control, adjustments of the mA and/or kV according to patient size, or  use of iterative reconstruction technique. Subtle peripheral groundglass densities are noted in both lungs, could reflect  Covid pneumonia or other. No pneumothorax or pneumomediastinum. The radiographic findings suspicious for  pneumomediastinum probably was artifact, perhaps related to cardiac motion. No pleural effusion or adenopathy. Cardiomegaly again noted. Impression  IMPRESSION:  1. No pneumomediastinum or pneumothorax. 2. Cardiomegaly. 3. Subtle groundglass densities in both lungs might be pneumonia or other. Discussion patient with worsening renal insufficiency has developed pulmonary edema acute hypoxic respiratory failure. He states he still has urine output normally at home. We will give him high-dose IV Lasix to see if diuresis is induced.   He very likely will need dialysis. He has minimal elevation in troponin probably troponin leak from hypoxia or just due to his renal insufficiency. He is not having any chest pain. Does have severe hypertension. Has been started on clonidine and hydralazine. 7/14 no distress today on nasal oxygen. Did have residual on post void bladder scan and Quesada catheter was placed with good diuresis overnight. Despite this potassium remains elevated. We will give 1 dose of Kayexalate. Despite diuresis hemoglobin is dropped to 6.7 will transfuse. He denies frequency small-volume urine or straining to urinate at home despite this with signs of obstruction we will add Flomax. Potassium 6 today we will give 1 dose of Kayexalate and continue diuresis  7/16 respirations nonlabored. Was placed back on 1 L nasal oxygen yesterday overnight oximetry shows well-maintained oxygen saturation. Will check overnight tonight on and off oxygen. Quesada catheter has been removed. Chest x-ray continues to show mild pulmonary edema  7/17 overnight oximetry shows desaturation when on room air. Will maintain oxygen and repeat overnight oximetry Ibrahima night  7/19 developed V. tach overnight and now in the ICU on IV amiodarone with rhythm showing alternating sinus with PAC and A. fib. Rate . Overnight oximetry continued to show desaturation on room air and he is back on nasal oxygen. He has no specific complaint  7/20 now in sinus rhythm with frequent PVCs. Cardiology is recommending cardiac cath but he is reluctant to undergo that. Creatinine has worsened despite IV fluid administration yesterday although blood pressure is improved. Currently oxygen saturation well-maintained on room air but last overnight oximetry showed desaturation will maintain oxygen at 1 L for now  7/21 had hypoxia yesterday afternoon and placed back on oxygen during the daytime as well as the night. He feels comfortable at this point.   Chest x-ray is unchanged still has mild pulmonary edema with small effusions. It does appear that he took a deeper breath today. Creatinine remains markedly elevated. He is on heparin for left axillary DVT. Holding switching to oral anticoagulation until sure no instrumentation is needed   7/23 unchanged remains on IV heparin. Oral Lasix being resumed did require transfusion 7/21. Repeat chest x-ray no worsening  7/26 chest x-ray unchanged pulmonary edema and bilateral effusions. He states he has good urine output with the current dose of Lasix. We will repeat overnight oximetry on and off oxygen           Thank you for allowing us to participate in the care of this patient.   We will follow along with you     Dolly Nunn MD

## 2021-07-26 NOTE — PROGRESS NOTES
PHYSICAL THERAPY TREATMENT  Patient: Kanika Maddox (1 y.o. male)  Date: 7/26/2021  Diagnosis: Pulmonary edema [J81.1] <principal problem not specified>  Procedure(s) (LRB):  LEFT HEART CATH / CORONARY ANGIOGRAPHY (N/A)    Precautions:    Chart, physical therapy assessment, plan of care and goals were reviewed. ASSESSMENT  Patient continues with skilled PT services and is progressing towards goals. Pt semi supine in bed upon arrival and agreeable to session. Performed bed mobility with SBA. Pt completed STS CGA. Pt ambulated with RW and min A +1 for chair follow. Noted fatigue during gait and slight SOB, required seated rest break during gait. Pt performed toileting with MELCHOR, see OT note for details. Pt performed seated therex in recliner, see details below. Pt left sitting in recliner with call bell in reach and needs met. Recommending d/c to IRF once medically appropriate. Current Level of Function Impacting Discharge (mobility/balance): assistance required during ambulation    Other factors to consider for discharge: PLOF, time sine onset, severity of deficits         PLAN :  Patient continues to benefit from skilled intervention to address the above impairments. Continue treatment per established plan of care. to address goals.     Recommendation for discharge: (in order for the patient to meet his/her long term goals)  Therapy 3 hours per day 5-7 days per week    This discharge recommendation:  Has been made in collaboration with the attending provider and/or case management    IF patient discharges home will need the following DME: to be determined (TBD)       SUBJECTIVE:   Patient stated I can walk a little further    OBJECTIVE DATA SUMMARY:   Critical Behavior:  Neurologic State: Alert  Orientation Level: Oriented X4  Cognition: Follows commands     Functional Mobility Training:  Bed Mobility:  Supine to Sit: Stand-by assistance  Scooting: Stand-by assistance    Transfers:  Sit to Stand: Contact guard assistance  Stand to Sit: Contact guard assistance  Bed to Chair: Minimum assistance;Assist x1    Balance:  Sitting: Intact  Standing: Intact; With support  Standing - Static: Constant support;Good  Standing - Dynamic : Constant support; Fair    Ambulation/Gait Training:  Distance (ft): 50 Feet (ft)  Assistive Device: Gait belt;Walker, rolling  Ambulation - Level of Assistance: Minimal assistance    Therapeutic Exercises:   1 x 10 AP  1 x 10 LAQ  1 x 10 Marches    Pain Ratin/10    Activity Tolerance:   Fair and requires rest breaks  Please refer to the flowsheet for vital signs taken during this treatment. After treatment patient left in no apparent distress:   Sitting in chair and Call bell within reach    COMMUNICATION/COLLABORATION:   The patients plan of care was discussed with: Occupational therapy assistant and Registered nurse. OT/PT sessions occurred together for increased patient and clinician safety    Problem: Mobility Impaired (Adult and Pediatric)  Goal: *Acute Goals and Plan of Care (Insert Text)  Description: Pt will be I with LE HEP in 7 days. Pt will perform bed mobility with mod I in 7 days. Pt will perform transfers with mod I in 7 days. Pt will amb 25-50 feet with LRAD safely with mod I in 7 days.    Outcome: Progressing Towards Goal       Bryn Easley PTA   Time Calculation: 23 mins

## 2021-07-26 NOTE — PROGRESS NOTES
Problem: Falls - Risk of  Goal: *Absence of Falls  Description: Document Linda Hsu Fall Risk and appropriate interventions in the flowsheet. Outcome: Progressing Towards Goal  Note: Fall Risk Interventions:  Mobility Interventions: Utilize walker, cane, or other assistive device, Bed/chair exit alarm, Patient to call before getting OOB, OT consult for ADLs, PT Consult for mobility concerns         Medication Interventions: Bed/chair exit alarm, Patient to call before getting OOB, Teach patient to arise slowly    Elimination Interventions: Bed/chair exit alarm, Call light in reach, Patient to call for help with toileting needs, Toileting schedule/hourly rounds, Urinal in reach              Problem: Pressure Injury - Risk of  Goal: *Prevention of pressure injury  Description: Document Alfa Scale and appropriate interventions in the flowsheet.   Outcome: Progressing Towards Goal  Note: Pressure Injury Interventions:  Sensory Interventions: Assess changes in LOC, Keep linens dry and wrinkle-free, Maintain/enhance activity level, Minimize linen layers    Moisture Interventions: Absorbent underpads, Apply protective barrier, creams and emollients, Minimize layers    Activity Interventions: PT/OT evaluation, Increase time out of bed    Mobility Interventions: PT/OT evaluation    Nutrition Interventions: Offer support with meals,snacks and hydration, Document food/fluid/supplement intake    Friction and Shear Interventions: Apply protective barrier, creams and emollients, Minimize layers

## 2021-07-26 NOTE — PROGRESS NOTES
Progress Note      7/26/2021 9:54 AM  NAME: Hyacinth Johnson   MRN:  [id number]   Admit Diagnosis: Pulmonary edema [J81.1]      Subjective:   Chart reviewed. Patient has had a symptomatic ventricular tachycardia and was shocked. Vascular surgery note appreciated. Echocardiogram results explained. He feels much better today. Patient was offered option of cardiac catheterization but he has decided not to pursue that. Review of Systems:    Symptom Y/N Comments  Symptom Y/N Comments   Fever/Chills n   Chest Pain n    Poor Appetite    Edema y    Cough    Abdominal Pain     Sputum    Joint Pain n    SOB/CARMONA n   Pruritis/Rash     Nausea/vomit    Other     Diarrhea         Constipation           Could NOT obtain due to:      Objective:          Physical Exam:    Last 24hrs VS reviewed since prior progress note. Most recent are:    Visit Vitals  BP (!) 163/99 (BP 1 Location: Right upper arm, BP Patient Position: At rest;Supine)   Pulse 76   Temp 97.5 °F (36.4 °C)   Resp 20   Ht 6' (1.829 m)   Wt 98.2 kg (216 lb 7.9 oz)   SpO2 96%   BMI 29.36 kg/m²       Intake/Output Summary (Last 24 hours) at 7/26/2021 1039  Last data filed at 7/26/2021 0908  Gross per 24 hour   Intake 240 ml   Output 600 ml   Net -360 ml        General Appearance: Well developed, well nourished, alert & oriented x 3,    no acute distress. Ears/Nose/Mouth/Throat: Hearing grossly normal.  Neck: Supple. Chest: Lungs clear to auscultation bilaterally. Cardiovascular: Regular rate and rhythm, S1,S2 normal, no murmur. Abdomen: Soft, non-tender, bowel sounds are active. Extremities: Lower extremity edema left upper extremity edema evident  Skin: Lower extremity blisters evident    []         Post-cath site without hematoma, bruit, tenderness, or thrill. Distal pulses intact.     PMH/SH reviewed - no change compared to H&P    Data Review    Telemetry: Patient had a ventricular tachycardia and was shocked and had episode of atrial fibrillation and now is in sinus rhythm with PVCs. EKG:   []  No new EKG for review    Lab Data Personally Reviewed:    Recent Labs     07/24/21  0814   WBC 4.6   HGB 8.9*   HCT 29.8*   *     No results for input(s): INR, PTP, APTT, INREXT, INREXT in the last 72 hours. Recent Labs     07/26/21  0623 07/24/21  0814    138   K 5.1 4.9    107   CO2 24 23   BUN 99* 96*   CREA 6.98* 7.26*   GLU 83 91   CA 8.3* 8.1*     No results for input(s): CPK, CKNDX, TROIQ in the last 72 hours. No lab exists for component: CPKMB  No results found for: CHOL, CHOLX, CHLST, CHOLV, HDL, HDLP, LDL, LDLC, DLDLP, [place], CHHD, CHHDX    Recent Labs     07/26/21 0623 07/24/21  0814   ALB 3.2* 3.2*     No results for input(s): PH, PCO2, PO2 in the last 72 hours. Medications Personally Reviewed:    Current Facility-Administered Medications   Medication Dose Route Frequency    amLODIPine (NORVASC) tablet 5 mg  5 mg Oral DAILY    apixaban (ELIQUIS) tablet 2.5 mg  2.5 mg Oral BID    sevelamer carbonate (RENVELA) tab 1,600 mg  1,600 mg Oral TID WITH MEALS    furosemide (LASIX) tablet 80 mg  80 mg Oral DAILY    0.9% sodium chloride infusion 250 mL  250 mL IntraVENous PRN    0.9% sodium chloride infusion 250 mL  250 mL IntraVENous PRN    metoprolol tartrate (LOPRESSOR) tablet 25 mg  25 mg Oral DAILY    tamsulosin (FLOMAX) capsule 0.4 mg  0.4 mg Oral DAILY    [Held by provider] hydrALAZINE (APRESOLINE) tablet 50 mg  50 mg Oral TID    sodium bicarbonate tablet 650 mg  650 mg Oral TID    0.9% sodium chloride infusion 250 mL  250 mL IntraVENous PRN    calcitRIOL (ROCALTROL) capsule 0.25 mcg  0.25 mcg Oral DAILY    ergocalciferol capsule 50,000 Units  50,000 Units Oral Q7D    hydrALAZINE (APRESOLINE) 20 mg/mL injection 40 mg  40 mg IntraVENous Q6H PRN           Problem List:   Acute hypoxic respiratory failure and patient seems to be somewhat better. Severe anemia. Renal failure. Heart failure.   Left upper extremity DVT. Minimal elevation of troponin I is probably not a presentation of myocardial infarction. Patient has had ventricular tachycardia and has been shocked. 1.      Assessment/Plan:   Patient was scheduled for cardiac catheterization but he says he does not want to go for catheterization. We will follow nephrology recommendations and vascular surgery recommendations. Probably physical therapy is of value. I will continue otherwise present care. Thank you. 1.          []       High complexity decision making was performed in this patient at high risk for decompensation with multiple organ involvement.     Solomon Guillen MD

## 2021-07-26 NOTE — PROGRESS NOTES
CM notified by Genevieve Lyle at [facility] that patient is not able to go to [facility] IRF on d/c due to insurance issues. At this time patient only have Medicare Part A, and will require Part B to go to IRF or receive HH. Patient was provided with the application to obtain part B by Genevieve Lyle at [facility], however it may take a few weeks to process. CM met with patient to discuss, patient reports that he would like to return home and feels that he will be safe at home. Patient reports he has been ambulating in the room and that his sister and nephew live with him and will be able to assist him if necessary. Patient's current DCP is to return home self care once medically stable.

## 2021-07-26 NOTE — PROGRESS NOTES
OCCUPATIONAL THERAPY TREATMENT  Patient: Jewels Berg (89 y.o. male)  Date: 7/26/2021  Diagnosis: Pulmonary edema [J81.1] <principal problem not specified>  Procedure(s) (LRB):  LEFT HEART CATH / CORONARY ANGIOGRAPHY (N/A)    Precautions:    Chart, occupational therapy assessment, plan of care, and goals were reviewed. ASSESSMENT  Patient continues with skilled OT services and is progressing towards goals. Pt. Received semi-supine in bed and agreeable to therapy session. Pt. Is progressing well with therapy session and during todays session requiring less assistance with transfers and able to increased functional mobility. Pt. Completed bed mobility with SBA with use of bed rails and bed modified, good sitting balance noted, sit-> stand CGA, functional ambulation CGA/ Min A for transfer from bed to bathroom with use of RW. Pt able to complete donning/doffing pants with SBA. Pt. Performed grooming at sink with SBA. Pt required 1 seated RB prior to increasing functional ambulation. Pt. Able to performed functional ambulation in fletcher way with CGA +1 for chair follow. Pt. Required 1 standing RB during ambulation. Pt. Required increased verbal cues for PLB d/t pt notably SOB. Pt. Educated on UE and LE therex to perform throughout the day, pt demonstrated and verbalized understanding. Pt. Left seated in chair with needs met and call bell within reach. Other factors to consider for discharge: PLOF, time since          PLAN :  Patient continues to benefit from skilled intervention to address the above impairments. Continue treatment per established plan of care. to address goals.     Recommendation for discharge: (in order for the patient to meet his/her long term goals)  Therapy 3 hours per day 5-7 days per week    This discharge recommendation:  Has been made in collaboration with the attending provider and/or case management    IF patient discharges home will need the following DME: TBD       SUBJECTIVE: Patient stated  I'm doing good.     OBJECTIVE DATA SUMMARY:   Cognitive/Behavioral Status:  Neurologic State: Alert; Appropriate for age  Orientation Level: Oriented X4  Cognition: Appropriate for age attention/concentration; Follows commands    Functional Mobility and Transfers for ADLs:  Bed Mobility:  Supine to Sit: Stand-by assistance  Scooting: Stand-by assistance    Transfers:  Sit to Stand: Contact guard assistance  Functional Transfers  Bathroom Mobility: Minimum assistance  Bed to Chair: Minimum assistance;Assist x1    Balance:  Sitting: Intact  Standing: Intact; With support  Standing - Static: Constant support;Good  Standing - Dynamic : Constant support; Fair    ADL Intervention:    Grooming  Grooming Assistance: Contact guard assistance  Position Performed: Standing  Washing Face: Contact guard assistance  Washing Hands: Contact guard assistance    Toileting  Toileting Assistance: Stand-by assistance  Bladder Hygiene: Stand-by assistance  Clothing Management: Stand-by assistance    Therapeutic Exercises: navid UE's  Exercise Sets Reps AROM AAROM PROM Self PROM Comments   Shoulder flex/ext 1 10 [x] [] [] []    Elbow flex/ext 1 10 [x] [] [] []    Wrist flex/ext 1 10 [x] [] [] []       [] [] [] []        Pain:  0/ 10 pain    Activity Tolerance:   Fair and requires frequent rest breaks  Please refer to the flowsheet for vital signs taken during this treatment. After treatment patient left in no apparent distress:   Sitting in chair and Call bell within reach    COMMUNICATION/COLLABORATION:   The patients plan of care was discussed with: Physical therapy assistant and Registered nurse. Co-tx with PTA for safety and chair follow for increased functional ambulation.       Marcela Francois  Time Calculation: 23 mins    Problem: Self Care Deficits Care Plan (Adult)  Goal: *Acute Goals and Plan of Care (Insert Text)  Description: Pt will be mod I sup <> sit in prep for EOB ADLs  Pt will be mod I grooming sitting EOB  Pt will be mod I LE dressing sitting EOB/long sit  Pt will be mod I sit <>  prep for toileting LRAD  Pt will be mod I toileting/toilet transfer/cloth mgmt LRAD  Pt will be I following UE HEP in prep for self care tasks   Outcome: Progressing Towards Goal

## 2021-07-26 NOTE — PROGRESS NOTES
Progress Note    Tina Irwin MD             Daily Progress Note: 7/26/2021      Subjective: The patient is seen for follow  up. Patient is sitting in chair looking very comfortable. As per RN patient is going to go have tonight overnight pulse oximetry. Discussed with patient regarding his discharge. He wants to walk with walker again tomorrow if he is able to walk he wished to go home with home physical therapy. Problem List:  Problem List as of 7/26/2021 Never Reviewed        Codes Class Noted - Resolved    Pulmonary edema ICD-10-CM: J81.1  ICD-9-CM: 460  7/13/2021 - Present        GI bleed ICD-10-CM: K92.2  ICD-9-CM: 578.9  1/5/2021 - Present              Medications reviewed  Current Facility-Administered Medications   Medication Dose Route Frequency    amLODIPine (NORVASC) tablet 5 mg  5 mg Oral DAILY    apixaban (ELIQUIS) tablet 2.5 mg  2.5 mg Oral BID    sevelamer carbonate (RENVELA) tab 1,600 mg  1,600 mg Oral TID WITH MEALS    furosemide (LASIX) tablet 80 mg  80 mg Oral DAILY    0.9% sodium chloride infusion 250 mL  250 mL IntraVENous PRN    0.9% sodium chloride infusion 250 mL  250 mL IntraVENous PRN    metoprolol tartrate (LOPRESSOR) tablet 25 mg  25 mg Oral DAILY    tamsulosin (FLOMAX) capsule 0.4 mg  0.4 mg Oral DAILY    [Held by provider] hydrALAZINE (APRESOLINE) tablet 50 mg  50 mg Oral TID    sodium bicarbonate tablet 650 mg  650 mg Oral TID    0.9% sodium chloride infusion 250 mL  250 mL IntraVENous PRN    calcitRIOL (ROCALTROL) capsule 0.25 mcg  0.25 mcg Oral DAILY    ergocalciferol capsule 50,000 Units  50,000 Units Oral Q7D    hydrALAZINE (APRESOLINE) 20 mg/mL injection 40 mg  40 mg IntraVENous Q6H PRN       Review of Systems:   A comprehensive review of systems was negative except for that written in the HPI.     Objective:   Physical Exam:     Visit Vitals  /88 (BP 1 Location: Right upper arm, BP Patient Position: At rest;Sitting)   Pulse 61   Temp 98.3 °F (36.8 °C)   Resp 18   Ht 6' (1.829 m)   Wt 98.2 kg (216 lb 7.9 oz)   SpO2 97%   BMI 29.36 kg/m²    O2 Flow Rate (L/min): 2 l/min O2 Device: None (Room air)    Temp (24hrs), Av.6 °F (36.4 °C), Min:97.3 °F (36.3 °C), Max:98.3 °F (36.8 °C)    701 - 1900  In: 240 [P.O.:240]  Out: 300 [Urine:300]   1901 - 700  In: -   Out: 500 [Urine:500]    General:  Alert, cooperative, no distress, appears stated age. Lungs:   Clear to auscultation bilaterally. Chest wall:  No tenderness or deformity. Heart:  Regular rate and rhythm, S1, S2 normal, no murmur, click, rub or gallop. Abdomen:   Soft, non-tender. Bowel sounds normal. No masses,  No organomegaly. Extremities: Extremities normal, atraumatic, no cyanosis bilateral leg edema as well as left upper limb edema   Pulses: 2+ and symmetric all extremities. Skin: Skin color, texture, turgor normal. No rashes or lesions   Neurologic: CNII-XII intact. No gross sensory or motor deficits     Data Review:       Recent Days:  Recent Labs     21  0814   WBC 4.6   HGB 8.9*   HCT 29.8*   *     Recent Labs     21  0623 21  0814    138   K 5.1 4.9    107   CO2 24 23   GLU 83 91   BUN 99* 96*   CREA 6.98* 7.26*   CA 8.3* 8.1*   PHOS 8.2* 8.2*   ALB 3.2* 3.2*     No results for input(s): PH, PCO2, PO2, HCO3, FIO2 in the last 72 hours. Results     Procedure Component Value Units Date/Time    MRSA SCREEN - PCR (NASAL) [[id number]] Collected: 21 173    Order Status: Completed Specimen: Swab Updated: 21     MRSA by PCR, Nasal Not Detected       COVID-19 RAPID TEST [[id number]] Collected: 21 0539    Order Status: Completed Specimen: Nasopharyngeal Updated: 21 0655     Specimen source Nasopharyngeal        COVID-19 rapid test Not Detected        Comment: Rapid Abbott ID Now   Rapid NAAT:  The specimen is NEGATIVE for SARS-CoV-2, the novel coronavirus associated with COVID-19.    Negative results should be treated as presumptive and, if inconsistent with clinical signs and symptoms or necessary for patient management, should be tested with an alternative molecular assay. Negative results do not preclude SARS-CoV-2 infection and should not be used as the sole basis for patient management decisions. This test has been authorized by the FDA under   an Emergency Use Authorization (EUA) for use by authorized laboratories. Fact sheet for Healthcare Providers: [employer] Fact sheet for Patients: [employer]   Methodology: Isothermal Nucleic Acid Amplification         CULTURE, BLOOD, PAIRED [[id number]] Collected: 07/12/21 2240    Order Status: Completed Specimen: Blood Updated: 07/16/21 1346     Special Requests: No Special Requests        Culture result:       Two of four bottles have been flagged positive by instrument. Bottles have been sent to [facility] laboratory to assess for possible growth.  Gram Positive Cocci in clusters CALLED TO AND READ BACK BY chiquita mtz r.n. [id number] 07/14/2021 by hanane                  Staphylococcus species, coagulase negative GROWING IN THE 1ST OF 4 BOTTLES DRAWN                  Staphylococcus species, coagulase negative (2nd colony type/strain) GROWING IN THE 2ND OF 4 BOTTLES DRAWN               24 Hour Results:  Recent Results (from the past 24 hour(s))   IRON PROFILE    Collection Time: 07/25/21 12:55 PM   Result Value Ref Range    Iron 88 35 - 150 ug/dL    TIBC 322 250 - 450 ug/dL    Iron % saturation 27 20 - 50 %   RENAL FUNCTION PANEL    Collection Time: 07/26/21  6:23 AM   Result Value Ref Range    Sodium 139 136 - 145 mmol/L    Potassium 5.1 3.5 - 5.1 mmol/L    Chloride 106 97 - 108 mmol/L    CO2 24 21 - 32 mmol/L    Anion gap 9 5 - 15 mmol/L    Glucose 83 65 - 100 mg/dL    BUN 99 (H) 6 - 20 mg/dL    Creatinine 6.98 (H) 0.70 - 1.30 mg/dL    BUN/Creatinine ratio 14 12 - 20      GFR est AA 10 (L) >60 ml/min/1.73m2    GFR est non-AA 8 (L) >60 ml/min/1.73m2    Calcium 8.3 (L) 8.5 - 10.1 mg/dL    Phosphorus 8.2 (H) 2.6 - 4.7 mg/dL    Albumin 3.2 (L) 3.5 - 5.0 g/dL       XR CHEST PA LAT   Final Result   Hydrostatic edema. Pleural effusions and associated bibasilar   atelectasis. No significant interval change. XR CHEST PORT   Final Result   There is continued moderate pulmonary interstitial and alveolar   edema with a slight interval improvement within the right upper lobe. The   remainder of this examination is unchanged. XR CHEST PORT   Final Result      XR CHEST PORT   Final Result      LOWER EXT ART PVR MULT LEVEL SEG PRESSURES   Final Result      DUPLEX LOWER EXT VENOUS BILAT   Final Result      XR CHEST PORT   Final Result   Findings/impression: Stable cardiomediastinal silhouette. Similar central   pulmonary vascular congestion and bilateral patchy airspace opacities. No   significant pleural effusion. No pneumothorax. Findings are not significantly changed. XR CHEST PORT   Final Result      US RETROPERITONEUM COMP   Final Result   Medical renal disease on the right      DUPLEX UPPER EXT VENOUS LEFT   Final Result      XR CHEST PORT   Final Result   Findings/impression:      Cardiac silhouette is enlarged. Central vascular congestion and patchy central   airspace disease/edema. Suspect trace right pleural effusion. No evidence of pneumothorax. No acute osseous abnormality identified. Assessment: Acute DVT left upper limb  Acute flash pulmonary edema now well compensated  Acute on chronic renal failure now resolved now kidney function has been stabilized  Anemia most likely of chronic disease now stabilized  Hypertension  Ventricular tachycardia with hypotension now resolved        Plan: As mentioned before patient has refused to go for cardiac catheterization. Patient is going to have overnight pulse oximetry for the requirement of oxygen at home.   As mentioned before patient wished to go home with home physical therapy if it is possible. Would like to walk with a walker again tomorrow today he is able to walk with a walker. Care Plan discussed with: Patient/Family and RN taking care of the patient. Total time spent with patient: 30 minutes.     Eleni Felty, MD

## 2021-07-26 NOTE — PROGRESS NOTES
[facility]     Renal Daily Progress Note:     Admission Date: 2021     Subjective: feeling ok, no further V-tach,. BP back to baseline. Heart rate around 60 bpm. Creatinine dropping. Urine not accurately quantified. Patient refused cardiac cath. Ambulating fletcher with PT    Not short of breath. No cough. No chest or abdominal pain.      Objective:     Visit Vitals  /88 (BP 1 Location: Right upper arm, BP Patient Position: At rest;Sitting)   Pulse 61   Temp 98.3 °F (36.8 °C)   Resp 18   Ht 6' (1.829 m)   Wt 98.2 kg (216 lb 7.9 oz)   SpO2 97%   BMI 29.36 kg/m²     Temp (24hrs), Av.7 °F (36.5 °C), Min:97.3 °F (36.3 °C), Max:98.3 °F (36.8 °C)        Intake/Output Summary (Last 24 hours) at 2021 1726  Last data filed at 2021 1318  Gross per 24 hour   Intake 480 ml   Output 800 ml   Net -320 ml     Current Facility-Administered Medications   Medication Dose Route Frequency    amLODIPine (NORVASC) tablet 5 mg  5 mg Oral DAILY    apixaban (ELIQUIS) tablet 2.5 mg  2.5 mg Oral BID    sevelamer carbonate (RENVELA) tab 1,600 mg  1,600 mg Oral TID WITH MEALS    furosemide (LASIX) tablet 80 mg  80 mg Oral DAILY    0.9% sodium chloride infusion 250 mL  250 mL IntraVENous PRN    0.9% sodium chloride infusion 250 mL  250 mL IntraVENous PRN    metoprolol tartrate (LOPRESSOR) tablet 25 mg  25 mg Oral DAILY    tamsulosin (FLOMAX) capsule 0.4 mg  0.4 mg Oral DAILY    [Held by provider] hydrALAZINE (APRESOLINE) tablet 50 mg  50 mg Oral TID    sodium bicarbonate tablet 650 mg  650 mg Oral TID    0.9% sodium chloride infusion 250 mL  250 mL IntraVENous PRN    calcitRIOL (ROCALTROL) capsule 0.25 mcg  0.25 mcg Oral DAILY    ergocalciferol capsule 50,000 Units  50,000 Units Oral Q7D    hydrALAZINE (APRESOLINE) 20 mg/mL injection 40 mg  40 mg IntraVENous Q6H PRN       Physical Exam:    Seen in Room 470    General appearance: Chronically ill-appearing patient sitting up on side of bed- comfortable. Head: Normocephalic    Lungs: clear to auscultation , no wheezes, no rales, poor air movement    Heart:  No S3 gallop , No pericardial rub. Abdomen: Not distended    Extremities:  Lower extremity edema including  dependent thighs. Neuro : Awake and responding appropriately. Data Review:     LABS:  Recent Labs     07/26/21  0623 07/24/21  0814    138   K 5.1 4.9    107   CO2 24 23   BUN 99* 96*   CREA 6.98* 7.26*   CA 8.3* 8.1*   ALB 3.2* 3.2*   PHOS 8.2* 8.2*   Vitamin D2 11.1  Intact   PSA  5.2  Recent Labs     07/24/21  0814   WBC 4.6   HGB 8.9*   HCT 29.8*   *   iron saturation 5%    No results for input(s): HÉCTOR, KU, CLU, CREAU in the last 72 hours. No lab exists for component: PROU     Blood Cultures 7-12-21  Two of four bottles have been flagged positive by instrument.  Bottles have been sent to [facility] laboratory to assess for possible growth. Gram Positive Cocci in clusters     Chest x-ray 7-26-21  History: Pulmonary edema. Pleural effusions.     Comparison: Including chest 7/23/2021.     Findings: The cardiac silhouette remains enlarged. The lungs are adequately  expanded. There is pulmonary vascular congestion and hydrostatic edema, not  significantly changed as compared to chest 7/23/2021. Small to moderate pleural  effusions and associated bibasilar atelectasis persist.  No pneumothorax is  identified. Degenerative changes are present in the spine.     IMPRESSION  Hydrostatic edema. Pleural effusions and associated bibasilar  atelectasis. No significant interval change. CXR 7-21-21  Chest single view.     Comparison single view chest July 19, 2021.     Patchy reticular markings through the lungs, same distribution. Those through  lung bases appear less dense; suspect mild degree improved aeration. Cardiac and  mediastinal structures unchanged. No pneumothorax or sizable pleural effusion.     CXR 7-19-21     Comparison single view chest July 16, 2021.     Advanced patchy central and basilar reticular markings through the lungs. Consider pneumonia. Cardiac and mediastinal structures magnified on this AP  view. No pneumothorax or sizable pleural effusion. CXR July 14, 2021:  1 view comparison to 7/12     Continued cardiomegaly and congestion. If There is mild interstitial edema, it  is unchanged. Right pleural effusion and adjacent atelectasis have improved. Retroperitoneal US 7-14-21  Left kidney is 12.2 cm and right kidney is 9.1. Right renal cortex is echogenic  but not the left. Multiple cysts on each side, including a large cyst is 7 cm on  the left. No hydronephrosis. Aorta and IVC normals visualized. Prostatic  enlargement and diffuse bladder wall thickening.  Moderate ascites     IMPRESSION  Medical renal disease on the right    Assessment:   Renal Specific Problems    CKD stage IV/V   Acute azotemia-exacerbated by bladder outlet obstruction and relative hypotension with poor perfusion-may be resolving  Hypertension - over-controlled  Volume overload- resolving  Hyperkalemia- with residual plaeural effusions and now lower ext edema  Metabolic acidosis- on bicarb  Acute anemia with significant iron deficiency  Acute left axillary/brachial vein DVT  Vitamin D2 def  Secondary hyperparathyroid  Proteinuria - nephrotic range        Plan:     Obtain/ Order: labs/cultures/radiology/procedures:  renal panel, CBC in AM      PLA2R Ab pending    Therapeutic:    Liberalize diet  Epogen  IV iron to rapidly replete stores --completed  Decrease sodium bicarbonate to bid  Calcitriol and ergocalciferol   PO4 binder--will increase sevelamer to 2 pc  Flomax   Cont lasix to bid

## 2021-07-27 LAB
ALBUMIN SERPL-MCNC: 3.2 G/DL (ref 3.5–5)
ALBUMIN/GLOB SERPL: 1 {RATIO} (ref 1.1–2.2)
ALP SERPL-CCNC: 93 U/L (ref 45–117)
ALT SERPL-CCNC: 25 U/L (ref 12–78)
ANION GAP SERPL CALC-SCNC: 8 MMOL/L (ref 5–15)
AST SERPL W P-5'-P-CCNC: 18 U/L (ref 15–37)
BASOPHILS # BLD: 0.1 K/UL (ref 0–0.1)
BASOPHILS NFR BLD: 2 % (ref 0–1)
BILIRUB SERPL-MCNC: 0.4 MG/DL (ref 0.2–1)
BUN SERPL-MCNC: 95 MG/DL (ref 6–20)
BUN/CREAT SERPL: 14 (ref 12–20)
CA-I BLD-MCNC: 8.4 MG/DL (ref 8.5–10.1)
CHLORIDE SERPL-SCNC: 108 MMOL/L (ref 97–108)
CO2 SERPL-SCNC: 23 MMOL/L (ref 21–32)
CREAT SERPL-MCNC: 6.86 MG/DL (ref 0.7–1.3)
DIFFERENTIAL METHOD BLD: ABNORMAL
EOSINOPHIL # BLD: 0.4 K/UL (ref 0–0.4)
EOSINOPHIL NFR BLD: 8 % (ref 0–7)
ERYTHROCYTE [DISTWIDTH] IN BLOOD BY AUTOMATED COUNT: 18.7 % (ref 11.5–14.5)
GLOBULIN SER CALC-MCNC: 3.1 G/DL (ref 2–4)
GLUCOSE SERPL-MCNC: 98 MG/DL (ref 65–100)
HCT VFR BLD AUTO: 29.6 % (ref 36.6–50.3)
HGB BLD-MCNC: 8.8 G/DL (ref 12.1–17)
IMM GRANULOCYTES # BLD AUTO: 0 K/UL (ref 0–0.04)
IMM GRANULOCYTES NFR BLD AUTO: 0 % (ref 0–0.5)
LYMPHOCYTES # BLD: 0.3 K/UL (ref 0.8–3.5)
LYMPHOCYTES NFR BLD: 6 % (ref 12–49)
MCH RBC QN AUTO: 27.9 PG (ref 26–34)
MCHC RBC AUTO-ENTMCNC: 29.7 G/DL (ref 30–36.5)
MCV RBC AUTO: 94 FL (ref 80–99)
MONOCYTES # BLD: 0.6 K/UL (ref 0–1)
MONOCYTES NFR BLD: 13 % (ref 5–13)
NEUTS SEG # BLD: 3.5 K/UL (ref 1.8–8)
NEUTS SEG NFR BLD: 71 % (ref 32–75)
NRBC # BLD: 0 K/UL (ref 0–0.01)
NRBC BLD-RTO: 0 PER 100 WBC
PLATELET # BLD AUTO: 148 K/UL (ref 150–400)
PMV BLD AUTO: 11.1 FL (ref 8.9–12.9)
POTASSIUM SERPL-SCNC: 4.9 MMOL/L (ref 3.5–5.1)
PROT SERPL-MCNC: 6.3 G/DL (ref 6.4–8.2)
RBC # BLD AUTO: 3.15 M/UL (ref 4.1–5.7)
SODIUM SERPL-SCNC: 139 MMOL/L (ref 136–145)
WBC # BLD AUTO: 4.8 K/UL (ref 4.1–11.1)

## 2021-07-27 PROCEDURE — 65270000029 HC RM PRIVATE

## 2021-07-27 PROCEDURE — 74011250637 HC RX REV CODE- 250/637: Performed by: INTERNAL MEDICINE

## 2021-07-27 PROCEDURE — 36415 COLL VENOUS BLD VENIPUNCTURE: CPT

## 2021-07-27 PROCEDURE — 85025 COMPLETE CBC W/AUTO DIFF WBC: CPT

## 2021-07-27 PROCEDURE — 80053 COMPREHEN METABOLIC PANEL: CPT

## 2021-07-27 RX ADMIN — FUROSEMIDE 40 MG: 40 TABLET ORAL at 08:57

## 2021-07-27 RX ADMIN — APIXABAN 2.5 MG: 2.5 TABLET, FILM COATED ORAL at 11:20

## 2021-07-27 RX ADMIN — SEVELAMER CARBONATE 1600 MG: 800 TABLET, FILM COATED ORAL at 16:32

## 2021-07-27 RX ADMIN — AMLODIPINE BESYLATE 5 MG: 5 TABLET ORAL at 08:57

## 2021-07-27 RX ADMIN — APIXABAN 2.5 MG: 2.5 TABLET, FILM COATED ORAL at 21:43

## 2021-07-27 RX ADMIN — SODIUM BICARBONATE 650 MG: 650 TABLET ORAL at 21:43

## 2021-07-27 RX ADMIN — CALCITRIOL CAPSULES 0.25 MCG 0.25 MCG: 0.25 CAPSULE ORAL at 08:57

## 2021-07-27 RX ADMIN — TAMSULOSIN HYDROCHLORIDE 0.4 MG: 0.4 CAPSULE ORAL at 08:57

## 2021-07-27 RX ADMIN — SODIUM BICARBONATE 650 MG: 650 TABLET ORAL at 08:57

## 2021-07-27 RX ADMIN — METOPROLOL TARTRATE 25 MG: 25 TABLET, FILM COATED ORAL at 08:57

## 2021-07-27 RX ADMIN — SEVELAMER CARBONATE 1600 MG: 800 TABLET, FILM COATED ORAL at 11:20

## 2021-07-27 RX ADMIN — FUROSEMIDE 40 MG: 40 TABLET ORAL at 17:20

## 2021-07-27 RX ADMIN — SEVELAMER CARBONATE 1600 MG: 800 TABLET, FILM COATED ORAL at 08:57

## 2021-07-27 NOTE — PROGRESS NOTES
Comprehensive Nutrition Assessment    Type and Reason for Visit: RD nutrition re-screen/LOS    Nutrition Recommendations/Plan:   Liberalize diet to Easy to Chew, 5 CHO, low K, low Phos    Add nepro daily    Nutrition Assessment:  Admitted 2/2 Pulmonary edema. CKD IV/V. Nephrology following. Current appetite reported as good. Per pt 95%PO on 7/20. On 7/27 pt reports eating 100% of trays, but is getting \"sick\" of the food. RD to liberalize diet. Complains of trays not being enough food, pt with hunger cues. RD to add nepro daily. Labs: H/H 8.9/29.8, BUN 99, Creat 6.98. Ca 8.3, Phos 8.2. Meds: amlodipine, apixaban, calcitriol, furosemide, lopressor, sevelamer. Malnutrition Assessment:  Malnutrition Status:  No malnutrition    Context:  Acute illness       Nutrition Related Findings:  No NFPE performed, appears nourished. Denies N/V/C/D. BM on 7/27. Reports C/S issues and would benefit from easy to chew diet- ordered. Edema: anasarca, 2+ generalized, 3+ LUE, 2+ RUE, 1+ sacral, 3+ RLE/LLE. Wounds:    None       Current Nutrition Therapies:  ADULT DIET Easy to Chew; 5 carb choices (75 gm/meal); No Salt Added (3-4 gm); Low Potassium (Less than 3000 mg/day); Low Phosphorus (Less than 1000 mg); 60 to 80 gm; 1800 ml    Anthropometric Measures:  · Height:  6' (182.9 cm)  · Current Body Wt:  98.2 kg (216 lb 7.9 oz)   · Admission Body Wt:  180 lb    · Usual Body Wt:   (URI)     · Ideal Body Wt:  178 lbs:  121.6 %   · BMI Category: Overweight (BMI 25.0-29. 9)       Nutrition Diagnosis:   No nutrition diagnosis at this time    Nutrition Interventions:   Food and/or Nutrient Delivery: Continue current diet, Start oral nutrition supplement  Nutrition Education and Counseling: No recommendations at this time  Coordination of Nutrition Care: Continue to monitor while inpatient    Nutrition Monitoring and Evaluation:   Behavioral-Environmental Outcomes: None identified  Food/Nutrient Intake Outcomes: Food and nutrient intake, Supplement intake  Physical Signs/Symptoms Outcomes: Weight, Nutrition focused physical findings, Meal time behavior    Discharge Planning:    Continue current diet     Electronically signed by Reema Bañuelos RD on 7/27/2021 at 1:41 PM    Contact: [phone]

## 2021-07-27 NOTE — PROGRESS NOTES
Pulmonary, Critical Care    Name: Delisa Mercado MRN: [id number]   : 1955 Hospital: [address] [place] [address]   Date: 2021  Admission date: 2021 Hospital Day: 16       Subjective/Interval History:   Seen in the emergency room wearing noninvasive ventilator. Patient has underlying renal insufficiency developed worsening shortness of breath cough presented to the emergency room chest x-ray shows pulmonary edema. He was profoundly hypoxic is now on noninvasive ventilator and feels more comfortable. Has not had any significant urine output since he has been in the ER. He also complains of new onset left arm swelling   post void bladder scan showed greater than 200 cc urine retention and Quesada catheter placed with 500 cc removed. Overnight he has put out an additional 1200 cc. Respirations improved currently nonlabored on nasal oxygen. Duplex scan of the left arm did show left axillary and proximal brachial DVT and heparin was started. On heparin with Quesada catheter and he has had slight bleeding at the urethral meatus. Ultrasound of the abdomen is pending  7/15 ultrasound of the abdomen showed medical renal disease on the right with small kidney no hydronephrosis there was evidence of prostate enlargement. He is breathing easily at this point and on room air is running on oxygen saturation of 93%   respirations nonlabored on nasal oxygen. Quesada catheter is out he states he has been voiding frequent small amounts without straining   no specific complaints breathing easily. Overnight oximetry showed minimal but significant desaturation while on room air 8 minutes 40 seconds with low of 71%  . Developed V. tach this morning given IV amiodarone IV magnesium and transferred to the unit. Currently heart rate  alternating between A. fib and sinus with PAC. He denies any discomfort is breathing easily   remains sinus rhythm with PVCs.   Had worsening hypoxia during the night and placed on oxygen he feels comfortable at this point. Urine output mildly improved yesterday but input remains greater than output  7/24 no specific complaints respirations nonlabored currently room air with saturation 97% Lasix been resumed 7/23. Urine output not accurately recorded but he states he has been passing a lot of urine  7/25 feels fine denies shortness of breath on room air. Oxygen saturation 97% on room air while awake. He states he is putting out good urine from his oral Lasix although its not being recorded in the chart  7/27 overnight oximetry continues to show nocturnal hypoxia will continue nocturnal oxygen  Patient Active Problem List   Diagnosis Code    GI bleed K92.2    Pulmonary edema J81.1       IMPRESSION:   1. Ventricular tachycardia none further seen   2. Atrial fibrillation converted to sinus  3. Hypotension corrected   4. Acute hypoxic respiratory failure room air oxygen saturation maintained while awake  5. Chronic hypoxic respiratory failure overnight oximetry continues to show desaturation at night  6. Acute pulmonary edema radiographically no change 7/26   7. 2 of 4 blood culture bottles with coagulase-negative staph aureus likely skin contaminant   8. Bilateral pleural effusions  chest x-ray unchanged  9. Acute on chronic kidney disease creatinine improved slightly  10. Obstructive uropathy Quesada catheter has been removed with no residual urine on bladder scanning  11. Severe hypertension corrected   12. DVT left axillary and brachial now on Eliquis  13. Anemia hemoglobin improved after last transfusion 7/21  14. Troponin leak likely due to renal insufficiency stable has not risen any further  Body mass index is 29.36 kg/m². RECOMMENDATIONS/PLAN:     1. Remains in sinus rhythm  2. Blood pressure well controlled now on metoprolol low-dose and Norvasc  3. Continue Flomax for prostate enlargement   4.  Repeat chest x-ray in a.m.  5. Subjective/Initial History:       I was asked by Abner Pressley MD to see Jenn Anderson a 76 y.o.  male  in consultation for a chief complaint of shortness of breath pulmonary edema acute hypoxic respiratory failure        Patient PCP: Olayinka Willett MD  PMH:  has a past medical history of Chronic kidney disease and Hypertension. PSH:   has no past surgical history on file. FHX: family history is not on file. SHX:  reports that he has never smoked. He has never used smokeless tobacco.    Systemic review somewhat limited as he is on noninvasive ventilator remains short of breath although states he is feeling better  General he has not noted any worsening edema of his upper legs fever chills or sweats does complain of new onset swelling of his left hand and arm  Eyes no double vision or momentary blindness  ENT no facial pain over the sinuses  Endocrinologic no polyuria polydipsia  Musculoskeletal no swollen tender joints  Neurologic no history seizures or syncope  Gastrointestinal no nausea vomiting acid indigestion  Genitourinary states he does still pass fair amount of urine each day has not noted any decrease in that.   Does not have to strain to urinate has no bleeding or drainage  Cardiovascular he is not aware of any underlying heart disease has not had chest pain diaphoresis has had worsening shortness of breath laying down and with exertion  Respiratory worsening shortness of breath over the last week or more no significant cough no sputum production no chills or sweats did have history COVID-19 pneumonitis January of this year    No Known Allergies   MEDS:   Current Facility-Administered Medications   Medication    furosemide (LASIX) tablet 40 mg    sodium bicarbonate tablet 650 mg    amLODIPine (NORVASC) tablet 5 mg    apixaban (ELIQUIS) tablet 2.5 mg    sevelamer carbonate (RENVELA) tab 1,600 mg    0.9% sodium chloride infusion 250 mL    0.9% sodium chloride infusion 250 mL    metoprolol tartrate (LOPRESSOR) tablet 25 mg    tamsulosin (FLOMAX) capsule 0.4 mg    [Held by provider] hydrALAZINE (APRESOLINE) tablet 50 mg    0.9% sodium chloride infusion 250 mL    calcitRIOL (ROCALTROL) capsule 0.25 mcg    ergocalciferol capsule 50,000 Units    hydrALAZINE (APRESOLINE) 20 mg/mL injection 40 mg        Current Facility-Administered Medications:     furosemide (LASIX) tablet 40 mg, 40 mg, Oral, BID, Jesse Lee MD, 40 mg at 07/27/21 0857    sodium bicarbonate tablet 650 mg, 650 mg, Oral, BID, Jesse Lee MD, 650 mg at 07/27/21 0857    amLODIPine (NORVASC) tablet 5 mg, 5 mg, Oral, DAILY, Williams Brooke MD, 5 mg at 07/27/21 0857    apixaban (ELIQUIS) tablet 2.5 mg, 2.5 mg, Oral, BID, Tyrone Boyd MD, 2.5 mg at 07/26/21 2250    sevelamer carbonate (RENVELA) tab 1,600 mg, 1,600 mg, Oral, TID WITH MEALS, Jesse Lee MD, 1,600 mg at 07/27/21 0857    0.9% sodium chloride infusion 250 mL, 250 mL, IntraVENous, PRN, Tyrone Boyd MD    0.9% sodium chloride infusion 250 mL, 250 mL, IntraVENous, PRN, Tyrone Boyd MD    metoprolol tartrate (LOPRESSOR) tablet 25 mg, 25 mg, Oral, DAILY, Jesse Lee MD, 25 mg at 07/27/21 0857    tamsulosin (FLOMAX) capsule 0.4 mg, 0.4 mg, Oral, DAILY, Gracia Castaneda MD, 0.4 mg at 07/27/21 0857    [Held by provider] hydrALAZINE (APRESOLINE) tablet 50 mg, 50 mg, Oral, TID, Gracia Castaneda MD, 50 mg at 07/18/21 2054    0.9% sodium chloride infusion 250 mL, 250 mL, IntraVENous, PRN, Gracia Castaneda MD    calcitRIOL (ROCALTROL) capsule 0.25 mcg, 0.25 mcg, Oral, DAILY, Jesse Lee MD, 0.25 mcg at 07/27/21 0857    ergocalciferol capsule 50,000 Units, 50,000 Units, Oral, Q7D, Jesse Lee MD, 50,000 Units at 07/21/21 1711    hydrALAZINE (APRESOLINE) 20 mg/mL injection 40 mg, 40 mg, IntraVENous, Q6H PRN, Gracia Castaneda MD, 40 mg at 07/15/21 1552      Objective:     Vital Signs: Telemetry:    normal sinus rhythm Intake/Output:   Visit Vitals  BP (!) 157/78 (BP Patient Position: At rest;Lying)   Pulse 70   Temp 98.3 °F (36.8 °C)   Resp 20   Ht 6' (1.829 m)   Wt 98.2 kg (216 lb 7.9 oz)   SpO2 96%   BMI 29.36 kg/m²       Temp (24hrs), Av °F (36.7 °C), Min:97.2 °F (36.2 °C), Max:98.6 °F (37 °C)        O2 Device: None (Room air) O2 Flow Rate (L/min): 2 l/min         Body mass index is 29.36 kg/m². Wt Readings from Last 4 Encounters:   21 98.2 kg (216 lb 7.9 oz)   21 79.4 kg (175 lb)          Intake/Output Summary (Last 24 hours) at 2021 1033  Last data filed at 2021 0849  Gross per 24 hour   Intake 942 ml   Output 900 ml   Net 42 ml       Last shift:      701 -  1900  In: 240 [P.O.:240]  Out: 400 [Urine:400]  Last 3 shifts: 1901 -  0700  In: 807 [P.O.:942]  Out: 1100 [Urine:1100]       Hemodynamics:    CO:    CI:    CVP:    SVR:   PAP Systolic:    PAP Diastolic:    PVR:    JM77:       Ventilator Settings:      Mode Rate TV Press PEEP FiO2 PIP Min. Vent               28 %     9.7 l/min      Physical Exam:    General:  male; no distress now on room air  HEENT: NCAT, visible oral mucosa is moist and clear  Eyes: anicteric; conjunctiva clear extraocular movements intact  Neck: no nodes,,no accessory MM use. no respiratory distress  Chest: no deformity,   Cardiac: Regular rhythm and rate now controlled  Lungs: distant breath sounds; clear anteriorly and laterally with rales in the bases  Abd: Soft positive bowel sounds no tenderness  Ext: Improvement in edema  of the lower extremities with continued edema of the left arm; no joint swelling;  No clubbing  : Clear urine  Neuro: Alert awake no distress speech is clear moves all 4 extremities  Psych-calm, oriented to person;   Skin: warm, dry, no cyanosis;   Pulses: Brachial and radial pulses intact  Capillary:slow capillary refill      Labs:    Recent Labs     21  0623      K 5.1      CO2 24 GLU 83   BUN 99*   CREA 6.98*   CA 8.3*   PHOS 8.2*   ALB 3.2*   7/27 no overnight oximetry split study with a low oxygen saturation 75% 33 minutes 10 seconds below 88%   7/25 overnight oximetry with 1 hour 13 minutes below 88% with low oxygen saturation 56%  7/19 overnight oximetry shows 14 minutes 26 seconds below 88% with low oxygen saturation 75%  7/17 overnight oximetry shows low oxygen saturation 71% with 8 minutes 40 seconds below 88% from 1 AM to 6 AM   7/16 overnight oximetry on 1 L shows only 2 minutes 20 seconds below 88% with a low oxygen saturation of 83%  7/15 room air oxygen saturation 93%  currently on noninvasive ventilator inspiratory pressure 16 expiratory pressure six rate 16 FiO2 28% with oxygen saturation 96%   BNP 32,264, previous greater than 35,000  Lab Results   Component Value Date/Time    Culture result:  07/12/2021 10:40 PM     Two of four bottles have been flagged positive by instrument. Bottles have been sent to [address] [place] [address] laboratory to assess for possible growth.  Gram Positive Cocci in clusters CALLED TO AND READ BACK BY Rupert Lama [email or web address] [id number] 07/14/2021 by [email or web address]    Culture result:  07/12/2021 10:40 PM     Staphylococcus species, coagulase negative GROWING IN THE 1ST OF 4 BOTTLES DRAWN    Culture result:  07/12/2021 10:40 PM     Staphylococcus species, coagulase negative (2nd colony type/strain) GROWING IN THE 2ND OF 4 BOTTLES DRAWN        Imaging:  I have personally reviewed the patients radiographs and have reviewed the reports:    CXR Results  (Last 48 hours)  7/23 chest x-ray mild pulmonary congestion tiny effusions unchanged from last x-ray although right upper lobe lung may have slightly less congestion  7/21 chest x-ray with continued mild pulmonary edema pattern small bilateral pleural effusions there may be improved inspiratory effort  7/16 chest x-ray with continued mild pulmonary edema and small pleural effusions unchanged               07/12/21 2251  XR CHEST PORT Final result Impression:  Findings/impression:       Cardiac silhouette is enlarged. Central vascular congestion and patchy central   airspace disease/edema. Suspect trace right pleural effusion. No evidence of pneumothorax. No acute osseous abnormality identified. Narrative:  Study: XR CHEST PORT       Clinical indication: sob       Comparison: None. To me chest x-ray consistent with cardiomegaly pulmonary edema and bilateral pleural effusions           Results from Hospital Encounter encounter on 07/12/21    XR CHEST PA LAT    Narrative  Chest, 2 views, 7/26/2021    History: Pulmonary edema. Pleural effusions. Comparison: Including chest 7/23/2021. Findings: The cardiac silhouette remains enlarged. The lungs are adequately  expanded. There is pulmonary vascular congestion and hydrostatic edema, not  significantly changed as compared to chest 7/23/2021. Small to moderate pleural  effusions and associated bibasilar atelectasis persist.  No pneumothorax is  identified. Degenerative changes are present in the spine. Impression  Hydrostatic edema. Pleural effusions and associated bibasilar  atelectasis. No significant interval change. XR CHEST PORT    Narrative  This study is a portable AP radiograph of the chest dated 7/23/2021 obtained at  2:52 AM.    HISTORY: Pulmonary edema. COMPARISON: 7/21/2021. FINDINGS: There is moderate enlargement of the cardiac silhouette. There is  associated distention of the pulmonary vessels and perivascular ill definition  consistent with interstitial edema. There also is perivascular airspace disease  compatible with alveolar edema. There has been a decrease in the alveolar edema  within the right upper lobe. This examination is negative for larger pleural effusions. The remainder of the examination is unchanged.     Impression  There is continued moderate pulmonary interstitial and alveolar  edema with a slight interval improvement within the right upper lobe. The  remainder of this examination is unchanged. XR CHEST PORT    Narrative  Chest single view. Comparison single view chest July 19, 2021. Patchy reticular markings through the lungs, same distribution. Those through  lung bases appear less dense; suspect mild degree improved aeration. Cardiac and  mediastinal structures unchanged. No pneumothorax or sizable pleural effusion. Results from [facility] encounter on 01/05/21    CT CHEST WO CONT    Narrative  Images obtained without contrast include the abdomen and pelvis. Comparison portable chest radiograph earlier today. Dose Reduction:  All CT scans at this facility are performed using dose reduction optimization  techniques as appropriate to a performed exam including the following: Automated  exposure control, adjustments of the mA and/or kV according to patient size, or  use of iterative reconstruction technique. Subtle peripheral groundglass densities are noted in both lungs, could reflect  Covid pneumonia or other. No pneumothorax or pneumomediastinum. The radiographic findings suspicious for  pneumomediastinum probably was artifact, perhaps related to cardiac motion. No pleural effusion or adenopathy. Cardiomegaly again noted. Impression  IMPRESSION:  1. No pneumomediastinum or pneumothorax. 2. Cardiomegaly. 3. Subtle groundglass densities in both lungs might be pneumonia or other. Discussion patient with worsening renal insufficiency has developed pulmonary edema acute hypoxic respiratory failure. He states he still has urine output normally at home. We will give him high-dose IV Lasix to see if diuresis is induced. He very likely will need dialysis. He has minimal elevation in troponin probably troponin leak from hypoxia or just due to his renal insufficiency. He is not having any chest pain. Does have severe hypertension. Has been started on clonidine and hydralazine.   7/14 no distress today on nasal oxygen. Did have residual on post void bladder scan and Quesada catheter was placed with good diuresis overnight. Despite this potassium remains elevated. We will give 1 dose of Kayexalate. Despite diuresis hemoglobin is dropped to 6.7 will transfuse. He denies frequency small-volume urine or straining to urinate at home despite this with signs of obstruction we will add Flomax. Potassium 6 today we will give 1 dose of Kayexalate and continue diuresis  7/16 respirations nonlabored. Was placed back on 1 L nasal oxygen yesterday overnight oximetry shows well-maintained oxygen saturation. Will check overnight tonight on and off oxygen. Quesada catheter has been removed. Chest x-ray continues to show mild pulmonary edema  7/17 overnight oximetry shows desaturation when on room air. Will maintain oxygen and repeat overnight oximetry Ibrahima night  7/19 developed V. tach overnight and now in the ICU on IV amiodarone with rhythm showing alternating sinus with PAC and A. fib. Rate . Overnight oximetry continued to show desaturation on room air and he is back on nasal oxygen. He has no specific complaint  7/20 now in sinus rhythm with frequent PVCs. Cardiology is recommending cardiac cath but he is reluctant to undergo that. Creatinine has worsened despite IV fluid administration yesterday although blood pressure is improved. Currently oxygen saturation well-maintained on room air but last overnight oximetry showed desaturation will maintain oxygen at 1 L for now  7/21 had hypoxia yesterday afternoon and placed back on oxygen during the daytime as well as the night. He feels comfortable at this point. Chest x-ray is unchanged still has mild pulmonary edema with small effusions. It does appear that he took a deeper breath today. Creatinine remains markedly elevated. He is on heparin for left axillary DVT.   Holding switching to oral anticoagulation until sure no instrumentation is needed 7/23 unchanged remains on IV heparin. Oral Lasix being resumed did require transfusion 7/21. Repeat chest x-ray no worsening  7/26 chest x-ray unchanged pulmonary edema and bilateral effusions. He states he has good urine output with the current dose of Lasix. We will repeat overnight oximetry on and off oxygen   7/27 continued nocturnal hypoxia he will need home oxygen           Thank you for allowing us to participate in the care of this patient.   We will follow along with you     Bri Alvarez MD

## 2021-07-27 NOTE — PROGRESS NOTES
[facility]     Renal Daily Progress Note:     Admission Date: 2021     Subjective: feeling ok, no further V-tach, BP up. Heart rate around 60 bpm. Creatinine had been dropping, labs today. Urine not accurately quantified. Patient refused cardiac cath. Ambulating fletcher with PT    Not short of breath. No cough. No chest or abdominal pain. Objective:     Visit Vitals  BP (!) 184/99 (BP 1 Location: Left leg)   Pulse 61   Temp 97.4 °F (36.3 °C)   Resp 14   Ht 6' (1.829 m)   Wt 98.2 kg (216 lb 7.9 oz)   SpO2 95%   BMI 29.36 kg/m²     Temp (24hrs), Av.9 °F (36.6 °C), Min:97.2 °F (36.2 °C), Max:98.6 °F (37 °C)        Intake/Output Summary (Last 24 hours) at 2021 1211  Last data filed at 2021 0849  Gross per 24 hour   Intake 942 ml   Output 900 ml   Net 42 ml     Current Facility-Administered Medications   Medication Dose Route Frequency    furosemide (LASIX) tablet 40 mg  40 mg Oral BID    sodium bicarbonate tablet 650 mg  650 mg Oral BID    amLODIPine (NORVASC) tablet 5 mg  5 mg Oral DAILY    apixaban (ELIQUIS) tablet 2.5 mg  2.5 mg Oral BID    sevelamer carbonate (RENVELA) tab 1,600 mg  1,600 mg Oral TID WITH MEALS    0.9% sodium chloride infusion 250 mL  250 mL IntraVENous PRN    0.9% sodium chloride infusion 250 mL  250 mL IntraVENous PRN    metoprolol tartrate (LOPRESSOR) tablet 25 mg  25 mg Oral DAILY    tamsulosin (FLOMAX) capsule 0.4 mg  0.4 mg Oral DAILY    [Held by provider] hydrALAZINE (APRESOLINE) tablet 50 mg  50 mg Oral TID    0.9% sodium chloride infusion 250 mL  250 mL IntraVENous PRN    calcitRIOL (ROCALTROL) capsule 0.25 mcg  0.25 mcg Oral DAILY    ergocalciferol capsule 50,000 Units  50,000 Units Oral Q7D    hydrALAZINE (APRESOLINE) 20 mg/mL injection 40 mg  40 mg IntraVENous Q6H PRN       Physical Exam:    Seen in Room 470    General appearance: Chronically ill-appearing patient sitting up on side of bed- comfortable.     Head: Normocephalic    Lungs: clear to auscultation , no wheezes, no rales, poor air movement    Heart:  No S3 gallop , No pericardial rub. Abdomen: Not distended    Extremities:  Lower extremity edema including  dependent thighs. Neuro : Awake and responding appropriately. Data Review:     LABS:  Recent Labs     07/26/21  0623      K 5.1      CO2 24   BUN 99*   CREA 6.98*   CA 8.3*   ALB 3.2*   PHOS 8.2*   Vitamin D2 11.1  Intact   PSA  5.2  No results for input(s): WBC, HGB, HCT, PLT, HGBEXT, HCTEXT, PLTEXT, HGBEXT, HCTEXT, PLTEXT in the last 72 hours. iron saturation 5%    No results for input(s): HÉCTOR, KU, CLU, CREAU in the last 72 hours. No lab exists for component: PROU     Blood Cultures 7-12-21  Two of four bottles have been flagged positive by instrument.  Bottles have been sent to [facility] laboratory to assess for possible growth. Gram Positive Cocci in clusters     Chest x-ray 7-26-21  History: Pulmonary edema. Pleural effusions.     Comparison: Including chest 7/23/2021.     Findings: The cardiac silhouette remains enlarged. The lungs are adequately  expanded. There is pulmonary vascular congestion and hydrostatic edema, not  significantly changed as compared to chest 7/23/2021. Small to moderate pleural  effusions and associated bibasilar atelectasis persist.  No pneumothorax is  identified. Degenerative changes are present in the spine.     IMPRESSION  Hydrostatic edema. Pleural effusions and associated bibasilar  atelectasis. No significant interval change. CXR 7-21-21  Chest single view.     Comparison single view chest July 19, 2021.     Patchy reticular markings through the lungs, same distribution. Those through  lung bases appear less dense; suspect mild degree improved aeration. Cardiac and  mediastinal structures unchanged. No pneumothorax or sizable pleural effusion.     CXR 7-19-21     Comparison single view chest July 16, 2021.     Advanced patchy central and basilar reticular markings through the lungs. Consider pneumonia. Cardiac and mediastinal structures magnified on this AP  view. No pneumothorax or sizable pleural effusion. CXR July 14, 2021:  1 view comparison to 7/12     Continued cardiomegaly and congestion. If There is mild interstitial edema, it  is unchanged. Right pleural effusion and adjacent atelectasis have improved. Retroperitoneal US 7-14-21  Left kidney is 12.2 cm and right kidney is 9.1. Right renal cortex is echogenic  but not the left. Multiple cysts on each side, including a large cyst is 7 cm on  the left. No hydronephrosis. Aorta and IVC normals visualized. Prostatic  enlargement and diffuse bladder wall thickening.  Moderate ascites     IMPRESSION  Medical renal disease on the right    Assessment:   Renal Specific Problems    CKD stage IV/V   Acute azotemia-exacerbated by bladder outlet obstruction and relative hypotension with poor perfusion-may be resolving  Hypertension -amlodipine started, blood pressure elevation may be related to volume overload  Volume overload-   Hyperkalemia- with residual plaeural effusions and now lower ext edema  Metabolic acidosis- on bicarb  Acute anemia with significant iron deficiency  Acute left axillary/brachial vein DVT  Vitamin D2 def  Secondary hyperparathyroid  Proteinuria - nephrotic range        Plan:     Obtain/ Order: labs/cultures/radiology/procedures:  renal panel, CBC in AM      PLA2R Ab pending    Therapeutic:    Liberalize diet  Epogen  IV iron to rapidly replete stores --completed  Decrease sodium bicarbonate to bid  Calcitriol and ergocalciferol   PO4 binder-- increased sevelamer to 2 pc  Flomax   Cont lasix to bid, started today we will monitor diuresis

## 2021-07-27 NOTE — PROGRESS NOTES
Progress Note    Jamaica Hughes MD             Daily Progress Note: 7/27/2021      Subjective: The patient is seen for follow  up. Offers no complaints. Patient is lying in the bed. Yesterday he said he walked with the help of physical therapist.  Today nobody has come yet. Medically patient is stable. He needs placement better at home with physical therapy and/or skilled care unit  Problem List:  Problem List as of 7/27/2021 Never Reviewed        Codes Class Noted - Resolved    Pulmonary edema ICD-10-CM: J81.1  ICD-9-CM: 578  7/13/2021 - Present        GI bleed ICD-10-CM: K92.2  ICD-9-CM: 578.9  1/5/2021 - Present              Medications reviewed  Current Facility-Administered Medications   Medication Dose Route Frequency    furosemide (LASIX) tablet 40 mg  40 mg Oral BID    sodium bicarbonate tablet 650 mg  650 mg Oral BID    amLODIPine (NORVASC) tablet 5 mg  5 mg Oral DAILY    apixaban (ELIQUIS) tablet 2.5 mg  2.5 mg Oral BID    sevelamer carbonate (RENVELA) tab 1,600 mg  1,600 mg Oral TID WITH MEALS    0.9% sodium chloride infusion 250 mL  250 mL IntraVENous PRN    0.9% sodium chloride infusion 250 mL  250 mL IntraVENous PRN    metoprolol tartrate (LOPRESSOR) tablet 25 mg  25 mg Oral DAILY    tamsulosin (FLOMAX) capsule 0.4 mg  0.4 mg Oral DAILY    [Held by provider] hydrALAZINE (APRESOLINE) tablet 50 mg  50 mg Oral TID    0.9% sodium chloride infusion 250 mL  250 mL IntraVENous PRN    calcitRIOL (ROCALTROL) capsule 0.25 mcg  0.25 mcg Oral DAILY    ergocalciferol capsule 50,000 Units  50,000 Units Oral Q7D    hydrALAZINE (APRESOLINE) 20 mg/mL injection 40 mg  40 mg IntraVENous Q6H PRN       Review of Systems:   A comprehensive review of systems was negative except for that written in the HPI.     Objective:   Physical Exam:     Visit Vitals  BP (!) 184/99 (BP 1 Location: Left leg)   Pulse 61   Temp 97.4 °F (36.3 °C)   Resp 14   Ht 6' (1.829 m)   Wt 98.2 kg (216 lb 7.9 oz)   SpO2 95% BMI 29.36 kg/m²    O2 Flow Rate (L/min): 2 l/min O2 Device: None (Room air)    Temp (24hrs), Av.9 °F (36.6 °C), Min:97.2 °F (36.2 °C), Max:98.6 °F (37 °C)    701 - 1900  In: 240 [P.O.:240]  Out: 400 [Urine:400]   1901 -  07  In: 315 [P.O.:942]  Out: 1100 [Urine:1100]    General:  Alert, cooperative, no distress, appears stated age. Lungs:   Clear to auscultation bilaterally. Chest wall:  No tenderness or deformity. Heart:  Regular rate and rhythm, S1, S2 normal, no murmur, click, rub or gallop. Abdomen:   Soft, non-tender. Bowel sounds normal. No masses,  No organomegaly. Extremities: Extremities normal, atraumatic, no cyanosis bilateral leg edema left upper limb edema   Pulses: 2+ and symmetric all extremities. Skin: Skin color, texture, turgor normal. No rashes or lesions   Neurologic: CNII-XII intact. No gross sensory or motor deficits     Data Review:       Recent Days:  No results for input(s): WBC, HGB, HCT, PLT, HGBEXT, HCTEXT, PLTEXT in the last 72 hours. Recent Labs     21  0623      K 5.1      CO2 24   GLU 83   BUN 99*   CREA 6.98*   CA 8.3*   PHOS 8.2*   ALB 3.2*     No results for input(s): PH, PCO2, PO2, HCO3, FIO2 in the last 72 hours. Results     Procedure Component Value Units Date/Time    MRSA SCREEN - PCR (NASAL) [[id number]] Collected: 21    Order Status: Completed Specimen: Swab Updated: 21     MRSA by PCR, Nasal Not Detected            24 Hour Results:  No results found for this or any previous visit (from the past 24 hour(s)). XR CHEST PA LAT   Final Result   Hydrostatic edema. Pleural effusions and associated bibasilar   atelectasis. No significant interval change. XR CHEST PORT   Final Result   There is continued moderate pulmonary interstitial and alveolar   edema with a slight interval improvement within the right upper lobe. The   remainder of this examination is unchanged.       XR CHEST PORT Final Result      XR CHEST PORT   Final Result      LOWER EXT ART PVR MULT LEVEL SEG PRESSURES   Final Result      DUPLEX LOWER EXT VENOUS BILAT   Final Result      XR CHEST PORT   Final Result   Findings/impression: Stable cardiomediastinal silhouette. Similar central   pulmonary vascular congestion and bilateral patchy airspace opacities. No   significant pleural effusion. No pneumothorax. Findings are not significantly changed. XR CHEST PORT   Final Result      US RETROPERITONEUM COMP   Final Result   Medical renal disease on the right      DUPLEX UPPER EXT VENOUS LEFT   Final Result      XR CHEST PORT   Final Result   Findings/impression:      Cardiac silhouette is enlarged. Central vascular congestion and patchy central   airspace disease/edema. Suspect trace right pleural effusion. No evidence of pneumothorax. No acute osseous abnormality identified. XR CHEST PA LAT    (Results Pending)        Assessment: Acute DVT  Acute on chronic renal failure now kidney function has been stabilized  Ventricular tachycardia  Hypertension  Anemia        Plan: Somehow today's labs are not available we will check CBC as well as CMP today. If stable patient either can go depending on his physical Ability to skilled care unit and/or home with physical therapy        Care Plan discussed with: Patient himself    Total time spent with patient: 30 minutes.     Cain Garrett MD Yes

## 2021-07-27 NOTE — PROGRESS NOTES
Progress Note      7/27/2021 9:54 AM  NAME: Antonina Silverio   MRN:  [id number]   Admit Diagnosis: Pulmonary edema [J81.1]      Subjective:   Chart reviewed. Patient has had a symptomatic ventricular tachycardia and was shocked. Vascular surgery note appreciated. Echocardiogram results explained. He feels much better today. Patient was offered option of cardiac catheterization but he has decided not to pursue that. Review of Systems:    Symptom Y/N Comments  Symptom Y/N Comments   Fever/Chills n   Chest Pain n    Poor Appetite    Edema y    Cough    Abdominal Pain     Sputum    Joint Pain n    SOB/CARMONA n   Pruritis/Rash     Nausea/vomit    Other     Diarrhea         Constipation           Could NOT obtain due to:      Objective:          Physical Exam:    Last 24hrs VS reviewed since prior progress note. Most recent are:    Visit Vitals  BP (!) 157/78 (BP Patient Position: At rest;Lying)   Pulse 70   Temp 98.3 °F (36.8 °C)   Resp 20   Ht 6' (1.829 m)   Wt 98.2 kg (216 lb 7.9 oz)   SpO2 96%   BMI 29.36 kg/m²       Intake/Output Summary (Last 24 hours) at 7/27/2021 1122  Last data filed at 7/27/2021 0849  Gross per 24 hour   Intake 942 ml   Output 900 ml   Net 42 ml        General Appearance: Well developed, well nourished, alert & oriented x 3,    no acute distress. Ears/Nose/Mouth/Throat: Hearing grossly normal.  Neck: Supple. Chest: Lungs clear to auscultation bilaterally. Cardiovascular: Regular rate and rhythm, S1,S2 normal, no murmur. Abdomen: Soft, non-tender, bowel sounds are active. Extremities: Lower extremity edema left upper extremity edema evident  Skin: Lower extremity blisters evident    []         Post-cath site without hematoma, bruit, tenderness, or thrill. Distal pulses intact.     PMH/SH reviewed - no change compared to H&P    Data Review    Telemetry: Patient had a ventricular tachycardia and was shocked and had episode of atrial fibrillation and now is in sinus rhythm with PVCs.    EKG:   []  No new EKG for review    Lab Data Personally Reviewed:    No results for input(s): WBC, HGB, HCT, PLT, HGBEXT, HCTEXT, PLTEXT, HGBEXT, HCTEXT, PLTEXT in the last 72 hours. No results for input(s): INR, PTP, APTT, INREXT, INREXT in the last 72 hours. Recent Labs     07/26/21  0623      K 5.1      CO2 24   BUN 99*   CREA 6.98*   GLU 83   CA 8.3*     No results for input(s): CPK, CKNDX, TROIQ in the last 72 hours. No lab exists for component: CPKMB  No results found for: CHOL, CHOLX, CHLST, CHOLV, HDL, HDLP, LDL, LDLC, DLDLP, TGLX, TRIGL, TRIGP, CHHD, CHHDX    Recent Labs     07/26/21  0623   ALB 3.2*     No results for input(s): PH, PCO2, PO2 in the last 72 hours. Medications Personally Reviewed:    Current Facility-Administered Medications   Medication Dose Route Frequency    furosemide (LASIX) tablet 40 mg  40 mg Oral BID    sodium bicarbonate tablet 650 mg  650 mg Oral BID    amLODIPine (NORVASC) tablet 5 mg  5 mg Oral DAILY    apixaban (ELIQUIS) tablet 2.5 mg  2.5 mg Oral BID    sevelamer carbonate (RENVELA) tab 1,600 mg  1,600 mg Oral TID WITH MEALS    0.9% sodium chloride infusion 250 mL  250 mL IntraVENous PRN    0.9% sodium chloride infusion 250 mL  250 mL IntraVENous PRN    metoprolol tartrate (LOPRESSOR) tablet 25 mg  25 mg Oral DAILY    tamsulosin (FLOMAX) capsule 0.4 mg  0.4 mg Oral DAILY    [Held by provider] hydrALAZINE (APRESOLINE) tablet 50 mg  50 mg Oral TID    0.9% sodium chloride infusion 250 mL  250 mL IntraVENous PRN    calcitRIOL (ROCALTROL) capsule 0.25 mcg  0.25 mcg Oral DAILY    ergocalciferol capsule 50,000 Units  50,000 Units Oral Q7D    hydrALAZINE (APRESOLINE) 20 mg/mL injection 40 mg  40 mg IntraVENous Q6H PRN           Problem List:   Acute hypoxic respiratory failure and patient seems to be somewhat better. Severe anemia. Renal failure. Heart failure. Left upper extremity DVT.   Minimal elevation of troponin I is probably not a presentation of myocardial infarction. Patient has had ventricular tachycardia and has been shocked. 1.      Assessment/Plan:   Patient was scheduled for cardiac catheterization but he says he does not want to go for catheterization. We will follow nephrology recommendations and vascular surgery recommendations. Probably physical therapy is of value. I will continue otherwise present care. Thank you. 1.          []       High complexity decision making was performed in this patient at high risk for decompensation with multiple organ involvement.     Helen Patel MD

## 2021-07-27 NOTE — PROGRESS NOTES
Problem: Pressure Injury - Risk of  Goal: *Prevention of pressure injury  Description: Document Alfa Scale and appropriate interventions in the flowsheet. Outcome: Progressing Towards Goal  Note: Pressure Injury Interventions:  Sensory Interventions: Assess changes in LOC, Minimize linen layers    Moisture Interventions: Absorbent underpads, Minimize layers    Activity Interventions: PT/OT evaluation    Mobility Interventions: HOB 30 degrees or less    Nutrition Interventions: Document food/fluid/supplement intake    Friction and Shear Interventions: HOB 30 degrees or less, Minimize layers                Problem: Falls - Risk of  Goal: *Absence of Falls  Description: Document Reggie Fall Risk and appropriate interventions in the flowsheet.   Outcome: Progressing Towards Goal  Note: Fall Risk Interventions:  Mobility Interventions: Bed/chair exit alarm, Patient to call before getting OOB         Medication Interventions: Bed/chair exit alarm, Teach patient to arise slowly    Elimination Interventions: Bed/chair exit alarm, Call light in reach, Toileting schedule/hourly rounds

## 2021-07-28 ENCOUNTER — APPOINTMENT (OUTPATIENT)
Dept: GENERAL RADIOLOGY | Age: 66
DRG: 291 | End: 2021-07-28
Attending: INTERNAL MEDICINE
Payer: MEDICARE

## 2021-07-28 LAB
ALBUMIN SERPL-MCNC: 3.2 G/DL (ref 3.5–5)
ALBUMIN/GLOB SERPL: 1 {RATIO} (ref 1.1–2.2)
ALP SERPL-CCNC: 91 U/L (ref 45–117)
ALT SERPL-CCNC: 25 U/L (ref 12–78)
ANION GAP SERPL CALC-SCNC: 10 MMOL/L (ref 5–15)
AST SERPL W P-5'-P-CCNC: 15 U/L (ref 15–37)
BASOPHILS # BLD: 0.1 K/UL (ref 0–0.1)
BASOPHILS NFR BLD: 1 % (ref 0–1)
BILIRUB SERPL-MCNC: 0.4 MG/DL (ref 0.2–1)
BUN SERPL-MCNC: 95 MG/DL (ref 6–20)
BUN/CREAT SERPL: 14 (ref 12–20)
CA-I BLD-MCNC: 8.5 MG/DL (ref 8.5–10.1)
CHLORIDE SERPL-SCNC: 108 MMOL/L (ref 97–108)
CO2 SERPL-SCNC: 23 MMOL/L (ref 21–32)
CREAT SERPL-MCNC: 6.83 MG/DL (ref 0.7–1.3)
DIFFERENTIAL METHOD BLD: ABNORMAL
EOSINOPHIL # BLD: 0.4 K/UL (ref 0–0.4)
EOSINOPHIL NFR BLD: 7 % (ref 0–7)
ERYTHROCYTE [DISTWIDTH] IN BLOOD BY AUTOMATED COUNT: 18.7 % (ref 11.5–14.5)
GLOBULIN SER CALC-MCNC: 3.2 G/DL (ref 2–4)
GLUCOSE SERPL-MCNC: 109 MG/DL (ref 65–100)
HCT VFR BLD AUTO: 30.9 % (ref 36.6–50.3)
HGB BLD-MCNC: 9 G/DL (ref 12.1–17)
IMM GRANULOCYTES # BLD AUTO: 0 K/UL (ref 0–0.04)
IMM GRANULOCYTES NFR BLD AUTO: 1 % (ref 0–0.5)
LYMPHOCYTES # BLD: 0.3 K/UL (ref 0.8–3.5)
LYMPHOCYTES NFR BLD: 7 % (ref 12–49)
MCH RBC QN AUTO: 27.6 PG (ref 26–34)
MCHC RBC AUTO-ENTMCNC: 29.1 G/DL (ref 30–36.5)
MCV RBC AUTO: 94.8 FL (ref 80–99)
MONOCYTES # BLD: 0.6 K/UL (ref 0–1)
MONOCYTES NFR BLD: 12 % (ref 5–13)
NEUTS SEG # BLD: 3.5 K/UL (ref 1.8–8)
NEUTS SEG NFR BLD: 72 % (ref 32–75)
NRBC # BLD: 0 K/UL (ref 0–0.01)
NRBC BLD-RTO: 0 PER 100 WBC
PLATELET # BLD AUTO: 146 K/UL (ref 150–400)
PMV BLD AUTO: 11.1 FL (ref 8.9–12.9)
POTASSIUM SERPL-SCNC: 5 MMOL/L (ref 3.5–5.1)
PROT SERPL-MCNC: 6.4 G/DL (ref 6.4–8.2)
RBC # BLD AUTO: 3.26 M/UL (ref 4.1–5.7)
SODIUM SERPL-SCNC: 141 MMOL/L (ref 136–145)
WBC # BLD AUTO: 4.8 K/UL (ref 4.1–11.1)

## 2021-07-28 PROCEDURE — 74011250637 HC RX REV CODE- 250/637: Performed by: INTERNAL MEDICINE

## 2021-07-28 PROCEDURE — 85025 COMPLETE CBC W/AUTO DIFF WBC: CPT

## 2021-07-28 PROCEDURE — 71046 X-RAY EXAM CHEST 2 VIEWS: CPT

## 2021-07-28 PROCEDURE — 36415 COLL VENOUS BLD VENIPUNCTURE: CPT

## 2021-07-28 PROCEDURE — 80053 COMPREHEN METABOLIC PANEL: CPT

## 2021-07-28 PROCEDURE — 97530 THERAPEUTIC ACTIVITIES: CPT

## 2021-07-28 PROCEDURE — 65270000029 HC RM PRIVATE

## 2021-07-28 RX ADMIN — SODIUM BICARBONATE 650 MG: 650 TABLET ORAL at 22:22

## 2021-07-28 RX ADMIN — SODIUM BICARBONATE 650 MG: 650 TABLET ORAL at 07:51

## 2021-07-28 RX ADMIN — AMLODIPINE BESYLATE 5 MG: 5 TABLET ORAL at 07:50

## 2021-07-28 RX ADMIN — FUROSEMIDE 40 MG: 40 TABLET ORAL at 16:27

## 2021-07-28 RX ADMIN — CALCITRIOL CAPSULES 0.25 MCG 0.25 MCG: 0.25 CAPSULE ORAL at 07:51

## 2021-07-28 RX ADMIN — APIXABAN 2.5 MG: 2.5 TABLET, FILM COATED ORAL at 07:50

## 2021-07-28 RX ADMIN — METOPROLOL TARTRATE 25 MG: 25 TABLET, FILM COATED ORAL at 07:52

## 2021-07-28 RX ADMIN — SEVELAMER CARBONATE 1600 MG: 800 TABLET, FILM COATED ORAL at 16:27

## 2021-07-28 RX ADMIN — SEVELAMER CARBONATE 1600 MG: 800 TABLET, FILM COATED ORAL at 12:04

## 2021-07-28 RX ADMIN — TAMSULOSIN HYDROCHLORIDE 0.4 MG: 0.4 CAPSULE ORAL at 07:51

## 2021-07-28 RX ADMIN — SEVELAMER CARBONATE 1600 MG: 800 TABLET, FILM COATED ORAL at 07:51

## 2021-07-28 RX ADMIN — APIXABAN 2.5 MG: 2.5 TABLET, FILM COATED ORAL at 22:22

## 2021-07-28 RX ADMIN — FUROSEMIDE 40 MG: 40 TABLET ORAL at 07:50

## 2021-07-28 RX ADMIN — ERGOCALCIFEROL 50000 UNITS: 1.25 CAPSULE ORAL at 16:26

## 2021-07-28 NOTE — PROGRESS NOTES
Pulmonary, Critical Care    Name: Ilda Lamas MRN: [id number]   : 1955 Hospital: [address] [place] [address]   Date: 2021  Admission date: 2021 Hospital Day: 17       Subjective/Interval History:   Seen in the emergency room wearing noninvasive ventilator. Patient has underlying renal insufficiency developed worsening shortness of breath cough presented to the emergency room chest x-ray shows pulmonary edema. He was profoundly hypoxic is now on noninvasive ventilator and feels more comfortable. Has not had any significant urine output since he has been in the ER. He also complains of new onset left arm swelling   post void bladder scan showed greater than 200 cc urine retention and Quesada catheter placed with 500 cc removed. Overnight he has put out an additional 1200 cc. Respirations improved currently nonlabored on nasal oxygen. Duplex scan of the left arm did show left axillary and proximal brachial DVT and heparin was started. On heparin with Quesada catheter and he has had slight bleeding at the urethral meatus. Ultrasound of the abdomen is pending  7/15 ultrasound of the abdomen showed medical renal disease on the right with small kidney no hydronephrosis there was evidence of prostate enlargement. He is breathing easily at this point and on room air is running on oxygen saturation of 93%   respirations nonlabored on nasal oxygen. Quesada catheter is out he states he has been voiding frequent small amounts without straining   no specific complaints breathing easily. Overnight oximetry showed minimal but significant desaturation while on room air 8 minutes 40 seconds with low of 71%  . Developed V. tach this morning given IV amiodarone IV magnesium and transferred to the unit. Currently heart rate  alternating between A. fib and sinus with PAC. He denies any discomfort is breathing easily   remains sinus rhythm with PVCs.   Had worsening hypoxia during the night and placed on oxygen he feels comfortable at this point. Urine output mildly improved yesterday but input remains greater than output  7/24 no specific complaints respirations nonlabored currently room air with saturation 97% Lasix been resumed 7/23. Urine output not accurately recorded but he states he has been passing a lot of urine  7/25 feels fine denies shortness of breath on room air. Oxygen saturation 97% on room air while awake. He states he is putting out good urine from his oral Lasix although its not being recorded in the chart  7/27 overnight oximetry continues to show nocturnal hypoxia will continue nocturnal oxygen 7/28  no one put oxygen on him last night. Good urine output recorded yesterday 1500 cc but chest x-ray today continues to show pulmonary edema and small effusions  Patient Active Problem List   Diagnosis Code    GI bleed K92.2    Pulmonary edema J81.1       IMPRESSION:   1. Ventricular tachycardia none further seen   2. Atrial fibrillation converted to sinus  3. Hypotension corrected   4. Acute hypoxic respiratory failure room air oxygen saturation maintained while awake  5. Chronic hypoxic respiratory failure overnight oximetry continues to show desaturation at night  6. Acute pulmonary edema radiographically no change today  7. 2 of 4 blood culture bottles with coagulase-negative staph aureus likely skin contaminant   8. Bilateral pleural effusions no change on today's x-ray  9. Acute on chronic kidney disease creatinine i stable  10. Obstructive uropathy Quesada catheter has been removed with no residual urine on bladder scanning  11. Severe hypertension corrected   12. DVT left axillary and brachial now on Eliquis  13. Anemia hemoglobin improved after last transfusion 7/21  14. Troponin leak likely due to renal insufficiency stable has not risen any further  Body mass index is 29.36 kg/m². RECOMMENDATIONS/PLAN:     1.  Remains in sinus rhythm  2. Blood pressure well controlled now on metoprolol low-dose and Norvasc  3. Continue Flomax for prostate enlargement   4. Repeat chest x-ray with continued fluid overload  5. Subjective/Initial History:       I was asked by Stef Bob MD to see Elías Camarillo a 76 y.o.  male  in consultation for a chief complaint of shortness of breath pulmonary edema acute hypoxic respiratory failure        Patient PCP: Ophelia Monaco MD  PMH:  has a past medical history of Chronic kidney disease and Hypertension. PSH:   has no past surgical history on file. FHX: family history is not on file. SHX:  reports that he has never smoked. He has never used smokeless tobacco.    Systemic review somewhat limited as he is on noninvasive ventilator remains short of breath although states he is feeling better  General he has not noted any worsening edema of his upper legs fever chills or sweats does complain of new onset swelling of his left hand and arm  Eyes no double vision or momentary blindness  ENT no facial pain over the sinuses  Endocrinologic no polyuria polydipsia  Musculoskeletal no swollen tender joints  Neurologic no history seizures or syncope  Gastrointestinal no nausea vomiting acid indigestion  Genitourinary states he does still pass fair amount of urine each day has not noted any decrease in that.   Does not have to strain to urinate has no bleeding or drainage  Cardiovascular he is not aware of any underlying heart disease has not had chest pain diaphoresis has had worsening shortness of breath laying down and with exertion  Respiratory worsening shortness of breath over the last week or more no significant cough no sputum production no chills or sweats did have history COVID-19 pneumonitis January of this year    No Known Allergies   MEDS:   Current Facility-Administered Medications   Medication    furosemide (LASIX) tablet 40 mg    sodium bicarbonate tablet 650 mg    amLODIPine (NORVASC) tablet 5 mg    apixaban (ELIQUIS) tablet 2.5 mg    sevelamer carbonate (RENVELA) tab 1,600 mg    0.9% sodium chloride infusion 250 mL    0.9% sodium chloride infusion 250 mL    metoprolol tartrate (LOPRESSOR) tablet 25 mg    tamsulosin (FLOMAX) capsule 0.4 mg    [Held by provider] hydrALAZINE (APRESOLINE) tablet 50 mg    0.9% sodium chloride infusion 250 mL    calcitRIOL (ROCALTROL) capsule 0.25 mcg    ergocalciferol capsule 50,000 Units    hydrALAZINE (APRESOLINE) 20 mg/mL injection 40 mg        Current Facility-Administered Medications:     furosemide (LASIX) tablet 40 mg, 40 mg, Oral, BID, Huey Clemens MD, 40 mg at 07/28/21 0750    sodium bicarbonate tablet 650 mg, 650 mg, Oral, BID, Huey Clemens MD, 650 mg at 07/28/21 0751    amLODIPine (NORVASC) tablet 5 mg, 5 mg, Oral, DAILY, Alexandria Boyd MD, 5 mg at 07/28/21 0750    apixaban (ELIQUIS) tablet 2.5 mg, 2.5 mg, Oral, BID, Kasey Díaz MD, 2.5 mg at 07/28/21 0750    sevelamer carbonate (RENVELA) tab 1,600 mg, 1,600 mg, Oral, TID WITH MEALS, Huey Clemens MD, 1,600 mg at 07/28/21 0751    0.9% sodium chloride infusion 250 mL, 250 mL, IntraVENous, PRN, Kasey Díaz MD    0.9% sodium chloride infusion 250 mL, 250 mL, IntraVENous, PRN, Kasey Díaz MD    metoprolol tartrate (LOPRESSOR) tablet 25 mg, 25 mg, Oral, DAILY, Huey Clemens MD, 25 mg at 07/28/21 5014    tamsulosin (FLOMAX) capsule 0.4 mg, 0.4 mg, Oral, DAILY, Matthew Wyman MD, 0.4 mg at 07/28/21 0751    [Held by provider] hydrALAZINE (APRESOLINE) tablet 50 mg, 50 mg, Oral, TID, Matthew Wyman MD, 50 mg at 07/18/21 2054    0.9% sodium chloride infusion 250 mL, 250 mL, IntraVENous, PRN, Matthew Wyman MD    calcitRIOL (ROCALTROL) capsule 0.25 mcg, 0.25 mcg, Oral, DAILY, Huey Clemens MD, 0.25 mcg at 07/28/21 0751    ergocalciferol capsule 50,000 Units, 50,000 Units, Oral, Q7D, Huey Clemens MD, 50,000 Units at 07/21/21 1711    hydrALAZINE (APRESOLINE) 20 mg/mL injection 40 mg, 40 mg, IntraVENous, Q6H PRN, Pardeep Barker MD, 40 mg at 07/15/21 1552      Objective:     Vital Signs: Telemetry:    normal sinus rhythm Intake/Output:   Visit Vitals  BP (!) 151/88 (BP 1 Location: Right upper arm, BP Patient Position: Sitting) Comment (BP Patient Position): Sitting on the side of bed   Pulse 66   Temp 98.4 °F (36.9 °C)   Resp 20   Ht 6' (1.829 m)   Wt 98.2 kg (216 lb 7.9 oz)   SpO2 99%   BMI 29.36 kg/m²       Temp (24hrs), Av.7 °F (36.5 °C), Min:97.2 °F (36.2 °C), Max:98.4 °F (36.9 °C)        O2 Device: None (Room air) O2 Flow Rate (L/min): 2 l/min         Body mass index is 29.36 kg/m². Wt Readings from Last 4 Encounters:   21 98.2 kg (216 lb 7.9 oz)   21 79.4 kg (175 lb)          Intake/Output Summary (Last 24 hours) at 2021 1013  Last data filed at 2021 0758  Gross per 24 hour   Intake 240 ml   Output 1475 ml   Net -1235 ml       Last shift:      701 -  1900  In: -   Out: 300 [Urine:300]  Last 3 shifts: 1901 -  0700  In: 702 [P. O.:702]  Out: 1575 [Urine:1575]       Hemodynamics:    CO:    CI:    CVP:    SVR:   PAP Systolic:    PAP Diastolic:    PVR:    IJ89:       Ventilator Settings:      Mode Rate TV Press PEEP FiO2 PIP Min. Vent               28 %     9.7 l/min      Physical Exam:    General:  male; no distress now on room air  HEENT: NCAT, visible oral mucosa is moist and clear  Eyes: anicteric; conjunctiva clear extraocular movements intact  Neck: no nodes,,no accessory MM use. no respiratory distress  Chest: no deformity,   Cardiac: Regular rhythm and rate now controlled  Lungs: distant breath sounds; clear anteriorly and laterally with rales in the bases  Abd: Soft positive bowel sounds no tenderness  Ext: Improvement in edema  of the lower extremities with continued edema of the left arm; no joint swelling;  No clubbing  : Clear urine  Neuro: Alert awake no distress speech is clear moves all 4 extremities  Psych-calm, oriented to person;   Skin: warm, dry, no cyanosis;   Pulses: Brachial and radial pulses intact  Capillary:slow capillary refill      Labs:    Recent Labs     07/28/21  0652 07/27/21  1729 07/26/21  0623    139 139   K 5.0 4.9 5.1    108 106   CO2 23 23 24   * 98 83   BUN 95* 95* 99*   CREA 6.83* 6.86* 6.98*   CA 8.5 8.4* 8.3*   PHOS  --   --  8.2*   ALB 3.2* 3.2* 3.2*   ALT 25 25  --    7/27 no overnight oximetry split study with a low oxygen saturation 75% 33 minutes 10 seconds below 88%   7/25 overnight oximetry with 1 hour 13 minutes below 88% with low oxygen saturation 56%  7/19 overnight oximetry shows 14 minutes 26 seconds below 88% with low oxygen saturation 75%  7/17 overnight oximetry shows low oxygen saturation 71% with 8 minutes 40 seconds below 88% from 1 AM to 6 AM   7/16 overnight oximetry on 1 L shows only 2 minutes 20 seconds below 88% with a low oxygen saturation of 83%  7/15 room air oxygen saturation 93%  currently on noninvasive ventilator inspiratory pressure 16 expiratory pressure six rate 16 FiO2 28% with oxygen saturation 96%   BNP 32,264, previous greater than 35,000  Lab Results   Component Value Date/Time    Culture result:  07/12/2021 10:40 PM     Two of four bottles have been flagged positive by instrument. Bottles have been sent to [facility] laboratory to assess for possible growth.  Gram Positive Cocci in clusters CALLED TO AND READ BACK BY Bell Espinal r.n. [phone] 07/14/2021 by [email or web address]    Culture result:  07/12/2021 10:40 PM     Staphylococcus species, coagulase negative GROWING IN THE 1ST OF 4 BOTTLES DRAWN    Culture result:  07/12/2021 10:40 PM     Staphylococcus species, coagulase negative (2nd colony type/strain) GROWING IN THE 2ND OF 4 BOTTLES DRAWN        Imaging:  I have personally reviewed the patients radiographs and have reviewed the reports:    CXR Results  (Last 48 hours)  7/23 chest x-ray mild pulmonary congestion tiny effusions unchanged from last x-ray although right upper lobe lung may have slightly less congestion  7/21 chest x-ray with continued mild pulmonary edema pattern small bilateral pleural effusions there may be improved inspiratory effort  7/16 chest x-ray with continued mild pulmonary edema and small pleural effusions unchanged               07/12/21 2251  XR CHEST PORT Final result    Impression:  Findings/impression:       Cardiac silhouette is enlarged. Central vascular congestion and patchy central   airspace disease/edema. Suspect trace right pleural effusion. No evidence of pneumothorax. No acute osseous abnormality identified. Narrative:  Study: XR CHEST PORT       Clinical indication: sob       Comparison: None. To me chest x-ray consistent with cardiomegaly pulmonary edema and bilateral pleural effusions           Results from Hospital Encounter encounter on 07/12/21    XR CHEST PA LAT    Narrative  Chest 2 views. Comparison chest series July 26, 2021. No change compared to prior imaging. Patchy lower lobe lung reticular markings  with small dependent pleural effusions persist. Cardiac and mediastinal  structures unchanged. No pneumothorax. XR CHEST PA LAT    Narrative  Chest, 2 views, 7/26/2021    History: Pulmonary edema. Pleural effusions. Comparison: Including chest 7/23/2021. Findings: The cardiac silhouette remains enlarged. The lungs are adequately  expanded. There is pulmonary vascular congestion and hydrostatic edema, not  significantly changed as compared to chest 7/23/2021. Small to moderate pleural  effusions and associated bibasilar atelectasis persist.  No pneumothorax is  identified. Degenerative changes are present in the spine. Impression  Hydrostatic edema. Pleural effusions and associated bibasilar  atelectasis. No significant interval change.       XR CHEST PORT    Narrative  This study is a portable AP radiograph of the chest dated 7/23/2021 obtained at  2:52 AM.    HISTORY: Pulmonary edema. COMPARISON: 7/21/2021. FINDINGS: There is moderate enlargement of the cardiac silhouette. There is  associated distention of the pulmonary vessels and perivascular ill definition  consistent with interstitial edema. There also is perivascular airspace disease  compatible with alveolar edema. There has been a decrease in the alveolar edema  within the right upper lobe. This examination is negative for larger pleural effusions. The remainder of the examination is unchanged. Impression  There is continued moderate pulmonary interstitial and alveolar  edema with a slight interval improvement within the right upper lobe. The  remainder of this examination is unchanged. Results from [place] encounter on 01/05/21    CT CHEST WO CONT    Narrative  Images obtained without contrast include the abdomen and pelvis. Comparison portable chest radiograph earlier today. Dose Reduction:  All CT scans at this facility are performed using dose reduction optimization  techniques as appropriate to a performed exam including the following: Automated  exposure control, adjustments of the mA and/or kV according to patient size, or  use of iterative reconstruction technique. Subtle peripheral groundglass densities are noted in both lungs, could reflect  Covid pneumonia or other. No pneumothorax or pneumomediastinum. The radiographic findings suspicious for  pneumomediastinum probably was artifact, perhaps related to cardiac motion. No pleural effusion or adenopathy. Cardiomegaly again noted. Impression  IMPRESSION:  1. No pneumomediastinum or pneumothorax. 2. Cardiomegaly. 3. Subtle groundglass densities in both lungs might be pneumonia or other. Discussion patient with worsening renal insufficiency has developed pulmonary edema acute hypoxic respiratory failure. He states he still has urine output normally at home.   We will give him high-dose IV Lasix to see if diuresis is induced. He very likely will need dialysis. He has minimal elevation in troponin probably troponin leak from hypoxia or just due to his renal insufficiency. He is not having any chest pain. Does have severe hypertension. Has been started on clonidine and hydralazine. 7/14 no distress today on nasal oxygen. Did have residual on post void bladder scan and Quesada catheter was placed with good diuresis overnight. Despite this potassium remains elevated. We will give 1 dose of Kayexalate. Despite diuresis hemoglobin is dropped to 6.7 will transfuse. He denies frequency small-volume urine or straining to urinate at home despite this with signs of obstruction we will add Flomax. Potassium 6 today we will give 1 dose of Kayexalate and continue diuresis  7/16 respirations nonlabored. Was placed back on 1 L nasal oxygen yesterday overnight oximetry shows well-maintained oxygen saturation. Will check overnight tonight on and off oxygen. Quesada catheter has been removed. Chest x-ray continues to show mild pulmonary edema  7/17 overnight oximetry shows desaturation when on room air. Will maintain oxygen and repeat overnight oximetry Ibrahima night  7/19 developed V. tach overnight and now in the ICU on IV amiodarone with rhythm showing alternating sinus with PAC and A. fib. Rate . Overnight oximetry continued to show desaturation on room air and he is back on nasal oxygen. He has no specific complaint  7/20 now in sinus rhythm with frequent PVCs. Cardiology is recommending cardiac cath but he is reluctant to undergo that. Creatinine has worsened despite IV fluid administration yesterday although blood pressure is improved. Currently oxygen saturation well-maintained on room air but last overnight oximetry showed desaturation will maintain oxygen at 1 L for now  7/21 had hypoxia yesterday afternoon and placed back on oxygen during the daytime as well as the night. He feels comfortable at this point. Chest x-ray is unchanged still has mild pulmonary edema with small effusions. It does appear that he took a deeper breath today. Creatinine remains markedly elevated. He is on heparin for left axillary DVT. Holding switching to oral anticoagulation until sure no instrumentation is needed   7/23 unchanged remains on IV heparin. Oral Lasix being resumed did require transfusion 7/21. Repeat chest x-ray no worsening  7/26 chest x-ray unchanged pulmonary edema and bilateral effusions. He states he has good urine output with the current dose of Lasix. We will repeat overnight oximetry on and off oxygen   7/27 continued nocturnal hypoxia he will need home oxygen  7/28 repeat overnight oximetry to keep him qualified for home oxygen           Thank you for allowing us to participate in the care of this patient.   We will follow along with you     Valeria Ramirez MD

## 2021-07-28 NOTE — PROGRESS NOTES
Problem: Falls - Risk of  Goal: *Absence of Falls  Description: Document Oleg Queen Fall Risk and appropriate interventions in the flowsheet. Outcome: Progressing Towards Goal  Note: Fall Risk Interventions:  Mobility Interventions: Assess mobility with egress test, Bed/chair exit alarm, Communicate number of staff needed for ambulation/transfer         Medication Interventions: Assess postural VS orthostatic hypotension, Bed/chair exit alarm, Evaluate medications/consider consulting pharmacy    Elimination Interventions: Bed/chair exit alarm, Call light in reach, Elevated toilet seat, Patient to call for help with toileting needs              Problem: Patient Education: Go to Patient Education Activity  Goal: Patient/Family Education  Outcome: Progressing Towards Goal     Problem: Pressure Injury - Risk of  Goal: *Prevention of pressure injury  Description: Document Alfa Scale and appropriate interventions in the flowsheet. Outcome: Progressing Towards Goal  Note: Pressure Injury Interventions:  Sensory Interventions: Assess changes in LOC, Check visual cues for pain, Keep linens dry and wrinkle-free, Maintain/enhance activity level, Minimize linen layers, Turn and reposition approx.  every two hours (pillows and wedges if needed)    Moisture Interventions: Absorbent underpads, Check for incontinence Q2 hours and as needed    Activity Interventions: Assess need for specialty bed, Chair cushion, Increase time out of bed, PT/OT evaluation, Pressure redistribution bed/mattress(bed type)    Mobility Interventions: Assess need for specialty bed, HOB 30 degrees or less, Pressure redistribution bed/mattress (bed type)    Nutrition Interventions: Document food/fluid/supplement intake    Friction and Shear Interventions: Apply protective barrier, creams and emollients, HOB 30 degrees or less                Problem: Patient Education: Go to Patient Education Activity  Goal: Patient/Family Education  Outcome: Progressing Towards Goal     Problem: Patient Education: Go to Patient Education Activity  Goal: Patient/Family Education  Outcome: Progressing Towards Goal     Problem: Patient Education: Go to Patient Education Activity  Goal: Patient/Family Education  Outcome: Progressing Towards Goal

## 2021-07-28 NOTE — PROGRESS NOTES
Progress Note    Stef Bob MD             Daily Progress Note: 7/28/2021      Subjective: The patient is seen for follow  up. Sitting in the chair at present time I requested patient to stand up from the chair with help patient states that it will be hard for him to stand up. He says he gets out of breath he needs help in getting up from the bed also has shortness of breath on exertion. Which is minimal exertion by few steps. Patient has been on Eliquis. He is at high risk for the fall. It will be very dangerous for patient to go home without any physical therapy as well as occupational therapy ideally patient should get into inpatient rehab.   But patient has some kind of anxious problem I have requested  to contact either inpatient rehab and/or outpatient physical therapy occupational therapy help for the patient  Problem List:  Problem List as of 7/28/2021 Never Reviewed        Codes Class Noted - Resolved    Pulmonary edema ICD-10-CM: J81.1  ICD-9-CM: 303  7/13/2021 - Present        GI bleed ICD-10-CM: K92.2  ICD-9-CM: 578.9  1/5/2021 - Present              Medications reviewed  Current Facility-Administered Medications   Medication Dose Route Frequency    furosemide (LASIX) tablet 40 mg  40 mg Oral BID    sodium bicarbonate tablet 650 mg  650 mg Oral BID    amLODIPine (NORVASC) tablet 5 mg  5 mg Oral DAILY    apixaban (ELIQUIS) tablet 2.5 mg  2.5 mg Oral BID    sevelamer carbonate (RENVELA) tab 1,600 mg  1,600 mg Oral TID WITH MEALS    0.9% sodium chloride infusion 250 mL  250 mL IntraVENous PRN    0.9% sodium chloride infusion 250 mL  250 mL IntraVENous PRN    metoprolol tartrate (LOPRESSOR) tablet 25 mg  25 mg Oral DAILY    tamsulosin (FLOMAX) capsule 0.4 mg  0.4 mg Oral DAILY    [Held by provider] hydrALAZINE (APRESOLINE) tablet 50 mg  50 mg Oral TID    0.9% sodium chloride infusion 250 mL  250 mL IntraVENous PRN    calcitRIOL (ROCALTROL) capsule 0.25 mcg  0.25 mcg Oral DAILY    ergocalciferol capsule 50,000 Units  50,000 Units Oral Q7D    hydrALAZINE (APRESOLINE) 20 mg/mL injection 40 mg  40 mg IntraVENous Q6H PRN       Review of Systems:   A comprehensive review of systems was negative except for that written in the HPI. Objective:   Physical Exam:     Visit Vitals  BP (!) 143/92 (BP 1 Location: Right upper arm, BP Patient Position: Sitting)   Pulse 62   Temp 97.9 °F (36.6 °C)   Resp 20   Ht 6' (1.829 m)   Wt 98.2 kg (216 lb 7.9 oz)   SpO2 97%   BMI 29.36 kg/m²    O2 Flow Rate (L/min): 2 l/min O2 Device: None (Room air)    Temp (24hrs), Av.9 °F (36.6 °C), Min:97.2 °F (36.2 °C), Max:98.4 °F (36.9 °C)    701 - 1900  In: -   Out: 300 [Urine:300]   1901 -  0700  In: 702 [P. O.:702]  Out: 1575 [Urine:1575]    General:  Alert, cooperative, no distress, appears stated age. Lungs:   Clear to auscultation bilaterally. Chest wall:  No tenderness or deformity. Heart:  Regular rate and rhythm, S1, S2 normal, no murmur, click, rub or gallop. Abdomen:   Soft, non-tender. Bowel sounds normal. No masses,  No organomegaly. Extremities: Extremities normal, atraumatic, no cyanosis or edema. Pulses: 2+ and symmetric all extremities. Skin: Skin color, texture, turgor normal. No rashes or lesions   Neurologic: CNII-XII intact. No gross sensory or motor deficits     Data Review:       Recent Days:  Recent Labs     21  0621  172   WBC 4.8 4.8   HGB 9.0* 8.8*   HCT 30.9* 29.6*   * 148*     Recent Labs     21  0652 21  1729 21  0623    139 139   K 5.0 4.9 5.1    108 106   CO2 23 23 24   * 98 83   BUN 95* 95* 99*   CREA 6.83* 6.86* 6.98*   CA 8.5 8.4* 8.3*   PHOS  --   --  8.2*   ALB 3.2* 3.2* 3.2*   TBILI 0.4 0.4  --    ALT 25 25  --      No results for input(s): PH, PCO2, PO2, HCO3, FIO2 in the last 72 hours. Results     ** No results found for the last 336 hours.  **         24 Hour Results:  Recent Results (from the past 24 hour(s))   CBC WITH AUTOMATED DIFF    Collection Time: 07/27/21  5:29 PM   Result Value Ref Range    WBC 4.8 4.1 - 11.1 K/uL    RBC 3.15 (L) 4.10 - 5.70 M/uL    HGB 8.8 (L) 12.1 - 17.0 g/dL    HCT 29.6 (L) 36.6 - 50.3 %    MCV 94.0 80.0 - 99.0 FL    MCH 27.9 26.0 - 34.0 PG    MCHC 29.7 (L) 30.0 - 36.5 g/dL    RDW 18.7 (H) 11.5 - 14.5 %    PLATELET 944 (L) 232 - 400 K/uL    MPV 11.1 8.9 - 12.9 FL    NRBC 0.0 0.0  WBC    ABSOLUTE NRBC 0.00 0.00 - 0.01 K/uL    NEUTROPHILS 71 32 - 75 %    LYMPHOCYTES 6 (L) 12 - 49 %    MONOCYTES 13 5 - 13 %    EOSINOPHILS 8 (H) 0 - 7 %    BASOPHILS 2 (H) 0 - 1 %    IMMATURE GRANULOCYTES 0 0 - 0.5 %    ABS. NEUTROPHILS 3.5 1.8 - 8.0 K/UL    ABS. LYMPHOCYTES 0.3 (L) 0.8 - 3.5 K/UL    ABS. MONOCYTES 0.6 0.0 - 1.0 K/UL    ABS. EOSINOPHILS 0.4 0.0 - 0.4 K/UL    ABS. BASOPHILS 0.1 0.0 - 0.1 K/UL    ABS. IMM. GRANS. 0.0 0.00 - 0.04 K/UL    DF AUTOMATED     METABOLIC PANEL, COMPREHENSIVE    Collection Time: 07/27/21  5:29 PM   Result Value Ref Range    Sodium 139 136 - 145 mmol/L    Potassium 4.9 3.5 - 5.1 mmol/L    Chloride 108 97 - 108 mmol/L    CO2 23 21 - 32 mmol/L    Anion gap 8 5 - 15 mmol/L    Glucose 98 65 - 100 mg/dL    BUN 95 (H) 6 - 20 mg/dL    Creatinine 6.86 (H) 0.70 - 1.30 mg/dL    BUN/Creatinine ratio 14 12 - 20      GFR est AA 10 (L) >60 ml/min/1.73m2    GFR est non-AA 8 (L) >60 ml/min/1.73m2    Calcium 8.4 (L) 8.5 - 10.1 mg/dL    Bilirubin, total 0.4 0.2 - 1.0 mg/dL    AST (SGOT) 18 15 - 37 U/L    ALT (SGPT) 25 12 - 78 U/L    Alk.  phosphatase 93 45 - 117 U/L    Protein, total 6.3 (L) 6.4 - 8.2 g/dL    Albumin 3.2 (L) 3.5 - 5.0 g/dL    Globulin 3.1 2.0 - 4.0 g/dL    A-G Ratio 1.0 (L) 1.1 - 2.2     CBC WITH AUTOMATED DIFF    Collection Time: 07/28/21  6:52 AM   Result Value Ref Range    WBC 4.8 4.1 - 11.1 K/uL    RBC 3.26 (L) 4.10 - 5.70 M/uL    HGB 9.0 (L) 12.1 - 17.0 g/dL    HCT 30.9 (L) 36.6 - 50.3 %    MCV 94.8 80.0 - 99.0 FL MCH 27.6 26.0 - 34.0 PG    MCHC 29.1 (L) 30.0 - 36.5 g/dL    RDW 18.7 (H) 11.5 - 14.5 %    PLATELET 224 (L) 623 - 400 K/uL    MPV 11.1 8.9 - 12.9 FL    NRBC 0.0 0.0  WBC    ABSOLUTE NRBC 0.00 0.00 - 0.01 K/uL    NEUTROPHILS 72 32 - 75 %    LYMPHOCYTES 7 (L) 12 - 49 %    MONOCYTES 12 5 - 13 %    EOSINOPHILS 7 0 - 7 %    BASOPHILS 1 0 - 1 %    IMMATURE GRANULOCYTES 1 (H) 0 - 0.5 %    ABS. NEUTROPHILS 3.5 1.8 - 8.0 K/UL    ABS. LYMPHOCYTES 0.3 (L) 0.8 - 3.5 K/UL    ABS. MONOCYTES 0.6 0.0 - 1.0 K/UL    ABS. EOSINOPHILS 0.4 0.0 - 0.4 K/UL    ABS. BASOPHILS 0.1 0.0 - 0.1 K/UL    ABS. IMM. GRANS. 0.0 0.00 - 0.04 K/UL    DF AUTOMATED     METABOLIC PANEL, COMPREHENSIVE    Collection Time: 07/28/21  6:52 AM   Result Value Ref Range    Sodium 141 136 - 145 mmol/L    Potassium 5.0 3.5 - 5.1 mmol/L    Chloride 108 97 - 108 mmol/L    CO2 23 21 - 32 mmol/L    Anion gap 10 5 - 15 mmol/L    Glucose 109 (H) 65 - 100 mg/dL    BUN 95 (H) 6 - 20 mg/dL    Creatinine 6.83 (H) 0.70 - 1.30 mg/dL    BUN/Creatinine ratio 14 12 - 20      GFR est AA 10 (L) >60 ml/min/1.73m2    GFR est non-AA 8 (L) >60 ml/min/1.73m2    Calcium 8.5 8.5 - 10.1 mg/dL    Bilirubin, total 0.4 0.2 - 1.0 mg/dL    AST (SGOT) 15 15 - 37 U/L    ALT (SGPT) 25 12 - 78 U/L    Alk. phosphatase 91 45 - 117 U/L    Protein, total 6.4 6.4 - 8.2 g/dL    Albumin 3.2 (L) 3.5 - 5.0 g/dL    Globulin 3.2 2.0 - 4.0 g/dL    A-G Ratio 1.0 (L) 1.1 - 2.2         XR CHEST PA LAT   Final Result      XR CHEST PA LAT   Final Result   Hydrostatic edema. Pleural effusions and associated bibasilar   atelectasis. No significant interval change. XR CHEST PORT   Final Result   There is continued moderate pulmonary interstitial and alveolar   edema with a slight interval improvement within the right upper lobe. The   remainder of this examination is unchanged.       XR CHEST PORT   Final Result      XR CHEST PORT   Final Result      LOWER EXT ART PVR MULT LEVEL SEG PRESSURES   Final Result      DUPLEX LOWER EXT VENOUS BILAT   Final Result      XR CHEST PORT   Final Result   Findings/impression: Stable cardiomediastinal silhouette. Similar central   pulmonary vascular congestion and bilateral patchy airspace opacities. No   significant pleural effusion. No pneumothorax. Findings are not significantly changed. XR CHEST PORT   Final Result      US RETROPERITONEUM COMP   Final Result   Medical renal disease on the right      DUPLEX UPPER EXT VENOUS LEFT   Final Result      XR CHEST PORT   Final Result   Findings/impression:      Cardiac silhouette is enlarged. Central vascular congestion and patchy central   airspace disease/edema. Suspect trace right pleural effusion. No evidence of pneumothorax. No acute osseous abnormality identified. Assessment: Acute left upper limb DVT  Ventricular tachycardia with hypotension  Shortness of breath on minimal exertion  Anemia  Acute on chronic renal failure now renal function has been stabilized  Hypertension        Plan: Patient is ready to be discharged at lower acuity level place like inpatient rehab or skilled care unit which will be best for the patient. Patient is also scared to go home because of the increased chances of fall. Care Plan discussed with: Patient himself as well as RN taking care of the patient    Total time spent with patient: 30 minutes.     Debra Narayan MD

## 2021-07-28 NOTE — PROGRESS NOTES
PHYSICAL THERAPY TREATMENT  Patient: Giorgi Blanton (62 y.o. male)  Date: 7/28/2021  Diagnosis: Pulmonary edema [J81.1] <principal problem not specified>  Procedure(s) (LRB):  LEFT HEART CATH / CORONARY ANGIOGRAPHY (N/A)    Precautions:    Chart, physical therapy assessment, plan of care and goals were reviewed. ASSESSMENT  Patient continues with skilled PT services and is progressing towards goals. Pt seated in recliner upon entry and agreeable to session. Pt completed STS from recliner with CGA. Pt ambulated with RW and CGA and +1 for chair follow; noted significant SOB at ~100   Into ambulation so patient took seated rest break in recliner. O2 saturation reading at 92% and increased to 94% after a minute; educated pt on PLB. Patient ambulated back to room after 4 minute seated rest break, no LOB or knee buckling noted. Patient participated in seated therex in recliner, see further details below. Patient left semi supine in bed with call bell in reach and needs met. Recommending d/c to IRF once medically appropriate. Current Level of Function Impacting Discharge (mobility/balance): assistance required during ambulation for safety     Other factors to consider for discharge: PLOF, time since onset, severity of deficits, respiratory status         PLAN :  Patient continues to benefit from skilled intervention to address the above impairments. Continue treatment per established plan of care. to address goals. Recommendation for discharge: (in order for the patient to meet his/her long term goals)  Therapy 3 hours per day 5-7 days per week    This discharge recommendation:  Has been made in collaboration with the attending provider and/or case management    IF patient discharges home will need the following DME: to be determined (TBD)       SUBJECTIVE:   Patient stated we can walk.     OBJECTIVE DATA SUMMARY:   Critical Behavior:  Neurologic State: Alert  Orientation Level: Oriented X4  Cognition: Follows commands    Functional Mobility Training:  Transfers:  Sit to Stand: Contact guard assistance  Stand to Sit: Contact guard assistance  Bed to Chair: Minimum assistance;Assist x2    Balance:  Sitting: Intact  Standing: Impaired; With support  Standing - Static: Constant support;Good  Standing - Dynamic : Constant support; Fair    Ambulation/Gait Training:  Distance (ft): 200 Feet (ft)  Assistive Device: Gait belt;Walker, rolling  Ambulation - Level of Assistance: Contact guard assistance    Therapeutic Exercises:   1 x 10 AP  1 x 10 LAQ  1 x 10 Marches    Pain Ratin/10    Activity Tolerance:   Fair, requires rest breaks, and observed SOB with activity  Please refer to the flowsheet for vital signs taken during this treatment. After treatment patient left in no apparent distress:   Sitting in chair and Call bell within reach    COMMUNICATION/COLLABORATION:   The patients plan of care was discussed with: Occupational therapy assistant. OT/PT sessions occurred together for increased patient and clinician safety    Problem: Mobility Impaired (Adult and Pediatric)  Goal: *Acute Goals and Plan of Care (Insert Text)  Description: Pt will be I with LE HEP in 7 days. Pt will perform bed mobility with mod I in 7 days. Pt will perform transfers with mod I in 7 days. Pt will amb 25-50 feet with LRAD safely with mod I in 7 days.    Outcome: Progressing Towards Goal       Naima Ped, PTA   Time Calculation: 24 mins

## 2021-07-28 NOTE — PROGRESS NOTES
Progress Note      7/28/2021 9:54 AM  NAME: Danii Saunders   MRN:  [id number]   Admit Diagnosis: Pulmonary edema [J81.1]      Subjective:   Chart reviewed. Patient has had a symptomatic ventricular tachycardia and was shocked. Vascular surgery note appreciated. Echocardiogram results explained. He feels much better today. Patient was offered option of cardiac catheterization but he has decided not to pursue that. Review of Systems:    Symptom Y/N Comments  Symptom Y/N Comments   Fever/Chills n   Chest Pain n    Poor Appetite    Edema y    Cough    Abdominal Pain     Sputum    Joint Pain n    SOB/CARMONA n   Pruritis/Rash     Nausea/vomit    Other     Diarrhea         Constipation           Could NOT obtain due to:      Objective:          Physical Exam:    Last 24hrs VS reviewed since prior progress note. Most recent are:    Visit Vitals  BP (!) 151/88 (BP 1 Location: Right upper arm, BP Patient Position: Sitting) Comment (BP Patient Position): Sitting on the side of bed   Pulse 66   Temp 98.4 °F (36.9 °C)   Resp 20   Ht 6' (1.829 m)   Wt 98.2 kg (216 lb 7.9 oz)   SpO2 99%   BMI 29.36 kg/m²       Intake/Output Summary (Last 24 hours) at 7/28/2021 1032  Last data filed at 7/28/2021 0758  Gross per 24 hour   Intake 240 ml   Output 1475 ml   Net -1235 ml        General Appearance: Well developed, well nourished, alert & oriented x 3,    no acute distress. Ears/Nose/Mouth/Throat: Hearing grossly normal.  Neck: Supple. Chest: Lungs clear to auscultation bilaterally. Cardiovascular: Regular rate and rhythm, S1,S2 normal, no murmur. Abdomen: Soft, non-tender, bowel sounds are active. Extremities: Lower extremity edema left upper extremity edema evident  Skin: Lower extremity blisters evident    []         Post-cath site without hematoma, bruit, tenderness, or thrill. Distal pulses intact.     PMH/SH reviewed - no change compared to H&P    Data Review    Telemetry: Patient had a ventricular tachycardia and was shocked and had episode of atrial fibrillation and now is in sinus rhythm with PVCs. EKG:   []  No new EKG for review    Lab Data Personally Reviewed:    Recent Labs     07/28/21 0652 07/27/21 1729   WBC 4.8 4.8   HGB 9.0* 8.8*   HCT 30.9* 29.6*   * 148*     No results for input(s): INR, PTP, APTT, INREXT, INREXT in the last 72 hours. Recent Labs     07/28/21 0652 07/27/21  1729 07/26/21 0623    139 139   K 5.0 4.9 5.1    108 106   CO2 23 23 24   BUN 95* 95* 99*   CREA 6.83* 6.86* 6.98*   * 98 83   CA 8.5 8.4* 8.3*     No results for input(s): CPK, CKNDX, TROIQ in the last 72 hours. No lab exists for component: CPKMB  No results found for: CHOL, CHOLX, CHLST, CHOLV, HDL, HDLP, LDL, LDLC, DLDLP, Fredick Cam, CHHD, CHHDX    Recent Labs     07/28/21  0652 07/27/21  1729 07/26/21  0623   AP 91 93  --    TP 6.4 6.3*  --    ALB 3.2* 3.2* 3.2*   GLOB 3.2 3.1  --      No results for input(s): PH, PCO2, PO2 in the last 72 hours.     Medications Personally Reviewed:    Current Facility-Administered Medications   Medication Dose Route Frequency    furosemide (LASIX) tablet 40 mg  40 mg Oral BID    sodium bicarbonate tablet 650 mg  650 mg Oral BID    amLODIPine (NORVASC) tablet 5 mg  5 mg Oral DAILY    apixaban (ELIQUIS) tablet 2.5 mg  2.5 mg Oral BID    sevelamer carbonate (RENVELA) tab 1,600 mg  1,600 mg Oral TID WITH MEALS    0.9% sodium chloride infusion 250 mL  250 mL IntraVENous PRN    0.9% sodium chloride infusion 250 mL  250 mL IntraVENous PRN    metoprolol tartrate (LOPRESSOR) tablet 25 mg  25 mg Oral DAILY    tamsulosin (FLOMAX) capsule 0.4 mg  0.4 mg Oral DAILY    [Held by provider] hydrALAZINE (APRESOLINE) tablet 50 mg  50 mg Oral TID    0.9% sodium chloride infusion 250 mL  250 mL IntraVENous PRN    calcitRIOL (ROCALTROL) capsule 0.25 mcg  0.25 mcg Oral DAILY    ergocalciferol capsule 50,000 Units  50,000 Units Oral Q7D    hydrALAZINE (APRESOLINE) 20 mg/mL injection 40 mg  40 mg IntraVENous Q6H PRN           Problem List:   Acute hypoxic respiratory failure and patient seems to be somewhat better. Severe anemia. Renal failure. Heart failure. Left upper extremity DVT. Minimal elevation of troponin I is probably not a presentation of myocardial infarction. Patient has had ventricular tachycardia and has been shocked. 1.      Assessment/Plan:   Patient was scheduled for cardiac catheterization but he says he does not want to go for catheterization. We will follow nephrology recommendations and vascular surgery recommendations. Probably physical therapy is of value. I will continue otherwise present care. Thank you. 1.          []       High complexity decision making was performed in this patient at high risk for decompensation with multiple organ involvement.     Courtney Abbott MD

## 2021-07-28 NOTE — PROGRESS NOTES
OCCUPATIONAL THERAPY TREATMENT  Patient: Ariana De La Garza (13 y.o. male)  Date: 7/28/2021  Diagnosis: Pulmonary edema [J81.1] <principal problem not specified>  Procedure(s) (LRB):  LEFT HEART CATH / CORONARY ANGIOGRAPHY (N/A)    Precautions:    Chart, occupational therapy assessment, plan of care, and goals were reviewed. ASSESSMENT  Patient continues with skilled OT services and is progressing towards goals. Pt received seated in recliner upon entry and agreeable to session. Pt completed sit-> stand from recliner with CGA. Pt performed functional ambulation with RW and CGA and +1 for chair follow; noted significant SOB -patient took seated rest break in recliner. O2 saturation reading at 92% and increased to 94% after a minute; educated pt on PLB, able to demonstrate. Patient ambulated back to room after 4 minute seated rest break, no LOB or knee buckling noted. Patient participated in seated therex in recliner, see further details below. Left UE held d/t increased edema- pt educated on elevating left UE on pillow throughout the day and to perform wrist Flex/ex and finger opposition. Patient left reclined in chair with call bell in reach and needs met. Recommending d/c to IRF once medically appropriate. Current Level of Function Impacting Discharge (ADLs): assistance with functional mobility, increased SOB with activity, decreased activity tolerance    Other factors to consider for discharge:PLOF, time since on set         PLAN :  Patient continues to benefit from skilled intervention to address the above impairments. Continue treatment per established plan of care. to address goals.     Recommendation for discharge: (in order for the patient to meet his/her long term goals)  Therapy 3 hours per day 5-7 days per week    This discharge recommendation:  Has been made in collaboration with the attending provider and/or case management    IF patient discharges home will need the following DME: RIC SUBJECTIVE:   Patient stated  No, I dont need to use the bathroom.     OBJECTIVE DATA SUMMARY:   Cognitive/Behavioral Status:  Neurologic State: Alert  Orientation Level: Oriented X4  Cognition: Follows commands    Functional Mobility and Transfers for ADLs:    Transfers:  Sit to Stand: Contact guard assistance     Bed to Chair: Minimum assistance;Assist x2    Balance:  Sitting: Intact  Standing: Impaired; With support  Standing - Static: Constant support;Good  Standing - Dynamic : Constant support; Fair      Therapeutic Exercises: navid UE's  Exercise Sets Reps AROM AAROM PROM Self PROM Comments   Shoulder flex/ext 1 10 [x] [] [] [] RUE    Elbow flex/ext 1 10 [x] [] [] [] RUE   Wrist flex/ext 1 10 [x] [] [] [] LUE &RUE   Thumb finger opposition 1 10 [x] [] [] [] LUE & RUE         Pain:  0/ 10 pain     Activity Tolerance:   Fair, requires rest breaks, requires frequent rest breaks, and observed SOB with activity  Please refer to the flowsheet for vital signs taken during this treatment. After treatment patient left in no apparent distress:   Sitting in chair, Heels elevated for pressure relief, and Call bell within reach    COMMUNICATION/COLLABORATION:   The patients plan of care was discussed with: Physical therapy assistant. Co-tx with PTA for increased assistance with transfers and mobility. Decreased activity tolerance and requires assistance with chair follow with ambulation.      Link [place]  Time Calculation: 24 mins    Problem: Self Care Deficits Care Plan (Adult)  Goal: *Acute Goals and Plan of Care (Insert Text)  Description: Pt will be mod I sup <> sit in prep for EOB ADLs  Pt will be mod I grooming sitting EOB  Pt will be mod I LE dressing sitting EOB/long sit  Pt will be mod I sit <>  prep for toileting LRAD  Pt will be mod I toileting/toilet transfer/cloth mgmt LRAD  Pt will be I following UE HEP in prep for self care tasks   Outcome: Progressing Towards Goal

## 2021-07-28 NOTE — PROGRESS NOTES
[facility] KIDNEY     Renal Daily Progress Note:     Admission Date: 2021     Subjective: patient seen in a.m. with son at bedside. Feeling ok,  BP up. Heart rate around 60 bpm. Creatinine trending down albeit at a slower pace. Not overtly short of breath at rest.    Not short of breath. No cough. No chest or abdominal pain. Objective:     Visit Vitals  BP (!) 137/91 (BP 1 Location: Right upper arm, BP Patient Position: Sitting)   Pulse 63   Temp 97.5 °F (36.4 °C)   Resp 22   Ht 6' (1.829 m)   Wt 98.2 kg (216 lb 7.9 oz)   SpO2 97%   BMI 29.36 kg/m²     Temp (24hrs), Av.9 °F (36.6 °C), Min:97.5 °F (36.4 °C), Max:98.4 °F (36.9 °C)        Intake/Output Summary (Last 24 hours) at 2021 1827  Last data filed at 2021 0758  Gross per 24 hour   Intake --   Output 1075 ml   Net -1075 ml     Current Facility-Administered Medications   Medication Dose Route Frequency    furosemide (LASIX) tablet 40 mg  40 mg Oral BID    sodium bicarbonate tablet 650 mg  650 mg Oral BID    amLODIPine (NORVASC) tablet 5 mg  5 mg Oral DAILY    apixaban (ELIQUIS) tablet 2.5 mg  2.5 mg Oral BID    sevelamer carbonate (RENVELA) tab 1,600 mg  1,600 mg Oral TID WITH MEALS    0.9% sodium chloride infusion 250 mL  250 mL IntraVENous PRN    0.9% sodium chloride infusion 250 mL  250 mL IntraVENous PRN    metoprolol tartrate (LOPRESSOR) tablet 25 mg  25 mg Oral DAILY    tamsulosin (FLOMAX) capsule 0.4 mg  0.4 mg Oral DAILY    [Held by provider] hydrALAZINE (APRESOLINE) tablet 50 mg  50 mg Oral TID    0.9% sodium chloride infusion 250 mL  250 mL IntraVENous PRN    calcitRIOL (ROCALTROL) capsule 0.25 mcg  0.25 mcg Oral DAILY    ergocalciferol capsule 50,000 Units  50,000 Units Oral Q7D    hydrALAZINE (APRESOLINE) 20 mg/mL injection 40 mg  40 mg IntraVENous Q6H PRN       Physical Exam:    Seen in Room 470    General appearance: Chronically ill-appearing patient sitting up In chair- comfortable.     Head: Normocephalic    Lungs: clear to auscultation , no wheezes, no rales, poor air movement    Heart:  No S3 gallop , No pericardial rub. Abdomen: Not distended    Extremities:  Lower extremity edema including  dependent thighs. Neuro : Awake and responding appropriately. Data Review:     LABS:  Recent Labs     07/28/21  0652 07/27/21  1729 07/26/21  0623    139 139   K 5.0 4.9 5.1    108 106   CO2 23 23 24   BUN 95* 95* 99*   CREA 6.83* 6.86* 6.98*   CA 8.5 8.4* 8.3*   ALB 3.2* 3.2* 3.2*   PHOS  --   --  8.2*   Vitamin D2 11.1  Intact   PSA  5.2  Recent Labs     07/28/21  0652 07/27/21  1729   WBC 4.8 4.8   HGB 9.0* 8.8*   HCT 30.9* 29.6*   * 148*   iron saturation 5%    No results for input(s): HÉCTOR, KU, CLU, CREAU in the last 72 hours. No lab exists for component: PROU     Blood Cultures 7-12-21  Two of four bottles have been flagged positive by instrument.  Bottles have been sent to [facility] laboratory to assess for possible growth. Gram Positive Cocci in clusters     Chest x-ray &-28  Chest 2 views.     Comparison chest series July 26, 2021.     No change compared to prior imaging. Patchy lower lobe lung reticular markings  with small dependent pleural effusions persist. Cardiac and mediastinal  structures unchanged. No pneumothorax. CXR 7-26-21  History: Pulmonary edema. Pleural effusions.     Comparison: Including chest 7/23/2021.     Findings: The cardiac silhouette remains enlarged. The lungs are adequately  expanded. There is pulmonary vascular congestion and hydrostatic edema, not  significantly changed as compared to chest 7/23/2021. Small to moderate pleural  effusions and associated bibasilar atelectasis persist.  No pneumothorax is  identified. Degenerative changes are present in the spine.     IMPRESSION  Hydrostatic edema. Pleural effusions and associated bibasilar  atelectasis. No significant interval change.      CXR 7-21-21  Chest single view.     Comparison single view chest July 19, 2021.     Patchy reticular markings through the lungs, same distribution. Those through  lung bases appear less dense; suspect mild degree improved aeration. Cardiac and  mediastinal structures unchanged. No pneumothorax or sizable pleural effusion. CXR 7-19-21     Comparison single view chest July 16, 2021.     Advanced patchy central and basilar reticular markings through the lungs. Consider pneumonia. Cardiac and mediastinal structures magnified on this AP  view. No pneumothorax or sizable pleural effusion. CXR July 14, 2021:  1 view comparison to 7/12     Continued cardiomegaly and congestion. If There is mild interstitial edema, it  is unchanged. Right pleural effusion and adjacent atelectasis have improved. Retroperitoneal US 7-14-21  Left kidney is 12.2 cm and right kidney is 9.1. Right renal cortex is echogenic  but not the left. Multiple cysts on each side, including a large cyst is 7 cm on  the left. No hydronephrosis. Aorta and IVC normals visualized. Prostatic  enlargement and diffuse bladder wall thickening.  Moderate ascites     IMPRESSION  Medical renal disease on the right    Assessment:   Renal Specific Problems    CKD stage IV/V   Acute azotemia-exacerbated by bladder outlet obstruction and relative hypotension with poor perfusion-may be resolving  Hypertension -amlodipine started, blood pressure elevation may be related to volume overload  Volume overload-   Hyperkalemia- with residual plaeural effusions and now lower ext edema  Metabolic acidosis- on bicarb  Acute anemia with significant iron deficiency  Acute left axillary/brachial vein DVT  Vitamin D2 def  Secondary hyperparathyroid  Proteinuria - nephrotic range        Plan:     Obtain/ Order: labs/cultures/radiology/procedures:  renal panel, CBC in AM      PLA2R Ab pending    Therapeutic:    Liberalize diet  Epogen  IV iron  --completed  Cont sodium bicarbonate  bid  Calcitriol and ergocalciferol   PO4 binder-- increased sevelamer to 2 pc  Flomax   Cont lasix to bid to promote diuresis

## 2021-07-29 LAB
ALBUMIN SERPL-MCNC: 3.2 G/DL (ref 3.5–5)
ALBUMIN/GLOB SERPL: 1 {RATIO} (ref 1.1–2.2)
ALP SERPL-CCNC: 99 U/L (ref 45–117)
ALT SERPL-CCNC: 28 U/L (ref 12–78)
ANION GAP SERPL CALC-SCNC: 7 MMOL/L (ref 5–15)
AST SERPL W P-5'-P-CCNC: 20 U/L (ref 15–37)
BASOPHILS # BLD: 0.1 K/UL (ref 0–0.1)
BASOPHILS NFR BLD: 1 % (ref 0–1)
BILIRUB SERPL-MCNC: 0.4 MG/DL (ref 0.2–1)
BUN SERPL-MCNC: 97 MG/DL (ref 6–20)
BUN/CREAT SERPL: 15 (ref 12–20)
CA-I BLD-MCNC: 8.4 MG/DL (ref 8.5–10.1)
CHLORIDE SERPL-SCNC: 110 MMOL/L (ref 97–108)
CO2 SERPL-SCNC: 24 MMOL/L (ref 21–32)
CREAT SERPL-MCNC: 6.64 MG/DL (ref 0.7–1.3)
DIFFERENTIAL METHOD BLD: ABNORMAL
EOSINOPHIL # BLD: 0.4 K/UL (ref 0–0.4)
EOSINOPHIL NFR BLD: 7 % (ref 0–7)
ERYTHROCYTE [DISTWIDTH] IN BLOOD BY AUTOMATED COUNT: 18.8 % (ref 11.5–14.5)
GLOBULIN SER CALC-MCNC: 3.1 G/DL (ref 2–4)
GLUCOSE SERPL-MCNC: 100 MG/DL (ref 65–100)
HCT VFR BLD AUTO: 29.9 % (ref 36.6–50.3)
HGB BLD-MCNC: 8.8 G/DL (ref 12.1–17)
IMM GRANULOCYTES # BLD AUTO: 0 K/UL (ref 0–0.04)
IMM GRANULOCYTES NFR BLD AUTO: 0 % (ref 0–0.5)
LYMPHOCYTES # BLD: 0.3 K/UL (ref 0.8–3.5)
LYMPHOCYTES NFR BLD: 6 % (ref 12–49)
MCH RBC QN AUTO: 28.3 PG (ref 26–34)
MCHC RBC AUTO-ENTMCNC: 29.4 G/DL (ref 30–36.5)
MCV RBC AUTO: 96.1 FL (ref 80–99)
MONOCYTES # BLD: 0.6 K/UL (ref 0–1)
MONOCYTES NFR BLD: 10 % (ref 5–13)
NEUTS SEG # BLD: 4.5 K/UL (ref 1.8–8)
NEUTS SEG NFR BLD: 76 % (ref 32–75)
NRBC # BLD: 0 K/UL (ref 0–0.01)
NRBC BLD-RTO: 0 PER 100 WBC
PLATELET # BLD AUTO: 141 K/UL (ref 150–400)
PMV BLD AUTO: 11.7 FL (ref 8.9–12.9)
POTASSIUM SERPL-SCNC: 5.2 MMOL/L (ref 3.5–5.1)
PROT SERPL-MCNC: 6.3 G/DL (ref 6.4–8.2)
RBC # BLD AUTO: 3.11 M/UL (ref 4.1–5.7)
SODIUM SERPL-SCNC: 141 MMOL/L (ref 136–145)
WBC # BLD AUTO: 5.9 K/UL (ref 4.1–11.1)

## 2021-07-29 PROCEDURE — 74011250637 HC RX REV CODE- 250/637: Performed by: INTERNAL MEDICINE

## 2021-07-29 PROCEDURE — 65270000029 HC RM PRIVATE

## 2021-07-29 PROCEDURE — 85025 COMPLETE CBC W/AUTO DIFF WBC: CPT

## 2021-07-29 PROCEDURE — 94760 N-INVAS EAR/PLS OXIMETRY 1: CPT

## 2021-07-29 PROCEDURE — 36415 COLL VENOUS BLD VENIPUNCTURE: CPT

## 2021-07-29 PROCEDURE — 77010033678 HC OXYGEN DAILY

## 2021-07-29 PROCEDURE — 80053 COMPREHEN METABOLIC PANEL: CPT

## 2021-07-29 PROCEDURE — 94762 N-INVAS EAR/PLS OXIMTRY CONT: CPT

## 2021-07-29 RX ADMIN — TAMSULOSIN HYDROCHLORIDE 0.4 MG: 0.4 CAPSULE ORAL at 09:08

## 2021-07-29 RX ADMIN — SEVELAMER CARBONATE 1600 MG: 800 TABLET, FILM COATED ORAL at 09:08

## 2021-07-29 RX ADMIN — FUROSEMIDE 40 MG: 40 TABLET ORAL at 09:08

## 2021-07-29 RX ADMIN — APIXABAN 2.5 MG: 2.5 TABLET, FILM COATED ORAL at 09:08

## 2021-07-29 RX ADMIN — METOPROLOL TARTRATE 25 MG: 25 TABLET, FILM COATED ORAL at 09:08

## 2021-07-29 RX ADMIN — AMLODIPINE BESYLATE 5 MG: 5 TABLET ORAL at 09:08

## 2021-07-29 RX ADMIN — SEVELAMER CARBONATE 1600 MG: 800 TABLET, FILM COATED ORAL at 17:09

## 2021-07-29 RX ADMIN — SODIUM BICARBONATE 650 MG: 650 TABLET ORAL at 20:39

## 2021-07-29 RX ADMIN — APIXABAN 2.5 MG: 2.5 TABLET, FILM COATED ORAL at 20:39

## 2021-07-29 RX ADMIN — FUROSEMIDE 40 MG: 40 TABLET ORAL at 17:09

## 2021-07-29 RX ADMIN — CALCITRIOL CAPSULES 0.25 MCG 0.25 MCG: 0.25 CAPSULE ORAL at 09:08

## 2021-07-29 RX ADMIN — SODIUM BICARBONATE 650 MG: 650 TABLET ORAL at 09:08

## 2021-07-29 RX ADMIN — SEVELAMER CARBONATE 1600 MG: 800 TABLET, FILM COATED ORAL at 12:33

## 2021-07-29 NOTE — PROGRESS NOTES
[facility]     Renal Daily Progress Note:     Admission Date: 2021     Subjective: patient seen in room 470  Sitting up in chair. Feeling ok,  BP up. Heart rate around up to 94 bpm. Creatinine trending down albeit at a slower pace. Not overtly short of breath at rest.    Not short of breath. No cough. No chest or abdominal pain. Objective:     Visit Vitals  BP (!) 160/89 (BP 1 Location: Right upper arm)   Pulse 94   Temp 97.8 °F (36.6 °C)   Resp 28   Ht 6' (1.829 m)   Wt 98.2 kg (216 lb 7.9 oz)   SpO2 94%   BMI 29.36 kg/m²     Temp (24hrs), Av.6 °F (36.4 °C), Min:97.4 °F (36.3 °C), Max:97.8 °F (36.6 °C)        Intake/Output Summary (Last 24 hours) at 2021 1219  Last data filed at 2021 0932  Gross per 24 hour   Intake 240 ml   Output 1250 ml   Net -1010 ml     Current Facility-Administered Medications   Medication Dose Route Frequency    furosemide (LASIX) tablet 40 mg  40 mg Oral BID    sodium bicarbonate tablet 650 mg  650 mg Oral BID    amLODIPine (NORVASC) tablet 5 mg  5 mg Oral DAILY    apixaban (ELIQUIS) tablet 2.5 mg  2.5 mg Oral BID    sevelamer carbonate (RENVELA) tab 1,600 mg  1,600 mg Oral TID WITH MEALS    0.9% sodium chloride infusion 250 mL  250 mL IntraVENous PRN    0.9% sodium chloride infusion 250 mL  250 mL IntraVENous PRN    metoprolol tartrate (LOPRESSOR) tablet 25 mg  25 mg Oral DAILY    tamsulosin (FLOMAX) capsule 0.4 mg  0.4 mg Oral DAILY    [Held by provider] hydrALAZINE (APRESOLINE) tablet 50 mg  50 mg Oral TID    0.9% sodium chloride infusion 250 mL  250 mL IntraVENous PRN    calcitRIOL (ROCALTROL) capsule 0.25 mcg  0.25 mcg Oral DAILY    ergocalciferol capsule 50,000 Units  50,000 Units Oral Q7D    hydrALAZINE (APRESOLINE) 20 mg/mL injection 40 mg  40 mg IntraVENous Q6H PRN       Physical Exam:    Seen in Room 470    General appearance: Chronically ill-appearing patient sitting up In chair- comfortable.     Head: Normocephalic    Lungs: clear to auscultation , no wheezes, no rales, poor air movement    Heart:  No S3 gallop , No pericardial rub. Abdomen: Not distended    Extremities:  Lower extremity edema including  dependent thighs. Neuro : Awake and responding appropriately. Data Review:     LABS:  Recent Labs     07/28/21  0652 07/27/21  1729    139   K 5.0 4.9    108   CO2 23 23   BUN 95* 95*   CREA 6.83* 6.86*   CA 8.5 8.4*   ALB 3.2* 3.2*   Vitamin D2 11.1  Intact   PSA  5.2  Recent Labs     07/28/21  0652 07/27/21  1729   WBC 4.8 4.8   HGB 9.0* 8.8*   HCT 30.9* 29.6*   * 148*   iron saturation 5%    No results for input(s): HÉCTOR, KU, CLU, CREAU in the last 72 hours. No lab exists for component: PROU     Blood Cultures 7-12-21  Two of four bottles have been flagged positive by instrument.  Bottles have been sent to [facility] laboratory to assess for possible growth. Gram Positive Cocci in clusters     Chest x-ray &-28  Chest 2 views.     Comparison chest series July 26, 2021.     No change compared to prior imaging. Patchy lower lobe lung reticular markings  with small dependent pleural effusions persist. Cardiac and mediastinal  structures unchanged. No pneumothorax. CXR 7-26-21  History: Pulmonary edema. Pleural effusions.     Comparison: Including chest 7/23/2021.     Findings: The cardiac silhouette remains enlarged. The lungs are adequately  expanded. There is pulmonary vascular congestion and hydrostatic edema, not  significantly changed as compared to chest 7/23/2021. Small to moderate pleural  effusions and associated bibasilar atelectasis persist.  No pneumothorax is  identified. Degenerative changes are present in the spine.     IMPRESSION  Hydrostatic edema. Pleural effusions and associated bibasilar  atelectasis. No significant interval change. CXR 7-21-21  Chest single view.     Comparison single view chest July 19, 2021.     Patchy reticular markings through the lungs, same distribution. Those through  lung bases appear less dense; suspect mild degree improved aeration. Cardiac and  mediastinal structures unchanged. No pneumothorax or sizable pleural effusion. CXR 7-19-21     Comparison single view chest July 16, 2021.     Advanced patchy central and basilar reticular markings through the lungs. Consider pneumonia. Cardiac and mediastinal structures magnified on this AP  view. No pneumothorax or sizable pleural effusion. CXR July 14, 2021:  1 view comparison to 7/12     Continued cardiomegaly and congestion. If There is mild interstitial edema, it  is unchanged. Right pleural effusion and adjacent atelectasis have improved. Retroperitoneal US 7-14-21  Left kidney is 12.2 cm and right kidney is 9.1. Right renal cortex is echogenic  but not the left. Multiple cysts on each side, including a large cyst is 7 cm on  the left. No hydronephrosis. Aorta and IVC normals visualized. Prostatic  enlargement and diffuse bladder wall thickening.  Moderate ascites     IMPRESSION  Medical renal disease on the right    Assessment:   Renal Specific Problems    CKD stage IV/V   Acute azotemia-exacerbated by bladder outlet obstruction and relative hypotension with poor perfusion-may be resolving  Hypertension -amlodipine started, blood pressure elevation may be related to volume overload  Volume overload-   Hyperkalemia- with residual plaeural effusions and now lower ext edema  Metabolic acidosis- on bicarb  Acute anemia with significant iron deficiency  Acute left axillary/brachial vein DVT  Vitamin D2 def  Secondary hyperparathyroid  Proteinuria - nephrotic range        Plan:     Obtain/ Order: labs/cultures/radiology/procedures:  renal panel, CBC in AM      PLA2R Ab pending    Therapeutic:      Epogen  IV iron  --completed  Cont sodium bicarbonate  bid  Calcitriol and ergocalciferol   PO4 binder-- increased sevelamer to 2 pc  Flomax   Cont lasix to bid to promote diuresis

## 2021-07-29 NOTE — PROGRESS NOTES
Patient resting in recliner without distress; patient encouraged to keep legs elevated and continue to mobilize periodically to reduce edema in lower extremities; patient VS within defined parameters with exception of elevated BP; will continue to monitor.

## 2021-07-29 NOTE — PROGRESS NOTES
Progress Note      7/29/2021 9:54 AM  NAME: Zina Diez   MRN:  [id number]   Admit Diagnosis: Pulmonary edema [J81.1]      Subjective:   Chart reviewed. Patient has had a symptomatic ventricular tachycardia and was shocked. Vascular surgery note appreciated. Echocardiogram results explained. He feels much better today. Patient was offered option of cardiac catheterization but he has decided not to pursue that. Review of Systems:    Symptom Y/N Comments  Symptom Y/N Comments   Fever/Chills n   Chest Pain n    Poor Appetite    Edema y    Cough    Abdominal Pain     Sputum    Joint Pain n    SOB/CARMONA n   Pruritis/Rash     Nausea/vomit    Other     Diarrhea         Constipation           Could NOT obtain due to:      Objective:          Physical Exam:    Last 24hrs VS reviewed since prior progress note. Most recent are:    Visit Vitals  BP (!) 160/89 (BP 1 Location: Right upper arm)   Pulse 94   Temp 97.8 °F (36.6 °C)   Resp 28   Ht 6' (1.829 m)   Wt 98.2 kg (216 lb 7.9 oz)   SpO2 94%   BMI 29.36 kg/m²       Intake/Output Summary (Last 24 hours) at 7/29/2021 1212  Last data filed at 7/29/2021 0932  Gross per 24 hour   Intake 240 ml   Output 1250 ml   Net -1010 ml        General Appearance: Well developed, well nourished, alert & oriented x 3,    no acute distress. Ears/Nose/Mouth/Throat: Hearing grossly normal.  Neck: Supple. Chest: Lungs clear to auscultation bilaterally. Cardiovascular: Regular rate and rhythm, S1,S2 normal, no murmur. Abdomen: Soft, non-tender, bowel sounds are active. Extremities: Lower extremity edema left upper extremity edema evident  Skin: Lower extremity blisters evident    []         Post-cath site without hematoma, bruit, tenderness, or thrill. Distal pulses intact.     PMH/SH reviewed - no change compared to H&P    Data Review    Telemetry: Patient had a ventricular tachycardia and was shocked and had episode of atrial fibrillation and now is in sinus rhythm with PVCs.    EKG:   []  No new EKG for review    Lab Data Personally Reviewed:    Recent Labs     07/28/21  0652 07/27/21  1729   WBC 4.8 4.8   HGB 9.0* 8.8*   HCT 30.9* 29.6*   * 148*     No results for input(s): INR, PTP, APTT, INREXT, INREXT in the last 72 hours. Recent Labs     07/28/21  0652 07/27/21  1729    139   K 5.0 4.9    108   CO2 23 23   BUN 95* 95*   CREA 6.83* 6.86*   * 98   CA 8.5 8.4*     No results for input(s): CPK, CKNDX, TROIQ in the last 72 hours. No lab exists for component: CPKMB  No results found for: CHOL, CHOLX, CHLST, CHOLV, HDL, HDLP, LDL, LDLC, DLDLP, Brenda Dustman, CHHD, CHHDX    Recent Labs     07/28/21  0652 07/27/21  1729   AP 91 93   TP 6.4 6.3*   ALB 3.2* 3.2*   GLOB 3.2 3.1     No results for input(s): PH, PCO2, PO2 in the last 72 hours. Medications Personally Reviewed:    Current Facility-Administered Medications   Medication Dose Route Frequency    furosemide (LASIX) tablet 40 mg  40 mg Oral BID    sodium bicarbonate tablet 650 mg  650 mg Oral BID    amLODIPine (NORVASC) tablet 5 mg  5 mg Oral DAILY    apixaban (ELIQUIS) tablet 2.5 mg  2.5 mg Oral BID    sevelamer carbonate (RENVELA) tab 1,600 mg  1,600 mg Oral TID WITH MEALS    0.9% sodium chloride infusion 250 mL  250 mL IntraVENous PRN    0.9% sodium chloride infusion 250 mL  250 mL IntraVENous PRN    metoprolol tartrate (LOPRESSOR) tablet 25 mg  25 mg Oral DAILY    tamsulosin (FLOMAX) capsule 0.4 mg  0.4 mg Oral DAILY    [Held by provider] hydrALAZINE (APRESOLINE) tablet 50 mg  50 mg Oral TID    0.9% sodium chloride infusion 250 mL  250 mL IntraVENous PRN    calcitRIOL (ROCALTROL) capsule 0.25 mcg  0.25 mcg Oral DAILY    ergocalciferol capsule 50,000 Units  50,000 Units Oral Q7D    hydrALAZINE (APRESOLINE) 20 mg/mL injection 40 mg  40 mg IntraVENous Q6H PRN           Problem List:   Acute hypoxic respiratory failure and patient seems to be somewhat better. Severe anemia. Renal failure. Heart failure. Left upper extremity DVT. Minimal elevation of troponin I is probably not a presentation of myocardial infarction. Patient has had ventricular tachycardia and has been shocked. 1.      Assessment/Plan:   Patient was scheduled for cardiac catheterization but he says he does not want to go for catheterization. We will follow nephrology recommendations and vascular surgery recommendations. Probably physical therapy is of value. I will continue otherwise present care. Thank you. 1.          []       High complexity decision making was performed in this patient at high risk for decompensation with multiple organ involvement.     Milagros Patel MD

## 2021-07-29 NOTE — PROGRESS NOTES
Progress Note    Win Burciaga MD             Daily Progress Note: 7/29/2021      Subjective: The patient is seen for follow  up. Patient is sitting in the chair. According to him today he has not walked yet so he does not know whether he is comfortable in standing up by himself as well as walking around without getting short of breath. Plan is if he is not able to go to skilled care unit when he is capable of at least standing up by himself with the help of the walker he is able to walk then would like to discharge of course it will be without fear of fall. As patient has been on Eliquis. He has bilateral upper limb edema right upper limb is swollen also today.   Bilateral lower limb edema also  Problem List:  Problem List as of 7/29/2021 Never Reviewed        Codes Class Noted - Resolved    Pulmonary edema ICD-10-CM: J81.1  ICD-9-CM: 570  7/13/2021 - Present        GI bleed ICD-10-CM: K92.2  ICD-9-CM: 578.9  1/5/2021 - Present              Medications reviewed  Current Facility-Administered Medications   Medication Dose Route Frequency    furosemide (LASIX) tablet 40 mg  40 mg Oral BID    sodium bicarbonate tablet 650 mg  650 mg Oral BID    amLODIPine (NORVASC) tablet 5 mg  5 mg Oral DAILY    apixaban (ELIQUIS) tablet 2.5 mg  2.5 mg Oral BID    sevelamer carbonate (RENVELA) tab 1,600 mg  1,600 mg Oral TID WITH MEALS    0.9% sodium chloride infusion 250 mL  250 mL IntraVENous PRN    0.9% sodium chloride infusion 250 mL  250 mL IntraVENous PRN    metoprolol tartrate (LOPRESSOR) tablet 25 mg  25 mg Oral DAILY    tamsulosin (FLOMAX) capsule 0.4 mg  0.4 mg Oral DAILY    [Held by provider] hydrALAZINE (APRESOLINE) tablet 50 mg  50 mg Oral TID    0.9% sodium chloride infusion 250 mL  250 mL IntraVENous PRN    calcitRIOL (ROCALTROL) capsule 0.25 mcg  0.25 mcg Oral DAILY    ergocalciferol capsule 50,000 Units  50,000 Units Oral Q7D    hydrALAZINE (APRESOLINE) 20 mg/mL injection 40 mg  40 mg IntraVENous Q6H PRN       Review of Systems:   A comprehensive review of systems was negative except for that written in the HPI. Objective:   Physical Exam:     Visit Vitals  BP (!) 160/89 (BP 1 Location: Right upper arm)   Pulse 94   Temp 97.8 °F (36.6 °C)   Resp 28   Ht 6' (1.829 m)   Wt 98.2 kg (216 lb 7.9 oz)   SpO2 94%   BMI 29.36 kg/m²    O2 Flow Rate (L/min): 2 l/min O2 Device: None (Room air)    Temp (24hrs), Av.6 °F (36.4 °C), Min:97.4 °F (36.3 °C), Max:97.8 °F (36.6 °C)    701 - 1900  In: 240 [P.O.:240]  Out: 450 [Urine:450]   1901 -  07  In: -   Out: 1875 [[id number]]    General:  Alert, cooperative, no distress, appears stated age. Lungs:   Clear to auscultation bilaterally. Chest wall:  No tenderness or deformity. Heart:  Regular rate and rhythm, S1, S2 normal, no murmur, click, rub or gallop. Abdomen:   Soft, non-tender. Bowel sounds normal. No masses,  No organomegaly. Extremities:  Edema on all 4 extremities   Pulses: 2+ and symmetric all extremities. Skin: Skin color, texture, turgor normal. No rashes or lesions   Neurologic: CNII-XII intact. No gross sensory or motor deficits     Data Review:       Recent Days:  Recent Labs     21  0652 21  1729   WBC 4.8 4.8   HGB 9.0* 8.8*   HCT 30.9* 29.6*   * 148*     Recent Labs     21  0652 21  1729    139   K 5.0 4.9    108   CO2 23 23   * 98   BUN 95* 95*   CREA 6.83* 6.86*   CA 8.5 8.4*   ALB 3.2* 3.2*   TBILI 0.4 0.4   ALT 25 25     No results for input(s): PH, PCO2, PO2, HCO3, FIO2 in the last 72 hours. Results     ** No results found for the last 336 hours. **         24 Hour Results:  No results found for this or any previous visit (from the past 24 hour(s)). XR CHEST PA LAT   Final Result      XR CHEST PA LAT   Final Result   Hydrostatic edema. Pleural effusions and associated bibasilar   atelectasis. No significant interval change.       XR CHEST PORT Final Result   There is continued moderate pulmonary interstitial and alveolar   edema with a slight interval improvement within the right upper lobe. The   remainder of this examination is unchanged. XR CHEST PORT   Final Result      XR CHEST PORT   Final Result      LOWER EXT ART PVR MULT LEVEL SEG PRESSURES   Final Result      DUPLEX LOWER EXT VENOUS BILAT   Final Result      XR CHEST PORT   Final Result   Findings/impression: Stable cardiomediastinal silhouette. Similar central   pulmonary vascular congestion and bilateral patchy airspace opacities. No   significant pleural effusion. No pneumothorax. Findings are not significantly changed. XR CHEST PORT   Final Result      US RETROPERITONEUM COMP   Final Result   Medical renal disease on the right      DUPLEX UPPER EXT VENOUS LEFT   Final Result      XR CHEST PORT   Final Result   Findings/impression:      Cardiac silhouette is enlarged. Central vascular congestion and patchy central   airspace disease/edema. Suspect trace right pleural effusion. No evidence of pneumothorax. No acute osseous abnormality identified. Assessment: Acute flash pulmonary edema now resolved  Ventricular tachycardia with hypotension  Severe anemia now stable  Acute on chronic renal failure now renal function is stabilized  Hypertension        Plan: I would like to discharge patient first preference is inpatient rehab so he can get his strength back without falling. If not if he is confident enough to go home without the fear of fall with home health physical therapy which [facility] has accepted him. Care Plan discussed with: Patient/Family as well as Argenis Washington charge nurse    Total time spent with patient: 30 minutes.     Abner Pressley MD

## 2021-07-29 NOTE — PROGRESS NOTES
Pulmonary, Critical Care    Name: Jacobo Uribe MRN: [id number]   : 1955 Hospital: [address] [place] [address]   Date: 2021  Admission date: 2021 Hospital Day:        Subjective/Interval History:   Seen in the emergency room wearing noninvasive ventilator. Patient has underlying renal insufficiency developed worsening shortness of breath cough presented to the emergency room chest x-ray shows pulmonary edema. He was profoundly hypoxic is now on noninvasive ventilator and feels more comfortable. Has not had any significant urine output since he has been in the ER. He also complains of new onset left arm swelling   post void bladder scan showed greater than 200 cc urine retention and Quesada catheter placed with 500 cc removed. Overnight he has put out an additional 1200 cc. Respirations improved currently nonlabored on nasal oxygen. Duplex scan of the left arm did show left axillary and proximal brachial DVT and heparin was started. On heparin with Quesada catheter and he has had slight bleeding at the urethral meatus. Ultrasound of the abdomen is pending  7/15 ultrasound of the abdomen showed medical renal disease on the right with small kidney no hydronephrosis there was evidence of prostate enlargement. He is breathing easily at this point and on room air is running on oxygen saturation of 93%   respirations nonlabored on nasal oxygen. Quesada catheter is out he states he has been voiding frequent small amounts without straining   no specific complaints breathing easily. Overnight oximetry showed minimal but significant desaturation while on room air 8 minutes 40 seconds with low of 71%  . Developed V. tach this morning given IV amiodarone IV magnesium and transferred to the unit. Currently heart rate  alternating between A. fib and sinus with PAC. He denies any discomfort is breathing easily   remains sinus rhythm with PVCs.   Had worsening hypoxia during the night and placed on oxygen he feels comfortable at this point. Urine output mildly improved yesterday but input remains greater than output  7/24 no specific complaints respirations nonlabored currently room air with saturation 97% Lasix been resumed 7/23. Urine output not accurately recorded but he states he has been passing a lot of urine  7/25 feels fine denies shortness of breath on room air. Oxygen saturation 97% on room air while awake. He states he is putting out good urine from his oral Lasix although its not being recorded in the chart  7/27 overnight oximetry continues to show nocturnal hypoxia will continue nocturnal oxygen 7/28  no one put oxygen on him last night. Good urine output recorded yesterday 1500 cc but chest x-ray today continues to show pulmonary edema and small effusions  7/29 overnight oximetry continues to show desaturation on room air with well-maintained saturation on 2 L. Patient Active Problem List   Diagnosis Code    GI bleed K92.2    Pulmonary edema J81.1       IMPRESSION:   1. Ventricular tachycardia none further seen   2. Atrial fibrillation converted to sinus  3. Hypotension corrected   4. Acute hypoxic respiratory failure room air oxygen saturation maintained while awake  5. Chronic hypoxic respiratory failure overnight oximetry continues to show desaturation at night  6. Acute pulmonary edema radiographically no change in/28  7. 2 of 4 blood culture bottles with coagulase-negative staph aureus likely skin contaminant   8. Bilateral pleural effusions no change on 7/28 x-ray  9. Acute on chronic kidney disease creatinine  stable  10. Obstructive uropathy Quesada catheter has been removed with no residual urine on bladder scanning  11. Severe hypertension corrected   12. DVT left axillary and brachial now on Eliquis  13. Anemia hemoglobin improved after last transfusion 7/21  14.  Troponin leak likely due to renal insufficiency stable has not risen any further  Body mass index is 29.36 kg/m². RECOMMENDATIONS/PLAN:     1. Remains in sinus rhythm  2. Blood pressure well controlled now on metoprolol low-dose and Norvasc  3. Continue Flomax for prostate enlargement   4. Repeat chest x-ray with continued fluid overload  5. Subjective/Initial History:       I was asked by Kami Pearson MD to see Mary Ann Perez a 76 y.o.  male  in consultation for a chief complaint of shortness of breath pulmonary edema acute hypoxic respiratory failure        Patient PCP: Olivia Hooper MD  PMH:  has a past medical history of Chronic kidney disease and Hypertension. PSH:   has no past surgical history on file. FHX: family history is not on file. SHX:  reports that he has never smoked. He has never used smokeless tobacco.    Systemic review somewhat limited as he is on noninvasive ventilator remains short of breath although states he is feeling better  General he has not noted any worsening edema of his upper legs fever chills or sweats does complain of new onset swelling of his left hand and arm  Eyes no double vision or momentary blindness  ENT no facial pain over the sinuses  Endocrinologic no polyuria polydipsia  Musculoskeletal no swollen tender joints  Neurologic no history seizures or syncope  Gastrointestinal no nausea vomiting acid indigestion  Genitourinary states he does still pass fair amount of urine each day has not noted any decrease in that.   Does not have to strain to urinate has no bleeding or drainage  Cardiovascular he is not aware of any underlying heart disease has not had chest pain diaphoresis has had worsening shortness of breath laying down and with exertion  Respiratory worsening shortness of breath over the last week or more no significant cough no sputum production no chills or sweats did have history COVID-19 pneumonitis January of this year    No Known Allergies   MEDS:   Current Facility-Administered Medications   Medication  furosemide (LASIX) tablet 40 mg    sodium bicarbonate tablet 650 mg    amLODIPine (NORVASC) tablet 5 mg    apixaban (ELIQUIS) tablet 2.5 mg    sevelamer carbonate (RENVELA) tab 1,600 mg    0.9% sodium chloride infusion 250 mL    0.9% sodium chloride infusion 250 mL    metoprolol tartrate (LOPRESSOR) tablet 25 mg    tamsulosin (FLOMAX) capsule 0.4 mg    [Held by provider] hydrALAZINE (APRESOLINE) tablet 50 mg    0.9% sodium chloride infusion 250 mL    calcitRIOL (ROCALTROL) capsule 0.25 mcg    ergocalciferol capsule 50,000 Units    hydrALAZINE (APRESOLINE) 20 mg/mL injection 40 mg        Current Facility-Administered Medications:     furosemide (LASIX) tablet 40 mg, 40 mg, Oral, BID, Maria Esther Cyr MD, 40 mg at 07/29/21 0908    sodium bicarbonate tablet 650 mg, 650 mg, Oral, BID, Maria Esther Cyr MD, 650 mg at 07/29/21 0908    amLODIPine (NORVASC) tablet 5 mg, 5 mg, Oral, DAILY, Layla Mcintosh MD, 5 mg at 07/29/21 0908    apixaban (ELIQUIS) tablet 2.5 mg, 2.5 mg, Oral, BID, Estefany Bedolla MD, 2.5 mg at 07/29/21 0908    sevelamer carbonate (RENVELA) tab 1,600 mg, 1,600 mg, Oral, TID WITH MEALS, Maria Esther Cyr MD, 1,600 mg at 07/29/21 0908    0.9% sodium chloride infusion 250 mL, 250 mL, IntraVENous, PRN, Estefany Bedolla MD    0.9% sodium chloride infusion 250 mL, 250 mL, IntraVENous, PRN, Estefany Bedolla MD    metoprolol tartrate (LOPRESSOR) tablet 25 mg, 25 mg, Oral, DAILY, Maria Esther Cyr MD, 25 mg at 07/29/21 0908    tamsulosin (FLOMAX) capsule 0.4 mg, 0.4 mg, Oral, DAILY, Sahil Graf MD, 0.4 mg at 07/29/21 0908    [Held by provider] hydrALAZINE (APRESOLINE) tablet 50 mg, 50 mg, Oral, TID, Sahil Graf MD, 50 mg at 07/18/21 2054    0.9% sodium chloride infusion 250 mL, 250 mL, IntraVENous, PRN, Sahil Graf MD    calcitRIOL (ROCALTROL) capsule 0.25 mcg, 0.25 mcg, Oral, DAILY, Maria Esther Cyr MD, 0.25 mcg at 07/29/21 0908    ergocalciferol capsule 50,000 Units, 50,000 Units, Oral, Q7D, Mark Callahan MD, 50,000 Units at 21 1626    hydrALAZINE (APRESOLINE) 20 mg/mL injection 40 mg, 40 mg, IntraVENous, Q6H PRN, Shayan Duval MD, 40 mg at 07/15/21 1552      Objective:     Vital Signs: Telemetry:    normal sinus rhythm Intake/Output:   Visit Vitals  BP (!) 160/89 (BP 1 Location: Right upper arm)   Pulse 94   Temp 97.8 °F (36.6 °C)   Resp 28   Ht 6' (1.829 m)   Wt 98.2 kg (216 lb 7.9 oz)   SpO2 94%   BMI 29.36 kg/m²       Temp (24hrs), Av.6 °F (36.4 °C), Min:97.4 °F (36.3 °C), Max:97.8 °F (36.6 °C)        O2 Device: None (Room air) O2 Flow Rate (L/min): 2 l/min         Body mass index is 29.36 kg/m². Wt Readings from Last 4 Encounters:   21 98.2 kg (216 lb 7.9 oz)   21 79.4 kg (175 lb)          Intake/Output Summary (Last 24 hours) at 2021 1209  Last data filed at 2021 0932  Gross per 24 hour   Intake 240 ml   Output 1250 ml   Net -1010 ml       Last shift:      701 -  190  In: 240 [P.O.:240]  Out: 450 [Urine:450]  Last 3 shifts: 1901 -  0700  In: -   Out: 4607 [Urine:1875]       Hemodynamics:    CO:    CI:    CVP:    SVR:   PAP Systolic:    PAP Diastolic:    PVR:    XB53:       Ventilator Settings:      Mode Rate TV Press PEEP FiO2 PIP Min. Vent               28 %     9.7 l/min      Physical Exam:    General:  male; no distress now on room air  HEENT: NCAT, visible oral mucosa is moist and clear  Eyes: anicteric; conjunctiva clear extraocular movements intact  Neck: no nodes,,no accessory MM use. no respiratory distress  Chest: no deformity,   Cardiac: Regular rhythm and rate now controlled  Lungs: distant breath sounds; clear anteriorly and laterally with rales in the bases  Abd: Soft positive bowel sounds no tenderness  Ext: Improvement in edema  of the lower extremities with continued edema of the left arm; no joint swelling;  No clubbing  : Clear urine  Neuro: Alert awake no distress speech is clear moves all 4 extremities  Psych-calm, oriented to person;   Skin: warm, dry, no cyanosis;   Pulses: Brachial and radial pulses intact  Capillary:slow capillary refill      Labs:    Recent Labs     07/28/21  0652 07/27/21  1729    139   K 5.0 4.9    108   CO2 23 23   * 98   BUN 95* 95*   CREA 6.83* 6.86*   CA 8.5 8.4*   ALB 3.2* 3.2*   ALT 25 25   7/29 overnight oximetry with low oxygen saturation 44% with 52 minutes 40 seconds below 88%   7/27  overnight oximetry split study with a low oxygen saturation 75% 33 minutes 10 seconds below 88%   7/25 overnight oximetry with 1 hour 13 minutes below 88% with low oxygen saturation 56%  7/19 overnight oximetry shows 14 minutes 26 seconds below 88% with low oxygen saturation 75%  7/17 overnight oximetry shows low oxygen saturation 71% with 8 minutes 40 seconds below 88% from 1 AM to 6 AM   7/16 overnight oximetry on 1 L shows only 2 minutes 20 seconds below 88% with a low oxygen saturation of 83%  7/15 room air oxygen saturation 93%  currently on noninvasive ventilator inspiratory pressure 16 expiratory pressure six rate 16 FiO2 28% with oxygen saturation 96%   BNP 32,264, previous greater than 35,000  Lab Results   Component Value Date/Time    Culture result:  07/12/2021 10:40 PM     Two of four bottles have been flagged positive by instrument. Bottles have been sent to [facility] laboratory to assess for possible growth.  Gram Positive Cocci in clusters CALLED TO AND READ BACK BY Waylon Isaacs r.n. [phone] 07/14/2021 by [email or web address]    Culture result:  07/12/2021 10:40 PM     Staphylococcus species, coagulase negative GROWING IN THE 1ST OF 4 BOTTLES DRAWN    Culture result:  07/12/2021 10:40 PM     Staphylococcus species, coagulase negative (2nd colony type/strain) GROWING IN THE 2ND OF 4 BOTTLES DRAWN        Imaging:  I have personally reviewed the patients radiographs and have reviewed the reports:    CXR Results  (Last 48 hours)  7/23 chest x-ray mild pulmonary congestion tiny effusions unchanged from last x-ray although right upper lobe lung may have slightly less congestion  7/21 chest x-ray with continued mild pulmonary edema pattern small bilateral pleural effusions there may be improved inspiratory effort  7/16 chest x-ray with continued mild pulmonary edema and small pleural effusions unchanged               07/12/21 2251  XR CHEST PORT Final result    Impression:  Findings/impression:       Cardiac silhouette is enlarged. Central vascular congestion and patchy central   airspace disease/edema. Suspect trace right pleural effusion. No evidence of pneumothorax. No acute osseous abnormality identified. Narrative:  Study: XR CHEST PORT       Clinical indication: sob       Comparison: None. To me chest x-ray consistent with cardiomegaly pulmonary edema and bilateral pleural effusions           Results from Hospital Encounter encounter on 07/12/21    XR CHEST PA LAT    Narrative  Chest 2 views. Comparison chest series July 26, 2021. No change compared to prior imaging. Patchy lower lobe lung reticular markings  with small dependent pleural effusions persist. Cardiac and mediastinal  structures unchanged. No pneumothorax. XR CHEST PA LAT    Narrative  Chest, 2 views, 7/26/2021    History: Pulmonary edema. Pleural effusions. Comparison: Including chest 7/23/2021. Findings: The cardiac silhouette remains enlarged. The lungs are adequately  expanded. There is pulmonary vascular congestion and hydrostatic edema, not  significantly changed as compared to chest 7/23/2021. Small to moderate pleural  effusions and associated bibasilar atelectasis persist.  No pneumothorax is  identified. Degenerative changes are present in the spine. Impression  Hydrostatic edema. Pleural effusions and associated bibasilar  atelectasis. No significant interval change.       XR CHEST PORT    Narrative  This study is a portable AP radiograph of the chest dated 7/23/2021 obtained at  2:52 AM.    HISTORY: Pulmonary edema. COMPARISON: 7/21/2021. FINDINGS: There is moderate enlargement of the cardiac silhouette. There is  associated distention of the pulmonary vessels and perivascular ill definition  consistent with interstitial edema. There also is perivascular airspace disease  compatible with alveolar edema. There has been a decrease in the alveolar edema  within the right upper lobe. This examination is negative for larger pleural effusions. The remainder of the examination is unchanged. Impression  There is continued moderate pulmonary interstitial and alveolar  edema with a slight interval improvement within the right upper lobe. The  remainder of this examination is unchanged. Results from [place] encounter on 01/05/21    CT CHEST WO CONT    Narrative  Images obtained without contrast include the abdomen and pelvis. Comparison portable chest radiograph earlier today. Dose Reduction:  All CT scans at this facility are performed using dose reduction optimization  techniques as appropriate to a performed exam including the following: Automated  exposure control, adjustments of the mA and/or kV according to patient size, or  use of iterative reconstruction technique. Subtle peripheral groundglass densities are noted in both lungs, could reflect  Covid pneumonia or other. No pneumothorax or pneumomediastinum. The radiographic findings suspicious for  pneumomediastinum probably was artifact, perhaps related to cardiac motion. No pleural effusion or adenopathy. Cardiomegaly again noted. Impression  IMPRESSION:  1. No pneumomediastinum or pneumothorax. 2. Cardiomegaly. 3. Subtle groundglass densities in both lungs might be pneumonia or other. Discussion patient with worsening renal insufficiency has developed pulmonary edema acute hypoxic respiratory failure. He states he still has urine output normally at home.   We will give him high-dose IV Lasix to see if diuresis is induced. He very likely will need dialysis. He has minimal elevation in troponin probably troponin leak from hypoxia or just due to his renal insufficiency. He is not having any chest pain. Does have severe hypertension. Has been started on clonidine and hydralazine. 7/14 no distress today on nasal oxygen. Did have residual on post void bladder scan and Quesada catheter was placed with good diuresis overnight. Despite this potassium remains elevated. We will give 1 dose of Kayexalate. Despite diuresis hemoglobin is dropped to 6.7 will transfuse. He denies frequency small-volume urine or straining to urinate at home despite this with signs of obstruction we will add Flomax. Potassium 6 today we will give 1 dose of Kayexalate and continue diuresis  7/16 respirations nonlabored. Was placed back on 1 L nasal oxygen yesterday overnight oximetry shows well-maintained oxygen saturation. Will check overnight tonight on and off oxygen. Quesada catheter has been removed. Chest x-ray continues to show mild pulmonary edema  7/17 overnight oximetry shows desaturation when on room air. Will maintain oxygen and repeat overnight oximetry Ibrahima night  7/19 developed V. tach overnight and now in the ICU on IV amiodarone with rhythm showing alternating sinus with PAC and A. fib. Rate . Overnight oximetry continued to show desaturation on room air and he is back on nasal oxygen. He has no specific complaint  7/20 now in sinus rhythm with frequent PVCs. Cardiology is recommending cardiac cath but he is reluctant to undergo that. Creatinine has worsened despite IV fluid administration yesterday although blood pressure is improved.   Currently oxygen saturation well-maintained on room air but last overnight oximetry showed desaturation will maintain oxygen at 1 L for now  7/21 had hypoxia yesterday afternoon and placed back on oxygen during the daytime as well as the night. He feels comfortable at this point. Chest x-ray is unchanged still has mild pulmonary edema with small effusions. It does appear that he took a deeper breath today. Creatinine remains markedly elevated. He is on heparin for left axillary DVT. Holding switching to oral anticoagulation until sure no instrumentation is needed   7/23 unchanged remains on IV heparin. Oral Lasix being resumed did require transfusion 7/21. Repeat chest x-ray no worsening  7/26 chest x-ray unchanged pulmonary edema and bilateral effusions. He states he has good urine output with the current dose of Lasix. We will repeat overnight oximetry on and off oxygen   7/27 continued nocturnal hypoxia he will need home oxygen  7/28 repeat overnight oximetry to keep him qualified for home oxygen  7/29 repeat overnight oximetry with low oxygen saturation 44% with 52 minutes 40 seconds below 88%. Continues to require nocturnal oxygen           Thank you for allowing us to participate in the care of this patient.   We will follow along with you     Jonathan Macias MD

## 2021-07-29 NOTE — PROGRESS NOTES
Problem: Falls - Risk of  Goal: *Absence of Falls  Description: Document Umbarger Logan Fall Risk and appropriate interventions in the flowsheet. Outcome: Progressing Towards Goal  Note: Fall Risk Interventions:  Mobility Interventions: Assess mobility with egress test, Bed/chair exit alarm, Communicate number of staff needed for ambulation/transfer         Medication Interventions: Assess postural VS orthostatic hypotension, Bed/chair exit alarm, Evaluate medications/consider consulting pharmacy    Elimination Interventions: Bed/chair exit alarm, Call light in reach              Problem: Patient Education: Go to Patient Education Activity  Goal: Patient/Family Education  Outcome: Progressing Towards Goal     Problem: Pressure Injury - Risk of  Goal: *Prevention of pressure injury  Description: Document Alfa Scale and appropriate interventions in the flowsheet. Outcome: Progressing Towards Goal  Note: Pressure Injury Interventions:  Sensory Interventions: Assess changes in LOC, Check visual cues for pain, Keep linens dry and wrinkle-free, Maintain/enhance activity level, Minimize linen layers, Turn and reposition approx.  every two hours (pillows and wedges if needed)    Moisture Interventions: Absorbent underpads, Check for incontinence Q2 hours and as needed    Activity Interventions: Assess need for specialty bed, Chair cushion, Increase time out of bed, PT/OT evaluation, Pressure redistribution bed/mattress(bed type)    Mobility Interventions: Assess need for specialty bed, HOB 30 degrees or less, Pressure redistribution bed/mattress (bed type)    Nutrition Interventions: Document food/fluid/supplement intake    Friction and Shear Interventions: Apply protective barrier, creams and emollients, HOB 30 degrees or less                Problem: Patient Education: Go to Patient Education Activity  Goal: Patient/Family Education  Outcome: Progressing Towards Goal     Problem: Patient Education: Go to Patient Education Activity  Goal: Patient/Family Education  Outcome: Progressing Towards Goal     Problem: Patient Education: Go to Patient Education Activity  Goal: Patient/Family Education  Outcome: Progressing Towards Goal

## 2021-07-30 LAB
ALBUMIN SERPL-MCNC: 3.3 G/DL (ref 3.5–5)
ANION GAP SERPL CALC-SCNC: 6 MMOL/L (ref 5–15)
BUN SERPL-MCNC: 97 MG/DL (ref 6–20)
BUN/CREAT SERPL: 15 (ref 12–20)
CA-I BLD-MCNC: 8.4 MG/DL (ref 8.5–10.1)
CHLORIDE SERPL-SCNC: 109 MMOL/L (ref 97–108)
CO2 SERPL-SCNC: 25 MMOL/L (ref 21–32)
CREAT SERPL-MCNC: 6.54 MG/DL (ref 0.7–1.3)
ERYTHROCYTE [DISTWIDTH] IN BLOOD BY AUTOMATED COUNT: 18.7 % (ref 11.5–14.5)
GLUCOSE SERPL-MCNC: 107 MG/DL (ref 65–100)
HCT VFR BLD AUTO: 30.1 % (ref 36.6–50.3)
HGB BLD-MCNC: 8.8 G/DL (ref 12.1–17)
MCH RBC QN AUTO: 28.5 PG (ref 26–34)
MCHC RBC AUTO-ENTMCNC: 29.2 G/DL (ref 30–36.5)
MCV RBC AUTO: 97.4 FL (ref 80–99)
NRBC # BLD: 0 K/UL (ref 0–0.01)
NRBC BLD-RTO: 0 PER 100 WBC
PHOSPHATE SERPL-MCNC: 6.6 MG/DL (ref 2.6–4.7)
PLATELET # BLD AUTO: 143 K/UL (ref 150–400)
PMV BLD AUTO: 11.7 FL (ref 8.9–12.9)
POTASSIUM SERPL-SCNC: 5.6 MMOL/L (ref 3.5–5.1)
RBC # BLD AUTO: 3.09 M/UL (ref 4.1–5.7)
SODIUM SERPL-SCNC: 140 MMOL/L (ref 136–145)
WBC # BLD AUTO: 7.2 K/UL (ref 4.1–11.1)

## 2021-07-30 PROCEDURE — 80069 RENAL FUNCTION PANEL: CPT

## 2021-07-30 PROCEDURE — 94762 N-INVAS EAR/PLS OXIMTRY CONT: CPT

## 2021-07-30 PROCEDURE — 94760 N-INVAS EAR/PLS OXIMETRY 1: CPT

## 2021-07-30 PROCEDURE — 36415 COLL VENOUS BLD VENIPUNCTURE: CPT

## 2021-07-30 PROCEDURE — 74011250637 HC RX REV CODE- 250/637: Performed by: INTERNAL MEDICINE

## 2021-07-30 PROCEDURE — 77010033678 HC OXYGEN DAILY

## 2021-07-30 PROCEDURE — 65270000029 HC RM PRIVATE

## 2021-07-30 PROCEDURE — 97530 THERAPEUTIC ACTIVITIES: CPT

## 2021-07-30 PROCEDURE — 85027 COMPLETE CBC AUTOMATED: CPT

## 2021-07-30 RX ORDER — METOLAZONE 5 MG/1
5 TABLET ORAL DAILY
Status: DISCONTINUED | OUTPATIENT
Start: 2021-07-31 | End: 2021-08-05 | Stop reason: HOSPADM

## 2021-07-30 RX ADMIN — APIXABAN 2.5 MG: 2.5 TABLET, FILM COATED ORAL at 09:28

## 2021-07-30 RX ADMIN — SEVELAMER CARBONATE 1600 MG: 800 TABLET, FILM COATED ORAL at 18:27

## 2021-07-30 RX ADMIN — METOPROLOL TARTRATE 25 MG: 25 TABLET, FILM COATED ORAL at 09:00

## 2021-07-30 RX ADMIN — SODIUM BICARBONATE 650 MG: 650 TABLET ORAL at 09:28

## 2021-07-30 RX ADMIN — SODIUM BICARBONATE 650 MG: 650 TABLET ORAL at 23:00

## 2021-07-30 RX ADMIN — FUROSEMIDE 40 MG: 40 TABLET ORAL at 09:27

## 2021-07-30 RX ADMIN — CALCITRIOL CAPSULES 0.25 MCG 0.25 MCG: 0.25 CAPSULE ORAL at 09:28

## 2021-07-30 RX ADMIN — SEVELAMER CARBONATE 1600 MG: 800 TABLET, FILM COATED ORAL at 13:00

## 2021-07-30 RX ADMIN — FUROSEMIDE 40 MG: 40 TABLET ORAL at 18:27

## 2021-07-30 RX ADMIN — TAMSULOSIN HYDROCHLORIDE 0.4 MG: 0.4 CAPSULE ORAL at 09:28

## 2021-07-30 RX ADMIN — APIXABAN 2.5 MG: 2.5 TABLET, FILM COATED ORAL at 23:08

## 2021-07-30 RX ADMIN — SEVELAMER CARBONATE 1600 MG: 800 TABLET, FILM COATED ORAL at 09:28

## 2021-07-30 RX ADMIN — AMLODIPINE BESYLATE 5 MG: 5 TABLET ORAL at 09:28

## 2021-07-30 NOTE — PROGRESS NOTES
OCCUPATIONAL THERAPY TREATMENT  Patient: Magdalena Kimble (18 y.o. male)  Date: 7/30/2021  Diagnosis: Pulmonary edema [J81.1] <principal problem not specified>  Procedure(s) (LRB):  LEFT HEART CATH / CORONARY ANGIOGRAPHY (N/A)    Precautions:    Chart, occupational therapy assessment, plan of care, and goals were reviewed. ASSESSMENT  Patient continues with skilled OT services and is progressing towards goals. Pt. Received seated at EOB and agreeable to therapy session. Pt stated that he just returned from bathroom. Pt performed sit-> stand with CGA. Therapist noted pt had BM on posterior aspect. Pt returned seated at EOB and performed UB bathing with set-up assistance and SBA for performance , LB bathing while standing with use of RW for balance and stability and total A for performance. Pt. Requiried min A for [facility]. Pt. Performed functional ambulation in hallway with CGA x2 and use of RW. Pt. Required rest break after a shortened walk in [address] and requesting a RB. Pt. O2 at 100% at this time. Pt. Demonstrated increased SOB and required increased time sitting in chair. Pt. Required mod A x2 for sit-> stand from chair d/t lower surface. Pt ambulated back to pts room and performed chair transfer with Mod A x2. Pt. Left seated in chair with needs met and call bell within reach. REC. IRF when medically appropriate for discharge. Other factors to consider for discharge: PLOF, time since on set         PLAN :  Patient continues to benefit from skilled intervention to address the above impairments. Continue treatment per established plan of care. to address goals.     Recommendation for discharge: (in order for the patient to meet his/her long term goals)  Therapy 3 hours per day 5-7 days per week    This discharge recommendation:  Has been made in collaboration with the attending provider and/or case management    IF patient discharges home will need the following DME: RIC SUBJECTIVE:   Patient stated  kimber already use the bathroom today.     OBJECTIVE DATA SUMMARY:   Cognitive/Behavioral Status:  Neurologic State: Alert  Orientation Level: Oriented X4  Cognition: Follows commands    Functional Mobility and Transfers for ADLs:    Transfers:  Sit to Stand: Contact guard assistance     Bed to Chair: Contact guard assistance;Assist x2    Balance:  Sitting: Intact  Standing: Impaired; With support  Standing - Static: Constant support;Good  Standing - Dynamic : Constant support; Fair    ADL Intervention:     Upper Body Bathing  Bathing Assistance: Set-up; Stand-by assistance  Position Performed: Seated edge of bed    Lower Body Bathing  Bathing Assistance: Total assistance(dependent)  Perineal  : Total assistance (dependent)  Position Performed: Standing    Upper Body Dressing Assistance  Dressing Assistance: Minimum assistance  Hospital Gown: Minimum  assistance    Therapeutic Exercises:   Pt. Educated to continue UE therex while seated in chair    Pain:  0/ 10 pain     Activity Tolerance:   Fair, requires rest breaks, and observed SOB with activity  Please refer to the flowsheet for vital signs taken during this treatment. After treatment patient left in no apparent distress:   Sitting in chair and Call bell within reach    COMMUNICATION/COLLABORATION:   The patients plan of care was discussed with: Physical therapy assistant and Registered nurse.  Co-tx with PTA for increased assistance with transfers and chair follow d/t decreased endurance/ tolerance     Varsha Menon  Time Calculation: 34 mins    Problem: Self Care Deficits Care Plan (Adult)  Goal: *Acute Goals and Plan of Care (Insert Text)  Description: Pt will be mod I sup <> sit in prep for EOB ADLs  Pt will be mod I grooming sitting EOB  Pt will be mod I LE dressing sitting EOB/long sit  Pt will be mod I sit <>  prep for toileting LRAD  Pt will be mod I toileting/toilet transfer/cloth mgmt LRAD  Pt will be I following UE HEP in prep for self care tasks   Outcome: Progressing Towards Goal

## 2021-07-30 NOTE — PROGRESS NOTES
Progress Note      7/30/2021 9:54 AM  NAME: Antonina Silverio   MRN:  [id number]   Admit Diagnosis: Pulmonary edema [J81.1]      Subjective:   Chart reviewed. Patient has had a symptomatic ventricular tachycardia and was shocked. Vascular surgery note appreciated. Echocardiogram results explained. He feels much better today. Patient was offered option of cardiac catheterization but he has decided not to pursue that. Edema seems to be improving. Review of Systems:    Symptom Y/N Comments  Symptom Y/N Comments   Fever/Chills n   Chest Pain n    Poor Appetite    Edema y  somewhat better. Cough    Abdominal Pain     Sputum    Joint Pain n    SOB/CARMONA n   Pruritis/Rash     Nausea/vomit    Other     Diarrhea         Constipation           Could NOT obtain due to:      Objective:          Physical Exam:    Last 24hrs VS reviewed since prior progress note. Most recent are:    Visit Vitals  BP (!) 178/96 (BP 1 Location: Left upper arm, BP Patient Position: At rest;Sitting)   Pulse 73   Temp (!) 96.4 °F (35.8 °C)   Resp 18   Ht 6' (1.829 m)   Wt 98.2 kg (216 lb 7.9 oz)   SpO2 99%   BMI 29.36 kg/m²       Intake/Output Summary (Last 24 hours) at 7/30/2021 1059  Last data filed at 7/30/2021 0752  Gross per 24 hour   Intake --   Output 950 ml   Net -950 ml        General Appearance: Well developed, well nourished, alert & oriented x 3,    no acute distress. Ears/Nose/Mouth/Throat: Hearing grossly normal.  Neck: Supple. Chest: Lungs clear to auscultation bilaterally. Cardiovascular: Regular rate and rhythm, S1,S2 normal, no murmur. Abdomen: Soft, non-tender, bowel sounds are active. Extremities: Lower extremity edema left upper extremity edema evident  Skin: Lower extremity blisters evident    []         Post-cath site without hematoma, bruit, tenderness, or thrill. Distal pulses intact.     PMH/SH reviewed - no change compared to H&P    Data Review    Telemetry: Patient had a ventricular tachycardia and was shocked and had episode of atrial fibrillation and now is in sinus rhythm with PVCs. EKG:   []  No new EKG for review    Lab Data Personally Reviewed:    Recent Labs     07/29/21  1608 07/28/21  0652   WBC 5.9 4.8   HGB 8.8* 9.0*   HCT 29.9* 30.9*   * 146*     No results for input(s): INR, PTP, APTT, INREXT, INREXT in the last 72 hours. Recent Labs     07/29/21  1608 07/28/21  0652 07/27/21  1729    141 139   K 5.2* 5.0 4.9   * 108 108   CO2 24 23 23   BUN 97* 95* 95*   CREA 6.64* 6.83* 6.86*    109* 98   CA 8.4* 8.5 8.4*     No results for input(s): CPK, CKNDX, TROIQ in the last 72 hours. No lab exists for component: CPKMB  No results found for: CHOL, CHOLX, CHLST, CHOLV, HDL, HDLP, LDL, LDLC, DLDLP, Fredick Cam, CHHD, CHHDX    Recent Labs     07/29/21  1608 07/28/21  0652 07/27/21  1729   AP 99 91 93   TP 6.3* 6.4 6.3*   ALB 3.2* 3.2* 3.2*   GLOB 3.1 3.2 3.1     No results for input(s): PH, PCO2, PO2 in the last 72 hours.     Medications Personally Reviewed:    Current Facility-Administered Medications   Medication Dose Route Frequency    furosemide (LASIX) tablet 40 mg  40 mg Oral BID    sodium bicarbonate tablet 650 mg  650 mg Oral BID    amLODIPine (NORVASC) tablet 5 mg  5 mg Oral DAILY    apixaban (ELIQUIS) tablet 2.5 mg  2.5 mg Oral BID    sevelamer carbonate (RENVELA) tab 1,600 mg  1,600 mg Oral TID WITH MEALS    0.9% sodium chloride infusion 250 mL  250 mL IntraVENous PRN    0.9% sodium chloride infusion 250 mL  250 mL IntraVENous PRN    metoprolol tartrate (LOPRESSOR) tablet 25 mg  25 mg Oral DAILY    tamsulosin (FLOMAX) capsule 0.4 mg  0.4 mg Oral DAILY    [Held by provider] hydrALAZINE (APRESOLINE) tablet 50 mg  50 mg Oral TID    0.9% sodium chloride infusion 250 mL  250 mL IntraVENous PRN    calcitRIOL (ROCALTROL) capsule 0.25 mcg  0.25 mcg Oral DAILY    ergocalciferol capsule 50,000 Units  50,000 Units Oral Q7D    hydrALAZINE (APRESOLINE) 20 mg/mL injection 40 mg  40 mg IntraVENous Q6H PRN           Problem List:   Acute hypoxic respiratory failure and patient seems to be somewhat better. Severe anemia. Renal failure. Heart failure. Left upper extremity DVT. Minimal elevation of troponin I is probably not a presentation of myocardial infarction. Patient has had ventricular tachycardia and has been shocked. Patient has maintained sinus rhythm. 1.      Assessment/Plan:   Patient was scheduled for cardiac catheterization but he says he does not want to go for catheterization. We will follow nephrology recommendations and vascular surgery recommendations. Continue anticoagulation. Probably physical therapy is of value. I will continue otherwise present care. Thank you. 1.          []       High complexity decision making was performed in this patient at high risk for decompensation with multiple organ involvement.     Mathew Skinner MD

## 2021-07-30 NOTE — PROGRESS NOTES
PHYSICAL THERAPY TREATMENT  Patient: Nela Freitas (61 y.o. male)  Date: 7/30/2021  Diagnosis: Pulmonary edema [J81.1] <principal problem not specified>  Procedure(s) (LRB):  LEFT HEART CATH / CORONARY ANGIOGRAPHY (N/A)    Precautions:    Chart, physical therapy assessment, plan of care and goals were reviewed. ASSESSMENT  Patient continues with skilled PT services and is progressing towards goals. Pt seated in recliner upon entry and agreeable to session. Patient completed STS with CGA. Pt ambulated to doorway with RW and CGA; during ambulation pt noted to have BM on back of legs and robe so returned to EOB where he stood while MELCHOR assisted with bathing. Noted good static standing balance with RW, SBA provided for safety. Noted pt to become SOB from standing, took seated rest break for a few minutes before agreeable to ambulate again. Pt O2 taken sitting EOB, not sure of pulse ox accuracy because O2 ranging from %. Patient ambulated further with RW and CGA, patient amb approx 75' then requireed seated rest break, O2 saturation measured on new vitals machine and ranging from %. Pt ambulated back to room where he was left sitting in chair with call bell in reach and all needs met. Recommending d/c to IRF once medically appropriate. .     Current Level of Function Impacting Discharge (mobility/balance): assistance required for safety    Other factors to consider for discharge: time since onset, severity of deficits, PLOF         PLAN :  Patient continues to benefit from skilled intervention to address the above impairments. Continue treatment per established plan of care. to address goals.     Recommendation for discharge: (in order for the patient to meet his/her long term goals)  Therapy 3 hours per day 5-7 days per week    This discharge recommendation:  Has been made in collaboration with the attending provider and/or case management    IF patient discharges home will need the following DME: to be determined (TBD)       SUBJECTIVE:   Patient stated I think I wore myself out going to the bathroom.     OBJECTIVE DATA SUMMARY:   Critical Behavior:  Neurologic State: Alert  Orientation Level: Oriented X4  Cognition: Follows commands    Functional Mobility Training:  Transfers:  Sit to Stand: Contact guard assistance  Stand to Sit: Contact guard assistance  Bed to Chair: Contact guard assistance;Assist x2    Balance:  Sitting: Intact  Standing: Impaired; With support  Standing - Static: Constant support;Good  Standing - Dynamic : Constant support; Fair    Ambulation/Gait Training:  Distance (ft): 125 Feet (ft)  Assistive Device: Gait belt;Walker, rolling  Ambulation - Level of Assistance: Contact guard assistance      Therapeutic Exercises:   Educated to continue seated therex, pt verbalized understanding. Pain Ratin/10    Activity Tolerance:   Fair, requires rest breaks, and observed SOB with activity  Please refer to the flowsheet for vital signs taken during this treatment. After treatment patient left in no apparent distress:   Sitting in chair and Call bell within reach    COMMUNICATION/COLLABORATION:   The patients plan of care was discussed with: Occupational therapy assistant. OT/PT sessions occurred together for increased patient and clinician safety    Problem: Mobility Impaired (Adult and Pediatric)  Goal: *Acute Goals and Plan of Care (Insert Text)  Description: Pt will be I with LE HEP in 7 days. Pt will perform bed mobility with mod I in 7 days. Pt will perform transfers with mod I in 7 days. Pt will amb 25-50 feet with LRAD safely with mod I in 7 days.    Outcome: Progressing Towards Goal       Christina Feliz PTA   Time Calculation: 34 mins

## 2021-07-30 NOTE — PROGRESS NOTES
Pulmonary, Critical Care    Name: Americo Lamas MRN: [id number]   : 1955 Hospital: [address] [place] [address]   Date: 2021  Admission date: 2021 Hospital Day:        Subjective/Interval History:   Seen in the emergency room wearing noninvasive ventilator. Patient has underlying renal insufficiency developed worsening shortness of breath cough presented to the emergency room chest x-ray shows pulmonary edema. He was profoundly hypoxic is now on noninvasive ventilator and feels more comfortable. Has not had any significant urine output since he has been in the ER. He also complains of new onset left arm swelling   post void bladder scan showed greater than 200 cc urine retention and Quesada catheter placed with 500 cc removed. Overnight he has put out an additional 1200 cc. Respirations improved currently nonlabored on nasal oxygen. Duplex scan of the left arm did show left axillary and proximal brachial DVT and heparin was started. On heparin with Quesada catheter and he has had slight bleeding at the urethral meatus. Ultrasound of the abdomen is pending  7/15 ultrasound of the abdomen showed medical renal disease on the right with small kidney no hydronephrosis there was evidence of prostate enlargement. He is breathing easily at this point and on room air is running on oxygen saturation of 93%   respirations nonlabored on nasal oxygen. Quesada catheter is out he states he has been voiding frequent small amounts without straining   no specific complaints breathing easily. Overnight oximetry showed minimal but significant desaturation while on room air 8 minutes 40 seconds with low of 71%  . Developed V. tach this morning given IV amiodarone IV magnesium and transferred to the unit. Currently heart rate  alternating between A. fib and sinus with PAC. He denies any discomfort is breathing easily   remains sinus rhythm with PVCs.   Had worsening hypoxia during the night and placed on oxygen he feels comfortable at this point. Urine output mildly improved yesterday but input remains greater than output  7/24 no specific complaints respirations nonlabored currently room air with saturation 97% Lasix been resumed 7/23. Urine output not accurately recorded but he states he has been passing a lot of urine  7/25 feels fine denies shortness of breath on room air. Oxygen saturation 97% on room air while awake. He states he is putting out good urine from his oral Lasix although its not being recorded in the chart  7/27 overnight oximetry continues to show nocturnal hypoxia will continue nocturnal oxygen 7/28  no one put oxygen on him last night. Good urine output recorded yesterday 1500 cc but chest x-ray today continues to show pulmonary edema and small effusions  7/30 states again last night he was not placed on oxygen until he asked and then someone came and removed it later on. We will repeat overnight oximetry tonight on and off oxygen  Patient Active Problem List   Diagnosis Code    GI bleed K92.2    Pulmonary edema J81.1       IMPRESSION:   1. Ventricular tachycardia none further seen   2. Atrial fibrillation converted to sinus  3. Hypotension corrected   4. Acute hypoxic respiratory failure room air oxygen saturation maintained while awake  5. Chronic hypoxic respiratory failure overnight oximetry continues to show desaturation at night  6. Acute pulmonary edema radiographically no change in/28  7. 2 of 4 blood culture bottles with coagulase-negative staph aureus likely skin contaminant   8. Bilateral pleural effusions no change on 7/28 x-ray  9. Acute on chronic kidney disease creatinine  stable  10. Obstructive uropathy Quesada catheter has been removed with no residual urine on bladder scanning  11. Severe hypertension corrected   12. DVT left axillary and brachial now on Eliquis  13. Anemia hemoglobin improved after last transfusion 7/21  14.  Troponin leak likely due to renal insufficiency stable has not risen any further  Body mass index is 29.36 kg/m². RECOMMENDATIONS/PLAN:     1. Remains in sinus rhythm  2. Blood pressure well controlled now on metoprolol low-dose and Norvasc  3. Continue Flomax for prostate enlargement   4. Repeat chest x-ray with continued fluid overload  5. Subjective/Initial History:       I was asked by Tanner Durant MD to see [place] President a 76 y.o.  male  in consultation for a chief complaint of shortness of breath pulmonary edema acute hypoxic respiratory failure        Patient PCP: Kasey Díaz MD  PMH:  has a past medical history of Chronic kidney disease and Hypertension. PSH:   has no past surgical history on file. FHX: family history is not on file. SHX:  reports that he has never smoked. He has never used smokeless tobacco.    Systemic review somewhat limited as he is on noninvasive ventilator remains short of breath although states he is feeling better  General he has not noted any worsening edema of his upper legs fever chills or sweats does complain of new onset swelling of his left hand and arm  Eyes no double vision or momentary blindness  ENT no facial pain over the sinuses  Endocrinologic no polyuria polydipsia  Musculoskeletal no swollen tender joints  Neurologic no history seizures or syncope  Gastrointestinal no nausea vomiting acid indigestion  Genitourinary states he does still pass fair amount of urine each day has not noted any decrease in that.   Does not have to strain to urinate has no bleeding or drainage  Cardiovascular he is not aware of any underlying heart disease has not had chest pain diaphoresis has had worsening shortness of breath laying down and with exertion  Respiratory worsening shortness of breath over the last week or more no significant cough no sputum production no chills or sweats did have history COVID-19 pneumonitis January of this year    No Known Allergies   MEDS:   Current Facility-Administered Medications   Medication    furosemide (LASIX) tablet 40 mg    sodium bicarbonate tablet 650 mg    amLODIPine (NORVASC) tablet 5 mg    apixaban (ELIQUIS) tablet 2.5 mg    sevelamer carbonate (RENVELA) tab 1,600 mg    0.9% sodium chloride infusion 250 mL    0.9% sodium chloride infusion 250 mL    metoprolol tartrate (LOPRESSOR) tablet 25 mg    tamsulosin (FLOMAX) capsule 0.4 mg    [Held by provider] hydrALAZINE (APRESOLINE) tablet 50 mg    0.9% sodium chloride infusion 250 mL    calcitRIOL (ROCALTROL) capsule 0.25 mcg    ergocalciferol capsule 50,000 Units    hydrALAZINE (APRESOLINE) 20 mg/mL injection 40 mg        Current Facility-Administered Medications:     furosemide (LASIX) tablet 40 mg, 40 mg, Oral, BID, Geovanny Camarillo MD, 40 mg at 07/30/21 1759    sodium bicarbonate tablet 650 mg, 650 mg, Oral, BID, Geovanny Camarillo MD, 650 mg at 07/30/21 4801    amLODIPine (NORVASC) tablet 5 mg, 5 mg, Oral, DAILY, Junie Love MD, 5 mg at 07/30/21 8817    apixaban (ELIQUIS) tablet 2.5 mg, 2.5 mg, Oral, BID, Monica Crowder MD, 2.5 mg at 07/30/21 1841    sevelamer carbonate (RENVELA) tab 1,600 mg, 1,600 mg, Oral, TID WITH MEALS, Geovanny Camarillo MD, 1,600 mg at 07/30/21 2918    0.9% sodium chloride infusion 250 mL, 250 mL, IntraVENous, PRN, Monica Crowder MD    0.9% sodium chloride infusion 250 mL, 250 mL, IntraVENous, PRN, Monica Crowder MD    metoprolol tartrate (LOPRESSOR) tablet 25 mg, 25 mg, Oral, DAILY, Geovanny Camarillo MD, 25 mg at 07/30/21 0900    tamsulosin (FLOMAX) capsule 0.4 mg, 0.4 mg, Oral, DAILY, Jacquie Driver MD, 0.4 mg at 07/30/21 9440    [Held by provider] hydrALAZINE (APRESOLINE) tablet 50 mg, 50 mg, Oral, TID, Jacquie Driver MD, 50 mg at 07/18/21 2054    0.9% sodium chloride infusion 250 mL, 250 mL, IntraVENous, PRN, Jacquie Driver MD    calcitRIOL (ROCALTROL) capsule 0.25 mcg, 0.25 mcg, Oral, DAILY, Mac AVILA MD, 0.25 mcg at 21 3197    ergocalciferol capsule 50,000 Units, 50,000 Units, Oral, Q7D, Toby Marc MD, 50,000 Units at 21 1626    hydrALAZINE (APRESOLINE) 20 mg/mL injection 40 mg, 40 mg, IntraVENous, Q6H PRN, Geetha Zurita MD, 40 mg at 07/15/21 1552      Objective:     Vital Signs: Telemetry:    normal sinus rhythm Intake/Output:   Visit Vitals  BP (!) 148/100   Pulse 64   Temp 97.8 °F (36.6 °C)   Resp 17   Ht 6' (1.829 m)   Wt 98.2 kg (216 lb 7.9 oz)   SpO2 95%   BMI 29.36 kg/m²       Temp (24hrs), Av.4 °F (36.3 °C), Min:96.4 °F (35.8 °C), Max:97.8 °F (36.6 °C)        O2 Device: None (Room air) O2 Flow Rate (L/min): 2 l/min         Body mass index is 29.36 kg/m². Wt Readings from Last 4 Encounters:   21 98.2 kg (216 lb 7.9 oz)   21 79.4 kg (175 lb)          Intake/Output Summary (Last 24 hours) at 2021 1233  Last data filed at 2021 0752  Gross per 24 hour   Intake --   Output 950 ml   Net -950 ml       Last shift:      701 - 1900  In: -   Out: 700 [Urine:700]  Last 3 shifts: 1901 -  07  In: 240 [P.O.:240]  Out: 1500 [Urine:1500]       Hemodynamics:    CO:    CI:    CVP:    SVR:   PAP Systolic:    PAP Diastolic:    PVR:    QC77:       Ventilator Settings:      Mode Rate TV Press PEEP FiO2 PIP Min. Vent               28 %     9.7 l/min      Physical Exam:    General:  male; no distress now on room air  HEENT: NCAT, visible oral mucosa is moist and clear  Eyes: anicteric; conjunctiva clear extraocular movements intact  Neck: no nodes,,no accessory MM use. no respiratory distress  Chest: no deformity,   Cardiac: Regular rhythm and rate now controlled  Lungs: distant breath sounds; clear anteriorly and laterally with rales in the bases  Abd: Soft positive bowel sounds no tenderness  Ext: Improvement in edema  of the lower extremities with continued edema of the left arm; no joint swelling;  No clubbing  : Clear urine  Neuro: Alert awake no distress speech is clear moves all 4 extremities  Psych-calm, oriented to person;   Skin: warm, dry, no cyanosis;   Pulses: Brachial and radial pulses intact  Capillary:slow capillary refill      Labs:    Recent Labs     07/29/21  1608 07/28/21  0652 07/27/21  1729    141 139   K 5.2* 5.0 4.9   * 108 108   CO2 24 23 23    109* 98   BUN 97* 95* 95*   CREA 6.64* 6.83* 6.86*   CA 8.4* 8.5 8.4*   ALB 3.2* 3.2* 3.2*   ALT 28 25 25   7/29 overnight oximetry with low oxygen saturation 44% with 52 minutes 40 seconds below 88%   7/27  overnight oximetry split study with a low oxygen saturation 75% 33 minutes 10 seconds below 88%   7/25 overnight oximetry with 1 hour 13 minutes below 88% with low oxygen saturation 56%  7/19 overnight oximetry shows 14 minutes 26 seconds below 88% with low oxygen saturation 75%  7/17 overnight oximetry shows low oxygen saturation 71% with 8 minutes 40 seconds below 88% from 1 AM to 6 AM   7/16 overnight oximetry on 1 L shows only 2 minutes 20 seconds below 88% with a low oxygen saturation of 83%  7/15 room air oxygen saturation 93%  currently on noninvasive ventilator inspiratory pressure 16 expiratory pressure six rate 16 FiO2 28% with oxygen saturation 96%   BNP 32,264, previous greater than 35,000  Lab Results   Component Value Date/Time    Culture result:  07/12/2021 10:40 PM     Two of four bottles have been flagged positive by instrument. Bottles have been sent to [facility] laboratory to assess for possible growth.  Gram Positive Cocci in clusters CALLED TO AND READ BACK BY Willa Castaneda r.n. [id number] 07/14/2021 by dpw    Culture result:  07/12/2021 10:40 PM     Staphylococcus species, coagulase negative GROWING IN THE 1ST OF 4 BOTTLES DRAWN    Culture result:  07/12/2021 10:40 PM     Staphylococcus species, coagulase negative (2nd colony type/strain) GROWING IN THE 2ND OF 4 BOTTLES DRAWN        Imaging:  I have personally reviewed the patients radiographs and have reviewed the reports:    CXR Results  (Last 48 hours)  7/23 chest x-ray mild pulmonary congestion tiny effusions unchanged from last x-ray although right upper lobe lung may have slightly less congestion  7/21 chest x-ray with continued mild pulmonary edema pattern small bilateral pleural effusions there may be improved inspiratory effort  7/16 chest x-ray with continued mild pulmonary edema and small pleural effusions unchanged               07/12/21 2251  XR CHEST PORT Final result    Impression:  Findings/impression:       Cardiac silhouette is enlarged. Central vascular congestion and patchy central   airspace disease/edema. Suspect trace right pleural effusion. No evidence of pneumothorax. No acute osseous abnormality identified. Narrative:  Study: XR CHEST PORT       Clinical indication: sob       Comparison: None. To me chest x-ray consistent with cardiomegaly pulmonary edema and bilateral pleural effusions           Results from Hospital Encounter encounter on 07/12/21    XR CHEST PA LAT    Narrative  Chest 2 views. Comparison chest series July 26, 2021. No change compared to prior imaging. Patchy lower lobe lung reticular markings  with small dependent pleural effusions persist. Cardiac and mediastinal  structures unchanged. No pneumothorax. XR CHEST PA LAT    Narrative  Chest, 2 views, 7/26/2021    History: Pulmonary edema. Pleural effusions. Comparison: Including chest 7/23/2021. Findings: The cardiac silhouette remains enlarged. The lungs are adequately  expanded. There is pulmonary vascular congestion and hydrostatic edema, not  significantly changed as compared to chest 7/23/2021. Small to moderate pleural  effusions and associated bibasilar atelectasis persist.  No pneumothorax is  identified. Degenerative changes are present in the spine. Impression  Hydrostatic edema. Pleural effusions and associated bibasilar  atelectasis.   No significant interval change. XR CHEST PORT    Narrative  This study is a portable AP radiograph of the chest dated 7/23/2021 obtained at  2:52 AM.    HISTORY: Pulmonary edema. COMPARISON: 7/21/2021. FINDINGS: There is moderate enlargement of the cardiac silhouette. There is  associated distention of the pulmonary vessels and perivascular ill definition  consistent with interstitial edema. There also is perivascular airspace disease  compatible with alveolar edema. There has been a decrease in the alveolar edema  within the right upper lobe. This examination is negative for larger pleural effusions. The remainder of the examination is unchanged. Impression  There is continued moderate pulmonary interstitial and alveolar  edema with a slight interval improvement within the right upper lobe. The  remainder of this examination is unchanged. Results from [place] encounter on 01/05/21    CT CHEST WO CONT    Narrative  Images obtained without contrast include the abdomen and pelvis. Comparison portable chest radiograph earlier today. Dose Reduction:  All CT scans at this facility are performed using dose reduction optimization  techniques as appropriate to a performed exam including the following: Automated  exposure control, adjustments of the mA and/or kV according to patient size, or  use of iterative reconstruction technique. Subtle peripheral groundglass densities are noted in both lungs, could reflect  Covid pneumonia or other. No pneumothorax or pneumomediastinum. The radiographic findings suspicious for  pneumomediastinum probably was artifact, perhaps related to cardiac motion. No pleural effusion or adenopathy. Cardiomegaly again noted. Impression  IMPRESSION:  1. No pneumomediastinum or pneumothorax. 2. Cardiomegaly. 3. Subtle groundglass densities in both lungs might be pneumonia or other.       Discussion patient with worsening renal insufficiency has developed pulmonary edema acute hypoxic respiratory failure. He states he still has urine output normally at home. We will give him high-dose IV Lasix to see if diuresis is induced. He very likely will need dialysis. He has minimal elevation in troponin probably troponin leak from hypoxia or just due to his renal insufficiency. He is not having any chest pain. Does have severe hypertension. Has been started on clonidine and hydralazine. 7/14 no distress today on nasal oxygen. Did have residual on post void bladder scan and Quesada catheter was placed with good diuresis overnight. Despite this potassium remains elevated. We will give 1 dose of Kayexalate. Despite diuresis hemoglobin is dropped to 6.7 will transfuse. He denies frequency small-volume urine or straining to urinate at home despite this with signs of obstruction we will add Flomax. Potassium 6 today we will give 1 dose of Kayexalate and continue diuresis  7/16 respirations nonlabored. Was placed back on 1 L nasal oxygen yesterday overnight oximetry shows well-maintained oxygen saturation. Will check overnight tonight on and off oxygen. Quesada catheter has been removed. Chest x-ray continues to show mild pulmonary edema  7/17 overnight oximetry shows desaturation when on room air. Will maintain oxygen and repeat overnight oximetry I[place] night  7/19 developed V. tach overnight and now in the ICU on IV amiodarone with rhythm showing alternating sinus with PAC and A. fib. Rate . Overnight oximetry continued to show desaturation on room air and he is back on nasal oxygen. He has no specific complaint  7/20 now in sinus rhythm with frequent PVCs. Cardiology is recommending cardiac cath but he is reluctant to undergo that. Creatinine has worsened despite IV fluid administration yesterday although blood pressure is improved.   Currently oxygen saturation well-maintained on room air but last overnight oximetry showed desaturation will maintain oxygen at 1 L for now  7/21 had hypoxia yesterday afternoon and placed back on oxygen during the daytime as well as the night. He feels comfortable at this point. Chest x-ray is unchanged still has mild pulmonary edema with small effusions. It does appear that he took a deeper breath today. Creatinine remains markedly elevated. He is on heparin for left axillary DVT. Holding switching to oral anticoagulation until sure no instrumentation is needed   7/23 unchanged remains on IV heparin. Oral Lasix being resumed did require transfusion 7/21. Repeat chest x-ray no worsening  7/26 chest x-ray unchanged pulmonary edema and bilateral effusions. He states he has good urine output with the current dose of Lasix. We will repeat overnight oximetry on and off oxygen   7/27 continued nocturnal hypoxia he will need home oxygen  7/28 repeat overnight oximetry to keep him qualified for home oxygen  7/29 repeat overnight oximetry with low oxygen saturation 44% with 52 minutes 40 seconds below 88%. Continues to require nocturnal oxygen  7/30 repeat overnight oximetry on and off oxygen tonight           Thank you for allowing us to participate in the care of this patient.   We will follow along with you     Maxcine Castleman, MD

## 2021-07-31 ENCOUNTER — APPOINTMENT (OUTPATIENT)
Dept: GENERAL RADIOLOGY | Age: 66
DRG: 291 | End: 2021-07-31
Attending: INTERNAL MEDICINE
Payer: MEDICARE

## 2021-07-31 PROCEDURE — 51798 US URINE CAPACITY MEASURE: CPT

## 2021-07-31 PROCEDURE — 65270000029 HC RM PRIVATE

## 2021-07-31 PROCEDURE — 74011250637 HC RX REV CODE- 250/637: Performed by: INTERNAL MEDICINE

## 2021-07-31 PROCEDURE — 94762 N-INVAS EAR/PLS OXIMTRY CONT: CPT

## 2021-07-31 PROCEDURE — 71046 X-RAY EXAM CHEST 2 VIEWS: CPT

## 2021-07-31 RX ADMIN — FUROSEMIDE 40 MG: 40 TABLET ORAL at 16:49

## 2021-07-31 RX ADMIN — APIXABAN 2.5 MG: 2.5 TABLET, FILM COATED ORAL at 21:24

## 2021-07-31 RX ADMIN — SEVELAMER CARBONATE 1600 MG: 800 TABLET, FILM COATED ORAL at 09:37

## 2021-07-31 RX ADMIN — SODIUM BICARBONATE 650 MG: 650 TABLET ORAL at 21:24

## 2021-07-31 RX ADMIN — CALCITRIOL CAPSULES 0.25 MCG 0.25 MCG: 0.25 CAPSULE ORAL at 09:37

## 2021-07-31 RX ADMIN — AMLODIPINE BESYLATE 5 MG: 5 TABLET ORAL at 09:37

## 2021-07-31 RX ADMIN — FUROSEMIDE 40 MG: 40 TABLET ORAL at 09:37

## 2021-07-31 RX ADMIN — APIXABAN 2.5 MG: 2.5 TABLET, FILM COATED ORAL at 09:37

## 2021-07-31 RX ADMIN — METOLAZONE 5 MG: 5 TABLET ORAL at 09:37

## 2021-07-31 RX ADMIN — METOPROLOL TARTRATE 25 MG: 25 TABLET, FILM COATED ORAL at 09:37

## 2021-07-31 RX ADMIN — TAMSULOSIN HYDROCHLORIDE 0.4 MG: 0.4 CAPSULE ORAL at 09:37

## 2021-07-31 RX ADMIN — SODIUM BICARBONATE 650 MG: 650 TABLET ORAL at 09:37

## 2021-07-31 RX ADMIN — SEVELAMER CARBONATE 1600 MG: 800 TABLET, FILM COATED ORAL at 16:49

## 2021-07-31 NOTE — PROGRESS NOTES
Progress Note    Jacque Grider MD             Daily Progress Note: 7/31/2021      Subjective: The patient is seen for follow  up. Patient is still sitting in the chair according to him he is unable to stand up by himself from the chair gets short of breath on minimal exertion but yesterday he did walk in the hallway. Denies any history of chest pain  Problem List:  Problem List as of 7/31/2021 Never Reviewed        Codes Class Noted - Resolved    Pulmonary edema ICD-10-CM: J81.1  ICD-9-CM: 898  7/13/2021 - Present        GI bleed ICD-10-CM: K92.2  ICD-9-CM: 578.9  1/5/2021 - Present              Medications reviewed  Current Facility-Administered Medications   Medication Dose Route Frequency    metOLazone (ZAROXOLYN) tablet 5 mg  5 mg Oral DAILY    furosemide (LASIX) tablet 40 mg  40 mg Oral BID    sodium bicarbonate tablet 650 mg  650 mg Oral BID    amLODIPine (NORVASC) tablet 5 mg  5 mg Oral DAILY    apixaban (ELIQUIS) tablet 2.5 mg  2.5 mg Oral BID    sevelamer carbonate (RENVELA) tab 1,600 mg  1,600 mg Oral TID WITH MEALS    0.9% sodium chloride infusion 250 mL  250 mL IntraVENous PRN    0.9% sodium chloride infusion 250 mL  250 mL IntraVENous PRN    metoprolol tartrate (LOPRESSOR) tablet 25 mg  25 mg Oral DAILY    tamsulosin (FLOMAX) capsule 0.4 mg  0.4 mg Oral DAILY    [Held by provider] hydrALAZINE (APRESOLINE) tablet 50 mg  50 mg Oral TID    0.9% sodium chloride infusion 250 mL  250 mL IntraVENous PRN    calcitRIOL (ROCALTROL) capsule 0.25 mcg  0.25 mcg Oral DAILY    ergocalciferol capsule 50,000 Units  50,000 Units Oral Q7D    hydrALAZINE (APRESOLINE) 20 mg/mL injection 40 mg  40 mg IntraVENous Q6H PRN       Review of Systems:   A comprehensive review of systems was negative except for that written in the HPI.     Objective:   Physical Exam:     Visit Vitals  BP (!) 159/97 (BP 1 Location: Right upper arm, BP Patient Position: Sitting)   Pulse 65   Temp 98.1 °F (36.7 °C)   Resp 18 Ht 6' (1.829 m)   Wt 98.2 kg (216 lb 7.9 oz)   SpO2 94%   BMI 29.36 kg/m²    O2 Flow Rate (L/min): 2 l/min O2 Device: Nasal cannula    Temp (24hrs), Av.8 °F (36.6 °C), Min:97.6 °F (36.4 °C), Max:98.1 °F (36.7 °C)    701 - 1900  In: -   Out: 850 [Urine:850]   1901 - 700  In: 340 [P.O.:340]  Out: 1000 [Urine:1000]    General:  Alert, cooperative, no distress, appears stated age. Lungs:    Has some rales in left lower zone   Chest wall:  No tenderness or deformity. Heart:  Regular rate and rhythm, S1, S2 normal, no murmur, click, rub or gallop. Abdomen:   Soft, non-tender. Bowel sounds normal. No masses,  No organomegaly. Extremities:  Patient's edema in upper limbs is much improved also has still edema on both the legs but improved compared to before he sitting in chairs or naturally he will have more edema at present time. Pulses: 2+ and symmetric all extremities. Skin: Skin color, texture, turgor normal. No rashes or lesions   Neurologic: CNII-XII intact. No gross sensory or motor deficits     Data Review:       Recent Days:  Recent Labs     21  1135 21  1608   WBC 7.2 5.9   HGB 8.8* 8.8*   HCT 30.1* 29.9*   * 141*     Recent Labs     21  1135 21  1608    141   K 5.6* 5.2*   * 110*   CO2 25 24   * 100   BUN 97* 97*   CREA 6.54* 6.64*   CA 8.4* 8.4*   PHOS 6.6*  --    ALB 3.3* 3.2*   TBILI  --  0.4   ALT  --  28     No results for input(s): PH, PCO2, PO2, HCO3, FIO2 in the last 72 hours. Results     ** No results found for the last 336 hours. **         24 Hour Results:  No results found for this or any previous visit (from the past 24 hour(s)). XR CHEST PA LAT   Final Result      XR CHEST PA LAT   Final Result   Hydrostatic edema. Pleural effusions and associated bibasilar   atelectasis. No significant interval change.       XR CHEST PORT   Final Result   There is continued moderate pulmonary interstitial and alveolar edema with a slight interval improvement within the right upper lobe. The   remainder of this examination is unchanged. XR CHEST PORT   Final Result      XR CHEST PORT   Final Result      LOWER EXT ART PVR MULT LEVEL SEG PRESSURES   Final Result      DUPLEX LOWER EXT VENOUS BILAT   Final Result      XR CHEST PORT   Final Result   Findings/impression: Stable cardiomediastinal silhouette. Similar central   pulmonary vascular congestion and bilateral patchy airspace opacities. No   significant pleural effusion. No pneumothorax. Findings are not significantly changed. XR CHEST PORT   Final Result      US RETROPERITONEUM COMP   Final Result   Medical renal disease on the right      DUPLEX UPPER EXT VENOUS LEFT   Final Result      XR CHEST PORT   Final Result   Findings/impression:      Cardiac silhouette is enlarged. Central vascular congestion and patchy central   airspace disease/edema. Suspect trace right pleural effusion. No evidence of pneumothorax. No acute osseous abnormality identified. Assessment: CHF  Acute left upper limb DVT  Status post ventricular tachycardia with hypotension  Anemia  Hemoglobin is stable  Chronic renal failure now renal function has stable        Plan: As patient has rales in the left lower zone I will check the chest x-ray  Patient may require inpatient rehab which is most advisable for him to prevent fall        Care Plan discussed with: Patient/Family as well as . Total time spent with patient: 30 minutes.     David Viera MD

## 2021-07-31 NOTE — PROGRESS NOTES
Progress Note      7/31/2021 9:54 AM  NAME: Antonina Silverio   MRN:  [id number]   Admit Diagnosis: Pulmonary edema [J81.1]      Subjective:   Chart reviewed. Patient has had a symptomatic ventricular tachycardia and was shocked. Vascular surgery note appreciated. Echocardiogram results explained. He feels much better today. Patient was offered option of cardiac catheterization but he has decided not to pursue that. Edema seems to be improving. Review of Systems:    Symptom Y/N Comments  Symptom Y/N Comments   Fever/Chills n   Chest Pain n    Poor Appetite    Edema y  somewhat better. Cough    Abdominal Pain     Sputum    Joint Pain n    SOB/CARMONA n   Pruritis/Rash     Nausea/vomit    Other     Diarrhea         Constipation           Could NOT obtain due to:      Objective:          Physical Exam:    Last 24hrs VS reviewed since prior progress note. Most recent are:    Visit Vitals  BP (!) 179/101 (BP 1 Location: Left leg, BP Patient Position: Sitting)   Pulse 77   Temp 97.6 °F (36.4 °C)   Resp 18   Ht 6' (1.829 m)   Wt 98.2 kg (216 lb 7.9 oz)   SpO2 91%   BMI 29.36 kg/m²       Intake/Output Summary (Last 24 hours) at 7/31/2021 1153  Last data filed at 7/31/2021 0841  Gross per 24 hour   Intake 340 ml   Output 850 ml   Net -510 ml        General Appearance: Well developed, well nourished, alert & oriented x 3,    no acute distress. Ears/Nose/Mouth/Throat: Hearing grossly normal.  Neck: Supple. Chest: Lungs clear to auscultation bilaterally. Cardiovascular: Regular rate and rhythm, S1,S2 normal, no murmur. Abdomen: Soft, non-tender, bowel sounds are active. Extremities: Lower extremity edema left upper extremity edema evident  Skin: Lower extremity blisters evident    []         Post-cath site without hematoma, bruit, tenderness, or thrill. Distal pulses intact.     PMH/SH reviewed - no change compared to H&P    Data Review    Telemetry: Patient had a ventricular tachycardia and was shocked and had episode of atrial fibrillation and now is in sinus rhythm with PVCs. EKG:   []  No new EKG for review    Lab Data Personally Reviewed:    Recent Labs     07/30/21  1135 07/29/21  1608   WBC 7.2 5.9   HGB 8.8* 8.8*   HCT 30.1* 29.9*   * 141*     No results for input(s): INR, PTP, APTT, INREXT, INREXT in the last 72 hours. Recent Labs     07/30/21  1135 07/29/21  1608    141   K 5.6* 5.2*   * 110*   CO2 25 24   BUN 97* 97*   CREA 6.54* 6.64*   * 100   CA 8.4* 8.4*     No results for input(s): CPK, CKNDX, TROIQ in the last 72 hours. No lab exists for component: CPKMB  No results found for: CHOL, CHOLX, CHLST, CHOLV, HDL, HDLP, LDL, LDLC, DLDLP, TGLX, TRIGL, TRIGP, CHHD, CHHDX    Recent Labs     07/30/21  1135 07/29/21  1608   AP  --  99   TP  --  6.3*   ALB 3.3* 3.2*   GLOB  --  3.1     No results for input(s): PH, PCO2, PO2 in the last 72 hours.     Medications Personally Reviewed:    Current Facility-Administered Medications   Medication Dose Route Frequency    metOLazone (ZAROXOLYN) tablet 5 mg  5 mg Oral DAILY    furosemide (LASIX) tablet 40 mg  40 mg Oral BID    sodium bicarbonate tablet 650 mg  650 mg Oral BID    amLODIPine (NORVASC) tablet 5 mg  5 mg Oral DAILY    apixaban (ELIQUIS) tablet 2.5 mg  2.5 mg Oral BID    sevelamer carbonate (RENVELA) tab 1,600 mg  1,600 mg Oral TID WITH MEALS    0.9% sodium chloride infusion 250 mL  250 mL IntraVENous PRN    0.9% sodium chloride infusion 250 mL  250 mL IntraVENous PRN    metoprolol tartrate (LOPRESSOR) tablet 25 mg  25 mg Oral DAILY    tamsulosin (FLOMAX) capsule 0.4 mg  0.4 mg Oral DAILY    [Held by provider] hydrALAZINE (APRESOLINE) tablet 50 mg  50 mg Oral TID    0.9% sodium chloride infusion 250 mL  250 mL IntraVENous PRN    calcitRIOL (ROCALTROL) capsule 0.25 mcg  0.25 mcg Oral DAILY    ergocalciferol capsule 50,000 Units  50,000 Units Oral Q7D    hydrALAZINE (APRESOLINE) 20 mg/mL injection 40 mg  40 mg IntraVENous Q6H PRN           Problem List:   Acute hypoxic respiratory failure and patient seems to be somewhat better. Severe anemia. Renal failure. Heart failure. Left upper extremity DVT. Minimal elevation of troponin I is probably not a presentation of myocardial infarction. Patient has had ventricular tachycardia and has been shocked. Patient has maintained sinus rhythm. 1.      Assessment/Plan:   Patient was scheduled for cardiac catheterization but he says he does not want to go for catheterization. We will follow nephrology recommendations and vascular surgery recommendations. Continue anticoagulation. Probably physical therapy is of value. I will continue otherwise present care. Thank you. 1.          []       High complexity decision making was performed in this patient at high risk for decompensation with multiple organ involvement.     Denise Fajardo MD

## 2021-07-31 NOTE — PROGRESS NOTES
Problem: Falls - Risk of  Goal: *Absence of Falls  Description: Document Rose Spare Fall Risk and appropriate interventions in the flowsheet. Outcome: Progressing Towards Goal  Note: Fall Risk Interventions:  Mobility Interventions: Bed/chair exit alarm, Patient to call before getting OOB         Medication Interventions: Bed/chair exit alarm    Elimination Interventions: Bed/chair exit alarm, Call light in reach              Problem: Patient Education: Go to Patient Education Activity  Goal: Patient/Family Education  Outcome: Progressing Towards Goal     Problem: Pressure Injury - Risk of  Goal: *Prevention of pressure injury  Description: Document Alfa Scale and appropriate interventions in the flowsheet.   Outcome: Progressing Towards Goal  Note: Pressure Injury Interventions:  Sensory Interventions: Assess changes in LOC, Float heels, Keep linens dry and wrinkle-free, Minimize linen layers    Moisture Interventions: Absorbent underpads    Activity Interventions: Increase time out of bed, Pressure redistribution bed/mattress(bed type)    Mobility Interventions: Float heels    Nutrition Interventions: Document food/fluid/supplement intake    Friction and Shear Interventions: Apply protective barrier, creams and emollients, Lift sheet, Minimize layers                Problem: Patient Education: Go to Patient Education Activity  Goal: Patient/Family Education  Outcome: Progressing Towards Goal

## 2021-07-31 NOTE — PROGRESS NOTES
[facility]     Renal Daily Progress Note:     Admission Date: 2021     Subjective: patient seen in room 470  Sitting up in chair. Feeling ok,  BP up. Heart rate around up to 94 bpm. Creatinine trending down albeit at a slower pace. Not overtly short of breath at rest but has dyspnea on exertion. K higher    Not short of breath at rest. No cough. No chest or abdominal pain. Objective:     Visit Vitals  BP (!) 142/85   Pulse 66   Temp 98 °F (36.7 °C)   Resp 20   Ht 6' (1.829 m)   Wt 98.2 kg (216 lb 7.9 oz)   SpO2 97%   BMI 29.36 kg/m²     Temp (24hrs), Av.5 °F (36.4 °C), Min:96.4 °F (35.8 °C), Max:98 °F (36.7 °C)        Intake/Output Summary (Last 24 hours) at 2021  Last data filed at 2021 1453  Gross per 24 hour   Intake --   Output 1000 ml   Net -1000 ml     Current Facility-Administered Medications   Medication Dose Route Frequency    furosemide (LASIX) tablet 40 mg  40 mg Oral BID    sodium bicarbonate tablet 650 mg  650 mg Oral BID    amLODIPine (NORVASC) tablet 5 mg  5 mg Oral DAILY    apixaban (ELIQUIS) tablet 2.5 mg  2.5 mg Oral BID    sevelamer carbonate (RENVELA) tab 1,600 mg  1,600 mg Oral TID WITH MEALS    0.9% sodium chloride infusion 250 mL  250 mL IntraVENous PRN    0.9% sodium chloride infusion 250 mL  250 mL IntraVENous PRN    metoprolol tartrate (LOPRESSOR) tablet 25 mg  25 mg Oral DAILY    tamsulosin (FLOMAX) capsule 0.4 mg  0.4 mg Oral DAILY    [Held by provider] hydrALAZINE (APRESOLINE) tablet 50 mg  50 mg Oral TID    0.9% sodium chloride infusion 250 mL  250 mL IntraVENous PRN    calcitRIOL (ROCALTROL) capsule 0.25 mcg  0.25 mcg Oral DAILY    ergocalciferol capsule 50,000 Units  50,000 Units Oral Q7D    hydrALAZINE (APRESOLINE) 20 mg/mL injection 40 mg  40 mg IntraVENous Q6H PRN       Physical Exam:    Seen in Room 470    General appearance: Chronically ill-appearing patient sitting up In chair- comfortable.     Head: Normocephalic    Lungs: clear to auscultation but decreased basilar , no wheezes, no rales, poor air movement    Heart:  No S3 gallop , No pericardial rub. Abdomen: Not distended    Extremities:  Lower extremity edema including  dependent thighs. Neuro : Awake and responding appropriately. Data Review:     LABS:  Recent Labs     07/30/21  1135 07/29/21  1608 07/28/21  0652    141 141   K 5.6* 5.2* 5.0   * 110* 108   CO2 25 24 23   BUN 97* 97* 95*   CREA 6.54* 6.64* 6.83*   CA 8.4* 8.4* 8.5   ALB 3.3* 3.2* 3.2*   PHOS 6.6*  --   --    Vitamin D2 11.1  Intact   PSA  5.2  Recent Labs     07/30/21  1135 07/29/21  1608 07/28/21  0652   WBC 7.2 5.9 4.8   HGB 8.8* 8.8* 9.0*   HCT 30.1* 29.9* 30.9*   * 141* 146*   iron saturation 5%    No results for input(s): HÉCTOR, KU, CLU, CREAU in the last 72 hours. No lab exists for component: PROU     Blood Cultures 7-12-21  Two of four bottles have been flagged positive by instrument.  Bottles have been sent to [facility] laboratory to assess for possible growth. Gram Positive Cocci in clusters     Chest x-ray &-28  Chest 2 views.     Comparison chest series July 26, 2021.     No change compared to prior imaging. Patchy lower lobe lung reticular markings  with small dependent pleural effusions persist. Cardiac and mediastinal  structures unchanged. No pneumothorax. CXR 7-26-21  History: Pulmonary edema. Pleural effusions.     Comparison: Including chest 7/23/2021.     Findings: The cardiac silhouette remains enlarged. The lungs are adequately  expanded. There is pulmonary vascular congestion and hydrostatic edema, not  significantly changed as compared to chest 7/23/2021. Small to moderate pleural  effusions and associated bibasilar atelectasis persist.  No pneumothorax is  identified. Degenerative changes are present in the spine.     IMPRESSION  Hydrostatic edema. Pleural effusions and associated bibasilar  atelectasis. No significant interval change.      CXR 7-21-21  Chest single view.     Comparison single view chest July 19, 2021.     Patchy reticular markings through the lungs, same distribution. Those through  lung bases appear less dense; suspect mild degree improved aeration. Cardiac and  mediastinal structures unchanged. No pneumothorax or sizable pleural effusion. CXR 7-19-21     Comparison single view chest July 16, 2021.     Advanced patchy central and basilar reticular markings through the lungs. Consider pneumonia. Cardiac and mediastinal structures magnified on this AP  view. No pneumothorax or sizable pleural effusion. CXR July 14, 2021:  1 view comparison to 7/12     Continued cardiomegaly and congestion. If There is mild interstitial edema, it  is unchanged. Right pleural effusion and adjacent atelectasis have improved. Retroperitoneal US 7-14-21  Left kidney is 12.2 cm and right kidney is 9.1. Right renal cortex is echogenic  but not the left. Multiple cysts on each side, including a large cyst is 7 cm on  the left. No hydronephrosis. Aorta and IVC normals visualized. Prostatic  enlargement and diffuse bladder wall thickening.  Moderate ascites     IMPRESSION  Medical renal disease on the right    Assessment:   Renal Specific Problems    CKD stage IV/V   Acute azotemia-exacerbated by bladder outlet obstruction and relative hypotension with poor perfusion-may be resolving  Hypertension -amlodipine started, blood pressure elevation may be related to volume overload  Volume overload-   Hyperkalemia- with residual plaeural effusions and now lower ext edema  Metabolic acidosis- on bicarb  Acute anemia with significant iron deficiency  Acute left axillary/brachial vein DVT  Vitamin D2 def  Secondary hyperparathyroid  Proteinuria - nephrotic range        Plan:     Obtain/ Order: labs/cultures/radiology/procedures:  renal panel, CBC in AM      PLA2R Ab pending    Therapeutic:      Epogen  IV iron  --completed  Cont sodium bicarbonate  bid  Calcitriol and ergocalciferol   PO4 binder-- increased sevelamer to 2 pc  Flomax   Cont lasix to bid to promote diuresis but metolazone 5 mg/ am

## 2021-07-31 NOTE — PROGRESS NOTES
Pt's plan of care was discussed with attending. PT continues to recommend IRF. The barrier to IRF placement is: pt only has Medicare Part A. Pt needs Medicare Part B to cover rehab. Apparently, the pt was provided with the application to apply for Medicare Part B, however it can take weeks to process that application. Pt wants to return home with home health. Cm stopped by pt's room to f/up on his plan of care, however the pt was not in his room. One of pt's son's was at the bedside. Son states his father is getting an x-ray. Son indicated he was going to try to stay until his father returns, but if not, his brother will be there. CM to f/up with pt.

## 2021-07-31 NOTE — PROGRESS NOTES
Pulmonary, Critical Care    Name: Chikis Rouse MRN: [id number]   : 1955 Hospital: [address] [place] [address]   Date: 2021  Admission date: 2021 Hospital Day:        Subjective/Interval History:   Seen in the emergency room wearing noninvasive ventilator. Patient has underlying renal insufficiency developed worsening shortness of breath cough presented to the emergency room chest x-ray shows pulmonary edema. He was profoundly hypoxic is now on noninvasive ventilator and feels more comfortable. Has not had any significant urine output since he has been in the ER. He also complains of new onset left arm swelling   post void bladder scan showed greater than 200 cc urine retention and Quesada catheter placed with 500 cc removed. Overnight he has put out an additional 1200 cc. Respirations improved currently nonlabored on nasal oxygen. Duplex scan of the left arm did show left axillary and proximal brachial DVT and heparin was started. On heparin with Quesada catheter and he has had slight bleeding at the urethral meatus. Ultrasound of the abdomen is pending  7/15 ultrasound of the abdomen showed medical renal disease on the right with small kidney no hydronephrosis there was evidence of prostate enlargement. He is breathing easily at this point and on room air is running on oxygen saturation of 93%   respirations nonlabored on nasal oxygen. Quesada catheter is out he states he has been voiding frequent small amounts without straining   no specific complaints breathing easily. Overnight oximetry showed minimal but significant desaturation while on room air 8 minutes 40 seconds with low of 71%  . Developed V. tach this morning given IV amiodarone IV magnesium and transferred to the unit. Currently heart rate  alternating between A. fib and sinus with PAC. He denies any discomfort is breathing easily   remains sinus rhythm with PVCs.   Had worsening hypoxia during the night and placed on oxygen he feels comfortable at this point. Urine output mildly improved yesterday but input remains greater than output  7/24 no specific complaints respirations nonlabored currently room air with saturation 97% Lasix been resumed 7/23. Urine output not accurately recorded but he states he has been passing a lot of urine  7/25 feels fine denies shortness of breath on room air. Oxygen saturation 97% on room air while awake. He states he is putting out good urine from his oral Lasix although its not being recorded in the chart  7/27 overnight oximetry continues to show nocturnal hypoxia will continue nocturnal oxygen 7/28  no one put oxygen on him last night. Good urine output recorded yesterday 1500 cc but chest x-ray today continues to show pulmonary edema and small effusions  7/30 states again last night he was not placed on oxygen until he asked and then someone came and removed it later on. We will repeat overnight oximetry tonight on and off oxygen  7/31 feels fine no specific complaints. Overnight oximetry last night showed no desaturation when he was on room air  Patient Active Problem List   Diagnosis Code    GI bleed K92.2    Pulmonary edema J81.1       IMPRESSION:   1. Ventricular tachycardia none further seen   2. Atrial fibrillation converted to sinus  3. Hypotension corrected   4. Acute hypoxic respiratory failure room air oxygen saturation maintained while awake  5. Chronic hypoxic respiratory failure overnight oximetry showed no significant desaturation last night would like to repeat it the entire night on room air prior to discharge  6. Acute pulmonary edema radiographically no change in/28  7. 2 of 4 blood culture bottles with coagulase-negative staph aureus likely skin contaminant   8. Bilateral pleural effusions no change on 7/28 x-ray  9. Acute on chronic kidney disease creatinine  stable  10.  Obstructive uropathy Quesada catheter has been removed with no residual urine on bladder scanning  11. Severe hypertension corrected   12. DVT left axillary and brachial now on Eliquis  13. Anemia hemoglobin improved after last transfusion 7/21  14. Troponin leak likely due to renal insufficiency stable has not risen any further  Body mass index is 29.36 kg/m². RECOMMENDATIONS/PLAN:     1. Remains in sinus rhythm  2. Blood pressure well controlled now on metoprolol low-dose and Norvasc  3. Continue Flomax for prostate enlargement   4. Repeat chest x-ray with continued fluid overload  5. 7/31 overnight oximetry did not show any significant desaturation. We will repeat at the entire night on room air         Subjective/Initial History:       I was asked by Jacque Grider MD to see Chikis Rouse a 76 y.o.  male  in consultation for a chief complaint of shortness of breath pulmonary edema acute hypoxic respiratory failure        Patient PCP: Shilpi Slaughter MD  PMH:  has a past medical history of Chronic kidney disease and Hypertension. PSH:   has no past surgical history on file. FHX: family history is not on file. SHX:  reports that he has never smoked. He has never used smokeless tobacco.    Systemic review somewhat limited as he is on noninvasive ventilator remains short of breath although states he is feeling better  General he has not noted any worsening edema of his upper legs fever chills or sweats does complain of new onset swelling of his left hand and arm  Eyes no double vision or momentary blindness  ENT no facial pain over the sinuses  Endocrinologic no polyuria polydipsia  Musculoskeletal no swollen tender joints  Neurologic no history seizures or syncope  Gastrointestinal no nausea vomiting acid indigestion  Genitourinary states he does still pass fair amount of urine each day has not noted any decrease in that.   Does not have to strain to urinate has no bleeding or drainage  Cardiovascular he is not aware of any underlying heart disease has not had chest pain diaphoresis has had worsening shortness of breath laying down and with exertion  Respiratory worsening shortness of breath over the last week or more no significant cough no sputum production no chills or sweats did have history COVID-19 pneumonitis January of this year    No Known Allergies   MEDS:   Current Facility-Administered Medications   Medication    metOLazone (ZAROXOLYN) tablet 5 mg    furosemide (LASIX) tablet 40 mg    sodium bicarbonate tablet 650 mg    amLODIPine (NORVASC) tablet 5 mg    apixaban (ELIQUIS) tablet 2.5 mg    sevelamer carbonate (RENVELA) tab 1,600 mg    0.9% sodium chloride infusion 250 mL    0.9% sodium chloride infusion 250 mL    metoprolol tartrate (LOPRESSOR) tablet 25 mg    tamsulosin (FLOMAX) capsule 0.4 mg    [Held by provider] hydrALAZINE (APRESOLINE) tablet 50 mg    0.9% sodium chloride infusion 250 mL    calcitRIOL (ROCALTROL) capsule 0.25 mcg    ergocalciferol capsule 50,000 Units    hydrALAZINE (APRESOLINE) 20 mg/mL injection 40 mg        Current Facility-Administered Medications:     metOLazone (ZAROXOLYN) tablet 5 mg, 5 mg, Oral, DAILY, Jesse Lee MD, 5 mg at 07/31/21 7601    furosemide (LASIX) tablet 40 mg, 40 mg, Oral, BID, Jesse Lee MD, 40 mg at 07/31/21 3751    sodium bicarbonate tablet 650 mg, 650 mg, Oral, BID, Jesse Lee MD, 650 mg at 07/31/21 9141    amLODIPine (NORVASC) tablet 5 mg, 5 mg, Oral, DAILY, Williams Brooke MD, 5 mg at 07/31/21 1199    apixaban (ELIQUIS) tablet 2.5 mg, 2.5 mg, Oral, BID, Tyrone Boyd MD, 2.5 mg at 07/31/21 1481    sevelamer carbonate (RENVELA) tab 1,600 mg, 1,600 mg, Oral, TID WITH MEALS, Jesse Lee MD, 1,600 mg at 07/31/21 2054    0.9% sodium chloride infusion 250 mL, 250 mL, IntraVENous, PRN, Tyrone Boyd MD    0.9% sodium chloride infusion 250 mL, 250 mL, IntraVENous, PRN, Tyrone Boyd MD    metoprolol tartrate (LOPRESSOR) tablet 25 mg, 25 mg, Oral, Alee Zurita MD, 25 mg at 21 0908    tamsulosin (FLOMAX) capsule 0.4 mg, 0.4 mg, Oral, DAILY, Mekhi Page MD, 0.4 mg at 21 0007    [Held by provider] hydrALAZINE (APRESOLINE) tablet 50 mg, 50 mg, Oral, TID, Mekhi Page MD, 50 mg at 21    0.9% sodium chloride infusion 250 mL, 250 mL, IntraVENous, PRN, Mekhi Page MD    calcitRIOL (ROCALTROL) capsule 0.25 mcg, 0.25 mcg, Oral, DAILY, Jill Orellana MD, 0.25 mcg at 21 9839    ergocalciferol capsule 50,000 Units, 50,000 Units, Oral, Q7D, Jill Orellana MD, 50,000 Units at 21 1626    hydrALAZINE (APRESOLINE) 20 mg/mL injection 40 mg, 40 mg, IntraVENous, Q6H PRN, Mekhi Page MD, 40 mg at 07/15/21 1552      Objective:     Vital Signs: Telemetry:    normal sinus rhythm Intake/Output:   Visit Vitals  BP (!) 179/101 (BP 1 Location: Left leg, BP Patient Position: Sitting)   Pulse 77   Temp 97.6 °F (36.4 °C)   Resp 18   Ht 6' (1.829 m)   Wt 98.2 kg (216 lb 7.9 oz)   SpO2 91%   BMI 29.36 kg/m²       Temp (24hrs), Av.8 °F (36.6 °C), Min:97.6 °F (36.4 °C), Max:98 °F (36.7 °C)        O2 Device: None (Room air) O2 Flow Rate (L/min): 2 l/min         Body mass index is 29.36 kg/m². Wt Readings from Last 4 Encounters:   21 98.2 kg (216 lb 7.9 oz)   21 79.4 kg (175 lb)          Intake/Output Summary (Last 24 hours) at 2021 1051  Last data filed at 2021 0841  Gross per 24 hour   Intake 340 ml   Output 850 ml   Net -510 ml       Last shift:      701 - 1900  In: -   Out: 550 [Urine:550]  Last 3 shifts: 1901 - 700  In: 340 [P.O.:340]  Out: 1000 [Urine:1000]       Hemodynamics:    CO:    CI:    CVP:    SVR:   PAP Systolic:    PAP Diastolic:    PVR:    CV74:       Ventilator Settings:      Mode Rate TV Press PEEP FiO2 PIP Min.  Vent               28 %     9.7 l/min      Physical Exam:    General:  male; no distress now on room air  HEENT: NCAT, visible oral mucosa is moist and clear  Eyes: anicteric; conjunctiva clear extraocular movements intact  Neck: no nodes,,no accessory MM use. no respiratory distress  Chest: no deformity,   Cardiac: Regular rhythm and rate now controlled  Lungs: distant breath sounds; clear anteriorly and laterally with rales in the bases  Abd: Soft positive bowel sounds no tenderness  Ext: Improvement in edema  of the lower extremities with continued edema of the left arm; no joint swelling;  No clubbing  : Clear urine  Neuro: Alert awake no distress speech is clear moves all 4 extremities  Psych-calm, oriented to person;   Skin: warm, dry, no cyanosis;   Pulses: Brachial and radial pulses intact  Capillary:slow capillary refill      Labs:    Recent Labs     07/30/21  1135 07/29/21  1608    141   K 5.6* 5.2*   * 110*   CO2 25 24   * 100   BUN 97* 97*   CREA 6.54* 6.64*   CA 8.4* 8.4*   PHOS 6.6*  --    ALB 3.3* 3.2*   ALT  --  28   7/39 overnight oximetry on and off oxygen with low oxygen saturation 72% with only 5 minutes below 88%   7/29 overnight oximetry with low oxygen saturation 44% with 52 minutes 40 seconds below 88%   7/27  overnight oximetry split study with a low oxygen saturation 75% 33 minutes 10 seconds below 88%   7/25 overnight oximetry with 1 hour 13 minutes below 88% with low oxygen saturation 56%  7/19 overnight oximetry shows 14 minutes 26 seconds below 88% with low oxygen saturation 75%  7/17 overnight oximetry shows low oxygen saturation 71% with 8 minutes 40 seconds below 88% from 1 AM to 6 AM   7/16 overnight oximetry on 1 L shows only 2 minutes 20 seconds below 88% with a low oxygen saturation of 83%  7/15 room air oxygen saturation 93%  currently on noninvasive ventilator inspiratory pressure 16 expiratory pressure six rate 16 FiO2 28% with oxygen saturation 96%   BNP 32,264, previous greater than 35,000  Lab Results   Component Value Date/Time    Culture result:  07/12/2021 10:40 PM Two of four bottles have been flagged positive by instrument. Bottles have been sent to [facility] laboratory to assess for possible growth. Gram Positive Cocci in clusters CALLED TO AND READ BACK BY Janiya Padron r.n. [id number] 07/14/2021 by dpw    Culture result:  07/12/2021 10:40 PM     Staphylococcus species, coagulase negative GROWING IN THE 1ST OF 4 BOTTLES DRAWN    Culture result:  07/12/2021 10:40 PM     Staphylococcus species, coagulase negative (2nd colony type/strain) GROWING IN THE 2ND OF 4 BOTTLES DRAWN        Imaging:  I have personally reviewed the patients radiographs and have reviewed the reports:    CXR Results  (Last 48 hours)  7/23 chest x-ray mild pulmonary congestion tiny effusions unchanged from last x-ray although right upper lobe lung may have slightly less congestion  7/21 chest x-ray with continued mild pulmonary edema pattern small bilateral pleural effusions there may be improved inspiratory effort  7/16 chest x-ray with continued mild pulmonary edema and small pleural effusions unchanged               07/12/21 2251  XR CHEST PORT Final result    Impression:  Findings/impression:       Cardiac silhouette is enlarged. Central vascular congestion and patchy central   airspace disease/edema. Suspect trace right pleural effusion. No evidence of pneumothorax. No acute osseous abnormality identified. Narrative:  Study: XR CHEST PORT       Clinical indication: sob       Comparison: None. To me chest x-ray consistent with cardiomegaly pulmonary edema and bilateral pleural effusions           Results from Hospital Encounter encounter on 07/12/21    XR CHEST PA LAT    Narrative  Chest 2 views. Comparison chest series July 26, 2021. No change compared to prior imaging. Patchy lower lobe lung reticular markings  with small dependent pleural effusions persist. Cardiac and mediastinal  structures unchanged. No pneumothorax.       XR CHEST PA LAT    Narrative  Chest, 2 views, 7/26/2021    History: Pulmonary edema. Pleural effusions. Comparison: Including chest 7/23/2021. Findings: The cardiac silhouette remains enlarged. The lungs are adequately  expanded. There is pulmonary vascular congestion and hydrostatic edema, not  significantly changed as compared to chest 7/23/2021. Small to moderate pleural  effusions and associated bibasilar atelectasis persist.  No pneumothorax is  identified. Degenerative changes are present in the spine. Impression  Hydrostatic edema. Pleural effusions and associated bibasilar  atelectasis. No significant interval change. XR CHEST PORT    Narrative  This study is a portable AP radiograph of the chest dated 7/23/2021 obtained at  2:52 AM.    HISTORY: Pulmonary edema. COMPARISON: 7/21/2021. FINDINGS: There is moderate enlargement of the cardiac silhouette. There is  associated distention of the pulmonary vessels and perivascular ill definition  consistent with interstitial edema. There also is perivascular airspace disease  compatible with alveolar edema. There has been a decrease in the alveolar edema  within the right upper lobe. This examination is negative for larger pleural effusions. The remainder of the examination is unchanged. Impression  There is continued moderate pulmonary interstitial and alveolar  edema with a slight interval improvement within the right upper lobe. The  remainder of this examination is unchanged. Results from [place] encounter on 01/05/21    CT CHEST WO CONT    Narrative  Images obtained without contrast include the abdomen and pelvis. Comparison portable chest radiograph earlier today.     Dose Reduction:  All CT scans at this facility are performed using dose reduction optimization  techniques as appropriate to a performed exam including the following: Automated  exposure control, adjustments of the mA and/or kV according to patient size, or  use of iterative reconstruction technique. Subtle peripheral groundglass densities are noted in both lungs, could reflect  Covid pneumonia or other. No pneumothorax or pneumomediastinum. The radiographic findings suspicious for  pneumomediastinum probably was artifact, perhaps related to cardiac motion. No pleural effusion or adenopathy. Cardiomegaly again noted. Impression  IMPRESSION:  1. No pneumomediastinum or pneumothorax. 2. Cardiomegaly. 3. Subtle groundglass densities in both lungs might be pneumonia or other. Discussion patient with worsening renal insufficiency has developed pulmonary edema acute hypoxic respiratory failure. He states he still has urine output normally at home. We will give him high-dose IV Lasix to see if diuresis is induced. He very likely will need dialysis. He has minimal elevation in troponin probably troponin leak from hypoxia or just due to his renal insufficiency. He is not having any chest pain. Does have severe hypertension. Has been started on clonidine and hydralazine. 7/14 no distress today on nasal oxygen. Did have residual on post void bladder scan and Quesada catheter was placed with good diuresis overnight. Despite this potassium remains elevated. We will give 1 dose of Kayexalate. Despite diuresis hemoglobin is dropped to 6.7 will transfuse. He denies frequency small-volume urine or straining to urinate at home despite this with signs of obstruction we will add Flomax. Potassium 6 today we will give 1 dose of Kayexalate and continue diuresis  7/16 respirations nonlabored. Was placed back on 1 L nasal oxygen yesterday overnight oximetry shows well-maintained oxygen saturation. Will check overnight tonight on and off oxygen. Quesada catheter has been removed. Chest x-ray continues to show mild pulmonary edema  7/17 overnight oximetry shows desaturation when on room air.   Will maintain oxygen and repeat overnight oximetry [place] night  7/19 developed V. tach overnight and now in the ICU on IV amiodarone with rhythm showing alternating sinus with PAC and A. fib. Rate . Overnight oximetry continued to show desaturation on room air and he is back on nasal oxygen. He has no specific complaint  7/20 now in sinus rhythm with frequent PVCs. Cardiology is recommending cardiac cath but he is reluctant to undergo that. Creatinine has worsened despite IV fluid administration yesterday although blood pressure is improved. Currently oxygen saturation well-maintained on room air but last overnight oximetry showed desaturation will maintain oxygen at 1 L for now  7/21 had hypoxia yesterday afternoon and placed back on oxygen during the daytime as well as the night. He feels comfortable at this point. Chest x-ray is unchanged still has mild pulmonary edema with small effusions. It does appear that he took a deeper breath today. Creatinine remains markedly elevated. He is on heparin for left axillary DVT. Holding switching to oral anticoagulation until sure no instrumentation is needed   7/23 unchanged remains on IV heparin. Oral Lasix being resumed did require transfusion 7/21. Repeat chest x-ray no worsening  7/26 chest x-ray unchanged pulmonary edema and bilateral effusions. He states he has good urine output with the current dose of Lasix. We will repeat overnight oximetry on and off oxygen   7/27 continued nocturnal hypoxia he will need home oxygen  7/28 repeat overnight oximetry to keep him qualified for home oxygen  7/29 repeat overnight oximetry with low oxygen saturation 44% with 52 minutes 40 seconds below 88%. Continues to require nocturnal oxygen  7/31 repeat overnight oximetry on room air           Thank you for allowing us to participate in the care of this patient.   We will follow along with you     Leah Oreilly MD

## 2021-08-01 LAB
ALBUMIN SERPL-MCNC: 3.3 G/DL (ref 3.5–5)
ANION GAP SERPL CALC-SCNC: 9 MMOL/L (ref 5–15)
BUN SERPL-MCNC: 99 MG/DL (ref 6–20)
BUN/CREAT SERPL: 15 (ref 12–20)
CA-I BLD-MCNC: 8.6 MG/DL (ref 8.5–10.1)
CHLORIDE SERPL-SCNC: 110 MMOL/L (ref 97–108)
CO2 SERPL-SCNC: 23 MMOL/L (ref 21–32)
CREAT SERPL-MCNC: 6.43 MG/DL (ref 0.7–1.3)
ERYTHROCYTE [DISTWIDTH] IN BLOOD BY AUTOMATED COUNT: 19.3 % (ref 11.5–14.5)
GLUCOSE SERPL-MCNC: 94 MG/DL (ref 65–100)
HCT VFR BLD AUTO: 28.8 % (ref 36.6–50.3)
HGB BLD-MCNC: 8.5 G/DL (ref 12.1–17)
MCH RBC QN AUTO: 28 PG (ref 26–34)
MCHC RBC AUTO-ENTMCNC: 29.5 G/DL (ref 30–36.5)
MCV RBC AUTO: 94.7 FL (ref 80–99)
NRBC # BLD: 0 K/UL (ref 0–0.01)
NRBC BLD-RTO: 0 PER 100 WBC
PHOSPHATE SERPL-MCNC: 5.7 MG/DL (ref 2.6–4.7)
PLATELET # BLD AUTO: 159 K/UL (ref 150–400)
PMV BLD AUTO: 11.8 FL (ref 8.9–12.9)
POTASSIUM SERPL-SCNC: 5.2 MMOL/L (ref 3.5–5.1)
RBC # BLD AUTO: 3.04 M/UL (ref 4.1–5.7)
SODIUM SERPL-SCNC: 142 MMOL/L (ref 136–145)
WBC # BLD AUTO: 6.3 K/UL (ref 4.1–11.1)

## 2021-08-01 PROCEDURE — 74011250637 HC RX REV CODE- 250/637: Performed by: INTERNAL MEDICINE

## 2021-08-01 PROCEDURE — 85027 COMPLETE CBC AUTOMATED: CPT

## 2021-08-01 PROCEDURE — 36415 COLL VENOUS BLD VENIPUNCTURE: CPT

## 2021-08-01 PROCEDURE — 74011250636 HC RX REV CODE- 250/636: Performed by: INTERNAL MEDICINE

## 2021-08-01 PROCEDURE — 65270000029 HC RM PRIVATE

## 2021-08-01 PROCEDURE — 80069 RENAL FUNCTION PANEL: CPT

## 2021-08-01 RX ADMIN — SEVELAMER CARBONATE 1600 MG: 800 TABLET, FILM COATED ORAL at 08:27

## 2021-08-01 RX ADMIN — HYDRALAZINE HYDROCHLORIDE 40 MG: 20 INJECTION INTRAMUSCULAR; INTRAVENOUS at 20:38

## 2021-08-01 RX ADMIN — FUROSEMIDE 40 MG: 40 TABLET ORAL at 17:43

## 2021-08-01 RX ADMIN — SEVELAMER CARBONATE 1600 MG: 800 TABLET, FILM COATED ORAL at 17:43

## 2021-08-01 RX ADMIN — SODIUM BICARBONATE 650 MG: 650 TABLET ORAL at 20:38

## 2021-08-01 RX ADMIN — SODIUM BICARBONATE 650 MG: 650 TABLET ORAL at 08:28

## 2021-08-01 RX ADMIN — AMLODIPINE BESYLATE 5 MG: 5 TABLET ORAL at 08:28

## 2021-08-01 RX ADMIN — FUROSEMIDE 40 MG: 40 TABLET ORAL at 08:28

## 2021-08-01 RX ADMIN — HYDRALAZINE HYDROCHLORIDE 40 MG: 20 INJECTION INTRAMUSCULAR; INTRAVENOUS at 06:12

## 2021-08-01 RX ADMIN — APIXABAN 2.5 MG: 2.5 TABLET, FILM COATED ORAL at 20:38

## 2021-08-01 RX ADMIN — CALCITRIOL CAPSULES 0.25 MCG 0.25 MCG: 0.25 CAPSULE ORAL at 08:27

## 2021-08-01 RX ADMIN — METOLAZONE 5 MG: 5 TABLET ORAL at 08:27

## 2021-08-01 RX ADMIN — METOPROLOL TARTRATE 25 MG: 25 TABLET, FILM COATED ORAL at 08:28

## 2021-08-01 RX ADMIN — TAMSULOSIN HYDROCHLORIDE 0.4 MG: 0.4 CAPSULE ORAL at 08:27

## 2021-08-01 RX ADMIN — APIXABAN 2.5 MG: 2.5 TABLET, FILM COATED ORAL at 08:27

## 2021-08-01 RX ADMIN — SEVELAMER CARBONATE 1600 MG: 800 TABLET, FILM COATED ORAL at 11:54

## 2021-08-01 NOTE — PROGRESS NOTES
Progress Note    Esau Tello MD             Daily Progress Note: 8/1/2021      Subjective: The patient is seen for follow  up. Still has shortness of breath on minimal exertion. Discussed with patient's son who was next to him. I will arrange for him oxygen at home as well as home health care at home with PT and OT tomorrow if possible I would like to discharge him as patient is little bit more confident for walking at home. Problem List:  Problem List as of 8/1/2021 Never Reviewed        Codes Class Noted - Resolved    Pulmonary edema ICD-10-CM: J81.1  ICD-9-CM: 625  7/13/2021 - Present        GI bleed ICD-10-CM: K92.2  ICD-9-CM: 578.9  1/5/2021 - Present              Medications reviewed  Current Facility-Administered Medications   Medication Dose Route Frequency    metOLazone (ZAROXOLYN) tablet 5 mg  5 mg Oral DAILY    furosemide (LASIX) tablet 40 mg  40 mg Oral BID    sodium bicarbonate tablet 650 mg  650 mg Oral BID    amLODIPine (NORVASC) tablet 5 mg  5 mg Oral DAILY    apixaban (ELIQUIS) tablet 2.5 mg  2.5 mg Oral BID    sevelamer carbonate (RENVELA) tab 1,600 mg  1,600 mg Oral TID WITH MEALS    0.9% sodium chloride infusion 250 mL  250 mL IntraVENous PRN    0.9% sodium chloride infusion 250 mL  250 mL IntraVENous PRN    metoprolol tartrate (LOPRESSOR) tablet 25 mg  25 mg Oral DAILY    tamsulosin (FLOMAX) capsule 0.4 mg  0.4 mg Oral DAILY    [Held by provider] hydrALAZINE (APRESOLINE) tablet 50 mg  50 mg Oral TID    0.9% sodium chloride infusion 250 mL  250 mL IntraVENous PRN    calcitRIOL (ROCALTROL) capsule 0.25 mcg  0.25 mcg Oral DAILY    ergocalciferol capsule 50,000 Units  50,000 Units Oral Q7D    hydrALAZINE (APRESOLINE) 20 mg/mL injection 40 mg  40 mg IntraVENous Q6H PRN       Review of Systems:   A comprehensive review of systems was negative except for that written in the HPI.     Objective:   Physical Exam:     Visit Vitals  BP (!) 152/94 (BP 1 Location: Left upper arm, BP Patient Position: At rest;Sitting)   Pulse 68   Temp 97.4 °F (36.3 °C)   Resp 20   Ht 6' (1.829 m)   Wt 104.3 kg (229 lb 15 oz)   SpO2 93%   BMI 31.19 kg/m²    O2 Flow Rate (L/min): 2 l/min O2 Device: None (Room air)    Temp (24hrs), Av.7 °F (36.5 °C), Min:97.4 °F (36.3 °C), Max:98.4 °F (36.9 °C)    701 - 1900  In: -   Out: 500 [Urine:500]   1901 - 700  In: 660 [P.O.:660]  Out:  [WZPLP:1638]    General:  Alert, cooperative, no distress, appears stated age. Lungs:   Clear to auscultation bilaterally. Chest wall:  No tenderness or deformity. Heart:  Regular rate and rhythm, S1, S2 normal, no murmur, click, rub or gallop. Abdomen:   Soft, non-tender. Bowel sounds normal. No masses,  No organomegaly. Extremities: Extremities normal, atraumatic, no cyanosis edema on the left upper limb is improving. Right upper limb does not have any more edema. Bilateral leg edema which is improved compared to before. Pulses: 2+ and symmetric all extremities. Skin: Skin color, texture, turgor normal. No rashes or lesions   Neurologic: CNII-XII intact. No gross sensory or motor deficits     Data Review:       Recent Days:  Recent Labs     21  1040 21  1135   WBC 6.3 7.2   HGB 8.5* 8.8*   HCT 28.8* 30.1*    143*     Recent Labs     21  1040 21  1135    140   K 5.2* 5.6*   * 109*   CO2 23 25   GLU 94 107*   BUN 99* 97*   CREA 6.43* 6.54*   CA 8.6 8.4*   PHOS 5.7* 6.6*   ALB 3.3* 3.3*     No results for input(s): PH, PCO2, PO2, HCO3, FIO2 in the last 72 hours. Results     ** No results found for the last 336 hours.  **         24 Hour Results:  Recent Results (from the past 24 hour(s))   RENAL FUNCTION PANEL    Collection Time: 21 10:40 AM   Result Value Ref Range    Sodium 142 136 - 145 mmol/L    Potassium 5.2 (H) 3.5 - 5.1 mmol/L    Chloride 110 (H) 97 - 108 mmol/L    CO2 23 21 - 32 mmol/L    Anion gap 9 5 - 15 mmol/L    Glucose 94 65 - 100 mg/dL    BUN 99 (H) 6 - 20 mg/dL    Creatinine 6.43 (H) 0.70 - 1.30 mg/dL    BUN/Creatinine ratio 15 12 - 20      GFR est AA 11 (L) >60 ml/min/1.73m2    GFR est non-AA 9 (L) >60 ml/min/1.73m2    Calcium 8.6 8.5 - 10.1 mg/dL    Phosphorus 5.7 (H) 2.6 - 4.7 mg/dL    Albumin 3.3 (L) 3.5 - 5.0 g/dL   CBC W/O DIFF    Collection Time: 08/01/21 10:40 AM   Result Value Ref Range    WBC 6.3 4.1 - 11.1 K/uL    RBC 3.04 (L) 4.10 - 5.70 M/uL    HGB 8.5 (L) 12.1 - 17.0 g/dL    HCT 28.8 (L) 36.6 - 50.3 %    MCV 94.7 80.0 - 99.0 FL    MCH 28.0 26.0 - 34.0 PG    MCHC 29.5 (L) 30.0 - 36.5 g/dL    RDW 19.3 (H) 11.5 - 14.5 %    PLATELET 240 965 - 175 K/uL    MPV 11.8 8.9 - 12.9 FL    NRBC 0.0 0.0  WBC    ABSOLUTE NRBC 0.00 0.00 - 0.01 K/uL       XR CHEST PA LAT   Final Result   Findings/impression:      Volume overload noted, with slightly increased interstitial markings present   bilaterally. There is mild bilateral perihilar airspace disease present, with   mild interval improvement. Interval improvement in left lower lobe airspace   disease. No pneumothorax. CP angles are blunted suggesting small effusions. No   pneumothorax. Enlarged cardiac size again noted similar to prior study. No   suspicious osseous lesions. XR CHEST PA LAT   Final Result      XR CHEST PA LAT   Final Result   Hydrostatic edema. Pleural effusions and associated bibasilar   atelectasis. No significant interval change. XR CHEST PORT   Final Result   There is continued moderate pulmonary interstitial and alveolar   edema with a slight interval improvement within the right upper lobe. The   remainder of this examination is unchanged. XR CHEST PORT   Final Result      XR CHEST PORT   Final Result      LOWER EXT ART PVR MULT LEVEL SEG PRESSURES   Final Result      DUPLEX LOWER EXT VENOUS BILAT   Final Result      XR CHEST PORT   Final Result   Findings/impression: Stable cardiomediastinal silhouette.  Similar central   pulmonary vascular congestion and bilateral patchy airspace opacities. No   significant pleural effusion. No pneumothorax. Findings are not significantly changed. XR CHEST PORT   Final Result      US RETROPERITONEUM COMP   Final Result   Medical renal disease on the right      DUPLEX UPPER EXT VENOUS LEFT   Final Result      XR CHEST PORT   Final Result   Findings/impression:      Cardiac silhouette is enlarged. Central vascular congestion and patchy central   airspace disease/edema. Suspect trace right pleural effusion. No evidence of pneumothorax. No acute osseous abnormality identified. Assessment: Anemia now hemoglobin is stable  CHF now well compensated  Renal failure now renal function is stable  Hypertension  Acute DVT in left upper limb  Ventricular tachycardia with hypotension        Plan: As mentioned in the beginning I would like to discharge patient tomorrow provided his condition is stable        Care Plan discussed with: Patient/Family    Total time spent with patient: 30 minutes.     Omero Sykes MD

## 2021-08-01 NOTE — PROGRESS NOTES
Problem: Falls - Risk of  Goal: *Absence of Falls  Description: Document [place] Fall Risk and appropriate interventions in the flowsheet. Outcome: Progressing Towards Goal  Note: Fall Risk Interventions:  Mobility Interventions: Bed/chair exit alarm, Patient to call before getting OOB         Medication Interventions: Bed/chair exit alarm    Elimination Interventions: Bed/chair exit alarm, Call light in reach, Patient to call for help with toileting needs              Problem: Patient Education: Go to Patient Education Activity  Goal: Patient/Family Education  Outcome: Progressing Towards Goal     Problem: Pressure Injury - Risk of  Goal: *Prevention of pressure injury  Description: Document [place] Scale and appropriate interventions in the flowsheet.   Outcome: Progressing Towards Goal  Note: Pressure Injury Interventions:  Sensory Interventions: Assess changes in LOC, Keep linens dry and wrinkle-free, Minimize linen layers, Maintain/enhance activity level    Moisture Interventions: Absorbent underpads, Minimize layers    Activity Interventions: Increase time out of bed, Pressure redistribution bed/mattress(bed type)    Mobility Interventions: Float heels, Pressure redistribution bed/mattress (bed type)    Nutrition Interventions: Document food/fluid/supplement intake    Friction and Shear Interventions: Apply protective barrier, creams and emollients, Lift sheet, Minimize layers                Problem: Patient Education: Go to Patient Education Activity  Goal: Patient/Family Education  Outcome: Progressing Towards Goal

## 2021-08-01 NOTE — PROGRESS NOTES
Problem: Falls - Risk of  Goal: *Absence of Falls  Description: Document [place] Fall Risk and appropriate interventions in the flowsheet. Outcome: Progressing Towards Goal  Note: Fall Risk Interventions:  Mobility Interventions: Bed/chair exit alarm, Patient to call before getting OOB         Medication Interventions: Bed/chair exit alarm    Elimination Interventions: Bed/chair exit alarm, Call light in reach              Problem: Patient Education: Go to Patient Education Activity  Goal: Patient/Family Education  Outcome: Progressing Towards Goal     Problem: Pressure Injury - Risk of  Goal: *Prevention of pressure injury  Description: Document Alfa Scale and appropriate interventions in the flowsheet.   Outcome: Progressing Towards Goal  Note: Pressure Injury Interventions:  Sensory Interventions: Assess changes in LOC, Float heels, Keep linens dry and wrinkle-free, Minimize linen layers    Moisture Interventions: Absorbent underpads, Minimize layers    Activity Interventions: Increase time out of bed, Pressure redistribution bed/mattress(bed type)    Mobility Interventions: Float heels    Nutrition Interventions: Document food/fluid/supplement intake    Friction and Shear Interventions: Apply protective barrier, creams and emollients, Lift sheet, Minimize layers                Problem: Patient Education: Go to Patient Education Activity  Goal: Patient/Family Education  Outcome: Progressing Towards Goal

## 2021-08-01 NOTE — PROGRESS NOTES
[facility]     Renal Daily Progress Note:     Admission Date: 2021     Subjective: patient seen in room 470 . Feeling ok,  BP up. Heart rate around up to 94 bpm. Creatinine trending down albeit at a slower pace. Not overtly short of breath at rest but has dyspnea on exertion. K higher. Urine output up after metolazone added    Not short of breath at rest. No cough. No chest or abdominal pain.      Objective:     Visit Vitals  BP (!) 143/86   Pulse 73   Temp 97.4 °F (36.3 °C)   Resp 20   Ht 6' (1.829 m)   Wt 104.3 kg (229 lb 15 oz)   SpO2 95%   BMI 31.19 kg/m²     Temp (24hrs), Av.7 °F (36.5 °C), Min:97.4 °F (36.3 °C), Max:98.1 °F (36.7 °C)        Intake/Output Summary (Last 24 hours) at 2021 2332  Last data filed at 2021 1548  Gross per 24 hour   Intake 220 ml   Output 950 ml   Net -730 ml     Current Facility-Administered Medications   Medication Dose Route Frequency    metOLazone (ZAROXOLYN) tablet 5 mg  5 mg Oral DAILY    furosemide (LASIX) tablet 40 mg  40 mg Oral BID    sodium bicarbonate tablet 650 mg  650 mg Oral BID    amLODIPine (NORVASC) tablet 5 mg  5 mg Oral DAILY    apixaban (ELIQUIS) tablet 2.5 mg  2.5 mg Oral BID    sevelamer carbonate (RENVELA) tab 1,600 mg  1,600 mg Oral TID WITH MEALS    0.9% sodium chloride infusion 250 mL  250 mL IntraVENous PRN    0.9% sodium chloride infusion 250 mL  250 mL IntraVENous PRN    metoprolol tartrate (LOPRESSOR) tablet 25 mg  25 mg Oral DAILY    tamsulosin (FLOMAX) capsule 0.4 mg  0.4 mg Oral DAILY    [Held by provider] hydrALAZINE (APRESOLINE) tablet 50 mg  50 mg Oral TID    0.9% sodium chloride infusion 250 mL  250 mL IntraVENous PRN    calcitRIOL (ROCALTROL) capsule 0.25 mcg  0.25 mcg Oral DAILY    ergocalciferol capsule 50,000 Units  50,000 Units Oral Q7D    hydrALAZINE (APRESOLINE) 20 mg/mL injection 40 mg  40 mg IntraVENous Q6H PRN       Physical Exam:    Seen in Room 470    General appearance: Chronically ill-appearing patient sitting up In chair- comfortable. Head: Normocephalic    Lungs: clear to auscultation but decreased basilar , no wheezes, no rales, poor air movement    Heart:  No S3 gallop , No pericardial rub. Abdomen: Not distended    Extremities:  Lower extremity edema including  dependent thighs. Neuro : Awake and responding appropriately. Data Review:     LABS:  Recent Labs     07/30/21  1135 07/29/21  1608    141   K 5.6* 5.2*   * 110*   CO2 25 24   BUN 97* 97*   CREA 6.54* 6.64*   CA 8.4* 8.4*   ALB 3.3* 3.2*   PHOS 6.6*  --    Vitamin D2 11.1  Intact   PSA  5.2  Recent Labs     07/30/21  1135 07/29/21  1608   WBC 7.2 5.9   HGB 8.8* 8.8*   HCT 30.1* 29.9*   * 141*   iron saturation 5%    No results for input(s): HÉCTOR, KU, CLU, CREAU in the last 72 hours. No lab exists for component: PROU     Blood Cultures 7-12-21  Two of four bottles have been flagged positive by instrument.  Bottles have been sent to [facility] laboratory to assess for possible growth. Gram Positive Cocci in clusters     Chest x-ray &-28  Chest 2 views.     Comparison chest series July 26, 2021.     No change compared to prior imaging. Patchy lower lobe lung reticular markings  with small dependent pleural effusions persist. Cardiac and mediastinal  structures unchanged. No pneumothorax. CXR 7-26-21  History: Pulmonary edema. Pleural effusions.     Comparison: Including chest 7/23/2021.     Findings: The cardiac silhouette remains enlarged. The lungs are adequately  expanded. There is pulmonary vascular congestion and hydrostatic edema, not  significantly changed as compared to chest 7/23/2021. Small to moderate pleural  effusions and associated bibasilar atelectasis persist.  No pneumothorax is  identified. Degenerative changes are present in the spine.     IMPRESSION  Hydrostatic edema. Pleural effusions and associated bibasilar  atelectasis. No significant interval change.      CXR 7-21-21  Chest single view.     Comparison single view chest July 19, 2021.     Patchy reticular markings through the lungs, same distribution. Those through  lung bases appear less dense; suspect mild degree improved aeration. Cardiac and  mediastinal structures unchanged. No pneumothorax or sizable pleural effusion. CXR 7-19-21     Comparison single view chest July 16, 2021.     Advanced patchy central and basilar reticular markings through the lungs. Consider pneumonia. Cardiac and mediastinal structures magnified on this AP  view. No pneumothorax or sizable pleural effusion. CXR July 14, 2021:  1 view comparison to 7/12     Continued cardiomegaly and congestion. If There is mild interstitial edema, it  is unchanged. Right pleural effusion and adjacent atelectasis have improved. Retroperitoneal US 7-14-21  Left kidney is 12.2 cm and right kidney is 9.1. Right renal cortex is echogenic  but not the left. Multiple cysts on each side, including a large cyst is 7 cm on  the left. No hydronephrosis. Aorta and IVC normals visualized. Prostatic  enlargement and diffuse bladder wall thickening.  Moderate ascites     IMPRESSION  Medical renal disease on the right    Assessment:   Renal Specific Problems    CKD stage IV/V   Acute azotemia-exacerbated by bladder outlet obstruction and relative hypotension with poor perfusion-may be resolving  Hypertension -amlodipine started, blood pressure elevation may be related to volume overload  Volume overload-   Hyperkalemia- with residual plaeural effusions and now lower ext edema  Metabolic acidosis- on bicarb  Acute anemia with significant iron deficiency  Acute left axillary/brachial vein DVT  Vitamin D2 def  Secondary hyperparathyroid  Proteinuria - nephrotic range        Plan:     Obtain/ Order: labs/cultures/radiology/procedures:  renal panel, CBC in AM      PLA2R Ab pending    Therapeutic:      Epogen  IV iron  --completed  Cont sodium bicarbonate  bid  Calcitriol and ergocalciferol   PO4 binder-- increased sevelamer to 2 pc  Flomax   Cont lasix to bid to promote diuresis with metolazone 5 mg/ am

## 2021-08-01 NOTE — PROGRESS NOTES
[facility]     Renal Daily Progress Note:     Admission Date: 2021     Subjective: patient seen in room 470 . Feeling ok,  BP up. Creatinine trending down albeit at a slower pace. Not overtly short of breath at rest but has dyspnea on exertion. K higher. Urine output up after metolazone added. Does not like the food served    Not short of breath at rest. No cough. No chest or abdominal pain.      Objective:     Visit Vitals  BP (!) 152/94 (BP 1 Location: Left upper arm, BP Patient Position: At rest;Sitting)   Pulse 68   Temp 97.4 °F (36.3 °C)   Resp 20   Ht 6' (1.829 m)   Wt 104.3 kg (229 lb 15 oz)   SpO2 93%   BMI 31.19 kg/m²     Temp (24hrs), Av.7 °F (36.5 °C), Min:97.4 °F (36.3 °C), Max:98.4 °F (36.9 °C)        Intake/Output Summary (Last 24 hours) at 2021 1759  Last data filed at 2021 1745  Gross per 24 hour   Intake 320 ml   Output 1540 ml   Net -1220 ml     Current Facility-Administered Medications   Medication Dose Route Frequency    metOLazone (ZAROXOLYN) tablet 5 mg  5 mg Oral DAILY    furosemide (LASIX) tablet 40 mg  40 mg Oral BID    sodium bicarbonate tablet 650 mg  650 mg Oral BID    amLODIPine (NORVASC) tablet 5 mg  5 mg Oral DAILY    apixaban (ELIQUIS) tablet 2.5 mg  2.5 mg Oral BID    sevelamer carbonate (RENVELA) tab 1,600 mg  1,600 mg Oral TID WITH MEALS    0.9% sodium chloride infusion 250 mL  250 mL IntraVENous PRN    0.9% sodium chloride infusion 250 mL  250 mL IntraVENous PRN    metoprolol tartrate (LOPRESSOR) tablet 25 mg  25 mg Oral DAILY    tamsulosin (FLOMAX) capsule 0.4 mg  0.4 mg Oral DAILY    [Held by provider] hydrALAZINE (APRESOLINE) tablet 50 mg  50 mg Oral TID    0.9% sodium chloride infusion 250 mL  250 mL IntraVENous PRN    calcitRIOL (ROCALTROL) capsule 0.25 mcg  0.25 mcg Oral DAILY    ergocalciferol capsule 50,000 Units  50,000 Units Oral Q7D    hydrALAZINE (APRESOLINE) 20 mg/mL injection 40 mg  40 mg IntraVENous Q6H PRN       Physical Exam:    Seen in Room 470    General appearance: Chronically ill-appearing patient sitting up In chair- comfortable. Head: Normocephalic    Lungs: clear to auscultation but decreased basilar , no wheezes, no rales    Heart:  No S3 gallop , No pericardial rub. Abdomen: Not distended    Extremities:  Lower extremity edema including  dependent thighs. Neuro : Awake and responding appropriately. Data Review:     LABS:  Recent Labs     08/01/21  1040 07/30/21  1135    140   K 5.2* 5.6*   * 109*   CO2 23 25   BUN 99* 97*   CREA 6.43* 6.54*   CA 8.6 8.4*   ALB 3.3* 3.3*   PHOS 5.7* 6.6*   Vitamin D2 11.1  Intact   PSA  5.2  Recent Labs     08/01/21  1040 07/30/21  1135   WBC 6.3 7.2   HGB 8.5* 8.8*   HCT 28.8* 30.1*    143*   iron saturation 5%    No results for input(s): HÉCTOR, KU, CLU, CREAU in the last 72 hours. No lab exists for component: PROU     Blood Cultures 7-12-21  Two of four bottles have been flagged positive by instrument.  Bottles have been sent to [address] [place] [address] laboratory to assess for possible growth. Gram Positive Cocci in clusters     Chest x-ray 7-31-21  IMPRESSION  Findings/impression:     Volume overload noted, with slightly increased interstitial markings present  bilaterally. There is mild bilateral perihilar airspace disease present, with  mild interval improvement. Interval improvement in left lower lobe airspace  disease. No pneumothorax. CP angles are blunted suggesting small effusions. No  pneumothorax. Enlarged cardiac size again noted similar to prior study. No  suspicious osseous lesions. CXR 7-28-21  Chest 2 views.     Comparison chest series July 26, 2021.     No change compared to prior imaging. Patchy lower lobe lung reticular markings  with small dependent pleural effusions persist. Cardiac and mediastinal  structures unchanged. No pneumothorax. CXR 7-26-21  History: Pulmonary edema.   Pleural effusions.     Comparison: Including chest 7/23/2021.     Findings: The cardiac silhouette remains enlarged. The lungs are adequately  expanded. There is pulmonary vascular congestion and hydrostatic edema, not  significantly changed as compared to chest 7/23/2021. Small to moderate pleural  effusions and associated bibasilar atelectasis persist.  No pneumothorax is  identified. Degenerative changes are present in the spine.     IMPRESSION  Hydrostatic edema. Pleural effusions and associated bibasilar  atelectasis. No significant interval change. CXR 7-21-21  Chest single view.     Comparison single view chest July 19, 2021.     Patchy reticular markings through the lungs, same distribution. Those through  lung bases appear less dense; suspect mild degree improved aeration. Cardiac and  mediastinal structures unchanged. No pneumothorax or sizable pleural effusion. CXR 7-19-21     Comparison single view chest July 16, 2021.     Advanced patchy central and basilar reticular markings through the lungs. Consider pneumonia. Cardiac and mediastinal structures magnified on this AP  view. No pneumothorax or sizable pleural effusion. CXR July 14, 2021:  1 view comparison to 7/12     Continued cardiomegaly and congestion. If There is mild interstitial edema, it  is unchanged. Right pleural effusion and adjacent atelectasis have improved. Retroperitoneal US 7-14-21  Left kidney is 12.2 cm and right kidney is 9.1. Right renal cortex is echogenic  but not the left. Multiple cysts on each side, including a large cyst is 7 cm on  the left. No hydronephrosis. Aorta and IVC normals visualized. Prostatic  enlargement and diffuse bladder wall thickening.  Moderate ascites     IMPRESSION  Medical renal disease on the right    Assessment:   Renal Specific Problems    CKD stage IV/V   Acute azotemia-exacerbated by bladder outlet obstruction and relative hypotension with poor perfusion-may be resolving  Hypertension -amlodipine started, blood pressure elevation may be related to volume overload  Volume overload-   Hyperkalemia- with residual plaeural effusions and now lower ext edema  Metabolic acidosis- on bicarb  Acute anemia with significant iron deficiency  Acute left axillary/brachial vein DVT  Vitamin D2 def  Secondary hyperparathyroid  Proteinuria - nephrotic range        Plan:     Obtain/ Order: labs/cultures/radiology/procedures:  renal panel,    PLA2R Ab pending    Therapeutic:      Epogen  IV iron  --completed  Cont sodium bicarbonate  bid  Calcitriol and ergocalciferol   PO4 binder-- increased sevelamer to 2 pc  Flomax   Cont lasix to bid to promote diuresis with metolazone 5 mg/ am

## 2021-08-01 NOTE — PROGRESS NOTES
Pulmonary, Critical Care    Name: Stacy Preston MRN: [id number]   : 1955 Hospital: [facility]   Date: 2021  Admission date: 2021 Hospital Day:        Subjective/Interval History:   Seen in the emergency room wearing noninvasive ventilator. Patient has underlying renal insufficiency developed worsening shortness of breath cough presented to the emergency room chest x-ray shows pulmonary edema. He was profoundly hypoxic is now on noninvasive ventilator and feels more comfortable. Has not had any significant urine output since he has been in the ER. He also complains of new onset left arm swelling   post void bladder scan showed greater than 200 cc urine retention and Quesada catheter placed with 500 cc removed. Overnight he has put out an additional 1200 cc. Respirations improved currently nonlabored on nasal oxygen. Duplex scan of the left arm did show left axillary and proximal brachial DVT and heparin was started. On heparin with Quesada catheter and he has had slight bleeding at the urethral meatus. Ultrasound of the abdomen is pending  7/15 ultrasound of the abdomen showed medical renal disease on the right with small kidney no hydronephrosis there was evidence of prostate enlargement. He is breathing easily at this point and on room air is running on oxygen saturation of 93%   respirations nonlabored on nasal oxygen. Quesada catheter is out he states he has been voiding frequent small amounts without straining   no specific complaints breathing easily. Overnight oximetry showed minimal but significant desaturation while on room air 8 minutes 40 seconds with low of 71%  . Developed V. tach this morning given IV amiodarone IV magnesium and transferred to the unit. Currently heart rate  alternating between A. fib and sinus with PAC. He denies any discomfort is breathing easily   remains sinus rhythm with PVCs.   Had worsening hypoxia during the night and placed on oxygen he feels comfortable at this point. Urine output mildly improved yesterday but input remains greater than output  7/24 no specific complaints respirations nonlabored currently room air with saturation 97% Lasix been resumed 7/23. Urine output not accurately recorded but he states he has been passing a lot of urine  7/25 feels fine denies shortness of breath on room air. Oxygen saturation 97% on room air while awake. He states he is putting out good urine from his oral Lasix although its not being recorded in the chart  7/27 overnight oximetry continues to show nocturnal hypoxia will continue nocturnal oxygen 7/28  no one put oxygen on him last night. Good urine output recorded yesterday 1500 cc but chest x-ray today continues to show pulmonary edema and small effusions  7/30 states again last night he was not placed on oxygen until he asked and then someone came and removed it later on. We will repeat overnight oximetry tonight on and off oxygen  7/31 feels fine no specific complaints. Overnight oximetry last night showed no desaturation when he was on room air  8/1 he was put on 2 L nasal cannula this morning repeat overnight pulse oximetry results pending  Patient Active Problem List   Diagnosis Code    GI bleed K92.2    Pulmonary edema J81.1       IMPRESSION:   1. Ventricular tachycardia none further seen   2. Atrial fibrillation converted to sinus  3. Hypotension corrected   4. Acute hypoxic respiratory failure room air oxygen saturation maintained while awake  5. Chronic hypoxic respiratory failure overnight oximetry showed no significant desaturation last night would like to repeat it the entire night on room air prior to discharge  6. Acute pulmonary edema radiographically no change in/28  7. 2 of 4 blood culture bottles with coagulase-negative staph aureus likely skin contaminant   8. Bilateral pleural effusions no change on 7/28 x-ray  9.  Acute on chronic kidney disease creatinine  stable  10. Obstructive uropathy Quesada catheter has been removed with no residual urine on bladder scanning  11. Severe hypertension corrected   12. DVT left axillary and brachial now on Eliquis  13. Anemia hemoglobin improved after last transfusion 7/21  14. Troponin leak likely due to renal insufficiency stable has not risen any further  Body mass index is 31.19 kg/m². RECOMMENDATIONS/PLAN:     1. Remains in sinus rhythm  2. Blood pressure well controlled now on metoprolol low-dose and Norvasc  3. Continue Flomax for prostate enlargement   4. Repeat chest x-ray with continued fluid overload  5. 7/31 overnight oximetry did not show any significant desaturation. We will repeat at the entire night on room air         Subjective/Initial History:       I was asked by Nixon Holland MD to see Kathy Vale a 76 y.o.  male  in consultation for a chief complaint of shortness of breath pulmonary edema acute hypoxic respiratory failure        Patient PCP: Crystal Hickey MD  PMH:  has a past medical history of Chronic kidney disease and Hypertension. PSH:   has no past surgical history on file. FHX: family history is not on file. SHX:  reports that he has never smoked. He has never used smokeless tobacco.    Systemic review somewhat limited as he is on noninvasive ventilator remains short of breath although states he is feeling better  General he has not noted any worsening edema of his upper legs fever chills or sweats does complain of new onset swelling of his left hand and arm  Eyes no double vision or momentary blindness  ENT no facial pain over the sinuses  Endocrinologic no polyuria polydipsia  Musculoskeletal no swollen tender joints  Neurologic no history seizures or syncope  Gastrointestinal no nausea vomiting acid indigestion  Genitourinary states he does still pass fair amount of urine each day has not noted any decrease in that.   Does not have to strain to urinate has no bleeding or drainage  Cardiovascular he is not aware of any underlying heart disease has not had chest pain diaphoresis has had worsening shortness of breath laying down and with exertion  Respiratory worsening shortness of breath over the last week or more no significant cough no sputum production no chills or sweats did have history COVID-19 pneumonitis January of this year    No Known Allergies   MEDS:   Current Facility-Administered Medications   Medication    metOLazone (ZAROXOLYN) tablet 5 mg    furosemide (LASIX) tablet 40 mg    sodium bicarbonate tablet 650 mg    amLODIPine (NORVASC) tablet 5 mg    apixaban (ELIQUIS) tablet 2.5 mg    sevelamer carbonate (RENVELA) tab 1,600 mg    0.9% sodium chloride infusion 250 mL    0.9% sodium chloride infusion 250 mL    metoprolol tartrate (LOPRESSOR) tablet 25 mg    tamsulosin (FLOMAX) capsule 0.4 mg    [Held by provider] hydrALAZINE (APRESOLINE) tablet 50 mg    0.9% sodium chloride infusion 250 mL    calcitRIOL (ROCALTROL) capsule 0.25 mcg    ergocalciferol capsule 50,000 Units    hydrALAZINE (APRESOLINE) 20 mg/mL injection 40 mg        Current Facility-Administered Medications:     metOLazone (ZAROXOLYN) tablet 5 mg, 5 mg, Oral, DAILY, Latasha Toro MD, 5 mg at 08/01/21 0827    furosemide (LASIX) tablet 40 mg, 40 mg, Oral, BID, Latasha Toro MD, 40 mg at 08/01/21 2898    sodium bicarbonate tablet 650 mg, 650 mg, Oral, BID, Latasha Toro MD, 650 mg at 08/01/21 7155    amLODIPine (NORVASC) tablet 5 mg, 5 mg, Oral, DAILY, Kehinde Pathak MD, 5 mg at 08/01/21 0864    apixaban (ELIQUIS) tablet 2.5 mg, 2.5 mg, Oral, BID, Scott Johns MD, 2.5 mg at 08/01/21 0827    sevelamer carbonate (RENVELA) tab 1,600 mg, 1,600 mg, Oral, TID WITH MEALS, Latasha Toro MD, 1,600 mg at 08/01/21 0827    0.9% sodium chloride infusion 250 mL, 250 mL, IntraVENous, PRN, Scott Johns MD    0.9% sodium chloride infusion 250 mL, 250 mL, IntraVENous, PRN, Estefany Bedolla MD    metoprolol tartrate (LOPRESSOR) tablet 25 mg, 25 mg, Oral, DAILY, Maria Esther Cyr MD, 25 mg at 21 09    tamsulosin (FLOMAX) capsule 0.4 mg, 0.4 mg, Oral, DAILY, Sahil Graf MD, 0.4 mg at 21 08    [Held by provider] hydrALAZINE (APRESOLINE) tablet 50 mg, 50 mg, Oral, TID, Sahil Graf MD, 50 mg at 21    0.9% sodium chloride infusion 250 mL, 250 mL, IntraVENous, PRN, Sahil Graf MD    calcitRIOL (ROCALTROL) capsule 0.25 mcg, 0.25 mcg, Oral, DAILY, Maria Esther Cyr MD, 0.25 mcg at 21 08    ergocalciferol capsule 50,000 Units, 50,000 Units, Oral, Q7D, Maria Esther Cyr MD, 50,000 Units at 21 1626    hydrALAZINE (APRESOLINE) 20 mg/mL injection 40 mg, 40 mg, IntraVENous, Q6H PRN, Sahil Graf MD, 40 mg at 21 0612      Objective:     Vital Signs: Telemetry:    normal sinus rhythm Intake/Output:   Visit Vitals  BP (!) 150/92 (BP 1 Location: Right upper arm, BP Patient Position: At rest;Supine)   Pulse 80   Temp 98.1 °F (36.7 °C)   Resp 18   Ht 6' (1.829 m)   Wt 104.3 kg (229 lb 15 oz)   SpO2 99%   BMI 31.19 kg/m²       Temp (24hrs), Av.7 °F (36.5 °C), Min:97.4 °F (36.3 °C), Max:98.1 °F (36.7 °C)        O2 Device: None (Room air) O2 Flow Rate (L/min): 2 l/min         Body mass index is 31.19 kg/m². Wt Readings from Last 4 Encounters:   21 104.3 kg (229 lb 15 oz)   21 79.4 kg (175 lb)          Intake/Output Summary (Last 24 hours) at 2021 1007  Last data filed at 2021 0520  Gross per 24 hour   Intake 320 ml   Output 1440 ml   Net -1120 ml       Last shift:      No intake/output data recorded. Last 3 shifts: 1901 -  0700  In: 660 [P.O.:660]  Out:  [Urine:]       Hemodynamics:    CO:    CI:    CVP:    SVR:   PAP Systolic:    PAP Diastolic:    PVR:    ZF02:       Ventilator Settings:      Mode Rate TV Press PEEP FiO2 PIP Min.  Vent               21 %     9.7 l/min Physical Exam:    General:  male; no distress now on room air  HEENT: NCAT, visible oral mucosa is moist and clear  Eyes: anicteric; conjunctiva clear extraocular movements intact  Neck: no nodes,,no accessory MM use. no respiratory distress  Chest: no deformity,   Cardiac: Regular rhythm and rate now controlled  Lungs: distant breath sounds; clear anteriorly and laterally with rales in the bases  Abd: Soft positive bowel sounds no tenderness  Ext: Improvement in edema  of the lower extremities with continued edema of the left arm; no joint swelling;  No clubbing  : Clear urine  Neuro: Alert awake no distress speech is clear moves all 4 extremities  Psych-calm, oriented to person;   Skin: warm, dry, no cyanosis;   Pulses: Brachial and radial pulses intact  Capillary:slow capillary refill      Labs:    Recent Labs     07/30/21  1135 07/29/21  1608    141   K 5.6* 5.2*   * 110*   CO2 25 24   * 100   BUN 97* 97*   CREA 6.54* 6.64*   CA 8.4* 8.4*   PHOS 6.6*  --    ALB 3.3* 3.2*   ALT  --  28   7/39 overnight oximetry on and off oxygen with low oxygen saturation 72% with only 5 minutes below 88%   7/29 overnight oximetry with low oxygen saturation 44% with 52 minutes 40 seconds below 88%   7/27  overnight oximetry split study with a low oxygen saturation 75% 33 minutes 10 seconds below 88%   7/25 overnight oximetry with 1 hour 13 minutes below 88% with low oxygen saturation 56%  7/19 overnight oximetry shows 14 minutes 26 seconds below 88% with low oxygen saturation 75%  7/17 overnight oximetry shows low oxygen saturation 71% with 8 minutes 40 seconds below 88% from 1 AM to 6 AM   7/16 overnight oximetry on 1 L shows only 2 minutes 20 seconds below 88% with a low oxygen saturation of 83%  7/15 room air oxygen saturation 93%  currently on noninvasive ventilator inspiratory pressure 16 expiratory pressure six rate 16 FiO2 28% with oxygen saturation 96%   BNP 32,264, previous greater than 35,000  Lab Results   Component Value Date/Time    Culture result:  07/12/2021 10:40 PM     Two of four bottles have been flagged positive by instrument. Bottles have been sent to [facility] laboratory to assess for possible growth. Gram Positive Cocci in clusters CALLED TO AND READ BACK BY Fletcher Davis r.n. [id number] 07/14/2021 by dpw    Culture result:  07/12/2021 10:40 PM     Staphylococcus species, coagulase negative GROWING IN THE 1ST OF 4 BOTTLES DRAWN    Culture result:  07/12/2021 10:40 PM     Staphylococcus species, coagulase negative (2nd colony type/strain) GROWING IN THE 2ND OF 4 BOTTLES DRAWN        Imaging:  I have personally reviewed the patients radiographs and have reviewed the reports:    CXR Results  (Last 48 hours)  7/23 chest x-ray mild pulmonary congestion tiny effusions unchanged from last x-ray although right upper lobe lung may have slightly less congestion  7/21 chest x-ray with continued mild pulmonary edema pattern small bilateral pleural effusions there may be improved inspiratory effort  7/16 chest x-ray with continued mild pulmonary edema and small pleural effusions unchanged               07/12/21 2251  XR CHEST PORT Final result    Impression:  Findings/impression:       Cardiac silhouette is enlarged. Central vascular congestion and patchy central   airspace disease/edema. Suspect trace right pleural effusion. No evidence of pneumothorax. No acute osseous abnormality identified. Narrative:  Study: XR CHEST PORT       Clinical indication: sob       Comparison: None. To me chest x-ray consistent with cardiomegaly pulmonary edema and bilateral pleural effusions           Results from Hospital Encounter encounter on 07/12/21    XR CHEST PA LAT    Narrative  Two-view chest:    COMPARISON: Chest x-ray July 20, 2021. Impression  Findings/impression:    Volume overload noted, with slightly increased interstitial markings present  bilaterally.  There is mild bilateral perihilar airspace disease present, with  mild interval improvement. Interval improvement in left lower lobe airspace  disease. No pneumothorax. CP angles are blunted suggesting small effusions. No  pneumothorax. Enlarged cardiac size again noted similar to prior study. No  suspicious osseous lesions. XR CHEST PA LAT    Narrative  Chest 2 views. Comparison chest series July 26, 2021. No change compared to prior imaging. Patchy lower lobe lung reticular markings  with small dependent pleural effusions persist. Cardiac and mediastinal  structures unchanged. No pneumothorax. XR CHEST PA LAT    Narrative  Chest, 2 views, 7/26/2021    History: Pulmonary edema. Pleural effusions. Comparison: Including chest 7/23/2021. Findings: The cardiac silhouette remains enlarged. The lungs are adequately  expanded. There is pulmonary vascular congestion and hydrostatic edema, not  significantly changed as compared to chest 7/23/2021. Small to moderate pleural  effusions and associated bibasilar atelectasis persist.  No pneumothorax is  identified. Degenerative changes are present in the spine. Impression  Hydrostatic edema. Pleural effusions and associated bibasilar  atelectasis. No significant interval change. Results from [place] encounter on 01/05/21    CT CHEST WO CONT    Narrative  Images obtained without contrast include the abdomen and pelvis. Comparison portable chest radiograph earlier today. Dose Reduction:  All CT scans at this facility are performed using dose reduction optimization  techniques as appropriate to a performed exam including the following: Automated  exposure control, adjustments of the mA and/or kV according to patient size, or  use of iterative reconstruction technique. Subtle peripheral groundglass densities are noted in both lungs, could reflect  Covid pneumonia or other. No pneumothorax or pneumomediastinum.  The radiographic findings suspicious for  pneumomediastinum probably was artifact, perhaps related to cardiac motion. No pleural effusion or adenopathy. Cardiomegaly again noted. Impression  IMPRESSION:  1. No pneumomediastinum or pneumothorax. 2. Cardiomegaly. 3. Subtle groundglass densities in both lungs might be pneumonia or other. Discussion patient with worsening renal insufficiency has developed pulmonary edema acute hypoxic respiratory failure. He states he still has urine output normally at home. We will give him high-dose IV Lasix to see if diuresis is induced. He very likely will need dialysis. He has minimal elevation in troponin probably troponin leak from hypoxia or just due to his renal insufficiency. He is not having any chest pain. Does have severe hypertension. Has been started on clonidine and hydralazine. 7/14 no distress today on nasal oxygen. Did have residual on post void bladder scan and Quesada catheter was placed with good diuresis overnight. Despite this potassium remains elevated. We will give 1 dose of Kayexalate. Despite diuresis hemoglobin is dropped to 6.7 will transfuse. He denies frequency small-volume urine or straining to urinate at home despite this with signs of obstruction we will add Flomax. Potassium 6 today we will give 1 dose of Kayexalate and continue diuresis  7/16 respirations nonlabored. Was placed back on 1 L nasal oxygen yesterday overnight oximetry shows well-maintained oxygen saturation. Will check overnight tonight on and off oxygen. Quesada catheter has been removed. Chest x-ray continues to show mild pulmonary edema  7/17 overnight oximetry shows desaturation when on room air. Will maintain oxygen and repeat overnight oximetry Ibrahima night  7/19 developed V. tach overnight and now in the ICU on IV amiodarone with rhythm showing alternating sinus with PAC and A. fib. Rate .   Overnight oximetry continued to show desaturation on room air and he is back on nasal oxygen. He has no specific complaint  7/20 now in sinus rhythm with frequent PVCs. Cardiology is recommending cardiac cath but he is reluctant to undergo that. Creatinine has worsened despite IV fluid administration yesterday although blood pressure is improved. Currently oxygen saturation well-maintained on room air but last overnight oximetry showed desaturation will maintain oxygen at 1 L for now  7/21 had hypoxia yesterday afternoon and placed back on oxygen during the daytime as well as the night. He feels comfortable at this point. Chest x-ray is unchanged still has mild pulmonary edema with small effusions. It does appear that he took a deeper breath today. Creatinine remains markedly elevated. He is on heparin for left axillary DVT. Holding switching to oral anticoagulation until sure no instrumentation is needed   7/23 unchanged remains on IV heparin. Oral Lasix being resumed did require transfusion 7/21. Repeat chest x-ray no worsening  7/26 chest x-ray unchanged pulmonary edema and bilateral effusions. He states he has good urine output with the current dose of Lasix. We will repeat overnight oximetry on and off oxygen   7/27 continued nocturnal hypoxia he will need home oxygen  7/28 repeat overnight oximetry to keep him qualified for home oxygen  7/29 repeat overnight oximetry with low oxygen saturation 44% with 52 minutes 40 seconds below 88%. Continues to require nocturnal oxygen  7/31 repeat overnight oximetry on room air           Thank you for allowing us to participate in the care of this patient.   We will follow along with you     Thelma Alejandro MD

## 2021-08-01 NOTE — PROGRESS NOTES
Progress Note      8/1/2021 9:54 AM  NAME: Bee Coates   MRN:  [id number]   Admit Diagnosis: Pulmonary edema [J81.1]      Subjective:   Chart reviewed. Patient has had a symptomatic ventricular tachycardia and was shocked. Vascular surgery note appreciated. Echocardiogram results explained. He feels much better today. Patient was offered option of cardiac catheterization but he has decided not to pursue that. Edema seems to be improving. Review of Systems:    Symptom Y/N Comments  Symptom Y/N Comments   Fever/Chills n   Chest Pain n    Poor Appetite    Edema y  somewhat better. Cough    Abdominal Pain     Sputum    Joint Pain n    SOB/CARMONA n   Pruritis/Rash     Nausea/vomit    Other     Diarrhea         Constipation           Could NOT obtain due to:      Objective:          Physical Exam:    Last 24hrs VS reviewed since prior progress note. Most recent are:    Visit Vitals  /89 (BP 1 Location: Right upper arm, BP Patient Position: At rest;Supine)   Pulse 64   Temp 98.4 °F (36.9 °C)   Resp 20   Ht 6' (1.829 m)   Wt 104.3 kg (229 lb 15 oz)   SpO2 99%   BMI 31.19 kg/m²       Intake/Output Summary (Last 24 hours) at 8/1/2021 1520  Last data filed at 8/1/2021 1109  Gross per 24 hour   Intake 320 ml   Output 1440 ml   Net -1120 ml        General Appearance: Well developed, well nourished, alert & oriented x 3,    no acute distress. Ears/Nose/Mouth/Throat: Hearing grossly normal.  Neck: Supple. Chest: Lungs clear to auscultation bilaterally. Cardiovascular: Regular rate and rhythm, S1,S2 normal, no murmur. Abdomen: Soft, non-tender, bowel sounds are active. Extremities: Lower extremity edema left upper extremity edema evident  Skin: Lower extremity blisters evident    []         Post-cath site without hematoma, bruit, tenderness, or thrill. Distal pulses intact.     PMH/SH reviewed - no change compared to H&P    Data Review    Telemetry: Patient had a ventricular tachycardia and was shocked and had episode of atrial fibrillation and now is in sinus rhythm with PVCs. EKG:   []  No new EKG for review    Lab Data Personally Reviewed:    Recent Labs     08/01/21  1040 07/30/21  1135   WBC 6.3 7.2   HGB 8.5* 8.8*   HCT 28.8* 30.1*    143*     No results for input(s): INR, PTP, APTT, INREXT, INREXT in the last 72 hours. Recent Labs     08/01/21  1040 07/30/21  1135 07/29/21  1608    140 141   K 5.2* 5.6* 5.2*   * 109* 110*   CO2 23 25 24   BUN 99* 97* 97*   CREA 6.43* 6.54* 6.64*   GLU 94 107* 100   CA 8.6 8.4* 8.4*     No results for input(s): CPK, CKNDX, TROIQ in the last 72 hours. No lab exists for component: CPKMB  No results found for: CHOL, CHOLX, CHLST, CHOLV, HDL, HDLP, LDL, LDLC, DLDLP, Ricardo Aguilar, CHHD, CHHDX    Recent Labs     08/01/21  1040 07/30/21  1135 07/29/21  1608   AP  --   --  99   TP  --   --  6.3*   ALB 3.3* 3.3* 3.2*   GLOB  --   --  3.1     No results for input(s): PH, PCO2, PO2 in the last 72 hours.     Medications Personally Reviewed:    Current Facility-Administered Medications   Medication Dose Route Frequency    metOLazone (ZAROXOLYN) tablet 5 mg  5 mg Oral DAILY    furosemide (LASIX) tablet 40 mg  40 mg Oral BID    sodium bicarbonate tablet 650 mg  650 mg Oral BID    amLODIPine (NORVASC) tablet 5 mg  5 mg Oral DAILY    apixaban (ELIQUIS) tablet 2.5 mg  2.5 mg Oral BID    sevelamer carbonate (RENVELA) tab 1,600 mg  1,600 mg Oral TID WITH MEALS    0.9% sodium chloride infusion 250 mL  250 mL IntraVENous PRN    0.9% sodium chloride infusion 250 mL  250 mL IntraVENous PRN    metoprolol tartrate (LOPRESSOR) tablet 25 mg  25 mg Oral DAILY    tamsulosin (FLOMAX) capsule 0.4 mg  0.4 mg Oral DAILY    [Held by provider] hydrALAZINE (APRESOLINE) tablet 50 mg  50 mg Oral TID    0.9% sodium chloride infusion 250 mL  250 mL IntraVENous PRN    calcitRIOL (ROCALTROL) capsule 0.25 mcg  0.25 mcg Oral DAILY    ergocalciferol capsule 50,000 Units  50,000 Units Oral Q7D    hydrALAZINE (APRESOLINE) 20 mg/mL injection 40 mg  40 mg IntraVENous Q6H PRN           Problem List:   Acute hypoxic respiratory failure and patient seems to be somewhat better. Severe anemia. Renal failure. Heart failure. Left upper extremity DVT. Minimal elevation of troponin I is probably not a presentation of myocardial infarction. Patient has had ventricular tachycardia and has been shocked. Patient has maintained sinus rhythm. 1.      Assessment/Plan:   Patient was scheduled for cardiac catheterization but he says he does not want to go for catheterization. We will follow nephrology recommendations and vascular surgery recommendations. Continue anticoagulation. Probably physical therapy is of value. I will continue otherwise present care. Thank you. 1.          []       High complexity decision making was performed in this patient at high risk for decompensation with multiple organ involvement.     Larry Cruz MD

## 2021-08-02 PROCEDURE — 77010033678 HC OXYGEN DAILY

## 2021-08-02 PROCEDURE — 74011250637 HC RX REV CODE- 250/637: Performed by: INTERNAL MEDICINE

## 2021-08-02 PROCEDURE — 51798 US URINE CAPACITY MEASURE: CPT

## 2021-08-02 PROCEDURE — 74011250636 HC RX REV CODE- 250/636: Performed by: INTERNAL MEDICINE

## 2021-08-02 PROCEDURE — 94760 N-INVAS EAR/PLS OXIMETRY 1: CPT

## 2021-08-02 PROCEDURE — 65270000029 HC RM PRIVATE

## 2021-08-02 RX ORDER — FUROSEMIDE 40 MG/1
80 TABLET ORAL DAILY
Status: DISCONTINUED | OUTPATIENT
Start: 2021-08-03 | End: 2021-08-02

## 2021-08-02 RX ORDER — SEVELAMER CARBONATE 800 MG/1
1600 TABLET, FILM COATED ORAL
Qty: 90 TABLET | Refills: 0 | Status: SHIPPED | OUTPATIENT
Start: 2021-08-02

## 2021-08-02 RX ORDER — FUROSEMIDE 10 MG/ML
40 INJECTION INTRAMUSCULAR; INTRAVENOUS 2 TIMES DAILY
Status: DISCONTINUED | OUTPATIENT
Start: 2021-08-02 | End: 2021-08-05

## 2021-08-02 RX ORDER — CALCITRIOL 0.25 UG/1
0.25 CAPSULE ORAL DAILY
Qty: 30 CAPSULE | Refills: 0 | Status: SHIPPED | OUTPATIENT
Start: 2021-08-03

## 2021-08-02 RX ORDER — TAMSULOSIN HYDROCHLORIDE 0.4 MG/1
0.4 CAPSULE ORAL DAILY
Qty: 30 CAPSULE | Refills: 0 | Status: SHIPPED | OUTPATIENT
Start: 2021-08-03

## 2021-08-02 RX ORDER — AMLODIPINE BESYLATE 5 MG/1
5 TABLET ORAL DAILY
Qty: 30 TABLET | Refills: 0 | Status: SHIPPED | OUTPATIENT
Start: 2021-08-03

## 2021-08-02 RX ORDER — METOLAZONE 5 MG/1
5 TABLET ORAL DAILY
Qty: 30 TABLET | Refills: 0 | Status: SHIPPED | OUTPATIENT
Start: 2021-08-03 | End: 2022-02-08

## 2021-08-02 RX ORDER — METOPROLOL TARTRATE 25 MG/1
25 TABLET, FILM COATED ORAL DAILY
Qty: 30 TABLET | Refills: 0 | Status: SHIPPED | OUTPATIENT
Start: 2021-08-03

## 2021-08-02 RX ADMIN — SEVELAMER CARBONATE 1600 MG: 800 TABLET, FILM COATED ORAL at 13:51

## 2021-08-02 RX ADMIN — SEVELAMER CARBONATE 1600 MG: 800 TABLET, FILM COATED ORAL at 09:16

## 2021-08-02 RX ADMIN — SODIUM BICARBONATE 650 MG: 650 TABLET ORAL at 21:25

## 2021-08-02 RX ADMIN — FUROSEMIDE 40 MG: 40 TABLET ORAL at 09:15

## 2021-08-02 RX ADMIN — APIXABAN 2.5 MG: 2.5 TABLET, FILM COATED ORAL at 09:16

## 2021-08-02 RX ADMIN — CALCITRIOL CAPSULES 0.25 MCG 0.25 MCG: 0.25 CAPSULE ORAL at 09:15

## 2021-08-02 RX ADMIN — FUROSEMIDE 40 MG: 10 INJECTION, SOLUTION INTRAMUSCULAR; INTRAVENOUS at 21:24

## 2021-08-02 RX ADMIN — SEVELAMER CARBONATE 1600 MG: 800 TABLET, FILM COATED ORAL at 18:08

## 2021-08-02 RX ADMIN — TAMSULOSIN HYDROCHLORIDE 0.4 MG: 0.4 CAPSULE ORAL at 09:15

## 2021-08-02 RX ADMIN — SODIUM BICARBONATE 650 MG: 650 TABLET ORAL at 09:15

## 2021-08-02 RX ADMIN — FUROSEMIDE 40 MG: 10 INJECTION, SOLUTION INTRAMUSCULAR; INTRAVENOUS at 13:51

## 2021-08-02 RX ADMIN — AMLODIPINE BESYLATE 5 MG: 5 TABLET ORAL at 09:16

## 2021-08-02 RX ADMIN — METOLAZONE 5 MG: 5 TABLET ORAL at 09:14

## 2021-08-02 RX ADMIN — METOPROLOL TARTRATE 25 MG: 25 TABLET, FILM COATED ORAL at 09:16

## 2021-08-02 RX ADMIN — APIXABAN 2.5 MG: 2.5 TABLET, FILM COATED ORAL at 21:24

## 2021-08-02 NOTE — PROGRESS NOTES
Progress Note    David Viera MD             Daily Progress Note: 8/2/2021      Subjective: The patient is seen for follow  up. Complaining of shortness of breath on minimal exertion. Also according to RN patient requires 2 people to make him stand up from his chair. Also from physical therapy note patient requires 3 hours of physical therapy a day for 5 to 7 days a week he requires inpatient rehab I believe he is at high risk for fall and he is on Eliquis. Problem List:  Problem List as of 8/2/2021 Never Reviewed        Codes Class Noted - Resolved    Pulmonary edema ICD-10-CM: J81.1  ICD-9-CM: 941  7/13/2021 - Present        GI bleed ICD-10-CM: K92.2  ICD-9-CM: 578.9  1/5/2021 - Present              Medications reviewed  Current Facility-Administered Medications   Medication Dose Route Frequency    [START ON 8/3/2021] furosemide (LASIX) tablet 80 mg  80 mg Oral DAILY    metOLazone (ZAROXOLYN) tablet 5 mg  5 mg Oral DAILY    sodium bicarbonate tablet 650 mg  650 mg Oral BID    amLODIPine (NORVASC) tablet 5 mg  5 mg Oral DAILY    apixaban (ELIQUIS) tablet 2.5 mg  2.5 mg Oral BID    sevelamer carbonate (RENVELA) tab 1,600 mg  1,600 mg Oral TID WITH MEALS    0.9% sodium chloride infusion 250 mL  250 mL IntraVENous PRN    0.9% sodium chloride infusion 250 mL  250 mL IntraVENous PRN    metoprolol tartrate (LOPRESSOR) tablet 25 mg  25 mg Oral DAILY    tamsulosin (FLOMAX) capsule 0.4 mg  0.4 mg Oral DAILY    [Held by provider] hydrALAZINE (APRESOLINE) tablet 50 mg  50 mg Oral TID    0.9% sodium chloride infusion 250 mL  250 mL IntraVENous PRN    calcitRIOL (ROCALTROL) capsule 0.25 mcg  0.25 mcg Oral DAILY    ergocalciferol capsule 50,000 Units  50,000 Units Oral Q7D    hydrALAZINE (APRESOLINE) 20 mg/mL injection 40 mg  40 mg IntraVENous Q6H PRN       Review of Systems:   A comprehensive review of systems was negative except for that written in the HPI.     Objective:   Physical Exam: Visit Vitals  BP (!) 168/104 Comment: Rechecked,177/99   Pulse 84   Temp 97.3 °F (36.3 °C)   Resp 22   Ht 6' (1.829 m)   Wt 104.3 kg (229 lb 15 oz)   SpO2 95%   BMI 31.19 kg/m²    O2 Flow Rate (L/min): 1 l/min O2 Device: Nasal cannula    Temp (24hrs), Av.6 °F (36.4 °C), Min:97.3 °F (36.3 °C), Max:98 °F (36.7 °C)    701 - 1900  In: -   Out: 090 [QESJP:528]   1901 - 700  In: 440 [P.O.:440]  Out: 1920 [BJUWE:0237]    General:  Alert, cooperative, no distress, appears stated age. Lungs:    Decreased breath sounds all over   Chest wall:  No tenderness or deformity. Heart:  Regular rate and rhythm, S1, S2 normal, no murmur, click, rub or gallop. Abdomen:   Soft, non-tender. Bowel sounds normal. No masses,  No organomegaly. Extremities: Extremities normal, atraumatic, no cyanosis bilateral leg edema   Pulses: 2+ and symmetric all extremities. Skin: Skin color, texture, turgor normal. No rashes or lesions   Neurologic: CNII-XII intact. No gross sensory or motor deficits     Data Review:       Recent Days:  Recent Labs     21  1040   WBC 6.3   HGB 8.5*   HCT 28.8*        Recent Labs     21  1040      K 5.2*   *   CO2 23   GLU 94   BUN 99*   CREA 6.43*   CA 8.6   PHOS 5.7*   ALB 3.3*     No results for input(s): PH, PCO2, PO2, HCO3, FIO2 in the last 72 hours. Results     ** No results found for the last 336 hours. **         24 Hour Results:  No results found for this or any previous visit (from the past 24 hour(s)). XR CHEST PA LAT   Final Result   Findings/impression:      Volume overload noted, with slightly increased interstitial markings present   bilaterally. There is mild bilateral perihilar airspace disease present, with   mild interval improvement. Interval improvement in left lower lobe airspace   disease. No pneumothorax. CP angles are blunted suggesting small effusions. No   pneumothorax.  Enlarged cardiac size again noted similar to prior study. No   suspicious osseous lesions. XR CHEST PA LAT   Final Result      XR CHEST PA LAT   Final Result   Hydrostatic edema. Pleural effusions and associated bibasilar   atelectasis. No significant interval change. XR CHEST PORT   Final Result   There is continued moderate pulmonary interstitial and alveolar   edema with a slight interval improvement within the right upper lobe. The   remainder of this examination is unchanged. XR CHEST PORT   Final Result      XR CHEST PORT   Final Result      LOWER EXT ART PVR MULT LEVEL SEG PRESSURES   Final Result      DUPLEX LOWER EXT VENOUS BILAT   Final Result      XR CHEST PORT   Final Result   Findings/impression: Stable cardiomediastinal silhouette. Similar central   pulmonary vascular congestion and bilateral patchy airspace opacities. No   significant pleural effusion. No pneumothorax. Findings are not significantly changed. XR CHEST PORT   Final Result      US RETROPERITONEUM COMP   Final Result   Medical renal disease on the right      DUPLEX UPPER EXT VENOUS LEFT   Final Result      XR CHEST PORT   Final Result   Findings/impression:      Cardiac silhouette is enlarged. Central vascular congestion and patchy central   airspace disease/edema. Suspect trace right pleural effusion. No evidence of pneumothorax. No acute osseous abnormality identified. Assessment: CHF  DVT left upper limb  Anemia now hemoglobin is stable  Chronic renal failure renal function is stable  Hypertension        Plan: I will start patient on IV Lasix 40 mg every 12 hours as on last chest x-ray patient has increased interstitial edema. This can also contribute to his increased shortness of breath and weakness. Patient requires cardiac catheterization. But he refuses for that.   I had to cancel the discharge on patient as he is not stable enough to be at home he requires again I am repeating myself inpatient rehab        Care Plan discussed with: Patient/Family RN taking care of the patient as well as charge nurse. Total time spent with patient: 30 minutes.     Reyes Ache, MD

## 2021-08-02 NOTE — PROGRESS NOTES
CM has reached out to [facility] regarding patient going under a aurora bed, as aurora beds at this facility come available at the beginning of the month. At this time [facility] is reviewing him. CM continues to follow. 2:45 PM - [facility] IRF aurora paperwork has been completed with patient and faxed to [facility] IRF for review via luba.   CM awaiting acceptance or denial.

## 2021-08-02 NOTE — DISCHARGE SUMMARY
Discharge Summary       PATIENT ID: Jefe Florez  MRN: [id number]   YOB: 1955    DATE OF ADMISSION: 7/12/2021 10:27 PM    DATE OF DISCHARGE: 2 August 2021  PRIMARY CARE PROVIDER: Fany Hua MD     ATTENDING PHYSICIAN: Renny Butler  DISCHARGING PROVIDER:Mike Ramirez   To contact this individual call [phone]    CONSULTATIONS: IP CONSULT TO PULMONOLOGY  IP CONSULT TO NEPHROLOGY  IP CONSULT TO UROLOGY  IP CONSULT TO HEMATOLOGY  IP CONSULT TO HEMATOLOGY  IP CONSULT TO CARDIOLOGY  IP CONSULT TO VASCULAR SURGERY    PROCEDURES/SURGERIES: Procedure(s):  LEFT HEART CATH / [address]:   Acute florid pulmonary edema  Acute on chronic renal failure  Hypertension  Anemia  Hypoxemic respiratory failure  Patient is a known patient to me 76years old -American man with history of hypertension and chronic renal failure came to the emergency room with severe shortness of breath he was diagnosed with hypoxemic respiratory failure. He was placed on BiPAP and and he was transferred to telemetry he was placed on IV Lasix. Pulmonary consult as well as nephrology consult for obtained. Patient had left upper limb swelling so he had a venous duplex scan which was done in ER which came out positive for which came out positive for DVT. He was started on IV heparin. He had low hemoglobin so he had also received 2 units of PRBCs. In between patient had ventricular tachycardia with hypotension which was converted back to regular sinus rhythm but patient was very weak and also he refused further cardiac catheterization. During this admission patient was also seen by vascular surgery as he is feet and hands were purplish but his arterial studies came out negative for any kind of peripheral vascular disease. Patient has to stay in hospital as we were not able to find him inpatient rehab.   Yesterday I spoke to his son and patient himself now patient is little bit more confident in standing up and walking with a walker at home. It is my understanding from  I was told that patient has somebody with him all the time at home. I have reconciliate L on medications. Patient gets short of breath on exertion he is on 2 L/min nasal cannula oxygen here at present time. He needs to get oxygen 2 L/min at home. I have discussed this case in detail with the RN taking care of the patient as well as charge nurse taking care of the patient all the medications are reconciliate it. DISCHARGE DIAGNOSES / PLAN:      1. Acute florid pulmonary edema now stable  2. Acute left upper limb DVT  3. Ventricular tachycardia with hypotension now resolved  4. Acute on chronic renal failure now resolved  5. Severe anemia now hemoglobin has been stable     ADDITIONAL CARE RECOMMENDATIONS:   Patient actually requires inpatient rehab but according to  patient does not have any insurance which can cover his very inpatient rehab. Also we requested the facility for a aurora which was also refused. So he will require oxygen 2 L/min continuous throughout the day and night as well as he will require a walker with a seat at home    PENDING TEST RESULTS:   At the time of discharge the following test results are still pending: None    FOLLOW UP APPOINTMENTS:    Dr.Akshay FLAVIA Edmond in 1 week      DIET:  2 g sodium 1500 mL fluid restriction  Oral Nutritional Supplements: None    ACTIVITY: Activity as tolerated    WOUND CARE: No wound care needed    EQUIPMENT needed: Walker      DISCHARGE MEDICATIONS:  Current Discharge Medication List      START taking these medications    Details   amLODIPine (NORVASC) 5 mg tablet Take 1 Tablet by mouth daily. Qty: 30 Tablet, Refills: 0  Start date: 8/3/2021      apixaban (ELIQUIS) 2.5 mg tablet Take 1 Tablet by mouth two (2) times a day.   Qty: 60 Tablet, Refills: 0  Start date: 8/2/2021      calcitRIOL (ROCALTROL) 0.25 mcg capsule Take 1 Capsule by mouth daily. Qty: 30 Capsule, Refills: 0  Start date: 8/3/2021      metOLazone (ZAROXOLYN) 5 mg tablet Take 1 Tablet by mouth daily. Qty: 30 Tablet, Refills: 0  Start date: 8/3/2021      metoprolol tartrate (LOPRESSOR) 25 mg tablet Take 1 Tablet by mouth daily. Indications: rapid ventricular heartbeat  Qty: 30 Tablet, Refills: 0  Start date: 8/3/2021      sevelamer carbonate (RENVELA) 800 mg tab tab Take 2 Tablets by mouth three (3) times daily (with meals). Indications: renal osteodystrophy with hyperphosphatemia  Qty: 90 Tablet, Refills: 0  Start date: 8/2/2021      tamsulosin (FLOMAX) 0.4 mg capsule Take 1 Capsule by mouth daily. Qty: 30 Capsule, Refills: 0  Start date: 8/3/2021         CONTINUE these medications which have NOT CHANGED    Details   sodium bicarbonate 650 mg tablet Take 1 Tab by mouth two (2) times a day. Qty: 60 Tab, Refills: 0         STOP taking these medications       hydrALAZINE (APRESOLINE) 50 mg tablet Comments:   Reason for Stopping:                 NOTIFY YOUR PHYSICIAN FOR ANY OF THE FOLLOWING:   Fever over 101 degrees for 24 hours. Chest pain, shortness of breath, fever, chills, nausea, vomiting, diarrhea, change in mentation, falling, weakness, bleeding. Severe pain or pain not relieved by medications. Or, any other signs or symptoms that you may have questions about. DISPOSITION:    Home With:  y OT y PT y [facility] y RN       Long term SNF/Inpatient Rehab    Independent/assisted living    Hospice    Other:       PATIENT CONDITION AT DISCHARGE:     Functional status    Poor    y Deconditioned     Independent      Cognition   y  Lucid     Forgetful     Dementia      Catheters/lines (plus indication)    [place]uesada     PICC     PEG    y None      Code status    y Full code     DNR      PHYSICAL EXAMINATION AT DISCHARGE:  General:          Alert, cooperative, no distress, appears stated age.      HEENT:           Atraumatic, anicteric sclerae, pink [id number] No oral ulcers, mucosa moist, throat clear, dentition fair  Neck:               Supple, symmetrical  Lungs:             Clear to auscultation bilaterally. No Wheezing or Rhonchi. No rales. Chest wall:      No tenderness  No Accessory muscle use. Heart:              Regular  rhythm,  No  murmur   No edema  Abdomen:        Soft, non-tender. Not distended. Bowel sounds normal  Extremities:     No cyanosis. No clubbing,   edema 3+                          Skin turgor normal, Capillary refill normal  Skin:                Not pale. Not Jaundiced  No rashes   Psych:             Not anxious or agitated.   Neurologic:      Alert, moves all extremities, answers questions appropriately and responds to commands       CHRONIC MEDICAL DIAGNOSES:  Problem List as of 8/2/2021 Never Reviewed        Codes Class Noted - Resolved    Pulmonary edema ICD-10-CM: J81.1  ICD-9-CM: 389  7/13/2021 - Present        GI bleed ICD-10-CM: K92.2  ICD-9-CM: 578.9  1/5/2021 - Present              Greater than 45 minutes were spent with the patient on counseling and coordination of care    Signed:   Jamaica Hughes MD  8/2/2021  12:48 PM

## 2021-08-02 NOTE — PROGRESS NOTES
[facility]     Renal Daily Progress Note:     Admission Date: 2021     Subjective: patient seen in room 470 . Feeling ok,  BP up. Creatinine trending down albeit at a slower pace. Not overtly short of breath at rest but has dyspnea on exertion. K stable. Urine output up after metolazone added. Does not like the food served    Not short of breath at rest. No cough. No chest or abdominal pain.      Objective:     Visit Vitals  BP (!) 142/91 (BP 1 Location: Right upper arm)   Pulse 67   Temp 97.5 °F (36.4 °C)   Resp 22   Ht 6' (1.829 m)   Wt 104.3 kg (229 lb 15 oz)   SpO2 95%   BMI 31.19 kg/m²     Temp (24hrs), Av.6 °F (36.4 °C), Min:97.3 °F (36.3 °C), Max:98 °F (36.7 °C)        Intake/Output Summary (Last 24 hours) at 2021 1632  Last data filed at 2021 0750  Gross per 24 hour   Intake 120 ml   Output 1400 ml   Net -1280 ml     Current Facility-Administered Medications   Medication Dose Route Frequency    furosemide (LASIX) injection 40 mg  40 mg IntraVENous BID    metOLazone (ZAROXOLYN) tablet 5 mg  5 mg Oral DAILY    sodium bicarbonate tablet 650 mg  650 mg Oral BID    amLODIPine (NORVASC) tablet 5 mg  5 mg Oral DAILY    apixaban (ELIQUIS) tablet 2.5 mg  2.5 mg Oral BID    sevelamer carbonate (RENVELA) tab 1,600 mg  1,600 mg Oral TID WITH MEALS    0.9% sodium chloride infusion 250 mL  250 mL IntraVENous PRN    0.9% sodium chloride infusion 250 mL  250 mL IntraVENous PRN    metoprolol tartrate (LOPRESSOR) tablet 25 mg  25 mg Oral DAILY    tamsulosin (FLOMAX) capsule 0.4 mg  0.4 mg Oral DAILY    [Held by provider] hydrALAZINE (APRESOLINE) tablet 50 mg  50 mg Oral TID    0.9% sodium chloride infusion 250 mL  250 mL IntraVENous PRN    calcitRIOL (ROCALTROL) capsule 0.25 mcg  0.25 mcg Oral DAILY    ergocalciferol capsule 50,000 Units  50,000 Units Oral Q7D    hydrALAZINE (APRESOLINE) 20 mg/mL injection 40 mg  40 mg IntraVENous Q6H PRN       Physical Exam:    Seen in Room 470    General appearance: Chronically ill-appearing patient sitting up In chair- comfortable. Head: Normocephalic    Lungs: clear to auscultation but decreased basilar , no wheezes, no rales    Heart:  No S3 gallop , No pericardial rub. Abdomen: Not distended    Extremities:  Lower extremity edema including  dependent thighs. Neuro : Awake and responding appropriately. Data Review:     LABS:  Recent Labs     08/01/21  1040      K 5.2*   *   CO2 23   BUN 99*   CREA 6.43*   CA 8.6   ALB 3.3*   PHOS 5.7*   Vitamin D2 11.1  Intact   PSA  5.2  Recent Labs     08/01/21  1040   WBC 6.3   HGB 8.5*   HCT 28.8*      iron saturation 5%    No results for input(s): HÉCTOR, KU, CLU, CREAU in the last 72 hours. No lab exists for component: PROU     Blood Cultures 7-12-21  Two of four bottles have been flagged positive by instrument.  Bottles have been sent to [facility] laboratory to assess for possible growth. Gram Positive Cocci in clusters     Chest x-ray 7-31-21  IMPRESSION  Findings/impression:     Volume overload noted, with slightly increased interstitial markings present  bilaterally. There is mild bilateral perihilar airspace disease present, with  mild interval improvement. Interval improvement in left lower lobe airspace  disease. No pneumothorax. CP angles are blunted suggesting small effusions. No  pneumothorax. Enlarged cardiac size again noted similar to prior study. No  suspicious osseous lesions. CXR 7-28-21  Chest 2 views.     Comparison chest series July 26, 2021.     No change compared to prior imaging. Patchy lower lobe lung reticular markings  with small dependent pleural effusions persist. Cardiac and mediastinal  structures unchanged. No pneumothorax. CXR 7-26-21  History: Pulmonary edema. Pleural effusions.     Comparison: Including chest 7/23/2021.     Findings: The cardiac silhouette remains enlarged. The lungs are adequately  expanded.   There is pulmonary vascular congestion and hydrostatic edema, not  significantly changed as compared to chest 7/23/2021. Small to moderate pleural  effusions and associated bibasilar atelectasis persist.  No pneumothorax is  identified. Degenerative changes are present in the spine.     IMPRESSION  Hydrostatic edema. Pleural effusions and associated bibasilar  atelectasis. No significant interval change. CXR 7-21-21  Chest single view.     Comparison single view chest July 19, 2021.     Patchy reticular markings through the lungs, same distribution. Those through  lung bases appear less dense; suspect mild degree improved aeration. Cardiac and  mediastinal structures unchanged. No pneumothorax or sizable pleural effusion. CXR 7-19-21     Comparison single view chest July 16, 2021.     Advanced patchy central and basilar reticular markings through the lungs. Consider pneumonia. Cardiac and mediastinal structures magnified on this AP  view. No pneumothorax or sizable pleural effusion. CXR July 14, 2021:  1 view comparison to 7/12     Continued cardiomegaly and congestion. If There is mild interstitial edema, it  is unchanged. Right pleural effusion and adjacent atelectasis have improved. Retroperitoneal US 7-14-21  Left kidney is 12.2 cm and right kidney is 9.1. Right renal cortex is echogenic  but not the left. Multiple cysts on each side, including a large cyst is 7 cm on  the left. No hydronephrosis. Aorta and IVC normals visualized. Prostatic  enlargement and diffuse bladder wall thickening.  Moderate ascites     IMPRESSION  Medical renal disease on the right    Assessment:   Renal Specific Problems    CKD stage IV/V   Acute azotemia-exacerbated by bladder outlet obstruction and relative hypotension with poor perfusion-may be resolving  Hypertension -amlodipine started, blood pressure elevation may be related to volume overload  Volume overload- resolving slowly  Hyperkalemia- with residual pleural effusions and now lower ext edema  Metabolic acidosis- on bicarb  Acute anemia with significant iron deficiency  Acute left axillary/brachial vein DVT  Vitamin D2 def  Secondary hyperparathyroid  Proteinuria - nephrotic range        Plan:     Obtain/ Order: labs/cultures/radiology/procedures:  renal panel,    PLA2R Ab pending    Therapeutic:      Epogen  IV iron  --completed  Cont sodium bicarbonate  bid  Calcitriol and ergocalciferol   PO4 binder-- increased sevelamer to 2 pc  Flomax   Cont lasix to bid to promote diuresis with metolazone 5 mg/ am

## 2021-08-02 NOTE — PROGRESS NOTES
Progress Note      8/2/2021 9:54 AM  NAME: Chikis Rouse   MRN:  [id number]   Admit Diagnosis: Pulmonary edema [J81.1]      Subjective:   Chart reviewed. Patient has had a symptomatic ventricular tachycardia and was shocked. Vascular surgery note appreciated. Echocardiogram results explained. He feels much better today. Patient was offered option of cardiac catheterization but he has decided not to pursue that. Edema seems to be improving. Review of Systems:    Symptom Y/N Comments  Symptom Y/N Comments   Fever/Chills n   Chest Pain n    Poor Appetite    Edema y  somewhat better. Cough    Abdominal Pain     Sputum    Joint Pain n    SOB/CARMOAN n   Pruritis/Rash     Nausea/vomit    Other     Diarrhea         Constipation           Could NOT obtain due to:      Objective:          Physical Exam:    Last 24hrs VS reviewed since prior progress note. Most recent are:    Visit Vitals  /88 (BP 1 Location: Right lower arm, BP Patient Position: At rest)   Pulse 72   Temp 98 °F (36.7 °C)   Resp 18   Ht 6' (1.829 m)   Wt 104.3 kg (229 lb 15 oz)   SpO2 97%   BMI 31.19 kg/m²       Intake/Output Summary (Last 24 hours) at 8/2/2021 7970  Last data filed at 8/2/2021 0455  Gross per 24 hour   Intake 120 ml   Output 880 ml   Net -760 ml        General Appearance: Well developed, well nourished, alert & oriented x 3,    no acute distress. Ears/Nose/Mouth/Throat: Hearing grossly normal.  Neck: Supple. Chest: Lungs clear to auscultation bilaterally. Cardiovascular: Regular rate and rhythm, S1,S2 normal, no murmur. Abdomen: Soft, non-tender, bowel sounds are active. Extremities: Lower extremity edema left upper extremity edema evident  Skin: Lower extremity blisters evident    []         Post-cath site without hematoma, bruit, tenderness, or thrill. Distal pulses intact.     PMH/SH reviewed - no change compared to H&P    Data Review    Telemetry: Patient had a ventricular tachycardia and was shocked and had episode of atrial fibrillation and now is in sinus rhythm with PVCs. EKG:   []  No new EKG for review    Lab Data Personally Reviewed:    Recent Labs     08/01/21  1040 07/30/21  1135   WBC 6.3 7.2   HGB 8.5* 8.8*   HCT 28.8* 30.1*    143*     No results for input(s): INR, PTP, APTT, INREXT, INREXT in the last 72 hours. Recent Labs     08/01/21  1040 07/30/21  1135    140   K 5.2* 5.6*   * 109*   CO2 23 25   BUN 99* 97*   CREA 6.43* 6.54*   GLU 94 107*   CA 8.6 8.4*     No results for input(s): CPK, CKNDX, TROIQ in the last 72 hours. No lab exists for component: CPKMB  No results found for: CHOL, CHOLX, CHLST, CHOLV, HDL, HDLP, LDL, LDLC, DLDLP, TGLX, TRIGL, TRIGP, CHHD, CHHDX    Recent Labs     08/01/21  1040 07/30/21  1135   ALB 3.3* 3.3*     No results for input(s): PH, PCO2, PO2 in the last 72 hours.     Medications Personally Reviewed:    Current Facility-Administered Medications   Medication Dose Route Frequency    metOLazone (ZAROXOLYN) tablet 5 mg  5 mg Oral DAILY    furosemide (LASIX) tablet 40 mg  40 mg Oral BID    sodium bicarbonate tablet 650 mg  650 mg Oral BID    amLODIPine (NORVASC) tablet 5 mg  5 mg Oral DAILY    apixaban (ELIQUIS) tablet 2.5 mg  2.5 mg Oral BID    sevelamer carbonate (RENVELA) tab 1,600 mg  1,600 mg Oral TID WITH MEALS    0.9% sodium chloride infusion 250 mL  250 mL IntraVENous PRN    0.9% sodium chloride infusion 250 mL  250 mL IntraVENous PRN    metoprolol tartrate (LOPRESSOR) tablet 25 mg  25 mg Oral DAILY    tamsulosin (FLOMAX) capsule 0.4 mg  0.4 mg Oral DAILY    [Held by provider] hydrALAZINE (APRESOLINE) tablet 50 mg  50 mg Oral TID    0.9% sodium chloride infusion 250 mL  250 mL IntraVENous PRN    calcitRIOL (ROCALTROL) capsule 0.25 mcg  0.25 mcg Oral DAILY    ergocalciferol capsule 50,000 Units  50,000 Units Oral Q7D    hydrALAZINE (APRESOLINE) 20 mg/mL injection 40 mg  40 mg IntraVENous Q6H PRN           Problem List:   Acute hypoxic respiratory failure and patient seems to be somewhat better. Severe anemia. Renal failure. Heart failure. Left upper extremity DVT. Minimal elevation of troponin I is probably not a presentation of myocardial infarction. Patient has had ventricular tachycardia and has been shocked. Patient has maintained sinus rhythm. Patient is clinically doing better. 1.      Assessment/Plan:   Patient was scheduled for cardiac catheterization but he says he does not want to go for catheterization. We will follow nephrology recommendations and vascular surgery recommendations. Continue anticoagulation. Probably physical therapy is of value. I will continue otherwise present care. Thank you. 1.          []       High complexity decision making was performed in this patient at high risk for decompensation with multiple organ involvement.     Mark Lopez MD

## 2021-08-03 LAB
ALBUMIN SERPL-MCNC: 3.2 G/DL (ref 3.5–5)
ALBUMIN/GLOB SERPL: 0.9 {RATIO} (ref 1.1–2.2)
ALP SERPL-CCNC: 109 U/L (ref 45–117)
ALT SERPL-CCNC: 24 U/L (ref 12–78)
ANION GAP SERPL CALC-SCNC: 8 MMOL/L (ref 5–15)
AST SERPL W P-5'-P-CCNC: 21 U/L (ref 15–37)
BASOPHILS # BLD: 0.1 K/UL (ref 0–0.1)
BASOPHILS NFR BLD: 1 % (ref 0–1)
BILIRUB SERPL-MCNC: 0.6 MG/DL (ref 0.2–1)
BUN SERPL-MCNC: 100 MG/DL (ref 6–20)
BUN/CREAT SERPL: 15 (ref 12–20)
CA-I BLD-MCNC: 8.4 MG/DL (ref 8.5–10.1)
CHLORIDE SERPL-SCNC: 108 MMOL/L (ref 97–108)
CO2 SERPL-SCNC: 22 MMOL/L (ref 21–32)
CREAT SERPL-MCNC: 6.48 MG/DL (ref 0.7–1.3)
DIFFERENTIAL METHOD BLD: ABNORMAL
EOSINOPHIL # BLD: 0.4 K/UL (ref 0–0.4)
EOSINOPHIL NFR BLD: 7 % (ref 0–7)
ERYTHROCYTE [DISTWIDTH] IN BLOOD BY AUTOMATED COUNT: 19.3 % (ref 11.5–14.5)
GLOBULIN SER CALC-MCNC: 3.6 G/DL (ref 2–4)
GLUCOSE SERPL-MCNC: 88 MG/DL (ref 65–100)
HCT VFR BLD AUTO: 28.8 % (ref 36.6–50.3)
HGB BLD-MCNC: 8.7 G/DL (ref 12.1–17)
IMM GRANULOCYTES # BLD AUTO: 0 K/UL (ref 0–0.04)
IMM GRANULOCYTES NFR BLD AUTO: 0 % (ref 0–0.5)
LYMPHOCYTES # BLD: 0.3 K/UL (ref 0.8–3.5)
LYMPHOCYTES NFR BLD: 5 % (ref 12–49)
MCH RBC QN AUTO: 28.9 PG (ref 26–34)
MCHC RBC AUTO-ENTMCNC: 30.2 G/DL (ref 30–36.5)
MCV RBC AUTO: 95.7 FL (ref 80–99)
MONOCYTES # BLD: 0.5 K/UL (ref 0–1)
MONOCYTES NFR BLD: 8 % (ref 5–13)
NEUTS SEG # BLD: 4.9 K/UL (ref 1.8–8)
NEUTS SEG NFR BLD: 79 % (ref 32–75)
NRBC # BLD: 0 K/UL (ref 0–0.01)
NRBC BLD-RTO: 0 PER 100 WBC
PLATELET # BLD AUTO: 158 K/UL (ref 150–400)
PMV BLD AUTO: 10.8 FL (ref 8.9–12.9)
POTASSIUM SERPL-SCNC: 5.4 MMOL/L (ref 3.5–5.1)
PROT SERPL-MCNC: 6.8 G/DL (ref 6.4–8.2)
RBC # BLD AUTO: 3.01 M/UL (ref 4.1–5.7)
SODIUM SERPL-SCNC: 138 MMOL/L (ref 136–145)
WBC # BLD AUTO: 6.1 K/UL (ref 4.1–11.1)

## 2021-08-03 PROCEDURE — 74011250637 HC RX REV CODE- 250/637: Performed by: INTERNAL MEDICINE

## 2021-08-03 PROCEDURE — 97530 THERAPEUTIC ACTIVITIES: CPT

## 2021-08-03 PROCEDURE — 51798 US URINE CAPACITY MEASURE: CPT

## 2021-08-03 PROCEDURE — 36415 COLL VENOUS BLD VENIPUNCTURE: CPT

## 2021-08-03 PROCEDURE — 85025 COMPLETE CBC W/AUTO DIFF WBC: CPT

## 2021-08-03 PROCEDURE — 80053 COMPREHEN METABOLIC PANEL: CPT

## 2021-08-03 PROCEDURE — 65270000029 HC RM PRIVATE

## 2021-08-03 PROCEDURE — 74011250636 HC RX REV CODE- 250/636: Performed by: INTERNAL MEDICINE

## 2021-08-03 RX ADMIN — METOPROLOL TARTRATE 25 MG: 25 TABLET, FILM COATED ORAL at 08:15

## 2021-08-03 RX ADMIN — TAMSULOSIN HYDROCHLORIDE 0.4 MG: 0.4 CAPSULE ORAL at 09:00

## 2021-08-03 RX ADMIN — APIXABAN 2.5 MG: 2.5 TABLET, FILM COATED ORAL at 21:19

## 2021-08-03 RX ADMIN — SODIUM BICARBONATE 650 MG: 650 TABLET ORAL at 21:19

## 2021-08-03 RX ADMIN — FUROSEMIDE 40 MG: 10 INJECTION, SOLUTION INTRAMUSCULAR; INTRAVENOUS at 22:30

## 2021-08-03 RX ADMIN — SEVELAMER CARBONATE 1600 MG: 800 TABLET, FILM COATED ORAL at 11:55

## 2021-08-03 RX ADMIN — APIXABAN 2.5 MG: 2.5 TABLET, FILM COATED ORAL at 08:15

## 2021-08-03 RX ADMIN — FUROSEMIDE 40 MG: 10 INJECTION, SOLUTION INTRAMUSCULAR; INTRAVENOUS at 08:16

## 2021-08-03 RX ADMIN — SEVELAMER CARBONATE 1600 MG: 800 TABLET, FILM COATED ORAL at 16:51

## 2021-08-03 RX ADMIN — CALCITRIOL CAPSULES 0.25 MCG 0.25 MCG: 0.25 CAPSULE ORAL at 08:15

## 2021-08-03 RX ADMIN — METOLAZONE 5 MG: 5 TABLET ORAL at 08:14

## 2021-08-03 RX ADMIN — AMLODIPINE BESYLATE 5 MG: 5 TABLET ORAL at 08:15

## 2021-08-03 RX ADMIN — SODIUM BICARBONATE 650 MG: 650 TABLET ORAL at 09:00

## 2021-08-03 RX ADMIN — SEVELAMER CARBONATE 1600 MG: 800 TABLET, FILM COATED ORAL at 08:15

## 2021-08-03 NOTE — PROGRESS NOTES
PHYSICAL THERAPY TREATMENT  Patient: Ariana De La Garza (31 y.o. male)  Date: 8/3/2021  Diagnosis: Pulmonary edema [J81.1] <principal problem not specified>  Procedure(s) (LRB):  LEFT HEART CATH / CORONARY ANGIOGRAPHY (N/A)    Precautions:    Chart, physical therapy assessment, plan of care and goals were reviewed. ASSESSMENT  Patient continues with skilled PT services and is progressing towards goals. Pt seated in recliner upon entry and agreeable to cotx with OT. Pt completed all transfers with CGA. Patient able to increase ambulation distance this session, took several standing rest breaks during gait training. Pt SOB post session, but able to recover within a few minutes. Educated to continue therex, pt verbalized understanding. Current Level of Function Impacting Discharge (mobility/balance): assistance required for safety    Other factors to consider for discharge: PLOF, time since onset, severity of deficits. PLAN :  Patient continues to benefit from skilled intervention to address the above impairments. Continue treatment per established plan of care. to address goals. Recommendation for discharge: (in order for the patient to meet his/her long term goals)  Therapy 3 hours per day 5-7 days per week    This discharge recommendation:  Has been made in collaboration with the attending provider and/or case management    IF patient discharges home will need the following DME: to be determined (TBD)       SUBJECTIVE:   Patient stated im ready to walk.     OBJECTIVE DATA SUMMARY:   Critical Behavior:  Neurologic State: Alert  Orientation Level: Oriented X4  Cognition: Follows commands    Functional Mobility Training:  Transfers:  Sit to Stand: Contact guard assistance  Stand to Sit: Contact guard assistance    Balance:  Sitting: Intact  Standing: Impaired; With support  Standing - Static: Constant support;Good  Standing - Dynamic : Constant support; Fair    Ambulation/Gait Training:  [facility] (ft): 225 Feet (ft)  Assistive Device: Gait belt;Walker, rolling  Ambulation - Level of Assistance: Contact guard assistance      Therapeutic Exercises:   Educated on seated and long sit therex, pt verbalized understanding. Pain Ratin/10    Activity Tolerance:   Fair, requires rest breaks, and observed SOB with activity  Please refer to the flowsheet for vital signs taken during this treatment. After treatment patient left in no apparent distress:   Sitting in chair and Call bell within reach    COMMUNICATION/COLLABORATION:   The patients plan of care was discussed with: Occupational therapy assistant. OT/PT sessions occurred together for increased patient and clinician safety    Problem: Mobility Impaired (Adult and Pediatric)  Goal: *Acute Goals and Plan of Care (Insert Text)  Description: Pt will be I with LE HEP in 7 days. Pt will perform bed mobility with mod I in 7 days. Pt will perform transfers with mod I in 7 days. Pt will amb 25-50 feet with LRAD safely with mod I in 7 days.    Outcome: Progressing Towards Goal       Latricia Fernandez PTA   Time Calculation: 27 mins

## 2021-08-03 NOTE — PROGRESS NOTES
Progress Note    Tina Irwin MD             Daily Progress Note: 8/3/2021      Subjective: The patient is seen for follow  up. Patient is sitting in the chair. According to him today he was able to go to the bathroom with a walker. But while talking he gets short of breath so I believe he is still short of breath on minimal exertion. Discussed with pulmonologist as well as nephrologist.  Problem List:  Problem List as of 8/3/2021 Never Reviewed        Codes Class Noted - Resolved    Pulmonary edema ICD-10-CM: J81.1  ICD-9-CM: 108  7/13/2021 - Present        GI bleed ICD-10-CM: K92.2  ICD-9-CM: 578.9  1/5/2021 - Present              Medications reviewed  Current Facility-Administered Medications   Medication Dose Route Frequency    furosemide (LASIX) injection 40 mg  40 mg IntraVENous BID    metOLazone (ZAROXOLYN) tablet 5 mg  5 mg Oral DAILY    sodium bicarbonate tablet 650 mg  650 mg Oral BID    amLODIPine (NORVASC) tablet 5 mg  5 mg Oral DAILY    apixaban (ELIQUIS) tablet 2.5 mg  2.5 mg Oral BID    sevelamer carbonate (RENVELA) tab 1,600 mg  1,600 mg Oral TID WITH MEALS    0.9% sodium chloride infusion 250 mL  250 mL IntraVENous PRN    0.9% sodium chloride infusion 250 mL  250 mL IntraVENous PRN    metoprolol tartrate (LOPRESSOR) tablet 25 mg  25 mg Oral DAILY    tamsulosin (FLOMAX) capsule 0.4 mg  0.4 mg Oral DAILY    [Held by provider] hydrALAZINE (APRESOLINE) tablet 50 mg  50 mg Oral TID    0.9% sodium chloride infusion 250 mL  250 mL IntraVENous PRN    calcitRIOL (ROCALTROL) capsule 0.25 mcg  0.25 mcg Oral DAILY    ergocalciferol capsule 50,000 Units  50,000 Units Oral Q7D    hydrALAZINE (APRESOLINE) 20 mg/mL injection 40 mg  40 mg IntraVENous Q6H PRN       Review of Systems:   A comprehensive review of systems was negative except for that written in the HPI. Objective:   Physical Exam:     Visit Vitals  BP (!) 150/94 (BP 1 Location: Right upper arm, BP Patient Position:  At rest;Sitting)   Pulse 62   Temp 97.8 °F (36.6 °C)   Resp 22   Ht 6' (1.829 m)   Wt 104.3 kg (229 lb 15 oz)   SpO2 93%   BMI 31.19 kg/m²    O2 Flow Rate (L/min): 1 l/min O2 Device: None (Room air)    Temp (24hrs), Av.7 °F (36.5 °C), Min:97.5 °F (36.4 °C), Max:98 °F (36.7 °C)    701 - 1900  In: -   Out: 500 [Urine:500]   1901 - 700  In: 120 [P.O.:120]  Out: 1200 [Urine:1200]    General:  Alert, cooperative, no distress, appears stated age. Lungs:    Decreased breath sounds bilaterally in lower zone with rales prominent in the left lower sternal.   Chest wall:  No tenderness or deformity. Heart:  Regular rate and rhythm, S1, S2 normal, no murmur, click, rub or gallop. Abdomen:   Soft, non-tender. Bowel sounds normal. No masses,  No organomegaly. Extremities: Extremities normal, atraumatic, no cyanosis 3+ edema both legs upper limb edema is getting better   Pulses: 2+ and symmetric all extremities. Skin: Skin color, texture, turgor normal. No rashes or lesions   Neurologic: CNII-XII intact. No gross sensory or motor deficits     Data Review:       Recent Days:  Recent Labs     21  0104 21  1040   WBC 6.1 6.3   HGB 8.7* 8.5*   HCT 28.8* 28.8*    159     Recent Labs     21  0104 21  1040    142   K 5.4* 5.2*    110*   CO2 22 23   GLU 88 94   * 99*   CREA 6.48* 6.43*   CA 8.4* 8.6   PHOS  --  5.7*   ALB 3.2* 3.3*   TBILI 0.6  --    ALT 24  --      No results for input(s): PH, PCO2, PO2, HCO3, FIO2 in the last 72 hours. Results     ** No results found for the last 336 hours.  **         24 Hour Results:  Recent Results (from the past 24 hour(s))   CBC WITH AUTOMATED DIFF    Collection Time: 21  1:04 AM   Result Value Ref Range    WBC 6.1 4.1 - 11.1 K/uL    RBC 3.01 (L) 4.10 - 5.70 M/uL    HGB 8.7 (L) 12.1 - 17.0 g/dL    HCT 28.8 (L) 36.6 - 50.3 %    MCV 95.7 80.0 - 99.0 FL    MCH 28.9 26.0 - 34.0 PG    MCHC 30.2 30.0 - 36.5 g/dL RDW 19.3 (H) 11.5 - 14.5 %    PLATELET 578 501 - 324 K/uL    MPV 10.8 8.9 - 12.9 FL    NRBC 0.0 0.0  WBC    ABSOLUTE NRBC 0.00 0.00 - 0.01 K/uL    NEUTROPHILS 79 (H) 32 - 75 %    LYMPHOCYTES 5 (L) 12 - 49 %    MONOCYTES 8 5 - 13 %    EOSINOPHILS 7 0 - 7 %    BASOPHILS 1 0 - 1 %    IMMATURE GRANULOCYTES 0 0 - 0.5 %    ABS. NEUTROPHILS 4.9 1.8 - 8.0 K/UL    ABS. LYMPHOCYTES 0.3 (L) 0.8 - 3.5 K/UL    ABS. MONOCYTES 0.5 0.0 - 1.0 K/UL    ABS. EOSINOPHILS 0.4 0.0 - 0.4 K/UL    ABS. BASOPHILS 0.1 0.0 - 0.1 K/UL    ABS. IMM. GRANS. 0.0 0.00 - 0.04 K/UL    DF AUTOMATED     METABOLIC PANEL, COMPREHENSIVE    Collection Time: 08/03/21  1:04 AM   Result Value Ref Range    Sodium 138 136 - 145 mmol/L    Potassium 5.4 (H) 3.5 - 5.1 mmol/L    Chloride 108 97 - 108 mmol/L    CO2 22 21 - 32 mmol/L    Anion gap 8 5 - 15 mmol/L    Glucose 88 65 - 100 mg/dL     (H) 6 - 20 mg/dL    Creatinine 6.48 (H) 0.70 - 1.30 mg/dL    BUN/Creatinine ratio 15 12 - 20      GFR est AA 10 (L) >60 ml/min/1.73m2    GFR est non-AA 9 (L) >60 ml/min/1.73m2    Calcium 8.4 (L) 8.5 - 10.1 mg/dL    Bilirubin, total 0.6 0.2 - 1.0 mg/dL    AST (SGOT) 21 15 - 37 U/L    ALT (SGPT) 24 12 - 78 U/L    Alk. phosphatase 109 45 - 117 U/L    Protein, total 6.8 6.4 - 8.2 g/dL    Albumin 3.2 (L) 3.5 - 5.0 g/dL    Globulin 3.6 2.0 - 4.0 g/dL    A-G Ratio 0.9 (L) 1.1 - 2.2         XR CHEST PA LAT   Final Result   Findings/impression:      Volume overload noted, with slightly increased interstitial markings present   bilaterally. There is mild bilateral perihilar airspace disease present, with   mild interval improvement. Interval improvement in left lower lobe airspace   disease. No pneumothorax. CP angles are blunted suggesting small effusions. No   pneumothorax. Enlarged cardiac size again noted similar to prior study. No   suspicious osseous lesions. XR CHEST PA LAT   Final Result      XR CHEST PA LAT   Final Result   Hydrostatic edema.   Pleural effusions and associated bibasilar   atelectasis. No significant interval change. XR CHEST PORT   Final Result   There is continued moderate pulmonary interstitial and alveolar   edema with a slight interval improvement within the right upper lobe. The   remainder of this examination is unchanged. XR CHEST PORT   Final Result      XR CHEST PORT   Final Result      LOWER EXT ART PVR MULT LEVEL SEG PRESSURES   Final Result      DUPLEX LOWER EXT VENOUS BILAT   Final Result      XR CHEST PORT   Final Result   Findings/impression: Stable cardiomediastinal silhouette. Similar central   pulmonary vascular congestion and bilateral patchy airspace opacities. No   significant pleural effusion. No pneumothorax. Findings are not significantly changed. XR CHEST PORT   Final Result      US RETROPERITONEUM COMP   Final Result   Medical renal disease on the right      DUPLEX UPPER EXT VENOUS LEFT   Final Result      XR CHEST PORT   Final Result   Findings/impression:      Cardiac silhouette is enlarged. Central vascular congestion and patchy central   airspace disease/edema. Suspect trace right pleural effusion. No evidence of pneumothorax. No acute osseous abnormality identified. Assessment: CHF  Acute on chronic renal failure now creatinine has been stabilized  Anemia hemoglobin has been stabilized  Status post ventricular tachycardia with hypotension  Hypertension        Plan: We will follow recommendation from cardiology as well as nephrology. Case management help is appreciated as [facility] paperwork has been started. Care Plan discussed with: Patient/Family as well as RN taking care of the patient and pulmonologist as well as nephrologist.    Total time spent with patient: 30 minutes.     Neptali Ca MD

## 2021-08-03 NOTE — PROGRESS NOTES
Pulmonary, Critical Care    Name: Jun Stovall MRN: [id number]   : 1955 Hospital: [address] [place] [address]   Date: 8/3/2021  Admission date: 2021 Hospital Day:        Subjective/Interval History:   Seen in the emergency room wearing noninvasive ventilator. Patient has underlying renal insufficiency developed worsening shortness of breath cough presented to the emergency room chest x-ray shows pulmonary edema. He was profoundly hypoxic is now on noninvasive ventilator and feels more comfortable. Has not had any significant urine output since he has been in the ER. He also complains of new onset left arm swelling   post void bladder scan showed greater than 200 cc urine retention and Quesada catheter placed with 500 cc removed. Overnight he has put out an additional 1200 cc. Respirations improved currently nonlabored on nasal oxygen. Duplex scan of the left arm did show left axillary and proximal brachial DVT and heparin was started. On heparin with Quesada catheter and he has had slight bleeding at the urethral meatus. Ultrasound of the abdomen is pending  7/15 ultrasound of the abdomen showed medical renal disease on the right with small kidney no hydronephrosis there was evidence of prostate enlargement. He is breathing easily at this point and on room air is running on oxygen saturation of 93%   respirations nonlabored on nasal oxygen. Quesada catheter is out he states he has been voiding frequent small amounts without straining   no specific complaints breathing easily. Overnight oximetry showed minimal but significant desaturation while on room air 8 minutes 40 seconds with low of 71%  . Developed V. tach this morning given IV amiodarone IV magnesium and transferred to the unit. Currently heart rate  alternating between A. fib and sinus with PAC. He denies any discomfort is breathing easily   remains sinus rhythm with PVCs.   Had worsening hypoxia during the night and placed on oxygen he feels comfortable at this point. Urine output mildly improved yesterday but input remains greater than output  7/24 no specific complaints respirations nonlabored currently room air with saturation 97% Lasix been resumed 7/23. Urine output not accurately recorded but he states he has been passing a lot of urine  7/25 feels fine denies shortness of breath on room air. Oxygen saturation 97% on room air while awake. He states he is putting out good urine from his oral Lasix although its not being recorded in the chart  7/27 overnight oximetry continues to show nocturnal hypoxia will continue nocturnal oxygen 7/28  no one put oxygen on him last night. Good urine output recorded yesterday 1500 cc but chest x-ray today continues to show pulmonary edema and small effusions  7/30 states again last night he was not placed on oxygen until he asked and then someone came and removed it later on. We will repeat overnight oximetry tonight on and off oxygen  7/31 feels fine no specific complaints. Overnight oximetry last night showed no desaturation when he was on room air  8/1 he was put on 2 L nasal cannula this morning repeat overnight pulse oximetry results pending  8/2 overnight oximetry study 8/1 showed no significant desaturation on room air  8/3 no specific complaints breathing well states he is getting up to the bathroom and back with less difficulty  Patient Active Problem List   Diagnosis Code    GI bleed K92.2    Pulmonary edema J81.1       IMPRESSION:   1. Ventricular tachycardia none further seen   2. Atrial fibrillation converted to sinus  3. Hypotension corrected   4. Acute hypoxic respiratory failure room air oxygen saturation maintained while awake  5. Chronic hypoxic respiratory failure most recent overnight oximetry 8/1 showed no significant desaturation will put oxygen on standby and repeat oximetry tonight  6.  Acute pulmonary edema radiographically no change 7/28  7. 2 of 4 blood culture bottles with coagulase-negative staph aureus likely skin contaminant   8. Bilateral pleural effusions no change on 7/28 x-ray  9. Acute on chronic kidney disease creatinine  stable  10. Obstructive uropathy Quesada catheter has been removed with no residual urine on bladder scanning  11. Severe hypertension corrected   12. DVT left axillary and brachial now on Eliquis  13. Anemia hemoglobin improved after last transfusion 7/21  14. Troponin leak likely due to renal insufficiency stable has not risen any further  Body mass index is 31.19 kg/m². RECOMMENDATIONS/PLAN:     1. Remains in sinus rhythm  2. Blood pressure well controlled now on metoprolol low-dose and Norvasc  3. Continue Flomax for prostate enlargement   4. Repeat chest x-ray with continued fluid overload  5. 8/2 overnight oximetry did not show any significant desaturation. Will repeat tonight         Subjective/Initial History:       I was asked by Laxmi Juarez MD to see Stacy Presotn a 78 y.o.  male  in consultation for a chief complaint of shortness of breath pulmonary edema acute hypoxic respiratory failure        Patient PCP: Britt Wayne MD  PMH:  has a past medical history of Chronic kidney disease and Hypertension. PSH:   has no past surgical history on file. FHX: family history is not on file. SHX:  reports that he has never smoked.  He has never used smokeless tobacco.    Systemic review somewhat limited as he is on noninvasive ventilator remains short of breath although states he is feeling better  General he has not noted any worsening edema of his upper legs fever chills or sweats does complain of new onset swelling of his left hand and arm  Eyes no double vision or momentary blindness  ENT no facial pain over the sinuses  Endocrinologic no polyuria polydipsia  Musculoskeletal no swollen tender joints  Neurologic no history seizures or syncope  Gastrointestinal no nausea vomiting acid indigestion  Genitourinary states he does still pass fair amount of urine each day has not noted any decrease in that.   Does not have to strain to urinate has no bleeding or drainage  Cardiovascular he is not aware of any underlying heart disease has not had chest pain diaphoresis has had worsening shortness of breath laying down and with exertion  Respiratory worsening shortness of breath over the last week or more no significant cough no sputum production no chills or sweats did have history COVID-19 pneumonitis January of this year    No Known Allergies   MEDS:   Current Facility-Administered Medications   Medication    furosemide (LASIX) injection 40 mg    metOLazone (ZAROXOLYN) tablet 5 mg    sodium bicarbonate tablet 650 mg    amLODIPine (NORVASC) tablet 5 mg    apixaban (ELIQUIS) tablet 2.5 mg    sevelamer carbonate (RENVELA) tab 1,600 mg    0.9% sodium chloride infusion 250 mL    0.9% sodium chloride infusion 250 mL    metoprolol tartrate (LOPRESSOR) tablet 25 mg    tamsulosin (FLOMAX) capsule 0.4 mg    [Held by provider] hydrALAZINE (APRESOLINE) tablet 50 mg    0.9% sodium chloride infusion 250 mL    calcitRIOL (ROCALTROL) capsule 0.25 mcg    ergocalciferol capsule 50,000 Units    hydrALAZINE (APRESOLINE) 20 mg/mL injection 40 mg        Current Facility-Administered Medications:     furosemide (LASIX) injection 40 mg, 40 mg, IntraVENous, BID, Tyrone Boyd MD, 40 mg at 08/03/21 0816    metOLazone (ZAROXOLYN) tablet 5 mg, 5 mg, Oral, DAILY, Jesse Lee MD, 5 mg at 08/03/21 4469    sodium bicarbonate tablet 650 mg, 650 mg, Oral, BID, Jesse Lee MD, 650 mg at 08/03/21 0900    amLODIPine (NORVASC) tablet 5 mg, 5 mg, Oral, DAILY, Williams Brooke MD, 5 mg at 08/03/21 0815    apixaban (ELIQUIS) tablet 2.5 mg, 2.5 mg, Oral, BID, Tyrone Boyd MD, 2.5 mg at 08/03/21 0815    sevelamer carbonate (RENVELA) tab 1,600 mg, 1,600 mg, Oral, TID WITH MEALS, Rosa AVILA MD, 1,600 mg at 21 1155    0.9% sodium chloride infusion 250 mL, 250 mL, IntraVENous, PRN, Luisa Dallas MD    0.9% sodium chloride infusion 250 mL, 250 mL, IntraVENous, PRN, Luisa Dallas MD    metoprolol tartrate (LOPRESSOR) tablet 25 mg, 25 mg, Oral, DAILY, Vianca Graham MD, 25 mg at 21 0815    tamsulosin (FLOMAX) capsule 0.4 mg, 0.4 mg, Oral, DAILY, See Rees MD, 0.4 mg at 21 0900    [Held by provider] hydrALAZINE (APRESOLINE) tablet 50 mg, 50 mg, Oral, TID, See Rees MD, 50 mg at 21    0.9% sodium chloride infusion 250 mL, 250 mL, IntraVENous, PRN, See Rees MD    calcitRIOL (ROCALTROL) capsule 0.25 mcg, 0.25 mcg, Oral, DAILY, Vianca Graham MD, 0.25 mcg at 21    ergocalciferol capsule 50,000 Units, 50,000 Units, Oral, Q7D, Vianca Graham MD, 50,000 Units at 21 162    hydrALAZINE (APRESOLINE) 20 mg/mL injection 40 mg, 40 mg, IntraVENous, Q6H PRN, See Rees MD, 40 mg at 21      Objective:     Vital Signs: Telemetry:    normal sinus rhythm Intake/Output:   Visit Vitals  BP (!) 150/94 (BP 1 Location: Right upper arm, BP Patient Position: At rest;Sitting)   Pulse 62   Temp 97.8 °F (36.6 °C)   Resp 22   Ht 6' (1.829 m)   Wt 104.3 kg (229 lb 15 oz)   SpO2 93%   BMI 31.19 kg/m²       Temp (24hrs), Av.7 °F (36.5 °C), Min:97.5 °F (36.4 °C), Max:98 °F (36.7 °C)        O2 Device: None (Room air) O2 Flow Rate (L/min): 1 l/min         Body mass index is 31.19 kg/m².     Wt Readings from Last 4 Encounters:   21 104.3 kg (229 lb 15 oz)   21 79.4 kg (175 lb)          Intake/Output Summary (Last 24 hours) at 8/3/2021 1314  Last data filed at 8/3/2021 0941  Gross per 24 hour   Intake --   Output 500 ml   Net -500 ml       Last shift:       07 -  1900  In: -   Out: 500 [Urine:500]  Last 3 shifts: 1901 -  0700  In: 120 [P.O.:120]  Out: 1200 [Urine:1200]       Hemodynamics:    CO:    CI: CVP:    SVR:   PAP Systolic:    PAP Diastolic:    PVR:    WF59:       Ventilator Settings:      Mode Rate TV Press PEEP FiO2 PIP Min. Vent               21 %     9.7 l/min      Physical Exam:    General:  male; no distress now on room air  HEENT: NCAT, visible oral mucosa is moist and clear  Eyes: anicteric; conjunctiva clear extraocular movements intact  Neck: no nodes,,no accessory MM use. no respiratory distress  Chest: no deformity,   Cardiac: Regular rhythm and rate now controlled  Lungs: distant breath sounds; clear anteriorly and laterally with rales in the bases  Abd: Soft positive bowel sounds no tenderness  Ext: Improvement in edema  of the lower extremities with continued edema of the left arm; no joint swelling;  No clubbing  : Clear urine  Neuro: Alert awake no distress speech is clear moves all 4 extremities  Psych-calm, oriented to person;   Skin: warm, dry, no cyanosis;   Pulses: Brachial and radial pulses intact  Capillary:slow capillary refill      Labs:    Recent Labs     08/03/21  0104 08/01/21  1040    142   K 5.4* 5.2*    110*   CO2 22 23   GLU 88 94   * 99*   CREA 6.48* 6.43*   CA 8.4* 8.6   PHOS  --  5.7*   ALB 3.2* 3.3*   ALT 24  --    8/1 overnight oximetry on room air with low oxygen saturation 79% only 5 minutes below 88   7/39 overnight oximetry on and off oxygen with low oxygen saturation 72% with only 5 minutes below 88%   7/29 overnight oximetry with low oxygen saturation 44% with 52 minutes 40 seconds below 88%   7/27  overnight oximetry split study with a low oxygen saturation 75% 33 minutes 10 seconds below 88%   7/25 overnight oximetry with 1 hour 13 minutes below 88% with low oxygen saturation 56%  7/19 overnight oximetry shows 14 minutes 26 seconds below 88% with low oxygen saturation 75%  7/17 overnight oximetry shows low oxygen saturation 71% with 8 minutes 40 seconds below 88% from 1 AM to 6 AM   7/16 overnight oximetry on 1 L shows only 2 minutes 20 seconds below 88% with a low oxygen saturation of 83%  7/15 room air oxygen saturation 93%  currently on noninvasive ventilator inspiratory pressure 16 expiratory pressure six rate 16 FiO2 28% with oxygen saturation 96%   BNP 32,264, previous greater than 35,000  Lab Results   Component Value Date/Time    Culture result:  07/12/2021 10:40 PM     Two of four bottles have been flagged positive by instrument. Bottles have been sent to [address] [place] [address] laboratory to assess for possible growth. Gram Positive Cocci in clusters CALLED TO AND READ BACK BY Falguni Langley r.n. [id number] 07/14/2021 by dpw    Culture result:  07/12/2021 10:40 PM     Staphylococcus species, coagulase negative GROWING IN THE 1ST OF 4 BOTTLES DRAWN    Culture result:  07/12/2021 10:40 PM     Staphylococcus species, coagulase negative (2nd colony type/strain) GROWING IN THE 2ND OF 4 BOTTLES DRAWN        Imaging:  I have personally reviewed the patients radiographs and have reviewed the reports:    CXR Results  (Last 48 hours)  7/23 chest x-ray mild pulmonary congestion tiny effusions unchanged from last x-ray although right upper lobe lung may have slightly less congestion  7/21 chest x-ray with continued mild pulmonary edema pattern small bilateral pleural effusions there may be improved inspiratory effort  7/16 chest x-ray with continued mild pulmonary edema and small pleural effusions unchanged               07/12/21 2251  XR CHEST PORT Final result    Impression:  Findings/impression:       Cardiac silhouette is enlarged. Central vascular congestion and patchy central   airspace disease/edema. Suspect trace right pleural effusion. No evidence of pneumothorax. No acute osseous abnormality identified. Narrative:  Study: XR CHEST PORT       Clinical indication: sob       Comparison: None.    To me chest x-ray consistent with cardiomegaly pulmonary edema and bilateral pleural effusions           Results from [facility] Encounter encounter on 07/12/21    XR CHEST PA LAT    Narrative  Two-view chest:    COMPARISON: Chest x-ray July 20, 2021. Impression  Findings/impression:    Volume overload noted, with slightly increased interstitial markings present  bilaterally. There is mild bilateral perihilar airspace disease present, with  mild interval improvement. Interval improvement in left lower lobe airspace  disease. No pneumothorax. CP angles are blunted suggesting small effusions. No  pneumothorax. Enlarged cardiac size again noted similar to prior study. No  suspicious osseous lesions. XR CHEST PA LAT    Narrative  Chest 2 views. Comparison chest series July 26, 2021. No change compared to prior imaging. Patchy lower lobe lung reticular markings  with small dependent pleural effusions persist. Cardiac and mediastinal  structures unchanged. No pneumothorax. XR CHEST PA LAT    Narrative  Chest, 2 views, 7/26/2021    History: Pulmonary edema. Pleural effusions. Comparison: Including chest 7/23/2021. Findings: The cardiac silhouette remains enlarged. The lungs are adequately  expanded. There is pulmonary vascular congestion and hydrostatic edema, not  significantly changed as compared to chest 7/23/2021. Small to moderate pleural  effusions and associated bibasilar atelectasis persist.  No pneumothorax is  identified. Degenerative changes are present in the spine. Impression  Hydrostatic edema. Pleural effusions and associated bibasilar  atelectasis. No significant interval change. Results from [place] encounter on 01/05/21    CT CHEST WO CONT    Narrative  Images obtained without contrast include the abdomen and pelvis. Comparison portable chest radiograph earlier today.     Dose Reduction:  All CT scans at this facility are performed using dose reduction optimization  techniques as appropriate to a performed exam including the following: Automated  exposure control, adjustments of the mA and/or kV according to patient size, or  use of iterative reconstruction technique. Subtle peripheral groundglass densities are noted in both lungs, could reflect  Covid pneumonia or other. No pneumothorax or pneumomediastinum. The radiographic findings suspicious for  pneumomediastinum probably was artifact, perhaps related to cardiac motion. No pleural effusion or adenopathy. Cardiomegaly again noted. Impression  IMPRESSION:  1. No pneumomediastinum or pneumothorax. 2. Cardiomegaly. 3. Subtle groundglass densities in both lungs might be pneumonia or other. Discussion patient with worsening renal insufficiency has developed pulmonary edema acute hypoxic respiratory failure. He states he still has urine output normally at home. We will give him high-dose IV Lasix to see if diuresis is induced. He very likely will need dialysis. He has minimal elevation in troponin probably troponin leak from hypoxia or just due to his renal insufficiency. He is not having any chest pain. Does have severe hypertension. Has been started on clonidine and hydralazine. 7/14 no distress today on nasal oxygen. Did have residual on post void bladder scan and Quesada catheter was placed with good diuresis overnight. Despite this potassium remains elevated. We will give 1 dose of Kayexalate. Despite diuresis hemoglobin is dropped to 6.7 will transfuse. He denies frequency small-volume urine or straining to urinate at home despite this with signs of obstruction we will add Flomax. Potassium 6 today we will give 1 dose of Kayexalate and continue diuresis  7/16 respirations nonlabored. Was placed back on 1 L nasal oxygen yesterday overnight oximetry shows well-maintained oxygen saturation. Will check overnight tonight on and off oxygen. Quesada catheter has been removed. Chest x-ray continues to show mild pulmonary edema  7/17 overnight oximetry shows desaturation when on room air.   Will maintain oxygen and repeat overnight oximetry Ibrahima night  7/19 developed V. tach overnight and now in the ICU on IV amiodarone with rhythm showing alternating sinus with PAC and A. fib. Rate . Overnight oximetry continued to show desaturation on room air and he is back on nasal oxygen. He has no specific complaint  7/20 now in sinus rhythm with frequent PVCs. Cardiology is recommending cardiac cath but he is reluctant to undergo that. Creatinine has worsened despite IV fluid administration yesterday although blood pressure is improved. Currently oxygen saturation well-maintained on room air but last overnight oximetry showed desaturation will maintain oxygen at 1 L for now  7/21 had hypoxia yesterday afternoon and placed back on oxygen during the daytime as well as the night. He feels comfortable at this point. Chest x-ray is unchanged still has mild pulmonary edema with small effusions. It does appear that he took a deeper breath today. Creatinine remains markedly elevated. He is on heparin for left axillary DVT. Holding switching to oral anticoagulation until sure no instrumentation is needed   7/23 unchanged remains on IV heparin. Oral Lasix being resumed did require transfusion 7/21. Repeat chest x-ray no worsening  7/26 chest x-ray unchanged pulmonary edema and bilateral effusions. He states he has good urine output with the current dose of Lasix. We will repeat overnight oximetry on and off oxygen   7/27 continued nocturnal hypoxia he will need home oxygen  7/28 repeat overnight oximetry to keep him qualified for home oxygen  7/29 repeat overnight oximetry with low oxygen saturation 44% with 52 minutes 40 seconds below 88%. Continues to require nocturnal oxygen  8/2 repeat overnight oximetry on room air on 8/1 showed no significant desaturation           Thank you for allowing us to participate in the care of this patient.   We will follow along with you     Ted Shetty MD

## 2021-08-03 NOTE — PROGRESS NOTES
Comprehensive Nutrition Assessment    Type and Reason for Visit: RD nutrition re-screen/LOS    Nutrition Recommendations/Plan:   Liberalize diet to Easy to Chew, 5 CHO, low K, low Phos     Continue nepro daily    Nutrition Assessment:  Admitted 2/2 Pulmonary edema. CKD IV/V. Nephrology following. Current appetite reported as good. Per pt 95%PO on 7/20. On 7/27 pt reports eating 100% of trays, but is getting \"sick\" of the food. RD to liberalize diet. Complains of trays not being enough food, pt with hunger cues. RD to add nepro daily. On 8/3 pt doesn't like food. Now eating ~75% of food. And 1 nepro daily. Pt requesting to have cornflakes and muffin for B. RD to add cornflakes as a snack. Labs: H/H 8.7/28.8, , Creat 6.48. Ca 8.4, Phos 5.7, K 5.4. Meds: amlodipine, apixaban, calcitriol, furosemide, lopressor, sevelamer. Malnutrition Assessment:  Malnutrition Status:  No malnutrition    Context:  Acute illness       Nutrition Related Findings:  No NFPE performed, appears nourished. Denies N/V/C/D. BM on 8/2. Reports C/S issues and would benefit from easy to chew diet- ordered. Edema: anasarca, 2+ generalized, 1+ LUE, 3+ RUE, 3+ RLE 1+ sacral, 2+ LLE. Wounds:    None       Current Nutrition Therapies:  ADULT DIET Easy to Chew; 5 carb choices (75 gm/meal); Low Potassium (Less than 3000 mg/day); Low Phosphorus (Less than 1000 mg)  ADULT ORAL NUTRITION SUPPLEMENT Lunch; Renal Supplement    Anthropometric Measures:  · Height:  6' (182.9 cm)  · Current Body Wt:  97.7 kg (215 lb 6.2 oz)   · Admission Body Wt:  180 lb    · Usual Body Wt:   (URI 2/2 fluid)     · Ideal Body Wt:  178 lbs:  121 %   · BMI Category: Overweight (BMI 25.0-29. 9)       Nutrition Diagnosis:   No nutrition diagnosis at this time     Nutrition Interventions:   Food and/or Nutrient Delivery: Continue current diet, Continue oral nutrition supplement  Nutrition Education and Counseling: No recommendations at this time  Coordination of Nutrition Care: Continue to monitor while inpatient    Nutrition Monitoring and Evaluation:   Behavioral-Environmental Outcomes: None identified  Food/Nutrient Intake Outcomes: Food and nutrient intake  Physical Signs/Symptoms Outcomes: Weight, Nutrition focused physical findings, Meal time behavior    Discharge Planning:    Continue current diet     Electronically signed by Caty Moralez RD on 8/3/2021 at 1:39 PM    Contact: [phone]

## 2021-08-03 NOTE — PROGRESS NOTES
[facility]     Renal Daily Progress Note:     Admission Date: 2021     Subjective: patient seen in room 470 . Feeling ok,  BP better. Creatinine trending down albeit at a slower pace. Not overtly short of breath at rest but has dyspnea on exertion. K stable. Urine output marginal but it is not clear if adequately collected. Not short of breath at rest. No cough. No chest or abdominal pain.      Objective:     Visit Vitals  BP (!) 155/88 (BP 1 Location: Right upper arm, BP Patient Position: Sitting)   Pulse 68   Temp 97.8 °F (36.6 °C)   Resp 18   Ht 6' (1.829 m)   Wt 97.7 kg (215 lb 6.2 oz)   SpO2 94%   BMI 29.21 kg/m²     Temp (24hrs), Av.8 °F (36.6 °C), Min:97.5 °F (36.4 °C), Max:98 °F (36.7 °C)        Intake/Output Summary (Last 24 hours) at 8/3/2021 1828  Last data filed at 8/3/2021 0941  Gross per 24 hour   Intake --   Output 500 ml   Net -500 ml     Current Facility-Administered Medications   Medication Dose Route Frequency    furosemide (LASIX) injection 40 mg  40 mg IntraVENous BID    metOLazone (ZAROXOLYN) tablet 5 mg  5 mg Oral DAILY    sodium bicarbonate tablet 650 mg  650 mg Oral BID    amLODIPine (NORVASC) tablet 5 mg  5 mg Oral DAILY    apixaban (ELIQUIS) tablet 2.5 mg  2.5 mg Oral BID    sevelamer carbonate (RENVELA) tab 1,600 mg  1,600 mg Oral TID WITH MEALS    0.9% sodium chloride infusion 250 mL  250 mL IntraVENous PRN    0.9% sodium chloride infusion 250 mL  250 mL IntraVENous PRN    metoprolol tartrate (LOPRESSOR) tablet 25 mg  25 mg Oral DAILY    tamsulosin (FLOMAX) capsule 0.4 mg  0.4 mg Oral DAILY    [Held by provider] hydrALAZINE (APRESOLINE) tablet 50 mg  50 mg Oral TID    0.9% sodium chloride infusion 250 mL  250 mL IntraVENous PRN    calcitRIOL (ROCALTROL) capsule 0.25 mcg  0.25 mcg Oral DAILY    ergocalciferol capsule 50,000 Units  50,000 Units Oral Q7D    hydrALAZINE (APRESOLINE) 20 mg/mL injection 40 mg  40 mg IntraVENous Q6H PRN       Physical Exam:    Seen in Room 470    General appearance: Chronically ill-appearing patient sitting up In chair- comfortable. Head: Normocephalic    Lungs: clear to auscultation but decreased basilar , no wheezes, no rales    Heart:  No S3 gallop , No pericardial rub. Abdomen: Not distended    Extremities:  Lower extremity edema including  dependent thighs. Neuro : Awake and responding appropriately. Data Review:     LABS:  Recent Labs     08/03/21  0104 08/01/21  1040    142   K 5.4* 5.2*    110*   CO2 22 23   * 99*   CREA 6.48* 6.43*   CA 8.4* 8.6   ALB 3.2* 3.3*   PHOS  --  5.7*   Vitamin D2 11.1  Intact   PSA  5.2  Recent Labs     08/03/21  0104 08/01/21  1040   WBC 6.1 6.3   HGB 8.7* 8.5*   HCT 28.8* 28.8*    159   iron saturation 5%    No results for input(s): HÉCTOR, KU, CLU, CREAU in the last 72 hours. No lab exists for component: PROU     Blood Cultures 7-12-21  Two of four bottles have been flagged positive by instrument.  Bottles have been sent to [facility] laboratory to assess for possible growth. Gram Positive Cocci in clusters     Chest x-ray 7-31-21  IMPRESSION  Findings/impression:     Volume overload noted, with slightly increased interstitial markings present  bilaterally. There is mild bilateral perihilar airspace disease present, with  mild interval improvement. Interval improvement in left lower lobe airspace  disease. No pneumothorax. CP angles are blunted suggesting small effusions. No  pneumothorax. Enlarged cardiac size again noted similar to prior study. No  suspicious osseous lesions. CXR 7-28-21  Chest 2 views.     Comparison chest series July 26, 2021.     No change compared to prior imaging. Patchy lower lobe lung reticular markings  with small dependent pleural effusions persist. Cardiac and mediastinal  structures unchanged. No pneumothorax. CXR 7-26-21  History: Pulmonary edema.   Pleural effusions.     Comparison: Including chest 7/23/2021.     Findings: The cardiac silhouette remains enlarged. The lungs are adequately  expanded. There is pulmonary vascular congestion and hydrostatic edema, not  significantly changed as compared to chest 7/23/2021. Small to moderate pleural  effusions and associated bibasilar atelectasis persist.  No pneumothorax is  identified. Degenerative changes are present in the spine.     IMPRESSION  Hydrostatic edema. Pleural effusions and associated bibasilar  atelectasis. No significant interval change. CXR 7-21-21  Chest single view.     Comparison single view chest July 19, 2021.     Patchy reticular markings through the lungs, same distribution. Those through  lung bases appear less dense; suspect mild degree improved aeration. Cardiac and  mediastinal structures unchanged. No pneumothorax or sizable pleural effusion. CXR 7-19-21     Comparison single view chest July 16, 2021.     Advanced patchy central and basilar reticular markings through the lungs. Consider pneumonia. Cardiac and mediastinal structures magnified on this AP  view. No pneumothorax or sizable pleural effusion. CXR July 14, 2021:  1 view comparison to 7/12     Continued cardiomegaly and congestion. If There is mild interstitial edema, it  is unchanged. Right pleural effusion and adjacent atelectasis have improved. Retroperitoneal US 7-14-21  Left kidney is 12.2 cm and right kidney is 9.1. Right renal cortex is echogenic  but not the left. Multiple cysts on each side, including a large cyst is 7 cm on  the left. No hydronephrosis. Aorta and IVC normals visualized. Prostatic  enlargement and diffuse bladder wall thickening.  Moderate ascites     IMPRESSION  Medical renal disease on the right    Assessment:   Renal Specific Problems    CKD stage IV/V --Baseline creatinine 3.77-4.02  Acute azotemia-exacerbated by bladder outlet obstruction and relative hypotension with poor perfusion-may be resolving  Hypertension - improved on amlodipine, blood pressure elevation may be related to volume overload  Volume overload- resolving slowly  Hyperkalemia- with residual pleural effusions and now lower ext edema  Metabolic acidosis- on bicarb  Acute anemia with significant iron deficiency  Acute left axillary/brachial vein DVT  Vitamin D2 def  Secondary hyperparathyroid  Proteinuria - nephrotic range  Hyperphosphatemia-controlled        Plan:     Obtain/ Order: labs/cultures/radiology/procedures:  renal panel,    PLA2R Ab pending    Therapeutic:    Discuss with Dr. Attila Valdez, will change to parenteral Lasix to see if we can diurese more. However, BUN is up and creatinine may have plateaued. I discussed the possibility of dialysis with the patient. He is agreeable if absolutely indicated.   Epogen  IV iron  --completed  Cont sodium bicarbonate  bid  Calcitriol and ergocalciferol   PO4 binder-- increased sevelamer to 2 pc  Flomax     Cont lasix to bid but changed to IV dosing to promote diuresis with metolazone 5 mg/ am

## 2021-08-03 NOTE — PROGRESS NOTES
Problem: Falls - Risk of  Goal: *Absence of Falls  Description: Document Rose Spare Fall Risk and appropriate interventions in the flowsheet. Outcome: Progressing Towards Goal  Note: Fall Risk Interventions:  Mobility Interventions: Bed/chair exit alarm, Patient to call before getting OOB         Medication Interventions: Bed/chair exit alarm    Elimination Interventions: Bed/chair exit alarm, Call light in reach, Patient to call for help with toileting needs              Problem: Patient Education: Go to Patient Education Activity  Goal: Patient/Family Education  Outcome: Progressing Towards Goal     Problem: Pressure Injury - Risk of  Goal: *Prevention of pressure injury  Description: Document Alfa Scale and appropriate interventions in the flowsheet.   Outcome: Progressing Towards Goal  Note: Pressure Injury Interventions:  Sensory Interventions: Assess changes in LOC, Keep linens dry and wrinkle-free, Minimize linen layers    Moisture Interventions: Absorbent underpads, Minimize layers    Activity Interventions: Increase time out of bed, Pressure redistribution bed/mattress(bed type)    Mobility Interventions: HOB 30 degrees or less, Float heels    Nutrition Interventions: Document food/fluid/supplement intake    Friction and Shear Interventions: HOB 30 degrees or less, Minimize layers                Problem: Patient Education: Go to Patient Education Activity  Goal: Patient/Family Education  Outcome: Progressing Towards Goal

## 2021-08-03 NOTE — PROGRESS NOTES
Progress Note      8/3/2021 9:54 AM  NAME: Deloris Garcia   MRN:  [id number]   Admit Diagnosis: Pulmonary edema [J81.1]      Subjective:   Chart reviewed. Patient has had a symptomatic ventricular tachycardia and was shocked. Vascular surgery note appreciated. Echocardiogram results explained. He feels much better today. Patient was offered option of cardiac catheterization but he has decided not to pursue that. Edema seems to be improving. Discussed with the family members. Patient did ambulate with a walker with the physical therapist.    Review of Systems:    Symptom Y/N Comments  Symptom Y/N Comments   Fever/Chills n   Chest Pain n    Poor Appetite    Edema y  somewhat better. Cough    Abdominal Pain     Sputum    Joint Pain n    SOB/CARMONA n   Pruritis/Rash     Nausea/vomit    Other     Diarrhea         Constipation           Could NOT obtain due to:      Objective:          Physical Exam:    Last 24hrs VS reviewed since prior progress note. Most recent are:    Visit Vitals  BP (!) 150/94 (BP 1 Location: Right upper arm, BP Patient Position: At rest;Sitting)   Pulse 62   Temp 97.8 °F (36.6 °C)   Resp 22   Ht 6' (1.829 m)   Wt 104.3 kg (229 lb 15 oz)   SpO2 93%   BMI 31.19 kg/m²       Intake/Output Summary (Last 24 hours) at 8/3/2021 1150  Last data filed at 8/3/2021 0941  Gross per 24 hour   Intake --   Output 500 ml   Net -500 ml        General Appearance: Well developed, well nourished, alert & oriented x 3,    no acute distress. Ears/Nose/Mouth/Throat: Hearing grossly normal.  Neck: Supple. Chest: Lungs clear to auscultation bilaterally. Cardiovascular: Regular rate and rhythm, S1,S2 normal, no murmur. Abdomen: Soft, non-tender, bowel sounds are active. Extremities: Lower extremity edema left upper extremity edema evident  Skin: Lower extremity blisters evident    []         Post-cath site without hematoma, bruit, tenderness, or thrill. Distal pulses intact.     PMH/SH reviewed - no change compared to H&P    Data Review    Telemetry: Patient had a ventricular tachycardia and was shocked and had episode of atrial fibrillation and now is in sinus rhythm with PVCs. EKG:   []  No new EKG for review    Lab Data Personally Reviewed:    Recent Labs     08/03/21  0104 08/01/21  1040   WBC 6.1 6.3   HGB 8.7* 8.5*   HCT 28.8* 28.8*    159     No results for input(s): INR, PTP, APTT, INREXT, INREXT in the last 72 hours. Recent Labs     08/03/21  0104 08/01/21  1040    142   K 5.4* 5.2*    110*   CO2 22 23   * 99*   CREA 6.48* 6.43*   GLU 88 94   CA 8.4* 8.6     No results for input(s): CPK, CKNDX, TROIQ in the last 72 hours. No lab exists for component: CPKMB  No results found for: CHOL, CHOLX, CHLST, CHOLV, HDL, HDLP, LDL, LDLC, DLDLP, TGLX, TRIGL, TRIGP, CHHD, CHHDX    Recent Labs     08/03/21  0104 08/01/21  1040     --    TP 6.8  --    ALB 3.2* 3.3*   GLOB 3.6  --      No results for input(s): PH, PCO2, PO2 in the last 72 hours.     Medications Personally Reviewed:    Current Facility-Administered Medications   Medication Dose Route Frequency    furosemide (LASIX) injection 40 mg  40 mg IntraVENous BID    metOLazone (ZAROXOLYN) tablet 5 mg  5 mg Oral DAILY    sodium bicarbonate tablet 650 mg  650 mg Oral BID    amLODIPine (NORVASC) tablet 5 mg  5 mg Oral DAILY    apixaban (ELIQUIS) tablet 2.5 mg  2.5 mg Oral BID    sevelamer carbonate (RENVELA) tab 1,600 mg  1,600 mg Oral TID WITH MEALS    0.9% sodium chloride infusion 250 mL  250 mL IntraVENous PRN    0.9% sodium chloride infusion 250 mL  250 mL IntraVENous PRN    metoprolol tartrate (LOPRESSOR) tablet 25 mg  25 mg Oral DAILY    tamsulosin (FLOMAX) capsule 0.4 mg  0.4 mg Oral DAILY    [Held by provider] hydrALAZINE (APRESOLINE) tablet 50 mg  50 mg Oral TID    0.9% sodium chloride infusion 250 mL  250 mL IntraVENous PRN    calcitRIOL (ROCALTROL) capsule 0.25 mcg  0.25 mcg Oral DAILY    ergocalciferol capsule 50,000 Units  50,000 Units Oral Q7D    hydrALAZINE (APRESOLINE) 20 mg/mL injection 40 mg  40 mg IntraVENous Q6H PRN           Problem List:   Acute hypoxic respiratory failure and patient seems to be somewhat better. Severe anemia. Renal failure. Heart failure. Left upper extremity DVT. Minimal elevation of troponin I is probably not a presentation of myocardial infarction. Patient has had ventricular tachycardia and has been shocked. Patient has maintained sinus rhythm. Patient is clinically doing better. 1.      Assessment/Plan:   Patient was scheduled for cardiac catheterization but he says he does not want to go for catheterization. We will follow nephrology recommendations and vascular surgery recommendations. Continue anticoagulation. Probably physical therapy is of value. I will continue otherwise present care. Thank you. 1.          []       High complexity decision making was performed in this patient at high risk for decompensation with multiple organ involvement.     Kaylan Turner MD

## 2021-08-03 NOTE — PROGRESS NOTES
Problem: Self Care Deficits Care Plan (Adult)  Goal: *Acute Goals and Plan of Care (Insert Text)  Description: Pt will be mod I sup <> sit in prep for EOB ADLs  Pt will be mod I grooming sitting EOB  Pt will be mod I LE dressing sitting EOB/long sit  Pt will be mod I sit <>  prep for toileting LRAD  Pt will be mod I toileting/toilet transfer/cloth mgmt LRAD  Pt will be I following UE HEP in prep for self care tasks   Outcome: Progressing Towards Goal   OCCUPATIONAL THERAPY TREATMENT  Patient: Jefe Florez (84 y.o. male)  Date: 8/3/2021  Diagnosis: Pulmonary edema [J81.1] <principal problem not specified>  Procedure(s) (LRB):  LEFT HEART CATH / CORONARY ANGIOGRAPHY (N/A)    Precautions: Fall  Chart, occupational therapy assessment, plan of care, and goals were reviewed. ASSESSMENT  Patient continues with skilled OT services and is progressing towards goals. Patient received seated in chair upon MELCHOR/PTA arrival without O2 supplement and agreeable to work with therapist.  Seated in chair, patient required SBA/set-up for grooming (washing face and hands). Patient educated on and completed bilateral UE HEP in prep for self care task and strength for OOB ADL'S, see grid below. STS with CGA using RW/gait belt with CGAX1 with PTA to ambulate chair <-> outside of room with MELCHOR  following with chair as patient required increased rest break 2/2 decreased activity tolerance/SOB. Patient returned to chair instructed on PLB technique to decrease mild SOB. Patient resting comfortably with blankets and call bell. Patient would benefit from continued skilled Occupational services while at [facility] in order to increase safety and independence with self care and functional tranfers/mobility. Recommend at discharge to IRF when medically appropriate. Patient is making progress towards goals and requires decreased assistance for functional transfers at this time. No goals met at this time.     Patient seen today by MELCHOR for treatment, discussed plan of care with supervising OT and goals reviewed and updated as appropriate. Plan of care reviewed and adjusted as necessary due to patient LOS, no change in patient functional status at this time, continue to progress towards goals as able. Current Level of Function Impacting Discharge (ADLs): CGA for functional tf's, SBA/set-up for grooming, decreased activity tolerance    Other factors to consider for discharge: Time of onset, medical prognosis/diagnosis, severity of deficits, PLOF, home environment, and family support         PLAN :  Patient continues to benefit from skilled intervention to address the above impairments. Continue treatment per established plan of care. to address goals. Recommend next OT session: Standing tolerance     Recommendation for discharge: (in order for the patient to meet his/her long term goals)  IRF    This discharge recommendation:  Has been made in collaboration with the attending provider and/or case management    IF patient discharges home will need the following DME: TBD       SUBJECTIVE:   Patient stated [birth year].     OBJECTIVE DATA SUMMARY:   Cognitive/Behavioral Status:  Neurologic State: Alert  Orientation Level: Oriented X4  Cognition: Appropriate for age attention/concentration             Functional Mobility and Transfers for ADLs:  Bed Mobility:       Transfers:  Sit to Stand: Contact guard assistance          Balance:  Sitting: Intact  Standing: Impaired; With support  Standing - Static: Good;Constant support  Standing - Dynamic : Fair;Constant support    ADL Intervention:       Grooming  Grooming Assistance: Set-up; Supervision  Position Performed: Seated in chair  Washing Face: Stand-by assistance;Set-up  Washing Hands: Stand-by assistance;Set-up         Therapeutic Exercises:   Exercise Sets Reps AROM AAROM PROM Self PROM Comments   Shoulder flex/ext 2 10 [x] [] [] [] Seated in chair, vc's for pacing self.    Elbow flex/ext 2 10 [x] [] [] []    Wrist flex/ext 2 10 [x] [] [] []    punches 2 10 [x] [] [] []         Pain:  0/10    Activity Tolerance:   Fair  Please refer to the flowsheet for vital signs taken during this treatment. After treatment patient left in no apparent distress:   Sitting in chair and Call bell within reach    COMMUNICATION/COLLABORATION:   The patients plan of care was discussed with: Occupational therapy assistant and Registered nurse.      JILL Roberts  Time Calculation: 37 mins

## 2021-08-04 ENCOUNTER — APPOINTMENT (OUTPATIENT)
Dept: GENERAL RADIOLOGY | Age: 66
DRG: 291 | End: 2021-08-04
Attending: INTERNAL MEDICINE
Payer: MEDICARE

## 2021-08-04 LAB
ALBUMIN SERPL-MCNC: 3.2 G/DL (ref 3.5–5)
ALBUMIN/GLOB SERPL: 0.8 {RATIO} (ref 1.1–2.2)
ALP SERPL-CCNC: 121 U/L (ref 45–117)
ALT SERPL-CCNC: 25 U/L (ref 12–78)
ANION GAP SERPL CALC-SCNC: 9 MMOL/L (ref 5–15)
AST SERPL W P-5'-P-CCNC: 16 U/L (ref 15–37)
BASOPHILS # BLD: 0.1 K/UL (ref 0–0.1)
BASOPHILS NFR BLD: 1 % (ref 0–1)
BILIRUB SERPL-MCNC: 0.7 MG/DL (ref 0.2–1)
BUN SERPL-MCNC: 102 MG/DL (ref 6–20)
BUN/CREAT SERPL: 15 (ref 12–20)
CA-I BLD-MCNC: 8.7 MG/DL (ref 8.5–10.1)
CHLORIDE SERPL-SCNC: 107 MMOL/L (ref 97–108)
CO2 SERPL-SCNC: 22 MMOL/L (ref 21–32)
CREAT SERPL-MCNC: 6.61 MG/DL (ref 0.7–1.3)
DIFFERENTIAL METHOD BLD: ABNORMAL
EOSINOPHIL # BLD: 0.4 K/UL (ref 0–0.4)
EOSINOPHIL NFR BLD: 8 % (ref 0–7)
ERYTHROCYTE [DISTWIDTH] IN BLOOD BY AUTOMATED COUNT: 19.1 % (ref 11.5–14.5)
GLOBULIN SER CALC-MCNC: 3.8 G/DL (ref 2–4)
GLUCOSE SERPL-MCNC: 90 MG/DL (ref 65–100)
HCT VFR BLD AUTO: 28.6 % (ref 36.6–50.3)
HGB BLD-MCNC: 8.5 G/DL (ref 12.1–17)
IMM GRANULOCYTES # BLD AUTO: 0 K/UL (ref 0–0.04)
IMM GRANULOCYTES NFR BLD AUTO: 0 % (ref 0–0.5)
LYMPHOCYTES # BLD: 0.3 K/UL (ref 0.8–3.5)
LYMPHOCYTES NFR BLD: 6 % (ref 12–49)
MCH RBC QN AUTO: 28.3 PG (ref 26–34)
MCHC RBC AUTO-ENTMCNC: 29.7 G/DL (ref 30–36.5)
MCV RBC AUTO: 95.3 FL (ref 80–99)
MONOCYTES # BLD: 0.4 K/UL (ref 0–1)
MONOCYTES NFR BLD: 8 % (ref 5–13)
NEUTS SEG # BLD: 4 K/UL (ref 1.8–8)
NEUTS SEG NFR BLD: 77 % (ref 32–75)
NRBC # BLD: 0 K/UL (ref 0–0.01)
NRBC BLD-RTO: 0 PER 100 WBC
PLATELET # BLD AUTO: 157 K/UL (ref 150–400)
PMV BLD AUTO: 10.8 FL (ref 8.9–12.9)
POTASSIUM SERPL-SCNC: 5.2 MMOL/L (ref 3.5–5.1)
PROT SERPL-MCNC: 7 G/DL (ref 6.4–8.2)
RBC # BLD AUTO: 3 M/UL (ref 4.1–5.7)
SODIUM SERPL-SCNC: 138 MMOL/L (ref 136–145)
WBC # BLD AUTO: 5.1 K/UL (ref 4.1–11.1)

## 2021-08-04 PROCEDURE — 71046 X-RAY EXAM CHEST 2 VIEWS: CPT

## 2021-08-04 PROCEDURE — 74011250636 HC RX REV CODE- 250/636: Performed by: INTERNAL MEDICINE

## 2021-08-04 PROCEDURE — 74011250637 HC RX REV CODE- 250/637: Performed by: INTERNAL MEDICINE

## 2021-08-04 PROCEDURE — 97530 THERAPEUTIC ACTIVITIES: CPT

## 2021-08-04 PROCEDURE — 94762 N-INVAS EAR/PLS OXIMTRY CONT: CPT

## 2021-08-04 PROCEDURE — 80053 COMPREHEN METABOLIC PANEL: CPT

## 2021-08-04 PROCEDURE — 65270000029 HC RM PRIVATE

## 2021-08-04 PROCEDURE — 85025 COMPLETE CBC W/AUTO DIFF WBC: CPT

## 2021-08-04 PROCEDURE — 36415 COLL VENOUS BLD VENIPUNCTURE: CPT

## 2021-08-04 RX ADMIN — APIXABAN 2.5 MG: 2.5 TABLET, FILM COATED ORAL at 21:02

## 2021-08-04 RX ADMIN — SODIUM BICARBONATE 650 MG: 650 TABLET ORAL at 21:02

## 2021-08-04 RX ADMIN — SEVELAMER CARBONATE 1600 MG: 800 TABLET, FILM COATED ORAL at 12:55

## 2021-08-04 RX ADMIN — CALCITRIOL CAPSULES 0.25 MCG 0.25 MCG: 0.25 CAPSULE ORAL at 10:03

## 2021-08-04 RX ADMIN — AMLODIPINE BESYLATE 5 MG: 5 TABLET ORAL at 10:03

## 2021-08-04 RX ADMIN — METOPROLOL TARTRATE 25 MG: 25 TABLET, FILM COATED ORAL at 10:03

## 2021-08-04 RX ADMIN — SEVELAMER CARBONATE 1600 MG: 800 TABLET, FILM COATED ORAL at 16:20

## 2021-08-04 RX ADMIN — FUROSEMIDE 40 MG: 10 INJECTION, SOLUTION INTRAMUSCULAR; INTRAVENOUS at 10:04

## 2021-08-04 RX ADMIN — SODIUM BICARBONATE 650 MG: 650 TABLET ORAL at 09:56

## 2021-08-04 RX ADMIN — FUROSEMIDE 40 MG: 10 INJECTION, SOLUTION INTRAMUSCULAR; INTRAVENOUS at 22:24

## 2021-08-04 RX ADMIN — TAMSULOSIN HYDROCHLORIDE 0.4 MG: 0.4 CAPSULE ORAL at 10:03

## 2021-08-04 RX ADMIN — METOLAZONE 5 MG: 5 TABLET ORAL at 09:56

## 2021-08-04 RX ADMIN — ERGOCALCIFEROL 50000 UNITS: 1.25 CAPSULE ORAL at 17:11

## 2021-08-04 RX ADMIN — APIXABAN 2.5 MG: 2.5 TABLET, FILM COATED ORAL at 10:03

## 2021-08-04 RX ADMIN — SEVELAMER CARBONATE 1600 MG: 800 TABLET, FILM COATED ORAL at 10:03

## 2021-08-04 NOTE — PROGRESS NOTES
[facility] KIDNEY     Renal Daily Progress Note:     Admission Date: 2021     Subjective: patient seen in room 470 . Feeling ok,  BP better. Creatinine seems to be static, actually up a bit after IV lasix. Not overtly short of breath at rest but has dyspnea on exertion. K high but stable. Urine output marginal but it is not clear if adequately collected. D/W patient that we may need to consider dialysis. Not short of breath at rest. No cough. No chest or abdominal pain.      Objective:     Visit Vitals  BP (!) 160/97 (BP 1 Location: Right upper arm, BP Patient Position: Sitting)   Pulse 71   Temp 98 °F (36.7 °C)   Resp 22   Ht 6' (1.829 m)   Wt 97.7 kg (215 lb 6.2 oz)   SpO2 93%   BMI 29.21 kg/m²     Temp (24hrs), Av.7 °F (36.5 °C), Min:97.4 °F (36.3 °C), Max:98 °F (36.7 °C)        Intake/Output Summary (Last 24 hours) at 2021 1652  Last data filed at 2021 0536  Gross per 24 hour   Intake --   Output 875 ml   Net -875 ml     Current Facility-Administered Medications   Medication Dose Route Frequency    furosemide (LASIX) injection 40 mg  40 mg IntraVENous BID    metOLazone (ZAROXOLYN) tablet 5 mg  5 mg Oral DAILY    sodium bicarbonate tablet 650 mg  650 mg Oral BID    amLODIPine (NORVASC) tablet 5 mg  5 mg Oral DAILY    apixaban (ELIQUIS) tablet 2.5 mg  2.5 mg Oral BID    sevelamer carbonate (RENVELA) tab 1,600 mg  1,600 mg Oral TID WITH MEALS    0.9% sodium chloride infusion 250 mL  250 mL IntraVENous PRN    0.9% sodium chloride infusion 250 mL  250 mL IntraVENous PRN    metoprolol tartrate (LOPRESSOR) tablet 25 mg  25 mg Oral DAILY    tamsulosin (FLOMAX) capsule 0.4 mg  0.4 mg Oral DAILY    [Held by provider] hydrALAZINE (APRESOLINE) tablet 50 mg  50 mg Oral TID    0.9% sodium chloride infusion 250 mL  250 mL IntraVENous PRN    calcitRIOL (ROCALTROL) capsule 0.25 mcg  0.25 mcg Oral DAILY    ergocalciferol capsule 50,000 Units  50,000 Units Oral Q7D    hydrALAZINE (APRESOLINE) 20 mg/mL injection 40 mg  40 mg IntraVENous Q6H PRN       Physical Exam:    Seen in Room 470    General appearance: Chronically ill-appearing patient sitting up In chair- comfortable. Head: Normocephalic    Lungs: clear to auscultation but decreased basilar , no wheezes, no rales    Heart:  No S3 gallop , No pericardial rub. Abdomen: Not distended    Extremities:  Lower extremity edema including  dependent thighs. Neuro : Awake and responding appropriately. Data Review:     LABS:  Recent Labs     08/04/21  0308 08/03/21  0104    138   K 5.2* 5.4*    108   CO2 22 22   * 100*   CREA 6.61* 6.48*   CA 8.7 8.4*   ALB 3.2* 3.2*   Vitamin D2 11.1  Intact   PSA  5.2  Recent Labs     08/04/21  0308 08/03/21  0104   WBC 5.1 6.1   HGB 8.5* 8.7*   HCT 28.6* 28.8*    158   iron saturation 5%    No results for input(s): HÉCTOR, KU, CLU, CREAU in the last 72 hours. No lab exists for component: PROU     Blood Cultures 7-12-21  Two of four bottles have been flagged positive by instrument.  Bottles have been sent to [facility] laboratory to assess for possible growth. Gram Positive Cocci in clusters     Chest x-ray 7-31-21  IMPRESSION  Findings/impression:     Volume overload noted, with slightly increased interstitial markings present  bilaterally. There is mild bilateral perihilar airspace disease present, with  mild interval improvement. Interval improvement in left lower lobe airspace  disease. No pneumothorax. CP angles are blunted suggesting small effusions. No  pneumothorax. Enlarged cardiac size again noted similar to prior study. No  suspicious osseous lesions. CXR 7-28-21  Chest 2 views.     Comparison chest series July 26, 2021.     No change compared to prior imaging. Patchy lower lobe lung reticular markings  with small dependent pleural effusions persist. Cardiac and mediastinal  structures unchanged. No pneumothorax. CXR 7-26-21  History: Pulmonary edema.   Pleural effusions.     Comparison: Including chest 7/23/2021.     Findings: The cardiac silhouette remains enlarged. The lungs are adequately  expanded. There is pulmonary vascular congestion and hydrostatic edema, not  significantly changed as compared to chest 7/23/2021. Small to moderate pleural  effusions and associated bibasilar atelectasis persist.  No pneumothorax is  identified. Degenerative changes are present in the spine.     IMPRESSION  Hydrostatic edema. Pleural effusions and associated bibasilar  atelectasis. No significant interval change. CXR 7-21-21  Chest single view.     Comparison single view chest July 19, 2021.     Patchy reticular markings through the lungs, same distribution. Those through  lung bases appear less dense; suspect mild degree improved aeration. Cardiac and  mediastinal structures unchanged. No pneumothorax or sizable pleural effusion. CXR 7-19-21     Comparison single view chest July 16, 2021.     Advanced patchy central and basilar reticular markings through the lungs. Consider pneumonia. Cardiac and mediastinal structures magnified on this AP  view. No pneumothorax or sizable pleural effusion. CXR July 14, 2021:  1 view comparison to 7/12     Continued cardiomegaly and congestion. If There is mild interstitial edema, it  is unchanged. Right pleural effusion and adjacent atelectasis have improved. Retroperitoneal US 7-14-21  Left kidney is 12.2 cm and right kidney is 9.1. Right renal cortex is echogenic  but not the left. Multiple cysts on each side, including a large cyst is 7 cm on  the left. No hydronephrosis. Aorta and IVC normals visualized. Prostatic  enlargement and diffuse bladder wall thickening.  Moderate ascites     IMPRESSION  Medical renal disease on the right    Assessment:   Renal Specific Problems    CKD stage IV/V --Baseline creatinine 3.77-4.02  Acute azotemia-exacerbated by bladder outlet obstruction and relative hypotension with poor perfusion-may be resolving  Hypertension - improved on amlodipine, blood pressure elevation may be related to volume overload  Volume overload- resolving slowly  Hyperkalemia- with residual pleural effusions and now lower ext edema  Metabolic acidosis- on bicarb  Acute anemia with significant iron deficiency  Acute left axillary/brachial vein DVT  Vitamin D2 def  Secondary hyperparathyroid  Proteinuria - nephrotic range  Hyperphosphatemia-controlled        Plan:     Obtain/ Order: labs/cultures/radiology/procedures:  renal panel, CXR and bladder post void scan    PLA2R Ab pending    Therapeutic:    Discuss with Dr. Howard Ip, will change to parenteral Lasix to see if we can diurese more. However, BUN is up and creatinine may have plateaued. I discussed the possibility of dialysis with the patient. He is agreeable if absolutely indicated.   Epogen  IV iron  --completed  Cont sodium bicarbonate  bid  Calcitriol and ergocalciferol   PO4 binder-- increased sevelamer to 2 pc  Flomax     Cont lasix to bid but changed to IV dosing to promote diuresis with metolazone 5 mg/ am

## 2021-08-04 NOTE — PROGRESS NOTES
Patient was reviewed by [facility] for aurora bed, however patient is not able to receive a aurora bed at this time. Due to patient's insurance, patient is not eligible for SNF or IRF. CM met with patient to discuss. Patient reports that he is anxious to return home and that he feels safe to return home. Patient states that his sister and nephew will assist him at home as well as 'other people'. Patient states that he has been walking in the room and is ready to go home at this time. Patient has been accepted with [facility], once cleared by attending patient will return home with [facility]. CM continues to follow.

## 2021-08-04 NOTE — PROGRESS NOTES
4 eyed skin assessment done by mekhi burnett and jacklyn burnett. Small blister noted to right AC area. Edema to both arms. Dry flakey skin to lower ext.

## 2021-08-04 NOTE — PROGRESS NOTES
OCCUPATIONAL THERAPY TREATMENT  Patient: Grisel Vergara (66 y.o. male)  Date: 8/4/2021  Diagnosis: Pulmonary edema [J81.1] <principal problem not specified>  Procedure(s) (LRB):  LEFT HEART CATH / CORONARY ANGIOGRAPHY (N/A)    Precautions:    Chart, occupational therapy assessment, plan of care, and goals were reviewed. ASSESSMENT  Patient continues with skilled OT services and is progressing towards goals. Pt. Received sitting in chair and agreeable to therapy session. Pt. Performed UE therex while seated in chair 3 sets of 15 reps. Pt. Required rest breaks throughout d/t increasingly SOB with activity. Pt. Educated on PLB- pt able to perform demonstrate. Pt. Performed LB dressing while seated in chair with SBA for doffing socks however required total A for donning d/t pt verbalizing inability to gniny while seated in chair. Pt. Completed sit-> stand with CGA. Padding changed at this time and pt repositioned in chair. Pt. Requesting to rest at this time. Pt educated to continue UE therex during the day to increased overall strength and endurance- pt verbalized understanding. Noted pt has increased edema throughout the body on navid UE and navid Bandar- RN stated she is aware of edema. REC. IRF when medically appropriate for discharge. Other factors to consider for discharge: PLOF, time since on set         PLAN :  Patient continues to benefit from skilled intervention to address the above impairments. Continue treatment per established plan of care. to address goals. Recommendation for discharge: (in order for the patient to meet his/her long term goals)  Therapy 3 hours per day 5-7 days per week    This discharge recommendation:  Has been made in collaboration with the attending provider and/or case management    IF patient discharges home will need the following DME: TBD       SUBJECTIVE:   Patient stated  i'm just tired .     OBJECTIVE DATA SUMMARY:   Cognitive/Behavioral Status:  Neurologic State: Alert     Cognition: Appropriate for age attention/concentration; Follows commands  Functional Mobility and Transfers for ADLs:    Transfers:  Sit to Stand: Contact guard assistance    Balance:  Standing: Impaired; With support  Standing - Static: Constant support; Fair    ADL Intervention:    Lower Body Dressing Assistance  Socks: Stand-by assistance; Total assistance (dependent)  Position Performed: Seated in chair      Therapeutic Exercises: navid UE's  Exercise Sets Reps AROM AAROM PROM Self PROM Comments   Shoulder flex/ext 1 15 [x] [] [] []    Elbow flex/ext 1 15 [x] [] [] []    Wrist flex/ext 1 15 [x] [] [] []       [] [] [] []        Pain:  0/10 pain reported    Activity Tolerance:   Fair  Please refer to the flowsheet for vital signs taken during this treatment. After treatment patient left in no apparent distress:   Sitting in chair and Call bell within reach    COMMUNICATION/COLLABORATION:   The patients plan of care was discussed with: Registered nurseMiles Ahuja  Time Calculation: 15 mins    Problem: Self Care Deficits Care Plan (Adult)  Goal: *Acute Goals and Plan of Care (Insert Text)  Description: Pt will be mod I sup <> sit in prep for EOB ADLs  Pt will be mod I grooming sitting EOB  Pt will be mod I LE dressing sitting EOB/long sit  Pt will be mod I sit <>  prep for toileting LRAD  Pt will be mod I toileting/toilet transfer/cloth mgmt LRAD  Pt will be I following UE HEP in prep for self care tasks   Outcome: Progressing Towards Goal

## 2021-08-04 NOTE — PROGRESS NOTES
Progress Note    Fany Mosquera MD             Daily Progress Note: 8/4/2021      Subjective: The patient is seen for follow  up. Shortness of breath on exertion  As per case management patient is excited to be going home. Physical therapy recommendation is 3 hours of physical therapy a day for 5 to 7 days a week  Also Dr. ELI [facility] has started patient on IV Lasix. Problem List:  Problem List as of 8/4/2021 Never Reviewed        Codes Class Noted - Resolved    Pulmonary edema ICD-10-CM: J81.1  ICD-9-CM: 257  7/13/2021 - Present        GI bleed ICD-10-CM: K92.2  ICD-9-CM: 578.9  1/5/2021 - Present              Medications reviewed  Current Facility-Administered Medications   Medication Dose Route Frequency    furosemide (LASIX) injection 40 mg  40 mg IntraVENous BID    metOLazone (ZAROXOLYN) tablet 5 mg  5 mg Oral DAILY    sodium bicarbonate tablet 650 mg  650 mg Oral BID    amLODIPine (NORVASC) tablet 5 mg  5 mg Oral DAILY    apixaban (ELIQUIS) tablet 2.5 mg  2.5 mg Oral BID    sevelamer carbonate (RENVELA) tab 1,600 mg  1,600 mg Oral TID WITH MEALS    0.9% sodium chloride infusion 250 mL  250 mL IntraVENous PRN    0.9% sodium chloride infusion 250 mL  250 mL IntraVENous PRN    metoprolol tartrate (LOPRESSOR) tablet 25 mg  25 mg Oral DAILY    tamsulosin (FLOMAX) capsule 0.4 mg  0.4 mg Oral DAILY    [Held by provider] hydrALAZINE (APRESOLINE) tablet 50 mg  50 mg Oral TID    0.9% sodium chloride infusion 250 mL  250 mL IntraVENous PRN    calcitRIOL (ROCALTROL) capsule 0.25 mcg  0.25 mcg Oral DAILY    ergocalciferol capsule 50,000 Units  50,000 Units Oral Q7D    hydrALAZINE (APRESOLINE) 20 mg/mL injection 40 mg  40 mg IntraVENous Q6H PRN       Review of Systems:   A comprehensive review of systems was negative except for that written in the HPI.     Objective:   Physical Exam:     Visit Vitals  BP (!) 156/104 (BP 1 Location: Right upper arm, BP Patient Position: Sitting)   Pulse 83   Temp 97.5 °F (36.4 °C)   Resp 20   Ht 6' (1.829 m)   Wt 97.7 kg (215 lb 6.2 oz)   SpO2 98%   BMI 29.21 kg/m²    O2 Flow Rate (L/min): 1 l/min O2 Device: None (Room air)    Temp (24hrs), Av.6 °F (36.4 °C), Min:97.4 °F (36.3 °C), Max:98 °F (36.7 °C)    No intake/output data recorded.  1901 -  0700  In: -   Out: 6347 [Urine:1375]    General:  Alert, cooperative, no distress, appears stated age. Lungs:   Clear to auscultation bilaterally. Chest wall:  No tenderness or deformity. Heart:  Regular rate and rhythm, S1, S2 normal, no murmur, click, rub or gallop. Abdomen:   Soft, non-tender. Bowel sounds normal. No masses,  No organomegaly. Extremities: Extremities normal, atraumatic, no cyanosis bilateral leg edema   Pulses: 2+ and symmetric all extremities. Skin: Skin color, texture, turgor normal. No rashes or lesions   Neurologic: CNII-XII intact. No gross sensory or motor deficits     Data Review:       Recent Days:  Recent Labs     21  0308 21  0104   WBC 5.1 6.1   HGB 8.5* 8.7*   HCT 28.6* 28.8*    158     Recent Labs     21  0308 21  0104    138   K 5.2* 5.4*    108   CO2 22 22   GLU 90 88   * 100*   CREA 6.61* 6.48*   CA 8.7 8.4*   ALB 3.2* 3.2*   TBILI 0.7 0.6   ALT 25 24     No results for input(s): PH, PCO2, PO2, HCO3, FIO2 in the last 72 hours. Results     ** No results found for the last 336 hours.  **         24 Hour Results:  Recent Results (from the past 24 hour(s))   CBC WITH AUTOMATED DIFF    Collection Time: 21  3:08 AM   Result Value Ref Range    WBC 5.1 4.1 - 11.1 K/uL    RBC 3.00 (L) 4.10 - 5.70 M/uL    HGB 8.5 (L) 12.1 - 17.0 g/dL    HCT 28.6 (L) 36.6 - 50.3 %    MCV 95.3 80.0 - 99.0 FL    MCH 28.3 26.0 - 34.0 PG    MCHC 29.7 (L) 30.0 - 36.5 g/dL    RDW 19.1 (H) 11.5 - 14.5 %    PLATELET 335 815 - 067 K/uL    MPV 10.8 8.9 - 12.9 FL    NRBC 0.0 0.0  WBC    ABSOLUTE NRBC 0.00 0.00 - 0.01 K/uL    NEUTROPHILS 77 (H) 32 - 75 % LYMPHOCYTES 6 (L) 12 - 49 %    MONOCYTES 8 5 - 13 %    EOSINOPHILS 8 (H) 0 - 7 %    BASOPHILS 1 0 - 1 %    IMMATURE GRANULOCYTES 0 0 - 0.5 %    ABS. NEUTROPHILS 4.0 1.8 - 8.0 K/UL    ABS. LYMPHOCYTES 0.3 (L) 0.8 - 3.5 K/UL    ABS. MONOCYTES 0.4 0.0 - 1.0 K/UL    ABS. EOSINOPHILS 0.4 0.0 - 0.4 K/UL    ABS. BASOPHILS 0.1 0.0 - 0.1 K/UL    ABS. IMM. GRANS. 0.0 0.00 - 0.04 K/UL    DF AUTOMATED     METABOLIC PANEL, COMPREHENSIVE    Collection Time: 08/04/21  3:08 AM   Result Value Ref Range    Sodium 138 136 - 145 mmol/L    Potassium 5.2 (H) 3.5 - 5.1 mmol/L    Chloride 107 97 - 108 mmol/L    CO2 22 21 - 32 mmol/L    Anion gap 9 5 - 15 mmol/L    Glucose 90 65 - 100 mg/dL     (H) 6 - 20 mg/dL    Creatinine 6.61 (H) 0.70 - 1.30 mg/dL    BUN/Creatinine ratio 15 12 - 20      GFR est AA 10 (L) >60 ml/min/1.73m2    GFR est non-AA 8 (L) >60 ml/min/1.73m2    Calcium 8.7 8.5 - 10.1 mg/dL    Bilirubin, total 0.7 0.2 - 1.0 mg/dL    AST (SGOT) 16 15 - 37 U/L    ALT (SGPT) 25 12 - 78 U/L    Alk. phosphatase 121 (H) 45 - 117 U/L    Protein, total 7.0 6.4 - 8.2 g/dL    Albumin 3.2 (L) 3.5 - 5.0 g/dL    Globulin 3.8 2.0 - 4.0 g/dL    A-G Ratio 0.8 (L) 1.1 - 2.2         XR CHEST PA LAT   Final Result   Findings/impression:      Volume overload noted, with slightly increased interstitial markings present   bilaterally. There is mild bilateral perihilar airspace disease present, with   mild interval improvement. Interval improvement in left lower lobe airspace   disease. No pneumothorax. CP angles are blunted suggesting small effusions. No   pneumothorax. Enlarged cardiac size again noted similar to prior study. No   suspicious osseous lesions. XR CHEST PA LAT   Final Result      XR CHEST PA LAT   Final Result   Hydrostatic edema. Pleural effusions and associated bibasilar   atelectasis. No significant interval change.       XR CHEST PORT   Final Result   There is continued moderate pulmonary interstitial and alveolar edema with a slight interval improvement within the right upper lobe. The   remainder of this examination is unchanged. XR CHEST PORT   Final Result      XR CHEST PORT   Final Result      LOWER EXT ART PVR MULT LEVEL SEG PRESSURES   Final Result      DUPLEX LOWER EXT VENOUS BILAT   Final Result      XR CHEST PORT   Final Result   Findings/impression: Stable cardiomediastinal silhouette. Similar central   pulmonary vascular congestion and bilateral patchy airspace opacities. No   significant pleural effusion. No pneumothorax. Findings are not significantly changed. XR CHEST PORT   Final Result      US RETROPERITONEUM COMP   Final Result   Medical renal disease on the right      DUPLEX UPPER EXT VENOUS LEFT   Final Result      XR CHEST PORT   Final Result   Findings/impression:      Cardiac silhouette is enlarged. Central vascular congestion and patchy central   airspace disease/edema. Suspect trace right pleural effusion. No evidence of pneumothorax. No acute osseous abnormality identified. Assessment: CHF  Chronic renal failure  Anemia  Acute left upper limb DVT  Hypertension        Plan: Will await for diuresis how patient responds with IV Lasix. If patient is going for dialysis would get it done while patient is in hospital.        Care Plan discussed with: Patient/Family    Total time spent with patient: 30 minutes.     Jamaica Hughes MD

## 2021-08-04 NOTE — PROGRESS NOTES
Progress Note      8/4/2021 9:54 AM  NAME: Jacobo Uribe   MRN:  [id number]   Admit Diagnosis: Pulmonary edema [J81.1]      Subjective:   Chart reviewed. Patient has had a symptomatic ventricular tachycardia and was shocked. Vascular surgery note appreciated. Echocardiogram results explained. He feels much better today. Patient was offered option of cardiac catheterization but he has decided not to pursue that. Edema seems to be improving. Discussed with the family members. Patient did ambulate with a walker with the physical therapist.    Review of Systems:    Symptom Y/N Comments  Symptom Y/N Comments   Fever/Chills n   Chest Pain n    Poor Appetite    Edema y  somewhat better. Cough    Abdominal Pain     Sputum    Joint Pain n    SOB/CARMONA n   Pruritis/Rash     Nausea/vomit    Other     Diarrhea         Constipation           Could NOT obtain due to:      Objective:          Physical Exam:    Last 24hrs VS reviewed since prior progress note. Most recent are:    Visit Vitals  BP (!) 177/105 (BP 1 Location: Right upper arm, BP Patient Position: Sitting)   Pulse 76   Temp 97.4 °F (36.3 °C)   Resp 18   Ht 6' (1.829 m)   Wt 97.7 kg (215 lb 6.2 oz)   SpO2 94%   BMI 29.21 kg/m²       Intake/Output Summary (Last 24 hours) at 8/4/2021 1045  Last data filed at 8/4/2021 0536  Gross per 24 hour   Intake --   Output 875 ml   Net -875 ml        General Appearance: Well developed, well nourished, alert & oriented x 3,    no acute distress. Ears/Nose/Mouth/Throat: Hearing grossly normal.  Neck: Supple. Chest: Lungs clear to auscultation bilaterally. Cardiovascular: Regular rate and rhythm, S1,S2 normal, no murmur. Abdomen: Soft, non-tender, bowel sounds are active. Extremities: Lower extremity edema left upper extremity edema evident  Skin: Lower extremity blisters evident    []         Post-cath site without hematoma, bruit, tenderness, or thrill. Distal pulses intact.     PMH/SH reviewed - no change compared to H&P    Data Review    Telemetry: Patient had a ventricular tachycardia and was shocked and had episode of atrial fibrillation and now is in sinus rhythm with PVCs. EKG:   []  No new EKG for review    Lab Data Personally Reviewed:    Recent Labs     08/04/21 0308 08/03/21 0104   WBC 5.1 6.1   HGB 8.5* 8.7*   HCT 28.6* 28.8*    158     No results for input(s): INR, PTP, APTT, INREXT, INREXT in the last 72 hours. Recent Labs     08/04/21 0308 08/03/21 0104    138   K 5.2* 5.4*    108   CO2 22 22   * 100*   CREA 6.61* 6.48*   GLU 90 88   CA 8.7 8.4*     No results for input(s): CPK, CKNDX, TROIQ in the last 72 hours. No lab exists for component: CPKMB  No results found for: CHOL, CHOLX, CHLST, CHOLV, HDL, HDLP, LDL, LDLC, DLDLP, TGLX, TRIGL, TRIGP, CHHD, CHHDX    Recent Labs     08/04/21 0308 08/03/21 0104   * 109   TP 7.0 6.8   ALB 3.2* 3.2*   GLOB 3.8 3.6     No results for input(s): PH, PCO2, PO2 in the last 72 hours.     Medications Personally Reviewed:    Current Facility-Administered Medications   Medication Dose Route Frequency    furosemide (LASIX) injection 40 mg  40 mg IntraVENous BID    metOLazone (ZAROXOLYN) tablet 5 mg  5 mg Oral DAILY    sodium bicarbonate tablet 650 mg  650 mg Oral BID    amLODIPine (NORVASC) tablet 5 mg  5 mg Oral DAILY    apixaban (ELIQUIS) tablet 2.5 mg  2.5 mg Oral BID    sevelamer carbonate (RENVELA) tab 1,600 mg  1,600 mg Oral TID WITH MEALS    0.9% sodium chloride infusion 250 mL  250 mL IntraVENous PRN    0.9% sodium chloride infusion 250 mL  250 mL IntraVENous PRN    metoprolol tartrate (LOPRESSOR) tablet 25 mg  25 mg Oral DAILY    tamsulosin (FLOMAX) capsule 0.4 mg  0.4 mg Oral DAILY    [Held by provider] hydrALAZINE (APRESOLINE) tablet 50 mg  50 mg Oral TID    0.9% sodium chloride infusion 250 mL  250 mL IntraVENous PRN    calcitRIOL (ROCALTROL) capsule 0.25 mcg  0.25 mcg Oral DAILY    ergocalciferol capsule 50,000 Units  50,000 Units Oral Q7D    hydrALAZINE (APRESOLINE) 20 mg/mL injection 40 mg  40 mg IntraVENous Q6H PRN           Problem List:   Acute hypoxic respiratory failure and patient seems to be somewhat better. Severe anemia. Renal failure. Heart failure. Left upper extremity DVT. Minimal elevation of troponin I is probably not a presentation of myocardial infarction. Patient has had ventricular tachycardia and has been shocked. Patient has maintained sinus rhythm. Patient is clinically doing better. 1.      Assessment/Plan:   Patient was scheduled for cardiac catheterization but he says he does not want to go for catheterization. We will follow nephrology recommendations and vascular surgery recommendations. Continue anticoagulation. Okay to discharge home from cardiac viewpoint and I will be happy to follow as an outpatient. Thank you. 1.          []       High complexity decision making was performed in this patient at high risk for decompensation with multiple organ involvement.     Mekhi Valdivia MD

## 2021-08-04 NOTE — PROGRESS NOTES
Pulmonary, Critical Care    Name: Jenn Anderson MRN: [id number]   : 1955 Hospital: [address] [place] [address]   Date: 2021  Admission date: 2021 Hospital Day: 24       Subjective/Interval History:   Seen in the emergency room wearing noninvasive ventilator. Patient has underlying renal insufficiency developed worsening shortness of breath cough presented to the emergency room chest x-ray shows pulmonary edema. He was profoundly hypoxic is now on noninvasive ventilator and feels more comfortable. Has not had any significant urine output since he has been in the ER. He also complains of new onset left arm swelling   post void bladder scan showed greater than 200 cc urine retention and Quesada catheter placed with 500 cc removed. Overnight he has put out an additional 1200 cc. Respirations improved currently nonlabored on nasal oxygen. Duplex scan of the left arm did show left axillary and proximal brachial DVT and heparin was started. On heparin with Quesada catheter and he has had slight bleeding at the urethral meatus. Ultrasound of the abdomen is pending  7/15 ultrasound of the abdomen showed medical renal disease on the right with small kidney no hydronephrosis there was evidence of prostate enlargement. He is breathing easily at this point and on room air is running on oxygen saturation of 93%   respirations nonlabored on nasal oxygen. Quesada catheter is out he states he has been voiding frequent small amounts without straining   no specific complaints breathing easily. Overnight oximetry showed minimal but significant desaturation while on room air 8 minutes 40 seconds with low of 71%  . Developed V. tach this morning given IV amiodarone IV magnesium and transferred to the unit. Currently heart rate  alternating between A. fib and sinus with PAC. He denies any discomfort is breathing easily   remains sinus rhythm with PVCs.   Had worsening hypoxia during the night and placed on oxygen he feels comfortable at this point. Urine output mildly improved yesterday but input remains greater than output  7/24 no specific complaints respirations nonlabored currently room air with saturation 97% Lasix been resumed 7/23. Urine output not accurately recorded but he states he has been passing a lot of urine  7/25 feels fine denies shortness of breath on room air. Oxygen saturation 97% on room air while awake. He states he is putting out good urine from his oral Lasix although its not being recorded in the chart  7/27 overnight oximetry continues to show nocturnal hypoxia will continue nocturnal oxygen 7/28  no one put oxygen on him last night. Good urine output recorded yesterday 1500 cc but chest x-ray today continues to show pulmonary edema and small effusions  7/30 states again last night he was not placed on oxygen until he asked and then someone came and removed it later on. We will repeat overnight oximetry tonight on and off oxygen  7/31 feels fine no specific complaints. Overnight oximetry last night showed no desaturation when he was on room air  8/1 he was put on 2 L nasal cannula this morning repeat overnight pulse oximetry results pending  8/2 overnight oximetry study 8/1 showed no significant desaturation on room air  8/4 no specific complaints breathing well states he is getting up to the bathroom and back with persistent but less difficulty  Patient Active Problem List   Diagnosis Code    GI bleed K92.2    Pulmonary edema J81.1       IMPRESSION:   1. Ventricular tachycardia none further seen   2. Atrial fibrillation converted to sinus  3. Hypotension corrected   4. Acute hypoxic respiratory failure room air oxygen saturation maintained while awake  5. Chronic hypoxic respiratory failure most recent overnight oximetry 8/1 showed no significant desaturation will put oxygen on standby   6.  Acute pulmonary edema radiographically no change 7/28  7. 2 of 4 blood culture bottles with coagulase-negative staph aureus likely skin contaminant   8. Bilateral pleural effusions no change on 7/28 x-ray  9. Acute on chronic kidney disease creatinine  stable  10. Obstructive uropathy Quesada catheter has been removed with no residual urine on bladder scanning  11. Severe hypertension corrected   12. DVT left axillary and brachial now on Eliquis  13. Anemia hemoglobin improved after last transfusion 7/21  14. Troponin leak likely due to renal insufficiency stable has not risen any further  Body mass index is 29.21 kg/m². RECOMMENDATIONS/PLAN:     1. Remains in sinus rhythm  2. Blood pressure well controlled now on metoprolol low-dose and Norvasc  3. Continue Flomax for prostate enlargement   4. Repeat chest x-ray with continued fluid overload  5. 8/4 overnight oximetry with low oxygen saturation 76% but only 6 minutes below 88%         Subjective/Initial History:       I was asked by Cain Garrett MD to see Kanika Maddox a 78 y.o.  male  in consultation for a chief complaint of shortness of breath pulmonary edema acute hypoxic respiratory failure        Patient PCP: Georgia Ledbetter MD  PMH:  has a past medical history of Chronic kidney disease and Hypertension. PSH:   has no past surgical history on file. FHX: family history is not on file. SHX:  reports that he has never smoked.  He has never used smokeless tobacco.    Systemic review somewhat limited as he is on noninvasive ventilator remains short of breath although states he is feeling better  General he has not noted any worsening edema of his upper legs fever chills or sweats does complain of new onset swelling of his left hand and arm  Eyes no double vision or momentary blindness  ENT no facial pain over the sinuses  Endocrinologic no polyuria polydipsia  Musculoskeletal no swollen tender joints  Neurologic no history seizures or syncope  Gastrointestinal no nausea vomiting acid indigestion  Genitourinary states he does still pass fair amount of urine each day has not noted any decrease in that.   Does not have to strain to urinate has no bleeding or drainage  Cardiovascular he is not aware of any underlying heart disease has not had chest pain diaphoresis has had worsening shortness of breath laying down and with exertion  Respiratory worsening shortness of breath over the last week or more no significant cough no sputum production no chills or sweats did have history COVID-19 pneumonitis January of this year    No Known Allergies   MEDS:   Current Facility-Administered Medications   Medication    furosemide (LASIX) injection 40 mg    metOLazone (ZAROXOLYN) tablet 5 mg    sodium bicarbonate tablet 650 mg    amLODIPine (NORVASC) tablet 5 mg    apixaban (ELIQUIS) tablet 2.5 mg    sevelamer carbonate (RENVELA) tab 1,600 mg    0.9% sodium chloride infusion 250 mL    0.9% sodium chloride infusion 250 mL    metoprolol tartrate (LOPRESSOR) tablet 25 mg    tamsulosin (FLOMAX) capsule 0.4 mg    [Held by provider] hydrALAZINE (APRESOLINE) tablet 50 mg    0.9% sodium chloride infusion 250 mL    calcitRIOL (ROCALTROL) capsule 0.25 mcg    ergocalciferol capsule 50,000 Units    hydrALAZINE (APRESOLINE) 20 mg/mL injection 40 mg        Current Facility-Administered Medications:     furosemide (LASIX) injection 40 mg, 40 mg, IntraVENous, BID, Joe DILLON MD, 40 mg at 08/04/21 1004    metOLazone (ZAROXOLYN) tablet 5 mg, 5 mg, Oral, DAILY, Zenia Angelucci, MD, 5 mg at 08/04/21 0807    sodium bicarbonate tablet 650 mg, 650 mg, Oral, BID, Zenia Angelucci, MD, 650 mg at 08/04/21 0956    amLODIPine (NORVASC) tablet 5 mg, 5 mg, Oral, DAILY, Hortencia Gutierrez MD, 5 mg at 08/04/21 1003    apixaban (ELIQUIS) tablet 2.5 mg, 2.5 mg, Oral, BID, Vic Evans MD, 2.5 mg at 08/04/21 1003    sevelamer carbonate (RENVELA) tab 1,600 mg, 1,600 mg, Oral, TID WITH MEALS, Zenia Angelucci, MD, 1,600 mg at 21 1620    0.9% sodium chloride infusion 250 mL, 250 mL, IntraVENous, PRN, Luisa Dallas MD    0.9% sodium chloride infusion 250 mL, 250 mL, IntraVENous, PRN, Luisa Dallas MD    metoprolol tartrate (LOPRESSOR) tablet 25 mg, 25 mg, Oral, DAILY, Vianca Graham MD, 25 mg at 21 1003    tamsulosin (FLOMAX) capsule 0.4 mg, 0.4 mg, Oral, DAILY, See Rees MD, 0.4 mg at 21 1003    [Held by provider] hydrALAZINE (APRESOLINE) tablet 50 mg, 50 mg, Oral, TID, See Rees MD, 50 mg at 21    0.9% sodium chloride infusion 250 mL, 250 mL, IntraVENous, PRN, See Rees MD    calcitRIOL (ROCALTROL) capsule 0.25 mcg, 0.25 mcg, Oral, DAILY, Vianca Graham MD, 0.25 mcg at 21    ergocalciferol capsule 50,000 Units, 50,000 Units, Oral, Q7D, Vianca Graham MD, 50,000 Units at 21    hydrALAZINE (APRESOLINE) 20 mg/mL injection 40 mg, 40 mg, IntraVENous, Q6H PRN, See Rees MD, 40 mg at 21      Objective:     Vital Signs: Telemetry:    normal sinus rhythm Intake/Output:   Visit Vitals  BP (!) 160/97 (BP 1 Location: Right upper arm, BP Patient Position: Sitting)   Pulse 71   Temp 98 °F (36.7 °C)   Resp 22   Ht 6' (1.829 m)   Wt 97.7 kg (215 lb 6.2 oz)   SpO2 93%   BMI 29.21 kg/m²       Temp (24hrs), Av.7 °F (36.5 °C), Min:97.4 °F (36.3 °C), Max:98 °F (36.7 °C)        O2 Device: None (Room air) O2 Flow Rate (L/min): 1 l/min         Body mass index is 29.21 kg/m². Wt Readings from Last 4 Encounters:   21 97.7 kg (215 lb 6.2 oz)   21 79.4 kg (175 lb)          Intake/Output Summary (Last 24 hours) at 2021 1700  Last data filed at 2021 0536  Gross per 24 hour   Intake --   Output 875 ml   Net -875 ml       Last shift:      No intake/output data recorded.   Last 3 shifts:  1901 -  0700  In: -   Out: 1268 [Urine:1375]       Hemodynamics:    CO:    CI:    CVP:    SVR:   PAP Systolic:    PAP Diastolic:    PVR: SV02:       Ventilator Settings:      Mode Rate TV Press PEEP FiO2 PIP Min. Vent               21 %     9.7 l/min      Physical Exam:    General:  male; no distress now on room air  HEENT: NCAT, visible oral mucosa is moist and clear  Eyes: anicteric; conjunctiva clear extraocular movements intact  Neck: no nodes,,no accessory MM use. no respiratory distress  Chest: no deformity,   Cardiac: Regular rhythm and rate now controlled  Lungs: distant breath sounds; clear anteriorly and laterally with rales in the bases  Abd: Soft positive bowel sounds no tenderness  Ext: Improvement in edema  of the lower extremities with continued edema of the left arm; no joint swelling;  No clubbing  : Clear urine  Neuro: Alert awake no distress speech is clear moves all 4 extremities  Psych-calm, oriented to person;   Skin: warm, dry, no cyanosis;   Pulses: Brachial and radial pulses intact  Capillary:slow capillary refill      Labs:    Recent Labs     08/04/21  0308 08/03/21  0104    138   K 5.2* 5.4*    108   CO2 22 22   GLU 90 88   * 100*   CREA 6.61* 6.48*   CA 8.7 8.4*   ALB 3.2* 3.2*   ALT 25 24   8/4 overnight oximetry with low oxygen saturation 76% with only 6 minutes below 88%   8/1 overnight oximetry on room air with low oxygen saturation 79% only 5 minutes below 88   7/39 overnight oximetry on and off oxygen with low oxygen saturation 72% with only 5 minutes below 88%   7/29 overnight oximetry with low oxygen saturation 44% with 52 minutes 40 seconds below 88%   7/27  overnight oximetry split study with a low oxygen saturation 75% 33 minutes 10 seconds below 88%   7/25 overnight oximetry with 1 hour 13 minutes below 88% with low oxygen saturation 56%  7/19 overnight oximetry shows 14 minutes 26 seconds below 88% with low oxygen saturation 75%    BNP 32,264, previous greater than 35,000  Lab Results   Component Value Date/Time    Culture result:  07/12/2021 10:40 PM     Two of four bottles have been flagged positive by instrument. Bottles have been sent to [facility] laboratory to assess for possible growth. Gram Positive Cocci in clusters CALLED TO AND READ BACK BY Ronni Sparrow r.n. [id number] 07/14/2021 by [email or web address]    Culture result:  07/12/2021 10:40 PM     Staphylococcus species, coagulase negative GROWING IN THE 1ST OF 4 BOTTLES DRAWN    Culture result:  07/12/2021 10:40 PM     Staphylococcus species, coagulase negative (2nd colony type/strain) GROWING IN THE 2ND OF 4 BOTTLES DRAWN        Imaging:  I have personally reviewed the patients radiographs and have reviewed the reports:    CXR Results  (Last 48 hours)  7/23 chest x-ray mild pulmonary congestion tiny effusions unchanged from last x-ray although right upper lobe lung may have slightly less congestion  7/21 chest x-ray with continued mild pulmonary edema pattern small bilateral pleural effusions there may be improved inspiratory effort  7/16 chest x-ray with continued mild pulmonary edema and small pleural effusions unchanged               07/12/21 2251  XR CHEST PORT Final result    Impression:  Findings/impression:       Cardiac silhouette is enlarged. Central vascular congestion and patchy central   airspace disease/edema. Suspect trace right pleural effusion. No evidence of pneumothorax. No acute osseous abnormality identified. Narrative:  Study: XR CHEST PORT       Clinical indication: sob       Comparison: None. To me chest x-ray consistent with cardiomegaly pulmonary edema and bilateral pleural effusions           Results from Hospital Encounter encounter on 07/12/21    XR CHEST PA LAT    Narrative  Two-view chest:    COMPARISON: Chest x-ray July 20, 2021. Impression  Findings/impression:    Volume overload noted, with slightly increased interstitial markings present  bilaterally. There is mild bilateral perihilar airspace disease present, with  mild interval improvement.  Interval improvement in left lower lobe airspace  disease. No pneumothorax. CP angles are blunted suggesting small effusions. No  pneumothorax. Enlarged cardiac size again noted similar to prior study. No  suspicious osseous lesions. XR CHEST PA LAT    Narrative  Chest 2 views. Comparison chest series July 26, 2021. No change compared to prior imaging. Patchy lower lobe lung reticular markings  with small dependent pleural effusions persist. Cardiac and mediastinal  structures unchanged. No pneumothorax. XR CHEST PA LAT    Narrative  Chest, 2 views, 7/26/2021    History: Pulmonary edema. Pleural effusions. Comparison: Including chest 7/23/2021. Findings: The cardiac silhouette remains enlarged. The lungs are adequately  expanded. There is pulmonary vascular congestion and hydrostatic edema, not  significantly changed as compared to chest 7/23/2021. Small to moderate pleural  effusions and associated bibasilar atelectasis persist.  No pneumothorax is  identified. Degenerative changes are present in the spine. Impression  Hydrostatic edema. Pleural effusions and associated bibasilar  atelectasis. No significant interval change. Results from [place] encounter on 01/05/21    CT CHEST WO CONT    Narrative  Images obtained without contrast include the abdomen and pelvis. Comparison portable chest radiograph earlier today. Dose Reduction:  All CT scans at this facility are performed using dose reduction optimization  techniques as appropriate to a performed exam including the following: Automated  exposure control, adjustments of the mA and/or kV according to patient size, or  use of iterative reconstruction technique. Subtle peripheral groundglass densities are noted in both lungs, could reflect  Covid pneumonia or other. No pneumothorax or pneumomediastinum. The radiographic findings suspicious for  pneumomediastinum probably was artifact, perhaps related to cardiac motion.   No pleural effusion or adenopathy. Cardiomegaly again noted. Impression  IMPRESSION:  1. No pneumomediastinum or pneumothorax. 2. Cardiomegaly. 3. Subtle groundglass densities in both lungs might be pneumonia or other. Discussion patient with worsening renal insufficiency has developed pulmonary edema acute hypoxic respiratory failure. He states he still has urine output normally at home. We will give him high-dose IV Lasix to see if diuresis is induced. He very likely will need dialysis. He has minimal elevation in troponin probably troponin leak from hypoxia or just due to his renal insufficiency. He is not having any chest pain. Does have severe hypertension. Has been started on clonidine and hydralazine. 7/14 no distress today on nasal oxygen. Did have residual on post void bladder scan and Quesada catheter was placed with good diuresis overnight. Despite this potassium remains elevated. We will give 1 dose of Kayexalate. Despite diuresis hemoglobin is dropped to 6.7 will transfuse. He denies frequency small-volume urine or straining to urinate at home despite this with signs of obstruction we will add Flomax. Potassium 6 today we will give 1 dose of Kayexalate and continue diuresis  7/16 respirations nonlabored. Was placed back on 1 L nasal oxygen yesterday overnight oximetry shows well-maintained oxygen saturation. Will check overnight tonight on and off oxygen. Quesada catheter has been removed. Chest x-ray continues to show mild pulmonary edema  7/17 overnight oximetry shows desaturation when on room air. Will maintain oxygen and repeat overnight oximetry Sunday night  7/19 developed V. tach overnight and now in the ICU on IV amiodarone with rhythm showing alternating sinus with PAC and A. fib. Rate . Overnight oximetry continued to show desaturation on room air and he is back on nasal oxygen. He has no specific complaint  7/20 now in sinus rhythm with frequent PVCs.   Cardiology is recommending cardiac cath but he is reluctant to undergo that. Creatinine has worsened despite IV fluid administration yesterday although blood pressure is improved. Currently oxygen saturation well-maintained on room air but last overnight oximetry showed desaturation will maintain oxygen at 1 L for now  7/21 had hypoxia yesterday afternoon and placed back on oxygen during the daytime as well as the night. He feels comfortable at this point. Chest x-ray is unchanged still has mild pulmonary edema with small effusions. It does appear that he took a deeper breath today. Creatinine remains markedly elevated. He is on heparin for left axillary DVT. Holding switching to oral anticoagulation until sure no instrumentation is needed   7/23 unchanged remains on IV heparin. Oral Lasix being resumed did require transfusion 7/21. Repeat chest x-ray no worsening  7/26 chest x-ray unchanged pulmonary edema and bilateral effusions. He states he has good urine output with the current dose of Lasix. We will repeat overnight oximetry on and off oxygen   7/27 continued nocturnal hypoxia he will need home oxygen  7/28 repeat overnight oximetry to keep him qualified for home oxygen  7/29 repeat overnight oximetry with low oxygen saturation 44% with 52 minutes 40 seconds below 88%. Continues to require nocturnal oxygen  8/4 repeat overnight oximetry on room air with no significant desaturation           Thank you for allowing us to participate in the care of this patient.   We will follow along with you     Jonathan Macias MD

## 2021-08-04 NOTE — PROGRESS NOTES
Problem: Falls - Risk of  Goal: *Absence of Falls  Description: Document Rose Spare Fall Risk and appropriate interventions in the flowsheet. Outcome: Progressing Towards Goal  Note: Fall Risk Interventions: Bed is in the lowest position and wheels are locked, call bell is within reach, bathroom light is on during evening hours, gripper socks are on and patient has been instructed to call out for assistance if needed. As of now, patient is free from falls and will continue to be monitored. Bedside shift change report given to Sobeida Welch RN (oncoming nurse) by Echo Lyon RN (offgoing nurse). Report included the following information SBAR, Kardex, Procedure Summary, Intake/Output, MAR, Accordion and Cardiac Rhythm NSR.

## 2021-08-05 VITALS
BODY MASS INDEX: 30.48 KG/M2 | RESPIRATION RATE: 18 BRPM | OXYGEN SATURATION: 99 % | HEIGHT: 72 IN | SYSTOLIC BLOOD PRESSURE: 169 MMHG | TEMPERATURE: 97.8 F | HEART RATE: 85 BPM | DIASTOLIC BLOOD PRESSURE: 94 MMHG | WEIGHT: 225 LBS

## 2021-08-05 LAB
ALBUMIN SERPL-MCNC: 3.4 G/DL (ref 3.5–5)
ALBUMIN/GLOB SERPL: 1 {RATIO} (ref 1.1–2.2)
ALP SERPL-CCNC: 118 U/L (ref 45–117)
ALT SERPL-CCNC: 22 U/L (ref 12–78)
ANION GAP SERPL CALC-SCNC: 9 MMOL/L (ref 5–15)
AST SERPL W P-5'-P-CCNC: 16 U/L (ref 15–37)
BASOPHILS # BLD: 0.1 K/UL (ref 0–0.1)
BASOPHILS NFR BLD: 1 % (ref 0–1)
BILIRUB SERPL-MCNC: 0.6 MG/DL (ref 0.2–1)
BUN SERPL-MCNC: 108 MG/DL (ref 6–20)
BUN/CREAT SERPL: 16 (ref 12–20)
CA-I BLD-MCNC: 9 MG/DL (ref 8.5–10.1)
CHLORIDE SERPL-SCNC: 110 MMOL/L (ref 97–108)
CO2 SERPL-SCNC: 22 MMOL/L (ref 21–32)
CREAT SERPL-MCNC: 6.91 MG/DL (ref 0.7–1.3)
DIFFERENTIAL METHOD BLD: ABNORMAL
EOSINOPHIL # BLD: 0.3 K/UL (ref 0–0.4)
EOSINOPHIL NFR BLD: 6 % (ref 0–7)
ERYTHROCYTE [DISTWIDTH] IN BLOOD BY AUTOMATED COUNT: 19.3 % (ref 11.5–14.5)
GLOBULIN SER CALC-MCNC: 3.3 G/DL (ref 2–4)
GLUCOSE SERPL-MCNC: 94 MG/DL (ref 65–100)
HCT VFR BLD AUTO: 27.7 % (ref 36.6–50.3)
HGB BLD-MCNC: 8.2 G/DL (ref 12.1–17)
IMM GRANULOCYTES # BLD AUTO: 0 K/UL (ref 0–0.04)
IMM GRANULOCYTES NFR BLD AUTO: 0 % (ref 0–0.5)
LYMPHOCYTES # BLD: 0.2 K/UL (ref 0.8–3.5)
LYMPHOCYTES NFR BLD: 4 % (ref 12–49)
MCH RBC QN AUTO: 28.7 PG (ref 26–34)
MCHC RBC AUTO-ENTMCNC: 29.6 G/DL (ref 30–36.5)
MCV RBC AUTO: 96.9 FL (ref 80–99)
MONOCYTES # BLD: 0.4 K/UL (ref 0–1)
MONOCYTES NFR BLD: 8 % (ref 5–13)
NEUTS SEG # BLD: 4.3 K/UL (ref 1.8–8)
NEUTS SEG NFR BLD: 81 % (ref 32–75)
NRBC # BLD: 0 K/UL (ref 0–0.01)
NRBC BLD-RTO: 0 PER 100 WBC
PLATELET # BLD AUTO: 142 K/UL (ref 150–400)
PMV BLD AUTO: 10.2 FL (ref 8.9–12.9)
POTASSIUM SERPL-SCNC: 5 MMOL/L (ref 3.5–5.1)
PROT SERPL-MCNC: 6.7 G/DL (ref 6.4–8.2)
RBC # BLD AUTO: 2.86 M/UL (ref 4.1–5.7)
SODIUM SERPL-SCNC: 141 MMOL/L (ref 136–145)
WBC # BLD AUTO: 5.4 K/UL (ref 4.1–11.1)

## 2021-08-05 PROCEDURE — 74011250637 HC RX REV CODE- 250/637: Performed by: INTERNAL MEDICINE

## 2021-08-05 PROCEDURE — 74011250636 HC RX REV CODE- 250/636: Performed by: INTERNAL MEDICINE

## 2021-08-05 PROCEDURE — 85025 COMPLETE CBC W/AUTO DIFF WBC: CPT

## 2021-08-05 PROCEDURE — 36415 COLL VENOUS BLD VENIPUNCTURE: CPT

## 2021-08-05 PROCEDURE — 80053 COMPREHEN METABOLIC PANEL: CPT

## 2021-08-05 RX ORDER — FUROSEMIDE 40 MG/1
60 TABLET ORAL
Status: DISCONTINUED | OUTPATIENT
Start: 2021-08-05 | End: 2021-08-05 | Stop reason: HOSPADM

## 2021-08-05 RX ORDER — FUROSEMIDE 40 MG/1
TABLET ORAL
Qty: 135 TABLET | Refills: 0 | Status: SHIPPED | OUTPATIENT
Start: 2021-08-05

## 2021-08-05 RX ADMIN — METOLAZONE 5 MG: 5 TABLET ORAL at 08:41

## 2021-08-05 RX ADMIN — SEVELAMER CARBONATE 1600 MG: 800 TABLET, FILM COATED ORAL at 08:42

## 2021-08-05 RX ADMIN — METOPROLOL TARTRATE 25 MG: 25 TABLET, FILM COATED ORAL at 08:42

## 2021-08-05 RX ADMIN — EPOETIN ALFA-EPBX 20000 UNITS: 10000 INJECTION, SOLUTION INTRAVENOUS; SUBCUTANEOUS at 16:15

## 2021-08-05 RX ADMIN — SODIUM BICARBONATE 650 MG: 650 TABLET ORAL at 08:42

## 2021-08-05 RX ADMIN — FUROSEMIDE 40 MG: 10 INJECTION, SOLUTION INTRAMUSCULAR; INTRAVENOUS at 08:41

## 2021-08-05 RX ADMIN — CALCITRIOL CAPSULES 0.25 MCG 0.25 MCG: 0.25 CAPSULE ORAL at 08:41

## 2021-08-05 RX ADMIN — AMLODIPINE BESYLATE 5 MG: 5 TABLET ORAL at 08:41

## 2021-08-05 RX ADMIN — TAMSULOSIN HYDROCHLORIDE 0.4 MG: 0.4 CAPSULE ORAL at 08:41

## 2021-08-05 RX ADMIN — APIXABAN 2.5 MG: 2.5 TABLET, FILM COATED ORAL at 08:41

## 2021-08-05 RX ADMIN — HYDRALAZINE HYDROCHLORIDE 40 MG: 20 INJECTION INTRAMUSCULAR; INTRAVENOUS at 01:39

## 2021-08-05 NOTE — PROGRESS NOTES
Patient had all due medicine, was repositioned , no complain of pain, had all due medicine, patient slept throuhout the night.  Patient complained of SOB, O2 at the  Time was 90%, provider was informed and patient was put on 2L of nasal  [place] oxygen

## 2021-08-05 NOTE — ROUTINE PROCESS
PT treatment attempted at [phone] however, pt stated he was still SOB and requesting to rest. Wanted to speak with MD about breathing difficulties before participating in therapy. Will continue to follow pt and will attempt treatment at a later time.  Thank you

## 2021-08-05 NOTE — PROGRESS NOTES
Pulmonary, Critical Care    Name: Marshall Castorena MRN: [id number]   : 1955 Hospital: [address] [place] [address]   Date: 2021  Admission date: 2021 Hospital Day:        Subjective/Interval History:   Seen in the emergency room wearing noninvasive ventilator. Patient has underlying renal insufficiency developed worsening shortness of breath cough presented to the emergency room chest x-ray shows pulmonary edema. He was profoundly hypoxic is now on noninvasive ventilator and feels more comfortable. Has not had any significant urine output since he has been in the ER. He also complains of new onset left arm swelling   post void bladder scan showed greater than 200 cc urine retention and Quesada catheter placed with 500 cc removed. Overnight he has put out an additional 1200 cc. Respirations improved currently nonlabored on nasal oxygen. Duplex scan of the left arm did show left axillary and proximal brachial DVT and heparin was started. On heparin with Quesada catheter and he has had slight bleeding at the urethral meatus. Ultrasound of the abdomen is pending  7/15 ultrasound of the abdomen showed medical renal disease on the right with small kidney no hydronephrosis there was evidence of prostate enlargement. He is breathing easily at this point and on room air is running on oxygen saturation of 93%   respirations nonlabored on nasal oxygen. Quesada catheter is out he states he has been voiding frequent small amounts without straining   no specific complaints breathing easily. Overnight oximetry showed minimal but significant desaturation while on room air 8 minutes 40 seconds with low of 71%  . Developed V. tach this morning given IV amiodarone IV magnesium and transferred to the unit. Currently heart rate  alternating between A. fib and sinus with PAC. He denies any discomfort is breathing easily   remains sinus rhythm with PVCs.   Had worsening hypoxia during the night and placed on oxygen he feels comfortable at this point. Urine output mildly improved yesterday but input remains greater than output  7/24 no specific complaints respirations nonlabored currently room air with saturation 97% Lasix been resumed 7/23. Urine output not accurately recorded but he states he has been passing a lot of urine  7/25 feels fine denies shortness of breath on room air. Oxygen saturation 97% on room air while awake. He states he is putting out good urine from his oral Lasix although its not being recorded in the chart  7/27 overnight oximetry continues to show nocturnal hypoxia will continue nocturnal oxygen 7/28  no one put oxygen on him last night. Good urine output recorded yesterday 1500 cc but chest x-ray today continues to show pulmonary edema and small effusions  7/30 states again last night he was not placed on oxygen until he asked and then someone came and removed it later on. We will repeat overnight oximetry tonight on and off oxygen  7/31 feels fine no specific complaints. Overnight oximetry last night showed no desaturation when he was on room air  8/1 he was put on 2 L nasal cannula this morning repeat overnight pulse oximetry results pending  8/2 overnight oximetry study 8/1 showed no significant desaturation on room air  8/4 no specific complaints breathing well states he is getting up to the bathroom and back with persistent but less difficulty  8/5 states he had some breathing difficulty last night and placed back on nasal oxygen. Patient Active Problem List   Diagnosis Code    GI bleed K92.2    Pulmonary edema J81.1       IMPRESSION:   1. Ventricular tachycardia none further seen   2. Atrial fibrillation converted to sinus  3. Hypotension corrected   4. Acute hypoxic respiratory failure room air oxygen saturation maintained while awake  5.  Chronic hypoxic respiratory failure most recent overnight oximetry 8/1 showed no significant desaturation will put oxygen on standby   6. Acute pulmonary edema chest x-ray mildly improved 8/4  7. 2 of 4 blood culture bottles with coagulase-negative staph aureus likely skin contaminant   8. Bilateral pleural effusions questionable mild improvement on x-ray 8/4  9. Acute on chronic kidney disease creatinine  stable  10. Obstructive uropathy Quesada catheter has been removed with no residual urine on bladder scanning  11. Severe hypertension corrected   12. DVT left axillary and brachial now on Eliquis  13. Anemia hemoglobin improved after last transfusion 7/21  14. Troponin leak likely due to renal insufficiency stable has not risen any further  Body mass index is 30.52 kg/m². RECOMMENDATIONS/PLAN:     1. Remains in sinus rhythm  2. Blood pressure well controlled now on metoprolol low-dose and Norvasc  3. Continue Flomax for prostate enlargement   4.   5. 8/4 overnight oximetry with low oxygen saturation 76% but only 6 minutes below 88%         Subjective/Initial History:       I was asked by Nixon Holland MD to see Kathy Vale a 78 y.o.  male  in consultation for a chief complaint of shortness of breath pulmonary edema acute hypoxic respiratory failure        Patient PCP: Crystal Hickey MD  PMH:  has a past medical history of Chronic kidney disease and Hypertension. PSH:   has no past surgical history on file. FHX: family history is not on file. SHX:  reports that he has never smoked.  He has never used smokeless tobacco.    Systemic review somewhat limited as he is on noninvasive ventilator remains short of breath although states he is feeling better  General he has not noted any worsening edema of his upper legs fever chills or sweats does complain of new onset swelling of his left hand and arm  Eyes no double vision or momentary blindness  ENT no facial pain over the sinuses  Endocrinologic no polyuria polydipsia  Musculoskeletal no swollen tender joints  Neurologic no history seizures or syncope  Gastrointestinal no nausea vomiting acid indigestion  Genitourinary states he does still pass fair amount of urine each day has not noted any decrease in that.   Does not have to strain to urinate has no bleeding or drainage  Cardiovascular he is not aware of any underlying heart disease has not had chest pain diaphoresis has had worsening shortness of breath laying down and with exertion  Respiratory worsening shortness of breath over the last week or more no significant cough no sputum production no chills or sweats did have history COVID-19 pneumonitis January of this year    No Known Allergies   MEDS:   Current Facility-Administered Medications   Medication    furosemide (LASIX) tablet 60 mg    epoetin valente-epbx (RETACRIT) injection 20,000 Units    metOLazone (ZAROXOLYN) tablet 5 mg    sodium bicarbonate tablet 650 mg    amLODIPine (NORVASC) tablet 5 mg    apixaban (ELIQUIS) tablet 2.5 mg    sevelamer carbonate (RENVELA) tab 1,600 mg    0.9% sodium chloride infusion 250 mL    0.9% sodium chloride infusion 250 mL    metoprolol tartrate (LOPRESSOR) tablet 25 mg    tamsulosin (FLOMAX) capsule 0.4 mg    [Held by provider] hydrALAZINE (APRESOLINE) tablet 50 mg    0.9% sodium chloride infusion 250 mL    calcitRIOL (ROCALTROL) capsule 0.25 mcg    hydrALAZINE (APRESOLINE) 20 mg/mL injection 40 mg        Current Facility-Administered Medications:     furosemide (LASIX) tablet 60 mg, 60 mg, Oral, ACB&D, Olivia Hooper MD    epoetin valente-epbx (RETACRIT) injection 20,000 Units, 20,000 Units, SubCUTAneous, ONCE, Yuko Bah MD    metOLazone (ZAROXOLYN) tablet 5 mg, 5 mg, Oral, DAILY, Yuko Bah MD, 5 mg at 08/05/21 0841    sodium bicarbonate tablet 650 mg, 650 mg, Oral, BID, Yuko Bah MD, 650 mg at 08/05/21 6713    amLODIPine (NORVASC) tablet 5 mg, 5 mg, Oral, DAILY, Christina Bowman MD, 5 mg at 08/05/21 0841    apixaban (ELIQUIS) tablet 2.5 mg, 2.5 mg, Oral, BID, Odilia DILLON MD, 2.5 mg at 21 0841    sevelamer carbonate (RENVELA) tab 1,600 mg, 1,600 mg, Oral, TID WITH MEALS, Randell Andres MD, 1,600 mg at 21 7709    0.9% sodium chloride infusion 250 mL, 250 mL, IntraVENous, PRN, Haim Telles MD    0.9% sodium chloride infusion 250 mL, 250 mL, IntraVENous, PRN, Haim Telles MD    metoprolol tartrate (LOPRESSOR) tablet 25 mg, 25 mg, Oral, DAILY, Randell Andres MD, 25 mg at 21 6988    tamsulosin (FLOMAX) capsule 0.4 mg, 0.4 mg, Oral, DAILY, Adelaida Reynolds MD, 0.4 mg at 21 0841    [Held by provider] hydrALAZINE (APRESOLINE) tablet 50 mg, 50 mg, Oral, TID, Adelaida Reynolds MD, 50 mg at 21    0.9% sodium chloride infusion 250 mL, 250 mL, IntraVENous, PRN, Adelaida Reynolds MD    calcitRIOL (ROCALTROL) capsule 0.25 mcg, 0.25 mcg, Oral, DAILY, Randell Andres MD, 0.25 mcg at 21 0841    hydrALAZINE (APRESOLINE) 20 mg/mL injection 40 mg, 40 mg, IntraVENous, Q6H PRN, Adelaida Reynolsd MD, 40 mg at 21 0139      Objective:     Vital Signs: Telemetry:    normal sinus rhythm Intake/Output:   Visit Vitals  BP (!) 169/94 (BP 1 Location: Right upper arm, BP Patient Position: Supine;Sitting)   Pulse 85   Temp 97.8 °F (36.6 °C)   Resp 18   Ht 6' (1.829 m)   Wt 102.1 kg (225 lb)   SpO2 99%   BMI 30.52 kg/m²       Temp (24hrs), Av.7 °F (36.5 °C), Min:97.5 °F (36.4 °C), Max:98 °F (36.7 °C)        O2 Device: None (Room air) O2 Flow Rate (L/min): 2 l/min         Body mass index is 30.52 kg/m².     Wt Readings from Last 4 Encounters:   21 102.1 kg (225 lb)   21 79.4 kg (175 lb)          Intake/Output Summary (Last 24 hours) at 2021 1309  Last data filed at 2021 5798  Gross per 24 hour   Intake 400 ml   Output 1700 ml   Net -1300 ml       Last shift:      701 - 1900  In: 400 [P.O.:400]  Out: 500 [Urine:500]  Last 3 shifts: 1901 - 700  In: -   Out:  [Urine:]       Hemodynamics:    CO:    CI: CVP:    SVR:   PAP Systolic:    PAP Diastolic:    PVR:    TM68:       Ventilator Settings:      Mode Rate TV Press PEEP FiO2 PIP Min. Vent               21 %     9.7 l/min      Physical Exam:    General:  male; no distress now on room air  HEENT: NCAT, visible oral mucosa is moist and clear  Eyes: anicteric; conjunctiva clear extraocular movements intact  Neck: no nodes,,no accessory MM use. no respiratory distress  Chest: no deformity,   Cardiac: Regular rhythm and rate now controlled  Lungs: distant breath sounds; clear anteriorly and laterally with rales in the bases  Abd: Soft positive bowel sounds no tenderness  Ext: Improvement in edema  of the lower extremities with continued edema of the left arm; no joint swelling;  No clubbing  : Clear urine  Neuro: Alert awake no distress speech is clear moves all 4 extremities  Psych-calm, oriented to person;   Skin: warm, dry, no cyanosis;   Pulses: Brachial and radial pulses intact  Capillary:slow capillary refill      Labs:    Recent Labs     08/05/21  0650 08/04/21  0308 08/03/21  0104    138 138   K 5.0 5.2* 5.4*   * 107 108   CO2 22 22 22   GLU 94 90 88   * 102* 100*   CREA 6.91* 6.61* 6.48*   CA 9.0 8.7 8.4*   ALB 3.4* 3.2* 3.2*   ALT 22 25 24   8/4 overnight oximetry with low oxygen saturation 76% with only 6 minutes below 88%   8/1 overnight oximetry on room air with low oxygen saturation 79% only 5 minutes below 88   7/39 overnight oximetry on and off oxygen with low oxygen saturation 72% with only 5 minutes below 88%   7/29 overnight oximetry with low oxygen saturation 44% with 52 minutes 40 seconds below 88%   7/27  overnight oximetry split study with a low oxygen saturation 75% 33 minutes 10 seconds below 88%   7/25 overnight oximetry with 1 hour 13 minutes below 88% with low oxygen saturation 56%  7/19 overnight oximetry shows 14 minutes 26 seconds below 88% with low oxygen saturation 75%    BNP 32,264, previous greater than 35,000  Lab Results   Component Value Date/Time    Culture result:  07/12/2021 10:40 PM     Two of four bottles have been flagged positive by instrument. Bottles have been sent to [facility] laboratory to assess for possible growth. Gram Positive Cocci in clusters CALLED TO AND READ BACK BY Lenin Vieira r.n. [id number] 07/14/2021 by dpw    Culture result:  07/12/2021 10:40 PM     Staphylococcus species, coagulase negative GROWING IN THE 1ST OF 4 BOTTLES DRAWN    Culture result:  07/12/2021 10:40 PM     Staphylococcus species, coagulase negative (2nd colony type/strain) GROWING IN THE 2ND OF 4 BOTTLES DRAWN        Imaging:  I have personally reviewed the patients radiographs and have reviewed the reports:    CXR Results  (Last 48 hours)  7/23 chest x-ray mild pulmonary congestion tiny effusions unchanged from last x-ray although right upper lobe lung may have slightly less congestion  7/21 chest x-ray with continued mild pulmonary edema pattern small bilateral pleural effusions there may be improved inspiratory effort  7/16 chest x-ray with continued mild pulmonary edema and small pleural effusions unchanged               07/12/21 2251  XR CHEST PORT Final result    Impression:  Findings/impression:       Cardiac silhouette is enlarged. Central vascular congestion and patchy central   airspace disease/edema. Suspect trace right pleural effusion. No evidence of pneumothorax. No acute osseous abnormality identified. Narrative:  Study: XR CHEST PORT       Clinical indication: sob       Comparison: None. To me chest x-ray consistent with cardiomegaly pulmonary edema and bilateral pleural effusions           Results from Hospital Encounter encounter on 07/12/21    XR CHEST PA LAT    Narrative  Chest, AP and lateral. Comparison with 7/31/2021. Impression  Stable mild-to-moderate enlargement of cardiopericardial silhouette  with central vascular prominence.  Perihilar opacities, consistent with pulmonary  edema, less likely pneumonia. No pneumothorax. XR CHEST PA LAT    Narrative  Two-view chest:    COMPARISON: Chest x-ray July 20, 2021. Impression  Findings/impression:    Volume overload noted, with slightly increased interstitial markings present  bilaterally. There is mild bilateral perihilar airspace disease present, with  mild interval improvement. Interval improvement in left lower lobe airspace  disease. No pneumothorax. CP angles are blunted suggesting small effusions. No  pneumothorax. Enlarged cardiac size again noted similar to prior study. No  suspicious osseous lesions. XR CHEST PA LAT    Narrative  Chest 2 views. Comparison chest series July 26, 2021. No change compared to prior imaging. Patchy lower lobe lung reticular markings  with small dependent pleural effusions persist. Cardiac and mediastinal  structures unchanged. No pneumothorax. Results from [place] encounter on 01/05/21    CT CHEST WO CONT    Narrative  Images obtained without contrast include the abdomen and pelvis. Comparison portable chest radiograph earlier today. Dose Reduction:  All CT scans at this facility are performed using dose reduction optimization  techniques as appropriate to a performed exam including the following: Automated  exposure control, adjustments of the mA and/or kV according to patient size, or  use of iterative reconstruction technique. Subtle peripheral groundglass densities are noted in both lungs, could reflect  Covid pneumonia or other. No pneumothorax or pneumomediastinum. The radiographic findings suspicious for  pneumomediastinum probably was artifact, perhaps related to cardiac motion. No pleural effusion or adenopathy. Cardiomegaly again noted. Impression  IMPRESSION:  1. No pneumomediastinum or pneumothorax. 2. Cardiomegaly. 3. Subtle groundglass densities in both lungs might be pneumonia or other.       Discussion patient with worsening renal insufficiency has developed pulmonary edema acute hypoxic respiratory failure. He states he still has urine output normally at home. We will give him high-dose IV Lasix to see if diuresis is induced. He very likely will need dialysis. He has minimal elevation in troponin probably troponin leak from hypoxia or just due to his renal insufficiency. He is not having any chest pain. Does have severe hypertension. Has been started on clonidine and hydralazine. 7/14 no distress today on nasal oxygen. Did have residual on post void bladder scan and Quesada catheter was placed with good diuresis overnight. Despite this potassium remains elevated. We will give 1 dose of Kayexalate. Despite diuresis hemoglobin is dropped to 6.7 will transfuse. He denies frequency small-volume urine or straining to urinate at home despite this with signs of obstruction we will add Flomax. Potassium 6 today we will give 1 dose of Kayexalate and continue diuresis  7/16 respirations nonlabored. Was placed back on 1 L nasal oxygen yesterday overnight oximetry shows well-maintained oxygen saturation. Will check overnight tonight on and off oxygen. Quesada catheter has been removed. Chest x-ray continues to show mild pulmonary edema  7/17 overnight oximetry shows desaturation when on room air. Will maintain oxygen and repeat overnight oximetry Sunday night  7/19 developed V. tach overnight and now in the ICU on IV amiodarone with rhythm showing alternating sinus with PAC and A. fib. Rate . Overnight oximetry continued to show desaturation on room air and he is back on nasal oxygen. He has no specific complaint  7/20 now in sinus rhythm with frequent PVCs. Cardiology is recommending cardiac cath but he is reluctant to undergo that. Creatinine has worsened despite IV fluid administration yesterday although blood pressure is improved.   Currently oxygen saturation well-maintained on room air but last overnight oximetry showed desaturation will maintain oxygen at 1 L for now  7/21 had hypoxia yesterday afternoon and placed back on oxygen during the daytime as well as the night. He feels comfortable at this point. Chest x-ray is unchanged still has mild pulmonary edema with small effusions. It does appear that he took a deeper breath today. Creatinine remains markedly elevated. He is on heparin for left axillary DVT. Holding switching to oral anticoagulation until sure no instrumentation is needed   7/23 unchanged remains on IV heparin. Oral Lasix being resumed did require transfusion 7/21. Repeat chest x-ray no worsening  7/26 chest x-ray unchanged pulmonary edema and bilateral effusions. He states he has good urine output with the current dose of Lasix. We will repeat overnight oximetry on and off oxygen   7/27 continued nocturnal hypoxia he will need home oxygen  7/28 repeat overnight oximetry to keep him qualified for home oxygen  7/29 repeat overnight oximetry with low oxygen saturation 44% with 52 minutes 40 seconds below 88%. Continues to require nocturnal oxygen  8/5 placed back on oxygen last night although no desaturation noted. He did have more shortness of breath last night feels fine today. Chest x-ray 8/4 to me looks less congested           Thank you for allowing us to participate in the care of this patient.   We will follow along with you     Lana Salgado MD

## 2021-08-05 NOTE — DISCHARGE INSTRUCTIONS
Medications will be filled at [facility] on [address] [address] in [place], Per pt request. Please  meds from this pharmacy.

## 2021-08-05 NOTE — PROGRESS NOTES
[facility] KIDNEY     Renal Daily Progress Note:     Admission Date: 2021     Subjective: patient seen in room 470 . Feeling ok,  BP better. Creatinine  up a bit after IV lasix. Not overtly short of breath at rest but has dyspnea on exertion. K high but stable. Urine output marginal good when collected. CXR may be a bit better, certainly not worse. D/W patient that we may need to consider dialysis; however at this point he refuses. Not short of breath at rest. No cough. No chest or abdominal pain.      Objective:     Visit Vitals  BP (!) 169/94 (BP 1 Location: Right upper arm, BP Patient Position: Supine;Sitting)   Pulse 85   Temp 97.8 °F (36.6 °C)   Resp 18   Ht 6' (1.829 m)   Wt 102.1 kg (225 lb)   SpO2 99%   BMI 30.52 kg/m²     Temp (24hrs), Av.7 °F (36.5 °C), Min:97.5 °F (36.4 °C), Max:98 °F (36.7 °C)        Intake/Output Summary (Last 24 hours) at 2021 1303  Last data filed at 2021 4344  Gross per 24 hour   Intake 400 ml   Output 1700 ml   Net -1300 ml     Current Facility-Administered Medications   Medication Dose Route Frequency    furosemide (LASIX) injection 40 mg  40 mg IntraVENous BID    metOLazone (ZAROXOLYN) tablet 5 mg  5 mg Oral DAILY    sodium bicarbonate tablet 650 mg  650 mg Oral BID    amLODIPine (NORVASC) tablet 5 mg  5 mg Oral DAILY    apixaban (ELIQUIS) tablet 2.5 mg  2.5 mg Oral BID    sevelamer carbonate (RENVELA) tab 1,600 mg  1,600 mg Oral TID WITH MEALS    0.9% sodium chloride infusion 250 mL  250 mL IntraVENous PRN    0.9% sodium chloride infusion 250 mL  250 mL IntraVENous PRN    metoprolol tartrate (LOPRESSOR) tablet 25 mg  25 mg Oral DAILY    tamsulosin (FLOMAX) capsule 0.4 mg  0.4 mg Oral DAILY    [Held by provider] hydrALAZINE (APRESOLINE) tablet 50 mg  50 mg Oral TID    0.9% sodium chloride infusion 250 mL  250 mL IntraVENous PRN    calcitRIOL (ROCALTROL) capsule 0.25 mcg  0.25 mcg Oral DAILY    hydrALAZINE (APRESOLINE) 20 mg/mL injection 40 mg 40 mg IntraVENous Q6H PRN       Physical Exam:    Seen in Room 470    General appearance: Chronically ill-appearing patient sitting up In chair- comfortable. Head: Normocephalic    Lungs: clear to auscultation but decreased basilar , no wheezes, no rales    Heart:  No S3 gallop , No pericardial rub. Abdomen: Not distended    Extremities:  Lower extremity edema including  dependent thighs. Neuro : Awake and responding appropriately. Data Review:     LABS:  Recent Labs     08/05/21  0650 08/04/21  0308 08/03/21  0104    138 138   K 5.0 5.2* 5.4*   * 107 108   CO2 22 22 22   * 102* 100*   CREA 6.91* 6.61* 6.48*   CA 9.0 8.7 8.4*   ALB 3.4* 3.2* 3.2*   Vitamin D2 11.1  Intact   PSA  5.2  Recent Labs     08/05/21  0650 08/04/21  0308 08/03/21  0104   WBC 5.4 5.1 6.1   HGB 8.2* 8.5* 8.7*   HCT 27.7* 28.6* 28.8*   * 157 158   iron saturation 5%    No results for input(s): HÉCTOR, KU, CLU, CREAU in the last 72 hours. No lab exists for component: PROU     Blood Cultures 7-12-21  Two of four bottles have been flagged positive by instrument.  Bottles have been sent to [facility] laboratory to assess for possible growth. Gram Positive Cocci in clusters     Chest x-ray 8-4-21  IMPRESSION  Stable mild-to-moderate enlargement of cardiopericardial silhouette  with central vascular prominence. Perihilar opacities, consistent with pulmonary  edema, less likely pneumonia. No pneumothorax. CXR 7-31-21  IMPRESSION  Findings/impression:     Volume overload noted, with slightly increased interstitial markings present  bilaterally. There is mild bilateral perihilar airspace disease present, with  mild interval improvement. Interval improvement in left lower lobe airspace  disease. No pneumothorax. CP angles are blunted suggesting small effusions. No  pneumothorax. Enlarged cardiac size again noted similar to prior study. No  suspicious osseous lesions.     CXR 7-28-21  Chest 2 views.     Comparison chest series July 26, 2021.     No change compared to prior imaging. Patchy lower lobe lung reticular markings  with small dependent pleural effusions persist. Cardiac and mediastinal  structures unchanged. No pneumothorax. CXR 7-26-21  History: Pulmonary edema. Pleural effusions.     Comparison: Including chest 7/23/2021.     Findings: The cardiac silhouette remains enlarged. The lungs are adequately  expanded. There is pulmonary vascular congestion and hydrostatic edema, not  significantly changed as compared to chest 7/23/2021. Small to moderate pleural  effusions and associated bibasilar atelectasis persist.  No pneumothorax is  identified. Degenerative changes are present in the spine.     IMPRESSION  Hydrostatic edema. Pleural effusions and associated bibasilar  atelectasis. No significant interval change. CXR 7-21-21  Chest single view.     Comparison single view chest July 19, 2021.     Patchy reticular markings through the lungs, same distribution. Those through  lung bases appear less dense; suspect mild degree improved aeration. Cardiac and  mediastinal structures unchanged. No pneumothorax or sizable pleural effusion. CXR 7-19-21     Comparison single view chest July 16, 2021.     Advanced patchy central and basilar reticular markings through the lungs. Consider pneumonia. Cardiac and mediastinal structures magnified on this AP  view. No pneumothorax or sizable pleural effusion. CXR July 14, 2021:  1 view comparison to 7/12     Continued cardiomegaly and congestion. If There is mild interstitial edema, it  is unchanged. Right pleural effusion and adjacent atelectasis have improved. Retroperitoneal US 7-14-21  Left kidney is 12.2 cm and right kidney is 9.1. Right renal cortex is echogenic  but not the left. Multiple cysts on each side, including a large cyst is 7 cm on  the left. No hydronephrosis. Aorta and IVC normals visualized.  Prostatic  enlargement and diffuse bladder wall thickening. Moderate ascites     IMPRESSION  Medical renal disease on the right    Assessment:   Renal Specific Problems    CKD stage IV/V --Baseline creatinine 3.77-4.02  Acute azotemia-exacerbated by bladder outlet obstruction and relative hypotension with poor perfusion-may be exacerbated now by IV diuretics  Hypertension - improved on amlodipine, blood pressure elevation may be related to volume overload  Volume overload- resolving slowly  Hyperkalemia-stable  Metabolic acidosis- on bicarb  Acute anemia with significant iron deficiency  Acute left axillary/brachial vein DVT  Vitamin D2 def  Secondary hyperparathyroid  Proteinuria - nephrotic range  Hyperphosphatemia-controlled        Plan:     Obtain/ Order: labs/cultures/radiology/procedures:  renal panel, CXR and bladder post void scan    PLA2R Ab pending    Therapeutic:    Discuss with Dr. Juan Lawson, I discussed the possibility of dialysis with the patient. He is agreeable if absolutely indicated but not at this moment. Therefore, ok to d/c. I can see him in 2 weeks (out of town next week, patient informed). Epogen as outpatient, give dose today  IV iron  --completed  Cont sodium bicarbonate  bid  Calcitriol and ergocalciferol   PO4 binder-- increased sevelamer to 2 pc  Flomax   Patient aware if worsening SOB, ataxia or jerking movements to come to ER. He may eed acute HD.  He will be staying with family  Cont lasix at 60 mg bid oral with metolazone 5 mg/ am

## 2021-08-05 NOTE — PROGRESS NOTES
Patient complained of difficulty breathing, O2 sats 90-91%. 2L O2 administered via nasal canula, O2 sats up to 98%. Dr Talamantes. Mitchell Rosario informed.

## 2021-08-05 NOTE — PROGRESS NOTES
This nurse spoke with staff from [place] [id number] [id number] in [place]. Requested that meds are are transferred and filled at [facility] on [address] in [place], per pt.

## 2021-08-05 NOTE — PROGRESS NOTES
Patient remains clear to d/c from  to home with [facility], once medically stable. CM continues to follow.

## 2021-08-05 NOTE — PROGRESS NOTES
Problem: Falls - Risk of  Goal: *Absence of Falls  Description: Document Luisa Martel Fall Risk and appropriate interventions in the flowsheet.   Outcome: Progressing Towards Goal  Note: Fall Risk Interventions:  Mobility Interventions: Bed/chair exit alarm         Medication Interventions: Bed/chair exit alarm    Elimination Interventions: Patient to call for help with toileting needs    History of Falls Interventions: Consult care management for discharge planning, Door open when patient unattended

## 2021-08-05 NOTE — PROGRESS NOTES
Pt has been discharged home with home health. Pt denies pain or discomfort, pt is not in acute distress. Pt verbalizes understanding of discharge teaching.

## 2021-08-05 NOTE — PROGRESS NOTES
Progress Note      8/5/2021 9:54 AM  NAME: Ariana De La Garza   MRN:  [id number]   Admit Diagnosis: Pulmonary edema [J81.1]      Subjective:   Chart reviewed. Patient has had a symptomatic ventricular tachycardia and was shocked. Vascular surgery note appreciated. Echocardiogram results explained. He feels much better today. Patient was offered option of cardiac catheterization but he has decided not to pursue that. Edema seems to be improving. Discussed with the family members. Patient did ambulate with a walker with the physical therapist.    Review of Systems:    Symptom Y/N Comments  Symptom Y/N Comments   Fever/Chills n   Chest Pain n    Poor Appetite    Edema y  somewhat better. Cough    Abdominal Pain     Sputum    Joint Pain n    SOB/CARMONA n   Pruritis/Rash     Nausea/vomit    Other     Diarrhea         Constipation           Could NOT obtain due to:      Objective:          Physical Exam:    Last 24hrs VS reviewed since prior progress note. Most recent are:    Visit Vitals  BP (!) 169/94 (BP 1 Location: Right upper arm, BP Patient Position: Supine;Sitting)   Pulse 85   Temp 97.8 °F (36.6 °C)   Resp 18   Ht 6' (1.829 m)   Wt 102.1 kg (225 lb)   SpO2 99%   BMI 30.52 kg/m²       Intake/Output Summary (Last 24 hours) at 8/5/2021 1245  Last data filed at 8/5/2021 9111  Gross per 24 hour   Intake 400 ml   Output 1700 ml   Net -1300 ml        General Appearance: Well developed, well nourished, alert & oriented x 3,    no acute distress. Ears/Nose/Mouth/Throat: Hearing grossly normal.  Neck: Supple. Chest: Lungs clear to auscultation bilaterally. Cardiovascular: Regular rate and rhythm, S1,S2 normal, no murmur. Abdomen: Soft, non-tender, bowel sounds are active. Extremities: Lower extremity edema left upper extremity edema evident  Skin: Lower extremity blisters evident    []         Post-cath site without hematoma, bruit, tenderness, or thrill. Distal pulses intact.     PMH/SH reviewed - no change compared to H&P    Data Review    Telemetry: Patient had a ventricular tachycardia and was shocked and had episode of atrial fibrillation and now is in sinus rhythm with PVCs. EKG:   []  No new EKG for review    Lab Data Personally Reviewed:    Recent Labs     08/05/21  0650 08/04/21  0308   WBC 5.4 5.1   HGB 8.2* 8.5*   HCT 27.7* 28.6*   * 157     No results for input(s): INR, PTP, APTT, INREXT, INREXT in the last 72 hours. Recent Labs     08/05/21  0650 08/04/21  0308 08/03/21  0104    138 138   K 5.0 5.2* 5.4*   * 107 108   CO2 22 22 22   * 102* 100*   CREA 6.91* 6.61* 6.48*   GLU 94 90 88   CA 9.0 8.7 8.4*     No results for input(s): CPK, CKNDX, TROIQ in the last 72 hours. No lab exists for component: CPKMB  No results found for: CHOL, CHOLX, CHLST, CHOLV, HDL, HDLP, LDL, LDLC, DLDLP, Brenda Dustman, CHHD, CHHDX    Recent Labs     08/05/21  0650 08/04/21  0308 08/03/21  0104   * 121* 109   TP 6.7 7.0 6.8   ALB 3.4* 3.2* 3.2*   GLOB 3.3 3.8 3.6     No results for input(s): PH, PCO2, PO2 in the last 72 hours.     Medications Personally Reviewed:    Current Facility-Administered Medications   Medication Dose Route Frequency    furosemide (LASIX) injection 40 mg  40 mg IntraVENous BID    metOLazone (ZAROXOLYN) tablet 5 mg  5 mg Oral DAILY    sodium bicarbonate tablet 650 mg  650 mg Oral BID    amLODIPine (NORVASC) tablet 5 mg  5 mg Oral DAILY    apixaban (ELIQUIS) tablet 2.5 mg  2.5 mg Oral BID    sevelamer carbonate (RENVELA) tab 1,600 mg  1,600 mg Oral TID WITH MEALS    0.9% sodium chloride infusion 250 mL  250 mL IntraVENous PRN    0.9% sodium chloride infusion 250 mL  250 mL IntraVENous PRN    metoprolol tartrate (LOPRESSOR) tablet 25 mg  25 mg Oral DAILY    tamsulosin (FLOMAX) capsule 0.4 mg  0.4 mg Oral DAILY    [Held by provider] hydrALAZINE (APRESOLINE) tablet 50 mg  50 mg Oral TID    0.9% sodium chloride infusion 250 mL  250 mL IntraVENous PRN  calcitRIOL (ROCALTROL) capsule 0.25 mcg  0.25 mcg Oral DAILY    hydrALAZINE (APRESOLINE) 20 mg/mL injection 40 mg  40 mg IntraVENous Q6H PRN           Problem List:   Acute hypoxic respiratory failure and patient seems to be somewhat better. Severe anemia. Renal failure. Heart failure. Left upper extremity DVT. Minimal elevation of troponin I is probably not a presentation of myocardial infarction. Patient has had ventricular tachycardia and has been shocked. Patient has maintained sinus rhythm. Patient is clinically doing better. 1.      Assessment/Plan:   Patient was scheduled for cardiac catheterization but he says he does not want to go for catheterization. We will follow nephrology recommendations and vascular surgery recommendations. Continue anticoagulation. Okay to discharge home from cardiac viewpoint and I will be happy to follow as an outpatient. Thank you. 1.          []       High complexity decision making was performed in this patient at high risk for decompensation with multiple organ involvement.     Tarah Vee MD

## 2021-08-24 LAB
ABO + RH BLD: NORMAL
BLD PROD TYP BPU: NORMAL
BLD PROD TYP BPU: NORMAL
BLOOD GROUP ANTIBODIES SERPL: NEGATIVE
BPU ID: NORMAL
BPU ID: NORMAL
CROSSMATCH RESULT,%XM: NORMAL
CROSSMATCH RESULT,%XM: NORMAL
SPECIMEN EXP DATE BLD: NORMAL
STATUS OF UNIT,%ST: NORMAL
STATUS OF UNIT,%ST: NORMAL
TRANSFUSION STATUS PATIENT QL: NORMAL
TRANSFUSION STATUS PATIENT QL: NORMAL
UNIT DIVISION, %UDIV: 0
UNIT DIVISION, %UDIV: 0

## 2021-10-25 NOTE — PROGRESS NOTES
[facility] KIDNEY     Renal Daily Progress Note:     Admission Date: 2021     Subjective: feeling better, got blood transfusion. Had significant bladder residual volume which was alleviated by Quesada catheter. However, he developed blood around the meatus and catheter was subsequently removed. Flomax started. Potassium better. BP elevated, he had been on Monoxidil    No longer short of breath, on room air with O2 sat > 90%. No cough. No chest or abdominal pain. Left great toe with blood blister but no pain. Apparently stubbed his toe last week he did some furniture.  Blood cultures show gram-positive cocci on Vanco.        Lila --> 21 --> F/U ESME, CKD    No new complaints were offered           Objective:     Visit Vitals  BP (!) 141/93 (BP 1 Location: Right lower arm, BP Patient Position: At rest)   Pulse (!) 121   Temp 97.2 °F (36.2 °C)   Resp 20   Ht 6' (1.829 m)   Wt 94.6 kg (208 lb 8.9 oz)   SpO2 98%   BMI 28.29 kg/m²     Temp (24hrs), Av.8 °F (36.6 °C), Min:97.2 °F (36.2 °C), Max:98.7 °F (37.1 °C)        Intake/Output Summary (Last 24 hours) at 2021 1706  Last data filed at 2021 1612  Gross per 24 hour   Intake --   Output 1950 ml   Net -1950 ml     Current Facility-Administered Medications   Medication Dose Route Frequency    minoxidiL (LONITEN) tablet 2.5 mg  2.5 mg Oral BID    amLODIPine (NORVASC) tablet 10 mg  10 mg Oral DAILY    furosemide (LASIX) injection 80 mg  80 mg IntraVENous Q12H    tamsulosin (FLOMAX) capsule 0.4 mg  0.4 mg Oral DAILY    hydrALAZINE (APRESOLINE) tablet 50 mg  50 mg Oral TID    sodium bicarbonate tablet 650 mg  650 mg Oral TID    0.9% sodium chloride infusion 250 mL  250 mL IntraVENous PRN    calcitRIOL (ROCALTROL) capsule 0.25 mcg  0.25 mcg Oral DAILY    sevelamer carbonate (RENVELA) tab 800 mg  800 mg Oral TID WITH MEALS    ergocalciferol capsule 50,000 Units  50,000 Units Oral Q7D    iron sucrose (VENOFER) injection 200 mg  200 mg IntraVENous Q24H    hydrALAZINE (APRESOLINE) 20 mg/mL injection 40 mg  40 mg IntraVENous Q6H PRN    cloNIDine (CATAPRES) 0.3 mg/24 hr patch 1 Patch  1 Patch TransDERmal Q7D    heparin 25,000 units in D5W 250 ml infusion  18-36 Units/kg/hr (Adjusted) IntraVENous TITRATE    heparin (porcine) 1,000 unit/mL injection 6,340 Units  80 Units/kg (Adjusted) IntraVENous PRN    Or    heparin (porcine) 1,000 unit/mL injection 3,170 Units  40 Units/kg (Adjusted) IntraVENous PRN       Physical Exam:    Seen in Room 470    General appearance: Chronically ill-appearing patient lying in bed comfortably. Head: Normocephalic    Lungs: clear to auscultation , no wheezes, no rales    Heart:  No S3 gallop , No pericardial rub. Abdomen: Not distended    Extremities:  Lower extremity edema --> Mild    Neuro : Sleeping but easily arousable      Data Review:     LABS:  Recent Labs     07/16/21  0346 07/15/21  0154 07/15/21  0145 07/14/21  0720 07/14/21  0720     142 140 141   < > 142  142   K 4.0  4.0 5.0 5.0   < > 6.0*  6.0*   *  109* 109* 110*   < > 114*  113*   CO2 20*  19* 22 21   < > 16*  17*   BUN 67*  67* 67* 67*   < > 72*  71*   CREA 6.43*  6.42* 6.31* 6.27*   < > 6.41*  6.36*   CA 8.3*  8.2* 7.8* 7.8*   < > 8.3*  8.2*  8.2*   ALB 3.5  3.4* 3.1* 3.1*   < > 3.2*  3.2*   PHOS 8.1*  --  8.4*  --  8.5*   MG  --   --   --   --  2.9*    < > = values in this interval not displayed. Vitamin D2 11.1  Intact   PSA  5.2  Recent Labs     07/16/21  0346 07/15/21  1048 07/15/21  0145   WBC 6.6 5.2 5.2   HGB 8.7* 8.3* 7.3*   HCT 28.8* 27.4* 24.7*    157 171   iron saturation 5%    No results for input(s): HÉCTOR, KU, CLU, CREAU in the last 72 hours. No lab exists for component: PROUBlood Cultures 7-12-21  Two of four bottles have been flagged positive by instrument.  Bottles have been sent to [facility] laboratory to assess for possible growth.  Gram Positive Cocci in clusters     Chest x-ray July 14, 2021:  1 view comparison to 7/12     Continued cardiomegaly and congestion. If There is mild interstitial edema, it  is unchanged. Right pleural effusion and adjacent atelectasis have improved. Retroperitoneal US 7-14-21  Left kidney is 12.2 cm and right kidney is 9.1. Right renal cortex is echogenic  but not the left. Multiple cysts on each side, including a large cyst is 7 cm on  the left. No hydronephrosis. Aorta and IVC normals visualized. Prostatic  enlargement and diffuse bladder wall thickening. Moderate ascites     IMPRESSION  Medical renal disease on the right    Assessment:   Renal Specific Problems      CKD stage IV at baseline  Acute azotemia-exacerbated by bladder outlet obstruction  Hypertension - not controlled  Volume overload- resolving  Hyperkalemia- corrected  Metabolic acidosis- on bicarb  Acute anemia with significant iron deficiency  Acute left axillary/brachial vein DVT  Vitamin D2 def  Secondary hyperparathyroid  Proteinuria - nephrotic range  Gram + bacteremia      Plan:     Obtain/ Order: labs/cultures/radiology/procedures:  renal panel, CBC in AM    Stool for occult blood  PLA2R Ab pending    Therapeutic:    Contnue minoxidil 2.5 mg b.i.d  IV iron to rapidly replete stores  Avoid Kayexalate if possible, use Lokelma  Continue sodium bicarbonate. The target bicarb level is 22/23  Calcitriol and ergocalciferol  Start PO4 binder--sevelamer  Flomax   Continue diuretic and perhaps change to oral dosing in a.m.   Antibiotics per primary team      Laura Ronquillo MD  [phone] Doxepin Pregnancy And Lactation Text: This medication is Pregnancy Category C and it isn't known if it is safe during pregnancy. It is also excreted in breast milk and breast feeding isn't recommended.

## 2021-12-30 NOTE — CONSULTS
History and Physical    Chief complaints: Left great toe ulcer and blister   History of Presenting Illness:  Jenn Anderson is a 76 y.o. pleasant man currently hospitalized with shortness of breath and worsening of swelling of both legs. I was consulted for evaluation of the left great toe blister and cold foot. Patient examined this morning. Patient is currently oxygen support. Patient says certain is better. Patient denies any particular worsening pain in the left foot. Patient claims that he had this wound about a week duration. Patient cannot remember how the wound started. Patient denies any fever chills. On examination: Patient has palpable femoral pulses. Nonpalpable pedal pulse bilaterally left foot is particular cool to touch. Doppler signal noted on the DP. There is a plaque in the blister the great toe on the left side. Patient was a chronic venous hypertension and the swelling extending from the foot to the below-knee level with signs of lymphedema as well. Past Medical History:   Diagnosis Date    Chronic kidney disease     Hypertension       No past surgical history on file. No family history on file. Social History     Tobacco Use    Smoking status: Never Smoker    Smokeless tobacco: Never Used   Substance Use Topics    Alcohol use: Not on file       Prior to Admission medications    Medication Sig Start Date End Date Taking? Authorizing Provider   hydrALAZINE (APRESOLINE) 50 mg tablet Take 1 Tab by mouth three (3) times daily. 1/13/21  Yes Olayinka Willett MD   sodium bicarbonate 650 mg tablet Take 1 Tab by mouth two (2) times a day. 1/13/21  Yes Olayinka Willett MD     No Known Allergies     Review of Systems:  Pertinent review of systems discussed in HPI, and rest of organ systems personally reviewed and they are negative.     Objective:   Vital signs reviewed:      Visit Vitals  BP (!) 163/81   Pulse 89   Temp 98.8 °F (37.1 °C)   Resp 20   Ht 6' (1.829 m)   [employer] 208 lb 8.9 oz (94.6 kg)   SpO2 95%   BMI 28.29 kg/m²       Physical Exam:   General appearance:   Patient is awake and alert  Head and neck atraumatic normocephalic  Cardiac system regular rate  Pulmonary no audible wheeze  ENT auricles normal no jaundice no hoarse voice. Chest wall excursion normal with respiration cycle  Eyes pupil equal gaze appropriate. Abdomen soft nontender distended no palpable mass  Neurologic nonfocal, cranial nerves intact  Musculoskeletal system: Moving all 4 extremity  Skin is warm and moist.  Hematology system: No bruising  Psychological appropriate cooperative. Vascular examination as discussed above. Data Review: Labs are reviewed. Discussed  Recent Results (from the past 24 hour(s))   PTT    Collection Time: 07/16/21  1:18 PM   Result Value Ref Range    aPTT 76.3 (H) 21.2 - 34.1 sec    aPTT, therapeutic range   82 - 109 sec   PTT    Collection Time: 07/16/21  9:42 PM   Result Value Ref Range    aPTT >153.0 (HH) 21.2 - 34.1 sec    aPTT, therapeutic range   82 - 109 sec         Imagings reviewed: discussed as below. Assessment:     Active Problems:    Pulmonary edema (7/13/2021)      Chronic venous hypertension,  Leg swelling    Lymphedema  Nonhealing wounds on the left foot involving great toe. Plan:     Most likely patient has a multifactorial etiology of left foot wound condition. Most likely combination of lymphedema, chronic venous hypertension and some left lower peripheral artery disease. I will schedule for venous duplex ultrasound lower extremity as well as lower extremity JAVON with PVR examination. I will make further recommendation after studies reviewed. Meanwhile keep both legs elevated to minimize swelling. Also please apply compression hose both legs. Burow's Advancement Flap Text: The defect edges were debeveled with a #15 scalpel blade.  Given the location of the defect and the proximity to free margins a Burow's advancement flap was deemed most appropriate.  Using a sterile surgical marker, the appropriate advancement flap was drawn incorporating the defect and placing the expected incisions within the relaxed skin tension lines where possible.    The area thus outlined was incised deep to adipose tissue with a #15 scalpel blade.  The skin margins were undermined to an appropriate distance in all directions utilizing iris scissors.

## 2022-01-07 ENCOUNTER — HOSPITAL ENCOUNTER (INPATIENT)
Age: 67
LOS: 31 days | Discharge: HOME | DRG: 871 | End: 2022-02-08
Attending: FAMILY MEDICINE | Admitting: INTERNAL MEDICINE
Payer: MEDICARE

## 2022-01-07 ENCOUNTER — APPOINTMENT (OUTPATIENT)
Dept: GENERAL RADIOLOGY | Age: 67
DRG: 871 | End: 2022-01-07
Attending: FAMILY MEDICINE
Payer: MEDICARE

## 2022-01-07 DIAGNOSIS — I49.9 VENTRICULAR ARRHYTHMIA: ICD-10-CM

## 2022-01-07 DIAGNOSIS — I42.9 CARDIOMYOPATHY, UNSPECIFIED TYPE (HCC): ICD-10-CM

## 2022-01-07 DIAGNOSIS — N17.9 ACUTE RENAL FAILURE, UNSPECIFIED ACUTE RENAL FAILURE TYPE (HCC): Primary | ICD-10-CM

## 2022-01-07 DIAGNOSIS — L03.119 CELLULITIS OF LOWER EXTREMITY, UNSPECIFIED LATERALITY: ICD-10-CM

## 2022-01-07 DIAGNOSIS — I73.9 PVD (PERIPHERAL VASCULAR DISEASE) (HCC): ICD-10-CM

## 2022-01-07 LAB
BASOPHILS # BLD: 0 K/UL (ref 0–0.1)
BASOPHILS NFR BLD: 0 % (ref 0–1)
DIFFERENTIAL METHOD BLD: ABNORMAL
EOSINOPHIL # BLD: 0 K/UL (ref 0–0.4)
EOSINOPHIL NFR BLD: 0 % (ref 0–7)
ERYTHROCYTE [DISTWIDTH] IN BLOOD BY AUTOMATED COUNT: 15.9 % (ref 11.5–14.5)
HCT VFR BLD AUTO: 31.5 % (ref 36.6–50.3)
HGB BLD-MCNC: 9.3 G/DL (ref 12.1–17)
IMM GRANULOCYTES # BLD AUTO: 0.1 K/UL (ref 0–0.04)
IMM GRANULOCYTES NFR BLD AUTO: 1 % (ref 0–0.5)
LYMPHOCYTES # BLD: 0.3 K/UL (ref 0.8–3.5)
LYMPHOCYTES NFR BLD: 3 % (ref 12–49)
MCH RBC QN AUTO: 30.2 PG (ref 26–34)
MCHC RBC AUTO-ENTMCNC: 29.5 G/DL (ref 30–36.5)
MCV RBC AUTO: 102.3 FL (ref 80–99)
MONOCYTES # BLD: 1 K/UL (ref 0–1)
MONOCYTES NFR BLD: 8 % (ref 5–13)
NEUTS SEG # BLD: 11 K/UL (ref 1.8–8)
NEUTS SEG NFR BLD: 88 % (ref 32–75)
NRBC # BLD: 0.02 K/UL (ref 0–0.01)
NRBC BLD-RTO: 0.2 PER 100 WBC
PLATELET # BLD AUTO: 220 K/UL (ref 150–400)
PMV BLD AUTO: 10.6 FL (ref 8.9–12.9)
RBC # BLD AUTO: 3.08 M/UL (ref 4.1–5.7)
TROPONIN-HIGH SENSITIVITY: 409 NG/L (ref 0–76)
WBC # BLD AUTO: 12.4 K/UL (ref 4.1–11.1)

## 2022-01-07 PROCEDURE — 80053 COMPREHEN METABOLIC PANEL: CPT

## 2022-01-07 PROCEDURE — 71045 X-RAY EXAM CHEST 1 VIEW: CPT

## 2022-01-07 PROCEDURE — 87635 SARS-COV-2 COVID-19 AMP PRB: CPT

## 2022-01-07 PROCEDURE — 85025 COMPLETE CBC W/AUTO DIFF WBC: CPT

## 2022-01-07 PROCEDURE — 93005 ELECTROCARDIOGRAM TRACING: CPT

## 2022-01-07 PROCEDURE — 99284 EMERGENCY DEPT VISIT MOD MDM: CPT

## 2022-01-07 PROCEDURE — 84484 ASSAY OF TROPONIN QUANT: CPT

## 2022-01-07 PROCEDURE — 36415 COLL VENOUS BLD VENIPUNCTURE: CPT

## 2022-01-07 NOTE — Clinical Note
Status[de-identified] INPATIENT [101]   Type of Bed: Telemetry [19]   Cardiac Monitoring Required?: No   Inpatient Hospitalization Certified Necessary for the Following Reasons: 3.  Patient receiving treatment that can only be provided in an inpatient setting (further clarification in H&P documentation)   Admitting Diagnosis: Renal failure [835849]   Admitting Physician: Ashia Self [[id number]]   Attending Physician: Ashia Self [[id number]]   Estimated Length of Stay: 2 Midnights   Discharge Plan[de-identified] Home with Office Follow-up

## 2022-01-07 NOTE — Clinical Note
TRANSFER - OUT REPORT:     Verbal report given to: YANCI. Report consisted of patient's Situation, Background, Assessment and   Recommendations(SBAR). Opportunity for questions and clarification was provided. Patient transported with a Registered Nurse. Patient transported to: ICU, 273.

## 2022-01-07 NOTE — Clinical Note
Contrast Dose Calculator:   Patient's age: 78.   Patient's sex: Male. Patient weight (kg) = 79.7. Creatinine level (mg/dL) = 6.3. Creatinine clearance (mL/min): 13.   Contrast concentration (mg/mL) = 370. MACD = 51.29 mL. Max Contrast dose per Creatinine Cl calculator = 29.25 mL.

## 2022-01-08 ENCOUNTER — APPOINTMENT (OUTPATIENT)
Dept: GENERAL RADIOLOGY | Age: 67
DRG: 871 | End: 2022-01-08
Attending: RADIOLOGY
Payer: MEDICARE

## 2022-01-08 ENCOUNTER — APPOINTMENT (OUTPATIENT)
Dept: NON INVASIVE DIAGNOSTICS | Age: 67
DRG: 871 | End: 2022-01-08
Attending: INTERNAL MEDICINE
Payer: MEDICARE

## 2022-01-08 ENCOUNTER — APPOINTMENT (OUTPATIENT)
Dept: INTERVENTIONAL RADIOLOGY/VASCULAR | Age: 67
DRG: 871 | End: 2022-01-08
Attending: INTERNAL MEDICINE
Payer: MEDICARE

## 2022-01-08 PROBLEM — N19 RENAL FAILURE: Status: ACTIVE | Noted: 2022-01-08

## 2022-01-08 PROBLEM — E87.5 HYPERKALEMIA: Status: ACTIVE | Noted: 2022-01-08

## 2022-01-08 LAB
ALBUMIN SERPL-MCNC: 2.9 G/DL (ref 3.5–5)
ALBUMIN SERPL-MCNC: 2.9 G/DL (ref 3.5–5)
ALBUMIN SERPL-MCNC: 3 G/DL (ref 3.5–5)
ALBUMIN/GLOB SERPL: 0.7 {RATIO} (ref 1.1–2.2)
ALP SERPL-CCNC: 118 U/L (ref 45–117)
ALP SERPL-CCNC: 123 U/L (ref 45–117)
ALP SERPL-CCNC: 150 U/L (ref 45–117)
ALT SERPL-CCNC: 15 U/L (ref 12–78)
ALT SERPL-CCNC: 16 U/L (ref 12–78)
ALT SERPL-CCNC: 18 U/L (ref 12–78)
ANION GAP SERPL CALC-SCNC: 13 MMOL/L (ref 5–15)
ANION GAP SERPL CALC-SCNC: 14 MMOL/L (ref 5–15)
ANION GAP SERPL CALC-SCNC: 14 MMOL/L (ref 5–15)
APTT PPP: 33.4 SEC (ref 21.2–34.1)
APTT PPP: 49.2 SEC (ref 21.2–34.1)
AST SERPL W P-5'-P-CCNC: 17 U/L (ref 15–37)
AST SERPL W P-5'-P-CCNC: 19 U/L (ref 15–37)
AST SERPL W P-5'-P-CCNC: 27 U/L (ref 15–37)
BASOPHILS # BLD: 0 K/UL (ref 0–0.1)
BASOPHILS NFR BLD: 0 % (ref 0–1)
BILIRUB SERPL-MCNC: 0.9 MG/DL (ref 0.2–1)
BILIRUB SERPL-MCNC: 0.9 MG/DL (ref 0.2–1)
BILIRUB SERPL-MCNC: 1.1 MG/DL (ref 0.2–1)
BUN SERPL-MCNC: 127 MG/DL (ref 6–20)
BUN SERPL-MCNC: 130 MG/DL (ref 6–20)
BUN SERPL-MCNC: 132 MG/DL (ref 6–20)
BUN/CREAT SERPL: 12 (ref 12–20)
BUN/CREAT SERPL: 13 (ref 12–20)
BUN/CREAT SERPL: 13 (ref 12–20)
CA-I BLD-MCNC: 8.7 MG/DL (ref 8.5–10.1)
CA-I BLD-MCNC: 8.8 MG/DL (ref 8.5–10.1)
CA-I BLD-MCNC: 9.1 MG/DL (ref 8.5–10.1)
CHLORIDE SERPL-SCNC: 113 MMOL/L (ref 97–108)
CHLORIDE SERPL-SCNC: 113 MMOL/L (ref 97–108)
CHLORIDE SERPL-SCNC: 114 MMOL/L (ref 97–108)
CK SERPL-CCNC: 172 U/L (ref 39–308)
CO2 SERPL-SCNC: 11 MMOL/L (ref 21–32)
CO2 SERPL-SCNC: 12 MMOL/L (ref 21–32)
CO2 SERPL-SCNC: 12 MMOL/L (ref 21–32)
COVID-19 RAPID TEST, COVR: NOT DETECTED
CREAT SERPL-MCNC: 10.1 MG/DL (ref 0.7–1.3)
CREAT SERPL-MCNC: 10.2 MG/DL (ref 0.7–1.3)
CREAT SERPL-MCNC: 10.2 MG/DL (ref 0.7–1.3)
DIFFERENTIAL METHOD BLD: ABNORMAL
ECHO AO ROOT DIAM: 3.6 CM
ECHO AO ROOT INDEX: 1.8 CM/M2
ECHO AR MAX VEL PISA: 4.1 M/S
ECHO AV PEAK GRADIENT: 8 MMHG
ECHO AV PEAK VELOCITY: 1.4 M/S
ECHO AV REGURGITANT PHT: 358 MS
ECHO AV VELOCITY RATIO: 0.71
ECHO EST RA PRESSURE: 15 MMHG
ECHO LA DIAMETER INDEX: 2.5 CM/M2
ECHO LA DIAMETER: 5 CM
ECHO LA TO AORTIC ROOT RATIO: 1.39
ECHO LV E' LATERAL VELOCITY: 5 CM/S
ECHO LV E' SEPTAL VELOCITY: 4 CM/S
ECHO LV EDV A4C: 64 ML
ECHO LV EDV INDEX A4C: 32 ML/M2
ECHO LV EJECTION FRACTION A4C: 23 %
ECHO LV EJECTION FRACTION BIPLANE: 26 % (ref 55–100)
ECHO LV ESV A4C: 49 ML
ECHO LV ESV INDEX A4C: 25 ML/M2
ECHO LV FRACTIONAL SHORTENING: 11 % (ref 28–44)
ECHO LV INTERNAL DIMENSION DIASTOLE INDEX: 2.85 CM/M2
ECHO LV INTERNAL DIMENSION DIASTOLIC: 5.7 CM (ref 4.2–5.9)
ECHO LV INTERNAL DIMENSION SYSTOLIC INDEX: 2.55 CM/M2
ECHO LV INTERNAL DIMENSION SYSTOLIC: 5.1 CM
ECHO LV IVSD: 1.8 CM (ref 0.6–1)
ECHO LV MASS 2D: 453 G (ref 88–224)
ECHO LV MASS INDEX 2D: 226.5 G/M2 (ref 49–115)
ECHO LV POSTERIOR WALL DIASTOLIC: 1.5 CM (ref 0.6–1)
ECHO LV RELATIVE WALL THICKNESS RATIO: 0.53
ECHO LVOT PEAK GRADIENT: 4 MMHG
ECHO LVOT PEAK VELOCITY: 1 M/S
ECHO MV REGURGITANT PEAK GRADIENT: 112 MMHG
ECHO MV REGURGITANT PEAK VELOCITY: 5.3 M/S
ECHO PULMONARY ARTERY END DIASTOLIC PRESSURE: 7 MMHG
ECHO PV MAX VELOCITY: 1.1 M/S
ECHO PV PEAK GRADIENT: 5 MMHG
ECHO PV REGURGITANT MAX VELOCITY: 1.4 M/S
ECHO RIGHT VENTRICULAR SYSTOLIC PRESSURE (RVSP): 62 MMHG
ECHO RV INTERNAL DIMENSION: 4.4 CM
ECHO RV TAPSE: 1.5 CM (ref 1.5–2)
ECHO TV REGURGITANT MAX VELOCITY: 3.41 M/S
ECHO TV REGURGITANT PEAK GRADIENT: 47 MMHG
EOSINOPHIL # BLD: 0 K/UL (ref 0–0.4)
EOSINOPHIL NFR BLD: 0 % (ref 0–7)
ERYTHROCYTE [DISTWIDTH] IN BLOOD BY AUTOMATED COUNT: 15.7 % (ref 11.5–14.5)
ERYTHROCYTE [DISTWIDTH] IN BLOOD BY AUTOMATED COUNT: 15.8 % (ref 11.5–14.5)
ERYTHROCYTE [DISTWIDTH] IN BLOOD BY AUTOMATED COUNT: 15.8 % (ref 11.5–14.5)
GLOBULIN SER CALC-MCNC: 4.2 G/DL (ref 2–4)
GLOBULIN SER CALC-MCNC: 4.3 G/DL (ref 2–4)
GLOBULIN SER CALC-MCNC: 4.3 G/DL (ref 2–4)
GLUCOSE SERPL-MCNC: 102 MG/DL (ref 65–100)
GLUCOSE SERPL-MCNC: 106 MG/DL (ref 65–100)
GLUCOSE SERPL-MCNC: 70 MG/DL (ref 65–100)
HCT VFR BLD AUTO: 31.4 % (ref 36.6–50.3)
HCT VFR BLD AUTO: 31.9 % (ref 36.6–50.3)
HCT VFR BLD AUTO: 33 % (ref 36.6–50.3)
HGB BLD-MCNC: 9.4 G/DL (ref 12.1–17)
HGB BLD-MCNC: 9.4 G/DL (ref 12.1–17)
HGB BLD-MCNC: 9.8 G/DL (ref 12.1–17)
IMM GRANULOCYTES # BLD AUTO: 0.1 K/UL (ref 0–0.04)
IMM GRANULOCYTES NFR BLD AUTO: 1 % (ref 0–0.5)
LYMPHOCYTES # BLD: 0.1 K/UL (ref 0.8–3.5)
LYMPHOCYTES # BLD: 0.2 K/UL (ref 0.8–3.5)
LYMPHOCYTES # BLD: 0.2 K/UL (ref 0.8–3.5)
LYMPHOCYTES NFR BLD: 1 % (ref 12–49)
LYMPHOCYTES NFR BLD: 2 % (ref 12–49)
LYMPHOCYTES NFR BLD: 2 % (ref 12–49)
MCH RBC QN AUTO: 30 PG (ref 26–34)
MCH RBC QN AUTO: 30.5 PG (ref 26–34)
MCH RBC QN AUTO: 30.6 PG (ref 26–34)
MCHC RBC AUTO-ENTMCNC: 29.5 G/DL (ref 30–36.5)
MCHC RBC AUTO-ENTMCNC: 29.7 G/DL (ref 30–36.5)
MCHC RBC AUTO-ENTMCNC: 29.9 G/DL (ref 30–36.5)
MCV RBC AUTO: 101.9 FL (ref 80–99)
MCV RBC AUTO: 102.3 FL (ref 80–99)
MCV RBC AUTO: 102.8 FL (ref 80–99)
MONOCYTES # BLD: 0.9 K/UL (ref 0–1)
MONOCYTES # BLD: 1 K/UL (ref 0–1)
MONOCYTES # BLD: 1.1 K/UL (ref 0–1)
MONOCYTES NFR BLD: 7 % (ref 5–13)
MONOCYTES NFR BLD: 8 % (ref 5–13)
MONOCYTES NFR BLD: 9 % (ref 5–13)
MRSA DNA SPEC QL NAA+PROBE: NOT DETECTED
NEUTS SEG # BLD: 11.4 K/UL (ref 1.8–8)
NEUTS SEG # BLD: 11.6 K/UL (ref 1.8–8)
NEUTS SEG # BLD: 12.2 K/UL (ref 1.8–8)
NEUTS SEG NFR BLD: 88 % (ref 32–75)
NEUTS SEG NFR BLD: 89 % (ref 32–75)
NEUTS SEG NFR BLD: 91 % (ref 32–75)
NRBC # BLD: 0.02 K/UL (ref 0–0.01)
NRBC BLD-RTO: 0.2 PER 100 WBC
PLATELET # BLD AUTO: 213 K/UL (ref 150–400)
PLATELET # BLD AUTO: 218 K/UL (ref 150–400)
PLATELET # BLD AUTO: 229 K/UL (ref 150–400)
PMV BLD AUTO: 10.1 FL (ref 8.9–12.9)
PMV BLD AUTO: 10.2 FL (ref 8.9–12.9)
PMV BLD AUTO: 9.7 FL (ref 8.9–12.9)
POTASSIUM SERPL-SCNC: 6.1 MMOL/L (ref 3.5–5.1)
POTASSIUM SERPL-SCNC: 6.5 MMOL/L (ref 3.5–5.1)
POTASSIUM SERPL-SCNC: 6.8 MMOL/L (ref 3.5–5.1)
PROT SERPL-MCNC: 7.1 G/DL (ref 6.4–8.2)
PROT SERPL-MCNC: 7.2 G/DL (ref 6.4–8.2)
PROT SERPL-MCNC: 7.3 G/DL (ref 6.4–8.2)
RBC # BLD AUTO: 3.07 M/UL (ref 4.1–5.7)
RBC # BLD AUTO: 3.13 M/UL (ref 4.1–5.7)
RBC # BLD AUTO: 3.21 M/UL (ref 4.1–5.7)
SODIUM SERPL-SCNC: 137 MMOL/L (ref 136–145)
SODIUM SERPL-SCNC: 139 MMOL/L (ref 136–145)
SODIUM SERPL-SCNC: 140 MMOL/L (ref 136–145)
SPECIMEN SOURCE: NORMAL
THERAPEUTIC RANGE,PTTT: ABNORMAL SEC (ref 82–109)
THERAPEUTIC RANGE,PTTT: NORMAL SEC (ref 82–109)
TROPONIN-HIGH SENSITIVITY: 395 NG/L (ref 0–76)
WBC # BLD AUTO: 12.7 K/UL (ref 4.1–11.1)
WBC # BLD AUTO: 13 K/UL (ref 4.1–11.1)
WBC # BLD AUTO: 13.3 K/UL (ref 4.1–11.1)

## 2022-01-08 PROCEDURE — C1894 INTRO/SHEATH, NON-LASER: HCPCS

## 2022-01-08 PROCEDURE — 74011000258 HC RX REV CODE- 258: Performed by: INTERNAL MEDICINE

## 2022-01-08 PROCEDURE — 74011000250 HC RX REV CODE- 250: Performed by: FAMILY MEDICINE

## 2022-01-08 PROCEDURE — 5A1D70Z PERFORMANCE OF URINARY FILTRATION, INTERMITTENT, LESS THAN 6 HOURS PER DAY: ICD-10-PCS | Performed by: INTERNAL MEDICINE

## 2022-01-08 PROCEDURE — 65610000006 HC RM INTENSIVE CARE

## 2022-01-08 PROCEDURE — 87205 SMEAR GRAM STAIN: CPT

## 2022-01-08 PROCEDURE — 84484 ASSAY OF TROPONIN QUANT: CPT

## 2022-01-08 PROCEDURE — 74011250637 HC RX REV CODE- 250/637: Performed by: FAMILY MEDICINE

## 2022-01-08 PROCEDURE — 74011250636 HC RX REV CODE- 250/636: Performed by: FAMILY MEDICINE

## 2022-01-08 PROCEDURE — 77010033678 HC OXYGEN DAILY

## 2022-01-08 PROCEDURE — 71045 X-RAY EXAM CHEST 1 VIEW: CPT

## 2022-01-08 PROCEDURE — 85025 COMPLETE CBC W/AUTO DIFF WBC: CPT

## 2022-01-08 PROCEDURE — 85730 THROMBOPLASTIN TIME PARTIAL: CPT

## 2022-01-08 PROCEDURE — 74011250637 HC RX REV CODE- 250/637: Performed by: INTERNAL MEDICINE

## 2022-01-08 PROCEDURE — 74011250636 HC RX REV CODE- 250/636: Performed by: INTERNAL MEDICINE

## 2022-01-08 PROCEDURE — 87186 SC STD MICRODIL/AGAR DIL: CPT

## 2022-01-08 PROCEDURE — 87641 MR-STAPH DNA AMP PROBE: CPT

## 2022-01-08 PROCEDURE — 82550 ASSAY OF CK (CPK): CPT

## 2022-01-08 PROCEDURE — 93005 ELECTROCARDIOGRAM TRACING: CPT

## 2022-01-08 PROCEDURE — 87077 CULTURE AEROBIC IDENTIFY: CPT

## 2022-01-08 PROCEDURE — 93306 TTE W/DOPPLER COMPLETE: CPT

## 2022-01-08 PROCEDURE — 80053 COMPREHEN METABOLIC PANEL: CPT

## 2022-01-08 PROCEDURE — 74011636637 HC RX REV CODE- 636/637: Performed by: FAMILY MEDICINE

## 2022-01-08 PROCEDURE — 99223 1ST HOSP IP/OBS HIGH 75: CPT | Performed by: INTERNAL MEDICINE

## 2022-01-08 PROCEDURE — 87340 HEPATITIS B SURFACE AG IA: CPT

## 2022-01-08 PROCEDURE — 02H633Z INSERTION OF INFUSION DEVICE INTO RIGHT ATRIUM, PERCUTANEOUS APPROACH: ICD-10-PCS | Performed by: RADIOLOGY

## 2022-01-08 PROCEDURE — 86706 HEP B SURFACE ANTIBODY: CPT

## 2022-01-08 PROCEDURE — 90935 HEMODIALYSIS ONE EVALUATION: CPT

## 2022-01-08 PROCEDURE — 74011250637 HC RX REV CODE- 250/637

## 2022-01-08 PROCEDURE — 76937 US GUIDE VASCULAR ACCESS: CPT

## 2022-01-08 RX ORDER — METOPROLOL SUCCINATE 50 MG/1
50 TABLET, EXTENDED RELEASE ORAL DAILY
Status: DISCONTINUED | OUTPATIENT
Start: 2022-01-08 | End: 2022-02-08 | Stop reason: HOSPADM

## 2022-01-08 RX ORDER — HEPARIN SODIUM 1000 [USP'U]/ML
2800 INJECTION, SOLUTION INTRAVENOUS; SUBCUTANEOUS
Status: DISCONTINUED | OUTPATIENT
Start: 2022-01-08 | End: 2022-01-28

## 2022-01-08 RX ORDER — ALBUTEROL SULFATE 2.5 MG/.5ML
10 SOLUTION RESPIRATORY (INHALATION) ONCE
Status: COMPLETED | OUTPATIENT
Start: 2022-01-08 | End: 2022-01-08

## 2022-01-08 RX ORDER — SODIUM CHLORIDE 9 MG/ML
80 INJECTION, SOLUTION INTRAVENOUS CONTINUOUS
Status: DISCONTINUED | OUTPATIENT
Start: 2022-01-08 | End: 2022-01-08 | Stop reason: ALTCHOICE

## 2022-01-08 RX ORDER — CALCIUM GLUCONATE 20 MG/ML
1 INJECTION, SOLUTION INTRAVENOUS ONCE
Status: COMPLETED | OUTPATIENT
Start: 2022-01-08 | End: 2022-01-08

## 2022-01-08 RX ORDER — HEPARIN SODIUM 1000 [USP'U]/ML
4000 INJECTION, SOLUTION INTRAVENOUS; SUBCUTANEOUS AS NEEDED
Status: DISCONTINUED | OUTPATIENT
Start: 2022-01-08 | End: 2022-02-08 | Stop reason: HOSPADM

## 2022-01-08 RX ORDER — SODIUM POLYSTYRENE SULFONATE 15 G/60ML
45 SUSPENSION ORAL; RECTAL ONCE
Status: COMPLETED | OUTPATIENT
Start: 2022-01-08 | End: 2022-01-08

## 2022-01-08 RX ORDER — SODIUM POLYSTYRENE SULFONATE 15 G/60ML
15 SUSPENSION ORAL; RECTAL
Status: COMPLETED | OUTPATIENT
Start: 2022-01-08 | End: 2022-01-08

## 2022-01-08 RX ORDER — HEPARIN SODIUM 1000 [USP'U]/ML
4000 INJECTION, SOLUTION INTRAVENOUS; SUBCUTANEOUS ONCE
Status: COMPLETED | OUTPATIENT
Start: 2022-01-08 | End: 2022-01-08

## 2022-01-08 RX ORDER — HEPARIN SODIUM 1000 [USP'U]/ML
2000 INJECTION, SOLUTION INTRAVENOUS; SUBCUTANEOUS AS NEEDED
Status: DISCONTINUED | OUTPATIENT
Start: 2022-01-08 | End: 2022-02-08 | Stop reason: HOSPADM

## 2022-01-08 RX ORDER — FUROSEMIDE 10 MG/ML
40 INJECTION INTRAMUSCULAR; INTRAVENOUS
Status: COMPLETED | OUTPATIENT
Start: 2022-01-08 | End: 2022-01-08

## 2022-01-08 RX ORDER — DEXTROSE 50 % IN WATER (D50W) INTRAVENOUS SYRINGE
25 ONCE
Status: COMPLETED | OUTPATIENT
Start: 2022-01-08 | End: 2022-01-08

## 2022-01-08 RX ORDER — GUAIFENESIN 100 MG/5ML
81 LIQUID (ML) ORAL DAILY
Status: DISCONTINUED | OUTPATIENT
Start: 2022-01-08 | End: 2022-02-08 | Stop reason: HOSPADM

## 2022-01-08 RX ADMIN — Medication 12 UNITS/KG/HR: at 01:23

## 2022-01-08 RX ADMIN — DEXTROSE MONOHYDRATE 25 G: 25 INJECTION, SOLUTION INTRAVENOUS at 01:34

## 2022-01-08 RX ADMIN — VANCOMYCIN HYDROCHLORIDE 1250 MG: 1.25 INJECTION, POWDER, LYOPHILIZED, FOR SOLUTION INTRAVENOUS at 03:49

## 2022-01-08 RX ADMIN — ALBUTEROL SULFATE 10 MG: 2.5 SOLUTION RESPIRATORY (INHALATION) at 01:37

## 2022-01-08 RX ADMIN — NITROGLYCERIN 0.5 INCH: 20 OINTMENT TOPICAL at 18:34

## 2022-01-08 RX ADMIN — CALCIUM GLUCONATE 1000 MG: 20 INJECTION, SOLUTION INTRAVENOUS at 02:57

## 2022-01-08 RX ADMIN — HEPARIN SODIUM 2000 UNITS: 1000 INJECTION INTRAVENOUS; SUBCUTANEOUS at 09:37

## 2022-01-08 RX ADMIN — NITROGLYCERIN 0.5 INCH: 20 OINTMENT TOPICAL at 04:10

## 2022-01-08 RX ADMIN — PIPERACILLIN SODIUM AND TAZOBACTAM SODIUM 3.38 G: 3; .375 INJECTION, POWDER, LYOPHILIZED, FOR SOLUTION INTRAVENOUS at 21:36

## 2022-01-08 RX ADMIN — METOPROLOL SUCCINATE 50 MG: 25 TABLET, EXTENDED RELEASE ORAL at 08:03

## 2022-01-08 RX ADMIN — SODIUM POLYSTYRENE SULFONATE 15 G: 15 SUSPENSION ORAL; RECTAL at 06:39

## 2022-01-08 RX ADMIN — SODIUM CHLORIDE 80 ML/HR: 9 INJECTION, SOLUTION INTRAVENOUS at 02:56

## 2022-01-08 RX ADMIN — FUROSEMIDE 40 MG: 10 INJECTION, SOLUTION INTRAMUSCULAR; INTRAVENOUS at 06:48

## 2022-01-08 RX ADMIN — HEPARIN SODIUM 2800 UNITS: 1000 INJECTION INTRAVENOUS; SUBCUTANEOUS at 18:27

## 2022-01-08 RX ADMIN — SODIUM POLYSTYRENE SULFONATE 45 G: 15 SUSPENSION ORAL; RECTAL at 01:33

## 2022-01-08 RX ADMIN — ASPIRIN 81 MG CHEWABLE TABLET 81 MG: 81 TABLET CHEWABLE at 08:03

## 2022-01-08 RX ADMIN — INSULIN HUMAN 10 UNITS: 100 INJECTION, SOLUTION PARENTERAL at 01:03

## 2022-01-08 RX ADMIN — NITROGLYCERIN 0.5 INCH: 20 OINTMENT TOPICAL at 13:23

## 2022-01-08 RX ADMIN — PIPERACILLIN SODIUM AND TAZOBACTAM SODIUM 3.38 G: 3; .375 INJECTION, POWDER, LYOPHILIZED, FOR SOLUTION INTRAVENOUS at 05:00

## 2022-01-08 RX ADMIN — HEPARIN SODIUM 4000 UNITS: 1000 INJECTION INTRAVENOUS; SUBCUTANEOUS at 01:19

## 2022-01-08 RX ADMIN — Medication 18 UNITS/KG/HR: at 09:41

## 2022-01-08 NOTE — H&P
History & Physical    Primary Care Provider: Ollie Russell MD  Source of Information: Patient      History of Presenting Illness:   Frankie Christianson is a 78 y.o. male who presents with history of hypertension and chronic renal failure patient was admitted in August 2021 after that he had never followed up in office. He denies any history of cough fever or chills. Positive history of nausea no history of vomiting no history of diarrhea no history of abdominal pain patient came to the emergency room because he was having malaise and headache and feeling weak. No history of increased frequency of micturition pain from degeneration no history of any joint pain or joint swelling according to patient he was moving around at home. No history of headache or dizziness no history of loss of consciousness or seizures no history of illness or discharge no history of throat pain or earache       Review of Systems:  A comprehensive review of systems was negative except for that written in the History of Present Illness. Past Medical History:   Diagnosis Date    Chronic kidney disease     Hypertension       No past surgical history on file. Prior to Admission medications    Medication Sig Start Date End Date Taking? Authorizing Provider   furosemide (LASIX) 40 mg tablet Take 1 and half tab daily twice a day 8/5/21   Ollie Russell MD   amLODIPine (NORVASC) 5 mg tablet Take 1 Tablet by mouth daily. 8/3/21   Ollie Russell MD   apixaban (ELIQUIS) 2.5 mg tablet Take 1 Tablet by mouth two (2) times a day. 8/2/21   Ollie Russell MD   calcitRIOL (ROCALTROL) 0.25 mcg capsule Take 1 Capsule by mouth daily. 8/3/21   Ollie Russell MD   metOLazone (ZAROXOLYN) 5 mg tablet Take 1 Tablet by mouth daily. 8/3/21   Ollie Russell MD   metoprolol tartrate (LOPRESSOR) 25 mg tablet Take 1 Tablet by mouth daily.  Indications: rapid ventricular heartbeat 8/3/21   MD troy Verdin carbonate (RENVELA) 800 mg tab tab Take 2 Tablets by mouth three (3) times daily (with meals). Indications: renal osteodystrophy with hyperphosphatemia 8/2/21   Bev Roberson MD   tamsulosin [facility]) 0.4 mg capsule Take 1 Capsule by mouth daily. 8/3/21   Bev Roberson MD   sodium bicarbonate 650 mg tablet Take 1 Tab by mouth two (2) times a day. 1/13/21   Bev Roberson MD     No Known Allergies   No family history on file. Social History     Social History Narrative    Not on file      SOCIAL HISTORY:    Patient resides:  Independently    Assisted Living    SNF    With family care y   Ambulates:   Independently y   w/cane    w/walker    w/wc    CODE STATUS:  DNR    Full y   Other      Objective:     Physical Exam:     Visit Vitals  BP (!) 149/112 (BP 1 Location: Left upper arm, BP Patient Position: At rest)   Pulse (!) 116   Temp 97 °F (36.1 °C)   Resp 23   Ht 6' (1.829 m)   Wt 78.4 kg (172 lb 13.5 oz)   SpO2 100%   BMI 23.44 kg/m²    O2 Flow Rate (L/min): 3 l/min O2 Device: Nasal cannula    General:  Alert, cooperative, no distress, appears stated age. Head:  Normocephalic, without obvious abnormality, atraumatic. Eyes:  Conjunctivae/corneas clear. PERRL, EOMs intact. Nose: Nares normal. Septum midline. Mucosa normal. No drainage or sinus tenderness. Throat: Lips, mucosa, and tongue normal. Teeth and gums normal.   Neck: Supple, symmetrical, trachea midline, no adenopathy, thyroid: no enlargement/tenderness/nodules, no carotid bruit and no JVD. Back:   Symmetric, no curvature. ROM normal. No CVA tenderness. Lungs:   Clear to auscultation bilaterally. Chest wall:  No tenderness or deformity. Heart:  Regular rate and rhythm, S1, S2 normal, no murmur, click, rub or gallop. Abdomen:   Soft, non-tender. Bowel sounds normal. No masses,  No organomegaly.    Extremities:  4+ edema both the legs has superficial ulcers on both legs also both feet are very cold unable to palpate dorsalis believed also on the sole of the feet patient has purple discoloration   Pulses: 2+ upper limb. Skin: Skin color, texture, turgor normal. No rashes or lesions   Neurologic: CNII-XII intact. No motor or sensory deficits. EKG:         Data Review:     Recent Days:  Recent Labs     01/08/22  0400 01/08/22  0118 01/07/22  2253   WBC 13.0* 12.7* 12.4*   HGB 9.4* 9.8* 9.3*   HCT 31.4* 33.0* 31.5*    218 220     Recent Labs     01/08/22  0400 01/07/22  2253    137   K 6.1* 6.8*   * 113*   CO2 12* 11*   GLU 70 102*   * 132*   CREA 10.20* 10.10*   CA 8.7 8.8   ALB 2.9* 3.0*   TBILI 0.9 0.9   ALT 15 16     No results for input(s): PH, PCO2, PO2, HCO3, FIO2 in the last 72 hours. 24 Hour Results:  Recent Results (from the past 24 hour(s))   CBC WITH AUTOMATED DIFF    Collection Time: 01/07/22 10:53 PM   Result Value Ref Range    WBC 12.4 (H) 4.1 - 11.1 K/uL    RBC 3.08 (L) 4.10 - 5.70 M/uL    HGB 9.3 (L) 12.1 - 17.0 g/dL    HCT 31.5 (L) 36.6 - 50.3 %    .3 (H) 80.0 - 99.0 FL    MCH 30.2 26.0 - 34.0 PG    MCHC 29.5 (L) 30.0 - 36.5 g/dL    RDW 15.9 (H) 11.5 - 14.5 %    PLATELET 159 135 - 302 K/uL    MPV 10.6 8.9 - 12.9 FL    NRBC 0.2 (H) 0.0  WBC    ABSOLUTE NRBC 0.02 (H) 0.00 - 0.01 K/uL    NEUTROPHILS 88 (H) 32 - 75 %    LYMPHOCYTES 3 (L) 12 - 49 %    MONOCYTES 8 5 - 13 %    EOSINOPHILS 0 0 - 7 %    BASOPHILS 0 0 - 1 %    IMMATURE GRANULOCYTES 1 (H) 0 - 0.5 %    ABS. NEUTROPHILS 11.0 (H) 1.8 - 8.0 K/UL    ABS. LYMPHOCYTES 0.3 (L) 0.8 - 3.5 K/UL    ABS. MONOCYTES 1.0 0.0 - 1.0 K/UL    ABS. EOSINOPHILS 0.0 0.0 - 0.4 K/UL    ABS. BASOPHILS 0.0 0.0 - 0.1 K/UL    ABS. IMM.  GRANS. 0.1 (H) 0.00 - 0.04 K/UL    DF AUTOMATED     METABOLIC PANEL, COMPREHENSIVE    Collection Time: 01/07/22 10:53 PM   Result Value Ref Range    Sodium 137 136 - 145 mmol/L    Potassium 6.8 (HH) 3.5 - 5.1 mmol/L    Chloride 113 (H) 97 - 108 mmol/L    CO2 11 (LL) 21 - 32 mmol/L    Anion gap 13 5 - 15 mmol/L Glucose 102 (H) 65 - 100 mg/dL     (H) 6 - 20 mg/dL    Creatinine 10.10 (H) 0.70 - 1.30 mg/dL    BUN/Creatinine ratio 13 12 - 20      GFR est AA 6 (L) >60 ml/min/1.73m2    GFR est non-AA 5 (L) >60 ml/min/1.73m2    Calcium 8.8 8.5 - 10.1 mg/dL    Bilirubin, total 0.9 0.2 - 1.0 mg/dL    AST (SGOT) 17 15 - 37 U/L    ALT (SGPT) 16 12 - 78 U/L    Alk. phosphatase 123 (H) 45 - 117 U/L    Protein, total 7.3 6.4 - 8.2 g/dL    Albumin 3.0 (L) 3.5 - 5.0 g/dL    Globulin 4.3 (H) 2.0 - 4.0 g/dL    A-G Ratio 0.7 (L) 1.1 - 2.2     TROPONIN-HIGH SENSITIVITY    Collection Time: 01/07/22 10:53 PM   Result Value Ref Range    Troponin-High Sensitivity 409 (HH) 0 - 76 ng/L   COVID-19 RAPID TEST    Collection Time: 01/07/22 11:13 PM   Result Value Ref Range    Specimen source Please find results under separate order      COVID-19 rapid test Not Detected Not Detected     PTT    Collection Time: 01/08/22  1:18 AM   Result Value Ref Range    aPTT 33.4 21.2 - 34.1 sec    aPTT, therapeutic range   82 - 109 sec   CBC WITH AUTOMATED DIFF    Collection Time: 01/08/22  1:18 AM   Result Value Ref Range    WBC 12.7 (H) 4.1 - 11.1 K/uL    RBC 3.21 (L) 4.10 - 5.70 M/uL    HGB 9.8 (L) 12.1 - 17.0 g/dL    HCT 33.0 (L) 36.6 - 50.3 %    .8 (H) 80.0 - 99.0 FL    MCH 30.5 26.0 - 34.0 PG    MCHC 29.7 (L) 30.0 - 36.5 g/dL    RDW 15.8 (H) 11.5 - 14.5 %    PLATELET 947 887 - 908 K/uL    MPV 9.7 8.9 - 12.9 FL    NRBC 0.2 (H) 0.0  WBC    ABSOLUTE NRBC 0.02 (H) 0.00 - 0.01 K/uL    NEUTROPHILS 89 (H) 32 - 75 %    LYMPHOCYTES 2 (L) 12 - 49 %    MONOCYTES 8 5 - 13 %    EOSINOPHILS 0 0 - 7 %    BASOPHILS 0 0 - 1 %    IMMATURE GRANULOCYTES 1 (H) 0 - 0.5 %    ABS. NEUTROPHILS 11.4 (H) 1.8 - 8.0 K/UL    ABS. LYMPHOCYTES 0.2 (L) 0.8 - 3.5 K/UL    ABS. MONOCYTES 1.0 0.0 - 1.0 K/UL    ABS. EOSINOPHILS 0.0 0.0 - 0.4 K/UL    ABS. BASOPHILS 0.0 0.0 - 0.1 K/UL    ABS. IMM.  GRANS. 0.1 (H) 0.00 - 0.04 K/UL    DF AUTOMATED     MRSA SCREEN - PCR (NASAL) Collection Time: 01/08/22  2:23 AM   Result Value Ref Range    MRSA by PCR, Nasal Not Detected Not Detected     CBC WITH AUTOMATED DIFF    Collection Time: 01/08/22  4:00 AM   Result Value Ref Range    WBC 13.0 (H) 4.1 - 11.1 K/uL    RBC 3.07 (L) 4.10 - 5.70 M/uL    HGB 9.4 (L) 12.1 - 17.0 g/dL    HCT 31.4 (L) 36.6 - 50.3 %    .3 (H) 80.0 - 99.0 FL    MCH 30.6 26.0 - 34.0 PG    MCHC 29.9 (L) 30.0 - 36.5 g/dL    RDW 15.7 (H) 11.5 - 14.5 %    PLATELET 580 896 - 645 K/uL    MPV 10.1 8.9 - 12.9 FL    NRBC 0.2 (H) 0.0  WBC    ABSOLUTE NRBC 0.02 (H) 0.00 - 0.01 K/uL    NEUTROPHILS 88 (H) 32 - 75 %    LYMPHOCYTES 2 (L) 12 - 49 %    MONOCYTES 9 5 - 13 %    EOSINOPHILS 0 0 - 7 %    BASOPHILS 0 0 - 1 %    IMMATURE GRANULOCYTES 1 (H) 0 - 0.5 %    ABS. NEUTROPHILS 11.6 (H) 1.8 - 8.0 K/UL    ABS. LYMPHOCYTES 0.2 (L) 0.8 - 3.5 K/UL    ABS. MONOCYTES 1.1 (H) 0.0 - 1.0 K/UL    ABS. EOSINOPHILS 0.0 0.0 - 0.4 K/UL    ABS. BASOPHILS 0.0 0.0 - 0.1 K/UL    ABS. IMM. GRANS. 0.1 (H) 0.00 - 0.04 K/UL    DF AUTOMATED     METABOLIC PANEL, COMPREHENSIVE    Collection Time: 01/08/22  4:00 AM   Result Value Ref Range    Sodium 139 136 - 145 mmol/L    Potassium 6.1 (H) 3.5 - 5.1 mmol/L    Chloride 113 (H) 97 - 108 mmol/L    CO2 12 (LL) 21 - 32 mmol/L    Anion gap 14 5 - 15 mmol/L    Glucose 70 65 - 100 mg/dL     (H) 6 - 20 mg/dL    Creatinine 10.20 (H) 0.70 - 1.30 mg/dL    BUN/Creatinine ratio 13 12 - 20      GFR est AA 6 (L) >60 ml/min/1.73m2    GFR est non-AA 5 (L) >60 ml/min/1.73m2    Calcium 8.7 8.5 - 10.1 mg/dL    Bilirubin, total 0.9 0.2 - 1.0 mg/dL    AST (SGOT) 19 15 - 37 U/L    ALT (SGPT) 15 12 - 78 U/L    Alk.  phosphatase 118 (H) 45 - 117 U/L    Protein, total 7.1 6.4 - 8.2 g/dL    Albumin 2.9 (L) 3.5 - 5.0 g/dL    Globulin 4.2 (H) 2.0 - 4.0 g/dL    A-G Ratio 0.7 (L) 1.1 - 2.2     CK    Collection Time: 01/08/22  4:00 AM   Result Value Ref Range     39 - 308 U/L   TROPONIN-HIGH SENSITIVITY    Collection Time: 01/08/22  4:00 AM   Result Value Ref Range    Troponin-High Sensitivity 395 (HH) 0 - 76 ng/L         Imaging: Possible aspiration pneumonia and CHF    Assessment:     Aspiration pneumonia  Acute on chronic renal failure  Elevated troponin  Hypertension  Tachycardia  Bilateral leg ulcers  Possible peripheral arterial disease  Hyper kalemia  Sepsis       Plan:     I have started patient on IV Zosyn I will stop the vancomycin as the skin ulcers are not that bad  Will consult cardiology  Give him some IV fluid and O and give Lasix  Patient is given calcium gluconate as well as IV insulin and glucose as well as Kayexalate for hyperkalemia  Nephrology consult  Anemia is most likely secondary to chronic disease  Low bicarb secondary to renal failure as well as N/V because of pneumonia  Will have infectious disease evaluation also  Discussed with RN taking care of the patient  Time spent is 45 minutes       Signed By: Enid Muniz MD     January 8, 2022

## 2022-01-08 NOTE — PROGRESS NOTES
Reason for Admission:   Renal Failure                    RUR Score:                  PCP: First and Last name:   Chata Perkins MD     Name of Practice:    Are you a current patient: Yes/No:    Approximate date of last visit:    Can you participate in a virtual visit if needed:     Do you (patient/family) have any concerns for transition/discharge? Plan for utilizing home health:       Current Advanced Directive/Advance Care Plan:  Full Code      Healthcare Decision Maker:   Click here to complete [id number] including selection of the Healthcare Decision Maker Relationship (ie \"Primary\")              Transition of Care Plan:     CM unable to assess patient due to AMS. CM attempted to call Sara Easton [phone] but number unreachable. CM tried [phone] but number disconnected.

## 2022-01-08 NOTE — CONSULTS
[address] Renal Consult Note      NAME:  Lina Oates   :   1955   MRN:  [id number]     Requesting Physician Desiree Darby MD   Reason for Consult:  Chronic kidney disease stage V with transition to stage VI     PCP:  Desiree Darby MD     Date/Time:  2022 12:49 PM          Subjective:     CHIEF COMPLAINT: Weakness, malaise, shortness of breath    HISTORY OF PRESENT ILLNESS:     Mr. Nicolas Breaux is a 78 y.o.  male who is admitted to the medical service with dyspnea. We are asked to evaluate for azotemia. Patient well known to our service. He was CKD stage V at baseline. History of hypertension greater than 25 years. History of recurrent diverticular bleed. Significant DJD of the left knee but has not been taking nonsteroidals. Last hospitalization was 2021. Discharge serum creatinine was around 6.3.     Presents with a several-day increase in shortness of breath with inability to lay flat along with weakness and malaise. He is hyperkalemic and acidotic. There has been nausea with occasional vomiting. Appetite is poor. No abdominal pain, no chest pain. Has noted a marked decrease in urine output over the last several days. Bladder scan done in ICU showed no significant bladder residual urine  Past Medical History:   Diagnosis Date    Chronic kidney disease     Hypertension    Left axillary DVT 2021    No past surgical history on file. Social History     Tobacco Use    Smoking status: Never Smoker    Smokeless tobacco: Never Used   Substance Use Topics    Alcohol use: Not on file        No family history on file. No Known Allergies     Prior to Admission medications    Medication Sig Start Date End Date Taking? Authorizing Provider   furosemide (LASIX) 40 mg tablet Take 1 and half tab daily twice a day 21   Desiree Darby MD   amLODIPine (NORVASC) 5 mg tablet Take 1 Tablet by mouth daily.  8/3/21   Desiree Darby MD   apixaban (ELIQUIS) 2.5 mg tablet Take 1 Tablet by mouth two (2) times a day. 8/2/21   Issac White MD   calcitRIOL (ROCALTROL) 0.25 mcg capsule Take 1 Capsule by mouth daily. 8/3/21   Issac White MD   metOLazone (ZAROXOLYN) 5 mg tablet Take 1 Tablet by mouth daily. 8/3/21   Issac White MD   metoprolol tartrate (LOPRESSOR) 25 mg tablet Take 1 Tablet by mouth daily. Indications: rapid ventricular heartbeat 8/3/21   Issac White MD   sevelamer carbonate (RENVELA) 800 mg tab tab Take 2 Tablets by mouth three (3) times daily (with meals). Indications: renal osteodystrophy with hyperphosphatemia 8/2/21   Issac White MD   tamsulosin [facility]) 0.4 mg capsule Take 1 Capsule by mouth daily. 8/3/21   Issac White MD   sodium bicarbonate 650 mg tablet Take 1 Tab by mouth two (2) times a day.  1/13/21   Issac White MD         Current Facility-Administered Medications:     heparin 25,000 units in  ml infusion, 12-25 Units/kg/hr (Adjusted), IntraVENous, TITRATE, Armando Clemens MD, Last Rate: 15.7 mL/hr at 01/08/22 0941, 18 Units/kg/hr at 01/08/22 0941    heparin (porcine) 1,000 unit/mL injection 4,000 Units, 4,000 Units, IntraVENous, PRN **OR** heparin (porcine) 1,000 unit/mL injection 2,000 Units, 2,000 Units, IntraVENous, PRN, Armando Clemens MD, 2,000 Units at 01/08/22 0937    metoprolol succinate (TOPROL-XL) XL tablet 50 mg, 50 mg, Oral, DAILY, Issac White MD, 50 mg at 01/08/22 0803    nitroglycerin (NITROBID) 2 % ointment 0.5 Inch, 0.5 Inch, Topical, Q6H, Issac White MD, 0.5 Inch at 01/08/22 0410    aspirin chewable tablet 81 mg, 81 mg, Oral, DAILY, Issac White MD, 81 mg at 01/08/22 0803    piperacillin-tazobactam (ZOSYN) 3.375 g in 0.9% sodium chloride (MBP/ADV) 100 mL MBP, 3.375 g, IntraVENous, Q12H, Issac White MD, Last Rate: 25 mL/hr at 01/08/22 0500, 3.375 g at 01/08/22 0500      Review of Systems:  A comprehensive review of systems was negative except for that written in the HPI. Objective:      VITALS:    Vital signs reviewed; most recent are:    Visit Vitals  BP (!) 149/112 (BP 1 Location: Left upper arm, BP Patient Position: At rest)   Pulse (!) 116   Temp 97.9 °F (36.6 °C)   Resp 23   Ht 6' (1.829 m)   Wt 78.4 kg (172 lb 13.5 oz)   SpO2 99%   BMI 23.44 kg/m²     SpO2 Readings from Last 6 Encounters:   01/08/22 99%   08/05/21 99%   01/13/21 100%    O2 Flow Rate (L/min): 3 l/min   No intake or output data in the 24 hours ending 01/08/22 1249         Exam:   Physical Exam:General appearance: alert, fatigued, cooperative, mild distress, appears older than stated age, appears chronically ill, he is tachypneic  Head: Normocephalic, without obvious abnormality, atraumatic, temporal wasting  Eyes: negative findings: anicteric sclera  Neck: supple, symmetrical, trachea midline, no adenopathy, thyroid: not enlarged, symmetric, no tenderness/mass/nodules, no carotid bruit and no JVD  Lungs: Scattered posterior rales, no wheeze  Heart: regular rate and rhythm, no rub  Abdomen: soft, non-tender. Bowel sounds normal. No masses,  no organomegaly  Extremities: edema trace bilateral pretibial areas, skin dry and scalley, no ulcers or lesions  Neurologic: Grossly normal.  Slow but alert and oriented x3. Able to move all extremities     LABS:  Recent Labs     01/08/22  0815 01/08/22  0400 01/07/22  2253    139 137   K 6.5* 6.1* 6.8*   * 113* 113*   CO2 12* 12* 11*   * 130* 132*   CREA 10.20* 10.20* 10.10*   CA 9.1 8.7 8.8   ALB 2.9* 2.9* 3.0*     Recent Labs     01/08/22  0815 01/08/22  0400 01/08/22  0118   WBC 13.3* 13.0* 12.7*   HGB 9.4* 9.4* 9.8*   HCT 31.9* 31.4* 33.0*    229 218     Lab Results   Component Value Date/Time    Glucose (POC) 126 (H) 01/13/2021 11:21 AM    Glucose (POC) 134 (H) 01/12/2021 07:41 PM     No results for input(s): PH, PCO2, PO2, HCO3, FIO2 in the last 72 hours. No results for input(s): INR, INREXT in the last 72 hours.   No results for input(s): HÉCTOR, KU, CLU, CREAU in the last 72 hours. No lab exists for component: PROU    Chest X-ray January 7, 2022:    IMPRESSION  Airspace opacity and effusion at the right lung base and in the  acute setting would suggest pneumonia and/or aspiration however the patient also  appears to have baseline/background vascular congestion and/or pulmonary  arterial hypertension, noting cardiomegaly and/or pericardial effusion    Assessment:   Renal Specific Problems  Transition to chronic kidney disease stage VI  Volume overload  Metabolic acidosis  Hyperkalemia  Anemia with renal failure   Leukocytosis  Probable right lower lobe pneumonia        Plan:     Obtain/ Order: labs/cultures/radiology/procedures:  renal panel and CBC in a.m., iron saturation      Therapeutic:    Patient needs acute dialysis. This will be the first dialysis treatment. I have explained the process to the patient. We had previously spoken about the eventual need for dialysis. He will have a temporary catheter placed by interventional radiology. We will then proceed with 3 hours of hemodialysis today and repeat on Monday.   Patient is agreeable    Risk of deterioration: medium      Total time spent with patient: 48 Minutes                  Discussed:  Patient, interventional radiology team, dialysis, ICU RN Hilary           ___________________________________________________    Attending Physician: Grupo Adkins MD

## 2022-01-08 NOTE — CONSULTS
Consult    NAME: Jeanne Sanchez   :  1955   MRN:  [id number]     Date/Time:  2022 4:36 PM    Patient PCP: Surya Markham MD  ________________________________________________________________________      Subjective:   CHIEF COMPLAINT: Shortness of breath. HISTORY OF PRESENT ILLNESS:   Patient is known to me from previous hospitalization and presented with worsening of shortness of breath and leg edema. He denies any chest tightness or dizziness. His troponin I is minimally elevated. He has a chronic kidney disease stage V and seems to have become acute on chronic renal failure. During the last hospitalization he had a DVT of left upper extremity and axillary vein thrombosis. Past Medical History:   Diagnosis Date    Chronic kidney disease     Hypertension    Paroxysmal atrial fibrillation. History of ventricular tachycardia. Left upper extremity DVT. Leg ulcers. No past surgical history on file. No Known Allergies   Meds:  See below  Social History     Tobacco Use    Smoking status: Never Smoker    Smokeless tobacco: Never Used   Substance Use Topics    Alcohol use: Not on file   Negative for smoking drinking or drugs. No family history on file. FAMILY HISTORY: Mother  at the age of 58 from a brain cancer and father also  in his 46s from the cancer. 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       REVIEW OF SYSTEMS:     []         Unable to obtain  ROS due to ---   [x]         Total of 12 systems reviewed as follows:    Constitutional: negative fever, negative chills, negative weight loss  Eyes:   negative visual changes  ENT:   negative sore throat, tongue or lip swelling  Respiratory:  negative cough, significant for dyspnea  Cards:  negative for chest pain, palpitations, positive for lower extremity edema  GI:   negative for nausea, vomiting, diarrhea, and abdominal pain  Genitourinary: negative for frequency, dysuria  Integument:  negative for rash   Hematologic:  negative for easy bruising and gum/nose bleeding  Musculoskel: negative for myalgias,  back pain  Neurological:  negative for headaches, dizziness, vertigo, weakness  Behavl/Psych: negative for feelings of anxiety, depression     Pertinent Positives include :    Objective:      Physical Exam:    Last 24hrs VS reviewed since prior progress note. Most recent are:    Visit Vitals  BP (!) 137/109   Pulse (!) 119   Temp 98.2 °F (36.8 °C)   Resp 11   Ht 6' (1.829 m)   Wt 78.4 kg (172 lb 13.5 oz)   SpO2 99%   BMI 23.44 kg/m²       Intake/Output Summary (Last 24 hours) at 1/8/2022 1636  Last data filed at 1/8/2022 1530  Gross per 24 hour   Intake --   Output 0 ml   Net 0 ml        General Appearance: Well developed, well nourished, alert & oriented x 3,    no acute distress. Ears/Nose/Mouth/Throat: Pupils equal and round, Hearing grossly normal.  Neck: Supple. JVP within normal limits. Carotids good upstrokes, with no bruit. Chest: Lungs clear to auscultation bilaterally. Cardiovascular: Regular rate and rhythm, S1S2 normal, grade 2/6 systolic murmur at precordial area  Abdomen: Soft, non-tender, bowel sounds are active. No organomegaly. Extremities: Positive for edema bilaterally. Stasis ulcers, femoral pulses +2, Distal Pulses +1. Skin: Warm and dry. Neuro: CN II-XII grossly intact, Strength and sensation grossly intact. []         Post-cath site without hematoma, bruit, tenderness, or thrill. Distal pulses intact. Data:      Telemetry: Normal sinus rhythm    EKG:  []  No new EKG for review. Prior to Admission medications    Medication Sig Start Date End Date Taking? Authorizing Provider   furosemide (LASIX) 40 mg tablet Take 1 and half tab daily twice a day 8/5/21   Ollie Russell MD   amLODIPine (NORVASC) 5 mg tablet Take 1 Tablet by mouth daily.  8/3/21   Ollie Russell MD apixaban (ELIQUIS) 2.5 mg tablet Take 1 Tablet by mouth two (2) times a day. 8/2/21   Dayana Narayanan MD   calcitRIOL (ROCALTROL) 0.25 mcg capsule Take 1 Capsule by mouth daily. 8/3/21   Dayana Narayanan MD   metOLazone (ZAROXOLYN) 5 mg tablet Take 1 Tablet by mouth daily. 8/3/21   Dayana Narayanan MD   metoprolol tartrate (LOPRESSOR) 25 mg tablet Take 1 Tablet by mouth daily. Indications: rapid ventricular heartbeat 8/3/21   Dayana Narayanan MD   sevelamer carbonate (RENVELA) 800 mg tab tab Take 2 Tablets by mouth three (3) times daily (with meals). Indications: renal osteodystrophy with hyperphosphatemia 8/2/21   Dayana Narayanan MD   tamsulosin [facility]) 0.4 mg capsule Take 1 Capsule by mouth daily. 8/3/21   Dayana Narayanan MD   sodium bicarbonate 650 mg tablet Take 1 Tab by mouth two (2) times a day. 1/13/21   Dayana Narayanan MD       Recent Results (from the past 24 hour(s))   CBC WITH AUTOMATED DIFF    Collection Time: 01/07/22 10:53 PM   Result Value Ref Range    WBC 12.4 (H) 4.1 - 11.1 K/uL    RBC 3.08 (L) 4.10 - 5.70 M/uL    HGB 9.3 (L) 12.1 - 17.0 g/dL    HCT 31.5 (L) 36.6 - 50.3 %    .3 (H) 80.0 - 99.0 FL    MCH 30.2 26.0 - 34.0 PG    MCHC 29.5 (L) 30.0 - 36.5 g/dL    RDW 15.9 (H) 11.5 - 14.5 %    PLATELET 209 175 - 660 K/uL    MPV 10.6 8.9 - 12.9 FL    NRBC 0.2 (H) 0.0  WBC    ABSOLUTE NRBC 0.02 (H) 0.00 - 0.01 K/uL    NEUTROPHILS 88 (H) 32 - 75 %    LYMPHOCYTES 3 (L) 12 - 49 %    MONOCYTES 8 5 - 13 %    EOSINOPHILS 0 0 - 7 %    BASOPHILS 0 0 - 1 %    IMMATURE GRANULOCYTES 1 (H) 0 - 0.5 %    ABS. NEUTROPHILS 11.0 (H) 1.8 - 8.0 K/UL    ABS. LYMPHOCYTES 0.3 (L) 0.8 - 3.5 K/UL    ABS. MONOCYTES 1.0 0.0 - 1.0 K/UL    ABS. EOSINOPHILS 0.0 0.0 - 0.4 K/UL    ABS. BASOPHILS 0.0 0.0 - 0.1 K/UL    ABS. IMM.  GRANS. 0.1 (H) 0.00 - 0.04 K/UL    DF AUTOMATED     METABOLIC PANEL, COMPREHENSIVE    Collection Time: 01/07/22 10:53 PM   Result Value Ref Range    Sodium 137 136 - 145 mmol/L    Potassium 6.8 (HH) 3.5 - 5.1 mmol/L    Chloride 113 (H) 97 - 108 mmol/L    CO2 11 (LL) 21 - 32 mmol/L    Anion gap 13 5 - 15 mmol/L    Glucose 102 (H) 65 - 100 mg/dL     (H) 6 - 20 mg/dL    Creatinine 10.10 (H) 0.70 - 1.30 mg/dL    BUN/Creatinine ratio 13 12 - 20      GFR est AA 6 (L) >60 ml/min/1.73m2    GFR est non-AA 5 (L) >60 ml/min/1.73m2    Calcium 8.8 8.5 - 10.1 mg/dL    Bilirubin, total 0.9 0.2 - 1.0 mg/dL    AST (SGOT) 17 15 - 37 U/L    ALT (SGPT) 16 12 - 78 U/L    Alk. phosphatase 123 (H) 45 - 117 U/L    Protein, total 7.3 6.4 - 8.2 g/dL    Albumin 3.0 (L) 3.5 - 5.0 g/dL    Globulin 4.3 (H) 2.0 - 4.0 g/dL    A-G Ratio 0.7 (L) 1.1 - 2.2     TROPONIN-HIGH SENSITIVITY    Collection Time: 01/07/22 10:53 PM   Result Value Ref Range    Troponin-High Sensitivity 409 (HH) 0 - 76 ng/L   COVID-19 RAPID TEST    Collection Time: 01/07/22 11:13 PM   Result Value Ref Range    Specimen source Please find results under separate order      COVID-19 rapid test Not Detected Not Detected     PTT    Collection Time: 01/08/22  1:18 AM   Result Value Ref Range    aPTT 33.4 21.2 - 34.1 sec    aPTT, therapeutic range   82 - 109 sec   CBC WITH AUTOMATED DIFF    Collection Time: 01/08/22  1:18 AM   Result Value Ref Range    WBC 12.7 (H) 4.1 - 11.1 K/uL    RBC 3.21 (L) 4.10 - 5.70 M/uL    HGB 9.8 (L) 12.1 - 17.0 g/dL    HCT 33.0 (L) 36.6 - 50.3 %    .8 (H) 80.0 - 99.0 FL    MCH 30.5 26.0 - 34.0 PG    MCHC 29.7 (L) 30.0 - 36.5 g/dL    RDW 15.8 (H) 11.5 - 14.5 %    PLATELET 038 043 - 453 K/uL    MPV 9.7 8.9 - 12.9 FL    NRBC 0.2 (H) 0.0  WBC    ABSOLUTE NRBC 0.02 (H) 0.00 - 0.01 K/uL    NEUTROPHILS 89 (H) 32 - 75 %    LYMPHOCYTES 2 (L) 12 - 49 %    MONOCYTES 8 5 - 13 %    EOSINOPHILS 0 0 - 7 %    BASOPHILS 0 0 - 1 %    IMMATURE GRANULOCYTES 1 (H) 0 - 0.5 %    ABS. NEUTROPHILS 11.4 (H) 1.8 - 8.0 K/UL    ABS. LYMPHOCYTES 0.2 (L) 0.8 - 3.5 K/UL    ABS. MONOCYTES 1.0 0.0 - 1.0 K/UL    ABS. EOSINOPHILS 0.0 0.0 - 0.4 K/UL    ABS.  BASOPHILS 0.0 0.0 - 0.1 K/UL    ABS. IMM. GRANS. 0.1 (H) 0.00 - 0.04 K/UL    DF AUTOMATED     MRSA SCREEN - PCR (NASAL)    Collection Time: 01/08/22  2:23 AM   Result Value Ref Range    MRSA by PCR, Nasal Not Detected Not Detected     CBC WITH AUTOMATED DIFF    Collection Time: 01/08/22  4:00 AM   Result Value Ref Range    WBC 13.0 (H) 4.1 - 11.1 K/uL    RBC 3.07 (L) 4.10 - 5.70 M/uL    HGB 9.4 (L) 12.1 - 17.0 g/dL    HCT 31.4 (L) 36.6 - 50.3 %    .3 (H) 80.0 - 99.0 FL    MCH 30.6 26.0 - 34.0 PG    MCHC 29.9 (L) 30.0 - 36.5 g/dL    RDW 15.7 (H) 11.5 - 14.5 %    PLATELET 341 093 - 865 K/uL    MPV 10.1 8.9 - 12.9 FL    NRBC 0.2 (H) 0.0  WBC    ABSOLUTE NRBC 0.02 (H) 0.00 - 0.01 K/uL    NEUTROPHILS 88 (H) 32 - 75 %    LYMPHOCYTES 2 (L) 12 - 49 %    MONOCYTES 9 5 - 13 %    EOSINOPHILS 0 0 - 7 %    BASOPHILS 0 0 - 1 %    IMMATURE GRANULOCYTES 1 (H) 0 - 0.5 %    ABS. NEUTROPHILS 11.6 (H) 1.8 - 8.0 K/UL    ABS. LYMPHOCYTES 0.2 (L) 0.8 - 3.5 K/UL    ABS. MONOCYTES 1.1 (H) 0.0 - 1.0 K/UL    ABS. EOSINOPHILS 0.0 0.0 - 0.4 K/UL    ABS. BASOPHILS 0.0 0.0 - 0.1 K/UL    ABS. IMM. GRANS. 0.1 (H) 0.00 - 0.04 K/UL    DF AUTOMATED     METABOLIC PANEL, COMPREHENSIVE    Collection Time: 01/08/22  4:00 AM   Result Value Ref Range    Sodium 139 136 - 145 mmol/L    Potassium 6.1 (H) 3.5 - 5.1 mmol/L    Chloride 113 (H) 97 - 108 mmol/L    CO2 12 (LL) 21 - 32 mmol/L    Anion gap 14 5 - 15 mmol/L    Glucose 70 65 - 100 mg/dL     (H) 6 - 20 mg/dL    Creatinine 10.20 (H) 0.70 - 1.30 mg/dL    BUN/Creatinine ratio 13 12 - 20      GFR est AA 6 (L) >60 ml/min/1.73m2    GFR est non-AA 5 (L) >60 ml/min/1.73m2    Calcium 8.7 8.5 - 10.1 mg/dL    Bilirubin, total 0.9 0.2 - 1.0 mg/dL    AST (SGOT) 19 15 - 37 U/L    ALT (SGPT) 15 12 - 78 U/L    Alk.  phosphatase 118 (H) 45 - 117 U/L    Protein, total 7.1 6.4 - 8.2 g/dL    Albumin 2.9 (L) 3.5 - 5.0 g/dL    Globulin 4.2 (H) 2.0 - 4.0 g/dL    A-G Ratio 0.7 (L) 1.1 - 2.2     CK    Collection Time: 01/08/22  4:00 AM   Result Value Ref Range     39 - 308 U/L   TROPONIN-HIGH SENSITIVITY    Collection Time: 01/08/22  4:00 AM   Result Value Ref Range    Troponin-High Sensitivity 395 (HH) 0 - 76 ng/L   PTT    Collection Time: 01/08/22  7:49 AM   Result Value Ref Range    aPTT 49.2 (H) 21.2 - 34.1 sec    aPTT, therapeutic range   82 - 109 sec   CBC WITH AUTOMATED DIFF    Collection Time: 01/08/22  8:15 AM   Result Value Ref Range    WBC 13.3 (H) 4.1 - 11.1 K/uL    RBC 3.13 (L) 4.10 - 5.70 M/uL    HGB 9.4 (L) 12.1 - 17.0 g/dL    HCT 31.9 (L) 36.6 - 50.3 %    .9 (H) 80.0 - 99.0 FL    MCH 30.0 26.0 - 34.0 PG    MCHC 29.5 (L) 30.0 - 36.5 g/dL    RDW 15.8 (H) 11.5 - 14.5 %    PLATELET 693 102 - 598 K/uL    MPV 10.2 8.9 - 12.9 FL    NRBC 0.2 (H) 0.0  WBC    ABSOLUTE NRBC 0.02 (H) 0.00 - 0.01 K/uL    NEUTROPHILS 91 (H) 32 - 75 %    LYMPHOCYTES 1 (L) 12 - 49 %    MONOCYTES 7 5 - 13 %    EOSINOPHILS 0 0 - 7 %    BASOPHILS 0 0 - 1 %    IMMATURE GRANULOCYTES 1 (H) 0 - 0.5 %    ABS. NEUTROPHILS 12.2 (H) 1.8 - 8.0 K/UL    ABS. LYMPHOCYTES 0.1 (L) 0.8 - 3.5 K/UL    ABS. MONOCYTES 0.9 0.0 - 1.0 K/UL    ABS. EOSINOPHILS 0.0 0.0 - 0.4 K/UL    ABS. BASOPHILS 0.0 0.0 - 0.1 K/UL    ABS. IMM. GRANS. 0.1 (H) 0.00 - 0.04 K/UL    DF AUTOMATED     METABOLIC PANEL, COMPREHENSIVE    Collection Time: 01/08/22  8:15 AM   Result Value Ref Range    Sodium 140 136 - 145 mmol/L    Potassium 6.5 (H) 3.5 - 5.1 mmol/L    Chloride 114 (H) 97 - 108 mmol/L    CO2 12 (LL) 21 - 32 mmol/L    Anion gap 14 5 - 15 mmol/L    Glucose 106 (H) 65 - 100 mg/dL     (H) 6 - 20 mg/dL    Creatinine 10.20 (H) 0.70 - 1.30 mg/dL    BUN/Creatinine ratio 12 12 - 20      GFR est AA 6 (L) >60 ml/min/1.73m2    GFR est non-AA 5 (L) >60 ml/min/1.73m2    Calcium 9.1 8.5 - 10.1 mg/dL    Bilirubin, total 1.1 (H) 0.2 - 1.0 mg/dL    AST (SGOT) 27 15 - 37 U/L    ALT (SGPT) 18 12 - 78 U/L    Alk.  phosphatase 150 (H) 45 - 117 U/L    Protein, total 7.2 6.4 - 8.2 g/dL    Albumin 2.9 (L) 3.5 - 5.0 g/dL    Globulin 4.3 (H) 2.0 - 4.0 g/dL    A-G Ratio 0.7 (L) 1.1 - 2.2     ECHO ADULT COMPLETE    Collection Time: 01/08/22  2:00 PM   Result Value Ref Range    LV EDV A4C 64 mL    LV ESV A4C 49 mL    IVSd 1.8 (A) 0.6 - 1.0 cm    LVIDd 5.7 4.2 - 5.9 cm    LVIDs 5.1 cm    LVOT Peak Velocity 1.0 m/s    LVOT Peak Gradient 4 mmHg    LVPWd 1.5 (A) 0.6 - 1.0 cm    LV E' Lateral Velocity 5 cm/s    LV E' Septal Velocity 4 cm/s    LV Ejection Fraction A4C 23 %    AR .0 ms    AR Max Velocity PISA 4.1 m/s    AV Peak Velocity 1.4 m/s    AV Peak Gradient 8 mmHg    Aortic Root 3.6 cm    LA Diameter 5.0 cm    MR Peak Velocity 5.3 m/s    MR Peak Gradient 112 mmHg    VA Max Velocity 1.4 m/s    Pulmonary Artery EDP 7 mmHg    PV Max Velocity 1.1 m/s    PV Peak Gradient 5 mmHg    Est. RA Pressure 15 mmHg    RVIDd 4.4 cm    TR Max Velocity 3.41 m/s    TR Peak Gradient 47 mmHg    TAPSE 1.5 1.5 - 2.0 cm    Fractional Shortening 2D 11 28 - 44 %    LV ESV Index A4C 25 mL/m2    LV EDV Index A4C 32 mL/m2    LVIDd Index 2.85 cm/m2    LVIDs Index 2.55 cm/m2    LV RWT Ratio 0.53     LV Mass 2D 453.0 (A) 88 - 224 g    LV Mass 2D Index 226.5 (A) 49 - 115 g/m2    LA Size Index 2.50 cm/m2    LA/AO Root Ratio 1.39     Ao Root Index 1.80 cm/m2    AV Velocity Ratio 0.71     RVSP 62 mmHg    EF BP 26 (A) 55 - 100 %         Assessment:   Patient had echocardiogram and ejection fraction has decreased to about 25 to 30% and has mild aortic, mild to moderate mitral and moderate tricuspid regurgitation with pulmonary pressures about 55 mmHg. Acute renal failure and hyperkalemia and dialysis has just been started. Anemia. History of paroxysmal atrial fibrillation and recent DVT left axillary vein. Troponin I is minimally elevated and may not be presentation of non-Q wave myocardial infarction. Stasis ulcers of the lower extremity. 1.         Plan:   Echocardiogram results explained to the patient.   Agree with dialysis and I will continue present care. Patient should be on anticoagulation because of history of DVT and paroxysmal atrial fibrillation. Thank you.   1.       []        High complexity decision making was performed    Britney Pina MD

## 2022-01-08 NOTE — PROGRESS NOTES
Start   Ordered   01/08/22 0245  IP CONSULT TO PHARMACY - VANCOMYCIN DOSING  ONE TIME     Complete     Comments: Pharmacy will order the necessary lab work, if needed, to complete the dosing and ongoing monitoring of requested drug therapy. Details will be updated within the patients Progress Notes. Ordering Provider: Issac White MD   Authorizing Provider: Issac White MD   Question Answer Comment   Antibiotic Indications Skin and Soft Tissue Infection    Skin duration of therapy Other unknown            Vancomycin 1250 mg as initial dose.  Pharmacy to follow

## 2022-01-08 NOTE — PROGRESS NOTES
Spoke with Dr Lyndsay Fine regarding vascular surgery consult. Stated he is not a vascular surgeon and there will be none available til Monday. Stated if primary doctor felt it necessary, the patient should be transferred to a different facility. Information passed to Dr Quin Mayer.

## 2022-01-08 NOTE — BRIEF OP NOTE
Brief Post Procedure Note    Patient: Marshal Boeck  YOB: 1955  MRN: [id number]    Date of Procedure: 1/8/2022    Pre-Op Diagnosis: CKD stage V with transition to stage VI, need for hemodialysis    Post-Op Diagnosis: Same    Procedure:  US guided non tunneled Trialysis catheter placement    :   Neha Valdovinos MD    Assistant:  Jade Rodriguez RN    Findings:  Successful placement of a 13 Sinhala 19 cm length Trialysis catheter via the right IJ vein. Post catheter placement chest xray demonstrated appropriate catheter position with distal tip in the proximal right atrium. Catheter appropriate for immediate use.     Estimated blood loss: <1 mL    Complication: None    Specimen: None

## 2022-01-08 NOTE — ED TRIAGE NOTES
Patient arrived via EMS. Patient is from home and states that he hasn't been feeling well for the past three days.

## 2022-01-08 NOTE — ED PROVIDER NOTES
HPI   78year old c/o intermittant headache, malaise, light headedness, malaise sob for 1 week. Pain is gradual onset, nonradiating, and currently mild in severity. Past Medical History:   Diagnosis Date    Chronic kidney disease     Hypertension        No past surgical history on file. No family history on file. Social History     Socioeconomic History    Marital status: SINGLE     Spouse name: Not on file    Number of children: Not on file    Years of education: Not on file    Highest education level: Not on file   Occupational History    Not on file   Tobacco Use    Smoking status: Never Smoker    Smokeless tobacco: Never Used   Substance and Sexual Activity    Alcohol use: Not on file    Drug use: Not on file    Sexual activity: Not on file   Other Topics Concern    Not on file   Social History Narrative    Not on file     Social Determinants of Health     Financial Resource Strain:     Difficulty of Paying Living Expenses: Not on file   Food Insecurity:     Worried About Running Out of Food in the Last Year: Not on file    Dale of Food in the Last Year: Not on file   Transportation Needs:     Lack of Transportation (Medical): Not on file    Lack of Transportation (Non-Medical):  Not on file   Physical Activity:     Days of Exercise per Week: Not on file    Minutes of Exercise per Session: Not on file   Stress:     Feeling of Stress : Not on file   Social Connections:     Frequency of Communication with Friends and Family: Not on file    Frequency of Social Gatherings with Friends and Family: Not on file    Attends Sikh Services: Not on file    Active Member of Clubs or Organizations: Not on file    Attends Club or Organization Meetings: Not on file    Marital Status: Not on file   Intimate Partner Violence:     Fear of Current or Ex-Partner: Not on file    Emotionally Abused: Not on file    Physically Abused: Not on file    Sexually Abused: Not on file   Housing Stability:     Unable to Pay for Housing in the Last Year: Not on file    Number of Places Lived in the Last Year: Not on file    Unstable Housing in the Last Year: Not on file         ALLERGIES: Patient has no known allergies. Review of Systems   Constitutional: Negative for chills and fever. HENT: Negative for rhinorrhea and sore throat. Eyes: Negative for pain and redness. Respiratory: Positive for shortness of breath. Negative for cough. Cardiovascular: Negative for leg swelling. Gastrointestinal: Negative for abdominal pain, nausea and vomiting. Endocrine: Negative for polydipsia and polyuria. Genitourinary: Negative for decreased urine volume, dysuria and frequency. Musculoskeletal: Negative for arthralgias and back pain. Skin: Negative for color change and rash. Allergic/Immunologic: Negative for environmental allergies, food allergies and immunocompromised state. Neurological: Positive for light-headedness and headaches. Hematological: Negative for adenopathy. Does not bruise/bleed easily. Psychiatric/Behavioral: Negative for agitation and hallucinations. All other systems reviewed and are negative. Vitals:    01/07/22 2232   BP: (!) 172/132   Pulse: 87   Resp: 18   Temp: 97.8 °F (36.6 °C)   SpO2: 94%   Weight: 102.1 kg (225 lb)   Height: 6' (1.829 m)            Physical Exam  Vitals and nursing note reviewed. Constitutional:       Comments: Chronically ill appearing   HENT:      Head: Normocephalic and atraumatic. Right Ear: Ear canal normal.      Left Ear: Ear canal normal.      Nose: Nose normal.      Mouth/Throat:      Mouth: Mucous membranes are dry. Eyes:      Extraocular Movements: Extraocular movements intact. Pupils: Pupils are equal, round, and reactive to light. Cardiovascular:      Rate and Rhythm: Normal rate and regular rhythm. Pulses: Normal pulses. Heart sounds: Normal heart sounds.    Pulmonary:      Effort: Pulmonary effort is normal.      Breath sounds: Normal breath sounds. Abdominal:      General: Abdomen is flat. Palpations: Abdomen is soft. Musculoskeletal:         General: No swelling or tenderness. Cervical back: Normal range of motion and neck supple. Skin:     General: Skin is warm and dry. Capillary Refill: Capillary refill takes less than 2 seconds. Neurological:      General: No focal deficit present. Mental Status: He is alert and oriented to person, place, and time. Psychiatric:         Mood and Affect: Mood normal.         Behavior: Behavior normal.        Reevaluation 0020: Unchanged discussed results and plan for admission. Admitting to Dr. Anya Alvarez at this time.

## 2022-01-08 NOTE — ED NOTES
Placed pt on bedpan, pt cleaned, sheets changed, pads placed under pt. Denies any additional needs at this time. NAD noted. Call light in reach.

## 2022-01-08 NOTE — CONSULTS
Consult Date: 1/8/2022    Consults Skin infection    Subjective  This is a 78year old male brought to ED by EMS from home because of malaise, headache and lightheadedness. He was afebrile but WBC was 12,400. Covid-19 not detected. No urinalysis. CXR showed stable appearing basilar right lung pneumonia. Images reviewed by me. TTE showed no vegetations. On exam patient had bilateral leg ulcers. Wound culture sent and Gram stain showed GPC in pairs and Gram negative rods. Patient started on IV Zosyn. ID has been consulted for this reason. Patient seen in the ICU where he appears somewhat confused but able to respond. Stated that he just had not been feeling well but unable to offer any specific symptoms. Past Medical History:   Diagnosis Date    Chronic kidney disease     Hypertension       No past surgical history on file. No family history on file.    Social History     Tobacco Use    Smoking status: Never Smoker    Smokeless tobacco: Never Used   Substance Use Topics    Alcohol use: Not on file       Current Facility-Administered Medications   Medication Dose Route Frequency Provider Last Rate Last Admin    heparin 25,000 units in  ml infusion  12-25 Units/kg/hr (Adjusted) IntraVENous TITRATE Nadya Clemens MD 15.7 mL/hr at 01/08/22 0941 18 Units/kg/hr at 01/08/22 0941    heparin (porcine) 1,000 unit/mL injection 4,000 Units  4,000 Units IntraVENous PRN Nadya Clemens MD        Or    heparin (porcine) 1,000 unit/mL injection 2,000 Units  2,000 Units IntraVENous PRN Nadya Clemens MD   2,000 Units at 01/08/22 0937    metoprolol succinate (TOPROL-XL) XL tablet 50 mg  50 mg Oral DAILY Phillip More MD   50 mg at 01/08/22 0803    nitroglycerin (NITROBID) 2 % ointment 0.5 Inch  0.5 Inch Topical Q6H Phillip More MD   0.5 Inch at 01/08/22 1834    aspirin chewable tablet 81 mg  81 mg Oral DAILY Phillip More MD   81 mg at 01/08/22 0803    piperacillin-tazobactam (ZOSYN) 3.375 g in 0.9% sodium chloride (MBP/ADV) 100 mL MBP  3.375 g IntraVENous Q12H Laurie Urias MD 25 mL/hr at 01/08/22 0500 3.375 g at 01/08/22 0500    heparin (porcine) 1,000 unit/mL injection 2,800 Units  2,800 Units Hemodialysis DIALYSIS PRN Floridalma Avelar MD   2,800 Units at 01/08/22 1827        Review of Systems   Unable to perform ROS: Mental status change       Objective     Vital signs for last 24 hours:  Visit Vitals  BP (!) 131/104   Pulse (!) 121   Temp 98.2 °F (36.8 °C)   Resp 12   Ht 6' (1.829 m)   Wt 172 lb 13.5 oz (78.4 kg)   SpO2 99%   BMI 23.44 kg/m²       Intake/Output this shift:  Current Shift: 01/08 0701 - 01/08 1900  In: -   Out: 3000   Last 3 Shifts: No intake/output data recorded. Data Review:   Recent Results (from the past 24 hour(s))   CBC WITH AUTOMATED DIFF    Collection Time: 01/07/22 10:53 PM   Result Value Ref Range    WBC 12.4 (H) 4.1 - 11.1 K/uL    RBC 3.08 (L) 4.10 - 5.70 M/uL    HGB 9.3 (L) 12.1 - 17.0 g/dL    HCT 31.5 (L) 36.6 - 50.3 %    .3 (H) 80.0 - 99.0 FL    MCH 30.2 26.0 - 34.0 PG    MCHC 29.5 (L) 30.0 - 36.5 g/dL    RDW 15.9 (H) 11.5 - 14.5 %    PLATELET 705 186 - 167 K/uL    MPV 10.6 8.9 - 12.9 FL    NRBC 0.2 (H) 0.0  WBC    ABSOLUTE NRBC 0.02 (H) 0.00 - 0.01 K/uL    NEUTROPHILS 88 (H) 32 - 75 %    LYMPHOCYTES 3 (L) 12 - 49 %    MONOCYTES 8 5 - 13 %    EOSINOPHILS 0 0 - 7 %    BASOPHILS 0 0 - 1 %    IMMATURE GRANULOCYTES 1 (H) 0 - 0.5 %    ABS. NEUTROPHILS 11.0 (H) 1.8 - 8.0 K/UL    ABS. LYMPHOCYTES 0.3 (L) 0.8 - 3.5 K/UL    ABS. MONOCYTES 1.0 0.0 - 1.0 K/UL    ABS. EOSINOPHILS 0.0 0.0 - 0.4 K/UL    ABS. BASOPHILS 0.0 0.0 - 0.1 K/UL    ABS. IMM.  GRANS. 0.1 (H) 0.00 - 0.04 K/UL    DF AUTOMATED     METABOLIC PANEL, COMPREHENSIVE    Collection Time: 01/07/22 10:53 PM   Result Value Ref Range    Sodium 137 136 - 145 mmol/L    Potassium 6.8 (HH) 3.5 - 5.1 mmol/L    Chloride 113 (H) 97 - 108 mmol/L    CO2 11 (LL) 21 - 32 mmol/L    Anion gap 13 5 - 15 mmol/L Glucose 102 (H) 65 - 100 mg/dL     (H) 6 - 20 mg/dL    Creatinine 10.10 (H) 0.70 - 1.30 mg/dL    BUN/Creatinine ratio 13 12 - 20      GFR est AA 6 (L) >60 ml/min/1.73m2    GFR est non-AA 5 (L) >60 ml/min/1.73m2    Calcium 8.8 8.5 - 10.1 mg/dL    Bilirubin, total 0.9 0.2 - 1.0 mg/dL    AST (SGOT) 17 15 - 37 U/L    ALT (SGPT) 16 12 - 78 U/L    Alk. phosphatase 123 (H) 45 - 117 U/L    Protein, total 7.3 6.4 - 8.2 g/dL    Albumin 3.0 (L) 3.5 - 5.0 g/dL    Globulin 4.3 (H) 2.0 - 4.0 g/dL    A-G Ratio 0.7 (L) 1.1 - 2.2     TROPONIN-HIGH SENSITIVITY    Collection Time: 01/07/22 10:53 PM   Result Value Ref Range    Troponin-High Sensitivity 409 (HH) 0 - 76 ng/L   COVID-19 RAPID TEST    Collection Time: 01/07/22 11:13 PM   Result Value Ref Range    Specimen source Please find results under separate order      COVID-19 rapid test Not Detected Not Detected     PTT    Collection Time: 01/08/22  1:18 AM   Result Value Ref Range    aPTT 33.4 21.2 - 34.1 sec    aPTT, therapeutic range   82 - 109 sec   CBC WITH AUTOMATED DIFF    Collection Time: 01/08/22  1:18 AM   Result Value Ref Range    WBC 12.7 (H) 4.1 - 11.1 K/uL    RBC 3.21 (L) 4.10 - 5.70 M/uL    HGB 9.8 (L) 12.1 - 17.0 g/dL    HCT 33.0 (L) 36.6 - 50.3 %    .8 (H) 80.0 - 99.0 FL    MCH 30.5 26.0 - 34.0 PG    MCHC 29.7 (L) 30.0 - 36.5 g/dL    RDW 15.8 (H) 11.5 - 14.5 %    PLATELET 798 011 - 780 K/uL    MPV 9.7 8.9 - 12.9 FL    NRBC 0.2 (H) 0.0  WBC    ABSOLUTE NRBC 0.02 (H) 0.00 - 0.01 K/uL    NEUTROPHILS 89 (H) 32 - 75 %    LYMPHOCYTES 2 (L) 12 - 49 %    MONOCYTES 8 5 - 13 %    EOSINOPHILS 0 0 - 7 %    BASOPHILS 0 0 - 1 %    IMMATURE GRANULOCYTES 1 (H) 0 - 0.5 %    ABS. NEUTROPHILS 11.4 (H) 1.8 - 8.0 K/UL    ABS. LYMPHOCYTES 0.2 (L) 0.8 - 3.5 K/UL    ABS. MONOCYTES 1.0 0.0 - 1.0 K/UL    ABS. EOSINOPHILS 0.0 0.0 - 0.4 K/UL    ABS. BASOPHILS 0.0 0.0 - 0.1 K/UL    ABS. IMM.  GRANS. 0.1 (H) 0.00 - 0.04 K/UL    DF AUTOMATED     MRSA SCREEN - PCR (NASAL) Collection Time: 01/08/22  2:23 AM   Result Value Ref Range    MRSA by PCR, Nasal Not Detected Not Detected     CBC WITH AUTOMATED DIFF    Collection Time: 01/08/22  4:00 AM   Result Value Ref Range    WBC 13.0 (H) 4.1 - 11.1 K/uL    RBC 3.07 (L) 4.10 - 5.70 M/uL    HGB 9.4 (L) 12.1 - 17.0 g/dL    HCT 31.4 (L) 36.6 - 50.3 %    .3 (H) 80.0 - 99.0 FL    MCH 30.6 26.0 - 34.0 PG    MCHC 29.9 (L) 30.0 - 36.5 g/dL    RDW 15.7 (H) 11.5 - 14.5 %    PLATELET 516 372 - 362 K/uL    MPV 10.1 8.9 - 12.9 FL    NRBC 0.2 (H) 0.0  WBC    ABSOLUTE NRBC 0.02 (H) 0.00 - 0.01 K/uL    NEUTROPHILS 88 (H) 32 - 75 %    LYMPHOCYTES 2 (L) 12 - 49 %    MONOCYTES 9 5 - 13 %    EOSINOPHILS 0 0 - 7 %    BASOPHILS 0 0 - 1 %    IMMATURE GRANULOCYTES 1 (H) 0 - 0.5 %    ABS. NEUTROPHILS 11.6 (H) 1.8 - 8.0 K/UL    ABS. LYMPHOCYTES 0.2 (L) 0.8 - 3.5 K/UL    ABS. MONOCYTES 1.1 (H) 0.0 - 1.0 K/UL    ABS. EOSINOPHILS 0.0 0.0 - 0.4 K/UL    ABS. BASOPHILS 0.0 0.0 - 0.1 K/UL    ABS. IMM. GRANS. 0.1 (H) 0.00 - 0.04 K/UL    DF AUTOMATED     METABOLIC PANEL, COMPREHENSIVE    Collection Time: 01/08/22  4:00 AM   Result Value Ref Range    Sodium 139 136 - 145 mmol/L    Potassium 6.1 (H) 3.5 - 5.1 mmol/L    Chloride 113 (H) 97 - 108 mmol/L    CO2 12 (LL) 21 - 32 mmol/L    Anion gap 14 5 - 15 mmol/L    Glucose 70 65 - 100 mg/dL     (H) 6 - 20 mg/dL    Creatinine 10.20 (H) 0.70 - 1.30 mg/dL    BUN/Creatinine ratio 13 12 - 20      GFR est AA 6 (L) >60 ml/min/1.73m2    GFR est non-AA 5 (L) >60 ml/min/1.73m2    Calcium 8.7 8.5 - 10.1 mg/dL    Bilirubin, total 0.9 0.2 - 1.0 mg/dL    AST (SGOT) 19 15 - 37 U/L    ALT (SGPT) 15 12 - 78 U/L    Alk.  phosphatase 118 (H) 45 - 117 U/L    Protein, total 7.1 6.4 - 8.2 g/dL    Albumin 2.9 (L) 3.5 - 5.0 g/dL    Globulin 4.2 (H) 2.0 - 4.0 g/dL    A-G Ratio 0.7 (L) 1.1 - 2.2     CK    Collection Time: 01/08/22  4:00 AM   Result Value Ref Range     39 - 308 U/L   TROPONIN-HIGH SENSITIVITY    Collection Time: 01/08/22  4:00 AM   Result Value Ref Range    Troponin-High Sensitivity 395 (HH) 0 - 76 ng/L   PTT    Collection Time: 01/08/22  7:49 AM   Result Value Ref Range    aPTT 49.2 (H) 21.2 - 34.1 sec    aPTT, therapeutic range   82 - 109 sec   CBC WITH AUTOMATED DIFF    Collection Time: 01/08/22  8:15 AM   Result Value Ref Range    WBC 13.3 (H) 4.1 - 11.1 K/uL    RBC 3.13 (L) 4.10 - 5.70 M/uL    HGB 9.4 (L) 12.1 - 17.0 g/dL    HCT 31.9 (L) 36.6 - 50.3 %    .9 (H) 80.0 - 99.0 FL    MCH 30.0 26.0 - 34.0 PG    MCHC 29.5 (L) 30.0 - 36.5 g/dL    RDW 15.8 (H) 11.5 - 14.5 %    PLATELET 772 901 - 919 K/uL    MPV 10.2 8.9 - 12.9 FL    NRBC 0.2 (H) 0.0  WBC    ABSOLUTE NRBC 0.02 (H) 0.00 - 0.01 K/uL    NEUTROPHILS 91 (H) 32 - 75 %    LYMPHOCYTES 1 (L) 12 - 49 %    MONOCYTES 7 5 - 13 %    EOSINOPHILS 0 0 - 7 %    BASOPHILS 0 0 - 1 %    IMMATURE GRANULOCYTES 1 (H) 0 - 0.5 %    ABS. NEUTROPHILS 12.2 (H) 1.8 - 8.0 K/UL    ABS. LYMPHOCYTES 0.1 (L) 0.8 - 3.5 K/UL    ABS. MONOCYTES 0.9 0.0 - 1.0 K/UL    ABS. EOSINOPHILS 0.0 0.0 - 0.4 K/UL    ABS. BASOPHILS 0.0 0.0 - 0.1 K/UL    ABS. IMM. GRANS. 0.1 (H) 0.00 - 0.04 K/UL    DF AUTOMATED     METABOLIC PANEL, COMPREHENSIVE    Collection Time: 01/08/22  8:15 AM   Result Value Ref Range    Sodium 140 136 - 145 mmol/L    Potassium 6.5 (H) 3.5 - 5.1 mmol/L    Chloride 114 (H) 97 - 108 mmol/L    CO2 12 (LL) 21 - 32 mmol/L    Anion gap 14 5 - 15 mmol/L    Glucose 106 (H) 65 - 100 mg/dL     (H) 6 - 20 mg/dL    Creatinine 10.20 (H) 0.70 - 1.30 mg/dL    BUN/Creatinine ratio 12 12 - 20      GFR est AA 6 (L) >60 ml/min/1.73m2    GFR est non-AA 5 (L) >60 ml/min/1.73m2    Calcium 9.1 8.5 - 10.1 mg/dL    Bilirubin, total 1.1 (H) 0.2 - 1.0 mg/dL    AST (SGOT) 27 15 - 37 U/L    ALT (SGPT) 18 12 - 78 U/L    Alk.  phosphatase 150 (H) 45 - 117 U/L    Protein, total 7.2 6.4 - 8.2 g/dL    Albumin 2.9 (L) 3.5 - 5.0 g/dL    Globulin 4.3 (H) 2.0 - 4.0 g/dL    A-G Ratio 0.7 (L) 1.1 - 2.2     ECHO ADULT COMPLETE    Collection Time: 01/08/22  2:00 PM   Result Value Ref Range    LV EDV A4C 64 mL    LV ESV A4C 49 mL    IVSd 1.8 (A) 0.6 - 1.0 cm    LVIDd 5.7 4.2 - 5.9 cm    LVIDs 5.1 cm    LVOT Peak Velocity 1.0 m/s    LVOT Peak Gradient 4 mmHg    LVPWd 1.5 (A) 0.6 - 1.0 cm    LV E' Lateral Velocity 5 cm/s    LV E' Septal Velocity 4 cm/s    LV Ejection Fraction A4C 23 %    AR .0 ms    AR Max Velocity PISA 4.1 m/s    AV Peak Velocity 1.4 m/s    AV Peak Gradient 8 mmHg    Aortic Root 3.6 cm    LA Diameter 5.0 cm    MR Peak Velocity 5.3 m/s    MR Peak Gradient 112 mmHg    KY Max Velocity 1.4 m/s    Pulmonary Artery EDP 7 mmHg    PV Max Velocity 1.1 m/s    PV Peak Gradient 5 mmHg    Est. RA Pressure 15 mmHg    RVIDd 4.4 cm    TR Max Velocity 3.41 m/s    TR Peak Gradient 47 mmHg    TAPSE 1.5 1.5 - 2.0 cm    Fractional Shortening 2D 11 28 - 44 %    LV ESV Index A4C 25 mL/m2    LV EDV Index A4C 32 mL/m2    LVIDd Index 2.85 cm/m2    LVIDs Index 2.55 cm/m2    LV RWT Ratio 0.53     LV Mass 2D 453.0 (A) 88 - 224 g    LV Mass 2D Index 226.5 (A) 49 - 115 g/m2    LA Size Index 2.50 cm/m2    LA/AO Root Ratio 1.39     Ao Root Index 1.80 cm/m2    AV Velocity Ratio 0.71     RVSP 62 mmHg    EF BP 26 (A) 55 - 100 %       Physical Exam  Vitals and nursing note reviewed. Constitutional:       Appearance: He is ill-appearing. HENT:      Head: Normocephalic and atraumatic. Right Ear: External ear normal.      Left Ear: External ear normal.      Nose: Nose normal.      Mouth/Throat:      Pharynx: Oropharynx is clear. Eyes:      Pupils: Pupils are equal, round, and reactive to light. Cardiovascular:      Rate and Rhythm: Regular rhythm. Tachycardia present. Heart sounds: Normal heart sounds. No murmur heard. Pulmonary:      Effort: Pulmonary effort is normal.      Breath sounds: Normal breath sounds. Abdominal:      General: Bowel sounds are normal.      Palpations: Abdomen is soft. Tenderness:  There is no abdominal tenderness. Genitourinary:     Comments: External urinary catheter  Musculoskeletal:      Cervical back: Neck supple. Right lower leg: Edema present. Left lower leg: Edema present. Comments: Both calves with generalized erythema and shallow wounds   Skin:         Neurological:      General: No focal deficit present. Mental Status: He is alert. He is disoriented. Psychiatric:         Behavior: Behavior normal.       ASSESSMENT/PLAN    1. Cellulitis both calves, etiology unclear, culture pending  2. Aspiration pneumonia, RLL  3. Sepsis with leukocytosis    Comment:  Zosyn monotherapy is not unreasonable but WBC remains elevated tomorrow, would add IV Vancomycin. 1. Continue IV Zosyn  2. Follow-up leg wound cultures   3. In am, repeat CBC, check procal and CRP, ASO and Dnase B strep antibody    Hardy Brown MD

## 2022-01-08 NOTE — PROGRESS NOTES
RENAL    Dialysis started, seen on HD, so far he is doing well, will attempt 3 liter UF today, reassess in Justo Adrian MD

## 2022-01-09 ENCOUNTER — APPOINTMENT (OUTPATIENT)
Dept: NON INVASIVE DIAGNOSTICS | Age: 67
DRG: 871 | End: 2022-01-09
Attending: INTERNAL MEDICINE
Payer: MEDICARE

## 2022-01-09 LAB
ALBUMIN SERPL-MCNC: 2.5 G/DL (ref 3.5–5)
ALBUMIN/GLOB SERPL: 0.8 {RATIO} (ref 1.1–2.2)
ALP SERPL-CCNC: 110 U/L (ref 45–117)
ALT SERPL-CCNC: 15 U/L (ref 12–78)
ANION GAP SERPL CALC-SCNC: 12 MMOL/L (ref 5–15)
APTT PPP: 48.6 SEC (ref 21.2–34.1)
APTT PPP: 62 SEC (ref 21.2–34.1)
AST SERPL W P-5'-P-CCNC: 16 U/L (ref 15–37)
ATRIAL RATE: 218 BPM
BASOPHILS # BLD: 0 K/UL (ref 0–0.1)
BASOPHILS NFR BLD: 0 % (ref 0–1)
BILIRUB SERPL-MCNC: 1 MG/DL (ref 0.2–1)
BUN SERPL-MCNC: 87 MG/DL (ref 6–20)
BUN/CREAT SERPL: 12 (ref 12–20)
CA-I BLD-MCNC: 8.1 MG/DL (ref 8.5–10.1)
CALCULATED P AXIS, ECG09: 23 DEGREES
CALCULATED R AXIS, ECG10: 120 DEGREES
CALCULATED T AXIS, ECG11: -96 DEGREES
CHLORIDE SERPL-SCNC: 106 MMOL/L (ref 97–108)
CO2 SERPL-SCNC: 22 MMOL/L (ref 21–32)
CREAT SERPL-MCNC: 6.97 MG/DL (ref 0.7–1.3)
CRP SERPL-MCNC: 12.1 MG/DL (ref 0–0.6)
DIAGNOSIS, 93000: NORMAL
DIFFERENTIAL METHOD BLD: ABNORMAL
EOSINOPHIL # BLD: 0 K/UL (ref 0–0.4)
EOSINOPHIL NFR BLD: 0 % (ref 0–7)
ERYTHROCYTE [DISTWIDTH] IN BLOOD BY AUTOMATED COUNT: 15.6 % (ref 11.5–14.5)
GLOBULIN SER CALC-MCNC: 3 G/DL (ref 2–4)
GLUCOSE SERPL-MCNC: 109 MG/DL (ref 65–100)
HBV SURFACE AB SER QL: NONREACTIVE
HBV SURFACE AB SER-ACNC: <3.1 MIU/ML
HBV SURFACE AG SER QL: <0.1 INDEX
HBV SURFACE AG SER QL: NEGATIVE
HCT VFR BLD AUTO: 27.2 % (ref 36.6–50.3)
HGB BLD-MCNC: 8.3 G/DL (ref 12.1–17)
IMM GRANULOCYTES # BLD AUTO: 0.1 K/UL (ref 0–0.04)
IMM GRANULOCYTES NFR BLD AUTO: 1 % (ref 0–0.5)
LEFT ABI: 0.82
LEFT ARM BP: 136 MMHG
LEFT DIST ATA VELOCITY: 27.3 CM/S
LEFT DIST PTA PSV: 38.5 CM/S
LEFT POSTERIOR TIBIAL: 112 MMHG
LYMPHOCYTES # BLD: 0.1 K/UL (ref 0.8–3.5)
LYMPHOCYTES NFR BLD: 1 % (ref 12–49)
MCH RBC QN AUTO: 30.1 PG (ref 26–34)
MCHC RBC AUTO-ENTMCNC: 30.5 G/DL (ref 30–36.5)
MCV RBC AUTO: 98.6 FL (ref 80–99)
MONOCYTES # BLD: 0.8 K/UL (ref 0–1)
MONOCYTES NFR BLD: 7 % (ref 5–13)
NEUTS SEG # BLD: 10.9 K/UL (ref 1.8–8)
NEUTS SEG NFR BLD: 91 % (ref 32–75)
NRBC # BLD: 0 K/UL (ref 0–0.01)
NRBC BLD-RTO: 0 PER 100 WBC
PLATELET # BLD AUTO: 191 K/UL (ref 150–400)
PMV BLD AUTO: 9.7 FL (ref 8.9–12.9)
POTASSIUM SERPL-SCNC: 4.6 MMOL/L (ref 3.5–5.1)
PROCALCITONIN SERPL-MCNC: 5.16 NG/ML
PROT SERPL-MCNC: 5.5 G/DL (ref 6.4–8.2)
Q-T INTERVAL, ECG07: 358 MS
QRS DURATION, ECG06: 106 MS
QTC CALCULATION (BEZET), ECG08: 482 MS
RBC # BLD AUTO: 2.76 M/UL (ref 4.1–5.7)
RIGHT ABI: 1.01
RIGHT DIST ATA VELOCITY: 14.7 CM/S
RIGHT DIST PTA PSV: 130 CM/S
RIGHT POSTERIOR TIBIAL: 138 MMHG
SODIUM SERPL-SCNC: 140 MMOL/L (ref 136–145)
THERAPEUTIC RANGE,PTTT: ABNORMAL SEC (ref 82–109)
THERAPEUTIC RANGE,PTTT: ABNORMAL SEC (ref 82–109)
VENTRICULAR RATE, ECG03: 109 BPM
WBC # BLD AUTO: 11.9 K/UL (ref 4.1–11.1)

## 2022-01-09 PROCEDURE — 80053 COMPREHEN METABOLIC PANEL: CPT

## 2022-01-09 PROCEDURE — 84145 PROCALCITONIN (PCT): CPT

## 2022-01-09 PROCEDURE — 74011250636 HC RX REV CODE- 250/636: Performed by: FAMILY MEDICINE

## 2022-01-09 PROCEDURE — 99233 SBSQ HOSP IP/OBS HIGH 50: CPT | Performed by: INTERNAL MEDICINE

## 2022-01-09 PROCEDURE — 93005 ELECTROCARDIOGRAM TRACING: CPT

## 2022-01-09 PROCEDURE — 77010033678 HC OXYGEN DAILY

## 2022-01-09 PROCEDURE — 86215 DEOXYRIBONUCLEASE ANTIBODY: CPT

## 2022-01-09 PROCEDURE — 86060 ANTISTREPTOLYSIN O TITER: CPT

## 2022-01-09 PROCEDURE — 74011250636 HC RX REV CODE- 250/636

## 2022-01-09 PROCEDURE — 85025 COMPLETE CBC W/AUTO DIFF WBC: CPT

## 2022-01-09 PROCEDURE — 85730 THROMBOPLASTIN TIME PARTIAL: CPT

## 2022-01-09 PROCEDURE — 74011250636 HC RX REV CODE- 250/636: Performed by: INTERNAL MEDICINE

## 2022-01-09 PROCEDURE — 74011000258 HC RX REV CODE- 258: Performed by: INTERNAL MEDICINE

## 2022-01-09 PROCEDURE — 93922 UPR/L XTREMITY ART 2 LEVELS: CPT

## 2022-01-09 PROCEDURE — 86140 C-REACTIVE PROTEIN: CPT

## 2022-01-09 PROCEDURE — 65610000006 HC RM INTENSIVE CARE

## 2022-01-09 PROCEDURE — 36415 COLL VENOUS BLD VENIPUNCTURE: CPT

## 2022-01-09 PROCEDURE — 74011250637 HC RX REV CODE- 250/637: Performed by: INTERNAL MEDICINE

## 2022-01-09 RX ORDER — ADENOSINE 3 MG/ML
INJECTION, SOLUTION INTRAVENOUS
Status: DISPENSED
Start: 2022-01-09 | End: 2022-01-10

## 2022-01-09 RX ORDER — MIDAZOLAM HYDROCHLORIDE 1 MG/ML
INJECTION, SOLUTION INTRAMUSCULAR; INTRAVENOUS
Status: COMPLETED
Start: 2022-01-09 | End: 2022-01-09

## 2022-01-09 RX ORDER — MAGNESIUM SULFATE HEPTAHYDRATE 40 MG/ML
2 INJECTION, SOLUTION INTRAVENOUS ONCE
Status: DISCONTINUED | OUTPATIENT
Start: 2022-01-09 | End: 2022-01-09

## 2022-01-09 RX ADMIN — PIPERACILLIN SODIUM AND TAZOBACTAM SODIUM 3.38 G: 3; .375 INJECTION, POWDER, LYOPHILIZED, FOR SOLUTION INTRAVENOUS at 04:05

## 2022-01-09 RX ADMIN — Medication 22 UNITS/KG/HR: at 19:25

## 2022-01-09 RX ADMIN — Medication 22 UNITS/KG/HR: at 20:13

## 2022-01-09 RX ADMIN — HEPARIN SODIUM 2000 UNITS: 1000 INJECTION INTRAVENOUS; SUBCUTANEOUS at 08:49

## 2022-01-09 RX ADMIN — NITROGLYCERIN 0.5 INCH: 20 OINTMENT TOPICAL at 17:18

## 2022-01-09 RX ADMIN — Medication 18 UNITS/KG/HR: at 05:56

## 2022-01-09 RX ADMIN — NITROGLYCERIN 0.5 INCH: 20 OINTMENT TOPICAL at 12:00

## 2022-01-09 RX ADMIN — NITROGLYCERIN 0.5 INCH: 20 OINTMENT TOPICAL at 06:03

## 2022-01-09 RX ADMIN — MIDAZOLAM 2 MG: 1 INJECTION INTRAMUSCULAR; INTRAVENOUS at 20:50

## 2022-01-09 RX ADMIN — PIPERACILLIN SODIUM AND TAZOBACTAM SODIUM 3.38 G: 3; .375 INJECTION, POWDER, LYOPHILIZED, FOR SOLUTION INTRAVENOUS at 14:36

## 2022-01-09 RX ADMIN — METOPROLOL SUCCINATE 50 MG: 25 TABLET, EXTENDED RELEASE ORAL at 08:56

## 2022-01-09 RX ADMIN — NITROGLYCERIN 0.5 INCH: 20 OINTMENT TOPICAL at 00:42

## 2022-01-09 RX ADMIN — HEPARIN SODIUM 2000 UNITS: 1000 INJECTION INTRAVENOUS; SUBCUTANEOUS at 19:28

## 2022-01-09 NOTE — PROGRESS NOTES
Progress Note    Simeon Severance, MD             Daily Progress Note: 2022      Subjective: The patient is seen for follow  up. Patient has been sleeping denies any history of chest pain or shortness of breath  No history of cough fever  Problem List:  Problem List as of 2022 Never Reviewed          Codes Class Noted - Resolved    Renal failure ICD-10-CM: N19  ICD-9-CM: 206  2022 - Present        Hyperkalemia ICD-10-CM: E87.5  ICD-9-CM: 276.7  2022 - Present        Pulmonary edema ICD-10-CM: J81.1  ICD-9-CM: 982  2021 - Present        GI bleed ICD-10-CM: K92.2  ICD-9-CM: 578.9  2021 - Present              Medications reviewed  Current Facility-Administered Medications   Medication Dose Route Frequency    heparin 25,000 units in  ml infusion  12-25 Units/kg/hr (Adjusted) IntraVENous TITRATE    heparin (porcine) 1,000 unit/mL injection 4,000 Units  4,000 Units IntraVENous PRN    Or    heparin (porcine) 1,000 unit/mL injection 2,000 Units  2,000 Units IntraVENous PRN    metoprolol succinate (TOPROL-XL) XL tablet 50 mg  50 mg Oral DAILY    nitroglycerin (NITROBID) 2 % ointment 0.5 Inch  0.5 Inch Topical Q6H    aspirin chewable tablet 81 mg  81 mg Oral DAILY    piperacillin-tazobactam (ZOSYN) 3.375 g in 0.9% sodium chloride (MBP/ADV) 100 mL MBP  3.375 g IntraVENous Q12H    heparin (porcine) 1,000 unit/mL injection 2,800 Units  2,800 Units Hemodialysis DIALYSIS PRN       Review of Systems:   A comprehensive review of systems was negative except for that written in the HPI.     Objective:   Physical Exam:     Visit Vitals  BP (!) 126/93 (BP 1 Location: Left upper arm, BP Patient Position: At rest)   Pulse (!) 122   Temp 98.1 °F (36.7 °C)   Resp 14   Ht 6' (1.829 m)   Wt 76.7 kg (169 lb 1.5 oz)   SpO2 100%   BMI 22.93 kg/m²    O2 Flow Rate (L/min): 6 l/min O2 Device: Nasal cannula    Temp (24hrs), Av.1 °F (36.7 °C), Min:97.9 °F (36.6 °C), Max:98.2 °F (36.8 °C)    701 - 01/09 1900  In: 15.7 [I.V.:15.7]  Out: -    01/07 1901 - 01/09 0700  In: -   Out: 3000     General:  Alert, cooperative, no distress, appears stated age. Lungs:   Clear to auscultation bilaterally. Chest wall:  No tenderness or deformity. Heart:  Regular rate and rhythm, S1, S2 normal, no murmur, click, rub or gallop. Abdomen:   Soft, non-tender. Bowel sounds normal. No masses,  No organomegaly. Extremities:  Patient has mottling as well as redness of both the legs. Decreased pulses in both legs awaiting for vascular evaluation   Pulses: 2+ and symmetric all extremities. Skin:  Skin on both the legs are mottled as well as edema is much better   Neurologic: CNII-XII intact. No gross sensory or motor deficits     Data Review:       Recent Days:  Recent Labs     01/09/22  0600 01/08/22  0815 01/08/22  0400   WBC 11.9* 13.3* 13.0*   HGB 8.3* 9.4* 9.4*   HCT 27.2* 31.9* 31.4*    213 229     Recent Labs     01/09/22  0600 01/08/22  0815 01/08/22  0400    140 139   K 4.6 6.5* 6.1*    114* 113*   CO2 22 12* 12*   * 106* 70   BUN 87* 127* 130*   CREA 6.97* 10.20* 10.20*   CA 8.1* 9.1 8.7   ALB 2.5* 2.9* 2.9*   TBILI 1.0 1.1* 0.9   ALT 15 18 15     No results for input(s): PH, PCO2, PO2, HCO3, FIO2 in the last 72 hours. Results     Procedure Component Value Units Date/Time    CULTURE, Madison Mascorro [[id number]] Collected: 01/08/22 0400    Order Status: Completed Specimen: Misc.  Wound Updated: 01/08/22 2124     Special Requests: No Special Requests        GRAM STAIN Rare WBCs seen               Occasional Gram Positive Cocci in pairs                  Rare apparent Gram Negative Rods           Culture result: PENDING    MRSA SCREEN - PCR (NASAL) [[id number]] Collected: 01/08/22 0223    Order Status: Completed Specimen: Swab Updated: 01/08/22 0516     MRSA by PCR, Nasal Not Detected       COVID-19 RAPID TEST [[id number]] Collected: 01/07/22 2313    Order Status: Completed Specimen: Nasopharyngeal Updated: 01/08/22 0010     Specimen source       Please find results under separate order           COVID-19 rapid test Not Detected        Comment: Rapid Abbott ID Now   Rapid NAAT:  The specimen is NEGATIVE for SARS-CoV-2, the novel coronavirus associated with COVID-19. Negative results should be treated as presumptive and, if inconsistent with clinical signs and symptoms or necessary for patient management, should be tested with an alternative molecular assay. Negative results do not preclude SARS-CoV-2 infection and should not be used as the sole basis for patient management decisions. This test has been authorized by the FDA under   an Emergency Use Authorization (EUA) for use by authorized laboratories.  Fact sheet for Healthcare Providers: iTendency.uy Fact sheet for Patients: iTendency.uy   Methodology: Isothermal Nucleic Acid Amplification              24 Hour Results:  Recent Results (from the past 24 hour(s))   ECHO ADULT COMPLETE    Collection Time: 01/08/22  2:00 PM   Result Value Ref Range    LV EDV A4C 64 mL    LV ESV A4C 49 mL    IVSd 1.8 (A) 0.6 - 1.0 cm    LVIDd 5.7 4.2 - 5.9 cm    LVIDs 5.1 cm    LVOT Peak Velocity 1.0 m/s    LVOT Peak Gradient 4 mmHg    LVPWd 1.5 (A) 0.6 - 1.0 cm    LV E' Lateral Velocity 5 cm/s    LV E' Septal Velocity 4 cm/s    LV Ejection Fraction A4C 23 %    AR .0 ms    AR Max Velocity PISA 4.1 m/s    AV Peak Velocity 1.4 m/s    AV Peak Gradient 8 mmHg    Aortic Root 3.6 cm    LA Diameter 5.0 cm    MR Peak Velocity 5.3 m/s    MR Peak Gradient 112 mmHg    MI Max Velocity 1.4 m/s    Pulmonary Artery EDP 7 mmHg    PV Max Velocity 1.1 m/s    PV Peak Gradient 5 mmHg    Est. RA Pressure 15 mmHg    RVIDd 4.4 cm    TR Max Velocity 3.41 m/s    TR Peak Gradient 47 mmHg    TAPSE 1.5 1.5 - 2.0 cm    Fractional Shortening 2D 11 28 - 44 %    LV ESV Index A4C 25 mL/m2    LV EDV Index A4C 32 mL/m2    LVIDd Index 2.85 cm/m2    LVIDs Index 2.55 cm/m2    LV RWT Ratio 0.53     LV Mass 2D 453.0 (A) 88 - 224 g    LV Mass 2D Index 226.5 (A) 49 - 115 g/m2    LA Size Index 2.50 cm/m2    LA/AO Root Ratio 1.39     Ao Root Index 1.80 cm/m2    AV Velocity Ratio 0.71     RVSP 62 mmHg    EF BP 26 (A) 55 - 100 %   PTT    Collection Time: 01/09/22  6:00 AM   Result Value Ref Range    aPTT 48.6 (H) 21.2 - 34.1 sec    aPTT, therapeutic range   82 - 109 sec   CBC WITH AUTOMATED DIFF    Collection Time: 01/09/22  6:00 AM   Result Value Ref Range    WBC 11.9 (H) 4.1 - 11.1 K/uL    RBC 2.76 (L) 4.10 - 5.70 M/uL    HGB 8.3 (L) 12.1 - 17.0 g/dL    HCT 27.2 (L) 36.6 - 50.3 %    MCV 98.6 80.0 - 99.0 FL    MCH 30.1 26.0 - 34.0 PG    MCHC 30.5 30.0 - 36.5 g/dL    RDW 15.6 (H) 11.5 - 14.5 %    PLATELET 572 843 - 895 K/uL    MPV 9.7 8.9 - 12.9 FL    NRBC 0.0 0.0  WBC    ABSOLUTE NRBC 0.00 0.00 - 0.01 K/uL    NEUTROPHILS 91 (H) 32 - 75 %    LYMPHOCYTES 1 (L) 12 - 49 %    MONOCYTES 7 5 - 13 %    EOSINOPHILS 0 0 - 7 %    BASOPHILS 0 0 - 1 %    IMMATURE GRANULOCYTES 1 (H) 0 - 0.5 %    ABS. NEUTROPHILS 10.9 (H) 1.8 - 8.0 K/UL    ABS. LYMPHOCYTES 0.1 (L) 0.8 - 3.5 K/UL    ABS. MONOCYTES 0.8 0.0 - 1.0 K/UL    ABS. EOSINOPHILS 0.0 0.0 - 0.4 K/UL    ABS. BASOPHILS 0.0 0.0 - 0.1 K/UL    ABS. IMM. GRANS. 0.1 (H) 0.00 - 0.04 K/UL    DF AUTOMATED     METABOLIC PANEL, COMPREHENSIVE    Collection Time: 01/09/22  6:00 AM   Result Value Ref Range    Sodium 140 136 - 145 mmol/L    Potassium 4.6 3.5 - 5.1 mmol/L    Chloride 106 97 - 108 mmol/L    CO2 22 21 - 32 mmol/L    Anion gap 12 5 - 15 mmol/L    Glucose 109 (H) 65 - 100 mg/dL    BUN 87 (H) 6 - 20 mg/dL    Creatinine 6.97 (H) 0.70 - 1.30 mg/dL    BUN/Creatinine ratio 12 12 - 20      GFR est AA 10 (L) >60 ml/min/1.73m2    GFR est non-AA 8 (L) >60 ml/min/1.73m2    Calcium 8.1 (L) 8.5 - 10.1 mg/dL    Bilirubin, total 1.0 0.2 - 1.0 mg/dL    AST (SGOT) 16 15 - 37 U/L    ALT (SGPT) 15 12 - 78 U/L    Alk. phosphatase 110 45 - 117 U/L    Protein, total 5.5 (L) 6.4 - 8.2 g/dL    Albumin 2.5 (L) 3.5 - 5.0 g/dL    Globulin 3.0 2.0 - 4.0 g/dL    A-G Ratio 0.8 (L) 1.1 - 2.2     C REACTIVE PROTEIN, QT    Collection Time: 01/09/22  6:00 AM   Result Value Ref Range    C-Reactive protein 12.10 (H) 0.00 - 0.60 mg/dL   PROCALCITONIN    Collection Time: 01/09/22  6:00 AM   Result Value Ref Range    Procalcitonin 5.16 (H) 0 ng/mL   DUPLEX LOW EXT ARTERIES WITH JAVON    Collection Time: 01/09/22  9:45 AM   Result Value Ref Range    Left Dist JOSÉ Velocity 27.3 cm/s    Left Dist PTA PSV 38.5 cm/s    Right Dist JOSÉ Velocity 14.7 cm/s    Right Dist PTA .0 cm/s    Left arm  mmHg    Left posterior tibial 112 mmHg    Right posterior tibial 138 mmHg    Left JAVON 0.82     Right JAVON 1.01        DUPLEX LOW EXT ARTERIES WITH JAVON   Final Result      XR CHEST PORT   Final Result      Interval placement of a right supraclavicular approach dialysis catheter with   distal tip in the proximal right atrium. Cardiomegaly with pulmonary vascular congestion and pulmonary edema. Stable appearing basilar right lung pneumonia. Small right pleural effusion.       XR CHEST PORT   Final Result   Airspace opacity and effusion at the right lung base and in the   acute setting would suggest pneumonia and/or aspiration however the patient also   appears to have baseline/background vascular congestion and/or pulmonary   arterial hypertension, noting cardiomegaly and/or pericardial effusion      IR INSERT NON TUNL CVC OVER 5 YRS    (Results Pending)   IR INSERT NON TUNL CVC OVER 5 YRS    (Results Pending)   IR US GUIDED VASCULAR ACCESS    (Results Pending)        Assessment: Aspiration pneumonia  Acute on chronic renal failure  Hypertension  Decreased ejection fraction  Pulmonary hypertension on echo          Plan: Cardiology as well as nephrology and infectious disease input appreciated  Hemoglobin is 8.3 today as well as WBC count has gone down to 11.9 today. Continue IV antibiotics  Patient has been on dialysis          Care Plan discussed with: Patient/Family and Nurse    Total time spent with patient: 30 minutes.     Gabi Oliver MD

## 2022-01-09 NOTE — PROGRESS NOTES
Progress Note    Patient: Martha Issa MRN: [id number]  SSN: [id number]    YOB: 1955  Age: 78 y.o. Sex: male      Admit Date: 1/7/2022    LOS: 1 day     Subjective:   Patient followed for sepsis with cellulitis both calves and aspiration pneumonia. Leg wound culture is pending. He is afebrile with decreasing WBC. CRP is elevated. Currently on IV Zosyn alone. Patient remains in the ICU. He is somnolent and nonverbal.       Objective:     Vitals:    01/09/22 0734 01/09/22 0800 01/09/22 0843 01/09/22 0856   BP:  (!) 116/97  115/87   Pulse:  (!) 123     Resp:  12     Temp:       SpO2: 100% 99%     Weight:   169 lb 1.5 oz (76.7 kg)    Height:            Intake and Output:  Current Shift: No intake/output data recorded. Last three shifts: 01/07 1901 - 01/09 0700  In: -   Out: 3000     Physical Exam:    Vitals and nursing note reviewed. Constitutional:       Appearance: He is ill-appearing. HENT: eyes closed  Neck:  Right sided dialysis catheter   Cardiovascular:      Rate and Rhythm: Regular rhythm. irregular Tachycardia present. Heart sounds: Normal heart sounds. No murmur heard.   Pulmonary:      Effort: Pulmonary effort is normal.      Breath sounds: Normal breath sounds. Abdominal:      General: Bowel sounds are normal.      Palpations: Abdomen is soft. Tenderness: There is no abdominal tenderness. Genitourinary:     Comments: External urinary catheter  Musculoskeletal:      Right lower leg: Edema present. Left lower leg: Edema present. Comments: Both calves with decreasing erythema and shallow wounds   Skin:         Neurological:      General: No focal deficit present. Mental Status: He is alert. He is disoriented.    Psychiatric:         Behavior: Behavior normal.     Lab/Data Review:     WBC 11,900    CRP 12.10    MRSA nasal PCR negative    Wound cultures leg (1/8)  GPC in pairs and GNRs, Pending    Assessment:     Active Problems:    Renal failure (1/8/2022)      Hyperkalemia (1/8/2022)    1. Cellulitis both calves, etiology unclear, culture pending, Day #2 IV Zosyn, improving  2. Aspiration pneumonia, RLL, Day #2 IV Zosyn  3. Sepsis with leukocytosis and elevated CRP     Comment:  WBC decreasing, will stay with Zosyn monotherapy. Plan:   1. Continue IV Zosyn  2. Follow-up leg wound cultures   3.  In am, repeat CBC, CRP; follow-up ASO and Dnase B strep antibody    Signed By: Daniella Espana MD     January 9, 2022

## 2022-01-09 NOTE — PROGRESS NOTES
[facility]     Renal Daily Progress Note:     Admission Date: 2022     Subjective: Looks better, less short of breath, not tachypneic. No nausea or vomiting. Able to eat. Appetite is still poor. Still fatigued      Review of Systems  Pertinent items are noted in HPI. Objective:     Visit Vitals  /87 (BP 1 Location: Left upper arm, BP Patient Position: At rest)   Pulse (!) 123   Temp 98.1 °F (36.7 °C)   Resp 12   Ht 6' (1.829 m)   Wt 76.7 kg (169 lb 1.5 oz)   SpO2 94%   BMI 22.93 kg/m²     Temp (24hrs), Av °F (36.7 °C), Min:97.9 °F (36.6 °C), Max:98.1 °F (36.7 °C)        Intake/Output Summary (Last 24 hours) at 2022 1600  Last data filed at 2022 0800  Gross per 24 hour   Intake 15.7 ml   Output 3000 ml   Net -2984.3 ml     Current Facility-Administered Medications   Medication Dose Route Frequency    heparin 25,000 units in  ml infusion  12-25 Units/kg/hr (Adjusted) IntraVENous TITRATE    heparin (porcine) 1,000 unit/mL injection 4,000 Units  4,000 Units IntraVENous PRN    Or    heparin (porcine) 1,000 unit/mL injection 2,000 Units  2,000 Units IntraVENous PRN    metoprolol succinate (TOPROL-XL) XL tablet 50 mg  50 mg Oral DAILY    nitroglycerin (NITROBID) 2 % ointment 0.5 Inch  0.5 Inch Topical Q6H    aspirin chewable tablet 81 mg  81 mg Oral DAILY    piperacillin-tazobactam (ZOSYN) 3.375 g in 0.9% sodium chloride (MBP/ADV) 100 mL MBP  3.375 g IntraVENous Q12H    heparin (porcine) 1,000 unit/mL injection 2,800 Units  2,800 Units Hemodialysis DIALYSIS PRN       Physical Exam:General appearance: alert, cooperative, no distress, appears older than stated age  Head: Normocephalic, without obvious abnormality, atraumatic  Lungs: clear to auscultation bilaterally  Heart: regular rate and rhythm, S4 present, no rub  Abdomen: soft, non-tender.  Bowel sounds normal. No masses,  no organomegaly  Extremities: venous stasis dermatitis noted, edema: Trace bilateral lower extremity  Skin: Left lower leg with posterior ulcer inferior to calf  Neurologic: Grossly normal    Data Review:     LABS:  Recent Labs     01/09/22  0600 01/08/22  0815 01/08/22  0400    140 139   K 4.6 6.5* 6.1*    114* 113*   CO2 22 12* 12*   BUN 87* 127* 130*   CREA 6.97* 10.20* 10.20*   CA 8.1* 9.1 8.7   ALB 2.5* 2.9* 2.9*     Recent Labs     01/09/22  0600 01/08/22  0815 01/08/22  0400   WBC 11.9* 13.3* 13.0*   HGB 8.3* 9.4* 9.4*   HCT 27.2* 31.9* 31.4*    213 229     No results for input(s): HÉCTOR, KU, CLU, CREAU in the last 72 hours. No lab exists for component: PROU    Assessment:   Renal Specific Problems  Chronic kidney disease stage VI  Volume overload  Metabolic acidosis- resolved  Hyperkalemia- improved  Anemia with renal failure   Leukocytosis  Probable right lower lobe pneumonia  Left leg ulcer  Venous stasis dermatitis lower extremity        Plan:     Obtain/ Order: labs/cultures/radiology/procedures:  renal panel and Fe studies- FE, TIBC, Ferritin, retic count        Therapeutic:    Dialysis in a.m.   Epogen if iron saturation greater than 20%  To see regarding left leg ulcer        Zaida Burger MD    [phone]

## 2022-01-10 LAB
25(OH)D3 SERPL-MCNC: 16.2 NG/ML (ref 30–100)
ALBUMIN SERPL-MCNC: 2.2 G/DL (ref 3.5–5)
ALBUMIN/GLOB SERPL: 0.6 {RATIO} (ref 1.1–2.2)
ALP SERPL-CCNC: 125 U/L (ref 45–117)
ALT SERPL-CCNC: 14 U/L (ref 12–78)
ANION GAP SERPL CALC-SCNC: 12 MMOL/L (ref 5–15)
APTT PPP: 68.2 SEC (ref 21.2–34.1)
APTT PPP: 74.6 SEC (ref 21.2–34.1)
APTT PPP: >153 SEC (ref 21.2–34.1)
AST SERPL W P-5'-P-CCNC: 13 U/L (ref 15–37)
ATRIAL RATE: 103 BPM
ATRIAL RATE: 123 BPM
ATRIAL RATE: 234 BPM
ATRIAL RATE: 92 BPM
BASOPHILS # BLD: 0 K/UL (ref 0–0.1)
BASOPHILS NFR BLD: 0 % (ref 0–1)
BILIRUB SERPL-MCNC: 0.8 MG/DL (ref 0.2–1)
BNP SERPL-MCNC: ABNORMAL PG/ML
BUN SERPL-MCNC: 92 MG/DL (ref 6–20)
BUN/CREAT SERPL: 13 (ref 12–20)
CA-I BLD-MCNC: 7.6 MG/DL (ref 8.5–10.1)
CA-I BLD-MCNC: 8 MG/DL (ref 8.5–10.1)
CALCULATED P AXIS, ECG09: -61 DEGREES
CALCULATED P AXIS, ECG09: 1 DEGREES
CALCULATED P AXIS, ECG09: 2 DEGREES
CALCULATED R AXIS, ECG10: 100 DEGREES
CALCULATED R AXIS, ECG10: 86 DEGREES
CALCULATED R AXIS, ECG10: 93 DEGREES
CALCULATED R AXIS, ECG10: 94 DEGREES
CALCULATED T AXIS, ECG11: -103 DEGREES
CALCULATED T AXIS, ECG11: -107 DEGREES
CALCULATED T AXIS, ECG11: -88 DEGREES
CALCULATED T AXIS, ECG11: -98 DEGREES
CHLORIDE SERPL-SCNC: 107 MMOL/L (ref 97–108)
CO2 SERPL-SCNC: 21 MMOL/L (ref 21–32)
CREAT SERPL-MCNC: 7.35 MG/DL (ref 0.7–1.3)
CRP SERPL-MCNC: 12.1 MG/DL (ref 0–0.6)
DIAGNOSIS, 93000: NORMAL
DIFFERENTIAL METHOD BLD: ABNORMAL
EOSINOPHIL # BLD: 0.1 K/UL (ref 0–0.4)
EOSINOPHIL NFR BLD: 1 % (ref 0–7)
ERYTHROCYTE [DISTWIDTH] IN BLOOD BY AUTOMATED COUNT: 15.7 % (ref 11.5–14.5)
GLOBULIN SER CALC-MCNC: 3.8 G/DL (ref 2–4)
GLUCOSE SERPL-MCNC: 153 MG/DL (ref 65–100)
HCT VFR BLD AUTO: 29 % (ref 36.6–50.3)
HGB BLD-MCNC: 8.6 G/DL (ref 12.1–17)
IMM GRANULOCYTES # BLD AUTO: 0.1 K/UL (ref 0–0.04)
IMM GRANULOCYTES NFR BLD AUTO: 1 % (ref 0–0.5)
IRON SATN MFR SERPL: 13 % (ref 20–50)
IRON SERPL-MCNC: 22 UG/DL (ref 35–150)
LYMPHOCYTES # BLD: 0.2 K/UL (ref 0.8–3.5)
LYMPHOCYTES NFR BLD: 2 % (ref 12–49)
MCH RBC QN AUTO: 29.9 PG (ref 26–34)
MCHC RBC AUTO-ENTMCNC: 29.7 G/DL (ref 30–36.5)
MCV RBC AUTO: 100.7 FL (ref 80–99)
MONOCYTES # BLD: 0.8 K/UL (ref 0–1)
MONOCYTES NFR BLD: 8 % (ref 5–13)
NEUTS SEG # BLD: 9.7 K/UL (ref 1.8–8)
NEUTS SEG NFR BLD: 88 % (ref 32–75)
NRBC # BLD: 0.03 K/UL (ref 0–0.01)
NRBC BLD-RTO: 0.3 PER 100 WBC
P-R INTERVAL, ECG05: 160 MS
P-R INTERVAL, ECG05: 182 MS
P-R INTERVAL, ECG05: 198 MS
PLATELET # BLD AUTO: 193 K/UL (ref 150–400)
PMV BLD AUTO: 10.3 FL (ref 8.9–12.9)
POTASSIUM SERPL-SCNC: 4.2 MMOL/L (ref 3.5–5.1)
PROCALCITONIN SERPL-MCNC: 4.39 NG/ML
PROT SERPL-MCNC: 6 G/DL (ref 6.4–8.2)
PTH-INTACT SERPL-MCNC: 740.5 PG/ML (ref 18.4–88)
Q-T INTERVAL, ECG07: 350 MS
Q-T INTERVAL, ECG07: 354 MS
Q-T INTERVAL, ECG07: 372 MS
Q-T INTERVAL, ECG07: 414 MS
QRS DURATION, ECG06: 100 MS
QRS DURATION, ECG06: 94 MS
QTC CALCULATION (BEZET), ECG08: 487 MS
QTC CALCULATION (BEZET), ECG08: 492 MS
QTC CALCULATION (BEZET), ECG08: 506 MS
QTC CALCULATION (BEZET), ECG08: 511 MS
RBC # BLD AUTO: 2.88 M/UL (ref 4.1–5.7)
SODIUM SERPL-SCNC: 140 MMOL/L (ref 136–145)
THERAPEUTIC RANGE,PTTT: ABNORMAL SEC (ref 82–109)
TIBC SERPL-MCNC: 174 UG/DL (ref 250–450)
VENTRICULAR RATE, ECG03: 103 BPM
VENTRICULAR RATE, ECG03: 119 BPM
VENTRICULAR RATE, ECG03: 123 BPM
VENTRICULAR RATE, ECG03: 92 BPM
WBC # BLD AUTO: 10.9 K/UL (ref 4.1–11.1)

## 2022-01-10 PROCEDURE — 85730 THROMBOPLASTIN TIME PARTIAL: CPT

## 2022-01-10 PROCEDURE — 85025 COMPLETE CBC W/AUTO DIFF WBC: CPT

## 2022-01-10 PROCEDURE — 84145 PROCALCITONIN (PCT): CPT

## 2022-01-10 PROCEDURE — 65610000006 HC RM INTENSIVE CARE

## 2022-01-10 PROCEDURE — 83540 ASSAY OF IRON: CPT

## 2022-01-10 PROCEDURE — 74011250636 HC RX REV CODE- 250/636: Performed by: FAMILY MEDICINE

## 2022-01-10 PROCEDURE — 74011000258 HC RX REV CODE- 258: Performed by: INTERNAL MEDICINE

## 2022-01-10 PROCEDURE — 99233 SBSQ HOSP IP/OBS HIGH 50: CPT | Performed by: INTERNAL MEDICINE

## 2022-01-10 PROCEDURE — 74011250636 HC RX REV CODE- 250/636: Performed by: INTERNAL MEDICINE

## 2022-01-10 PROCEDURE — 74011250637 HC RX REV CODE- 250/637: Performed by: INTERNAL MEDICINE

## 2022-01-10 PROCEDURE — 83970 ASSAY OF PARATHORMONE: CPT

## 2022-01-10 PROCEDURE — 83880 ASSAY OF NATRIURETIC PEPTIDE: CPT

## 2022-01-10 PROCEDURE — 80053 COMPREHEN METABOLIC PANEL: CPT

## 2022-01-10 PROCEDURE — 90935 HEMODIALYSIS ONE EVALUATION: CPT

## 2022-01-10 PROCEDURE — 82306 VITAMIN D 25 HYDROXY: CPT

## 2022-01-10 PROCEDURE — 36415 COLL VENOUS BLD VENIPUNCTURE: CPT

## 2022-01-10 PROCEDURE — 5A1D70Z PERFORMANCE OF URINARY FILTRATION, INTERMITTENT, LESS THAN 6 HOURS PER DAY: ICD-10-PCS | Performed by: INTERNAL MEDICINE

## 2022-01-10 PROCEDURE — 86140 C-REACTIVE PROTEIN: CPT

## 2022-01-10 PROCEDURE — 93005 ELECTROCARDIOGRAM TRACING: CPT

## 2022-01-10 RX ORDER — HEPARIN SODIUM 1000 [USP'U]/ML
INJECTION, SOLUTION INTRAVENOUS; SUBCUTANEOUS
Status: COMPLETED
Start: 2022-01-10 | End: 2022-01-11

## 2022-01-10 RX ORDER — LANOLIN ALCOHOL/MO/W.PET/CERES
1 CREAM (GRAM) TOPICAL 2 TIMES DAILY WITH MEALS
Status: DISCONTINUED | OUTPATIENT
Start: 2022-01-10 | End: 2022-02-08 | Stop reason: HOSPADM

## 2022-01-10 RX ADMIN — Medication 21 UNITS/KG/HR: at 15:19

## 2022-01-10 RX ADMIN — NITROGLYCERIN 0.5 INCH: 20 OINTMENT TOPICAL at 23:42

## 2022-01-10 RX ADMIN — PIPERACILLIN SODIUM AND TAZOBACTAM SODIUM 3.38 G: 3; .375 INJECTION, POWDER, LYOPHILIZED, FOR SOLUTION INTRAVENOUS at 16:12

## 2022-01-10 RX ADMIN — NITROGLYCERIN 0.5 INCH: 20 OINTMENT TOPICAL at 18:34

## 2022-01-10 RX ADMIN — PIPERACILLIN SODIUM AND TAZOBACTAM SODIUM 3.38 G: 3; .375 INJECTION, POWDER, LYOPHILIZED, FOR SOLUTION INTRAVENOUS at 03:35

## 2022-01-10 RX ADMIN — NITROGLYCERIN 0.5 INCH: 20 OINTMENT TOPICAL at 12:55

## 2022-01-10 RX ADMIN — NITROGLYCERIN 0.5 INCH: 20 OINTMENT TOPICAL at 05:49

## 2022-01-10 RX ADMIN — METOPROLOL SUCCINATE 50 MG: 25 TABLET, EXTENDED RELEASE ORAL at 12:55

## 2022-01-10 RX ADMIN — HEPARIN SODIUM 2800 UNITS: 1000 INJECTION INTRAVENOUS; SUBCUTANEOUS at 11:59

## 2022-01-10 RX ADMIN — HEPARIN SODIUM 2000 UNITS: 1000 INJECTION INTRAVENOUS; SUBCUTANEOUS at 03:52

## 2022-01-10 RX ADMIN — FERROUS SULFATE TAB 325 MG (65 MG ELEMENTAL FE) 325 MG: 325 (65 FE) TAB at 16:12

## 2022-01-10 RX ADMIN — HEPARIN SODIUM 2000 UNITS: 1000 INJECTION INTRAVENOUS; SUBCUTANEOUS at 23:40

## 2022-01-10 NOTE — PROGRESS NOTES
Progress Note    Melvin Matamoros MD             Daily Progress Note: 1/10/2022      Subjective: The patient is seen for follow  up. When I saw patient he did not complain of any chest pain or shortness of breath. No cough fever chills. No nausea vomiting or diarrhea. Yesterday evening at 8:26 PM patient had ventricular tachycardia cardia with a rate of more than 200/min patient was complaining about chest pain as well as shortness of breath so patient was coded and cardioversion was done under the direction of . At present patient's heart rate was 147 on monitor but he is asymptomatic patient has not received his beta-blocker today morning.   Problem List:  Problem List as of 1/10/2022 Never Reviewed          Codes Class Noted - Resolved    Renal failure ICD-10-CM: N19  ICD-9-CM: 087  1/8/2022 - Present        Hyperkalemia ICD-10-CM: E87.5  ICD-9-CM: 276.7  1/8/2022 - Present        Pulmonary edema ICD-10-CM: J81.1  ICD-9-CM: 886  7/13/2021 - Present        GI bleed ICD-10-CM: K92.2  ICD-9-CM: 578.9  1/5/2021 - Present              Medications reviewed  Current Facility-Administered Medications   Medication Dose Route Frequency    ferrous sulfate tablet 325 mg  1 Tablet Oral BID WITH MEALS    heparin 25,000 units in  ml infusion  12-25 Units/kg/hr (Adjusted) IntraVENous TITRATE    heparin (porcine) 1,000 unit/mL injection 4,000 Units  4,000 Units IntraVENous PRN    Or    heparin (porcine) 1,000 unit/mL injection 2,000 Units  2,000 Units IntraVENous PRN    metoprolol succinate (TOPROL-XL) XL tablet 50 mg  50 mg Oral DAILY    nitroglycerin (NITROBID) 2 % ointment 0.5 Inch  0.5 Inch Topical Q6H    aspirin chewable tablet 81 mg  81 mg Oral DAILY    piperacillin-tazobactam (ZOSYN) 3.375 g in 0.9% sodium chloride (MBP/ADV) 100 mL MBP  3.375 g IntraVENous Q12H    heparin (porcine) 1,000 unit/mL injection 2,800 Units  2,800 Units Hemodialysis DIALYSIS PRN       Review of Systems:   A comprehensive review of systems was negative except for that written in the HPI. Objective:   Physical Exam:     Visit Vitals  /86   Pulse 88   Temp 97.6 °F (36.4 °C)   Resp 14   Ht 6' (1.829 m)   Wt 76.7 kg (169 lb 1.5 oz)   SpO2 100%   BMI 22.93 kg/m²    O2 Flow Rate (L/min): 4 l/min O2 Device: Nasal cannula    Temp (24hrs), Av °F (36.7 °C), Min:97.6 °F (36.4 °C), Max:98.6 °F (37 °C)    01/10 0701 - 01/10 1900  In: 84 [I.V.:84]  Out: 3000    1901 - 01/10 07  In: 426.9 [I.V.:426.9]  Out: -     General:  Alert, cooperative, no distress, appears stated age. Lungs:   Clear to auscultation bilaterally. Chest wall:  No tenderness or deformity. Heart:  Regular rate and rhythm, S1, S2 normal, no murmur, click, rub or gallop. Abdomen:   Soft, non-tender. Bowel sounds normal. No masses,  No organomegaly. Extremities: Extremities normal, atraumatic, no cyanosis or edema. Pulses: 2+ and symmetric all extremities. Skin: Skin color, texture, turgor normal. No rashes or lesions   Neurologic: CNII-XII intact. No gross sensory or motor deficits     Data Review:       Recent Days:  Recent Labs     01/10/22  0550 22  0600 22  0815   WBC 10.9 11.9* 13.3*   HGB 8.6* 8.3* 9.4*   HCT 29.0* 27.2* 31.9*    191 213     Recent Labs     01/10/22  0550 22  0600 22  0815    140 140   K 4.2 4.6 6.5*    106 114*   CO2 21 22 12*   * 109* 106*   BUN 92* 87* 127*   CREA 7.35* 6.97* 10.20*   CA 7.6*  8.0* 8.1* 9.1   ALB 2.2* 2.5* 2.9*   TBILI 0.8 1.0 1.1*   ALT 14 15 18     No results for input(s): PH, PCO2, PO2, HCO3, FIO2 in the last 72 hours. Results     Procedure Component Value Units Date/Time    CULTURE, Spencer Reji [[id number]] Collected: 22 0400    Order Status: Completed Specimen: Misc.  Wound Updated: 22 3198     Special Requests: No Special Requests        GRAM STAIN Rare WBCs seen               Occasional Gram Positive Cocci in pairs Rare apparent Gram Negative Rods           Culture result:       Heavy  preliminary report of probable S. Aureus (Negative for antigen detection) confirmation and sensitivities to follow. Light Gram Negative Rods       MRSA SCREEN - PCR (NASAL) [[id number]] Collected: 01/08/22 0223    Order Status: Completed Specimen: Swab Updated: 01/08/22 0516     MRSA by PCR, Nasal Not Detected       COVID-19 RAPID TEST [[id number]] Collected: 01/07/22 2313    Order Status: Completed Specimen: Nasopharyngeal Updated: 01/08/22 0010     Specimen source       Please find results under separate order           COVID-19 rapid test Not Detected        Comment: Rapid Abbott ID Now   Rapid NAAT:  The specimen is NEGATIVE for SARS-CoV-2, the novel coronavirus associated with COVID-19. Negative results should be treated as presumptive and, if inconsistent with clinical signs and symptoms or necessary for patient management, should be tested with an alternative molecular assay. Negative results do not preclude SARS-CoV-2 infection and should not be used as the sole basis for patient management decisions. This test has been authorized by the FDA under   an Emergency Use Authorization (EUA) for use by authorized laboratories.  Fact sheet for Healthcare Providers: [employer] Fact sheet for Patients: kstattoo[employer]com   Methodology: Isothermal Nucleic Acid Amplification              24 Hour Results:  Recent Results (from the past 24 hour(s))   PTT    Collection Time: 01/09/22  5:25 PM   Result Value Ref Range    aPTT 62.0 (H) 21.2 - 34.1 sec    aPTT, therapeutic range   82 - 109 sec   EKG, 12 LEAD, SUBSEQUENT    Collection Time: 01/09/22  8:51 PM   Result Value Ref Range    Ventricular Rate 103 BPM    Atrial Rate 103 BPM    P-R Interval 182 ms    QRS Duration 94 ms    Q-T Interval 372 ms    QTC Calculation (Bezet) 487 ms    Calculated P Axis 1 degrees    Calculated R Axis 86 degrees    Calculated T Axis -107 degrees    Diagnosis       Sinus tachycardia with occasional and consecutive Premature ventricular   complexes  Septal infarct , age undetermined  Lateral infarct , age undetermined  ST & T wave abnormality, consider inferior ischemia  Abnormal ECG  When compared with ECG of 08-JAN-2022 02:43, (Unconfirmed)  Sinus rhythm has replaced Atrial flutter  ST less depressed in Inferior leads  Confirmed by Tammi RENAE MD ([id number]) on 1/10/2022 11:46:28 AM     PTT    Collection Time: 01/10/22  1:20 AM   Result Value Ref Range    aPTT 68.2 (H) 21.2 - 34.1 sec    aPTT, therapeutic range   82 - 109 sec   CBC WITH AUTOMATED DIFF    Collection Time: 01/10/22  5:50 AM   Result Value Ref Range    WBC 10.9 4.1 - 11.1 K/uL    RBC 2.88 (L) 4.10 - 5.70 M/uL    HGB 8.6 (L) 12.1 - 17.0 g/dL    HCT 29.0 (L) 36.6 - 50.3 %    .7 (H) 80.0 - 99.0 FL    MCH 29.9 26.0 - 34.0 PG    MCHC 29.7 (L) 30.0 - 36.5 g/dL    RDW 15.7 (H) 11.5 - 14.5 %    PLATELET 303 087 - 577 K/uL    MPV 10.3 8.9 - 12.9 FL    NRBC 0.3 (H) 0.0  WBC    ABSOLUTE NRBC 0.03 (H) 0.00 - 0.01 K/uL    NEUTROPHILS 88 (H) 32 - 75 %    LYMPHOCYTES 2 (L) 12 - 49 %    MONOCYTES 8 5 - 13 %    EOSINOPHILS 1 0 - 7 %    BASOPHILS 0 0 - 1 %    IMMATURE GRANULOCYTES 1 (H) 0 - 0.5 %    ABS. NEUTROPHILS 9.7 (H) 1.8 - 8.0 K/UL    ABS. LYMPHOCYTES 0.2 (L) 0.8 - 3.5 K/UL    ABS. MONOCYTES 0.8 0.0 - 1.0 K/UL    ABS. EOSINOPHILS 0.1 0.0 - 0.4 K/UL    ABS. BASOPHILS 0.0 0.0 - 0.1 K/UL    ABS. IMM.  GRANS. 0.1 (H) 0.00 - 0.04 K/UL    DF AUTOMATED     METABOLIC PANEL, COMPREHENSIVE    Collection Time: 01/10/22  5:50 AM   Result Value Ref Range    Sodium 140 136 - 145 mmol/L    Potassium 4.2 3.5 - 5.1 mmol/L    Chloride 107 97 - 108 mmol/L    CO2 21 21 - 32 mmol/L    Anion gap 12 5 - 15 mmol/L    Glucose 153 (H) 65 - 100 mg/dL    BUN 92 (H) 6 - 20 mg/dL    Creatinine 7.35 (H) 0.70 - 1.30 mg/dL    BUN/Creatinine ratio 13 12 - 20      GFR est AA 9 (L) >60 ml/min/1.73m2    GFR est non-AA 7 (L) >60 ml/min/1.73m2    Calcium 8.0 (L) 8.5 - 10.1 mg/dL    Bilirubin, total 0.8 0.2 - 1.0 mg/dL    AST (SGOT) 13 (L) 15 - 37 U/L    ALT (SGPT) 14 12 - 78 U/L    Alk.  phosphatase 125 (H) 45 - 117 U/L    Protein, total 6.0 (L) 6.4 - 8.2 g/dL    Albumin 2.2 (L) 3.5 - 5.0 g/dL    Globulin 3.8 2.0 - 4.0 g/dL    A-G Ratio 0.6 (L) 1.1 - 2.2     C REACTIVE PROTEIN, QT    Collection Time: 01/10/22  5:50 AM   Result Value Ref Range    C-Reactive protein 12.10 (H) 0.00 - 0.60 mg/dL   PROCALCITONIN    Collection Time: 01/10/22  5:50 AM   Result Value Ref Range    Procalcitonin 4.39 (H) 0 ng/mL   IRON PROFILE    Collection Time: 01/10/22  5:50 AM   Result Value Ref Range    Iron 22 (L) 35 - 150 ug/dL    TIBC 174 (L) 250 - 450 ug/dL    Iron % saturation 13 (L) 20 - 50 %   PTH INTACT    Collection Time: 01/10/22  5:50 AM   Result Value Ref Range    Calcium 7.6 (L) 8.5 - 10.1 mg/dL    PTH, Intact 740.5 (H) 18.4 - 88.0 pg/mL   VITAMIN D, 25 HYDROXY    Collection Time: 01/10/22  5:50 AM   Result Value Ref Range    Vitamin D 25-Hydroxy 16.2 (L) 30 - 100 ng/mL   NT-PRO BNP    Collection Time: 01/10/22  5:50 AM   Result Value Ref Range    NT pro-BNP >35,000 (H) <125 pg/mL   EKG, 12 LEAD, SUBSEQUENT    Collection Time: 01/10/22  7:59 AM   Result Value Ref Range    Ventricular Rate 92 BPM    Atrial Rate 92 BPM    P-R Interval 198 ms    QRS Duration 94 ms    Q-T Interval 414 ms    QTC Calculation (Bezet) 511 ms    Calculated P Axis 2 degrees    Calculated R Axis 93 degrees    Calculated T Axis -103 degrees    Diagnosis       Sinus rhythm with Premature atrial complexes  Rightward axis  ST & T wave abnormality, consider inferolateral ischemia  Prolonged QT  Abnormal ECG  When compared with ECG of 09-JAN-2022 07:52, (Unconfirmed)  ST less depressed in Inferior leads  Confirmed by Asael RENAE MD ([id number]) on 1/10/2022 11:43:57 AM         DUPLEX LOW EXT ARTERIES WITH JAVON   Final Result      XR CHEST PORT   Final Result      Interval placement of a right supraclavicular approach dialysis catheter with   distal tip in the proximal right atrium. Cardiomegaly with pulmonary vascular congestion and pulmonary edema. Stable appearing basilar right lung pneumonia. Small right pleural effusion. IR INSERT NON TUNL CVC OVER 5 YRS   Final Result      Technically successful insertion of non-tunneled Trialysis catheter with US   guidance. Plan:       Post catheter placement chest xray confirmed appropriate position of the   catheter. The catheter is appropriate for immediate use.   _______________________________________________________________      PROCEDURE SUMMARY:   - Venous access with ultrasound guidance   - Non-tunneled central venous catheter insertion      PROCEDURE DETAILS:      Pre-procedure   Relevant imaging review: None    Informed consent for the procedure was obtained and time-out was performed prior   to the procedure. Prophylactic antibiotic administered: Not administered   Preparation: The site was prepared and draped using all elements of maximal   sterile barrier technique including sterile gloves, sterile gown, cap, mask,   large sterile sheet, sterile ultrasound probe cover, sterile ultrasound gel,   hand hygiene and cutaneous antisepsis with 2% chlorhexidine. Medical reason for site preparation exception: Not applicable      Anesthesia/sedation   Level of anesthesia: No sedation   Anesthesia administered by: Not applicable   Duration of anesthesia/sedation: 0 minutes. Access   The vessel was sonographically evaluated and judged to be patent and appropriate   for access and a permanent image was stored. Three mLs of 1% Lidocaine was   administered to the access site. Real time ultrasound was used to visualize   needle entry into the vessel.     Vein accessed: Right internal jugular vein   Access technique: 4F micropuncture set      Catheter placement   The access site was dilated and the catheter was placed into the vein over a   wire and all guidewires were removed in their entirety. Catheter ports were   aspirated and flushed. Catheter tip location was verified by portable X-ray. Catheter size:13 Lao   Catheter length: 20 cm   Catheter tip position: Proximal right atrium   Catheter flush: Heparin (100 units/mL)      Closure   The catheter was secured. A sterile dressing was applied. Catheter securement technique: Non-absorbable suture      Additional Medications   None      Additional Details   Estimated blood loss:  Less than 1 mL   Additional description of procedure: None   Additional findings: None   Additional equipment: None   Specimens removed: None                     IR US GUIDED VASCULAR ACCESS   Final Result      Technically successful insertion of non-tunneled Trialysis catheter with US   guidance. Plan:       Post catheter placement chest xray confirmed appropriate position of the   catheter. The catheter is appropriate for immediate use.   _______________________________________________________________      PROCEDURE SUMMARY:   - Venous access with ultrasound guidance   - Non-tunneled central venous catheter insertion      PROCEDURE DETAILS:      Pre-procedure   Relevant imaging review: None    Informed consent for the procedure was obtained and time-out was performed prior   to the procedure. Prophylactic antibiotic administered: Not administered   Preparation: The site was prepared and draped using all elements of maximal   sterile barrier technique including sterile gloves, sterile gown, cap, mask,   large sterile sheet, sterile ultrasound probe cover, sterile ultrasound gel,   hand hygiene and cutaneous antisepsis with 2% chlorhexidine.     Medical reason for site preparation exception: Not applicable      Anesthesia/sedation   Level of anesthesia: No sedation   Anesthesia administered by: Not applicable   Duration of anesthesia/sedation: 0 minutes. Access   The vessel was sonographically evaluated and judged to be patent and appropriate   for access and a permanent image was stored. Three mLs of 1% Lidocaine was   administered to the access site. Real time ultrasound was used to visualize   needle entry into the vessel. Vein accessed: Right internal jugular vein   Access technique: 4F micropuncture set      Catheter placement   The access site was dilated and the catheter was placed into the vein over a   wire and all guidewires were removed in their entirety. Catheter ports were   aspirated and flushed. Catheter tip location was verified by portable X-ray. Catheter size:13 Bhutanese   Catheter length: 20 cm   Catheter tip position: Proximal right atrium   Catheter flush: Heparin (100 units/mL)      Closure   The catheter was secured. A sterile dressing was applied. Catheter securement technique: Non-absorbable suture      Additional Medications   None      Additional Details   Estimated blood loss:  Less than 1 mL   Additional description of procedure: None   Additional findings: None   Additional equipment: None   Specimens removed: None                     XR CHEST PORT   Final Result   Airspace opacity and effusion at the right lung base and in the   acute setting would suggest pneumonia and/or aspiration however the patient also   appears to have baseline/background vascular congestion and/or pulmonary   arterial hypertension, noting cardiomegaly and/or pericardial effusion      IR INSERT NON TUNL CVC OVER 5 YRS    (Results Pending)        Assessment: Ventricular tachycardia  History of paroxysmal atrial fibrillation  Elevated troponin  CHF  Renal failure on dialysis now  Pneumonia  Peripheral arterial disease  Infected leg ulcers secondary to venous stasis        Plan: We will continue current antibiotics.   Will discuss with cardiologist regarding patient's condition  If possible would like to increase beta-blocker        Care Plan discussed with: Patient/Family and Nurse    Total time spent with patient: 30 minutes.     Gabi Oliver MD

## 2022-01-10 NOTE — PROGRESS NOTES
[facility]     Renal Daily Progress Note:     Admission Date: 2022     Subjective: Last night presumed to have V. tach and was electrically cardioverted. Seen at the conclusion of dialysis. He tolerated it well. Mental status is good. He  looks better, less short of breath, not tachypneic. No nausea or vomiting. Able to eat. Appetite is still poor. Less fatigued      Review of Systems  Pertinent items are noted in HPI. Objective:     Visit Vitals  /86   Pulse 88   Temp 97.6 °F (36.4 °C)   Resp 14   Ht 6' (1.829 m)   Wt 76.7 kg (169 lb 1.5 oz)   SpO2 100%   BMI 22.93 kg/m²     Temp (24hrs), Av °F (36.7 °C), Min:97.6 °F (36.4 °C), Max:98.6 °F (37 °C)        Intake/Output Summary (Last 24 hours) at 1/10/2022 1234  Last data filed at 1/10/2022 1200  Gross per 24 hour   Intake 495.15 ml   Output 3000 ml   Net -2504.85 ml     Current Facility-Administered Medications   Medication Dose Route Frequency    heparin 25,000 units in  ml infusion  12-25 Units/kg/hr (Adjusted) IntraVENous TITRATE    heparin (porcine) 1,000 unit/mL injection 4,000 Units  4,000 Units IntraVENous PRN    Or    heparin (porcine) 1,000 unit/mL injection 2,000 Units  2,000 Units IntraVENous PRN    metoprolol succinate (TOPROL-XL) XL tablet 50 mg  50 mg Oral DAILY    nitroglycerin (NITROBID) 2 % ointment 0.5 Inch  0.5 Inch Topical Q6H    aspirin chewable tablet 81 mg  81 mg Oral DAILY    piperacillin-tazobactam (ZOSYN) 3.375 g in 0.9% sodium chloride (MBP/ADV) 100 mL MBP  3.375 g IntraVENous Q12H    heparin (porcine) 1,000 unit/mL injection 2,800 Units  2,800 Units Hemodialysis DIALYSIS PRN       Physical Exam:General appearance: alert, cooperative, no distress, appears older than stated age  Head: Normocephalic, without obvious abnormality, atraumatic  Lungs: clear to auscultation bilaterally  Heart: regular rate and rhythm, no rub  Abdomen: soft, non-tender.  Bowel sounds normal. No masses,  no organomegaly  Extremities: venous stasis dermatitis noted, edema: decreased lower extremity edema but has dependent thigh edema  Skin: Left lower leg with posterior ulcer inferior to calf  Neurologic: Grossly normal    Data Review:     LABS:  Recent Labs     01/10/22  0550 01/09/22  0600 01/08/22  0815    140 140   K 4.2 4.6 6.5*    106 114*   CO2 21 22 12*   BUN 92* 87* 127*   CREA 7.35* 6.97* 10.20*   CA 7.6*  8.0* 8.1* 9.1   ALB 2.2* 2.5* 2.9*   Iron saturation 13%    Recent Labs     01/10/22  0550 01/09/22  0600 01/08/22  0815   WBC 10.9 11.9* 13.3*   HGB 8.6* 8.3* 9.4*   HCT 29.0* 27.2* 31.9*    191 213     No results for input(s): HÉCTOR, KU, CLU, CREAU in the last 72 hours. No lab exists for component: PROU   Chest x-ray January 8, 2022:  IMPRESSION     Interval placement of a right supraclavicular approach dialysis catheter with  distal tip in the proximal right atrium.     Cardiomegaly with pulmonary vascular congestion and pulmonary edema.      Stable appearing basilar right lung pneumonia. Small right pleural effusion.   Assessment:   Renal Specific Problems  Chronic kidney disease stage VI  Volume overload  Metabolic acidosis- resolved  Hyperkalemia- improved  Anemia with renal failure and iron deficient  Leukocytosis with elevated but declining procalcitonin  Probable right lower lobe pneumonia  Left leg ulcer  Venous stasis dermatitis lower extremity          Plan:     Obtain/ Order: labs/cultures/radiology/procedures:      Therapeutic:    Dialysis today without issue, next dialysis Wednesday  Epogen if iron saturation greater than 20%  Oral iron, avoid IV iron if there is signs of active infection  Wound care to see regarding left leg ulcer    Case management to arrange outpatient dialysis    Brittnee Smith MD    [phone]

## 2022-01-10 NOTE — PROGRESS NOTES
At 2028 patients heart rhythm went into [facility]. After assessing patient at the bedside, he complained of shortness of breath and appeared in distress. Valsalva manuever was attempted but the heart rhythm remained the same. Dr. Puma Cruz was called to come to the bedside. He ordered IV midazolam and a cardioversion at the bedside. One shock was administered and the patient converted to sinus rhythm. Will continue to monitor.

## 2022-01-10 NOTE — PROGRESS NOTES
Progress Note      1/10/2022 12:19 PM  NAME: Marshal Boeck   MRN:  [id number]   Admit Diagnosis: Renal failure [N19]  Hyperkalemia [E87.5]      Subjective:   Chart reviewed. Patient just had dialysis completed today. Symptoms of shortness of breath has improved. Denies any chest painOr palpitation. Review of Systems:    Symptom Y/N Comments  Symptom Y/N Comments   Fever/Chills n   Chest Pain n    Poor Appetite    Edema y    Cough    Abdominal Pain     Sputum    Joint Pain     SOB/CARMONA y  improving  Pruritis/Rash     Nausea/vomit    Other     Diarrhea         Constipation           Could NOT obtain due to:      Objective:          Physical Exam:    Last 24hrs VS reviewed since prior progress note. Most recent are:    Visit Vitals  /86   Pulse 88   Temp 97.6 °F (36.4 °C)   Resp 14   Ht 6' (1.829 m)   Wt 76.7 kg (169 lb 1.5 oz)   SpO2 100%   BMI 22.93 kg/m²       Intake/Output Summary (Last 24 hours) at 1/10/2022 1219  Last data filed at 1/10/2022 1200  Gross per 24 hour   Intake 495.15 ml   Output 3000 ml   Net -2504.85 ml        General Appearance: Well developed, well nourished, alert & oriented x 3,    no acute distress. Ears/Nose/Mouth/Throat: Hearing grossly normal.  Neck: Supple. Chest: Lungs clear to auscultation bilaterally. Cardiovascular: Regular rate and rhythm, S1,S2 normal, no murmur. Abdomen: Soft, non-tender, bowel sounds are active. Extremities: No edema bilaterally. Skin: Warm and dry. []         Post-cath site without hematoma, bruit, tenderness, or thrill. Distal pulses intact.     PMH/SH reviewed - no change compared to H&P    Data Review    Telemetry: normal sinus rhythm     EKG:   []  No new EKG for review    Lab Data Personally Reviewed:    Recent Labs     01/10/22  0550 01/09/22  0600   WBC 10.9 11.9*   HGB 8.6* 8.3*   HCT 29.0* 27.2*    191     Recent Labs     01/10/22  0120 01/09/22  1725 01/09/22  0600   APTT 68.2* 62.0* 48.6*      Recent Labs 01/10/22  0550 01/09/22  0600 01/08/22  0815    140 140   K 4.2 4.6 6.5*    106 114*   CO2 21 22 12*   BUN 92* 87* 127*   CREA 7.35* 6.97* 10.20*   * 109* 106*   CA 7.6*  8.0* 8.1* 9.1     Recent Labs     01/08/22  0400        No results found for: CHOL, CHOLX, CHLST, CHOLV, HDL, HDLP, LDL, LDLC, DLDLP, TGLX, TRIGL, TRIGP, CHHD, CHHDX    Recent Labs     01/10/22  0550 01/09/22  0600 01/08/22  0815   * 110 150*   TP 6.0* 5.5* 7.2   ALB 2.2* 2.5* 2.9*   GLOB 3.8 3.0 4.3*     No results for input(s): PH, PCO2, PO2 in the last 72 hours. Medications Personally Reviewed:    Current Facility-Administered Medications   Medication Dose Route Frequency    heparin 25,000 units in  ml infusion  12-25 Units/kg/hr (Adjusted) IntraVENous TITRATE    heparin (porcine) 1,000 unit/mL injection 4,000 Units  4,000 Units IntraVENous PRN    Or    heparin (porcine) 1,000 unit/mL injection 2,000 Units  2,000 Units IntraVENous PRN    metoprolol succinate (TOPROL-XL) XL tablet 50 mg  50 mg Oral DAILY    nitroglycerin (NITROBID) 2 % ointment 0.5 Inch  0.5 Inch Topical Q6H    aspirin chewable tablet 81 mg  81 mg Oral DAILY    piperacillin-tazobactam (ZOSYN) 3.375 g in 0.9% sodium chloride (MBP/ADV) 100 mL MBP  3.375 g IntraVENous Q12H    heparin (porcine) 1,000 unit/mL injection 2,800 Units  2,800 Units Hemodialysis DIALYSIS PRN           Problem List:   Patient has a acute on chronic renal failure and had a dialysis and is clinically better. Decompensated heart failure and ejection fraction is depressed and patient has a at least moderate possibly severe pulmonary hypertension. History of paroxysmal atrial fibrillation. Recent DVT of her left axillary vein. History of paroxysmal atrial fibrillation. 1.      Assessment/Plan:   I will continue dialysis as per recommendations of the nephrologist.  We will otherwise continue present care.   Patient did not want to have a cath during the recent admission and was clinically stable still may offer him cardiac catheterization because is a new onset left ventricular systolic dysfunction and troponin I is minimally elevated. Thank you. 1.          []       High complexity decision making was performed in this patient at high risk for decompensation with multiple organ involvement.     Rai Cody MD

## 2022-01-10 NOTE — PROGRESS NOTES
CM reviewed clinical chart. Patient's discharge disposition is to return home. Patient may benefit from PT/OT consults when medically appropriate. CM will continue to follow for PT/OT recommendations and any d/c needs.

## 2022-01-10 NOTE — PROGRESS NOTES
Progress Note    Patient: Josefina York MRN: [id number]  SSN: [id number]    YOB: 1955  Age: 78 y.o. Sex: male      Admit Date: 1/7/2022    LOS: 2 days     Subjective:   Patient followed for sepsis with cellulitis both calves and aspiration pneumonia. Leg wound culture is pending. He is afebrile with now normal WBC and decreasing procal.  Currently on IV Zosyn alone. Patient remains in the ICU. He is awake and responsive today, offers no complaints. Objective:     Vitals:    01/10/22 1100 01/10/22 1115 01/10/22 1130 01/10/22 1145   BP: 120/73 114/69 118/80 121/68   Pulse: 83 86 98 100   Resp: 14 14 13 14   Temp:       SpO2: 98% 100% 97% 100%   Weight:       Height:            Intake and Output:  Current Shift: 01/10 0701 - 01/10 1900  In: 84 [I.V.:84]  Out: -   Last three shifts: 01/08 1901 - 01/10 0700  In: 426.9 [I.V.:426.9]  Out: -     Physical Exam:    Vitals and nursing note reviewed. Constitutional:       Appearance: He is ill-appearing. HENT: eyes closed  Neck:  Right sided dialysis catheter   Cardiovascular:      Rate and Rhythm: Regular rhythm. irregular Tachycardia present. Heart sounds: Normal heart sounds. No murmur heard.   Pulmonary:      Effort: Pulmonary effort is normal.      Breath sounds: Normal breath sounds. Abdominal:      General: Bowel sounds are normal.      Palpations: Abdomen is soft. Tenderness: There is no abdominal tenderness. Genitourinary:     Comments: External urinary catheter  Musculoskeletal:      Right lower leg: Edema present. Left lower leg: Edema present. Comments: Both calves with decreasing erythema and shallow wounds; erythema has resolved  Skin:         Neurological: nonfocal, mild confusion  Psychiatric:         Behavior: Behavior normal.     Lab/Data Review:     WBC 10,900    CRP 12.10 <12.10  Procal 4.39 <5.16    MRSA nasal PCR negative    Wound cultures leg (1/8)  Heavy probable S.  Aureus and Gram negative rods    Assessment:     Active Problems:    Renal failure (1/8/2022)      Hyperkalemia (1/8/2022)    1. Cellulitis both calves, secondary to probable S. Aureus, possible Gram negative rods, Day #3 IV Zosyn, improving  2. Aspiration pneumonia, RLL, Day #3 IV Zosyn  3. Sepsis with leukocytosis and elevated CRP/procal     Comment:  WBC now normal with decreasing procal.  Reasonable to continue Zosyn though S. Aureus could be MRSA. Plan:   1. Continue IV Zosyn  2. Follow-up leg wound cultures   3.  In am, repeat CBC, CRP, procal; follow-up ASO and Dnase B strep antibody    Signed By: Doris Adams MD     January 10, 2022

## 2022-01-11 LAB
ALBUMIN SERPL-MCNC: 2 G/DL (ref 3.5–5)
ALBUMIN/GLOB SERPL: 0.6 {RATIO} (ref 1.1–2.2)
ALP SERPL-CCNC: 118 U/L (ref 45–117)
ALT SERPL-CCNC: 12 U/L (ref 12–78)
ANION GAP SERPL CALC-SCNC: 10 MMOL/L (ref 5–15)
APTT PPP: 122.5 SEC (ref 21.2–34.1)
APTT PPP: 71.8 SEC (ref 21.2–34.1)
APTT PPP: 86.8 SEC (ref 21.2–34.1)
AST SERPL W P-5'-P-CCNC: 19 U/L (ref 15–37)
BASOPHILS # BLD: 0 K/UL (ref 0–0.1)
BASOPHILS NFR BLD: 0 % (ref 0–1)
BILIRUB SERPL-MCNC: 0.8 MG/DL (ref 0.2–1)
BUN SERPL-MCNC: 48 MG/DL (ref 6–20)
BUN/CREAT SERPL: 10 (ref 12–20)
CA-I BLD-MCNC: 7 MG/DL (ref 8.5–10.1)
CHLORIDE SERPL-SCNC: 104 MMOL/L (ref 97–108)
CO2 SERPL-SCNC: 26 MMOL/L (ref 21–32)
CREAT SERPL-MCNC: 4.86 MG/DL (ref 0.7–1.3)
CRP SERPL-MCNC: 8.19 MG/DL (ref 0–0.6)
DIFFERENTIAL METHOD BLD: ABNORMAL
EOSINOPHIL # BLD: 0.2 K/UL (ref 0–0.4)
EOSINOPHIL NFR BLD: 2 % (ref 0–7)
ERYTHROCYTE [DISTWIDTH] IN BLOOD BY AUTOMATED COUNT: 15.4 % (ref 11.5–14.5)
GLOBULIN SER CALC-MCNC: 3.3 G/DL (ref 2–4)
GLUCOSE SERPL-MCNC: 120 MG/DL (ref 65–100)
HCT VFR BLD AUTO: 28.6 % (ref 36.6–50.3)
HGB BLD-MCNC: 8.6 G/DL (ref 12.1–17)
IMM GRANULOCYTES # BLD AUTO: 0.1 K/UL (ref 0–0.04)
IMM GRANULOCYTES NFR BLD AUTO: 1 % (ref 0–0.5)
LYMPHOCYTES # BLD: 0.3 K/UL (ref 0.8–3.5)
LYMPHOCYTES NFR BLD: 3 % (ref 12–49)
MCH RBC QN AUTO: 30.1 PG (ref 26–34)
MCHC RBC AUTO-ENTMCNC: 30.1 G/DL (ref 30–36.5)
MCV RBC AUTO: 100 FL (ref 80–99)
MONOCYTES # BLD: 0.8 K/UL (ref 0–1)
MONOCYTES NFR BLD: 8 % (ref 5–13)
NEUTS SEG # BLD: 8.2 K/UL (ref 1.8–8)
NEUTS SEG NFR BLD: 86 % (ref 32–75)
NRBC # BLD: 0.03 K/UL (ref 0–0.01)
NRBC BLD-RTO: 0.3 PER 100 WBC
PLATELET # BLD AUTO: 192 K/UL (ref 150–400)
PMV BLD AUTO: 10.4 FL (ref 8.9–12.9)
POTASSIUM SERPL-SCNC: 3.7 MMOL/L (ref 3.5–5.1)
PROCALCITONIN SERPL-MCNC: 4.41 NG/ML
PROT SERPL-MCNC: 5.3 G/DL (ref 6.4–8.2)
RBC # BLD AUTO: 2.86 M/UL (ref 4.1–5.7)
SODIUM SERPL-SCNC: 140 MMOL/L (ref 136–145)
THERAPEUTIC RANGE,PTTT: ABNORMAL SEC (ref 82–109)
WBC # BLD AUTO: 9.6 K/UL (ref 4.1–11.1)

## 2022-01-11 PROCEDURE — 74011250636 HC RX REV CODE- 250/636

## 2022-01-11 PROCEDURE — 84145 PROCALCITONIN (PCT): CPT

## 2022-01-11 PROCEDURE — 65610000006 HC RM INTENSIVE CARE

## 2022-01-11 PROCEDURE — 99233 SBSQ HOSP IP/OBS HIGH 50: CPT | Performed by: INTERNAL MEDICINE

## 2022-01-11 PROCEDURE — 74011250637 HC RX REV CODE- 250/637: Performed by: INTERNAL MEDICINE

## 2022-01-11 PROCEDURE — 80053 COMPREHEN METABOLIC PANEL: CPT

## 2022-01-11 PROCEDURE — 74011250636 HC RX REV CODE- 250/636: Performed by: INTERNAL MEDICINE

## 2022-01-11 PROCEDURE — 74011000258 HC RX REV CODE- 258: Performed by: INTERNAL MEDICINE

## 2022-01-11 PROCEDURE — 86140 C-REACTIVE PROTEIN: CPT

## 2022-01-11 PROCEDURE — 85025 COMPLETE CBC W/AUTO DIFF WBC: CPT

## 2022-01-11 PROCEDURE — 36415 COLL VENOUS BLD VENIPUNCTURE: CPT

## 2022-01-11 PROCEDURE — 74011250636 HC RX REV CODE- 250/636: Performed by: FAMILY MEDICINE

## 2022-01-11 PROCEDURE — 93005 ELECTROCARDIOGRAM TRACING: CPT

## 2022-01-11 PROCEDURE — 85730 THROMBOPLASTIN TIME PARTIAL: CPT

## 2022-01-11 PROCEDURE — 74011000250 HC RX REV CODE- 250: Performed by: INTERNAL MEDICINE

## 2022-01-11 RX ORDER — MEROPENEM 1 G/1
INJECTION, POWDER, FOR SOLUTION INTRAVENOUS
Status: COMPLETED
Start: 2022-01-11 | End: 2022-01-11

## 2022-01-11 RX ADMIN — PIPERACILLIN SODIUM AND TAZOBACTAM SODIUM 3.38 G: 3; .375 INJECTION, POWDER, LYOPHILIZED, FOR SOLUTION INTRAVENOUS at 14:35

## 2022-01-11 RX ADMIN — Medication 23 UNITS/KG/HR: at 04:08

## 2022-01-11 RX ADMIN — SODIUM CHLORIDE, PRESERVATIVE FREE 1 G: 5 INJECTION INTRAVENOUS at 23:52

## 2022-01-11 RX ADMIN — FERROUS SULFATE TAB 325 MG (65 MG ELEMENTAL FE) 325 MG: 325 (65 FE) TAB at 17:42

## 2022-01-11 RX ADMIN — FERROUS SULFATE TAB 325 MG (65 MG ELEMENTAL FE) 325 MG: 325 (65 FE) TAB at 09:23

## 2022-01-11 RX ADMIN — ASPIRIN 81 MG CHEWABLE TABLET 81 MG: 81 TABLET CHEWABLE at 09:23

## 2022-01-11 RX ADMIN — HEPARIN SODIUM 2000 UNITS: 1000 INJECTION, SOLUTION INTRAVENOUS; SUBCUTANEOUS at 22:47

## 2022-01-11 RX ADMIN — METOPROLOL SUCCINATE 50 MG: 25 TABLET, EXTENDED RELEASE ORAL at 09:27

## 2022-01-11 RX ADMIN — Medication 23 UNITS/KG/HR: at 22:50

## 2022-01-11 RX ADMIN — HEPARIN SODIUM 2000 UNITS: 1000 INJECTION INTRAVENOUS; SUBCUTANEOUS at 22:47

## 2022-01-11 RX ADMIN — NITROGLYCERIN 0.5 INCH: 20 OINTMENT TOPICAL at 23:52

## 2022-01-11 RX ADMIN — PIPERACILLIN SODIUM AND TAZOBACTAM SODIUM 3.38 G: 3; .375 INJECTION, POWDER, LYOPHILIZED, FOR SOLUTION INTRAVENOUS at 03:49

## 2022-01-11 RX ADMIN — MEROPENEM 1 G: 1 INJECTION, POWDER, FOR SOLUTION INTRAVENOUS at 23:53

## 2022-01-11 NOTE — PROGRESS NOTES
[facility]     Renal Daily Progress Note:     Admission Date: 2022     Subjective: No further cardiac dysrhythmias. Fatigued but looks better. Eating without nausea or vomiting. Mental status is good. He  looks better, less short of breath, not tachypneic. Review of Systems  Pertinent items are noted in HPI. Objective:     Visit Vitals  BP 99/76 (BP 1 Location: Left upper arm, BP Patient Position: At rest)   Pulse 78   Temp 98.6 °F (37 °C)   Resp 12   Ht 6' (1.829 m)   Wt 76.7 kg (169 lb 1.5 oz)   SpO2 100%   BMI 22.93 kg/m²     Temp (24hrs), Av.1 °F (36.7 °C), Min:97.6 °F (36.4 °C), Max:98.6 °F (37 °C)        Intake/Output Summary (Last 24 hours) at 2022 1603  Last data filed at 2022 1015  Gross per 24 hour   Intake 1025.36 ml   Output 175 ml   Net 850.36 ml     Current Facility-Administered Medications   Medication Dose Route Frequency    ferrous sulfate tablet 325 mg  1 Tablet Oral BID WITH MEALS    heparin 25,000 units in  ml infusion  12-25 Units/kg/hr (Adjusted) IntraVENous TITRATE    heparin (porcine) 1,000 unit/mL injection 4,000 Units  4,000 Units IntraVENous PRN    Or    heparin (porcine) 1,000 unit/mL injection 2,000 Units  2,000 Units IntraVENous PRN    metoprolol succinate (TOPROL-XL) XL tablet 50 mg  50 mg Oral DAILY    nitroglycerin (NITROBID) 2 % ointment 0.5 Inch  0.5 Inch Topical Q6H    aspirin chewable tablet 81 mg  81 mg Oral DAILY    piperacillin-tazobactam (ZOSYN) 3.375 g in 0.9% sodium chloride (MBP/ADV) 100 mL MBP  3.375 g IntraVENous Q12H    heparin (porcine) 1,000 unit/mL injection 2,800 Units  2,800 Units Hemodialysis DIALYSIS PRN       Physical Exam:General appearance: alert, cooperative, no distress, appears older than stated age  Head: Normocephalic, without obvious abnormality, atraumatic  Lungs: clear to auscultation bilaterally  Heart: regular rate and rhythm, no rub  Abdomen: soft, non-tender.  Bowel sounds normal. No masses,  no organomegaly  Extremities: venous stasis dermatitis noted, edema: decreased lower extremity edema but has dependent thigh edema  Skin: Left lower leg with posterior ulcer inferior to calf  Neurologic: Grossly normal    Data Review:     LABS:  Recent Labs     01/11/22  1330 01/10/22  0550 01/09/22  0600    140 140   K 3.7 4.2 4.6    107 106   CO2 26 21 22   BUN 48* 92* 87*   CREA 4.86* 7.35* 6.97*   CA 7.0* 7.6*  8.0* 8.1*   ALB 2.0* 2.2* 2.5*   Iron saturation 13%    Recent Labs     01/11/22  0400 01/10/22  0550 01/09/22  0600   WBC 9.6 10.9 11.9*   HGB 8.6* 8.6* 8.3*   HCT 28.6* 29.0* 27.2*    193 191     No results for input(s): HÉCTOR, KU, CLU, CREAU in the last 72 hours. No lab exists for component: PROU   Chest x-ray January 8, 2022:  IMPRESSION     Interval placement of a right supraclavicular approach dialysis catheter with  distal tip in the proximal right atrium.     Cardiomegaly with pulmonary vascular congestion and pulmonary edema.      Stable appearing basilar right lung pneumonia. Small right pleural effusion.   Assessment:   Renal Specific Problems  Chronic kidney disease stage VI  Volume overload  Metabolic acidosis- resolved  Hyperkalemia- improved  Anemia with renal failure and iron deficient  Leukocytosis with elevated but declining procalcitonin  Probable right lower lobe pneumonia  Left leg ulcer  Venous stasis dermatitis lower extremity          Plan:     Obtain/ Order: labs/cultures/radiology/procedures:      Therapeutic:    Dialysis Wednesday  Epogen if iron saturation greater than 20%  Oral iron, avoid IV iron if there is signs of active infection  Wound care to see regarding left leg ulcer    Case management to arrange outpatient dialysis    Harvey Hedrick MD    [phone]

## 2022-01-11 NOTE — PROGRESS NOTES
Progress Note      1/11/2022 12:19 PM  NAME: Cindy Ferro   MRN:  [id number]   Admit Diagnosis: Renal failure [N19]  Hyperkalemia [E87.5]      Subjective:   Chart reviewed. Patient is on dialysis. Symptoms of shortness of breath has improved. Denies any chest pain Or palpitation. Review of Systems:    Symptom Y/N Comments  Symptom Y/N Comments   Fever/Chills n   Chest Pain n    Poor Appetite    Edema y    Cough    Abdominal Pain     Sputum    Joint Pain     SOB/CARMONA y  improving  Pruritis/Rash     Nausea/vomit    Other     Diarrhea         Constipation           Could NOT obtain due to:      Objective:          Physical Exam:    Last 24hrs VS reviewed since prior progress note. Most recent are:    Visit Vitals  BP 98/74 (BP 1 Location: Left upper arm, BP Patient Position: At rest)   Pulse 89   Temp 98.3 °F (36.8 °C)   Resp 14   Ht 6' (1.829 m)   Wt 76.7 kg (169 lb 1.5 oz)   SpO2 100%   BMI 22.93 kg/m²       Intake/Output Summary (Last 24 hours) at 1/11/2022 1230  Last data filed at 1/11/2022 1015  Gross per 24 hour   Intake 855.36 ml   Output 175 ml   Net 680.36 ml        General Appearance: Well developed, well nourished, alert & oriented x 3,    no acute distress. Ears/Nose/Mouth/Throat: Hearing grossly normal.  Neck: Supple. Chest: Lungs clear to auscultation bilaterally. Cardiovascular: Regular rate and rhythm, S1,S2 normal, no murmur. Abdomen: Soft, non-tender, bowel sounds are active. Extremities: No edema bilaterally. Skin: Warm and dry. []         Post-cath site without hematoma, bruit, tenderness, or thrill. Distal pulses intact.     PMH/SH reviewed - no change compared to H&P    Data Review    Telemetry: normal sinus rhythm , patient has some PVCs    EKG:   []  No new EKG for review    Lab Data Personally Reviewed:    Recent Labs     01/11/22  0400 01/10/22  0550   WBC 9.6 10.9   HGB 8.6* 8.6*   HCT 28.6* 29.0*    193     Recent Labs     01/11/22  0400 01/10/22  4702 01/10/22  0730   APTT 122.5* 74.6* >153.0*      Recent Labs     01/10/22  0550 01/09/22  0600    140   K 4.2 4.6    106   CO2 21 22   BUN 92* 87*   CREA 7.35* 6.97*   * 109*   CA 7.6*  8.0* 8.1*     No results for input(s): CPK, CKNDX, TROIQ in the last 72 hours. No lab exists for component: CPKMB  No results found for: CHOL, CHOLX, CHLST, CHOLV, HDL, HDLP, LDL, LDLC, DLDLP, TGLX, TRIGL, TRIGP, CHHD, CHHDX    Recent Labs     01/10/22  0550 01/09/22  0600   * 110   TP 6.0* 5.5*   ALB 2.2* 2.5*   GLOB 3.8 3.0     No results for input(s): PH, PCO2, PO2 in the last 72 hours. Medications Personally Reviewed:    Current Facility-Administered Medications   Medication Dose Route Frequency    ferrous sulfate tablet 325 mg  1 Tablet Oral BID WITH MEALS    heparin 25,000 units in  ml infusion  12-25 Units/kg/hr (Adjusted) IntraVENous TITRATE    heparin (porcine) 1,000 unit/mL injection 4,000 Units  4,000 Units IntraVENous PRN    Or    heparin (porcine) 1,000 unit/mL injection 2,000 Units  2,000 Units IntraVENous PRN    metoprolol succinate (TOPROL-XL) XL tablet 50 mg  50 mg Oral DAILY    nitroglycerin (NITROBID) 2 % ointment 0.5 Inch  0.5 Inch Topical Q6H    aspirin chewable tablet 81 mg  81 mg Oral DAILY    piperacillin-tazobactam (ZOSYN) 3.375 g in 0.9% sodium chloride (MBP/ADV) 100 mL MBP  3.375 g IntraVENous Q12H    heparin (porcine) 1,000 unit/mL injection 2,800 Units  2,800 Units Hemodialysis DIALYSIS PRN           Problem List:   Patient has a acute on chronic renal failure and had a dialysis and is clinically better. Decompensated heart failure and ejection fraction is depressed and patient has a at least moderate possibly severe pulmonary hypertension. History of paroxysmal atrial fibrillation. Recent DVT of her left axillary vein. History of paroxysmal atrial fibrillation.   1.      Assessment/Plan:   I will continue dialysis as per recommendations of the nephrologist.  We will otherwise continue present care. Patient did not want to have a cath during the recent admission and was clinically stable still may offer him cardiac catheterization because is a new onset left ventricular systolic dysfunction and troponin I is minimally elevated. Thank you. 1.          []       High complexity decision making was performed in this patient at high risk for decompensation with multiple organ involvement.     Larinda Schilder, MD

## 2022-01-12 LAB
ALBUMIN SERPL-MCNC: 2 G/DL (ref 3.5–5)
ALBUMIN/GLOB SERPL: 0.5 {RATIO} (ref 1.1–2.2)
ALP SERPL-CCNC: 121 U/L (ref 45–117)
ALT SERPL-CCNC: 11 U/L (ref 12–78)
ANION GAP SERPL CALC-SCNC: 11 MMOL/L (ref 5–15)
APTT PPP: 148.3 SEC (ref 21.2–34.1)
APTT PPP: 86.7 SEC (ref 21.2–34.1)
APTT PPP: 92.9 SEC (ref 21.2–34.1)
ASO AB SERPL-ACNC: NORMAL IU/ML
AST SERPL W P-5'-P-CCNC: 12 U/L (ref 15–37)
ATRIAL RATE: 106 BPM
ATRIAL RATE: 182 BPM
ATRIAL RATE: 76 BPM
BACTERIA SPEC CULT: NORMAL
BASOPHILS # BLD: 0 K/UL (ref 0–0.1)
BASOPHILS NFR BLD: 0 % (ref 0–1)
BILIRUB SERPL-MCNC: 0.7 MG/DL (ref 0.2–1)
BUN SERPL-MCNC: 53 MG/DL (ref 6–20)
BUN/CREAT SERPL: 10 (ref 12–20)
CA-I BLD-MCNC: 7 MG/DL (ref 8.5–10.1)
CALCULATED P AXIS, ECG09: -15 DEGREES
CALCULATED R AXIS, ECG10: 105 DEGREES
CALCULATED R AXIS, ECG10: 107 DEGREES
CALCULATED R AXIS, ECG10: 84 DEGREES
CALCULATED T AXIS, ECG11: -111 DEGREES
CALCULATED T AXIS, ECG11: -156 DEGREES
CALCULATED T AXIS, ECG11: -84 DEGREES
CHLORIDE SERPL-SCNC: 104 MMOL/L (ref 97–108)
CO2 SERPL-SCNC: 23 MMOL/L (ref 21–32)
CREAT SERPL-MCNC: 5.45 MG/DL (ref 0.7–1.3)
CRP SERPL-MCNC: 6.32 MG/DL (ref 0–0.6)
DIAGNOSIS, 93000: NORMAL
DIFFERENTIAL METHOD BLD: ABNORMAL
EOSINOPHIL # BLD: 0.2 K/UL (ref 0–0.4)
EOSINOPHIL NFR BLD: 2 % (ref 0–7)
ERYTHROCYTE [DISTWIDTH] IN BLOOD BY AUTOMATED COUNT: 15.5 % (ref 11.5–14.5)
GLOBULIN SER CALC-MCNC: 3.7 G/DL (ref 2–4)
GLUCOSE SERPL-MCNC: 114 MG/DL (ref 65–100)
GRAM STN SPEC: NORMAL
HCT VFR BLD AUTO: 29.4 % (ref 36.6–50.3)
HGB BLD-MCNC: 8.7 G/DL (ref 12.1–17)
IMM GRANULOCYTES # BLD AUTO: 0.1 K/UL (ref 0–0.04)
IMM GRANULOCYTES NFR BLD AUTO: 1 % (ref 0–0.5)
LYMPHOCYTES # BLD: 0.4 K/UL (ref 0.8–3.5)
LYMPHOCYTES NFR BLD: 5 % (ref 12–49)
MAGNESIUM SERPL-MCNC: 2.4 MG/DL (ref 1.6–2.4)
MCH RBC QN AUTO: 29.8 PG (ref 26–34)
MCHC RBC AUTO-ENTMCNC: 29.6 G/DL (ref 30–36.5)
MCV RBC AUTO: 100.7 FL (ref 80–99)
MONOCYTES # BLD: 0.8 K/UL (ref 0–1)
MONOCYTES NFR BLD: 10 % (ref 5–13)
NEUTS SEG # BLD: 6.9 K/UL (ref 1.8–8)
NEUTS SEG NFR BLD: 82 % (ref 32–75)
NRBC # BLD: 0.02 K/UL (ref 0–0.01)
NRBC BLD-RTO: 0.2 PER 100 WBC
P-R INTERVAL, ECG05: 184 MS
PLATELET # BLD AUTO: 176 K/UL (ref 150–400)
PMV BLD AUTO: 9.9 FL (ref 8.9–12.9)
POTASSIUM SERPL-SCNC: 3.5 MMOL/L (ref 3.5–5.1)
PROCALCITONIN SERPL-MCNC: 3.12 NG/ML
PROT SERPL-MCNC: 5.7 G/DL (ref 6.4–8.2)
Q-T INTERVAL, ECG07: 302 MS
Q-T INTERVAL, ECG07: 378 MS
Q-T INTERVAL, ECG07: 424 MS
QRS DURATION, ECG06: 104 MS
QRS DURATION, ECG06: 122 MS
QRS DURATION, ECG06: 92 MS
QTC CALCULATION (BEZET), ECG08: 477 MS
QTC CALCULATION (BEZET), ECG08: 502 MS
QTC CALCULATION (BEZET), ECG08: 541 MS
RBC # BLD AUTO: 2.92 M/UL (ref 4.1–5.7)
SODIUM SERPL-SCNC: 138 MMOL/L (ref 136–145)
SPECIAL REQUESTS,SREQ: NORMAL
STREP DNASE B SER-ACNC: NORMAL U/ML
THERAPEUTIC RANGE,PTTT: ABNORMAL SEC (ref 82–109)
TROPONIN-HIGH SENSITIVITY: 230 NG/L (ref 0–76)
VENTRICULAR RATE, ECG03: 106 BPM
VENTRICULAR RATE, ECG03: 193 BPM
VENTRICULAR RATE, ECG03: 76 BPM
WBC # BLD AUTO: 8.4 K/UL (ref 4.1–11.1)

## 2022-01-12 PROCEDURE — 74011000250 HC RX REV CODE- 250: Performed by: INTERNAL MEDICINE

## 2022-01-12 PROCEDURE — 74011250636 HC RX REV CODE- 250/636: Performed by: FAMILY MEDICINE

## 2022-01-12 PROCEDURE — 84145 PROCALCITONIN (PCT): CPT

## 2022-01-12 PROCEDURE — 74011250637 HC RX REV CODE- 250/637: Performed by: INTERNAL MEDICINE

## 2022-01-12 PROCEDURE — 93005 ELECTROCARDIOGRAM TRACING: CPT

## 2022-01-12 PROCEDURE — 80074 ACUTE HEPATITIS PANEL: CPT

## 2022-01-12 PROCEDURE — 74011250636 HC RX REV CODE- 250/636

## 2022-01-12 PROCEDURE — 74011000250 HC RX REV CODE- 250

## 2022-01-12 PROCEDURE — 99222 1ST HOSP IP/OBS MODERATE 55: CPT | Performed by: SURGERY

## 2022-01-12 PROCEDURE — 74011250636 HC RX REV CODE- 250/636: Performed by: INTERNAL MEDICINE

## 2022-01-12 PROCEDURE — 93010 ELECTROCARDIOGRAM REPORT: CPT | Performed by: INTERNAL MEDICINE

## 2022-01-12 PROCEDURE — 85730 THROMBOPLASTIN TIME PARTIAL: CPT

## 2022-01-12 PROCEDURE — 86060 ANTISTREPTOLYSIN O TITER: CPT

## 2022-01-12 PROCEDURE — 85025 COMPLETE CBC W/AUTO DIFF WBC: CPT

## 2022-01-12 PROCEDURE — 65610000006 HC RM INTENSIVE CARE

## 2022-01-12 PROCEDURE — 86704 HEP B CORE ANTIBODY TOTAL: CPT

## 2022-01-12 PROCEDURE — 99233 SBSQ HOSP IP/OBS HIGH 50: CPT | Performed by: INTERNAL MEDICINE

## 2022-01-12 PROCEDURE — 86140 C-REACTIVE PROTEIN: CPT

## 2022-01-12 PROCEDURE — 86215 DEOXYRIBONUCLEASE ANTIBODY: CPT

## 2022-01-12 PROCEDURE — 83735 ASSAY OF MAGNESIUM: CPT

## 2022-01-12 PROCEDURE — 84484 ASSAY OF TROPONIN QUANT: CPT

## 2022-01-12 PROCEDURE — 80053 COMPREHEN METABOLIC PANEL: CPT

## 2022-01-12 PROCEDURE — 36415 COLL VENOUS BLD VENIPUNCTURE: CPT

## 2022-01-12 RX ORDER — ADENOSINE 3 MG/ML
INJECTION, SOLUTION INTRAVENOUS
Status: COMPLETED
Start: 2022-01-12 | End: 2022-01-12

## 2022-01-12 RX ORDER — METOPROLOL TARTRATE 5 MG/5ML
INJECTION INTRAVENOUS
Status: COMPLETED
Start: 2022-01-12 | End: 2022-01-12

## 2022-01-12 RX ORDER — METOPROLOL TARTRATE 5 MG/5ML
5 INJECTION INTRAVENOUS ONCE
Status: COMPLETED | OUTPATIENT
Start: 2022-01-12 | End: 2022-01-12

## 2022-01-12 RX ORDER — DIGOXIN 0.25 MG/ML
250 INJECTION INTRAMUSCULAR; INTRAVENOUS
Status: COMPLETED | OUTPATIENT
Start: 2022-01-12 | End: 2022-01-12

## 2022-01-12 RX ORDER — ADENOSINE 3 MG/ML
12 INJECTION, SOLUTION INTRAVENOUS ONCE
Status: COMPLETED | OUTPATIENT
Start: 2022-01-12 | End: 2022-01-12

## 2022-01-12 RX ORDER — METOPROLOL TARTRATE 5 MG/5ML
5 INJECTION INTRAVENOUS
Status: COMPLETED | OUTPATIENT
Start: 2022-01-12 | End: 2022-01-12

## 2022-01-12 RX ORDER — ADENOSINE 3 MG/ML
12 INJECTION, SOLUTION INTRAVENOUS
Status: COMPLETED | OUTPATIENT
Start: 2022-01-12 | End: 2022-01-12

## 2022-01-12 RX ORDER — AMIODARONE HYDROCHLORIDE 200 MG/1
400 TABLET ORAL 2 TIMES DAILY
Status: DISCONTINUED | OUTPATIENT
Start: 2022-01-12 | End: 2022-01-21

## 2022-01-12 RX ORDER — ADENOSINE 3 MG/ML
6 INJECTION, SOLUTION INTRAVENOUS
Status: COMPLETED | OUTPATIENT
Start: 2022-01-12 | End: 2022-01-12

## 2022-01-12 RX ORDER — DIGOXIN 0.25 MG/ML
INJECTION INTRAMUSCULAR; INTRAVENOUS
Status: COMPLETED
Start: 2022-01-12 | End: 2022-01-12

## 2022-01-12 RX ADMIN — ADENOSINE 6 MG: 3 INJECTION, SOLUTION INTRAVENOUS at 05:12

## 2022-01-12 RX ADMIN — AMIODARONE HYDROCHLORIDE 400 MG: 200 TABLET ORAL at 11:53

## 2022-01-12 RX ADMIN — ADENOSINE 12 MG: 3 INJECTION, SOLUTION INTRAVENOUS at 09:44

## 2022-01-12 RX ADMIN — ADENOSINE 6 MG: 3 INJECTION INTRAVENOUS at 05:12

## 2022-01-12 RX ADMIN — FERROUS SULFATE TAB 325 MG (65 MG ELEMENTAL FE) 325 MG: 325 (65 FE) TAB at 08:58

## 2022-01-12 RX ADMIN — NITROGLYCERIN 0.5 INCH: 20 OINTMENT TOPICAL at 23:22

## 2022-01-12 RX ADMIN — ADENOSINE 6 MG: 3 INJECTION, SOLUTION INTRAVENOUS at 09:19

## 2022-01-12 RX ADMIN — SODIUM CHLORIDE, PRESERVATIVE FREE 1 G: 5 INJECTION INTRAVENOUS at 23:22

## 2022-01-12 RX ADMIN — FERROUS SULFATE TAB 325 MG (65 MG ELEMENTAL FE) 325 MG: 325 (65 FE) TAB at 17:17

## 2022-01-12 RX ADMIN — ASPIRIN 81 MG CHEWABLE TABLET 81 MG: 81 TABLET CHEWABLE at 08:58

## 2022-01-12 RX ADMIN — ADENOSINE 12 MG: 3 INJECTION, SOLUTION INTRAVENOUS at 05:18

## 2022-01-12 RX ADMIN — DIGOXIN: 0.25 INJECTION INTRAMUSCULAR; INTRAVENOUS at 10:00

## 2022-01-12 RX ADMIN — METOPROLOL SUCCINATE 50 MG: 25 TABLET, EXTENDED RELEASE ORAL at 09:00

## 2022-01-12 RX ADMIN — METOPROLOL TARTRATE 5 MG: 5 INJECTION INTRAVENOUS at 09:55

## 2022-01-12 RX ADMIN — METOPROLOL TARTRATE 5 MG: 5 INJECTION INTRAVENOUS at 05:42

## 2022-01-12 RX ADMIN — AMIODARONE HYDROCHLORIDE 400 MG: 200 TABLET ORAL at 20:02

## 2022-01-12 RX ADMIN — DIGOXIN: 250 INJECTION, SOLUTION INTRAMUSCULAR; INTRAVENOUS; PARENTERAL at 10:00

## 2022-01-12 RX ADMIN — Medication 20 UNITS/KG/HR: at 23:23

## 2022-01-12 RX ADMIN — SODIUM CHLORIDE, PRESERVATIVE FREE 1 G: 5 INJECTION INTRAVENOUS at 11:05

## 2022-01-12 RX ADMIN — METOPROLOL TARTRATE 5 MG: 5 INJECTION INTRAVENOUS at 06:11

## 2022-01-12 RX ADMIN — ADENOSINE 12 MG: 3 INJECTION INTRAVENOUS at 09:44

## 2022-01-12 RX ADMIN — NITROGLYCERIN 0.5 INCH: 20 OINTMENT TOPICAL at 18:00

## 2022-01-12 RX ADMIN — ADENOSINE 12 MG: 3 INJECTION, SOLUTION INTRAVENOUS at 09:47

## 2022-01-12 RX ADMIN — ADENOSINE 12 MG: 3 INJECTION INTRAVENOUS at 05:18

## 2022-01-12 NOTE — PROGRESS NOTES
Progress Note    Patient: Christa Granado MRN: [id number]  SSN: [id number]    YOB: 1955  Age: 78 y.o. Sex: male      Admit Date: 1/7/2022    LOS: 3 days     Subjective:   Patient followed for sepsis with cellulitis both calves and aspiration pneumonia. Leg wound culture is pending. He is afebrile with now normal WBC and decreasing procal.  Currently on IV Zosyn alone. Patient remains in the ICU. He is awake and responsive today, offers no complaints. Objective:     Vitals:    01/11/22 1600 01/11/22 1700 01/11/22 1800 01/11/22 2030   BP: (!) 102/90 (!) 111/96 112/88 107/71   Pulse: 80 80 99 100   Resp: 20 19 25 23   Temp:    98.2 °F (36.8 °C)   SpO2: 99% 100% 97% 96%   Weight:       Height:            Intake and Output:  Current Shift: No intake/output data recorded. Last three shifts: 01/10 0701 - 01/11 1900  In: 1179.4 [P.O.:720; I.V.:459.4]  Out: 6557 [Urine:175]    Physical Exam:    Vitals and nursing note reviewed. Constitutional:       Appearance: He is ill-appearing. HENT: eyes closed  Neck:  Right sided dialysis catheter   Cardiovascular:      Rate and Rhythm: Regular rhythm. irregular Tachycardia present. Heart sounds: Normal heart sounds. No murmur heard.   Pulmonary:      Effort: Pulmonary effort is normal.      Breath sounds: Normal breath sounds. Abdominal:      General: Bowel sounds are normal.      Palpations: Abdomen is soft. Tenderness: There is no abdominal tenderness. Genitourinary:     Comments: External urinary catheter  Musculoskeletal:      Right lower leg: Edema present. Left lower leg: Edema present. Comments:  Both calves with decreasing erythema and shallow wounds; erythema has resolved  Skin:         Neurological: nonfocal, mild confusion  Psychiatric:         Behavior: Behavior normal.     Lab/Data Review:     WBC 9,600    CRP 8.19 <12.10 <12.10  Procal 4.41 <4.39 <5.16    MRSA nasal PCR negative    Wound cultures leg (1/8) Staphylococcus aureus (MSSA),  Alcaligenes faecalis resistant to Zosyn, Klebsiella pneumoniae      Assessment:     Active Problems:    Renal failure (1/8/2022)      Hyperkalemia (1/8/2022)    1. Cellulitis both calves, secondary to probable S. Aureus (MSSA), Alcaligenes faecalis resistant to Zosyn, Klebsiella pneumoniae,  Day #4 IV Zosyn, improving  2. Aspiration pneumonia, RLL, Day #4 IV Zosyn  3. Sepsis with leukocytosis and elevated CRP/procal     Comment:  WBC now normal with decreasing CRP, however, Alcaligenes is resistant to Zosyn. Plan:   1. Discontinue to Zosyn  2. Start Meropenem IV which will cover MSSA as well  3.  In am, repeat CBC, CRP, procal    Signed By: Eliezer Gupta MD     January 11, 2022

## 2022-01-12 NOTE — PROGRESS NOTES
Progress Note    Patient: Bryan Medina MRN: [id number]  SSN: [id number]    YOB: 1955  Age: 78 y.o. Sex: male      Admit Date: 1/7/2022    LOS: 4 days     Subjective:   Patient followed for sepsis with cellulitis both calves and aspiration pneumonia. Leg wound culture grew multiple organisms as shown below. He is afebrile with normal WBC and decreasing procal and CRP. Currently on IV Meropenem. Patient currently in overflow unit. He is awake and responsive today, offers no complaints. Objective:     Vitals:    01/12/22 0600 01/12/22 0611 01/12/22 0633 01/12/22 0749   BP: 109/87 (!) 107/90 109/87 (!) 105/93   Pulse: (!) 144 (!) 146 (!) 144 92   Resp:   23 15   Temp:   98.3 °F (36.8 °C) 98.1 °F (36.7 °C)   SpO2:   95% 100%   Weight:    184 lb 11.9 oz (83.8 kg)   Height:            Intake and Output:  Current Shift: No intake/output data recorded. Last three shifts: 01/10 1901 - 01/12 0700  In: 325.6 [P.O.:240; I.V.:85.6]  Out: 175 [Urine:175]    Physical Exam:    Vitals and nursing note reviewed. Constitutional:       Appearance: He is ill-appearing. HENT: eyes closed  Neck:  Right sided dialysis catheter   Cardiovascular:      Rate and Rhythm: Regular rhythm. irregular Tachycardia present. Heart sounds: Normal heart sounds. No murmur heard.   Pulmonary:      Effort: Pulmonary effort is normal.      Breath sounds: Normal breath sounds. Abdominal:      General: Bowel sounds are normal.      Palpations: Abdomen is soft. Tenderness: There is no abdominal tenderness. Genitourinary:     Comments: External urinary catheter  Musculoskeletal:      Right lower leg: Edema present. Left lower leg: Edema present. Comments:  Both calves now with bulky gauzed dressing not removed at this time  Skin:         Neurological: nonfocal, mild confusion  Psychiatric:         Behavior: Behavior normal.     Lab/Data Review:     WBC 8,400    CRP 6.32 <8.19 <12.10 <12.10  Procal 3.12 < 4.41 <4.39 <5.16    MRSA nasal PCR negative    Wound cultures leg (1/8)  Staphylococcus aureus (MSSA),  Alcaligenes faecalis resistant to Zosyn, Klebsiella pneumoniae      Assessment:     Active Problems:    Renal failure (1/8/2022)      Hyperkalemia (1/8/2022)    1. Cellulitis both calves, secondary to probable S. Aureus (MSSA), Alcaligenes faecalis resistant to Zosyn, Klebsiella pneumoniae,  Day #5 IV Antibiotics, now Meropenem,  improving  2. Aspiration pneumonia, RLL, Day #5 IV Antibiotics, now Meropenem  3. Sepsis with leukocytosis and elevated CRP/procal, resolving     Comment:  WBC normal with decreasing CRP and procal.     Plan:   1. Continue  Meropenem (Zosyn resistance) IV which will cover MSSA as well  3.  In am, repeat CRP, procal    Signed By: Donta Ibrahim MD     January 12, 2022

## 2022-01-12 NOTE — PROGRESS NOTES
Referral for dialysis sent to [facility] for new HD chair. Patient requires HEP B Core AB, Total and Acute Hep Panel labs are needed. Orders placed for needed labs. Pending acceptance for new HD chair.           JUAN Robles

## 2022-01-12 NOTE — PROGRESS NOTES
RN Lorenz Cogan, RN at bedside to evaluate patient. Hr ranging from low 60s-150s. BP is soft in the low 100s. Patient denies any CP or SOB. Will notify Dr. Hai Osuna and continue to monitor patient. 0806-Dr. Hai Osuna notified of patient's HR and BP. Stated that he will consult with Dr. Griselda Morfin.

## 2022-01-12 NOTE — PROGRESS NOTES
OT evaluation order received and acknowledged. OT evaluation attempted at 9:18; per RN/medical chart, pt currently with elevated HR 160s at rest, not appropriate for skilled OT evaluation at this time. Will continue to follow and re-attempt OT eval at a later time. Thank you.

## 2022-01-12 NOTE — PROGRESS NOTES
Ola Scheuermann from wound care at bedside to evaluate patient. Dressing to legs changed. Pt has no prn pain medication ordered. Offered to call MD for pain medication and patient stated that he didn't want any pain medication.

## 2022-01-12 NOTE — PROGRESS NOTES
Pt  ST, change from previous, EKG completed; notified Dr. Karen Alston of EKG changes and HR elevated; Dr. Anya Alvarez requested to have the EKG text to him for review, he called back with no new orders except to add on a troponin to morning labs. Pt does not have any complaints of chest pain or feeling of his heart rate being any different, pt states, \"I feel great\"; will continue to monitor.

## 2022-01-12 NOTE — WOUND CARE
IP WOUND CONSULT    [facility]  RECORD NUMBER:  [id number]  AGE: 78 y.o. GENDER: male  : 1955  TODAY'S DATE:  2022    GENERAL     [] Follow-up   [x] New Consult    Dima Banerjee is a 78 y.o. male referred by:   [x] Physician  [] Nursing  [] Other:         PAST MEDICAL HISTORY    Past Medical History:   Diagnosis Date    Chronic kidney disease     Hypertension         PAST SURGICAL HISTORY    Past Surgical History:   Procedure Laterality Date    IR INSERT NON TUNL CVC OVER 5 YRS  2022       FAMILY HISTORY    No family history on file. ALLERGIES    No Known Allergies    MEDICATIONS    No current facility-administered medications on file prior to encounter. Current Outpatient Medications on File Prior to Encounter   Medication Sig Dispense Refill    furosemide (LASIX) 40 mg tablet Take 1 and half tab daily twice a day 135 Tablet 0    amLODIPine (NORVASC) 5 mg tablet Take 1 Tablet by mouth daily. 30 Tablet 0    apixaban (ELIQUIS) 2.5 mg tablet Take 1 Tablet by mouth two (2) times a day. 60 Tablet 0    calcitRIOL (ROCALTROL) 0.25 mcg capsule Take 1 Capsule by mouth daily. 30 Capsule 0    metOLazone (ZAROXOLYN) 5 mg tablet Take 1 Tablet by mouth daily. 30 Tablet 0    metoprolol tartrate (LOPRESSOR) 25 mg tablet Take 1 Tablet by mouth daily. Indications: rapid ventricular heartbeat 30 Tablet 0    sevelamer carbonate (RENVELA) 800 mg tab tab Take 2 Tablets by mouth three (3) times daily (with meals). Indications: renal osteodystrophy with hyperphosphatemia 90 Tablet 0    tamsulosin (FLOMAX) 0.4 mg capsule Take 1 Capsule by mouth daily. 30 Capsule 0    sodium bicarbonate 650 mg tablet Take 1 Tab by mouth two (2) times a day.  60 Tab 0         [unfilled]  Visit Vitals  /78   Pulse 74   Temp 99.1 °F (37.3 °C)   Resp 16   Ht 6' (1.829 m)   Wt 83.8 kg (184 lb 11.9 oz)   SpO2 97%   BMI 25.06 kg/m²       ASSESSMENT     Wound Identification & Type: Venous stasis ulcers to BLEs  Dressing change: Yes, see flow chart  Verbal consent for picture: Yes    Contributing Factors: anticoagulation therapy, edema, decreased mobility, shear force and decreased tissue oxygenation    Wound Toe (Comment  which one) Left Dry eschar, intact 07/16/21 (Active)   Drainage Amount None 01/12/22 0800   Number of days: 180       Wound Pretibial Right Stasis ulcers (Active)   Wound Image   01/12/22 1735   Wound Etiology Venous 01/12/22 1735   Dressing Status New dressing applied 01/12/22 1735   Cleansed Cleansed with saline 01/12/22 1735   Dressing/Treatment ABD pad; Ace wrap;Roll gauze; Xeroform 01/12/22 1735   Dressing Change Due 01/14/22 01/12/22 1735   Wound Assessment Pink/red 01/12/22 1735   Drainage Amount None 01/12/22 1735   Wound Odor None 01/12/22 1735   Sera-Wound/Incision Assessment Dry/flaky;Fragile 01/12/22 1735   Edges Unattached edges 01/12/22 1735   Wound Thickness Description Partial thickness 01/12/22 1735   Number of days: 4       Wound Pretibial Left Stasis Ulcers (Active)   Wound Image    01/12/22 1737   Wound Etiology Venous 01/12/22 1737   Dressing Status New dressing applied 01/12/22 1737   Cleansed Cleansed with saline 01/12/22 1737   Dressing/Treatment ABD pad; Ace wrap;Roll gauze; Xeroform 01/12/22 1737   Dressing Change Due 01/14/22 01/12/22 1737   Wound Assessment Pink/red 01/12/22 1737   Drainage Amount Scant 01/12/22 1737   Drainage Description Serosanguinous 01/12/22 1737   Wound Odor None 01/12/22 1737   Sera-Wound/Incision Assessment Fragile;Dry/flaky 01/12/22 1737   Edges Unattached edges 01/12/22 1737   Wound Thickness Description Partial thickness 01/12/22 1737   Number of days: 4          PLAN     Skin Care & Pressure Relief Recommendations  Minimize layers of linen  Turn/reposition approximately every 2 hours  Pillow wedges  Offload heels pillows    Alfa 18  Blood Glucose: 114 on 1/12/22                             Albumin: 2.0 on 1/12/22  WBCs: 8.4 on 1/12/22    Support Surface: Gel mattress    Physician/Provider notified:   Recommendations: Venous stasis ulcers noted to BLEs, appears to be chronic. Apply Xeroform gauze and wrap with ACE wraps (for gentle compression) every other day and prn for soiling, see dressing order. Keep BLEs elevated with 2-4 pillows to help reduce swelling/edema. Patient noted to be oliguric, no diarrhea. Apply a PrimoFit to manage  incontinence if needed. Use foam wedge to turn q2h at 30 degree angle or more to offload sacrum and prevent pressure related skin injury. Apply Optifoam Border Sacral dressing to sacrum daily and prn for soiling to redistribute pressure away from bony prominence. Maintain HOB at 30 degrees or less, if not contraindicated, to reduce pressure to buttocks and sacrum. Raise foot of bed to help prevent friction and shear injury from sliding down in the bed. Will continue to follow.           Discharge Wound Care Needs: TBD    Teaching completed with:   [] Patient           [] Family member       [] Caregiver          [] Nursing  [] Other    Patient/Caregiver Teaching:  Level of patient/caregiver understanding able to:   [] Indicates understanding       [] Needs reinforcement  [] Unsuccessful      [] Verbal Understanding  [] Demonstrated understanding       [] No evidence of learning  [] Refused teaching         [] N/A       Electronically signed by Atif Soliz RN on 1/12/2022 at 5:43 PM

## 2022-01-12 NOTE — PROGRESS NOTES
Progress Note      1/12/2022 12:19 PM  NAME: Marshal Boeck   MRN:  [id number]   Admit Diagnosis: Renal failure [N19]  Hyperkalemia [E87.5]      Subjective:   Chart reviewed. Patient is on dialysis. Symptoms of shortness of breath has improved. Patient had frequent palpitations during the night and apparently a nurse thought it was SVT and was given a Adenocard however EKG is concerning for ventricular tachycardia. With the patient and her with the nurse. Review of Systems:    Symptom Y/N Comments  Symptom Y/N Comments   Fever/Chills n   Chest Pain n    Poor Appetite    Edema y    Cough    Abdominal Pain     Sputum    Joint Pain     SOB/CARMONA y  improving  Pruritis/Rash     Nausea/vomit    Other     Diarrhea         Constipation           Could NOT obtain due to:      Objective:          Physical Exam:    Last 24hrs VS reviewed since prior progress note. Most recent are:    Visit Vitals  /88   Pulse (!) 145   Temp 99.1 °F (37.3 °C)   Resp 18   Ht 6' (1.829 m)   Wt 83.8 kg (184 lb 11.9 oz)   SpO2 100%   BMI 25.06 kg/m²     No intake or output data in the 24 hours ending 01/12/22 1154     General Appearance: Well developed, well nourished, alert & oriented x 3,    no acute distress. Ears/Nose/Mouth/Throat: Hearing grossly normal.  Neck: Supple. Chest: Lungs clear to auscultation bilaterally. Cardiovascular: Regular rate and rhythm, S1,S2 normal, no murmur. Abdomen: Soft, non-tender, bowel sounds are active. Extremities: No edema bilaterally. Skin: Warm and dry. []         Post-cath site without hematoma, bruit, tenderness, or thrill. Distal pulses intact. PMH/SH reviewed - no change compared to H&P    Data Review    Telemetry: normal sinus rhythm , patient has some PVCs, and episode of wide-complex tachycardia possible ventricular tachycardia. EKG:   Patient had EKG that is very concerning for ventricular tachycardia.     Lab Data Personally Reviewed:    Recent Labs 01/12/22  0542 01/11/22  0400   WBC 8.4 9.6   HGB 8.7* 8.6*   HCT 29.4* 28.6*    192     Recent Labs     01/12/22  0542 01/11/22  2040 01/11/22  1330   APTT 148.3* 71.8* 86.8*      Recent Labs     01/12/22  0542 01/11/22  1330 01/10/22  0550    140 140   K 3.5 3.7 4.2    104 107   CO2 23 26 21   BUN 53* 48* 92*   CREA 5.45* 4.86* 7.35*   * 120* 153*   CA 7.0* 7.0* 7.6*  8.0*     No results for input(s): CPK, CKNDX, TROIQ in the last 72 hours. No lab exists for component: CPKMB  No results found for: CHOL, CHOLX, CHLST, CHOLV, HDL, HDLP, LDL, LDLC, DLDLP, Cynthia Khat, CHHD, CHHDX    Recent Labs     01/12/22  0542 01/11/22  1330 01/10/22  0550   * 118* 125*   TP 5.7* 5.3* 6.0*   ALB 2.0* 2.0* 2.2*   GLOB 3.7 3.3 3.8     No results for input(s): PH, PCO2, PO2 in the last 72 hours. Medications Personally Reviewed:    Current Facility-Administered Medications   Medication Dose Route Frequency    amiodarone (CORDARONE) tablet 400 mg  400 mg Oral BID    meropenem (MERREM) 1 g in 0.9% sodium chloride 20 mL IV syringe  1 g IntraVENous Q12H    ferrous sulfate tablet 325 mg  1 Tablet Oral BID WITH MEALS    heparin 25,000 units in  ml infusion  12-25 Units/kg/hr (Adjusted) IntraVENous TITRATE    heparin (porcine) 1,000 unit/mL injection 4,000 Units  4,000 Units IntraVENous PRN    Or    heparin (porcine) 1,000 unit/mL injection 2,000 Units  2,000 Units IntraVENous PRN    metoprolol succinate (TOPROL-XL) XL tablet 50 mg  50 mg Oral DAILY    nitroglycerin (NITROBID) 2 % ointment 0.5 Inch  0.5 Inch Topical Q6H    aspirin chewable tablet 81 mg  81 mg Oral DAILY    heparin (porcine) 1,000 unit/mL injection 2,800 Units  2,800 Units Hemodialysis DIALYSIS PRN           Problem List:   Patient has a acute on chronic renal failure and had a dialysis and is clinically better.   Decompensated heart failure and ejection fraction is depressed and patient has a at least moderate possibly severe pulmonary hypertension. History of paroxysmal atrial fibrillation. Recent DVT of her left axillary vein. History of paroxysmal atrial fibrillation. Wide-complex tachycardia probably ventricular tachycardia. 1.      Assessment/Plan:   I will continue dialysis as per recommendations of the nephrologist.  We will otherwise continue present care. Patient did not want to have a cath during the recent admission and was clinically stable still may offer him cardiac catheterization because is a new onset left ventricular systolic dysfunction and troponin I is minimally elevated. I discussed with the patient and with the nurse and with Dr. Sivan Roy and probably will need to consider cardiac catheterization and the patient probably will require either ICD. Meanwhile I will start the patient on amiodarone. Cardiac catheterization and possible angioplasty stent insertion procedure explained to the patient and risk benefits discussed and patient agrees. Thank you. 1.          []       High complexity decision making was performed in this patient at high risk for decompensation with multiple organ involvement.     Lani Milton MD

## 2022-01-12 NOTE — PROGRESS NOTES
0915-Pt noted to have wide comples tachycardia at 203. Pt at this time now c/o shortness of breath. 0916-Dr. Evens Silva notified of patient condition. New orders received. 0918-Pt appears sinus tach at 160s, still c/o sob. 0919-Adenosine 6 mg ICP given. Pt report that sob is feeling better. 0920-Ricci Miguel made aware of consult and patient's current condition. 6924- Pt remains in sinus tach rate in the 150s. Pt denies any sob at this time. 0943-Pt back in SVT, rate 207, Pt denies any sob or cp. Pt remains awake and alert. 0944-Pt given 12 mg of adenosine IVP. 0946-Pt remains SVT at 203.     0947-Pt given 12 mg of adenosine. 0948-Converted ST 140s    1006-Dr. Wiley aware that patient is a dialysis patient. RN cleared with him first before giving patient Digoxin. 1008-Digoxin 0.25 mg given IVP. 1010-Pt resting quietly in bed with nursing staff at bedside. Denies any sob or cp. Pt states feeling better, HR remains in 140s and ST. RN remains at bedside and continues to monitor patient.

## 2022-01-12 NOTE — PROGRESS NOTES
TRANSFER - OUT REPORT:    Verbal report given to Joanna Kwok RN on Dima Banerjee  being transferred to ICU, room 273 for routine progression of care       Report consisted of patients Situation, Background, Assessment and   Recommendations(SBAR). Information from the following report(s) SBAR, Kardex, [facility] and Cardiac Rhythm sinus arrhythmia was reviewed with the receiving nurse. Opportunity for questions and clarification was provided.       Patient transported with:   Monitor  O2 @ 3 liters  Registered Nurse

## 2022-01-12 NOTE — PROGRESS NOTES
TRANSFER - IN REPORT:    Verbal report received from Chaim Dean, [address] on [facility]  being received from PACU for change in patient condition(sinus arrthmyia- possible cath tomorrow)      Report consisted of patients Situation, Background, Assessment and   Recommendations(SBAR). Information from the following report(s) SBAR, Kardex, ED Summary, Intake/Output, MAR, Recent Results and Cardiac Rhythm Sinus arrythmia  was reviewed with the receiving nurse. Opportunity for questions and clarification was provided. Assessment completed upon patients arrival to unit and care assumed.

## 2022-01-12 NOTE — PROGRESS NOTES
Pt HR noticed to be 201 on the bedside pacu monitor; EKG completed which shows wide QRS tachycardia with a HR of 193; Dr. Leah Wiggins called and orders given to give the pt 6mg IV Adenocard, then repeat at 12mg IV Adenocard if pt not converted to NSR; 1st dose of IV Adenocard given at 0511 without any change in HR, second dose of IV Adenocard given at 0518 with successful conversion to SR w/ PVCs;  Both doses of Adenosine given while pt connected to the defib monitor; Repeat EKG after second dose of Adenosine completed showing a rhythm of ST w/ PACs; pt does not complain of any chest pain at this time and states he \"feels fine\"

## 2022-01-12 NOTE — PROGRESS NOTES
[facility]     Renal Daily Progress Note:     Admission Date: 2022     Subjective: Recurrent SVT. Fatigued but looks better. Eating without nausea or vomiting. Mental status is good. He  looks better, less short of breath, not tachypneic. Unable to perform dialysis today because of unstable cardiac rhythm without a water source in current location, ICU full    Review of Systems  Pertinent items are noted in HPI.     Objective:     Visit Vitals  /81   Pulse 79   Temp 99.1 °F (37.3 °C)   Resp 15   Ht 6' (1.829 m)   Wt 83.8 kg (184 lb 11.9 oz)   SpO2 95%   BMI 25.06 kg/m²     Temp (24hrs), Av.4 °F (36.9 °C), Min:97.9 °F (36.6 °C), Max:99.1 °F (37.3 °C)        Intake/Output Summary (Last 24 hours) at 2022 1620  Last data filed at 2022 1203  Gross per 24 hour   Intake 240 ml   Output --   Net 240 ml     Current Facility-Administered Medications   Medication Dose Route Frequency    amiodarone (CORDARONE) tablet 400 mg  400 mg Oral BID    meropenem (MERREM) 1 g in 0.9% sodium chloride 20 mL IV syringe  1 g IntraVENous Q12H    ferrous sulfate tablet 325 mg  1 Tablet Oral BID WITH MEALS    heparin 25,000 units in  ml infusion  12-25 Units/kg/hr (Adjusted) IntraVENous TITRATE    heparin (porcine) 1,000 unit/mL injection 4,000 Units  4,000 Units IntraVENous PRN    Or    heparin (porcine) 1,000 unit/mL injection 2,000 Units  2,000 Units IntraVENous PRN    metoprolol succinate (TOPROL-XL) XL tablet 50 mg  50 mg Oral DAILY    nitroglycerin (NITROBID) 2 % ointment 0.5 Inch  0.5 Inch Topical Q6H    aspirin chewable tablet 81 mg  81 mg Oral DAILY    heparin (porcine) 1,000 unit/mL injection 2,800 Units  2,800 Units Hemodialysis DIALYSIS PRN       Physical Exam:General appearance: alert, cooperative, no distress, appears older than stated age  Head: Normocephalic, without obvious abnormality, atraumatic  Lungs: clear to auscultation bilaterally  Heart: regular rate and rhythm, no rub  Abdomen: soft, non-tender. Bowel sounds normal. No masses,  no organomegaly  Extremities: venous stasis dermatitis noted, edema: decreased lower extremity edema but has dependent thigh edema  Skin: Left lower leg with posterior ulcer inferior to calf  Neurologic: Grossly normal    Data Review:     LABS:  Recent Labs     01/12/22  0542 01/11/22  1330 01/10/22  0550    140 140   K 3.5 3.7 4.2    104 107   CO2 23 26 21   BUN 53* 48* 92*   CREA 5.45* 4.86* 7.35*   CA 7.0* 7.0* 7.6*  8.0*   ALB 2.0* 2.0* 2.2*   MG 2.4  --   --    Iron saturation 13%    Recent Labs     01/12/22  0542 01/11/22  0400 01/10/22  0550   WBC 8.4 9.6 10.9   HGB 8.7* 8.6* 8.6*   HCT 29.4* 28.6* 29.0*    192 193     No results for input(s): HÉCTOR, KU, CLU, CREAU in the last 72 hours. No lab exists for component: PROU   Chest x-ray January 8, 2022:  IMPRESSION     Interval placement of a right supraclavicular approach dialysis catheter with  distal tip in the proximal right atrium.     Cardiomegaly with pulmonary vascular congestion and pulmonary edema.      Stable appearing basilar right lung pneumonia. Small right pleural effusion.   Assessment:   Renal Specific Problems  Chronic kidney disease stage VI  SVT- recurrent  Volume overload  Metabolic acidosis- resolved  Hyperkalemia- improved  Anemia with renal failure and iron deficient  Leukocytosis with elevated but declining procalcitonin  Probable right lower lobe pneumonia  Left leg ulcer  Venous stasis dermatitis lower extremity          Plan:     Obtain/ Order: labs/cultures/radiology/procedures:      Therapeutic:    Dialysis Thursday  Epogen if iron saturation greater than 20%  Oral iron, avoid IV iron if there is signs of active infection  Wound care to see regarding left leg ulcer    Case management to arrange outpatient dialysis    Nicole Mota MD    [phone]

## 2022-01-12 NOTE — PROGRESS NOTES
PT evaluation order received and acknowledged. PT evaluation attempted at 9:18; per RN/medical chart, pt currently with elevated HR 160s at rest, not appropriate for skilled PT evaluation at this time. Will continue to follow and re-attempt PT eval at a later time. Thank you.

## 2022-01-12 NOTE — PROGRESS NOTES
After pt received the Adenosine x2 doses, he expressed that he had urinated on himself and wanted to be cleaned up, pt was wiped down and new gown placed and when pt attempted to turn on his side to change out the pad under him, his HR went up to 148 and stayed at 148;  Dr. Annie Weber was called again and he gave orders to give 5mg IV Lopressor and to repeat again aftere 5 minutes if 1st dose did not bring HR down and if BP allowed; 1st dose of IV Lopressor given at 0542 and 2nd dose given at 0611; a clean pad was placed between the pts legs and he agreed that we could completely change out the pad once his HR stabilized; will continue to monitor pt

## 2022-01-13 LAB
ALBUMIN SERPL-MCNC: 2.2 G/DL (ref 3.5–5)
ALBUMIN/GLOB SERPL: 0.6 {RATIO} (ref 1.1–2.2)
ALP SERPL-CCNC: 117 U/L (ref 45–117)
ALT SERPL-CCNC: 11 U/L (ref 12–78)
ANION GAP SERPL CALC-SCNC: 10 MMOL/L (ref 5–15)
APTT PPP: 95.3 SEC (ref 21.2–34.1)
ASO AB SERPL-ACNC: 86 IU/ML (ref 0–200)
AST SERPL W P-5'-P-CCNC: 11 U/L (ref 15–37)
BASOPHILS # BLD: 0 K/UL (ref 0–0.1)
BASOPHILS NFR BLD: 0 % (ref 0–1)
BILIRUB SERPL-MCNC: 0.6 MG/DL (ref 0.2–1)
BUN SERPL-MCNC: 62 MG/DL (ref 6–20)
BUN/CREAT SERPL: 10 (ref 12–20)
CA-I BLD-MCNC: 7.2 MG/DL (ref 8.5–10.1)
CHLORIDE SERPL-SCNC: 104 MMOL/L (ref 97–108)
CO2 SERPL-SCNC: 25 MMOL/L (ref 21–32)
CREAT SERPL-MCNC: 6.3 MG/DL (ref 0.7–1.3)
CRP SERPL-MCNC: 5.3 MG/DL (ref 0–0.6)
DIFFERENTIAL METHOD BLD: ABNORMAL
EOSINOPHIL # BLD: 0.1 K/UL (ref 0–0.4)
EOSINOPHIL NFR BLD: 1 % (ref 0–7)
ERYTHROCYTE [DISTWIDTH] IN BLOOD BY AUTOMATED COUNT: 15.3 % (ref 11.5–14.5)
GLOBULIN SER CALC-MCNC: 3.4 G/DL (ref 2–4)
GLUCOSE SERPL-MCNC: 102 MG/DL (ref 65–100)
HBV CORE AB SERPL QL IA: NEGATIVE
HCT VFR BLD AUTO: 30 % (ref 36.6–50.3)
HGB BLD-MCNC: 8.9 G/DL (ref 12.1–17)
IMM GRANULOCYTES # BLD AUTO: 0.2 K/UL (ref 0–0.04)
IMM GRANULOCYTES NFR BLD AUTO: 2 % (ref 0–0.5)
LYMPHOCYTES # BLD: 0.4 K/UL (ref 0.8–3.5)
LYMPHOCYTES NFR BLD: 4 % (ref 12–49)
MCH RBC QN AUTO: 30.1 PG (ref 26–34)
MCHC RBC AUTO-ENTMCNC: 29.7 G/DL (ref 30–36.5)
MCV RBC AUTO: 101.4 FL (ref 80–99)
MONOCYTES # BLD: 0.9 K/UL (ref 0–1)
MONOCYTES NFR BLD: 9 % (ref 5–13)
NEUTS SEG # BLD: 8.7 K/UL (ref 1.8–8)
NEUTS SEG NFR BLD: 84 % (ref 32–75)
NRBC # BLD: 0 K/UL (ref 0–0.01)
NRBC BLD-RTO: 0 PER 100 WBC
PLATELET # BLD AUTO: 191 K/UL (ref 150–400)
PMV BLD AUTO: 9.4 FL (ref 8.9–12.9)
POTASSIUM SERPL-SCNC: 3.8 MMOL/L (ref 3.5–5.1)
PROCALCITONIN SERPL-MCNC: 2.39 NG/ML
PROT SERPL-MCNC: 5.6 G/DL (ref 6.4–8.2)
RBC # BLD AUTO: 2.96 M/UL (ref 4.1–5.7)
SODIUM SERPL-SCNC: 139 MMOL/L (ref 136–145)
THERAPEUTIC RANGE,PTTT: ABNORMAL SEC (ref 82–109)
WBC # BLD AUTO: 10.3 K/UL (ref 4.1–11.1)

## 2022-01-13 PROCEDURE — 74011250636 HC RX REV CODE- 250/636: Performed by: INTERNAL MEDICINE

## 2022-01-13 PROCEDURE — 93458 L HRT ARTERY/VENTRICLE ANGIO: CPT | Performed by: INTERNAL MEDICINE

## 2022-01-13 PROCEDURE — 90935 HEMODIALYSIS ONE EVALUATION: CPT

## 2022-01-13 PROCEDURE — 77030013516 HC DEV INFL ANGI MRTM -B: Performed by: INTERNAL MEDICINE

## 2022-01-13 PROCEDURE — 84145 PROCALCITONIN (PCT): CPT

## 2022-01-13 PROCEDURE — 93571 IV DOP VEL&/PRESS C FLO 1ST: CPT | Performed by: INTERNAL MEDICINE

## 2022-01-13 PROCEDURE — 74011000250 HC RX REV CODE- 250: Performed by: INTERNAL MEDICINE

## 2022-01-13 PROCEDURE — C1887 CATHETER, GUIDING: HCPCS | Performed by: INTERNAL MEDICINE

## 2022-01-13 PROCEDURE — 5A1D70Z PERFORMANCE OF URINARY FILTRATION, INTERMITTENT, LESS THAN 6 HOURS PER DAY: ICD-10-PCS | Performed by: INTERNAL MEDICINE

## 2022-01-13 PROCEDURE — 85025 COMPLETE CBC W/AUTO DIFF WBC: CPT

## 2022-01-13 PROCEDURE — 77030025703 HC SYR ANGI VACLOK MRTM -A: Performed by: INTERNAL MEDICINE

## 2022-01-13 PROCEDURE — 74011250636 HC RX REV CODE- 250/636: Performed by: FAMILY MEDICINE

## 2022-01-13 PROCEDURE — 85730 THROMBOPLASTIN TIME PARTIAL: CPT

## 2022-01-13 PROCEDURE — 99233 SBSQ HOSP IP/OBS HIGH 50: CPT | Performed by: INTERNAL MEDICINE

## 2022-01-13 PROCEDURE — 99152 MOD SED SAME PHYS/QHP 5/>YRS: CPT | Performed by: INTERNAL MEDICINE

## 2022-01-13 PROCEDURE — 74011250637 HC RX REV CODE- 250/637: Performed by: INTERNAL MEDICINE

## 2022-01-13 PROCEDURE — C1760 CLOSURE DEV, VASC: HCPCS | Performed by: INTERNAL MEDICINE

## 2022-01-13 PROCEDURE — 77030004522 HC CATH ANGI DX EXPO BSC -A: Performed by: INTERNAL MEDICINE

## 2022-01-13 PROCEDURE — 65610000006 HC RM INTENSIVE CARE

## 2022-01-13 PROCEDURE — 99153 MOD SED SAME PHYS/QHP EA: CPT | Performed by: INTERNAL MEDICINE

## 2022-01-13 PROCEDURE — C1894 INTRO/SHEATH, NON-LASER: HCPCS | Performed by: INTERNAL MEDICINE

## 2022-01-13 PROCEDURE — C1769 GUIDE WIRE: HCPCS | Performed by: INTERNAL MEDICINE

## 2022-01-13 PROCEDURE — 80053 COMPREHEN METABOLIC PANEL: CPT

## 2022-01-13 PROCEDURE — 77030041063 HC CATH DX ANGI DXTRTY MEDT -A: Performed by: INTERNAL MEDICINE

## 2022-01-13 PROCEDURE — 4A023N7 MEASUREMENT OF CARDIAC SAMPLING AND PRESSURE, LEFT HEART, PERCUTANEOUS APPROACH: ICD-10-PCS | Performed by: INTERNAL MEDICINE

## 2022-01-13 PROCEDURE — 86140 C-REACTIVE PROTEIN: CPT

## 2022-01-13 PROCEDURE — 74011000636 HC RX REV CODE- 636: Performed by: INTERNAL MEDICINE

## 2022-01-13 PROCEDURE — 77030008542 HC TBNG MON PRSS EDWD -A: Performed by: INTERNAL MEDICINE

## 2022-01-13 PROCEDURE — 85347 COAGULATION TIME ACTIVATED: CPT

## 2022-01-13 PROCEDURE — 36415 COLL VENOUS BLD VENIPUNCTURE: CPT

## 2022-01-13 DEVICE — ANGIO-SEAL VIP VASCULAR CLOSURE DEVICE
Type: IMPLANTABLE DEVICE | Status: FUNCTIONAL
Brand: ANGIO-SEAL

## 2022-01-13 RX ORDER — LIDOCAINE HYDROCHLORIDE 10 MG/ML
INJECTION INFILTRATION; PERINEURAL AS NEEDED
Status: DISCONTINUED | OUTPATIENT
Start: 2022-01-13 | End: 2022-01-13 | Stop reason: HOSPADM

## 2022-01-13 RX ORDER — HEPARIN SODIUM 200 [USP'U]/100ML
INJECTION, SOLUTION INTRAVENOUS
Status: COMPLETED | OUTPATIENT
Start: 2022-01-13 | End: 2022-01-13

## 2022-01-13 RX ORDER — DIPHENHYDRAMINE HYDROCHLORIDE 50 MG/ML
INJECTION, SOLUTION INTRAMUSCULAR; INTRAVENOUS AS NEEDED
Status: DISCONTINUED | OUTPATIENT
Start: 2022-01-13 | End: 2022-01-13 | Stop reason: HOSPADM

## 2022-01-13 RX ORDER — FENTANYL CITRATE 50 UG/ML
INJECTION, SOLUTION INTRAMUSCULAR; INTRAVENOUS AS NEEDED
Status: DISCONTINUED | OUTPATIENT
Start: 2022-01-13 | End: 2022-01-13 | Stop reason: HOSPADM

## 2022-01-13 RX ORDER — HEPARIN SODIUM 1000 [USP'U]/ML
INJECTION, SOLUTION INTRAVENOUS; SUBCUTANEOUS AS NEEDED
Status: DISCONTINUED | OUTPATIENT
Start: 2022-01-13 | End: 2022-01-13 | Stop reason: HOSPADM

## 2022-01-13 RX ORDER — MIDAZOLAM HYDROCHLORIDE 1 MG/ML
INJECTION INTRAMUSCULAR; INTRAVENOUS AS NEEDED
Status: DISCONTINUED | OUTPATIENT
Start: 2022-01-13 | End: 2022-01-13 | Stop reason: HOSPADM

## 2022-01-13 RX ORDER — SODIUM CHLORIDE 0.9 % (FLUSH) 0.9 %
5-40 SYRINGE (ML) INJECTION EVERY 8 HOURS
Status: DISCONTINUED | OUTPATIENT
Start: 2022-01-13 | End: 2022-02-08 | Stop reason: HOSPADM

## 2022-01-13 RX ORDER — SODIUM CHLORIDE 0.9 % (FLUSH) 0.9 %
5-40 SYRINGE (ML) INJECTION AS NEEDED
Status: DISCONTINUED | OUTPATIENT
Start: 2022-01-13 | End: 2022-02-08 | Stop reason: HOSPADM

## 2022-01-13 RX ADMIN — Medication 12 UNITS/KG/HR: at 17:28

## 2022-01-13 RX ADMIN — FERROUS SULFATE TAB 325 MG (65 MG ELEMENTAL FE) 325 MG: 325 (65 FE) TAB at 08:20

## 2022-01-13 RX ADMIN — FERROUS SULFATE TAB 325 MG (65 MG ELEMENTAL FE) 325 MG: 325 (65 FE) TAB at 16:53

## 2022-01-13 RX ADMIN — NITROGLYCERIN 0.5 INCH: 20 OINTMENT TOPICAL at 17:09

## 2022-01-13 RX ADMIN — ASPIRIN 81 MG CHEWABLE TABLET 81 MG: 81 TABLET CHEWABLE at 08:20

## 2022-01-13 RX ADMIN — HEPARIN SODIUM 2800 UNITS: 1000 INJECTION INTRAVENOUS; SUBCUTANEOUS at 17:01

## 2022-01-13 RX ADMIN — NITROGLYCERIN 0.5 INCH: 20 OINTMENT TOPICAL at 23:48

## 2022-01-13 RX ADMIN — SODIUM CHLORIDE, PRESERVATIVE FREE 1 G: 5 INJECTION INTRAVENOUS at 16:52

## 2022-01-13 RX ADMIN — AMIODARONE HYDROCHLORIDE 400 MG: 200 TABLET ORAL at 08:20

## 2022-01-13 RX ADMIN — HEPARIN SODIUM 4000 UNITS: 1000 INJECTION INTRAVENOUS; SUBCUTANEOUS at 17:30

## 2022-01-13 RX ADMIN — NITROGLYCERIN 0.5 INCH: 20 OINTMENT TOPICAL at 12:36

## 2022-01-13 RX ADMIN — METOPROLOL SUCCINATE 50 MG: 25 TABLET, EXTENDED RELEASE ORAL at 08:20

## 2022-01-13 RX ADMIN — NITROGLYCERIN 0.5 INCH: 20 OINTMENT TOPICAL at 05:21

## 2022-01-13 RX ADMIN — SODIUM CHLORIDE, PRESERVATIVE FREE 10 ML: 5 INJECTION INTRAVENOUS at 16:52

## 2022-01-13 RX ADMIN — AMIODARONE HYDROCHLORIDE 400 MG: 200 TABLET ORAL at 20:42

## 2022-01-13 RX ADMIN — SODIUM CHLORIDE, PRESERVATIVE FREE 1 G: 5 INJECTION INTRAVENOUS at 22:49

## 2022-01-13 NOTE — CONSULTS
Lester Turk M.D. and Yelitza Kiran. Armaan Chi M.D.  [address], [address], [address] [place] [address]  [phone]   [email or web address]      Date of  Admission: 1/7/2022 10:29 PM     Assessment and Plan:     Kirstei Wilder is admitted with sepsis. 1.  Ventricular tachycardia  2. Nonischemic cardiomyopathy  3. Chronic systolic heart failure  4. End-stage renal disease, now on dialysis    He needs ICD for secondary prevention. He has now had 2 separate occurrences of this over the past year. EF remains low compared to July 2021. I discussed the indication for ICD. Also briefly discussed procedure, risks and benefits. He is not interested in ICD at this time. He feels that he has had too many procedures recently. Discussed risk of sudden cardiac death. He understands this. I will discussed with him again but at this time, we will hold off on ICD until he agrees. Due to his infectious issues, we need to be aware of risk for infection with ICD implant. May benefit from S-ICD, rather than transvenous ICD, if he wishes to proceed. Thank you for this consultation. Please call with questions. REASON FOR CONSULT: Ventricular tachycardia   Primary Cardiologist:  Dr. Josh Cast    HPI:  79-year-old gentleman, known to me from prior hospitalization in July 2021. History of cardiomyopathy, ventricular tachycardia, end-stage renal disease now started on dialysis. He presented to the emergency room with complaints of headache and weakness and fatigue. He was initially diagnosed with sepsis and treated with antibiotics. He was also started on dialysis due to volume overload and worsening renal failure. He developed ventricular tachycardia yesterday early morning. Initially was given adenosine and reportedly broke to sinus but then had recurrence of tachycardia. Ultimately was given IV metoprolol with termination and did not recur.   He was then started on amiodarone. He underwent left heart catheterization today which showed no significant CAD. Patient Active Problem List    Diagnosis Date Noted    Renal failure 01/08/2022    Hyperkalemia 01/08/2022    Pulmonary edema 07/13/2021    GI bleed 01/05/2021      Phillip More MD  Past Medical History:   Diagnosis Date    Chronic kidney disease     Hypertension       Past Surgical History:   Procedure Laterality Date    IR INSERT NON TUNL CVC OVER 5 YRS  1/8/2022     No Known Allergies   No family history on file. Social History     Socioeconomic History    Marital status: SINGLE     Spouse name: Not on file    Number of children: Not on file    Years of education: Not on file    Highest education level: Not on file   Occupational History    Not on file   Tobacco Use    Smoking status: Never Smoker    Smokeless tobacco: Never Used   Substance and Sexual Activity    Alcohol use: Not on file    Drug use: Not on file    Sexual activity: Not on file   Other Topics Concern    Not on file   Social History Narrative    Not on file     Social Determinants of Health     Financial Resource Strain:     Difficulty of Paying Living Expenses: Not on file   Food Insecurity:     Worried About Running Out of Food in the Last Year: Not on file    Dale of Food in the Last Year: Not on file   Transportation Needs:     Lack of Transportation (Medical): Not on file    Lack of Transportation (Non-Medical):  Not on file   Physical Activity:     Days of Exercise per Week: Not on file    Minutes of Exercise per Session: Not on file   Stress:     Feeling of Stress : Not on file   Social Connections:     Frequency of Communication with Friends and Family: Not on file    Frequency of Social Gatherings with Friends and Family: Not on file    Attends [employer] Services: Not on file    Active Member of Clubs or Organizations: Not on file    Attends Club or Organization Meetings: Not on file    Marital Status: Not on file   Intimate Partner Violence:     Fear of Current or Ex-Partner: Not on file    Emotionally Abused: Not on file    Physically Abused: Not on file    Sexually Abused: Not on file   Housing Stability:     Unable to Pay for Housing in the Last Year: Not on file    Number of Places Lived in the Last Year: Not on file    Unstable Housing in the Last Year: Not on file     Current Facility-Administered Medications   Medication Dose Route Frequency    sodium chloride (NS) flush 5-40 mL  5-40 mL IntraVENous Q8H    sodium chloride (NS) flush 5-40 mL  5-40 mL IntraVENous PRN    amiodarone (CORDARONE) tablet 400 mg  400 mg Oral BID    meropenem (MERREM) 1 g in 0.9% sodium chloride 20 mL IV syringe  1 g IntraVENous Q12H    ferrous sulfate tablet 325 mg  1 Tablet Oral BID WITH MEALS    heparin 25,000 units in  ml infusion  12-25 Units/kg/hr (Adjusted) IntraVENous TITRATE    heparin (porcine) 1,000 unit/mL injection 4,000 Units  4,000 Units IntraVENous PRN    Or    heparin (porcine) 1,000 unit/mL injection 2,000 Units  2,000 Units IntraVENous PRN    metoprolol succinate (TOPROL-XL) XL tablet 50 mg  50 mg Oral DAILY    nitroglycerin (NITROBID) 2 % ointment 0.5 Inch  0.5 Inch Topical Q6H    aspirin chewable tablet 81 mg  81 mg Oral DAILY    heparin (porcine) 1,000 unit/mL injection 2,800 Units  2,800 Units Hemodialysis DIALYSIS PRN           Review of Symptoms:  A comprehensive review of systems was negative in 11 points other than stated above in HPI. Physical Exam    Visit Vitals  /83   Pulse 80   Temp 98 °F (36.7 °C)   Resp 18   Ht 6' (1.829 m)   Wt 79.7 kg (175 lb 11.3 oz)   SpO2 98%   BMI 23.83 kg/m²     Currently on dialysis  Skin warm and dry  HEENT: WNL  Oropharynx without exudate. Neck supple  Lungs clear  Heart -regular  Abdomen soft and non tender,   Pulses 2+ throughout   Neuro:  Normal facial grimace,  Moves all extremities.    AAAO   Psych: unanxious    Labs:   Recent Results (from the past 24 hour(s))   PTT    Collection Time: 01/12/22  8:00 PM   Result Value Ref Range    aPTT 92.9 (H) 21.2 - 34.1 sec    aPTT, therapeutic range   82 - 872 sec   METABOLIC PANEL, COMPREHENSIVE    Collection Time: 01/13/22  4:15 AM   Result Value Ref Range    Sodium 139 136 - 145 mmol/L    Potassium 3.8 3.5 - 5.1 mmol/L    Chloride 104 97 - 108 mmol/L    CO2 25 21 - 32 mmol/L    Anion gap 10 5 - 15 mmol/L    Glucose 102 (H) 65 - 100 mg/dL    BUN 62 (H) 6 - 20 mg/dL    Creatinine 6.30 (H) 0.70 - 1.30 mg/dL    BUN/Creatinine ratio 10 (L) 12 - 20      GFR est AA 11 (L) >60 ml/min/1.73m2    GFR est non-AA 9 (L) >60 ml/min/1.73m2    Calcium 7.2 (L) 8.5 - 10.1 mg/dL    Bilirubin, total 0.6 0.2 - 1.0 mg/dL    AST (SGOT) 11 (L) 15 - 37 U/L    ALT (SGPT) 11 (L) 12 - 78 U/L    Alk.  phosphatase 117 45 - 117 U/L    Protein, total 5.6 (L) 6.4 - 8.2 g/dL    Albumin 2.2 (L) 3.5 - 5.0 g/dL    Globulin 3.4 2.0 - 4.0 g/dL    A-G Ratio 0.6 (L) 1.1 - 2.2     C REACTIVE PROTEIN, QT    Collection Time: 01/13/22  4:15 AM   Result Value Ref Range    C-Reactive protein 5.30 (H) 0.00 - 0.60 mg/dL   PROCALCITONIN    Collection Time: 01/13/22  4:15 AM   Result Value Ref Range    Procalcitonin 2.39 (H) 0 ng/mL   PTT    Collection Time: 01/13/22  4:15 AM   Result Value Ref Range    aPTT 95.3 (H) 21.2 - 34.1 sec    aPTT, therapeutic range   82 - 109 sec   CBC WITH AUTOMATED DIFF    Collection Time: 01/13/22  4:15 AM   Result Value Ref Range    WBC 10.3 4.1 - 11.1 K/uL    RBC 2.96 (L) 4.10 - 5.70 M/uL    HGB 8.9 (L) 12.1 - 17.0 g/dL    HCT 30.0 (L) 36.6 - 50.3 %    .4 (H) 80.0 - 99.0 FL    MCH 30.1 26.0 - 34.0 PG    MCHC 29.7 (L) 30.0 - 36.5 g/dL    RDW 15.3 (H) 11.5 - 14.5 %    PLATELET 237 523 - 369 K/uL    MPV 9.4 8.9 - 12.9 FL    NRBC 0.0 0.0  WBC    ABSOLUTE NRBC 0.00 0.00 - 0.01 K/uL    NEUTROPHILS 84 (H) 32 - 75 %    LYMPHOCYTES 4 (L) 12 - 49 %    MONOCYTES 9 5 - 13 %    EOSINOPHILS 1 0 - 7 %    BASOPHILS 0 0 - 1 %    IMMATURE GRANULOCYTES 2 (H) 0 - 0.5 %    ABS. NEUTROPHILS 8.7 (H) 1.8 - 8.0 K/UL    ABS. LYMPHOCYTES 0.4 (L) 0.8 - 3.5 K/UL    ABS. MONOCYTES 0.9 0.0 - 1.0 K/UL    ABS. EOSINOPHILS 0.1 0.0 - 0.4 K/UL    ABS. BASOPHILS 0.0 0.0 - 0.1 K/UL    ABS. IMM. GRANS. 0.2 (H) 0.00 - 0.04 K/UL    DF AUTOMATED         Cardiographics    Telemetry: Sinus with PVCs  ECG: Sinus tachycardia with septal and lateral infarcts. Echocardiogram:     1/8/22  Result status: Final result       Left Ventricle: Left ventricle is dilated. Increased wall thickness. Normal wall motion. Normal left ventricular systolic function with a visually estimated EF of 20 - 25%. Grade I diastolic dysfunction with normal LAP.   Right Ventricle: Right ventricle is mildly dilated. Normal wall thickness.   Aortic Valve: Mild to moderate transvalvular regurgitation.   Mitral Valve: Moderate transvalvular regurgitation.   Tricuspid Valve: Moderate to severe transvalvular regurgitation.   Pulmonic Valve: Moderate transvalvular regurgitation.   Left Atrium: Left atrium is moderately dilated.   Right Atrium: Right atrium is dilated.   Pericardium: Left pleural effusion.

## 2022-01-13 NOTE — PROGRESS NOTES
Progress Note    Ricky Galloway MD             Daily Progress Note: 1/13/2022      Subjective: The patient is seen for follow  up. Patient is lying in the bed. He had cardiac catheterization today and it did not show any blockages.   As per electrophysiologist patient does not want any ICDs placed  He denies any chest pain or shortness of breath  No history of nausea vomiting diarrhea abdominal pain or black  Problem List:  Problem List as of 1/13/2022 Never Reviewed          Codes Class Noted - Resolved    Renal failure ICD-10-CM: N19  ICD-9-CM: 787  1/8/2022 - Present        Hyperkalemia ICD-10-CM: E87.5  ICD-9-CM: 276.7  1/8/2022 - Present        Pulmonary edema ICD-10-CM: J81.1  ICD-9-CM: 260  7/13/2021 - Present        GI bleed ICD-10-CM: K92.2  ICD-9-CM: 578.9  1/5/2021 - Present              Medications reviewed  Current Facility-Administered Medications   Medication Dose Route Frequency    sodium chloride (NS) flush 5-40 mL  5-40 mL IntraVENous Q8H    sodium chloride (NS) flush 5-40 mL  5-40 mL IntraVENous PRN    amiodarone (CORDARONE) tablet 400 mg  400 mg Oral BID    meropenem (MERREM) 1 g in 0.9% sodium chloride 20 mL IV syringe  1 g IntraVENous Q12H    ferrous sulfate tablet 325 mg  1 Tablet Oral BID WITH MEALS    heparin 25,000 units in  ml infusion  12-25 Units/kg/hr (Adjusted) IntraVENous TITRATE    heparin (porcine) 1,000 unit/mL injection 4,000 Units  4,000 Units IntraVENous PRN    Or    heparin (porcine) 1,000 unit/mL injection 2,000 Units  2,000 Units IntraVENous PRN    metoprolol succinate (TOPROL-XL) XL tablet 50 mg  50 mg Oral DAILY    nitroglycerin (NITROBID) 2 % ointment 0.5 Inch  0.5 Inch Topical Q6H    aspirin chewable tablet 81 mg  81 mg Oral DAILY    heparin (porcine) 1,000 unit/mL injection 2,800 Units  2,800 Units Hemodialysis DIALYSIS PRN       Review of Systems:   A comprehensive review of systems was negative except for that written in the HPI.    Objective:   Physical Exam:     Visit Vitals  /83   Pulse 68   Temp 98 °F (36.7 °C)   Resp 14   Ht 6' (1.829 m)   Wt 79.7 kg (175 lb 11.3 oz)   SpO2 99%   BMI 23.83 kg/m²    O2 Flow Rate (L/min): 3 l/min O2 Device: Nasal cannula    Temp (24hrs), Av °F (36.7 °C), Min:97.5 °F (36.4 °C), Max:98.3 °F (36.8 °C)     07 - 1900  In: -   Out: 2980    1901 -  07  In: 600 [P.O.:390; I.V.:210]  Out: 0     General:  Alert, cooperative, no distress, appears stated age. Lungs:   Clear to auscultation bilaterally. Chest wall:  No tenderness or deformity. Heart:  Regular rate and rhythm, S1, S2 normal, no murmur, click, rub or gallop. Abdomen:   Soft, non-tender. Bowel sounds normal. No masses,  No organomegaly. Extremities: Extremities normal, atraumatic, no cyanosis or edema. Pulses: 2+ and symmetric all extremities. Skin: Skin color, texture, turgor normal. No rashes or lesions   Neurologic: CNII-XII intact. No gross sensory or motor deficits     Data Review:       Recent Days:  Recent Labs     22  0415 22  0542 22  0400   WBC 10.3 8.4 9.6   HGB 8.9* 8.7* 8.6*   HCT 30.0* 29.4* 28.6*    176 192     Recent Labs     22  0415 22  0542 22  1330    138 140   K 3.8 3.5 3.7    104 104   CO2 25 23 26   * 114* 120*   BUN 62* 53* 48*   CREA 6.30* 5.45* 4.86*   CA 7.2* 7.0* 7.0*   MG  --  2.4  --    ALB 2.2* 2.0* 2.0*   TBILI 0.6 0.7 0.8   ALT 11* 11* 12     No results for input(s): PH, PCO2, PO2, HCO3, FIO2 in the last 72 hours. Results     Procedure Component Value Units Date/Time    CULTURE, Jeremiah Camilla STAIN [[id number]]  (Susceptibility) Collected: 22 0400    Order Status: Completed Specimen: Misc.  Wound Updated: 2214     Special Requests: No Special Requests        GRAM STAIN Rare WBCs seen               Occasional Gram Positive Cocci in pairs                  Rare apparent Gram Negative Rods Culture result:       Heavy Staphylococcus aureus                  Heavy Alcaligenes faecalis                  Light Klebsiella pneumoniae            Light Proteus mirabilis       Susceptibility      Staphylococcus aureus     SHIRA     Ciprofloxacin ($) Susceptible     Clindamycin ($) Susceptible     Daptomycin ($$$$$) Susceptible     Doxycycline ($$) Susceptible     Erythromycin ($$$$) Susceptible     Gentamicin ($) Susceptible     Levofloxacin ($) Susceptible     Linezolid ($$$$$) Susceptible     Moxifloxacin ($$$$) Susceptible     Oxacillin Susceptible     Rifampin ($$$$) Susceptible  [1]      Tetracycline Susceptible     Trimeth/Sulfa Susceptible     Vancomycin ($) Susceptible                 [1]  Rifampin is not to be used for mono-therapy.           Linear View               Susceptibility      Alcaligenes faecalis     SHIRA     Amikacin ($) Susceptible     Cefepime ($$) Susceptible     Ceftazidime ($) Susceptible     Ceftriaxone ($) Susceptible     Ciprofloxacin ($) Susceptible     Gentamicin ($) Susceptible     Levofloxacin ($) Susceptible     Meropenem ($$) Susceptible     Piperacillin/Tazobac ($) Resistant     Tobramycin ($) Susceptible     Trimeth/Sulfa Susceptible                  Linear View               Susceptibility      Klebsiella pneumoniae     SHIRA     Amikacin ($) Susceptible     Ampicillin ($) Resistant     Ampicillin/sulbactam ($) Susceptible     Cefazolin ($) Susceptible     Cefepime ($$) Susceptible     Cefoxitin Susceptible     Ceftazidime ($) Susceptible     Ceftriaxone ($) Susceptible     Ciprofloxacin ($) Susceptible     Gentamicin ($) Susceptible     Levofloxacin ($) Susceptible     Meropenem ($$) Susceptible     Piperacillin/Tazobac ($) Susceptible     Tobramycin ($) Susceptible     Trimeth/Sulfa Susceptible                  Linear View               Susceptibility      Proteus mirabilis     SHIRA     Amikacin ($) Susceptible     Ampicillin ($) Susceptible     Ampicillin/sulbactam ($) Susceptible     Cefazolin ($) Susceptible     Cefepime ($$) Susceptible     Cefoxitin Susceptible     Ceftazidime ($) Susceptible     Ceftriaxone ($) Susceptible     Ciprofloxacin ($) Susceptible     Gentamicin ($) Susceptible     Levofloxacin ($) Susceptible     Meropenem ($$) Susceptible     Piperacillin/Tazobac ($) Susceptible     Tobramycin ($) Susceptible     Trimeth/Sulfa Susceptible                  Linear View                   MRSA SCREEN - PCR (NASAL) [[id number]] Collected: 01/08/22 0223    Order Status: Completed Specimen: Swab Updated: 01/08/22 0516     MRSA by PCR, Nasal Not Detected       COVID-19 RAPID TEST [[id number]] Collected: 01/07/22 2313    Order Status: Completed Specimen: Nasopharyngeal Updated: 01/08/22 0010     Specimen source       Please find results under separate order           COVID-19 rapid test Not Detected        Comment: Rapid [employer] ID Now   Rapid NAAT:  The specimen is NEGATIVE for SARS-CoV-2, the novel coronavirus associated with COVID-19. Negative results should be treated as presumptive and, if inconsistent with clinical signs and symptoms or necessary for patient management, should be tested with an alternative molecular assay. Negative results do not preclude SARS-CoV-2 infection and should not be used as the sole basis for patient management decisions. This test has been authorized by the FDA under   an Emergency Use Authorization (EUA) for use by authorized laboratories.  Fact sheet for Healthcare Providers: ConventionUpdate.co.nz Fact sheet for Patients: ConventionUpdate.co.nz   Methodology: Isothermal Nucleic Acid Amplification              24 Hour Results:  Recent Results (from the past 24 hour(s))   PTT    Collection Time: 01/12/22  8:00 PM   Result Value Ref Range    aPTT 92.9 (H) 21.2 - 34.1 sec    aPTT, therapeutic range   82 - 485 sec   METABOLIC PANEL, COMPREHENSIVE    Collection Time: 01/13/22  4:15 AM   Result Value Ref Range    Sodium 139 136 - 145 mmol/L    Potassium 3.8 3.5 - 5.1 mmol/L    Chloride 104 97 - 108 mmol/L    CO2 25 21 - 32 mmol/L    Anion gap 10 5 - 15 mmol/L    Glucose 102 (H) 65 - 100 mg/dL    BUN 62 (H) 6 - 20 mg/dL    Creatinine 6.30 (H) 0.70 - 1.30 mg/dL    BUN/Creatinine ratio 10 (L) 12 - 20      GFR est AA 11 (L) >60 ml/min/1.73m2    GFR est non-AA 9 (L) >60 ml/min/1.73m2    Calcium 7.2 (L) 8.5 - 10.1 mg/dL    Bilirubin, total 0.6 0.2 - 1.0 mg/dL    AST (SGOT) 11 (L) 15 - 37 U/L    ALT (SGPT) 11 (L) 12 - 78 U/L    Alk. phosphatase 117 45 - 117 U/L    Protein, total 5.6 (L) 6.4 - 8.2 g/dL    Albumin 2.2 (L) 3.5 - 5.0 g/dL    Globulin 3.4 2.0 - 4.0 g/dL    A-G Ratio 0.6 (L) 1.1 - 2.2     C REACTIVE PROTEIN, QT    Collection Time: 01/13/22  4:15 AM   Result Value Ref Range    C-Reactive protein 5.30 (H) 0.00 - 0.60 mg/dL   PROCALCITONIN    Collection Time: 01/13/22  4:15 AM   Result Value Ref Range    Procalcitonin 2.39 (H) 0 ng/mL   PTT    Collection Time: 01/13/22  4:15 AM   Result Value Ref Range    aPTT 95.3 (H) 21.2 - 34.1 sec    aPTT, therapeutic range   82 - 109 sec   CBC WITH AUTOMATED DIFF    Collection Time: 01/13/22  4:15 AM   Result Value Ref Range    WBC 10.3 4.1 - 11.1 K/uL    RBC 2.96 (L) 4.10 - 5.70 M/uL    HGB 8.9 (L) 12.1 - 17.0 g/dL    HCT 30.0 (L) 36.6 - 50.3 %    .4 (H) 80.0 - 99.0 FL    MCH 30.1 26.0 - 34.0 PG    MCHC 29.7 (L) 30.0 - 36.5 g/dL    RDW 15.3 (H) 11.5 - 14.5 %    PLATELET 114 875 - 986 K/uL    MPV 9.4 8.9 - 12.9 FL    NRBC 0.0 0.0  WBC    ABSOLUTE NRBC 0.00 0.00 - 0.01 K/uL    NEUTROPHILS 84 (H) 32 - 75 %    LYMPHOCYTES 4 (L) 12 - 49 %    MONOCYTES 9 5 - 13 %    EOSINOPHILS 1 0 - 7 %    BASOPHILS 0 0 - 1 %    IMMATURE GRANULOCYTES 2 (H) 0 - 0.5 %    ABS. NEUTROPHILS 8.7 (H) 1.8 - 8.0 K/UL    ABS. LYMPHOCYTES 0.4 (L) 0.8 - 3.5 K/UL    ABS. MONOCYTES 0.9 0.0 - 1.0 K/UL    ABS. EOSINOPHILS 0.1 0.0 - 0.4 K/UL    ABS. BASOPHILS 0.0 0.0 - 0.1 K/UL    ABS. IMM. GRANS. 0.2 (H) 0.00 - 0.04 K/UL    DF AUTOMATED         DUPLEX LOW EXT ARTERIES WITH JAVON   Final Result      XR CHEST PORT   Final Result      Interval placement of a right supraclavicular approach dialysis catheter with   distal tip in the proximal right atrium. Cardiomegaly with pulmonary vascular congestion and pulmonary edema. Stable appearing basilar right lung pneumonia. Small right pleural effusion. IR INSERT NON TUNL CVC OVER 5 YRS   Final Result      Technically successful insertion of non-tunneled Trialysis catheter with US   guidance. Plan:       Post catheter placement chest xray confirmed appropriate position of the   catheter. The catheter is appropriate for immediate use.   _______________________________________________________________      PROCEDURE SUMMARY:   - Venous access with ultrasound guidance   - Non-tunneled central venous catheter insertion      PROCEDURE DETAILS:      Pre-procedure   Relevant imaging review: None    Informed consent for the procedure was obtained and time-out was performed prior   to the procedure. Prophylactic antibiotic administered: Not administered   Preparation: The site was prepared and draped using all elements of maximal   sterile barrier technique including sterile gloves, sterile gown, cap, mask,   large sterile sheet, sterile ultrasound probe cover, sterile ultrasound gel,   hand hygiene and cutaneous antisepsis with 2% chlorhexidine. Medical reason for site preparation exception: Not applicable      Anesthesia/sedation   Level of anesthesia: No sedation   Anesthesia administered by: Not applicable   Duration of anesthesia/sedation: 0 minutes. Access   The vessel was sonographically evaluated and judged to be patent and appropriate   for access and a permanent image was stored. Three mLs of 1% Lidocaine was   administered to the access site. Real time ultrasound was used to visualize   needle entry into the vessel.     Vein accessed: Right internal jugular vein   Access technique: 4F micropuncture set      Catheter placement   The access site was dilated and the catheter was placed into the vein over a   wire and all guidewires were removed in their entirety. Catheter ports were   aspirated and flushed. Catheter tip location was verified by portable X-ray. Catheter size:13 English   Catheter length: 20 cm   Catheter tip position: Proximal right atrium   Catheter flush: Heparin (100 units/mL)      Closure   The catheter was secured. A sterile dressing was applied. Catheter securement technique: Non-absorbable suture      Additional Medications   None      Additional Details   Estimated blood loss:  Less than 1 mL   Additional description of procedure: None   Additional findings: None   Additional equipment: None   Specimens removed: None                     IR US GUIDED VASCULAR ACCESS   Final Result      Technically successful insertion of non-tunneled Trialysis catheter with US   guidance. Plan:       Post catheter placement chest xray confirmed appropriate position of the   catheter. The catheter is appropriate for immediate use.   _______________________________________________________________      PROCEDURE SUMMARY:   - Venous access with ultrasound guidance   - Non-tunneled central venous catheter insertion      PROCEDURE DETAILS:      Pre-procedure   Relevant imaging review: None    Informed consent for the procedure was obtained and time-out was performed prior   to the procedure. Prophylactic antibiotic administered: Not administered   Preparation: The site was prepared and draped using all elements of maximal   sterile barrier technique including sterile gloves, sterile gown, cap, mask,   large sterile sheet, sterile ultrasound probe cover, sterile ultrasound gel,   hand hygiene and cutaneous antisepsis with 2% chlorhexidine.     Medical reason for site preparation exception: Not applicable      Anesthesia/sedation   Level of anesthesia: No sedation   Anesthesia administered by: Not applicable   Duration of anesthesia/sedation: 0 minutes. Access   The vessel was sonographically evaluated and judged to be patent and appropriate   for access and a permanent image was stored. Three mLs of 1% Lidocaine was   administered to the access site. Real time ultrasound was used to visualize   needle entry into the vessel. Vein accessed: Right internal jugular vein   Access technique: 4F micropuncture set      Catheter placement   The access site was dilated and the catheter was placed into the vein over a   wire and all guidewires were removed in their entirety. Catheter ports were   aspirated and flushed. Catheter tip location was verified by portable X-ray. Catheter size:13 American   Catheter length: 20 cm   Catheter tip position: Proximal right atrium   Catheter flush: Heparin (100 units/mL)      Closure   The catheter was secured. A sterile dressing was applied.    Catheter securement technique: Non-absorbable suture      Additional Medications   None      Additional Details   Estimated blood loss:  Less than 1 mL   Additional description of procedure: None   Additional findings: None   Additional equipment: None   Specimens removed: None                     XR CHEST PORT   Final Result   Airspace opacity and effusion at the right lung base and in the   acute setting would suggest pneumonia and/or aspiration however the patient also   appears to have baseline/background vascular congestion and/or pulmonary   arterial hypertension, noting cardiomegaly and/or pericardial effusion      IR INSERT NON TUNL CVC OVER 5 YRS    (Results Pending)        Assessment: Cardiac dysrhythmia  Aspiration pneumonia  Bilateral leg cellulitis  Sepsis resolved  Acute on chronic renal failure  Hypertension  Paroxysmal atrial fibrillation        Plan: As patient has refused for ICD placement if it is okay with cardiology I would like to transfer patient to  W.  Will continue current treatment        Care Plan discussed with: Patient/Family and Nurse    Total time spent with patient: 30 minutes.     Jose Parra MD

## 2022-01-13 NOTE — PROGRESS NOTES
PT evaluation order received and acknowledged. Per medical chart, pt currently off of the floor in CCL. Will continue to follow and re-attempt PT eval at a later time as medically appropriate. Thank you.

## 2022-01-13 NOTE — PROGRESS NOTES
Progress Note    Patient: Jeanne Sanchez MRN: [id number]  SSN: [id number]    YOB: 1955  Age: 78 y.o. Sex: male      Admit Date: 1/7/2022    LOS: 5 days     Subjective:   Patient followed for sepsis with cellulitis both calves and aspiration pneumonia. Leg wound culture grew multiple organisms as shown below. He is afebrile with normal WBC and decreasing procal and CRP. Currently on IV Meropenem. Patient transferred back to the ICU. Problems with VTach and requiring defibrillator. Apparently underwent cardiac cath. Objective:     Vitals:    01/13/22 1515 01/13/22 1530 01/13/22 1545 01/13/22 1600   BP: (!) 118/92 123/85 (!) 121/98 123/82   Pulse: 63 64 69 69   Resp: 10 18 10 12   Temp:       TempSrc:       SpO2: 99% 99% 98% 97%   Weight:       Height:            Intake and Output:  Current Shift: No intake/output data recorded. Last three shifts: 01/11 1901 - 01/13 0700  In: 600 [P.O.:390; I.V.:210]  Out: 0     Physical Exam:    Vitals and nursing note reviewed. Constitutional:       Appearance: He is ill-appearing. HENT: eyes closed  Neck:  Right sided dialysis catheter   Cardiovascular:      Rate and Rhythm: Regular rhythm. irregular Tachycardia present. Heart sounds: Normal heart sounds. No murmur heard.   Pulmonary:      Effort: Pulmonary effort is normal.      Breath sounds: Normal breath sounds. Abdominal:      General: Bowel sounds are normal.      Palpations: Abdomen is soft. Tenderness: There is no abdominal tenderness. Genitourinary:     Comments: External urinary catheter  Musculoskeletal:      Right lower leg: Edema present. Left lower leg: Edema present. Comments:  Both calves now with bulky gauzed dressing not removed at this time  Skin:         Neurological: nonfocal, mild confusion  Psychiatric:         Behavior: Behavior normal.     Lab/Data Review:     WBC 8,400    CRP 5.30 <6.32 <8.19 <12.10 <12.10  Procal 2.39 <3.12 < 4.41 <4.39 <5.16    MRSA nasal PCR negative    Wound cultures leg (1/8)  Staphylococcus aureus (MSSA),  Alcaligenes faecalis resistant to Zosyn, Klebsiella pneumoniae      Assessment:     Active Problems:    Renal failure (1/8/2022)      Hyperkalemia (1/8/2022)    1. Cellulitis both calves, secondary to probable S. Aureus (MSSA), Alcaligenes faecalis resistant to Zosyn, Klebsiella pneumoniae,  Day #6 IV Antibiotics, now Meropenem,  improving  2. Aspiration pneumonia, RLL, Day #5 IV Antibiotics, now Meropenem  3. Sepsis with leukocytosis and elevated CRP/procal, resolving     Comment:  WBC normal with decreasing CRP and procal.     Plan:   1. Continue  Meropenem (Zosyn resistance) IV which will cover MSSA as well, for 8 more days  3.  In am, repeat CRP, procal    Signed By: Sharlene Monroe MD     January 13, 2022

## 2022-01-13 NOTE — PROGRESS NOTES
OT evaluation order received and acknowledged. Per medical chart, pt currently off of the floor in CCL. Will continue to follow and re-attempt OT eval at a later time as medically appropriate. Thank you.

## 2022-01-13 NOTE — CONSULTS
Lester Valentin M.D. and Samra Rodriguez. José Luis Celis M.D.  [address], [address], [address] [place] [address]  [phone]   [email or web address]      Date of  Admission: 1/7/2022 10:29 PM     Assessment and Plan:     Mikayla Flowers is admitted with VT    Full consult to follow. 1. VT  2. CAD  3. Cardiomyopathy    Recommend LHC when able. Likely will need ICD prior to discharge. In meantime, continue amiodarone. Would use IV amiodarone drip if recurrent VT. REASON FOR CONSULT:  Primary Cardiologist:    HPI:      Patient Active Problem List    Diagnosis Date Noted    Renal failure 01/08/2022    Hyperkalemia 01/08/2022    Pulmonary edema 07/13/2021    GI bleed 01/05/2021      Dee Hansen MD  Past Medical History:   Diagnosis Date    Chronic kidney disease     Hypertension       Past Surgical History:   Procedure Laterality Date    IR INSERT NON TUNL CVC OVER 5 YRS  1/8/2022     No Known Allergies   No family history on file. Social History     Socioeconomic History    Marital status: SINGLE     Spouse name: Not on file    Number of children: Not on file    Years of education: Not on file    Highest education level: Not on file   Occupational History    Not on file   Tobacco Use    Smoking status: Never Smoker    Smokeless tobacco: Never Used   Substance and Sexual Activity    Alcohol use: Not on file    Drug use: Not on file    Sexual activity: Not on file   Other Topics Concern    Not on file   Social History Narrative    Not on file     Social Determinants of Health     Financial Resource Strain:     Difficulty of Paying Living Expenses: Not on file   Food Insecurity:     Worried About Running Out of Food in the Last Year: Not on file    Dale of Food in the Last Year: Not on file   Transportation Needs:     Lack of Transportation (Medical): Not on file    Lack of Transportation (Non-Medical):  Not on file   Physical Activity:     Days of Exercise per Week: Not on file    Minutes of Exercise per Session: Not on file   Stress:     Feeling of Stress : Not on file   Social Connections:     Frequency of Communication with Friends and Family: Not on file    Frequency of Social Gatherings with Friends and Family: Not on file    Attends Mosque Services: Not on file    Active Member of Clubs or Organizations: Not on file    Attends Club or Organization Meetings: Not on file    Marital Status: Not on file   Intimate Partner Violence:     Fear of Current or Ex-Partner: Not on file    Emotionally Abused: Not on file    Physically Abused: Not on file    Sexually Abused: Not on file   Housing Stability:     Unable to Pay for Housing in the Last Year: Not on file    Number of Places Lived in the Last Year: Not on file    Unstable Housing in the Last Year: Not on file     Current Facility-Administered Medications   Medication Dose Route Frequency    amiodarone (CORDARONE) tablet 400 mg  400 mg Oral BID    meropenem (MERREM) 1 g in 0.9% sodium chloride 20 mL IV syringe  1 g IntraVENous Q12H    ferrous sulfate tablet 325 mg  1 Tablet Oral BID WITH MEALS    heparin 25,000 units in  ml infusion  12-25 Units/kg/hr (Adjusted) IntraVENous TITRATE    heparin (porcine) 1,000 unit/mL injection 4,000 Units  4,000 Units IntraVENous PRN    Or    heparin (porcine) 1,000 unit/mL injection 2,000 Units  2,000 Units IntraVENous PRN    metoprolol succinate (TOPROL-XL) XL tablet 50 mg  50 mg Oral DAILY    nitroglycerin (NITROBID) 2 % ointment 0.5 Inch  0.5 Inch Topical Q6H    aspirin chewable tablet 81 mg  81 mg Oral DAILY    heparin (porcine) 1,000 unit/mL injection 2,800 Units  2,800 Units Hemodialysis DIALYSIS PRN                 Physical Exam    Visit Vitals  BP (!) 141/91 (BP 1 Location: Left upper arm, BP Patient Position: At rest)   Pulse 77   Temp 98.1 °F (36.7 °C)   Resp 15   Ht 6' (1.829 m)   Wt 83.8 kg (184 lb 11.9 oz)   SpO2 100%   BMI 25.06 kg/m² Labs:   Recent Results (from the past 24 hour(s))   HEPATITIS PANEL, ACUTE    Collection Time: 01/12/22 10:33 AM   Result Value Ref Range    Hepatitis A, IgM NONREACTIVE NONREACTIVE      __        Hepatitis B surface Ag <0.10 Index    Hep B surface Ag Interp. Negative Negative    __        Hepatitis B core, IgM NONREACTIVE NONREACTIVE    __        Hepatitis C virus Ab PENDING Index    Hep C virus Ab Interp. PENDING    PTT    Collection Time: 01/12/22  2:00 PM   Result Value Ref Range    aPTT 86.7 (H) 21.2 - 34.1 sec    aPTT, therapeutic range   82 - 109 sec   PTT    Collection Time: 01/12/22  8:00 PM   Result Value Ref Range    aPTT 92.9 (H) 21.2 - 34.1 sec    aPTT, therapeutic range   82 - 841 sec   METABOLIC PANEL, COMPREHENSIVE    Collection Time: 01/13/22  4:15 AM   Result Value Ref Range    Sodium 139 136 - 145 mmol/L    Potassium 3.8 3.5 - 5.1 mmol/L    Chloride 104 97 - 108 mmol/L    CO2 25 21 - 32 mmol/L    Anion gap 10 5 - 15 mmol/L    Glucose 102 (H) 65 - 100 mg/dL    BUN 62 (H) 6 - 20 mg/dL    Creatinine 6.30 (H) 0.70 - 1.30 mg/dL    BUN/Creatinine ratio 10 (L) 12 - 20      GFR est AA 11 (L) >60 ml/min/1.73m2    GFR est non-AA 9 (L) >60 ml/min/1.73m2    Calcium 7.2 (L) 8.5 - 10.1 mg/dL    Bilirubin, total 0.6 0.2 - 1.0 mg/dL    AST (SGOT) 11 (L) 15 - 37 U/L    ALT (SGPT) 11 (L) 12 - 78 U/L    Alk.  phosphatase 117 45 - 117 U/L    Protein, total 5.6 (L) 6.4 - 8.2 g/dL    Albumin 2.2 (L) 3.5 - 5.0 g/dL    Globulin 3.4 2.0 - 4.0 g/dL    A-G Ratio 0.6 (L) 1.1 - 2.2     C REACTIVE PROTEIN, QT    Collection Time: 01/13/22  4:15 AM   Result Value Ref Range    C-Reactive protein 5.30 (H) 0.00 - 0.60 mg/dL   PROCALCITONIN    Collection Time: 01/13/22  4:15 AM   Result Value Ref Range    Procalcitonin 2.39 (H) 0 ng/mL   PTT    Collection Time: 01/13/22  4:15 AM   Result Value Ref Range    aPTT 95.3 (H) 21.2 - 34.1 sec    aPTT, therapeutic range   82 - 109 sec   CBC WITH AUTOMATED DIFF    Collection Time: 01/13/22  4:15 AM   Result Value Ref Range    WBC 10.3 4.1 - 11.1 K/uL    RBC 2.96 (L) 4.10 - 5.70 M/uL    HGB 8.9 (L) 12.1 - 17.0 g/dL    HCT 30.0 (L) 36.6 - 50.3 %    .4 (H) 80.0 - 99.0 FL    MCH 30.1 26.0 - 34.0 PG    MCHC 29.7 (L) 30.0 - 36.5 g/dL    RDW 15.3 (H) 11.5 - 14.5 %    PLATELET 372 240 - 743 K/uL    MPV 9.4 8.9 - 12.9 FL    NRBC 0.0 0.0  WBC    ABSOLUTE NRBC 0.00 0.00 - 0.01 K/uL    NEUTROPHILS 84 (H) 32 - 75 %    LYMPHOCYTES 4 (L) 12 - 49 %    MONOCYTES 9 5 - 13 %    EOSINOPHILS 1 0 - 7 %    BASOPHILS 0 0 - 1 %    IMMATURE GRANULOCYTES 2 (H) 0 - 0.5 %    ABS. NEUTROPHILS 8.7 (H) 1.8 - 8.0 K/UL    ABS. LYMPHOCYTES 0.4 (L) 0.8 - 3.5 K/UL    ABS. MONOCYTES 0.9 0.0 - 1.0 K/UL    ABS. EOSINOPHILS 0.1 0.0 - 0.4 K/UL    ABS. BASOPHILS 0.0 0.0 - 0.1 K/UL    ABS. IMM.  GRANS. 0.2 (H) 0.00 - 0.04 K/UL    DF AUTOMATED

## 2022-01-13 NOTE — CONSULTS
History and Physical    Chief complaints: Bilateral leg wound   History of Presenting Illness:  Christa Granado is a 78 y.o. very pleasant man currently hospitalized with multiple medical problems. I was consulted to evaluate about leg wound. Patient also had a vascular lab study. Patient is currently undergoing treatment for SVTs. Patient's heart rate has been 150s to 200. Patient apparently received multiple rounds of adenosine. Patient currently mildly complaining of shortness of breath. However blood pressure has been stable. Both legs examined. Patient has palpable femoral pulses. Patient has nonpalpable pedal pulses. Has multiple wounds including right pretibial ulcers and multiple leg ulcers as well. Patient has arterial duplex ultrasound done and right ankle index 1.0 and the left side is 0.8. Past Medical History:   Diagnosis Date    Chronic kidney disease     Hypertension       Past Surgical History:   Procedure Laterality Date    IR INSERT NON TUNL CVC OVER 5 YRS  1/8/2022     No family history on file. Social History     Tobacco Use    Smoking status: Never Smoker    Smokeless tobacco: Never Used   Substance Use Topics    Alcohol use: Not on file       Prior to Admission medications    Medication Sig Start Date End Date Taking? Authorizing Provider   furosemide (LASIX) 40 mg tablet Take 1 and half tab daily twice a day 8/5/21   Phoebe Subramanian MD   amLODIPine (NORVASC) 5 mg tablet Take 1 Tablet by mouth daily. 8/3/21   Phoebe Subramanian MD   apixaban (ELIQUIS) 2.5 mg tablet Take 1 Tablet by mouth two (2) times a day. 8/2/21   Phoebe Subramanian MD   calcitRIOL (ROCALTROL) 0.25 mcg capsule Take 1 Capsule by mouth daily. 8/3/21   Phoebe Subramanian MD   metOLazone (ZAROXOLYN) 5 mg tablet Take 1 Tablet by mouth daily. 8/3/21   Phoebe Subramanian MD   metoprolol tartrate (LOPRESSOR) 25 mg tablet Take 1 Tablet by mouth daily.  Indications: rapid ventricular heartbeat 8/3/21   Sasha Joe Maria Luisa Alvarado MD   sevelamer carbonate (RENVELA) 800 mg tab tab Take 2 Tablets by mouth three (3) times daily (with meals). Indications: renal osteodystrophy with hyperphosphatemia 8/2/21   Sean Celeste MD   tamsulosin [facility]) 0.4 mg capsule Take 1 Capsule by mouth daily. 8/3/21   Sean Celeste MD   sodium bicarbonate 650 mg tablet Take 1 Tab by mouth two (2) times a day. 1/13/21   Sean Celeste MD     No Known Allergies     Review of Systems:  Pertinent review of systems discussed in HPI, and rest of organ systems personally reviewed and they are negative. Objective:   Vital signs reviewed:      Visit Vitals  BP (!) 123/92 (BP 1 Location: Left upper arm, BP Patient Position: At rest)   Pulse 75   Temp 98.3 °F (36.8 °C)   Resp 15   Ht 6' (1.829 m)   Wt 184 lb 11.9 oz (83.8 kg)   SpO2 99%   BMI 25.06 kg/m²       Physical Exam:   General appearance:   Patient is awake and alert, not in particular distress. Head and neck atraumatic normocephalic. ENT shows normal oral mucosa, no jaundice no hoarse voice. Eyes: Pupil equal gaze appropriate. Cardiac system regular rate rhythm. Pulmonary: No audible wheeze. Chest wall: Chest wall excursion normal with respiration cycle, there is no deformity or chest trauma. Abdomen: Soft not tender or distended, bowel sounds active. There is no obvious palpable mass, or hernia. Neurologic: Nonfocal.  Cranial nerves intact, no new focal findings. Musculoskeletal system: Motor function normal limits, motor function 5 out of 5, range of motion normal in all 4 extremity  Skin: Warm and moist.  Hematologic system: No obvious bruising. Psychosocial: Appropriate and cooperative. Vascular examination: Lower and upper extremities warm to touch, no signs of ischemia or cyanosis. Data Review: Labs are reviewed.  Discussed  Recent Results (from the past 24 hour(s))   EKG, 12 LEAD, SUBSEQUENT    Collection Time: 01/12/22  5:06 AM   Result Value Ref Range Ventricular Rate 193 BPM    Atrial Rate 182 BPM    QRS Duration 122 ms    Q-T Interval 302 ms    QTC Calculation (Bezet) 541 ms    Calculated R Axis 107 degrees    Calculated T Axis -84 degrees    Diagnosis       Wide QRS tachycardia  Rightward axis  Septal infarct , age undetermined  Marked ST abnormality, possible inferior subendocardial injury  Marked ST abnormality, possible anterolateral subendocardial injury  Abnormal ECG  When compared with ECG of 11-JAN-2022 22:13, (Unconfirmed)  Previous ECG has undetermined rhythm, needs review  Confirmed by Darci RENAE MD ([id number]) on 1/12/2022 10:51:08 AM     EKG, 12 LEAD, SUBSEQUENT    Collection Time: 01/12/22  5:22 AM   Result Value Ref Range    Ventricular Rate 106 BPM    Atrial Rate 106 BPM    P-R Interval 184 ms    QRS Duration 92 ms    Q-T Interval 378 ms    QTC Calculation (Bezet) 502 ms    Calculated P Axis -15 degrees    Calculated R Axis 105 degrees    Calculated T Axis -111 degrees    Diagnosis       Sinus tachycardia with Premature atrial complexes  Septal infarct , age undetermined  Lateral infarct , age undetermined  Abnormal ECG  When compared with ECG of 12-JAN-2022 05:06, (Unconfirmed)  Sinus rhythm has replaced Wide QRS tachycardia  Vent. rate has decreased BY  87 BPM  Confirmed by Darci RENAE MD ([id number]) on 1/12/2022 31:65:32 AM     METABOLIC PANEL, COMPREHENSIVE    Collection Time: 01/12/22  5:42 AM   Result Value Ref Range    Sodium 138 136 - 145 mmol/L    Potassium 3.5 3.5 - 5.1 mmol/L    Chloride 104 97 - 108 mmol/L    CO2 23 21 - 32 mmol/L    Anion gap 11 5 - 15 mmol/L    Glucose 114 (H) 65 - 100 mg/dL    BUN 53 (H) 6 - 20 mg/dL    Creatinine 5.45 (H) 0.70 - 1.30 mg/dL    BUN/Creatinine ratio 10 (L) 12 - 20      GFR est AA 13 (L) >60 ml/min/1.73m2    GFR est non-AA 11 (L) >60 ml/min/1.73m2    Calcium 7.0 (L) 8.5 - 10.1 mg/dL    Bilirubin, total 0.7 0.2 - 1.0 mg/dL    AST (SGOT) 12 (L) 15 - 37 U/L    ALT (SGPT) 11 (L) 12 - 78 U/L    Alk. phosphatase 121 (H) 45 - 117 U/L    Protein, total 5.7 (L) 6.4 - 8.2 g/dL    Albumin 2.0 (L) 3.5 - 5.0 g/dL    Globulin 3.7 2.0 - 4.0 g/dL    A-G Ratio 0.5 (L) 1.1 - 2.2     C REACTIVE PROTEIN, QT    Collection Time: 01/12/22  5:42 AM   Result Value Ref Range    C-Reactive protein 6.32 (H) 0.00 - 0.60 mg/dL   PROCALCITONIN    Collection Time: 01/12/22  5:42 AM   Result Value Ref Range    Procalcitonin 3.12 (H) 0 ng/mL   CBC WITH AUTOMATED DIFF    Collection Time: 01/12/22  5:42 AM   Result Value Ref Range    WBC 8.4 4.1 - 11.1 K/uL    RBC 2.92 (L) 4.10 - 5.70 M/uL    HGB 8.7 (L) 12.1 - 17.0 g/dL    HCT 29.4 (L) 36.6 - 50.3 %    .7 (H) 80.0 - 99.0 FL    MCH 29.8 26.0 - 34.0 PG    MCHC 29.6 (L) 30.0 - 36.5 g/dL    RDW 15.5 (H) 11.5 - 14.5 %    PLATELET 183 114 - 856 K/uL    MPV 9.9 8.9 - 12.9 FL    NRBC 0.2 (H) 0.0  WBC    ABSOLUTE NRBC 0.02 (H) 0.00 - 0.01 K/uL    NEUTROPHILS 82 (H) 32 - 75 %    LYMPHOCYTES 5 (L) 12 - 49 %    MONOCYTES 10 5 - 13 %    EOSINOPHILS 2 0 - 7 %    BASOPHILS 0 0 - 1 %    IMMATURE GRANULOCYTES 1 (H) 0 - 0.5 %    ABS. NEUTROPHILS 6.9 1.8 - 8.0 K/UL    ABS. LYMPHOCYTES 0.4 (L) 0.8 - 3.5 K/UL    ABS. MONOCYTES 0.8 0.0 - 1.0 K/UL    ABS. EOSINOPHILS 0.2 0.0 - 0.4 K/UL    ABS. BASOPHILS 0.0 0.0 - 0.1 K/UL    ABS. IMM. GRANS. 0.1 (H) 0.00 - 0.04 K/UL    DF AUTOMATED     TROPONIN-HIGH SENSITIVITY    Collection Time: 01/12/22  5:42 AM   Result Value Ref Range    Troponin-High Sensitivity 230 () 0 - 76 ng/L   PTT    Collection Time: 01/12/22  5:42 AM   Result Value Ref Range    aPTT 148.3 (HH) 21.2 - 34.1 sec    aPTT, therapeutic range   82 - 109 sec   MAGNESIUM    Collection Time: 01/12/22  5:42 AM   Result Value Ref Range    Magnesium 2.4 1.6 - 2.4 mg/dL   HEPATITIS PANEL, ACUTE    Collection Time: 01/12/22 10:33 AM   Result Value Ref Range    Hepatitis A, IgM NONREACTIVE NONREACTIVE      __        Hepatitis B surface Ag <0.10 Index    Hep B surface Ag Interp.  Negative Negative __        Hepatitis B core, IgM NONREACTIVE NONREACTIVE    __        Hepatitis C virus Ab PENDING Index    Hep C virus Ab Interp. PENDING    PTT    Collection Time: 01/12/22  2:00 PM   Result Value Ref Range    aPTT 86.7 (H) 21.2 - 34.1 sec    aPTT, therapeutic range   82 - 109 sec   PTT    Collection Time: 01/12/22  8:00 PM   Result Value Ref Range    aPTT 92.9 (H) 21.2 - 34.1 sec    aPTT, therapeutic range   82 - 109 sec             Imagings reviewed: discussed as below. No name on file. Assessment:     Active Problems:    Renal failure (1/8/2022)      Hyperkalemia (1/8/2022)        Plan:     Patient will need appropriate wound coverage both legs. Patient has a significantly diminished peak systolic velocity bilateral anterior tibial and posterior tibial artery with a heavy calcified vessels. Most likely both ABIs are abnormally elevated. In the setting of nonhealing ulcers patient will need angiographic examination both legs at some point once patient's cardiac point of view is more stable. I will continue monitor his progress.

## 2022-01-13 NOTE — DIALYSIS
PT had 3.5 hours of dialysis, tolerated well. Pt had 3040ml removed with 300 ml rinseback. Pt CVC site clean dry intact and rewrapped after tx. Pt handed off to ICU nurse.

## 2022-01-13 NOTE — PROGRESS NOTES
Clinical chart reviewed by CM. Discharge dispo: home. Updated clinicals and labs sent to [facility] via Martina MUNSON; patient is pending acceptance for a new HD chair. CM will continue to follow.

## 2022-01-13 NOTE — PROGRESS NOTES
Received patient back from cath lab. Site to right groin WNL. No hematoma or bleeding noted. Pulse palpable. Will continue to monitor.

## 2022-01-13 NOTE — PROGRESS NOTES
Physician Progress Note      PATIENT:               Yaa Turner  CSN #:                  [id number]  :                       1955  ADMIT DATE:       2022 10:29 PM  [address] DATE:  RESPONDING  PROVIDER #:        Reynaldo Wilson MD          QUERY TEXT:    Dear Dr. Tamela Haskins,    Patient admitted with aspiration pneumonia. Documentation reflects sepsis in  H&P. If possible, please document in the progress notes and discharge summary if sepsis was: The medical record reflects the following:  Risk Factors: 78year old male, weakness, Aspiration pneumonia, renal failure,  Clinical Indicators: 1/8 H&P - Sepsis   Infectious disease consult - Sepsis with leukocytosis  WBC: 12.4-12.7-13.0-13.3-11.9-10.9-9.6-8.4-10.3  Procalcitonin: 5.16-4.39-4.41-3.12-2.39  CRP: 12.10-8.19-6.32-5.30  Treatment: IV Meropenem, IV Vancomyin, IVF NS, IV Zosyn    Please call [phone] with any questions  Options provided:  -- sepsis confirmed after study  -- sepsis treated and resolved  -- sepsis ruled out after study  -- Other - I will add my own diagnosis  -- Disagree - Not applicable / Not valid  -- Disagree - Clinically unable to determine / Unknown  -- Refer to Clinical Documentation Reviewer    PROVIDER RESPONSE TEXT:    Please read all progress notes    Query created by:  Gloria Garcia on 2022 7:52 AM      Electronically signed by:  Reynaldo Wilson MD 2022 6:24 PM

## 2022-01-13 NOTE — PROGRESS NOTES
[facility]     Renal Daily Progress Note:     Admission Date: 2022     Subjective: Recurrent SVT. Underwent cardiac cath today:  Indication: Severe left ventricular systolic dysfunction and patient had a V. Tach.     Left main coronary artery is a very large caliber vessel and has no disease. LAD artery is a very large caliber vessel and proximal mid and distal segment has no stenosis and diagonal branches has no disease. Ramus intermedius is a medium caliber vessel without disease. Circumflex artery is a large-caliber vessel and proximal mid and distal segment and AV groove segment has no stenosis. Large obtuse marginal branch and posterolateral branch has no stenosis. Right coronary artery is a large-caliber vessel and does junction of proximal and mid segment there was a concern of stenosis and that therefore it was interrogated by IFR which turned out to be 0.95 and was not deemed critical enough to do intervention.     Left ventriculography is performed in the right anterior oblique view and ejection fraction is about 20 to 25% and distal anterior wall apex and distal inferior wall is near akinetic. This may be possible presentation of Takotsubo syndrome.     Plan: Patient will receive defibrillator    Fatigued but looks better. Mental status is good. He  looks better, less short of breath, not tachypneic. Review of Systems  Pertinent items are noted in HPI.     Objective:     Visit Vitals  BP (!) 140/102 (BP 1 Location: Left upper arm, BP Patient Position: At rest)   Pulse 67   Temp 98.1 °F (36.7 °C)   Resp 10   Ht 6' (1.829 m)   Wt 79.7 kg (175 lb 11.3 oz)   SpO2 98%   BMI 23.83 kg/m²     Temp (24hrs), Av.1 °F (36.7 °C), Min:97.7 °F (36.5 °C), Max:98.3 °F (36.8 °C)        Intake/Output Summary (Last 24 hours) at 2022 1209  Last data filed at 2022 0517  Gross per 24 hour   Intake 360 ml   Output 0 ml   Net 360 ml     Current Facility-Administered Medications Medication Dose Route Frequency    amiodarone (CORDARONE) tablet 400 mg  400 mg Oral BID    meropenem (MERREM) 1 g in 0.9% sodium chloride 20 mL IV syringe  1 g IntraVENous Q12H    ferrous sulfate tablet 325 mg  1 Tablet Oral BID WITH MEALS    heparin 25,000 units in  ml infusion  12-25 Units/kg/hr (Adjusted) IntraVENous TITRATE    heparin (porcine) 1,000 unit/mL injection 4,000 Units  4,000 Units IntraVENous PRN    Or    heparin (porcine) 1,000 unit/mL injection 2,000 Units  2,000 Units IntraVENous PRN    metoprolol succinate (TOPROL-XL) XL tablet 50 mg  50 mg Oral DAILY    nitroglycerin (NITROBID) 2 % ointment 0.5 Inch  0.5 Inch Topical Q6H    aspirin chewable tablet 81 mg  81 mg Oral DAILY    heparin (porcine) 1,000 unit/mL injection 2,800 Units  2,800 Units Hemodialysis DIALYSIS PRN       Physical Exam:General appearance: alert, cooperative, no distress, appears older than stated age  Head: Normocephalic, without obvious abnormality, atraumatic  Lungs: clear to auscultation bilaterally  Heart: regular rate and rhythm, no rub  Abdomen: soft, non-tender. Bowel sounds normal. No masses,  no organomegaly  Extremities: venous stasis dermatitis noted, edema: decreased lower extremity edema but has dependent thigh edema  Skin: Left lower leg with posterior ulcer inferior to calf  Neurologic: Grossly normal    Data Review:     LABS:  Recent Labs     01/13/22 0415 01/12/22  0542 01/11/22  1330    138 140   K 3.8 3.5 3.7    104 104   CO2 25 23 26   BUN 62* 53* 48*   CREA 6.30* 5.45* 4.86*   CA 7.2* 7.0* 7.0*   ALB 2.2* 2.0* 2.0*   MG  --  2.4  --    Iron saturation 13%    Recent Labs     01/13/22  0415 01/12/22  0542 01/11/22  0400   WBC 10.3 8.4 9.6   HGB 8.9* 8.7* 8.6*   HCT 30.0* 29.4* 28.6*    176 192     No results for input(s): HÉCTOR, KU, CLU, CREAU in the last 72 hours.     No lab exists for component: PROU   Chest x-ray January 8, 2022:  IMPRESSION     Interval placement of a right supraclavicular approach dialysis catheter with  distal tip in the proximal right atrium.     Cardiomegaly with pulmonary vascular congestion and pulmonary edema.      Stable appearing basilar right lung pneumonia. Small right pleural effusion.   Assessment:   Renal Specific Problems  Chronic kidney disease stage VI  SVT- recurrent  Volume overload  Metabolic acidosis- resolved  Hyperkalemia- improved  Anemia with renal failure and iron deficient  Leukocytosis with elevated but declining procalcitonin  Probable right lower lobe pneumonia  Left leg ulcer  Venous stasis dermatitis lower extremity          Plan:     Obtain/ Order: labs/cultures/radiology/procedures:      Therapeutic:    Dialysis  Today x 3.5 hr  Epogen if iron saturation greater than 20%  Oral iron, avoid IV iron if there is signs of active infection  Wound care - regarding left leg ulcer    Case management to arrange outpatient dialysis    Ramirez Wade MD    [phone]

## 2022-01-14 LAB
ACT BLD: 118 SEC (ref 74–125)
ACT BLD: 169 SEC (ref 74–125)
ALBUMIN SERPL-MCNC: 2 G/DL (ref 3.5–5)
ALBUMIN/GLOB SERPL: 0.5 {RATIO} (ref 1.1–2.2)
ALP SERPL-CCNC: 102 U/L (ref 45–117)
ALT SERPL-CCNC: 10 U/L (ref 12–78)
ANION GAP SERPL CALC-SCNC: 9 MMOL/L (ref 5–15)
APTT PPP: 33.9 SEC (ref 21.2–34.1)
APTT PPP: 46.9 SEC (ref 21.2–34.1)
APTT PPP: 47.1 SEC (ref 21.2–34.1)
APTT PPP: 73.7 SEC (ref 21.2–34.1)
AST SERPL W P-5'-P-CCNC: 11 U/L (ref 15–37)
BILIRUB SERPL-MCNC: 0.5 MG/DL (ref 0.2–1)
BUN SERPL-MCNC: 36 MG/DL (ref 6–20)
BUN/CREAT SERPL: 8 (ref 12–20)
CA-I BLD-MCNC: 7.6 MG/DL (ref 8.5–10.1)
CHLORIDE SERPL-SCNC: 102 MMOL/L (ref 97–108)
CO2 SERPL-SCNC: 26 MMOL/L (ref 21–32)
CREAT SERPL-MCNC: 4.52 MG/DL (ref 0.7–1.3)
CRP SERPL-MCNC: 4.16 MG/DL (ref 0–0.6)
GLOBULIN SER CALC-MCNC: 4 G/DL (ref 2–4)
GLUCOSE SERPL-MCNC: 85 MG/DL (ref 65–100)
HAV IGM SER QL: NONREACTIVE
HBV CORE IGM SER QL: NONREACTIVE
HBV SURFACE AG SER QL: <0.1 INDEX
HBV SURFACE AG SER QL: NEGATIVE
HCV AB SER IA-ACNC: <0.02 INDEX
HCV AB SERPL QL IA: NONREACTIVE
PERFORMED BY, TECHID: ABNORMAL
PERFORMED BY, TECHID: NORMAL
POTASSIUM SERPL-SCNC: 3.2 MMOL/L (ref 3.5–5.1)
PROCALCITONIN SERPL-MCNC: 1.74 NG/ML
PROT SERPL-MCNC: 6 G/DL (ref 6.4–8.2)
SODIUM SERPL-SCNC: 137 MMOL/L (ref 136–145)
SP1: NORMAL
SP2: NORMAL
SP3: NORMAL
STREP DNASE B SER-ACNC: 177 U/ML (ref 0–120)
THERAPEUTIC RANGE,PTTT: ABNORMAL SEC (ref 82–109)
THERAPEUTIC RANGE,PTTT: NORMAL SEC (ref 82–109)

## 2022-01-14 PROCEDURE — 85730 THROMBOPLASTIN TIME PARTIAL: CPT

## 2022-01-14 PROCEDURE — 97161 PT EVAL LOW COMPLEX 20 MIN: CPT

## 2022-01-14 PROCEDURE — 74011250637 HC RX REV CODE- 250/637: Performed by: INTERNAL MEDICINE

## 2022-01-14 PROCEDURE — 86140 C-REACTIVE PROTEIN: CPT

## 2022-01-14 PROCEDURE — 94760 N-INVAS EAR/PLS OXIMETRY 1: CPT

## 2022-01-14 PROCEDURE — 97530 THERAPEUTIC ACTIVITIES: CPT

## 2022-01-14 PROCEDURE — 74011000250 HC RX REV CODE- 250: Performed by: INTERNAL MEDICINE

## 2022-01-14 PROCEDURE — 74011250636 HC RX REV CODE- 250/636: Performed by: INTERNAL MEDICINE

## 2022-01-14 PROCEDURE — 36415 COLL VENOUS BLD VENIPUNCTURE: CPT

## 2022-01-14 PROCEDURE — 84145 PROCALCITONIN (PCT): CPT

## 2022-01-14 PROCEDURE — 99232 SBSQ HOSP IP/OBS MODERATE 35: CPT | Performed by: SURGERY

## 2022-01-14 PROCEDURE — 74011250636 HC RX REV CODE- 250/636: Performed by: FAMILY MEDICINE

## 2022-01-14 PROCEDURE — 65270000029 HC RM PRIVATE

## 2022-01-14 PROCEDURE — 97165 OT EVAL LOW COMPLEX 30 MIN: CPT

## 2022-01-14 PROCEDURE — 99232 SBSQ HOSP IP/OBS MODERATE 35: CPT | Performed by: INTERNAL MEDICINE

## 2022-01-14 PROCEDURE — 80053 COMPREHEN METABOLIC PANEL: CPT

## 2022-01-14 RX ADMIN — Medication 16 UNITS/KG/HR: at 14:01

## 2022-01-14 RX ADMIN — ASPIRIN 81 MG CHEWABLE TABLET 81 MG: 81 TABLET CHEWABLE at 10:35

## 2022-01-14 RX ADMIN — SODIUM CHLORIDE, PRESERVATIVE FREE 10 ML: 5 INJECTION INTRAVENOUS at 14:24

## 2022-01-14 RX ADMIN — SODIUM CHLORIDE, PRESERVATIVE FREE 1 G: 5 INJECTION INTRAVENOUS at 22:25

## 2022-01-14 RX ADMIN — AMIODARONE HYDROCHLORIDE 400 MG: 200 TABLET ORAL at 22:29

## 2022-01-14 RX ADMIN — HEPARIN SODIUM 2000 UNITS: 1000 INJECTION INTRAVENOUS; SUBCUTANEOUS at 23:39

## 2022-01-14 RX ADMIN — HEPARIN SODIUM 2000 UNITS: 1000 INJECTION INTRAVENOUS; SUBCUTANEOUS at 00:37

## 2022-01-14 RX ADMIN — HEPARIN SODIUM 2000 UNITS: 1000 INJECTION INTRAVENOUS; SUBCUTANEOUS at 13:57

## 2022-01-14 RX ADMIN — METOPROLOL SUCCINATE 50 MG: 25 TABLET, EXTENDED RELEASE ORAL at 10:35

## 2022-01-14 RX ADMIN — NITROGLYCERIN 0.5 INCH: 20 OINTMENT TOPICAL at 18:00

## 2022-01-14 RX ADMIN — SODIUM CHLORIDE, PRESERVATIVE FREE 1 G: 5 INJECTION INTRAVENOUS at 10:35

## 2022-01-14 RX ADMIN — FERROUS SULFATE TAB 325 MG (65 MG ELEMENTAL FE) 325 MG: 325 (65 FE) TAB at 10:35

## 2022-01-14 RX ADMIN — FERROUS SULFATE TAB 325 MG (65 MG ELEMENTAL FE) 325 MG: 325 (65 FE) TAB at 17:00

## 2022-01-14 RX ADMIN — AMIODARONE HYDROCHLORIDE 400 MG: 200 TABLET ORAL at 10:35

## 2022-01-14 RX ADMIN — SODIUM CHLORIDE, PRESERVATIVE FREE 10 ML: 5 INJECTION INTRAVENOUS at 22:25

## 2022-01-14 NOTE — PROGRESS NOTES
Progress Note      1/14/2022 12:19 PM  NAME: Denis Councilman   MRN:  [id number]   Admit Diagnosis: Renal failure [N19]  Hyperkalemia [E87.5]      Subjective:   Chart reviewed. Patient is on dialysis. Symptoms of shortness of breath has improved. Patient had frequent palpitations during the night and apparently a nurse thought it was SVT and was given a Adenocard however EKG is concerning for ventricular tachycardia. With the patient and her with the nurse. Cardiac catheterization findings discussed with the patient and also discussed with the electrophysiologist.    Review of Systems:    Symptom Y/N Comments  Symptom Y/N Comments   Fever/Chills n   Chest Pain n    Poor Appetite    Edema y    Cough    Abdominal Pain     Sputum    Joint Pain     SOB/CARMONA y  improving  Pruritis/Rash     Nausea/vomit    Other     Diarrhea         Constipation           Could NOT obtain due to:      Objective:          Physical Exam:    Last 24hrs VS reviewed since prior progress note. Most recent are:    Visit Vitals  /72 (BP 1 Location: Left upper arm, BP Patient Position: Sitting)   Pulse (!) 58   Temp 98.7 °F (37.1 °C)   Resp 16   Ht 6' (1.829 m)   Wt 79.6 kg (175 lb 7.8 oz)   SpO2 97%   BMI 23.80 kg/m²       Intake/Output Summary (Last 24 hours) at 1/14/2022 1528  Last data filed at 1/13/2022 1630  Gross per 24 hour   Intake --   Output 3040 ml   Net -3040 ml        General Appearance: Well developed, well nourished, alert & oriented x 3,    no acute distress. Ears/Nose/Mouth/Throat: Hearing grossly normal.  Neck: Supple. Chest: Lungs clear to auscultation bilaterally. Cardiovascular: Regular rate and rhythm, S1,S2 normal, no murmur. Abdomen: Soft, non-tender, bowel sounds are active. Extremities: Patient has ulcers of the lower extremities  Skin: Warm and dry. []         Post-cath site without hematoma, bruit, tenderness, or thrill. Distal pulses intact.     PMH/SH reviewed - no change compared to H&P    Data Review    Telemetry: normal sinus rhythm , patient has some PVCs, and episode of wide-complex tachycardia possible ventricular tachycardia. EKG:   Patient had EKG that is very concerning for ventricular tachycardia. Lab Data Personally Reviewed:    Recent Labs     01/13/22 0415 01/12/22  0542   WBC 10.3 8.4   HGB 8.9* 8.7*   HCT 30.0* 29.4*    176     Recent Labs     01/14/22  1404 01/14/22  0744 01/13/22  2300   APTT 33.9 47.1* 46.9*      Recent Labs     01/14/22  0744 01/13/22  0415 01/12/22  0542    139 138   K 3.2* 3.8 3.5    104 104   CO2 26 25 23   BUN 36* 62* 53*   CREA 4.52* 6.30* 5.45*   GLU 85 102* 114*   CA 7.6* 7.2* 7.0*   MG  --   --  2.4     No results for input(s): CPK, CKNDX, TROIQ in the last 72 hours. No lab exists for component: CPKMB  No results found for: CHOL, CHOLX, CHLST, CHOLV, HDL, HDLP, LDL, LDLC, DLDLP, Ade Karlos, CHHD, CHHDX    Recent Labs     01/14/22 0744 01/13/22 0415 01/12/22  0542    117 121*   TP 6.0* 5.6* 5.7*   ALB 2.0* 2.2* 2.0*   GLOB 4.0 3.4 3.7     No results for input(s): PH, PCO2, PO2 in the last 72 hours.     Medications Personally Reviewed:    Current Facility-Administered Medications   Medication Dose Route Frequency    sodium chloride (NS) flush 5-40 mL  5-40 mL IntraVENous Q8H    sodium chloride (NS) flush 5-40 mL  5-40 mL IntraVENous PRN    amiodarone (CORDARONE) tablet 400 mg  400 mg Oral BID    meropenem (MERREM) 1 g in 0.9% sodium chloride 20 mL IV syringe  1 g IntraVENous Q12H    ferrous sulfate tablet 325 mg  1 Tablet Oral BID WITH MEALS    heparin 25,000 units in  ml infusion  12-25 Units/kg/hr (Adjusted) IntraVENous TITRATE    heparin (porcine) 1,000 unit/mL injection 4,000 Units  4,000 Units IntraVENous PRN    Or    heparin (porcine) 1,000 unit/mL injection 2,000 Units  2,000 Units IntraVENous PRN    metoprolol succinate (TOPROL-XL) XL tablet 50 mg  50 mg Oral DAILY    nitroglycerin (NITROBID) 2 % ointment 0.5 Inch  0.5 Inch Topical Q6H    aspirin chewable tablet 81 mg  81 mg Oral DAILY    heparin (porcine) 1,000 unit/mL injection 2,800 Units  2,800 Units Hemodialysis DIALYSIS PRN           Problem List:   Patient has a acute on chronic renal failure and had a dialysis and is clinically better. Decompensated heart failure and ejection fraction is depressed and patient has a at least moderate possibly severe pulmonary hypertension. History of paroxysmal atrial fibrillation. Recent DVT of her left axillary vein. Wide-complex tachycardia probably ventricular tachycardia. Catheterization did not show evidence of coronary artery disease and ejection fraction was about 20 to 25%. 1.      Assessment/Plan:   I will continue dialysis as per recommendations of the nephrologist.  Patient is offered ICD by Dr. Joaquim Mukherjee however patient does not want it. Meanwhile I will start the patient on amiodarone. Vascular surgery note reviewed and appreciated and will follow the recommendations. We will continue amiodarone and present care. Thank you. 1.          []       High complexity decision making was performed in this patient at high risk for decompensation with multiple organ involvement.     Carline Greene MD

## 2022-01-14 NOTE — PROGRESS NOTES
CM uploaded Hepatitis results via [employer] to [facility]. Most recent clinicals uploaded as well. Discharge disposition will be home, but we are still pending dialysis chair with [facility].

## 2022-01-14 NOTE — PROGRESS NOTES
PROGRESS NOTE      Chief Complaints:  Patient examined this morning he looks comfortable  HPI and  Objective:    Patient denies any pain in the legs. He has no new complaints. No fever chills abdominal pain nausea or generalized weakness. Review of Systems:  Rest of review of system negative, personally reviewed. EXAM:  Visit Vitals  /87 (BP 1 Location: Left upper arm, BP Patient Position: At rest)   Pulse 62   Temp 98.5 °F (36.9 °C)   Resp 18   Ht 6' (1.829 m)   Wt 175 lb 11.3 oz (79.7 kg)   SpO2 97%   BMI 23.83 kg/m²       Patient is awake and alert  Head and neck atraumatic, normocephalic. ENT: No hoarse voice  Cardiac system regular rate rhythm. Pulmonary no audible wheeze  Chest wall excursion normal with respiration cycle  Abdomen is soft not particularly distended. Neurologically nonfocal.  Skin is warm and moist.  Psychosocial: Cooperative. Vascular examination as previously noted no changes. Recent Results (from the past 24 hour(s))   POC ACTIVATED CLOTTING TIME    Collection Time: 01/13/22 10:29 AM   Result Value Ref Range    Activated Clotting Time (POC) 118 74 - 125 sec    Performed by [employer]    POC ACTIVATED CLOTTING TIME    Collection Time: 01/13/22 10:37 AM   Result Value Ref Range    Activated Clotting Time (POC) 169 (H) 74 - 125 sec    Performed by [employer]    PTT    Collection Time: 01/13/22 11:00 PM   Result Value Ref Range    aPTT 46.9 (H) 21.2 - 34.1 sec    aPTT, therapeutic range   82 - 109 sec       ASSESSMENT:   Patient is 78 y.o. with diagnosis of : Active Problems:    Renal failure (1/8/2022)      Hyperkalemia (1/8/2022)        PLAN:                 Patient has dressings intact both legs. Patient currently getting dialysis. Vascular studies reviewed. Patient has chronic nonhealing ulcers of both legs. Wounds are worse on the left side. I am planning bilateral leg angiographic examination with interventions sometime sooner than later.   Currently waiting for definitive stabilization from the cardiac point of view. Most likely this will be arranged as outpatient. I will continue monitor his progress.

## 2022-01-14 NOTE — PROGRESS NOTES
OCCUPATIONAL THERAPY EVALUATION  Patient: Martha Issa (77 y.o. male)  Date: 1/14/2022  Primary Diagnosis: Renal failure [N19]  Hyperkalemia [E87.5]  Procedure(s) (LRB):  LEFT HEART CATH / CORONARY ANGIOGRAPHY W GRAFTS (N/A)  PERCUTANEOUS CORONARY INTERVENTION (N/A) 1 Day Post-Op   Precautions: Fall risk       ASSESSMENT  Pt is a 77 y/o M with PMH of CKD and HTN presenting to [facility] with c/o headache, malaise, light headedness, and SOB x1 week, admitted 01/07/22 and being treated for aspiration pneumonia, acute on chronic renal failure, elevated troponin, HTN, tachycardia, bilateral leg ulcers, PAD, hyperkalemia, and sepsis. Pt received semi-supine in bed upon OT/PT arrival, AXO x4, and agreeable to OT/PT evaluation at this time. Per pt report, pt lives with his sister and nephew in a two-story home with 2 KATHY and B HR, was IND with ADLs and Mod I using RW for mobility at [facility]. Other DME owned includes: [employer] [place] [address]      Based on current observations, pt presents with deficits in generalized strength, balance, functional activity tolerance, and increased pain impacting overall performance of ADLs and functional transfers/mobility. Pt currently requires total A to don socks while supine in bed. Pt requires min A for rolling, sup>sit EOB, and scooting to EOB. Pt sat EOB and washed face with set up of warm wash cloth. Pt completed sit EOB>stand with min A. Pt completed stand pivot transfer from EOB to recliner chair with min A. Pt completed stand>sit in recliner chair with min A. Pt able to drink a drink IND. Overall, pt tolerates session fair. Pt would benefit from continued skilled OT services to address current impairments and improve IND and safety with self cares and functional transfers/mobility. Recommend discharge to SNF vs HHOT once medically appropriate.     Other factors to consider for discharge: PLOF, home support, severity of deficits     Patient will benefit from skilled therapy intervention to address the above noted impairments. PLAN :  Recommendations and Planned Interventions: self care training, functional mobility training, therapeutic exercise, therapeutic activities, endurance activities and patient education    Frequency/Duration: Patient will be followed by occupational therapy:  3-5x/week to address goals. Recommendation for discharge: (in order for the patient to meet his/her long term goals)  Ramsey Sigala lucie NAJERA     This discharge recommendation:  Has been made in collaboration with the attending provider and/or case management    IF patient discharges home will need the following DME: bedside commode       SUBJECTIVE:   Patient stated I want hot water to wash my face.     OBJECTIVE DATA SUMMARY:   HISTORY:   Past Medical History:   Diagnosis Date    Chronic kidney disease     Hypertension      Past Surgical History:   Procedure Laterality Date    IR INSERT NON TUNL CVC OVER 5 YRS  1/8/2022       Expanded or extensive additional review of patient history:     Home Situation  Home Environment: Private residence  # Steps to Enter: 2  Rails to Enter: Yes  Hand Rails : Bilateral  Wheelchair Ramp: No  One/Two Story Residence: Two story, live on 1st floor  Lift Chair Available: No  Living Alone: No  Support Systems: Other Family Member(s) (sister and nephew)  Current DME Used/Available at Home: [address] [facility] [place], straight,Walker, rolling    Hand dominance: Right    EXAMINATION OF PERFORMANCE DEFICITS:  Cognitive/Behavioral Status:  Neurologic State: Alert  Orientation Level: Oriented X4                Range of Motion:  AROM: Generally decreased, functional  PROM: Generally decreased, functional                      Strength:  Strength: Generally decreased, functional                Coordination:     Fine Motor Skills-Upper: Left Intact; Right Intact    Gross Motor Skills-Upper: Left Intact; Right Intact      Balance:  Sitting: Intact; With support  Standing: Impaired; Without support  Standing - Static: Good  Standing - Dynamic : Fair;Constant support    Functional Mobility and Transfers for ADLs:  Bed Mobility:  Rolling: Minimum assistance  Supine to Sit: Minimum assistance  Scooting: Minimum assistance    Transfers:  Sit to Stand: Minimum assistance  Stand to Sit: Minimum assistance      ADL Intervention and task modifications:  Feeding  Drink to Mouth: Independent    Grooming  Position Performed: Seated edge of bed  Washing Face: Set-up                   Lower Body Dressing Assistance  Socks: Total assistance (dependent)  Position Performed: Supine              Therapeutic Exercise:  Pt would benefit from UE HEP initiated at next session. Functional Measure:    [address] [place] [address] AM-PACTM \"6 Clicks\"                                                       Daily Activity Inpatient Short Form  How much help from another person does the patient currently need. .. Total; A Lot A Little None   1. Putting on and taking off regular lower body clothing? [x]  1 []  2 []  3 []  4   2. Bathing (including washing, rinsing, drying)? []  1 [x]  2 []  3 []  4   3. Toileting, which includes using toilet, bedpan or urinal? [] 1 []  2 [x]  3 []  4   4. Putting on and taking off regular upper body clothing? []  1 []  2 []  3 [x]  4   5. Taking care of personal grooming such as brushing teeth? []  1 []  2 []  3 [x]  4   6. Eating meals? []  1 []  2 []  3 [x]  4   © 2007, Trustees of [address] [place] [address], under license to [employer]. All rights reserved     Score: 18/24     Interpretation of Tool:  Represents clinically-significant functional categories (i.e. Activities of daily living).   Percentage of Impairment CH    0%   CI    1-19% CJ    20-39% CK    40-59% CL    60-79% CM    80-99% CN     100%   [facility]  Score 6-24 24 23 20-22 15-19 10-14 7-9 6         Occupational Therapy Evaluation Charge Determination   History Examination Decision-Making   LOW Complexity : Brief history review  LOW Complexity : 1-3 performance deficits relating to physical, cognitive , or psychosocial skils that result in activity limitations and / or participation restrictions  LOW Complexity : No comorbidities that affect functional and no verbal or physical assistance needed to complete eval tasks       Based on the above components, the patient evaluation is determined to be of the following complexity level: LOW   Pain Ratin/10 BLE    Activity Tolerance:   Fair  Please refer to the flowsheet for vital signs taken during this treatment. After treatment patient left in no apparent distress:    Sitting in chair and Call bell within reach    COMMUNICATION/EDUCATION:   The patients plan of care was discussed with: Physical therapist.     Patient/family agree to work toward stated goals and plan of care. This patients plan of care is appropriate for delegation to [facility]. PT/OT sessions occurred together for increased safety of pt and clinician. Thank you for this referral.  Rachelle Atwood OT  Time Calculation: 32 mins    Problem: Self Care Deficits Care Plan (Adult)  Goal: *Acute Goals and Plan of Care (Insert Text)  Description: 1. Pt will be mod I sup <> sit in prep for EOB ADLs  2. Pt will be mod I grooming standing at sink  3. Pt will be min A LE dressing sitting EOB/long sit  4. Pt will be mod I sit <>  prep for toileting LRAD  5. Pt will be mod I toileting/toilet transfer/cloth mgmt LRAD  6.  Pt will be IND following UE HEP in prep for self care tasks      Outcome: Not Met

## 2022-01-14 NOTE — PROGRESS NOTES
REPORT CALLED TO Twyla Lehman RN ON 4 WEST. PT TO BE TRANSFERRED TO ROOM 477.    0315-PT TRANSFERRED TO ROOM 7, 4 WEST.

## 2022-01-14 NOTE — PROGRESS NOTES
Sitting up comfortably this afternoon. No major complaints were offered. Had mild leg oedema      Lab Review ;    BUN 36 Creat 4.52     Na 137 K 3.2     Cl 102 Bicarb 26     Glu 137      Mild hypokalemia is not unusual in the post dialytic period. Will adjust the dialysate for tomorrow's HD session. I would hold off on K supplements. Will request Hep B (total core) and Hep B (Ig M) to facilitate placement in the outpatient   Dialysis unit.

## 2022-01-14 NOTE — PROGRESS NOTES
PHYSICAL THERAPY EVALUATION  Patient: Andrea Klein (12 y.o. male)  Date: 1/14/2022  Primary Diagnosis: Renal failure [N19]  Hyperkalemia [E87.5]  Procedure(s) (LRB):  LEFT HEART CATH / CORONARY ANGIOGRAPHY W GRAFTS (N/A)  PERCUTANEOUS CORONARY INTERVENTION (N/A) 1 Day Post-Op   Precautions: falls       ASSESSMENT  Pt is a 77 yo male admitted on 1/7/2022 for CHF exacerbation, aspiration PNA, elevated troponin, HTN, bilateral LE ulcers, hyperkalemia, and sepsis. Pt initially admitted to ICU, was transferred on 1/14/22, PT orders received 1/11/22 but was unable to participate until today due to elevated HR and undergoing procedures. PMH: CKD and HTN. Pt A&O x 4. Per pt report pt resides with sister and nephew in a 2 story home (primary bedroom on 1st floor) with 2 KATHY, bilateral handrails, pt was I for ADLS/IADLS, SPC or RW with mobility prior to admission. DME: SPC and RW; pt states he was in the process of getting a rollator PTA. Based on the objective data described below, the patient presents with generalized weakness, impaired functional mobility, impaired amb, impaired balance, and decreased activity tolerance. Pt semi-supine in bed upon PT arrival, agreeable to evaluation. Pt required min A for bed mobility, min A supine > sit, min A sit <> stand transfers, requiring 2 attempts and elevation of bed height (due to pt height) to complete. Pt amb 2 feet (bed to chair) with gt belt, RW, and min A; demonstrating NBOS with short, shuffling, step to gt pattern with no LOB or knee buckling noted. Pt did fair with session today with c/o 8/10 bilateral LE pain with mobility this session as well as requiring increased assistance for mobility. Pt will benefit from continued skilled PT to address above deficits and return to PLOF. Current PT DC recommendation HHPT with family care vs SNF.      Current Level of Function Impacting Discharge (mobility/balance): min A    Other factors to consider for discharge: severity of deficits, LOS      PLAN :  Recommendations and Planned Interventions: bed mobility training, transfer training, gait training, therapeutic exercises, neuromuscular re-education, patient and family training/education and therapeutic activities     Frequency/Duration: Patient will be followed by physical therapy:  3-5x/week to address goals. Recommendation for discharge: (in order for the patient to meet his/her long term goals)  To Be Determined HHPT with family care vs SNF    This discharge recommendation:  Has been made in collaboration with the attending provider and/or case management    IF patient discharges home will need the following DME: bedside commode and shower chair         SUBJECTIVE:   Patient stated i haven't gotten up since I got here.     OBJECTIVE DATA SUMMARY:   HISTORY:    Past Medical History:   Diagnosis Date    Chronic kidney disease     Hypertension      Past Surgical History:   Procedure Laterality Date    IR INSERT NON TUNL CVC OVER 5 YRS  1/8/2022       Home Situation  Home Environment: Private residence  # Steps to Enter: 2  Rails to Enter: Yes  Hand Rails : Bilateral  Wheelchair Ramp: No  One/Two Story Residence: Two story, live on 1st floor  Lift Chair Available: No  Living Alone: No  Support Systems: Other Family Member(s) (sister and nephew)  Current DME Used/Available at Home: Stacie Rumple, straight,Walker, rolling    EXAMINATION/PRESENTATION/DECISION MAKING:   Critical Behavior:  Neurologic State: Alert  Orientation Level: Oriented X4  Cognition: Appropriate decision making,Follows commands     Hearing:     Skin:  Bandages noted bilateral LE  Edema: none noted   Range Of Motion:  AROM: Generally decreased, functional           PROM: Generally decreased, functional           Strength:    Strength: Generally decreased, functional                 Functional Mobility:  Bed Mobility:  Rolling: Minimum assistance  Supine to Sit: Minimum assistance     Scooting: Minimum assistance  Transfers:  Sit to Stand: Minimum assistance  Stand to Sit: Minimum assistance                       Balance:   Sitting: Intact; With support  Standing: Impaired; Without support  Standing - Static: Good  Standing - Dynamic : Fair;Constant support  Ambulation/Gait Training:  Distance (ft): 2 Feet (ft)  Assistive Device: Gait belt;Walker, rolling  Ambulation - Level of Assistance: Contact guard assistance;Minimal assistance; Additional time     Gait Description (WDL): Exceptions to [facility]           Base of Support: Narrowed     Speed/Sharmila: Slow;Shuffled          Therapeutic Exercises:   Not completed this session    Functional Measure:  [facility] [address] AM-PAC 6 Clicks         Basic Mobility Inpatient Short Form  How much difficulty does the patient currently have. .. Unable A Lot A Little None   1. Turning over in bed (including adjusting bedclothes, sheets and blankets)? [] 1   [] 2   [x] 3   [] 4   2. Sitting down on and standing up from a chair with arms ( e.g., wheelchair, bedside commode, etc.)   [] 1   [] 2   [x] 3   [] 4   3. Moving from lying on back to sitting on the side of the bed? [] 1   [] 2   [x] 3   [] 4          How much help from another person does the patient currently need. .. Total A Lot A Little None   4. Moving to and from a bed to a chair (including a wheelchair)? [] 1   [] 2   [x] 3   [] 4   5. Need to walk in hospital room? [] 1   [x] 2   [] 3   [] 4   6. Climbing 3-5 steps with a railing? [] 1   [x] 2   [] 3   [] 4   © 2007, Trustees of [address] [place] [address] [address] [address], under license to [employer]. All rights reserved     Score:  Initial: 16/24 Most Recent: X (Date: 1/14/22 )   Interpretation of Tool:  Represents activities that are increasingly more difficult (i.e. Bed mobility, Transfers, Gait).   Score 24 23 22-20 19-15 14-10 9-7 6   Modifier CH CI CJ CK CL CM CN         Physical Therapy Evaluation Charge Determination   History Examination Presentation Decision-Making HIGH Complexity :3+ comorbidities / personal factors will impact the outcome/ POC  HIGH Complexity : 4+ Standardized tests and measures addressing body structure, function, activity limitation and / or participation in recreation  LOW Complexity : Stable, uncomplicated  Other outcome measures ampac 6  mod      Based on the above components, the patient evaluation is determined to be of the following complexity level: LOW     Pain Ratin/10 bilateral LE with mobility 0/10 at rest    Activity Tolerance:   Fair, requires rest breaks and pain with mobility     After treatment patient left in no apparent distress:   Sitting in chair and Call bell within reach and nsg updated. GOALS:    Problem: Mobility Impaired (Adult and Pediatric)  Goal: *Acute Goals and Plan of Care (Insert Text)  Description: Pt will be I with LE HEP in 7 days. Pt will perform bed mobility with mod I in 7 days. Pt will perform transfers with mod I in 7 days. Pt will amb  feet with LRAD safely with mod I in 7 days. Outcome: Not Met       COMMUNICATION/EDUCATION:   The patients plan of care was discussed with: Occupational therapist, Registered nurse, and Case management. Fall prevention education was provided and the patient/caregiver indicated understanding., Patient/family have participated as able in goal setting and plan of care. , and Patient/family agree to work toward stated goals and plan of care. PT/OT sessions occurred together for increased safety of pt and clinician.        Thank you for this referral.  Pam Franco, PT, DPT   Time Calculation: 33 mins

## 2022-01-14 NOTE — PROGRESS NOTES
Comprehensive Nutrition Assessment    Type and Reason for Visit: RD nutrition re-screen/LOS (LOS)    Nutrition Recommendations/Plan:   continue current diet    Nutrition Assessment:  Admitted for sepsis with cellulitis both calves and aspiration pneumonia. Pt wt stable since admission. pt reports good appetite, meal intake %. No nutrition interventions at this time. meds: aspirin, statins. Labs: BUN 36, Cr 4.5, Ca 7.6, GFR 13 TP 6.0, alb 2.0     Malnutrition Assessment:  Malnutrition Status:  No malnutrition        Estimated Daily Nutrient Needs:  Energy (kcal): 1980 kcal (MSJ x 1.2); Weight Used for Energy Requirements: Current  Protein (g): 96 g (1.2 g/kg); Weight Used for Protein Requirements:    Fluid (ml/day): 1980 ml; Method Used for Fluid Requirements: 1 ml/kcal      Nutrition Related Findings:  No NFPE performed, appears nourished. Denies N/V/C/D. Normal BM 1/13, + BS. No hx dysphagia. Wounds:    Multiple (R&L pretibial stasis ulcers)       Current Nutrition Therapies:  ADULT DIET Dysphagia - Minced & Moist; Low Sodium (2 gm); Low Potassium (Less than 3000 mg/day);  Low Phosphorus (Less than 1000 mg)    Anthropometric Measures:  · Height:  6' (182.9 cm)  · Current Body Wt:  81 kg (178 lb 9.2 oz)   · Admission Body Wt:       · Usual Body Wt:        · Ideal Body Wt:  178 lbs:      · Adjusted Body Weight:   ; Weight Adjustment for: No adjustment   · Adjusted BMI:       · BMI Category:  Normal weight (BMI 22.0-24.9) age over 72       Nutrition Diagnosis:   No nutrition diagnosis at this time    Nutrition Interventions:   Food and/or Nutrient Delivery: Continue current diet  Nutrition Education and Counseling: No recommendations at this time  Coordination of Nutrition Care: No recommendation at this time    Goals:  maintain weight +/- 5%, meal intake > 50%       Nutrition Monitoring and Evaluation:   Behavioral-Environmental Outcomes: None identified  Food/Nutrient Intake Outcomes: Food and nutrient intake  Physical Signs/Symptoms Outcomes: Weight    Discharge Planning:          Electronically signed by Nathanael Lagunas on 1/14/2022 at 11:49 AM    Contact: Ext [phone], or via [employer]

## 2022-01-14 NOTE — PROGRESS NOTES
Called and spoke to Dr. Rocio Ackerman office [phone]. Office states Dr. Murray Hickey is covering for Dr. Nina Villalta today. Asked office to please give Dr. Murray Hickey message to please order the correct Hep B core lab work. Noemy blake [facility] [phone] ext [phone] states the labs we have are incorrect and they will not be able to accept patient until correct blood work has been done.

## 2022-01-14 NOTE — PROGRESS NOTES
Progress Note    Martha Montilla MD             Daily Progress Note: 1/14/2022      Subjective: The patient is seen for follow  up. Complain of shortness of breath.   Patient says I am scared that I need oxygen  Denies any chest pain no nausea vomiting diarrhea abdominal pain or black stool  Vascular surgery's input appreciated  Patient does not want ICD placement  Nephrology note is reviewed and appreciated  Problem List:  Problem List as of 1/14/2022 Never Reviewed          Codes Class Noted - Resolved    Renal failure ICD-10-CM: N19  ICD-9-CM: 084  1/8/2022 - Present        Hyperkalemia ICD-10-CM: E87.5  ICD-9-CM: 276.7  1/8/2022 - Present        Pulmonary edema ICD-10-CM: J81.1  ICD-9-CM: 239  7/13/2021 - Present        GI bleed ICD-10-CM: K92.2  ICD-9-CM: 578.9  1/5/2021 - Present              Medications reviewed  Current Facility-Administered Medications   Medication Dose Route Frequency    sodium chloride (NS) flush 5-40 mL  5-40 mL IntraVENous Q8H    sodium chloride (NS) flush 5-40 mL  5-40 mL IntraVENous PRN    amiodarone (CORDARONE) tablet 400 mg  400 mg Oral BID    meropenem (MERREM) 1 g in 0.9% sodium chloride 20 mL IV syringe  1 g IntraVENous Q12H    ferrous sulfate tablet 325 mg  1 Tablet Oral BID WITH MEALS    heparin 25,000 units in  ml infusion  12-25 Units/kg/hr (Adjusted) IntraVENous TITRATE    heparin (porcine) 1,000 unit/mL injection 4,000 Units  4,000 Units IntraVENous PRN    Or    heparin (porcine) 1,000 unit/mL injection 2,000 Units  2,000 Units IntraVENous PRN    metoprolol succinate (TOPROL-XL) XL tablet 50 mg  50 mg Oral DAILY    nitroglycerin (NITROBID) 2 % ointment 0.5 Inch  0.5 Inch Topical Q6H    aspirin chewable tablet 81 mg  81 mg Oral DAILY    heparin (porcine) 1,000 unit/mL injection 2,800 Units  2,800 Units Hemodialysis DIALYSIS PRN       Review of Systems:   A comprehensive review of systems was negative except for that written in the HPI.    Objective:   Physical Exam:     Visit Vitals  /72 (BP 1 Location: Left upper arm, BP Patient Position: Sitting)   Pulse (!) 58   Temp 98.7 °F (37.1 °C)   Resp 16   Ht 6' (1.829 m)   Wt 79.6 kg (175 lb 7.8 oz)   SpO2 97%   BMI 23.80 kg/m²    O2 Flow Rate (L/min): 1.5 l/min O2 Device: None (Room air)    Temp (24hrs), Av.8 °F (37.1 °C), Min:98.4 °F (36.9 °C), Max:99.4 °F (37.4 °C)    No intake/output data recorded.  1901 -  0700  In: 360 [P.O.:150; I.V.:210]  Out: 3040     General:  Alert, cooperative, no distress, appears stated age. Lungs:   Clear to auscultation bilaterally. Chest wall:  No tenderness or deformity. Heart:  Regular rate and rhythm, S1, S2 normal, no murmur, click, rub or gallop. Abdomen:   Soft, non-tender. Bowel sounds normal. No masses,  No organomegaly. Extremities:  Edema is much better   Pulses: 2+ and symmetric all extremities. Skin:  Has multiple ulcers on both legs left more than right   Neurologic: CNII-XII intact. No gross sensory or motor deficits     Data Review:       Recent Days:  Recent Labs     22  0415 22  0542   WBC 10.3 8.4   HGB 8.9* 8.7*   HCT 30.0* 29.4*    176     Recent Labs     22  0744 22  0415 22  0542    139 138   K 3.2* 3.8 3.5    104 104   CO2 26 25 23   GLU 85 102* 114*   BUN 36* 62* 53*   CREA 4.52* 6.30* 5.45*   CA 7.6* 7.2* 7.0*   MG  --   --  2.4   ALB 2.0* 2.2* 2.0*   TBILI 0.5 0.6 0.7   ALT 10* 11* 11*     No results for input(s): PH, PCO2, PO2, HCO3, FIO2 in the last 72 hours. Results     Procedure Component Value Units Date/Time    CULTURE, Conda Nailer STAIN [[id number]]  (Susceptibility) Collected: 22 0400    Order Status: Completed Specimen: Misc.  Wound Updated: 22     Special Requests: No Special Requests        GRAM STAIN Rare WBCs seen               Occasional Gram Positive Cocci in pairs                  Rare apparent Gram Negative Rods Culture result:       Heavy Staphylococcus aureus                  Heavy Alcaligenes faecalis                  Light Klebsiella pneumoniae            Light Proteus mirabilis       Susceptibility      Staphylococcus aureus     SHIRA     Ciprofloxacin ($) Susceptible     Clindamycin ($) Susceptible     Daptomycin ($$$$$) Susceptible     Doxycycline ($$) Susceptible     Erythromycin ($$$$) Susceptible     Gentamicin ($) Susceptible     Levofloxacin ($) Susceptible     Linezolid ($$$$$) Susceptible     Moxifloxacin ($$$$) Susceptible     Oxacillin Susceptible     Rifampin ($$$$) Susceptible  [1]      Tetracycline Susceptible     Trimeth/Sulfa Susceptible     Vancomycin ($) Susceptible                 [1]  Rifampin is not to be used for mono-therapy.           Linear View               Susceptibility      Alcaligenes faecalis     SHIRA     Amikacin ($) Susceptible     Cefepime ($$) Susceptible     Ceftazidime ($) Susceptible     Ceftriaxone ($) Susceptible     Ciprofloxacin ($) Susceptible     Gentamicin ($) Susceptible     Levofloxacin ($) Susceptible     Meropenem ($$) Susceptible     Piperacillin/Tazobac ($) Resistant     Tobramycin ($) Susceptible     Trimeth/Sulfa Susceptible                  Linear View               Susceptibility      Klebsiella pneumoniae     SHIRA     Amikacin ($) Susceptible     Ampicillin ($) Resistant     Ampicillin/sulbactam ($) Susceptible     Cefazolin ($) Susceptible     Cefepime ($$) Susceptible     Cefoxitin Susceptible     Ceftazidime ($) Susceptible     Ceftriaxone ($) Susceptible     Ciprofloxacin ($) Susceptible     Gentamicin ($) Susceptible     Levofloxacin ($) Susceptible     Meropenem ($$) Susceptible     Piperacillin/Tazobac ($) Susceptible     Tobramycin ($) Susceptible     Trimeth/Sulfa Susceptible                  Linear View               Susceptibility      Proteus mirabilis     SHIRA     Amikacin ($) Susceptible     Ampicillin ($) Susceptible     Ampicillin/sulbactam ($) Susceptible     Cefazolin ($) Susceptible     Cefepime ($$) Susceptible     Cefoxitin Susceptible     Ceftazidime ($) Susceptible     Ceftriaxone ($) Susceptible     Ciprofloxacin ($) Susceptible     Gentamicin ($) Susceptible     Levofloxacin ($) Susceptible     Meropenem ($$) Susceptible     Piperacillin/Tazobac ($) Susceptible     Tobramycin ($) Susceptible     Trimeth/Sulfa Susceptible                  Linear View                   MRSA SCREEN - PCR (NASAL) [[id number]] Collected: 01/08/22 0223    Order Status: Completed Specimen: Swab Updated: 01/08/22 0516     MRSA by PCR, Nasal Not Detected       COVID-19 RAPID TEST [[id number]] Collected: 01/07/22 2313    Order Status: Completed Specimen: Nasopharyngeal Updated: 01/08/22 0010     Specimen source       Please find results under separate order           COVID-19 rapid test Not Detected        Comment: Rapid Abbott ID Now   Rapid NAAT:  The specimen is NEGATIVE for SARS-CoV-2, the novel coronavirus associated with COVID-19. Negative results should be treated as presumptive and, if inconsistent with clinical signs and symptoms or necessary for patient management, should be tested with an alternative molecular assay. Negative results do not preclude SARS-CoV-2 infection and should not be used as the sole basis for patient management decisions. This test has been authorized by the FDA under   an Emergency Use Authorization (EUA) for use by authorized laboratories.  Fact sheet for Healthcare Providers: ConventionUpdate.co.nz Fact sheet for Patients: ConventionUpdate.co.nz   Methodology: Isothermal Nucleic Acid Amplification              24 Hour Results:  Recent Results (from the past 24 hour(s))   PTT    Collection Time: 01/13/22 11:00 PM   Result Value Ref Range    aPTT 46.9 (H) 21.2 - 34.1 sec    aPTT, therapeutic range   82 - 053 sec   METABOLIC PANEL, COMPREHENSIVE    Collection Time: 01/14/22  7:44 AM   Result Value Ref Range    Sodium 137 136 - 145 mmol/L    Potassium 3.2 (L) 3.5 - 5.1 mmol/L    Chloride 102 97 - 108 mmol/L    CO2 26 21 - 32 mmol/L    Anion gap 9 5 - 15 mmol/L    Glucose 85 65 - 100 mg/dL    BUN 36 (H) 6 - 20 mg/dL    Creatinine 4.52 (H) 0.70 - 1.30 mg/dL    BUN/Creatinine ratio 8 (L) 12 - 20      GFR est AA 16 (L) >60 ml/min/1.73m2    GFR est non-AA 13 (L) >60 ml/min/1.73m2    Calcium 7.6 (L) 8.5 - 10.1 mg/dL    Bilirubin, total 0.5 0.2 - 1.0 mg/dL    AST (SGOT) 11 (L) 15 - 37 U/L    ALT (SGPT) 10 (L) 12 - 78 U/L    Alk. phosphatase 102 45 - 117 U/L    Protein, total 6.0 (L) 6.4 - 8.2 g/dL    Albumin 2.0 (L) 3.5 - 5.0 g/dL    Globulin 4.0 2.0 - 4.0 g/dL    A-G Ratio 0.5 (L) 1.1 - 2.2     PTT    Collection Time: 01/14/22  7:44 AM   Result Value Ref Range    aPTT 47.1 (H) 21.2 - 34.1 sec    aPTT, therapeutic range   82 - 109 sec   C REACTIVE PROTEIN, QT    Collection Time: 01/14/22  7:44 AM   Result Value Ref Range    C-Reactive protein 4.16 (H) 0.00 - 0.60 mg/dL   PROCALCITONIN    Collection Time: 01/14/22  7:44 AM   Result Value Ref Range    Procalcitonin 1.74 (H) 0 ng/mL   PTT    Collection Time: 01/14/22  2:04 PM   Result Value Ref Range    aPTT 33.9 21.2 - 34.1 sec    aPTT, therapeutic range   82 - 109 sec       DUPLEX LOW EXT ARTERIES WITH JAVON   Final Result      XR CHEST PORT   Final Result      Interval placement of a right supraclavicular approach dialysis catheter with   distal tip in the proximal right atrium. Cardiomegaly with pulmonary vascular congestion and pulmonary edema. Stable appearing basilar right lung pneumonia. Small right pleural effusion. IR INSERT NON TUNL CVC OVER 5 YRS   Final Result      Technically successful insertion of non-tunneled Trialysis catheter with US   guidance. Plan:       Post catheter placement chest xray confirmed appropriate position of the   catheter. The catheter is appropriate for immediate use. _______________________________________________________________      PROCEDURE SUMMARY:   - Venous access with ultrasound guidance   - Non-tunneled central venous catheter insertion      PROCEDURE DETAILS:      Pre-procedure   Relevant imaging review: None    Informed consent for the procedure was obtained and time-out was performed prior   to the procedure. Prophylactic antibiotic administered: Not administered   Preparation: The site was prepared and draped using all elements of maximal   sterile barrier technique including sterile gloves, sterile gown, cap, mask,   large sterile sheet, sterile ultrasound probe cover, sterile ultrasound gel,   hand hygiene and cutaneous antisepsis with 2% chlorhexidine. Medical reason for site preparation exception: Not applicable      Anesthesia/sedation   Level of anesthesia: No sedation   Anesthesia administered by: Not applicable   Duration of anesthesia/sedation: 0 minutes. Access   The vessel was sonographically evaluated and judged to be patent and appropriate   for access and a permanent image was stored. Three mLs of 1% Lidocaine was   administered to the access site. Real time ultrasound was used to visualize   needle entry into the vessel. Vein accessed: Right internal jugular vein   Access technique: 4F micropuncture set      Catheter placement   The access site was dilated and the catheter was placed into the vein over a   wire and all guidewires were removed in their entirety. Catheter ports were   aspirated and flushed. Catheter tip location was verified by portable X-ray. Catheter size:13 Tamazight   Catheter length: 20 cm   Catheter tip position: Proximal right atrium   Catheter flush: Heparin (100 units/mL)      Closure   The catheter was secured. A sterile dressing was applied.    Catheter securement technique: Non-absorbable suture      Additional Medications   None      Additional Details   Estimated blood loss:  Less than 1 mL   Additional description of procedure: None   Additional findings: None   Additional equipment: None   Specimens removed: None                     IR US GUIDED VASCULAR ACCESS   Final Result      Technically successful insertion of non-tunneled Trialysis catheter with US   guidance. Plan:       Post catheter placement chest xray confirmed appropriate position of the   catheter. The catheter is appropriate for immediate use.   _______________________________________________________________      PROCEDURE SUMMARY:   - Venous access with ultrasound guidance   - Non-tunneled central venous catheter insertion      PROCEDURE DETAILS:      Pre-procedure   Relevant imaging review: None    Informed consent for the procedure was obtained and time-out was performed prior   to the procedure. Prophylactic antibiotic administered: Not administered   Preparation: The site was prepared and draped using all elements of maximal   sterile barrier technique including sterile gloves, sterile gown, cap, mask,   large sterile sheet, sterile ultrasound probe cover, sterile ultrasound gel,   hand hygiene and cutaneous antisepsis with 2% chlorhexidine. Medical reason for site preparation exception: Not applicable      Anesthesia/sedation   Level of anesthesia: No sedation   Anesthesia administered by: Not applicable   Duration of anesthesia/sedation: 0 minutes. Access   The vessel was sonographically evaluated and judged to be patent and appropriate   for access and a permanent image was stored. Three mLs of 1% Lidocaine was   administered to the access site. Real time ultrasound was used to visualize   needle entry into the vessel. Vein accessed: Right internal jugular vein   Access technique: 4F micropuncture set      Catheter placement   The access site was dilated and the catheter was placed into the vein over a   wire and all guidewires were removed in their entirety. Catheter ports were   aspirated and flushed.  Catheter tip location was verified by portable X-ray. Catheter size:13 Romanian   Catheter length: 20 cm   Catheter tip position: Proximal right atrium   Catheter flush: Heparin (100 units/mL)      Closure   The catheter was secured. A sterile dressing was applied. Catheter securement technique: Non-absorbable suture      Additional Medications   None      Additional Details   Estimated blood loss:  Less than 1 mL   Additional description of procedure: None   Additional findings: None   Additional equipment: None   Specimens removed: None                     XR CHEST PORT   Final Result   Airspace opacity and effusion at the right lung base and in the   acute setting would suggest pneumonia and/or aspiration however the patient also   appears to have baseline/background vascular congestion and/or pulmonary   arterial hypertension, noting cardiomegaly and/or pericardial effusion      IR INSERT NON TUNL CVC OVER 5 YRS    (Results Pending)        Assessment: Cardiac dysrhythmia with paroxysmal atrial fibrillation  CHF  Acute on chronic renal failure now on dialysis  Hypertension  Peripheral arterial disease  Pneumonia        Plan: We will continue current on heparin as well as IV antibiotics antibiotics  Requested RN to start patient on oxygen 2 L/min      Care Plan discussed with: Patient/Family and Nurse    Total time spent with patient: 30 minutes.     Colt De Leon MD

## 2022-01-14 NOTE — PROGRESS NOTES
Progress Note    Patient: Cristela Worthy MRN: [id number]  SSN: [id number]    YOB: 1955  Age: 78 y.o. Sex: male      Admit Date: 1/7/2022    LOS: 6 days     Subjective:   Patient followed for sepsis with cellulitis both calves and aspiration pneumonia. Leg wound culture grew multiple organisms as shown below. He is afebrile with normal WBC and decreasing procal and CRP. Currently on IV Meropenem. Patient transferred back to the ICU. Problems with VTach and requiring defibrillator. Apparently underwent cardiac cath. Patient now on 4W sitting up in bedside chair with no complaints. Objective:     Vitals:    01/13/22 2318 01/13/22 2348 01/14/22 0230 01/14/22 0740   BP: (!) 120/100 115/86 106/82 127/87   Pulse: 67 61 66 62   Resp: 15  17 18   Temp: 98.4 °F (36.9 °C)   98.5 °F (36.9 °C)   TempSrc:       SpO2:   97% 97%   Weight:       Height:            Intake and Output:  Current Shift: No intake/output data recorded. Last three shifts: 01/12 1901 - 01/14 0700  In: 360 [P.O.:150; I.V.:210]  Out: 3040     Physical Exam:    Vitals and nursing note reviewed. Constitutional:       Appearance: He is ill-appearing. Genitourinary:     Comments: External urinary catheter  Musculoskeletal:      Right lower leg: Edema present. Left lower leg: Edema present. Comments: Both calves now with bulky gauze dressing and ace wrap, not removed at this time  Skin:         Neurological: nonfocal, mild confusion  Psychiatric:         Behavior: Behavior normal.     Lab/Data Review:     WBC 10,300    CRP 5.30 <6.32 <8.19 <12.10 <12.10  Procal 2.39 <3.12 < 4.41 <4.39 <5.16    MRSA nasal PCR negative    Wound cultures leg (1/8)  Staphylococcus aureus (MSSA),  Alcaligenes faecalis resistant to Zosyn, Klebsiella pneumoniae      Assessment:     Active Problems:    Renal failure (1/8/2022)      Hyperkalemia (1/8/2022)    1. Cellulitis both calves, secondary to probable S.  Aureus (MSSA), Alcaligenes faecalis resistant to Zosyn, Klebsiella pneumoniae,  Day #7 IV Antibiotics, now Meropenem,  improving  2. Aspiration pneumonia, RLL, Day #7 IV Antibiotics, now Meropenem  3. Sepsis with leukocytosis and elevated CRP/procal, resolving     Comment:  WBC normal with decreasing CRP and procal.     Plan:   1. Continue  Meropenem (Zosyn resistance) IV which will cover MSSA as well, for 7 more days  2.  On Monday, repeat CRP, procal    Signed By: Cindi Seaman MD     January 14, 2022

## 2022-01-15 LAB
ALBUMIN SERPL-MCNC: 1.8 G/DL (ref 3.5–5)
ALBUMIN/GLOB SERPL: 0.6 {RATIO} (ref 1.1–2.2)
ALP SERPL-CCNC: 92 U/L (ref 45–117)
ALT SERPL-CCNC: 12 U/L (ref 12–78)
ANION GAP SERPL CALC-SCNC: 9 MMOL/L (ref 5–15)
APTT PPP: 52.7 SEC (ref 21.2–34.1)
APTT PPP: >153 SEC (ref 21.2–34.1)
AST SERPL W P-5'-P-CCNC: 12 U/L (ref 15–37)
BILIRUB SERPL-MCNC: 0.4 MG/DL (ref 0.2–1)
BUN SERPL-MCNC: 49 MG/DL (ref 6–20)
BUN/CREAT SERPL: 9 (ref 12–20)
CA-I BLD-MCNC: 7.2 MG/DL (ref 8.5–10.1)
CHLORIDE SERPL-SCNC: 104 MMOL/L (ref 97–108)
CO2 SERPL-SCNC: 26 MMOL/L (ref 21–32)
CREAT SERPL-MCNC: 5.38 MG/DL (ref 0.7–1.3)
GLOBULIN SER CALC-MCNC: 3 G/DL (ref 2–4)
GLUCOSE SERPL-MCNC: 102 MG/DL (ref 65–100)
HBV CORE IGM SER QL: NONREACTIVE
POTASSIUM SERPL-SCNC: 3.1 MMOL/L (ref 3.5–5.1)
PROT SERPL-MCNC: 4.8 G/DL (ref 6.4–8.2)
SODIUM SERPL-SCNC: 139 MMOL/L (ref 136–145)
THERAPEUTIC RANGE,PTTT: ABNORMAL SEC (ref 82–109)
THERAPEUTIC RANGE,PTTT: ABNORMAL SEC (ref 82–109)

## 2022-01-15 PROCEDURE — 85730 THROMBOPLASTIN TIME PARTIAL: CPT

## 2022-01-15 PROCEDURE — 74011250637 HC RX REV CODE- 250/637: Performed by: INTERNAL MEDICINE

## 2022-01-15 PROCEDURE — 74011250636 HC RX REV CODE- 250/636: Performed by: INTERNAL MEDICINE

## 2022-01-15 PROCEDURE — 74011000250 HC RX REV CODE- 250: Performed by: INTERNAL MEDICINE

## 2022-01-15 PROCEDURE — 36415 COLL VENOUS BLD VENIPUNCTURE: CPT

## 2022-01-15 PROCEDURE — 65270000029 HC RM PRIVATE

## 2022-01-15 PROCEDURE — 86705 HEP B CORE ANTIBODY IGM: CPT

## 2022-01-15 PROCEDURE — 74011250636 HC RX REV CODE- 250/636: Performed by: FAMILY MEDICINE

## 2022-01-15 PROCEDURE — 74011250636 HC RX REV CODE- 250/636

## 2022-01-15 PROCEDURE — 80053 COMPREHEN METABOLIC PANEL: CPT

## 2022-01-15 PROCEDURE — 94760 N-INVAS EAR/PLS OXIMETRY 1: CPT

## 2022-01-15 PROCEDURE — 90935 HEMODIALYSIS ONE EVALUATION: CPT

## 2022-01-15 PROCEDURE — 5A1D70Z PERFORMANCE OF URINARY FILTRATION, INTERMITTENT, LESS THAN 6 HOURS PER DAY: ICD-10-PCS | Performed by: INTERNAL MEDICINE

## 2022-01-15 PROCEDURE — 86704 HEP B CORE ANTIBODY TOTAL: CPT

## 2022-01-15 RX ORDER — POTASSIUM CHLORIDE 750 MG/1
40 TABLET, FILM COATED, EXTENDED RELEASE ORAL
Status: COMPLETED | OUTPATIENT
Start: 2022-01-15 | End: 2022-01-15

## 2022-01-15 RX ORDER — HEPARIN SODIUM 1000 [USP'U]/ML
INJECTION, SOLUTION INTRAVENOUS; SUBCUTANEOUS
Status: COMPLETED
Start: 2022-01-15 | End: 2022-01-15

## 2022-01-15 RX ADMIN — AMIODARONE HYDROCHLORIDE 400 MG: 200 TABLET ORAL at 21:28

## 2022-01-15 RX ADMIN — HEPARIN SODIUM 2800 UNITS: 1000 INJECTION INTRAVENOUS; SUBCUTANEOUS at 11:26

## 2022-01-15 RX ADMIN — SODIUM CHLORIDE, PRESERVATIVE FREE 10 ML: 5 INJECTION INTRAVENOUS at 14:00

## 2022-01-15 RX ADMIN — ASPIRIN 81 MG CHEWABLE TABLET 81 MG: 81 TABLET CHEWABLE at 12:25

## 2022-01-15 RX ADMIN — SODIUM CHLORIDE, PRESERVATIVE FREE 1 G: 5 INJECTION INTRAVENOUS at 21:28

## 2022-01-15 RX ADMIN — AMIODARONE HYDROCHLORIDE 400 MG: 200 TABLET ORAL at 12:25

## 2022-01-15 RX ADMIN — SODIUM CHLORIDE, PRESERVATIVE FREE 10 ML: 5 INJECTION INTRAVENOUS at 05:48

## 2022-01-15 RX ADMIN — POTASSIUM CHLORIDE 40 MEQ: 750 TABLET, FILM COATED, EXTENDED RELEASE ORAL at 21:28

## 2022-01-15 RX ADMIN — SODIUM CHLORIDE, PRESERVATIVE FREE 1 G: 5 INJECTION INTRAVENOUS at 12:25

## 2022-01-15 RX ADMIN — HEPARIN SODIUM 2000 UNITS: 1000 INJECTION INTRAVENOUS; SUBCUTANEOUS at 21:29

## 2022-01-15 RX ADMIN — SODIUM CHLORIDE, PRESERVATIVE FREE 10 ML: 5 INJECTION INTRAVENOUS at 21:29

## 2022-01-15 RX ADMIN — FERROUS SULFATE TAB 325 MG (65 MG ELEMENTAL FE) 325 MG: 325 (65 FE) TAB at 12:25

## 2022-01-15 RX ADMIN — FERROUS SULFATE TAB 325 MG (65 MG ELEMENTAL FE) 325 MG: 325 (65 FE) TAB at 17:05

## 2022-01-15 RX ADMIN — HEPARIN SODIUM 2800 UNITS: 1000 INJECTION, SOLUTION INTRAVENOUS; SUBCUTANEOUS at 11:26

## 2022-01-15 NOTE — PROGRESS NOTES
PT treatment attempted at [phone] and [phone] however, pt off floor during both attempts. Will continue to follow pt and will attempt treatment at a later time.  Thank you

## 2022-01-15 NOTE — PROGRESS NOTES
[facility]     Renal Daily Progress Note:     Admission Date: 2022     Subjective: Recurrent SVT. Underwent cardiac cath today:  Indication: Severe left ventricular systolic dysfunction and patient had a V. Tach.     Left main coronary artery is a very large caliber vessel and has no disease. LAD artery is a very large caliber vessel and proximal mid and distal segment has no stenosis and diagonal branches has no disease. Ramus intermedius is a medium caliber vessel without disease. Circumflex artery is a large-caliber vessel and proximal mid and distal segment and AV groove segment has no stenosis. Large obtuse marginal branch and posterolateral branch has no stenosis. Right coronary artery is a large-caliber vessel and does junction of proximal and mid segment there was a concern of stenosis and that therefore it was interrogated by IFR which turned out to be 0.95 and was not deemed critical enough to do intervention.     Left ventriculography is performed in the right anterior oblique view and ejection fraction is about 20 to 25% and distal anterior wall apex and distal inferior wall is near akinetic. This may be possible presentation of Takotsubo syndrome.     Plan: Patient will receive defibrillator    Fatigued but looks better. Mental status is good. He  looks better, less short of breath, not tachypneic. 1/15/22 Patient is being dialyzed. He did not have any complaints at present. Review of Systems  Pertinent items are noted in HPI.     Objective:     Visit Vitals  /81 (BP 1 Location: Left upper arm, BP Patient Position: At rest;Supine)   Pulse 60   Temp 98.1 °F (36.7 °C)   Resp 20   Ht 6' (1.829 m)   Wt 79.6 kg (175 lb 7.8 oz)   SpO2 100%   BMI 23.80 kg/m²     Temp (24hrs), Av.5 °F (36.9 °C), Min:98 °F (36.7 °C), Max:99.5 °F (37.5 °C)        Intake/Output Summary (Last 24 hours) at 1/15/2022 9997  Last data filed at 1/15/2022 1115  Gross per 24 hour   Intake -- Output 3000 ml   Net -3000 ml     Current Facility-Administered Medications   Medication Dose Route Frequency    sodium chloride (NS) flush 5-40 mL  5-40 mL IntraVENous Q8H    sodium chloride (NS) flush 5-40 mL  5-40 mL IntraVENous PRN    amiodarone (CORDARONE) tablet 400 mg  400 mg Oral BID    meropenem (MERREM) 1 g in 0.9% sodium chloride 20 mL IV syringe  1 g IntraVENous Q12H    ferrous sulfate tablet 325 mg  1 Tablet Oral BID WITH MEALS    heparin 25,000 units in  ml infusion  12-25 Units/kg/hr (Adjusted) IntraVENous TITRATE    heparin (porcine) 1,000 unit/mL injection 4,000 Units  4,000 Units IntraVENous PRN    Or    heparin (porcine) 1,000 unit/mL injection 2,000 Units  2,000 Units IntraVENous PRN    metoprolol succinate (TOPROL-XL) XL tablet 50 mg  50 mg Oral DAILY    nitroglycerin (NITROBID) 2 % ointment 0.5 Inch  0.5 Inch Topical Q6H    aspirin chewable tablet 81 mg  81 mg Oral DAILY    heparin (porcine) 1,000 unit/mL injection 2,800 Units  2,800 Units Hemodialysis DIALYSIS PRN       Physical Exam:General appearance: alert, cooperative, no distress, appears older than stated age. HD in progress. Head: Normocephalic, without obvious abnormality, atraumatic  Lungs: clear to auscultation bilaterally  Heart: regular rate and rhythm, no rub  Abdomen: soft, non-tender.  Bowel sounds normal. No masses,  no organomegaly  Extremities: venous stasis dermatitis noted, edema: decreased lower extremity edema but has dependent thigh edema  Skin: Left lower leg with posterior ulcer inferior to calf  Neurologic: Grossly normal    Data Review:     LABS:  Recent Labs     01/15/22  0800 01/14/22  0744 01/13/22  0415    137 139   K 3.1* 3.2* 3.8    102 104   CO2 26 26 25   BUN 49* 36* 62*   CREA 5.38* 4.52* 6.30*   CA 7.2* 7.6* 7.2*   ALB 1.8* 2.0* 2.2*   Iron saturation 13%    Recent Labs     01/13/22  0415   WBC 10.3   HGB 8.9*   HCT 30.0*        No results for input(s): HÉCTOR, KU, CLU, CREAU in the last 72 hours. No lab exists for component: PROU   Chest x-ray January 8, 2022:  IMPRESSION     Interval placement of a right supraclavicular approach dialysis catheter with  distal tip in the proximal right atrium.     Cardiomegaly with pulmonary vascular congestion and pulmonary edema.      Stable appearing basilar right lung pneumonia. Small right pleural effusion. Assessment:   Renal Specific Problems  Chronic kidney disease stage VI, started on HD. Tolerating the Tx well. SVT- recurrent  Volume overload  Metabolic acidosis- resolved  Hyperkalemia, now hypokalemia.   Anemia with renal failure and iron deficient  Leukocytosis with elevated but declining procalcitonin  Probable right lower lobe pneumonia  Left leg ulcer  Venous stasis dermatitis lower extremity          Plan:     Obtain/ Order: labs/cultures/radiology/procedures:      Therapeutic:    Complete a full HD Dialysis  Today x 3.5 hr, change the bath to higher K ( d/w HD RN Pat Mas)  Epogen if iron saturation greater than 20%  Oral iron, avoid IV iron if there is signs of active infection  Wound care - regarding left leg ulcer    Case management to arrange outpatient dialysis    Clarisse Chandler MD    [phone]

## 2022-01-16 LAB
ALBUMIN SERPL-MCNC: 2.1 G/DL (ref 3.5–5)
ALBUMIN/GLOB SERPL: 0.5 {RATIO} (ref 1.1–2.2)
ALP SERPL-CCNC: 101 U/L (ref 45–117)
ALT SERPL-CCNC: 19 U/L (ref 12–78)
ANION GAP SERPL CALC-SCNC: 8 MMOL/L (ref 5–15)
APTT PPP: 88.1 SEC (ref 21.2–34.1)
APTT PPP: 92.3 SEC (ref 21.2–34.1)
AST SERPL W P-5'-P-CCNC: 22 U/L (ref 15–37)
ATRIAL RATE: 58 BPM
BILIRUB SERPL-MCNC: 0.5 MG/DL (ref 0.2–1)
BUN SERPL-MCNC: 30 MG/DL (ref 6–20)
BUN/CREAT SERPL: 8 (ref 12–20)
CA-I BLD-MCNC: 7.5 MG/DL (ref 8.5–10.1)
CALCULATED P AXIS, ECG09: 69 DEGREES
CALCULATED R AXIS, ECG10: 108 DEGREES
CALCULATED T AXIS, ECG11: 166 DEGREES
CHLORIDE SERPL-SCNC: 103 MMOL/L (ref 97–108)
CO2 SERPL-SCNC: 27 MMOL/L (ref 21–32)
CREAT SERPL-MCNC: 3.73 MG/DL (ref 0.7–1.3)
DIAGNOSIS, 93000: NORMAL
GLOBULIN SER CALC-MCNC: 4 G/DL (ref 2–4)
GLUCOSE SERPL-MCNC: 96 MG/DL (ref 65–100)
MAGNESIUM SERPL-MCNC: 2.3 MG/DL (ref 1.6–2.4)
P-R INTERVAL, ECG05: 182 MS
POTASSIUM SERPL-SCNC: 3.8 MMOL/L (ref 3.5–5.1)
PROT SERPL-MCNC: 6.1 G/DL (ref 6.4–8.2)
Q-T INTERVAL, ECG07: 518 MS
QRS DURATION, ECG06: 108 MS
QTC CALCULATION (BEZET), ECG08: 508 MS
SODIUM SERPL-SCNC: 138 MMOL/L (ref 136–145)
THERAPEUTIC RANGE,PTTT: ABNORMAL SEC (ref 82–109)
THERAPEUTIC RANGE,PTTT: ABNORMAL SEC (ref 82–109)
VENTRICULAR RATE, ECG03: 58 BPM

## 2022-01-16 PROCEDURE — 93005 ELECTROCARDIOGRAM TRACING: CPT

## 2022-01-16 PROCEDURE — 74011000250 HC RX REV CODE- 250: Performed by: INTERNAL MEDICINE

## 2022-01-16 PROCEDURE — 36415 COLL VENOUS BLD VENIPUNCTURE: CPT

## 2022-01-16 PROCEDURE — 74011250636 HC RX REV CODE- 250/636: Performed by: INTERNAL MEDICINE

## 2022-01-16 PROCEDURE — 85730 THROMBOPLASTIN TIME PARTIAL: CPT

## 2022-01-16 PROCEDURE — 74011250637 HC RX REV CODE- 250/637: Performed by: INTERNAL MEDICINE

## 2022-01-16 PROCEDURE — 80053 COMPREHEN METABOLIC PANEL: CPT

## 2022-01-16 PROCEDURE — 74011250636 HC RX REV CODE- 250/636: Performed by: FAMILY MEDICINE

## 2022-01-16 PROCEDURE — 65270000029 HC RM PRIVATE

## 2022-01-16 PROCEDURE — 83735 ASSAY OF MAGNESIUM: CPT

## 2022-01-16 RX ORDER — OXYCODONE HYDROCHLORIDE 5 MG/1
5 TABLET ORAL
Status: DISCONTINUED | OUTPATIENT
Start: 2022-01-16 | End: 2022-02-08 | Stop reason: HOSPADM

## 2022-01-16 RX ADMIN — FERROUS SULFATE TAB 325 MG (65 MG ELEMENTAL FE) 325 MG: 325 (65 FE) TAB at 16:18

## 2022-01-16 RX ADMIN — OXYCODONE 5 MG: 5 TABLET ORAL at 00:30

## 2022-01-16 RX ADMIN — Medication 17 UNITS/KG/HR: at 23:16

## 2022-01-16 RX ADMIN — SODIUM CHLORIDE, PRESERVATIVE FREE 10 ML: 5 INJECTION INTRAVENOUS at 06:41

## 2022-01-16 RX ADMIN — ASPIRIN 81 MG CHEWABLE TABLET 81 MG: 81 TABLET CHEWABLE at 08:47

## 2022-01-16 RX ADMIN — SODIUM CHLORIDE, PRESERVATIVE FREE 1 G: 5 INJECTION INTRAVENOUS at 23:14

## 2022-01-16 RX ADMIN — SODIUM CHLORIDE, PRESERVATIVE FREE 10 ML: 5 INJECTION INTRAVENOUS at 23:15

## 2022-01-16 RX ADMIN — OXYCODONE 5 MG: 5 TABLET ORAL at 23:19

## 2022-01-16 RX ADMIN — FERROUS SULFATE TAB 325 MG (65 MG ELEMENTAL FE) 325 MG: 325 (65 FE) TAB at 08:46

## 2022-01-16 RX ADMIN — AMIODARONE HYDROCHLORIDE 400 MG: 200 TABLET ORAL at 08:45

## 2022-01-16 RX ADMIN — SODIUM CHLORIDE, PRESERVATIVE FREE 1 G: 5 INJECTION INTRAVENOUS at 12:00

## 2022-01-16 RX ADMIN — METOPROLOL SUCCINATE 50 MG: 25 TABLET, EXTENDED RELEASE ORAL at 08:46

## 2022-01-16 RX ADMIN — AMIODARONE HYDROCHLORIDE 400 MG: 200 TABLET ORAL at 22:53

## 2022-01-16 NOTE — PROGRESS NOTES
Progress Note      1/16/2022 12:19 PM  NAME: Praveen Elizondo   MRN:  [id number]   Admit Diagnosis: Renal failure [N19]  Hyperkalemia [E87.5]      Subjective:   Chart reviewed. Patient is on dialysis. Symptoms of shortness of breath has improved. Patient had frequent palpitations during the night and apparently a nurse thought it was SVT and was given a Adenocard however EKG is concerning for ventricular tachycardia. With the patient and her with the nurse. Cardiac catheterization findings discussed with the patient and also discussed with the electrophysiologist.    Review of Systems:    Symptom Y/N Comments  Symptom Y/N Comments   Fever/Chills n   Chest Pain n    Poor Appetite    Edema y    Cough    Abdominal Pain     Sputum    Joint Pain     SOB/CARMONA y  improving  Pruritis/Rash     Nausea/vomit    Other     Diarrhea         Constipation           Could NOT obtain due to:      Objective:          Physical Exam:    Last 24hrs VS reviewed since prior progress note. Most recent are:    Visit Vitals  BP (!) 126/92 (BP 1 Location: Left upper arm, BP Patient Position: Sitting)   Pulse 63   Temp 98 °F (36.7 °C)   Resp 20   Ht 6' (1.829 m)   Wt 79.6 kg (175 lb 7.8 oz)   SpO2 98%   BMI 23.80 kg/m²     No intake or output data in the 24 hours ending 01/16/22 1616     General Appearance: Well developed, well nourished, alert & oriented x 3,    no acute distress. Ears/Nose/Mouth/Throat: Hearing grossly normal.  Neck: Supple. Chest: Lungs clear to auscultation bilaterally. Cardiovascular: Regular rate and rhythm, S1,S2 normal, no murmur. Abdomen: Soft, non-tender, bowel sounds are active. Extremities: Patient has ulcers of the lower extremities  Skin: Warm and dry. []         Post-cath site without hematoma, bruit, tenderness, or thrill. Distal pulses intact.     PMH/SH reviewed - no change compared to H&P    Data Review    Telemetry: normal sinus rhythm , patient has some PVCs, and episode of wide-complex tachycardia possible ventricular tachycardia. EKG:   Patient had EKG that is very concerning for ventricular tachycardia. Lab Data Personally Reviewed:    No results for input(s): WBC, HGB, HCT, PLT, HGBEXT, HCTEXT, PLTEXT, HGBEXT, HCTEXT, PLTEXT in the last 72 hours. Recent Labs     01/16/22  1156 01/16/22  0349 01/15/22  2005   APTT 88.1* 92.3* 52.7*      Recent Labs     01/16/22  0349 01/15/22  0800 01/14/22  0744    139 137   K 3.8 3.1* 3.2*    104 102   CO2 27 26 26   BUN 30* 49* 36*   CREA 3.73* 5.38* 4.52*   GLU 96 102* 85   CA 7.5* 7.2* 7.6*   MG 2.3  --   --      No results for input(s): CPK, CKNDX, TROIQ in the last 72 hours. No lab exists for component: CPKMB  No results found for: CHOL, CHOLX, CHLST, CHOLV, HDL, HDLP, LDL, LDLC, DLDLP, [facility], CHHD, CHHDX    Recent Labs     01/16/22  0349 01/15/22  0800 01/14/22  0744    92 102   TP 6.1* 4.8* 6.0*   ALB 2.1* 1.8* 2.0*   GLOB 4.0 3.0 4.0     No results for input(s): PH, PCO2, PO2 in the last 72 hours.     Medications Personally Reviewed:    Current Facility-Administered Medications   Medication Dose Route Frequency    oxyCODONE IR (ROXICODONE) tablet 5 mg  5 mg Oral Q8H PRN    sodium chloride (NS) flush 5-40 mL  5-40 mL IntraVENous Q8H    sodium chloride (NS) flush 5-40 mL  5-40 mL IntraVENous PRN    amiodarone (CORDARONE) tablet 400 mg  400 mg Oral BID    meropenem (MERREM) 1 g in 0.9% sodium chloride 20 mL IV syringe  1 g IntraVENous Q12H    ferrous sulfate tablet 325 mg  1 Tablet Oral BID WITH MEALS    heparin 25,000 units in  ml infusion  12-25 Units/kg/hr (Adjusted) IntraVENous TITRATE    heparin (porcine) 1,000 unit/mL injection 4,000 Units  4,000 Units IntraVENous PRN    Or    heparin (porcine) 1,000 unit/mL injection 2,000 Units  2,000 Units IntraVENous PRN    metoprolol succinate (TOPROL-XL) XL tablet 50 mg  50 mg Oral DAILY    nitroglycerin (NITROBID) 2 % ointment 0.5 Inch  0.5 Inch Topical Q6H    aspirin chewable tablet 81 mg  81 mg Oral DAILY    heparin (porcine) 1,000 unit/mL injection 2,800 Units  2,800 Units Hemodialysis DIALYSIS PRN           Problem List:   Patient has a acute on chronic renal failure and had a dialysis and is clinically better. Decompensated heart failure and ejection fraction is depressed and patient has a at least moderate possibly severe pulmonary hypertension. History of paroxysmal atrial fibrillation. Recent DVT of her left axillary vein. Wide-complex tachycardia probably ventricular tachycardia. Catheterization did not show evidence of coronary artery disease and ejection fraction was about 20 to 25%. 1.      Assessment/Plan:   I will continue dialysis as per recommendations of the nephrologist.  Patient is offered ICD by Dr. Cory Thomas however patient does not want it. Meanwhile I will start the patient on amiodarone. Vascular surgery note reviewed and appreciated and will follow the recommendations. We will continue amiodarone and present care. Thank you. 1.          []       High complexity decision making was performed in this patient at high risk for decompensation with multiple organ involvement.     Da Sandoval MD

## 2022-01-16 NOTE — PROGRESS NOTES
Progress Note    Martha Montilla MD             Daily Progress Note: 1/16/2022      Subjective: The patient is seen for follow  up. Patient had severe leg pain last night. Which can be secondary to after dialysis as well as can be because of his peripheral arterial disease. He was given oxycodone and his pain is much better now. Sitting in the chair.   Patient may require placement in rehab facility  Problem List:  Problem List as of 1/16/2022 Never Reviewed          Codes Class Noted - Resolved    Renal failure ICD-10-CM: N19  ICD-9-CM: 059  1/8/2022 - Present        Hyperkalemia ICD-10-CM: E87.5  ICD-9-CM: 276.7  1/8/2022 - Present        Pulmonary edema ICD-10-CM: J81.1  ICD-9-CM: 950  7/13/2021 - Present        GI bleed ICD-10-CM: K92.2  ICD-9-CM: 578.9  1/5/2021 - Present              Medications reviewed  Current Facility-Administered Medications   Medication Dose Route Frequency    oxyCODONE IR (ROXICODONE) tablet 5 mg  5 mg Oral Q8H PRN    sodium chloride (NS) flush 5-40 mL  5-40 mL IntraVENous Q8H    sodium chloride (NS) flush 5-40 mL  5-40 mL IntraVENous PRN    amiodarone (CORDARONE) tablet 400 mg  400 mg Oral BID    meropenem (MERREM) 1 g in 0.9% sodium chloride 20 mL IV syringe  1 g IntraVENous Q12H    ferrous sulfate tablet 325 mg  1 Tablet Oral BID WITH MEALS    heparin 25,000 units in  ml infusion  12-25 Units/kg/hr (Adjusted) IntraVENous TITRATE    heparin (porcine) 1,000 unit/mL injection 4,000 Units  4,000 Units IntraVENous PRN    Or    heparin (porcine) 1,000 unit/mL injection 2,000 Units  2,000 Units IntraVENous PRN    metoprolol succinate (TOPROL-XL) XL tablet 50 mg  50 mg Oral DAILY    nitroglycerin (NITROBID) 2 % ointment 0.5 Inch  0.5 Inch Topical Q6H    aspirin chewable tablet 81 mg  81 mg Oral DAILY    heparin (porcine) 1,000 unit/mL injection 2,800 Units  2,800 Units Hemodialysis DIALYSIS PRN       Review of Systems:   A comprehensive review of systems was negative except for that written in the HPI. Objective:   Physical Exam:     Visit Vitals  BP (!) 126/92 (BP 1 Location: Left upper arm, BP Patient Position: Sitting)   Pulse 63   Temp 98 °F (36.7 °C)   Resp 20   Ht 6' (1.829 m)   Wt 79.6 kg (175 lb 7.8 oz)   SpO2 98%   BMI 23.80 kg/m²    O2 Flow Rate (L/min): 2 l/min O2 Device: Nasal cannula    Temp (24hrs), Av °F (36.7 °C), Min:97.7 °F (36.5 °C), Max:98.5 °F (36.9 °C)    No intake/output data recorded.  1901 -  0700  In: -   Out: 3000     General:  Alert, cooperative, no distress, appears stated age. Lungs:   Clear to auscultation bilaterally. Chest wall:  No tenderness or deformity. Heart:  Regular rate and rhythm, S1, S2 normal, no murmur, click, rub or gallop. Abdomen:   Soft, non-tender. Bowel sounds normal. No masses,  No organomegaly. Extremities: Extremities normal, atraumatic, no cyanosis or edema. Pulses: 2+ and symmetric all extremities. Skin: Skin color, texture, turgor normal. No rashes or lesions has ulcers on both legs left more than   Neurologic: CNII-XII intact. No gross sensory or motor deficits     Data Review:       Recent Days:  No results for input(s): WBC, HGB, HCT, PLT, HGBEXT, HCTEXT, PLTEXT in the last 72 hours. Recent Labs     22  0349 01/15/22  0800 22  0744    139 137   K 3.8 3.1* 3.2*    104 102   CO2 27 26 26   GLU 96 102* 85   BUN 30* 49* 36*   CREA 3.73* 5.38* 4.52*   CA 7.5* 7.2* 7.6*   MG 2.3  --   --    ALB 2.1* 1.8* 2.0*   TBILI 0.5 0.4 0.5   ALT 19 12 10*     No results for input(s): PH, PCO2, PO2, HCO3, FIO2 in the last 72 hours. Results     Procedure Component Value Units Date/Time    CULTURE, Cynthia Arn STAIN [[id number]]  (Susceptibility) Collected: 22 0400    Order Status: Completed Specimen: Misc.  Wound Updated: 22     Special Requests: No Special Requests        GRAM STAIN Rare WBCs seen               Occasional Gram Positive Cocci in pairs Rare apparent Gram Negative Rods           Culture result:       Heavy Staphylococcus aureus                  Heavy Alcaligenes faecalis                  Light Klebsiella pneumoniae            Light Proteus mirabilis       Susceptibility      Staphylococcus aureus     SHIRA     Ciprofloxacin ($) Susceptible     Clindamycin ($) Susceptible     Daptomycin ($$$$$) Susceptible     Doxycycline ($$) Susceptible     Erythromycin ($$$$) Susceptible     Gentamicin ($) Susceptible     Levofloxacin ($) Susceptible     Linezolid ($$$$$) Susceptible     Moxifloxacin ($$$$) Susceptible     Oxacillin Susceptible     Rifampin ($$$$) Susceptible  [1]      Tetracycline Susceptible     Trimeth/Sulfa Susceptible     Vancomycin ($) Susceptible                 [1]  Rifampin is not to be used for mono-therapy.           Linear View               Susceptibility      Alcaligenes faecalis     SHIRA     Amikacin ($) Susceptible     Cefepime ($$) Susceptible     Ceftazidime ($) Susceptible     Ceftriaxone ($) Susceptible     Ciprofloxacin ($) Susceptible     Gentamicin ($) Susceptible     Levofloxacin ($) Susceptible     Meropenem ($$) Susceptible     Piperacillin/Tazobac ($) Resistant     Tobramycin ($) Susceptible     Trimeth/Sulfa Susceptible                  Linear View               Susceptibility      Klebsiella pneumoniae     SHIRA     Amikacin ($) Susceptible     Ampicillin ($) Resistant     Ampicillin/sulbactam ($) Susceptible     Cefazolin ($) Susceptible     Cefepime ($$) Susceptible     Cefoxitin Susceptible     Ceftazidime ($) Susceptible     Ceftriaxone ($) Susceptible     Ciprofloxacin ($) Susceptible     Gentamicin ($) Susceptible     Levofloxacin ($) Susceptible     Meropenem ($$) Susceptible     Piperacillin/Tazobac ($) Susceptible     Tobramycin ($) Susceptible     Trimeth/Sulfa Susceptible                  Linear View               Susceptibility      Proteus mirabilis     SHIRA     Amikacin ($) Susceptible Ampicillin ($) Susceptible     Ampicillin/sulbactam ($) Susceptible     Cefazolin ($) Susceptible     Cefepime ($$) Susceptible     Cefoxitin Susceptible     Ceftazidime ($) Susceptible     Ceftriaxone ($) Susceptible     Ciprofloxacin ($) Susceptible     Gentamicin ($) Susceptible     Levofloxacin ($) Susceptible     Meropenem ($$) Susceptible     Piperacillin/Tazobac ($) Susceptible     Tobramycin ($) Susceptible     Trimeth/Sulfa Susceptible                  Linear View                   MRSA SCREEN - PCR (NASAL) [[id number]] Collected: 01/08/22 0223    Order Status: Completed Specimen: Swab Updated: 01/08/22 0516     MRSA by PCR, Nasal Not Detected       COVID-19 RAPID TEST [[id number]] Collected: 01/07/22 2313    Order Status: Completed Specimen: Nasopharyngeal Updated: 01/08/22 0010     Specimen source       Please find results under separate order           COVID-19 rapid test Not Detected        Comment: Rapid Abbott ID Now   Rapid NAAT:  The specimen is NEGATIVE for SARS-CoV-2, the novel coronavirus associated with COVID-19. Negative results should be treated as presumptive and, if inconsistent with clinical signs and symptoms or necessary for patient management, should be tested with an alternative molecular assay. Negative results do not preclude SARS-CoV-2 infection and should not be used as the sole basis for patient management decisions. This test has been authorized by the FDA under   an Emergency Use Authorization (EUA) for use by authorized laboratories.  Fact sheet for Healthcare Providers: ConventionUpdate.co.nz Fact sheet for Patients: ConventionUpdate.co.nz   Methodology: Isothermal Nucleic Acid Amplification              24 Hour Results:  Recent Results (from the past 24 hour(s))   PTT    Collection Time: 01/15/22  8:05 PM   Result Value Ref Range    aPTT 52.7 (H) 21.2 - 34.1 sec    aPTT, therapeutic range   82 - 979 sec   METABOLIC PANEL, COMPREHENSIVE Collection Time: 01/16/22  3:49 AM   Result Value Ref Range    Sodium 138 136 - 145 mmol/L    Potassium 3.8 3.5 - 5.1 mmol/L    Chloride 103 97 - 108 mmol/L    CO2 27 21 - 32 mmol/L    Anion gap 8 5 - 15 mmol/L    Glucose 96 65 - 100 mg/dL    BUN 30 (H) 6 - 20 mg/dL    Creatinine 3.73 (H) 0.70 - 1.30 mg/dL    BUN/Creatinine ratio 8 (L) 12 - 20      GFR est AA 20 (L) >60 ml/min/1.73m2    GFR est non-AA 16 (L) >60 ml/min/1.73m2    Calcium 7.5 (L) 8.5 - 10.1 mg/dL    Bilirubin, total 0.5 0.2 - 1.0 mg/dL    AST (SGOT) 22 15 - 37 U/L    ALT (SGPT) 19 12 - 78 U/L    Alk. phosphatase 101 45 - 117 U/L    Protein, total 6.1 (L) 6.4 - 8.2 g/dL    Albumin 2.1 (L) 3.5 - 5.0 g/dL    Globulin 4.0 2.0 - 4.0 g/dL    A-G Ratio 0.5 (L) 1.1 - 2.2     MAGNESIUM    Collection Time: 01/16/22  3:49 AM   Result Value Ref Range    Magnesium 2.3 1.6 - 2.4 mg/dL   PTT    Collection Time: 01/16/22  3:49 AM   Result Value Ref Range    aPTT 92.3 (H) 21.2 - 34.1 sec    aPTT, therapeutic range   82 - 109 sec   EKG, 12 LEAD, SUBSEQUENT    Collection Time: 01/16/22  7:46 AM   Result Value Ref Range    Ventricular Rate 58 BPM    Atrial Rate 58 BPM    P-R Interval 182 ms    QRS Duration 108 ms    Q-T Interval 518 ms    QTC Calculation (Bezet) 508 ms    Calculated P Axis 69 degrees    Calculated R Axis 108 degrees    Calculated T Axis 166 degrees    Diagnosis       Sinus bradycardia with Premature atrial complexes  Possible Left atrial enlargement  Lateral infarct (cited on or before 11-JAN-2022)  Prolonged QT  Abnormal ECG  When compared with ECG of 12-JAN-2022 05:22,  Vent.  rate has decreased BY  48 BPM  Nonspecific T wave abnormality now evident in Anterior leads  Confirmed by [facility], [facility] ([id number]) on 1/16/2022 1:42:43 PM     PTT    Collection Time: 01/16/22 11:56 AM   Result Value Ref Range    aPTT 88.1 (H) 21.2 - 34.1 sec    aPTT, therapeutic range   82 - 109 sec       DUPLEX LOW EXT ARTERIES WITH JAVON   Final Result      XR CHEST PORT   Final Result      Interval placement of a right supraclavicular approach dialysis catheter with   distal tip in the proximal right atrium. Cardiomegaly with pulmonary vascular congestion and pulmonary edema. Stable appearing basilar right lung pneumonia. Small right pleural effusion. IR INSERT NON TUNL CVC OVER 5 YRS   Final Result      Technically successful insertion of non-tunneled Trialysis catheter with US   guidance. Plan:       Post catheter placement chest xray confirmed appropriate position of the   catheter. The catheter is appropriate for immediate use.   _______________________________________________________________      PROCEDURE SUMMARY:   - Venous access with ultrasound guidance   - Non-tunneled central venous catheter insertion      PROCEDURE DETAILS:      Pre-procedure   Relevant imaging review: None    Informed consent for the procedure was obtained and time-out was performed prior   to the procedure. Prophylactic antibiotic administered: Not administered   Preparation: The site was prepared and draped using all elements of maximal   sterile barrier technique including sterile gloves, sterile gown, cap, mask,   large sterile sheet, sterile ultrasound probe cover, sterile ultrasound gel,   hand hygiene and cutaneous antisepsis with 2% chlorhexidine. Medical reason for site preparation exception: Not applicable      Anesthesia/sedation   Level of anesthesia: No sedation   Anesthesia administered by: Not applicable   Duration of anesthesia/sedation: 0 minutes. Access   The vessel was sonographically evaluated and judged to be patent and appropriate   for access and a permanent image was stored. Three mLs of 1% Lidocaine was   administered to the access site. Real time ultrasound was used to visualize   needle entry into the vessel.     Vein accessed: Right internal jugular vein   Access technique: 4F micropuncture set      Catheter placement   The access site was dilated and the catheter was placed into the vein over a   wire and all guidewires were removed in their entirety. Catheter ports were   aspirated and flushed. Catheter tip location was verified by portable X-ray. Catheter size:13 Kosovan   Catheter length: 20 cm   Catheter tip position: Proximal right atrium   Catheter flush: Heparin (100 units/mL)      Closure   The catheter was secured. A sterile dressing was applied. Catheter securement technique: Non-absorbable suture      Additional Medications   None      Additional Details   Estimated blood loss:  Less than 1 mL   Additional description of procedure: None   Additional findings: None   Additional equipment: None   Specimens removed: None                     IR US GUIDED VASCULAR ACCESS   Final Result      Technically successful insertion of non-tunneled Trialysis catheter with US   guidance. Plan:       Post catheter placement chest xray confirmed appropriate position of the   catheter. The catheter is appropriate for immediate use.   _______________________________________________________________      PROCEDURE SUMMARY:   - Venous access with ultrasound guidance   - Non-tunneled central venous catheter insertion      PROCEDURE DETAILS:      Pre-procedure   Relevant imaging review: None    Informed consent for the procedure was obtained and time-out was performed prior   to the procedure. Prophylactic antibiotic administered: Not administered   Preparation: The site was prepared and draped using all elements of maximal   sterile barrier technique including sterile gloves, sterile gown, cap, mask,   large sterile sheet, sterile ultrasound probe cover, sterile ultrasound gel,   hand hygiene and cutaneous antisepsis with 2% chlorhexidine. Medical reason for site preparation exception: Not applicable      Anesthesia/sedation   Level of anesthesia: No sedation   Anesthesia administered by: Not applicable   Duration of anesthesia/sedation: 0 minutes.       Access The vessel was sonographically evaluated and judged to be patent and appropriate   for access and a permanent image was stored. Three mLs of 1% Lidocaine was   administered to the access site. Real time ultrasound was used to visualize   needle entry into the vessel. Vein accessed: Right internal jugular vein   Access technique: 4F micropuncture set      Catheter placement   The access site was dilated and the catheter was placed into the vein over a   wire and all guidewires were removed in their entirety. Catheter ports were   aspirated and flushed. Catheter tip location was verified by portable X-ray. Catheter size:13 Gambian   Catheter length: 20 cm   Catheter tip position: Proximal right atrium   Catheter flush: Heparin (100 units/mL)      Closure   The catheter was secured. A sterile dressing was applied. Catheter securement technique: Non-absorbable suture      Additional Medications   None      Additional Details   Estimated blood loss:  Less than 1 mL   Additional description of procedure: None   Additional findings: None   Additional equipment: None   Specimens removed: None                     XR CHEST PORT   Final Result   Airspace opacity and effusion at the right lung base and in the   acute setting would suggest pneumonia and/or aspiration however the patient also   appears to have baseline/background vascular congestion and/or pulmonary   arterial hypertension, noting cardiomegaly and/or pericardial effusion      IR INSERT NON TUNL CVC OVER 5 YRS    (Results Pending)        Assessment: Pneumonia  Acute on chronic renal failure  Hypertension  Peripheral arterial disease  CHF with normal coronaries        Plan: Patient has been stable in his this condition. Patient has received enough antibiotics. We will check his CBC.   Patient needs to be placed in rehab hospital to improve his physical [address] [facility] discussed with: Patient/Family and Nurse    Total time spent with patient: 30 minutes.     Melody Putnam MD

## 2022-01-16 NOTE — PROGRESS NOTES
Progress Note      1/15/2022 12:19 PM  NAME: Kirstie Wilder   MRN:  [id number]   Admit Diagnosis: Renal failure [N19]  Hyperkalemia [E87.5]      Subjective:   Chart reviewed. Patient is on dialysis. Symptoms of shortness of breath has improved. Patient had frequent palpitations during the night and apparently a nurse thought it was SVT and was given a Adenocard however EKG is concerning for ventricular tachycardia. With the patient and her with the nurse. Cardiac catheterization findings discussed with the patient and also discussed with the electrophysiologist.    Review of Systems:    Symptom Y/N Comments  Symptom Y/N Comments   Fever/Chills n   Chest Pain n    Poor Appetite    Edema y    Cough    Abdominal Pain     Sputum    Joint Pain     SOB/CARMONA y  improving  Pruritis/Rash     Nausea/vomit    Other     Diarrhea         Constipation           Could NOT obtain due to:      Objective:          Physical Exam:    Last 24hrs VS reviewed since prior progress note. Most recent are:    Visit Vitals  /72   Pulse 69   Temp 97.8 °F (36.6 °C)   Resp 20   Ht 6' (1.829 m)   Wt 79.6 kg (175 lb 7.8 oz)   SpO2 100%   BMI 23.80 kg/m²       Intake/Output Summary (Last 24 hours) at 1/15/2022 2106  Last data filed at 1/15/2022 1115  Gross per 24 hour   Intake --   Output 3000 ml   Net -3000 ml        General Appearance: Well developed, well nourished, alert & oriented x 3,    no acute distress. Ears/Nose/Mouth/Throat: Hearing grossly normal.  Neck: Supple. Chest: Lungs clear to auscultation bilaterally. Cardiovascular: Regular rate and rhythm, S1,S2 normal, no murmur. Abdomen: Soft, non-tender, bowel sounds are active. Extremities: Patient has ulcers of the lower extremities  Skin: Warm and dry. []         Post-cath site without hematoma, bruit, tenderness, or thrill. Distal pulses intact.     PMH/SH reviewed - no change compared to H&P    Data Review    Telemetry: normal sinus rhythm , patient has some PVCs, and episode of wide-complex tachycardia possible ventricular tachycardia. EKG:   Patient had EKG that is very concerning for ventricular tachycardia. Lab Data Personally Reviewed:    Recent Labs     01/13/22 0415   WBC 10.3   HGB 8.9*   HCT 30.0*        Recent Labs     01/15/22  2005 01/15/22  0800 01/14/22 2045   APTT 52.7* >153.0* 73.7*      Recent Labs     01/15/22  0800 01/14/22 0744 01/13/22 0415    137 139   K 3.1* 3.2* 3.8    102 104   CO2 26 26 25   BUN 49* 36* 62*   CREA 5.38* 4.52* 6.30*   * 85 102*   CA 7.2* 7.6* 7.2*     No results for input(s): CPK, CKNDX, TROIQ in the last 72 hours. No lab exists for component: CPKMB  No results found for: CHOL, CHOLX, CHLST, CHOLV, HDL, HDLP, LDL, LDLC, DLDLP, TGLX, Elverna Pool, CHHD, CHHDX    Recent Labs     01/15/22  0800 01/14/22  0744 01/13/22  0415   AP 92 102 117   TP 4.8* 6.0* 5.6*   ALB 1.8* 2.0* 2.2*   GLOB 3.0 4.0 3.4     No results for input(s): PH, PCO2, PO2 in the last 72 hours.     Medications Personally Reviewed:    Current Facility-Administered Medications   Medication Dose Route Frequency    sodium chloride (NS) flush 5-40 mL  5-40 mL IntraVENous Q8H    sodium chloride (NS) flush 5-40 mL  5-40 mL IntraVENous PRN    amiodarone (CORDARONE) tablet 400 mg  400 mg Oral BID    meropenem (MERREM) 1 g in 0.9% sodium chloride 20 mL IV syringe  1 g IntraVENous Q12H    ferrous sulfate tablet 325 mg  1 Tablet Oral BID WITH MEALS    heparin 25,000 units in  ml infusion  12-25 Units/kg/hr (Adjusted) IntraVENous TITRATE    heparin (porcine) 1,000 unit/mL injection 4,000 Units  4,000 Units IntraVENous PRN    Or    heparin (porcine) 1,000 unit/mL injection 2,000 Units  2,000 Units IntraVENous PRN    metoprolol succinate (TOPROL-XL) XL tablet 50 mg  50 mg Oral DAILY    nitroglycerin (NITROBID) 2 % ointment 0.5 Inch  0.5 Inch Topical Q6H    aspirin chewable tablet 81 mg  81 mg Oral DAILY    heparin (porcine) 1,000 unit/mL injection 2,800 Units  2,800 Units Hemodialysis DIALYSIS PRN           Problem List:   Patient has a acute on chronic renal failure and had a dialysis and is clinically better. Decompensated heart failure and ejection fraction is depressed and patient has a at least moderate possibly severe pulmonary hypertension. History of paroxysmal atrial fibrillation. Recent DVT of her left axillary vein. Wide-complex tachycardia probably ventricular tachycardia. Catheterization did not show evidence of coronary artery disease and ejection fraction was about 20 to 25%. Potassium level is low. 1.      Assessment/Plan:   I will continue dialysis as per recommendations of the nephrologist.  Patient is offered ICD by Dr. Micky Sotelo however patient does not want it. Meanwhile I will start the patient on amiodarone. Vascular surgery note reviewed and appreciated and will follow the recommendations. I will give potassium chloride supplementation. We will continue amiodarone and present care. Thank you. 1.          []       High complexity decision making was performed in this patient at high risk for decompensation with multiple organ involvement.     Rai Cody MD

## 2022-01-17 LAB
ALBUMIN SERPL-MCNC: 2 G/DL (ref 3.5–5)
ALBUMIN/GLOB SERPL: 0.5 {RATIO} (ref 1.1–2.2)
ALP SERPL-CCNC: 92 U/L (ref 45–117)
ALT SERPL-CCNC: 16 U/L (ref 12–78)
ANION GAP SERPL CALC-SCNC: 8 MMOL/L (ref 5–15)
APTT PPP: 96.5 SEC (ref 21.2–34.1)
AST SERPL W P-5'-P-CCNC: 17 U/L (ref 15–37)
ATRIAL RATE: 54 BPM
BASOPHILS # BLD: 0.1 K/UL (ref 0–0.1)
BASOPHILS NFR BLD: 1 % (ref 0–1)
BILIRUB SERPL-MCNC: 0.4 MG/DL (ref 0.2–1)
BUN SERPL-MCNC: 40 MG/DL (ref 6–20)
BUN/CREAT SERPL: 9 (ref 12–20)
CA-I BLD-MCNC: 7.9 MG/DL (ref 8.5–10.1)
CALCULATED P AXIS, ECG09: 63 DEGREES
CALCULATED R AXIS, ECG10: 103 DEGREES
CALCULATED T AXIS, ECG11: -165 DEGREES
CHLORIDE SERPL-SCNC: 104 MMOL/L (ref 97–108)
CO2 SERPL-SCNC: 27 MMOL/L (ref 21–32)
CREAT SERPL-MCNC: 4.7 MG/DL (ref 0.7–1.3)
CRP SERPL-MCNC: 2.53 MG/DL (ref 0–0.6)
DIAGNOSIS, 93000: NORMAL
DIFFERENTIAL METHOD BLD: ABNORMAL
EOSINOPHIL # BLD: 0.3 K/UL (ref 0–0.4)
EOSINOPHIL NFR BLD: 3 % (ref 0–7)
ERYTHROCYTE [DISTWIDTH] IN BLOOD BY AUTOMATED COUNT: 15.1 % (ref 11.5–14.5)
GLOBULIN SER CALC-MCNC: 3.8 G/DL (ref 2–4)
GLUCOSE SERPL-MCNC: 98 MG/DL (ref 65–100)
HCT VFR BLD AUTO: 32.5 % (ref 36.6–50.3)
HGB BLD-MCNC: 9.2 G/DL (ref 12.1–17)
IMM GRANULOCYTES # BLD AUTO: 0.2 K/UL (ref 0–0.04)
IMM GRANULOCYTES NFR BLD AUTO: 2 % (ref 0–0.5)
LYMPHOCYTES # BLD: 0.6 K/UL (ref 0.8–3.5)
LYMPHOCYTES NFR BLD: 7 % (ref 12–49)
MCH RBC QN AUTO: 29.8 PG (ref 26–34)
MCHC RBC AUTO-ENTMCNC: 28.3 G/DL (ref 30–36.5)
MCV RBC AUTO: 105.2 FL (ref 80–99)
MONOCYTES # BLD: 1.1 K/UL (ref 0–1)
MONOCYTES NFR BLD: 12 % (ref 5–13)
NEUTS SEG # BLD: 6.6 K/UL (ref 1.8–8)
NEUTS SEG NFR BLD: 75 % (ref 32–75)
NRBC # BLD: 0 K/UL (ref 0–0.01)
NRBC BLD-RTO: 0 PER 100 WBC
P-R INTERVAL, ECG05: 204 MS
PLATELET # BLD AUTO: 241 K/UL (ref 150–400)
PMV BLD AUTO: 10 FL (ref 8.9–12.9)
POTASSIUM SERPL-SCNC: 4.2 MMOL/L (ref 3.5–5.1)
PROCALCITONIN SERPL-MCNC: 0.84 NG/ML
PROT SERPL-MCNC: 5.8 G/DL (ref 6.4–8.2)
Q-T INTERVAL, ECG07: 528 MS
QRS DURATION, ECG06: 114 MS
QTC CALCULATION (BEZET), ECG08: 500 MS
RBC # BLD AUTO: 3.09 M/UL (ref 4.1–5.7)
SODIUM SERPL-SCNC: 139 MMOL/L (ref 136–145)
THERAPEUTIC RANGE,PTTT: ABNORMAL SEC (ref 82–109)
VENTRICULAR RATE, ECG03: 54 BPM
WBC # BLD AUTO: 8.8 K/UL (ref 4.1–11.1)

## 2022-01-17 PROCEDURE — 85025 COMPLETE CBC W/AUTO DIFF WBC: CPT

## 2022-01-17 PROCEDURE — 99232 SBSQ HOSP IP/OBS MODERATE 35: CPT | Performed by: INTERNAL MEDICINE

## 2022-01-17 PROCEDURE — 80053 COMPREHEN METABOLIC PANEL: CPT

## 2022-01-17 PROCEDURE — 97530 THERAPEUTIC ACTIVITIES: CPT

## 2022-01-17 PROCEDURE — 94760 N-INVAS EAR/PLS OXIMETRY 1: CPT

## 2022-01-17 PROCEDURE — 97110 THERAPEUTIC EXERCISES: CPT

## 2022-01-17 PROCEDURE — 74011250636 HC RX REV CODE- 250/636: Performed by: INTERNAL MEDICINE

## 2022-01-17 PROCEDURE — 65270000029 HC RM PRIVATE

## 2022-01-17 PROCEDURE — 77010033678 HC OXYGEN DAILY

## 2022-01-17 PROCEDURE — 86140 C-REACTIVE PROTEIN: CPT

## 2022-01-17 PROCEDURE — 36415 COLL VENOUS BLD VENIPUNCTURE: CPT

## 2022-01-17 PROCEDURE — 74011000250 HC RX REV CODE- 250: Performed by: INTERNAL MEDICINE

## 2022-01-17 PROCEDURE — 84145 PROCALCITONIN (PCT): CPT

## 2022-01-17 PROCEDURE — 74011250637 HC RX REV CODE- 250/637: Performed by: INTERNAL MEDICINE

## 2022-01-17 PROCEDURE — 85730 THROMBOPLASTIN TIME PARTIAL: CPT

## 2022-01-17 PROCEDURE — 93005 ELECTROCARDIOGRAM TRACING: CPT

## 2022-01-17 RX ADMIN — METOPROLOL SUCCINATE 50 MG: 25 TABLET, EXTENDED RELEASE ORAL at 10:04

## 2022-01-17 RX ADMIN — SODIUM CHLORIDE, PRESERVATIVE FREE 1 G: 5 INJECTION INTRAVENOUS at 22:00

## 2022-01-17 RX ADMIN — FERROUS SULFATE TAB 325 MG (65 MG ELEMENTAL FE) 325 MG: 325 (65 FE) TAB at 10:40

## 2022-01-17 RX ADMIN — ASPIRIN 81 MG CHEWABLE TABLET 81 MG: 81 TABLET CHEWABLE at 10:04

## 2022-01-17 RX ADMIN — SODIUM CHLORIDE, PRESERVATIVE FREE 1 G: 5 INJECTION INTRAVENOUS at 10:06

## 2022-01-17 RX ADMIN — SODIUM CHLORIDE, PRESERVATIVE FREE 10 ML: 5 INJECTION INTRAVENOUS at 22:01

## 2022-01-17 RX ADMIN — AMIODARONE HYDROCHLORIDE 400 MG: 200 TABLET ORAL at 22:00

## 2022-01-17 RX ADMIN — AMIODARONE HYDROCHLORIDE 400 MG: 200 TABLET ORAL at 10:04

## 2022-01-17 RX ADMIN — FERROUS SULFATE TAB 325 MG (65 MG ELEMENTAL FE) 325 MG: 325 (65 FE) TAB at 17:43

## 2022-01-17 RX ADMIN — SODIUM CHLORIDE, PRESERVATIVE FREE 10 ML: 5 INJECTION INTRAVENOUS at 14:30

## 2022-01-17 NOTE — PROGRESS NOTES
Spiritual Care Assessment/Progress Note  [facility]      NAME: Rimma Reyes      MRN: [id number]  AGE: 78 y.o.  SEX: male  Zoroastrian Affiliation: Restoration   Language: English     1/17/2022     Total Time (in minutes): 15     Spiritual Assessment begun in [address] through conversation with:         [x]Patient        [] Family    [] Friend(s)        Reason for Consult: Initial/Spiritual assessment, patient floor     Spiritual beliefs: (Please include comment if needed)     [] Identifies with a juaquin tradition:         [] Supported by a juaquin community:            [] Claims no spiritual orientation:           [] Seeking spiritual identity:                [] Adheres to an individual form of spirituality:           [x] Not able to assess:                           Identified resources for coping:      [] Prayer                               [] Music                  [] Guided Imagery     [] Family/friends                 [] Pet visits     [] Devotional reading                         [x] Unknown     [] Other:                                              Interventions offered during this visit: (See comments for more details)    Patient Interventions: Affirmation of emotions/emotional suffering,Affirmation of juaquin,Catharsis/review of pertinent events in supportive environment,Coping skills reviewed/reinforced           Plan of Care:     [] Support spiritual and/or cultural needs    [] Support AMD and/or advance care planning process      [] Support grieving process   [] Coordinate Rites and/or Rituals    [] Coordination with community clergy   [] No spiritual needs identified at this time   [] Detailed Plan of Care below (See Comments)  [] Make referral to Music Therapy  [] Make referral to Pet Therapy     [] Make referral to Addiction services  [] Make referral to [facility]  [] Make referral to Spiritual Care Partner  [] No future visits requested        [x] Contact Spiritual Care for further referrals     Comments: The purpose of the visit was to do a spiritual assessment on the patient. The patient was resting in his recliner and mentioned that he was tried and would rather have the visit at some other time. The  provided the ministry of presence, while respecting the patient's wishes to visit later. [address] [facility] Jessica Nguyen.    can be reached by calling the  at [facility]  [phone]

## 2022-01-17 NOTE — PROGRESS NOTES
Progress Note    Amanda Garrett MD             Daily Progress Note: 1/17/2022      Subjective: The patient is seen for follow  up. Offers no complaints. Patient was very frustrated as he has not ate since yesterday evening. I discussed his discharge option my recommendation is to go for inpatient rehab to build up his strength and then go home.   But patient wants to go home with home physical therapy  Problem List:  Problem List as of 1/17/2022 Never Reviewed          Codes Class Noted - Resolved    Renal failure ICD-10-CM: N19  ICD-9-CM: 982  1/8/2022 - Present        Hyperkalemia ICD-10-CM: E87.5  ICD-9-CM: 276.7  1/8/2022 - Present        Pulmonary edema ICD-10-CM: J81.1  ICD-9-CM: 094  7/13/2021 - Present        GI bleed ICD-10-CM: K92.2  ICD-9-CM: 578.9  1/5/2021 - Present              Medications reviewed  Current Facility-Administered Medications   Medication Dose Route Frequency    oxyCODONE IR (ROXICODONE) tablet 5 mg  5 mg Oral Q8H PRN    sodium chloride (NS) flush 5-40 mL  5-40 mL IntraVENous Q8H    sodium chloride (NS) flush 5-40 mL  5-40 mL IntraVENous PRN    amiodarone (CORDARONE) tablet 400 mg  400 mg Oral BID    meropenem (MERREM) 1 g in 0.9% sodium chloride 20 mL IV syringe  1 g IntraVENous Q12H    ferrous sulfate tablet 325 mg  1 Tablet Oral BID WITH MEALS    heparin 25,000 units in  ml infusion  12-25 Units/kg/hr (Adjusted) IntraVENous TITRATE    heparin (porcine) 1,000 unit/mL injection 4,000 Units  4,000 Units IntraVENous PRN    Or    heparin (porcine) 1,000 unit/mL injection 2,000 Units  2,000 Units IntraVENous PRN    metoprolol succinate (TOPROL-XL) XL tablet 50 mg  50 mg Oral DAILY    nitroglycerin (NITROBID) 2 % ointment 0.5 Inch  0.5 Inch Topical Q6H    aspirin chewable tablet 81 mg  81 mg Oral DAILY    heparin (porcine) 1,000 unit/mL injection 2,800 Units  2,800 Units Hemodialysis DIALYSIS PRN       Review of Systems:   A comprehensive review of systems was negative except for that written in the HPI. Objective:   Physical Exam:     Visit Vitals  BP 99/68 (BP Patient Position: Sitting; At rest)   Pulse 76   Temp 97.9 °F (36.6 °C)   Resp 20   Ht 6' (1.829 m)   Wt 74.4 kg (164 lb 0.4 oz)   SpO2 98%   BMI 22.25 kg/m²    O2 Flow Rate (L/min): 4 l/min O2 Device: Nasal cannula    Temp (24hrs), Av.8 °F (36.6 °C), Min:97.4 °F (36.3 °C), Max:98.1 °F (36.7 °C)    No intake/output data recorded. No intake/output data recorded. General:  Alert, cooperative, no distress, appears stated age. Lungs:   Clear to auscultation bilaterally. Chest wall:  No tenderness or deformity. Heart:  Regular rate and rhythm, S1, S2 normal, no murmur, click, rub or gallop. Abdomen:   Soft, non-tender. Bowel sounds normal. No masses,  No organomegaly. Extremities: Extremities normal, atraumatic, no cyanosis or edema. Pulses: 2+ and symmetric all extremities. Skin: Skin color, texture, turgor normal. No rashes or lesions ulcers on both legs now better   Neurologic: CNII-XII intact. No gross sensory or motor deficits     Data Review:       Recent Days:  Recent Labs     22  0504   WBC 8.8   HGB 9.2*   HCT 32.5*        Recent Labs     22  0504 22  0349 01/15/22  0800    138 139   K 4.2 3.8 3.1*    103 104   CO2 27 27 26   GLU 98 96 102*   BUN 40* 30* 49*   CREA 4.70* 3.73* 5.38*   CA 7.9* 7.5* 7.2*   MG  --  2.3  --    ALB 2.0* 2.1* 1.8*   TBILI 0.4 0.5 0.4   ALT 16 19 12     No results for input(s): PH, PCO2, PO2, HCO3, FIO2 in the last 72 hours. Results     Procedure Component Value Units Date/Time    CULTURE, Montiel Lev STAIN [[id number]]  (Susceptibility) Collected: 22 0400    Order Status: Completed Specimen: Misc.  Wound Updated: 22 0714     Special Requests: No Special Requests        GRAM STAIN Rare WBCs seen               Occasional Gram Positive Cocci in pairs                  Rare apparent Gram Negative Rods Culture result:       Heavy Staphylococcus aureus                  Heavy Alcaligenes faecalis                  Light Klebsiella pneumoniae            Light Proteus mirabilis       Susceptibility      Staphylococcus aureus     SHIRA     Ciprofloxacin ($) Susceptible     Clindamycin ($) Susceptible     Daptomycin ($$$$$) Susceptible     Doxycycline ($$) Susceptible     Erythromycin ($$$$) Susceptible     Gentamicin ($) Susceptible     Levofloxacin ($) Susceptible     Linezolid ($$$$$) Susceptible     Moxifloxacin ($$$$) Susceptible     Oxacillin Susceptible     Rifampin ($$$$) Susceptible  [1]      Tetracycline Susceptible     Trimeth/Sulfa Susceptible     Vancomycin ($) Susceptible                 [1]  Rifampin is not to be used for mono-therapy.           Linear View               Susceptibility      Alcaligenes faecalis     SHIRA     Amikacin ($) Susceptible     Cefepime ($$) Susceptible     Ceftazidime ($) Susceptible     Ceftriaxone ($) Susceptible     Ciprofloxacin ($) Susceptible     Gentamicin ($) Susceptible     Levofloxacin ($) Susceptible     Meropenem ($$) Susceptible     Piperacillin/Tazobac ($) Resistant     Tobramycin ($) Susceptible     Trimeth/Sulfa Susceptible                  Linear View               Susceptibility      Klebsiella pneumoniae     SHIRA     Amikacin ($) Susceptible     Ampicillin ($) Resistant     Ampicillin/sulbactam ($) Susceptible     Cefazolin ($) Susceptible     Cefepime ($$) Susceptible     Cefoxitin Susceptible     Ceftazidime ($) Susceptible     Ceftriaxone ($) Susceptible     Ciprofloxacin ($) Susceptible     Gentamicin ($) Susceptible     Levofloxacin ($) Susceptible     Meropenem ($$) Susceptible     Piperacillin/Tazobac ($) Susceptible     Tobramycin ($) Susceptible     Trimeth/Sulfa Susceptible                  Linear View               Susceptibility      Proteus mirabilis     SHIRA     Amikacin ($) Susceptible     Ampicillin ($) Susceptible     Ampicillin/sulbactam ($) Susceptible     Cefazolin ($) Susceptible     Cefepime ($$) Susceptible     Cefoxitin Susceptible     Ceftazidime ($) Susceptible     Ceftriaxone ($) Susceptible     Ciprofloxacin ($) Susceptible     Gentamicin ($) Susceptible     Levofloxacin ($) Susceptible     Meropenem ($$) Susceptible     Piperacillin/Tazobac ($) Susceptible     Tobramycin ($) Susceptible     Trimeth/Sulfa Susceptible                  Linear View                   MRSA SCREEN - PCR (NASAL) [[id number]] Collected: 01/08/22 0223    Order Status: Completed Specimen: Swab Updated: 01/08/22 0516     MRSA by PCR, Nasal Not Detected       COVID-19 RAPID TEST [[id number]] Collected: 01/07/22 2313    Order Status: Completed Specimen: Nasopharyngeal Updated: 01/08/22 0010     Specimen source       Please find results under separate order           COVID-19 rapid test Not Detected        Comment: Rapid Abbott ID Now   Rapid NAAT:  The specimen is NEGATIVE for SARS-CoV-2, the novel coronavirus associated with COVID-19. Negative results should be treated as presumptive and, if inconsistent with clinical signs and symptoms or necessary for patient management, should be tested with an alternative molecular assay. Negative results do not preclude SARS-CoV-2 infection and should not be used as the sole basis for patient management decisions. This test has been authorized by the FDA under   an Emergency Use Authorization (EUA) for use by authorized laboratories.  Fact sheet for Healthcare Providers: ConventionUpdate.co.nz Fact sheet for Patients: ConventionUpdate.co.nz   Methodology: Isothermal Nucleic Acid Amplification              24 Hour Results:  Recent Results (from the past 24 hour(s))   METABOLIC PANEL, COMPREHENSIVE    Collection Time: 01/17/22  5:04 AM   Result Value Ref Range    Sodium 139 136 - 145 mmol/L    Potassium 4.2 3.5 - 5.1 mmol/L    Chloride 104 97 - 108 mmol/L    CO2 27 21 - 32 mmol/L    Anion gap 8 5 - 15 mmol/L    Glucose 98 65 - 100 mg/dL    BUN 40 (H) 6 - 20 mg/dL    Creatinine 4.70 (H) 0.70 - 1.30 mg/dL    BUN/Creatinine ratio 9 (L) 12 - 20      GFR est AA 15 (L) >60 ml/min/1.73m2    GFR est non-AA 12 (L) >60 ml/min/1.73m2    Calcium 7.9 (L) 8.5 - 10.1 mg/dL    Bilirubin, total 0.4 0.2 - 1.0 mg/dL    AST (SGOT) 17 15 - 37 U/L    ALT (SGPT) 16 12 - 78 U/L    Alk. phosphatase 92 45 - 117 U/L    Protein, total 5.8 (L) 6.4 - 8.2 g/dL    Albumin 2.0 (L) 3.5 - 5.0 g/dL    Globulin 3.8 2.0 - 4.0 g/dL    A-G Ratio 0.5 (L) 1.1 - 2.2     C REACTIVE PROTEIN, QT    Collection Time: 01/17/22  5:04 AM   Result Value Ref Range    C-Reactive protein 2.53 (H) 0.00 - 0.60 mg/dL   PROCALCITONIN    Collection Time: 01/17/22  5:04 AM   Result Value Ref Range    Procalcitonin 0.84 (H) 0 ng/mL   CBC WITH AUTOMATED DIFF    Collection Time: 01/17/22  5:04 AM   Result Value Ref Range    WBC 8.8 4.1 - 11.1 K/uL    RBC 3.09 (L) 4.10 - 5.70 M/uL    HGB 9.2 (L) 12.1 - 17.0 g/dL    HCT 32.5 (L) 36.6 - 50.3 %    .2 (H) 80.0 - 99.0 FL    MCH 29.8 26.0 - 34.0 PG    MCHC 28.3 (L) 30.0 - 36.5 g/dL    RDW 15.1 (H) 11.5 - 14.5 %    PLATELET 990 358 - 022 K/uL    MPV 10.0 8.9 - 12.9 FL    NRBC 0.0 0.0  WBC    ABSOLUTE NRBC 0.00 0.00 - 0.01 K/uL    NEUTROPHILS 75 32 - 75 %    LYMPHOCYTES 7 (L) 12 - 49 %    MONOCYTES 12 5 - 13 %    EOSINOPHILS 3 0 - 7 %    BASOPHILS 1 0 - 1 %    IMMATURE GRANULOCYTES 2 (H) 0 - 0.5 %    ABS. NEUTROPHILS 6.6 1.8 - 8.0 K/UL    ABS. LYMPHOCYTES 0.6 (L) 0.8 - 3.5 K/UL    ABS. MONOCYTES 1.1 (H) 0.0 - 1.0 K/UL    ABS. EOSINOPHILS 0.3 0.0 - 0.4 K/UL    ABS. BASOPHILS 0.1 0.0 - 0.1 K/UL    ABS. IMM.  GRANS. 0.2 (H) 0.00 - 0.04 K/UL    DF AUTOMATED     PTT    Collection Time: 01/17/22  5:04 AM   Result Value Ref Range    aPTT 96.5 (H) 21.2 - 34.1 sec    aPTT, therapeutic range   82 - 109 sec   EKG, 12 LEAD, SUBSEQUENT    Collection Time: 01/17/22  8:14 AM   Result Value Ref Range    Ventricular Rate 54 BPM Atrial Rate 54 BPM    P-R Interval 204 ms    QRS Duration 114 ms    Q-T Interval 528 ms    QTC Calculation (Bezet) 500 ms    Calculated P Axis 63 degrees    Calculated R Axis 103 degrees    Calculated T Axis -165 degrees    Diagnosis       Sinus bradycardia  Possible Left atrial enlargement  Lateral infarct (cited on or before 11-JAN-2022)  T wave abnormality, consider inferior ischemia  Prolonged QT  Abnormal ECG  When compared with ECG of 16-JAN-2022 07:46,  Premature atrial complexes are no longer Present  Nonspecific T wave abnormality no longer evident in Anterior leads  Confirmed by OC LAWRENCE, Surya [facility] ([id number]) on 1/17/2022 9:37:02 AM         DUPLEX LOW EXT ARTERIES WITH JAVON   Final Result      XR CHEST PORT   Final Result      Interval placement of a right supraclavicular approach dialysis catheter with   distal tip in the proximal right atrium. Cardiomegaly with pulmonary vascular congestion and pulmonary edema. Stable appearing basilar right lung pneumonia. Small right pleural effusion. IR INSERT NON TUNL CVC OVER 5 YRS   Final Result      Technically successful insertion of non-tunneled Trialysis catheter with US   guidance. Plan:       Post catheter placement chest xray confirmed appropriate position of the   catheter. The catheter is appropriate for immediate use.   _______________________________________________________________      PROCEDURE SUMMARY:   - Venous access with ultrasound guidance   - Non-tunneled central venous catheter insertion      PROCEDURE DETAILS:      Pre-procedure   Relevant imaging review: None    Informed consent for the procedure was obtained and time-out was performed prior   to the procedure.    Prophylactic antibiotic administered: Not administered   Preparation: The site was prepared and draped using all elements of maximal   sterile barrier technique including sterile gloves, sterile gown, cap, mask,   large sterile sheet, sterile ultrasound probe cover, sterile ultrasound gel,   hand hygiene and cutaneous antisepsis with 2% chlorhexidine. Medical reason for site preparation exception: Not applicable      Anesthesia/sedation   Level of anesthesia: No sedation   Anesthesia administered by: Not applicable   Duration of anesthesia/sedation: 0 minutes. Access   The vessel was sonographically evaluated and judged to be patent and appropriate   for access and a permanent image was stored. Three mLs of 1% Lidocaine was   administered to the access site. Real time ultrasound was used to visualize   needle entry into the vessel. Vein accessed: Right internal jugular vein   Access technique: 4F micropuncture set      Catheter placement   The access site was dilated and the catheter was placed into the vein over a   wire and all guidewires were removed in their entirety. Catheter ports were   aspirated and flushed. Catheter tip location was verified by portable X-ray. Catheter size:13 Latvian   Catheter length: 20 cm   Catheter tip position: Proximal right atrium   Catheter flush: Heparin (100 units/mL)      Closure   The catheter was secured. A sterile dressing was applied. Catheter securement technique: Non-absorbable suture      Additional Medications   None      Additional Details   Estimated blood loss:  Less than 1 mL   Additional description of procedure: None   Additional findings: None   Additional equipment: None   Specimens removed: None                     IR US GUIDED VASCULAR ACCESS   Final Result      Technically successful insertion of non-tunneled Trialysis catheter with US   guidance. Plan:       Post catheter placement chest xray confirmed appropriate position of the   catheter.  The catheter is appropriate for immediate use.   _______________________________________________________________      PROCEDURE SUMMARY:   - Venous access with ultrasound guidance   - Non-tunneled central venous catheter insertion      PROCEDURE DETAILS: Pre-procedure   Relevant imaging review: None    Informed consent for the procedure was obtained and time-out was performed prior   to the procedure. Prophylactic antibiotic administered: Not administered   Preparation: The site was prepared and draped using all elements of maximal   sterile barrier technique including sterile gloves, sterile gown, cap, mask,   large sterile sheet, sterile ultrasound probe cover, sterile ultrasound gel,   hand hygiene and cutaneous antisepsis with 2% chlorhexidine. Medical reason for site preparation exception: Not applicable      Anesthesia/sedation   Level of anesthesia: No sedation   Anesthesia administered by: Not applicable   Duration of anesthesia/sedation: 0 minutes. Access   The vessel was sonographically evaluated and judged to be patent and appropriate   for access and a permanent image was stored. Three mLs of 1% Lidocaine was   administered to the access site. Real time ultrasound was used to visualize   needle entry into the vessel. Vein accessed: Right internal jugular vein   Access technique: 4F micropuncture set      Catheter placement   The access site was dilated and the catheter was placed into the vein over a   wire and all guidewires were removed in their entirety. Catheter ports were   aspirated and flushed. Catheter tip location was verified by portable X-ray. Catheter size:13 Japanese   Catheter length: 20 cm   Catheter tip position: Proximal right atrium   Catheter flush: Heparin (100 units/mL)      Closure   The catheter was secured. A sterile dressing was applied.    Catheter securement technique: Non-absorbable suture      Additional Medications   None      Additional Details   Estimated blood loss:  Less than 1 mL   Additional description of procedure: None   Additional findings: None   Additional equipment: None   Specimens removed: None                     XR CHEST PORT   Final Result   Airspace opacity and effusion at the right lung base and in the   acute setting would suggest pneumonia and/or aspiration however the patient also   appears to have baseline/background vascular congestion and/or pulmonary   arterial hypertension, noting cardiomegaly and/or pericardial effusion      IR INSERT NON TUNL CVC OVER 5 YRS    (Results Pending)        Assessment: Bilateral leg ulcers  PAD  CHF  Ventricular tachycardia cardiac dysrhythmia  Chronic renal failure on dialysis  Pneumonia      Plan: Discussed with  regarding his discharge plan. If it is okay with infectious disease I would like to discharge the patient. Continue current medications  Patient has refused for ICD placement        Care Plan discussed with: Patient/Family, Nurse and     Total time spent with patient: 30 minutes.     Juanita Yeager MD

## 2022-01-17 NOTE — PROGRESS NOTES
CM went in to talk with patient after Dr. Anat Reyes encouraged patient to go to rehab. Patient stated to  that Dr. Anat Reyes told him he should go to rehab for a while before he goes home. Patient verbalized agreement in going to rehab and asked that referral be sent to [facility]. Referral sent via [facility].

## 2022-01-17 NOTE — PROGRESS NOTES
Problem: Mobility Impaired (Adult and Pediatric)  Goal: *Acute Goals and Plan of Care (Insert Text)  Description: Pt will be I with LE HEP in 7 days. Pt will perform bed mobility with mod I in 7 days. Pt will perform transfers with mod I in 7 days. Pt will amb  feet with LRAD safely with mod I in 7 days. Outcome: Progressing Towards Goal   PHYSICAL THERAPY TREATMENT  Patient: Lorena Irene (89 y.o. male)  Date: 1/17/2022  Diagnosis: Renal failure [N19]  Hyperkalemia [E87.5] <principal problem not specified>  Procedure(s) (LRB):  LEFT HEART CATH / CORONARY ANGIOGRAPHY W GRAFTS (N/A)  PERCUTANEOUS CORONARY INTERVENTION (N/A) 4 Days Post-Op  Precautions:    Chart, physical therapy assessment, plan of care and goals were reviewed. ASSESSMENT  Patient continues with skilled PT services and is progressing towards goals. Pt received supine in bed, RN present. He was pleasant and cooperative, although declining amb. Pt completed bed mobility rolling, supine>sit, and scooting to EOB with Sup but additional time. Pt transferred sit>stand with mod A then amb 3 ft to chair with CGA using the FWW. Pt sat in chair and completed LE ex (see chart below) needing rest break after each ex and needing cues for proper execution and to stay on task. Pt was left seated in chair in reclining position, call bell within reach, in NAD, all needs addressed. Current Level of Function Impacting Discharge (mobility/balance): assist x 1, balance deficits    Other factors to consider for discharge: severity of impairments, decreased strength, flexibility and endurance, assist/support available at DC         PLAN :  Patient continues to benefit from skilled intervention to address the above impairments. Continue treatment per established plan of care. to address goals.     Recommendation for discharge: (in order for the patient to meet his/her long term goals)  Ramsey Jewell discharge recommendation:  Has been made in collaboration with the attending provider and/or case management    IF patient discharges home will need the following DME: to be determined (TBD)       SUBJECTIVE:   Patient stated I haven't been out of this bed, I want to get up.     OBJECTIVE DATA SUMMARY:   Critical Behavior:  Neurologic State: Alert  Orientation Level: Oriented X4  Cognition: Follows commands     Functional Mobility Training:  Bed Mobility:  Rolling: Supervision; Additional time  Supine to Sit: Supervision; Additional time  Scooting: Stand-by assistance; Additional time    Transfers:  Sit to Stand: Moderate assistance  Stand to Sit: Minimum assistance     Balance:  Sitting: Intact  Standing: Impaired; Without support  Standing - Static: Good  Standing - Dynamic : Fair;Constant support    Ambulation/Gait Training:  Distance (ft): 3 Feet (ft)  Assistive Device: Walker, rolling;Gait belt  Ambulation - Level of Assistance: Contact guard assistance     Therapeutic Exercises:       EXERCISE   Sets   Reps   Active Active Assist   Passive Self ROM   Comments   Ankle Pumps  20 [x] [] [] []    Hip abd/add  10 [x] [] [] []    Long Arc Quads  10 [x] [] [] []    Marching  10 [x] [] [] []       Pain Ratin/10    Activity Tolerance:   Fair  Please refer to the flowsheet for vital signs taken during this treatment. After treatment patient left in no apparent distress:   Sitting in chair, Heels elevated for pressure relief, and Call bell within reach    COMMUNICATION/COLLABORATION:   The patients plan of care was discussed with: Registered nurse.      Tod Lopez PTA   Time Calculation: 28 mins

## 2022-01-17 NOTE — PROGRESS NOTES
CM received message from Nori Mcdermott at [facility] that patient has been accepted and can start on Wednesday January 19th on a MWF schedule at 5:30am.

## 2022-01-17 NOTE — PROGRESS NOTES
CM discussed with patient recommendations of PT for patient to go to skilled nursing facility. Physician also encouraged patient to go to acute rehab. Patient adamantly refused to go to rehab, SNF or anywhere other than home. Offered patient home health services to include PT at home. [facility]. Patient also states he will drive himself to and from dialysis.

## 2022-01-18 LAB
APTT PPP: 63 SEC (ref 21.2–34.1)
APTT PPP: 64 SEC (ref 21.2–34.1)
APTT PPP: >153 SEC (ref 21.2–34.1)
BASOPHILS # BLD: 0.1 K/UL (ref 0–0.1)
BASOPHILS NFR BLD: 1 % (ref 0–1)
CRP SERPL-MCNC: 2.63 MG/DL (ref 0–0.6)
DIFFERENTIAL METHOD BLD: ABNORMAL
EOSINOPHIL # BLD: 0.3 K/UL (ref 0–0.4)
EOSINOPHIL NFR BLD: 3 % (ref 0–7)
ERYTHROCYTE [DISTWIDTH] IN BLOOD BY AUTOMATED COUNT: 15.1 % (ref 11.5–14.5)
HBV CORE AB SERPL QL IA: NEGATIVE
HCT VFR BLD AUTO: 32.1 % (ref 36.6–50.3)
HGB BLD-MCNC: 9.1 G/DL (ref 12.1–17)
IMM GRANULOCYTES # BLD AUTO: 0.2 K/UL (ref 0–0.04)
IMM GRANULOCYTES NFR BLD AUTO: 2 % (ref 0–0.5)
LYMPHOCYTES # BLD: 0.5 K/UL (ref 0.8–3.5)
LYMPHOCYTES NFR BLD: 5 % (ref 12–49)
MCH RBC QN AUTO: 29.7 PG (ref 26–34)
MCHC RBC AUTO-ENTMCNC: 28.3 G/DL (ref 30–36.5)
MCV RBC AUTO: 104.9 FL (ref 80–99)
MONOCYTES # BLD: 1 K/UL (ref 0–1)
MONOCYTES NFR BLD: 11 % (ref 5–13)
NEUTS SEG # BLD: 7.3 K/UL (ref 1.8–8)
NEUTS SEG NFR BLD: 78 % (ref 32–75)
NRBC # BLD: 0 K/UL (ref 0–0.01)
NRBC BLD-RTO: 0 PER 100 WBC
PLATELET # BLD AUTO: 259 K/UL (ref 150–400)
PMV BLD AUTO: 10.3 FL (ref 8.9–12.9)
PROCALCITONIN SERPL-MCNC: 0.7 NG/ML
RBC # BLD AUTO: 3.06 M/UL (ref 4.1–5.7)
THERAPEUTIC RANGE,PTTT: ABNORMAL SEC
THERAPEUTIC RANGE,PTTT: ABNORMAL SEC (ref 82–109)
THERAPEUTIC RANGE,PTTT: ABNORMAL SEC (ref 82–109)
WBC # BLD AUTO: 9.3 K/UL (ref 4.1–11.1)

## 2022-01-18 PROCEDURE — 74011250636 HC RX REV CODE- 250/636: Performed by: FAMILY MEDICINE

## 2022-01-18 PROCEDURE — 74011250636 HC RX REV CODE- 250/636: Performed by: INTERNAL MEDICINE

## 2022-01-18 PROCEDURE — 85025 COMPLETE CBC W/AUTO DIFF WBC: CPT

## 2022-01-18 PROCEDURE — 74011250637 HC RX REV CODE- 250/637: Performed by: INTERNAL MEDICINE

## 2022-01-18 PROCEDURE — 5A1D70Z PERFORMANCE OF URINARY FILTRATION, INTERMITTENT, LESS THAN 6 HOURS PER DAY: ICD-10-PCS | Performed by: INTERNAL MEDICINE

## 2022-01-18 PROCEDURE — 86140 C-REACTIVE PROTEIN: CPT

## 2022-01-18 PROCEDURE — 85730 THROMBOPLASTIN TIME PARTIAL: CPT

## 2022-01-18 PROCEDURE — 99232 SBSQ HOSP IP/OBS MODERATE 35: CPT | Performed by: INTERNAL MEDICINE

## 2022-01-18 PROCEDURE — 74011250636 HC RX REV CODE- 250/636

## 2022-01-18 PROCEDURE — 74011000250 HC RX REV CODE- 250: Performed by: INTERNAL MEDICINE

## 2022-01-18 PROCEDURE — 36415 COLL VENOUS BLD VENIPUNCTURE: CPT

## 2022-01-18 PROCEDURE — 65270000029 HC RM PRIVATE

## 2022-01-18 PROCEDURE — 84145 PROCALCITONIN (PCT): CPT

## 2022-01-18 PROCEDURE — 90935 HEMODIALYSIS ONE EVALUATION: CPT

## 2022-01-18 RX ORDER — HEPARIN SODIUM 1000 [USP'U]/ML
INJECTION, SOLUTION INTRAVENOUS; SUBCUTANEOUS
Status: COMPLETED
Start: 2022-01-18 | End: 2022-01-18

## 2022-01-18 RX ADMIN — SODIUM CHLORIDE, PRESERVATIVE FREE 10 ML: 5 INJECTION INTRAVENOUS at 05:28

## 2022-01-18 RX ADMIN — SODIUM CHLORIDE, PRESERVATIVE FREE 1 G: 5 INJECTION INTRAVENOUS at 20:59

## 2022-01-18 RX ADMIN — NITROGLYCERIN 0.5 INCH: 20 OINTMENT TOPICAL at 00:05

## 2022-01-18 RX ADMIN — EPOETIN ALFA-EPBX 10000 UNITS: 10000 INJECTION, SOLUTION INTRAVENOUS; SUBCUTANEOUS at 11:18

## 2022-01-18 RX ADMIN — FERROUS SULFATE TAB 325 MG (65 MG ELEMENTAL FE) 325 MG: 325 (65 FE) TAB at 17:29

## 2022-01-18 RX ADMIN — IRON SUCROSE 200 MG: 20 INJECTION, SOLUTION INTRAVENOUS at 11:17

## 2022-01-18 RX ADMIN — HEPARIN SODIUM 2800 UNITS: 1000 INJECTION INTRAVENOUS; SUBCUTANEOUS at 11:18

## 2022-01-18 RX ADMIN — ASPIRIN 81 MG CHEWABLE TABLET 81 MG: 81 TABLET CHEWABLE at 14:43

## 2022-01-18 RX ADMIN — AMIODARONE HYDROCHLORIDE 400 MG: 200 TABLET ORAL at 14:43

## 2022-01-18 RX ADMIN — HEPARIN SODIUM 2800 UNITS: 1000 INJECTION, SOLUTION INTRAVENOUS; SUBCUTANEOUS at 11:18

## 2022-01-18 RX ADMIN — SODIUM CHLORIDE, PRESERVATIVE FREE 10 ML: 5 INJECTION INTRAVENOUS at 21:07

## 2022-01-18 RX ADMIN — HEPARIN SODIUM 2000 UNITS: 1000 INJECTION INTRAVENOUS; SUBCUTANEOUS at 23:46

## 2022-01-18 RX ADMIN — NITROGLYCERIN 0.5 INCH: 20 OINTMENT TOPICAL at 23:46

## 2022-01-18 RX ADMIN — Medication 17 UNITS/KG/HR: at 06:00

## 2022-01-18 RX ADMIN — NITROGLYCERIN 0.5 INCH: 20 OINTMENT TOPICAL at 05:27

## 2022-01-18 RX ADMIN — NITROGLYCERIN 0.5 INCH: 20 OINTMENT TOPICAL at 17:29

## 2022-01-18 RX ADMIN — SODIUM CHLORIDE, PRESERVATIVE FREE 10 ML: 5 INJECTION INTRAVENOUS at 16:09

## 2022-01-18 RX ADMIN — METOPROLOL SUCCINATE 50 MG: 25 TABLET, EXTENDED RELEASE ORAL at 14:43

## 2022-01-18 RX ADMIN — AMIODARONE HYDROCHLORIDE 400 MG: 200 TABLET ORAL at 20:58

## 2022-01-18 NOTE — PROGRESS NOTES
Lab reported patient's PTT value at 7:00 A. M. Heparin Gtt placed on hold for an hour to resumed as per protocol. No sign of bleeding, no distress noted. Pateint's condition stable.

## 2022-01-18 NOTE — PROGRESS NOTES
Progress Note    Patient: Maeve Torres MRN: [id number]  SSN: [id number]    YOB: 1955  Age: 78 y.o. Sex: male      Admit Date: 1/7/2022    LOS: 9 days     Subjective:   Patient followed for sepsis with cellulitis both calves and aspiration pneumonia. Leg wound culture grew multiple organisms as shown below. He has been transferred out of the ICU. He is sitting up in bedside chair with no complaints. He is afebrile with normal WBC and decreasing procal and CRP. Currently on IV Meropenem. Patient transferred back to the ICU. Problems with VTach and requiring defibrillator. Apparently underwent cardiac cath. Patient now on 4W sitting up in bedside chair with no complaints. Objective:     Vitals:    01/17/22 1038 01/17/22 1123 01/17/22 1502 01/17/22 2155   BP:  115/78 99/68 112/76   Pulse:  74 76 68   Resp:  20 20 20   Temp:  98.1 °F (36.7 °C) 97.9 °F (36.6 °C) 98.3 °F (36.8 °C)   TempSrc:       SpO2: 90% 92% 98% 97%   Weight:       Height:            Intake and Output:  Current Shift: No intake/output data recorded. Last three shifts: No intake/output data recorded. Physical Exam:    Vitals and nursing note reviewed. Constitutional:       Appearance: He is ill-appearing. Genitourinary:     Comments: External urinary catheter  Musculoskeletal:      Right lower leg: Edema present. Left lower leg: Edema present. Comments: Right calf bandaged, left calf fully exposed and unremarkable  Skin:         Neurological: nonfocal, mild confusion  Psychiatric:         Behavior: Behavior normal.     Lab/Data Review:     WBC 8,800    CRP 2.53 < 5.30 <6.32 <8.19 <12.10 <12.10  Procal 0.84 < 2.39 <3.12 < 4.41 <4.39 <5.16    MRSA nasal PCR negative    Wound cultures leg (1/8)  Staphylococcus aureus (MSSA),  Alcaligenes faecalis resistant to Zosyn, Klebsiella pneumoniae      Assessment:     Active Problems:    Renal failure (1/8/2022)      Hyperkalemia (1/8/2022)    1.  Cellulitis both calves, secondary to probable S. Aureus (MSSA), Alcaligenes faecalis resistant to Zosyn, Klebsiella pneumoniae,  Day #10 IV Antibiotics, now Meropenem,  improving  2. Aspiration pneumonia, RLL, Day #10 IV Antibiotics, now Meropenem  3. Sepsis with leukocytosis and elevated CRP/procal, resolving     Comment:  WBC normal with still decreasing CRP and procal.     Plan:   1. Continue  Meropenem (Zosyn resistance) IV which will cover MSSA as well, for 4 more days  2.  In am, repeat CRP, procal    Signed By: Daniella Espana MD     January 17, 2022

## 2022-01-18 NOTE — PROGRESS NOTES
Progress Note      1/18/2022 12:19 PM  NAME: Martha Issa   MRN:  [id number]   Admit Diagnosis: Renal failure [N19]  Hyperkalemia [E87.5]      Subjective:   Chart reviewed. Patient is on dialysis. Symptoms of shortness of breath has improved. Patient had frequent palpitations during the night and apparently a nurse thought it was SVT and was given a Adenocard however EKG is concerning for ventricular tachycardia. With the patient and her with the nurse. Cardiac catheterization findings discussed with the patient and also discussed with the electrophysiologist.    Review of Systems:    Symptom Y/N Comments  Symptom Y/N Comments   Fever/Chills n   Chest Pain n    Poor Appetite    Edema y    Cough    Abdominal Pain     Sputum    Joint Pain     SOB/CARMONA y  improving  Pruritis/Rash     Nausea/vomit    Other     Diarrhea         Constipation           Could NOT obtain due to:      Objective:          Physical Exam:    Last 24hrs VS reviewed since prior progress note. Most recent are:    Visit Vitals  /78 (BP 1 Location: Right upper arm, BP Patient Position: At rest)   Pulse 73   Temp 98.5 °F (36.9 °C)   Resp 18   Ht 6' (1.829 m)   Wt 77.1 kg (169 lb 15.6 oz)   SpO2 91%   BMI 23.05 kg/m²       Intake/Output Summary (Last 24 hours) at 1/18/2022 1304  Last data filed at 1/18/2022 1130  Gross per 24 hour   Intake --   Output 2000 ml   Net -2000 ml        General Appearance: Well developed, well nourished, alert & oriented x 3,    no acute distress. Ears/Nose/Mouth/Throat: Hearing grossly normal.  Neck: Supple. Chest: Lungs clear to auscultation bilaterally. Cardiovascular: Regular rate and rhythm, S1,S2 normal, no murmur. Abdomen: Soft, non-tender, bowel sounds are active. Extremities: Patient has ulcers of the lower extremities  Skin: Warm and dry. []         Post-cath site without hematoma, bruit, tenderness, or thrill. Distal pulses intact.     PMH/SH reviewed - no change compared to H&P    Data Review    Telemetry: normal sinus rhythm , patient has some PVCs, and episode of wide-complex tachycardia possible ventricular tachycardia. EKG:   Patient had EKG that is very concerning for ventricular tachycardia. Lab Data Personally Reviewed:    Recent Labs     01/18/22  0441 01/17/22  0504   WBC 9.3 8.8   HGB 9.1* 9.2*   HCT 32.1* 32.5*    241     Recent Labs     01/18/22  0441 01/17/22  0504 01/16/22  1156   APTT >153.0* 96.5* 88.1*      Recent Labs     01/17/22  0504 01/16/22  0349    138   K 4.2 3.8    103   CO2 27 27   BUN 40* 30*   CREA 4.70* 3.73*   GLU 98 96   CA 7.9* 7.5*   MG  --  2.3     No results for input(s): CPK, CKNDX, TROIQ in the last 72 hours. No lab exists for component: CPKMB  No results found for: CHOL, CHOLX, CHLST, CHOLV, HDL, HDLP, LDL, LDLC, DLDLP, TGLX, TRIGL, TRIGP, CHHD, CHHDX    Recent Labs     01/17/22  0504 01/16/22  0349   AP 92 101   TP 5.8* 6.1*   ALB 2.0* 2.1*   GLOB 3.8 4.0     No results for input(s): PH, PCO2, PO2 in the last 72 hours.     Medications Personally Reviewed:    Current Facility-Administered Medications   Medication Dose Route Frequency    epoetin valente-epbx (RETACRIT) injection 10,000 Units  10,000 Units IntraVENous DIALYSIS TUE, THU & SAT    iron sucrose (VENOFER) injection 200 mg  200 mg IntraVENous DIALYSIS TUE, THU & SAT    oxyCODONE IR (ROXICODONE) tablet 5 mg  5 mg Oral Q8H PRN    sodium chloride (NS) flush 5-40 mL  5-40 mL IntraVENous Q8H    sodium chloride (NS) flush 5-40 mL  5-40 mL IntraVENous PRN    amiodarone (CORDARONE) tablet 400 mg  400 mg Oral BID    meropenem (MERREM) 1 g in 0.9% sodium chloride 20 mL IV syringe  1 g IntraVENous Q12H    ferrous sulfate tablet 325 mg  1 Tablet Oral BID WITH MEALS    heparin 25,000 units in  ml infusion  12-25 Units/kg/hr (Adjusted) IntraVENous TITRATE    heparin (porcine) 1,000 unit/mL injection 4,000 Units  4,000 Units IntraVENous PRN    Or    heparin (porcine) 1,000 unit/mL injection 2,000 Units  2,000 Units IntraVENous PRN    metoprolol succinate (TOPROL-XL) XL tablet 50 mg  50 mg Oral DAILY    nitroglycerin (NITROBID) 2 % ointment 0.5 Inch  0.5 Inch Topical Q6H    aspirin chewable tablet 81 mg  81 mg Oral DAILY    heparin (porcine) 1,000 unit/mL injection 2,800 Units  2,800 Units Hemodialysis DIALYSIS PRN           Problem List:   Patient has a acute on chronic renal failure and had a dialysis and is clinically better. Decompensated heart failure and ejection fraction is depressed and patient has a at least moderate possibly severe pulmonary hypertension. History of paroxysmal atrial fibrillation. Recent DVT of her left axillary vein. Wide-complex tachycardia probably ventricular tachycardia. Catheterization did not show evidence of coronary artery disease and ejection fraction was about 20 to 25%. 1.      Assessment/Plan:   I will continue dialysis as per recommendations of the nephrologist.  Patient is offered ICD by Dr. Cirilo Ward however patient does not want it. Meanwhile I will start the patient on amiodarone. Vascular surgery note reviewed and appreciated and will follow the recommendations. We will continue amiodarone and present care. Patient will be going for cardiac rehab. Thank you. 1.          []       High complexity decision making was performed in this patient at high risk for decompensation with multiple organ involvement.     Cara Camara MD

## 2022-01-18 NOTE — PROGRESS NOTES
Progress Note      1/17/2022 12:19 PM  NAME: Oscar Blake   MRN:  [id number]   Admit Diagnosis: Renal failure [N19]  Hyperkalemia [E87.5]      Subjective:   Chart reviewed. Patient is on dialysis. Symptoms of shortness of breath has improved. Patient had frequent palpitations during the night and apparently a nurse thought it was SVT and was given a Adenocard however EKG is concerning for ventricular tachycardia. With the patient and her with the nurse. Cardiac catheterization findings discussed with the patient and also discussed with the electrophysiologist.    Review of Systems:    Symptom Y/N Comments  Symptom Y/N Comments   Fever/Chills n   Chest Pain n    Poor Appetite    Edema y    Cough    Abdominal Pain     Sputum    Joint Pain     SOB/CARMONA y  improving  Pruritis/Rash     Nausea/vomit    Other     Diarrhea         Constipation           Could NOT obtain due to:      Objective:          Physical Exam:    Last 24hrs VS reviewed since prior progress note. Most recent are:    Visit Vitals  BP 99/68 (BP Patient Position: Sitting; At rest)   Pulse 76   Temp 97.9 °F (36.6 °C)   Resp 20   Ht 6' (1.829 m)   Wt 74.4 kg (164 lb 0.4 oz)   SpO2 98%   BMI 22.25 kg/m²     No intake or output data in the 24 hours ending 01/17/22 2021     General Appearance: Well developed, well nourished, alert & oriented x 3,    no acute distress. Ears/Nose/Mouth/Throat: Hearing grossly normal.  Neck: Supple. Chest: Lungs clear to auscultation bilaterally. Cardiovascular: Regular rate and rhythm, S1,S2 normal, no murmur. Abdomen: Soft, non-tender, bowel sounds are active. Extremities: Patient has ulcers of the lower extremities  Skin: Warm and dry. []         Post-cath site without hematoma, bruit, tenderness, or thrill. Distal pulses intact.     PMH/SH reviewed - no change compared to H&P    Data Review    Telemetry: normal sinus rhythm , patient has some PVCs, and episode of wide-complex tachycardia possible ventricular tachycardia. EKG:   Patient had EKG that is very concerning for ventricular tachycardia. Lab Data Personally Reviewed:    Recent Labs     01/17/22  0504   WBC 8.8   HGB 9.2*   HCT 32.5*        Recent Labs     01/17/22  0504 01/16/22  1156 01/16/22  0349   APTT 96.5* 88.1* 92.3*      Recent Labs     01/17/22  0504 01/16/22  0349 01/15/22  0800    138 139   K 4.2 3.8 3.1*    103 104   CO2 27 27 26   BUN 40* 30* 49*   CREA 4.70* 3.73* 5.38*   GLU 98 96 102*   CA 7.9* 7.5* 7.2*   MG  --  2.3  --      No results for input(s): CPK, CKNDX, TROIQ in the last 72 hours. No lab exists for component: CPKMB  No results found for: CHOL, CHOLX, CHLST, CHOLV, HDL, HDLP, LDL, LDLC, DLDLP, Jannine Holter, CHHD, CHHDX    Recent Labs     01/17/22  0504 01/16/22  0349 01/15/22  0800   AP 92 101 92   TP 5.8* 6.1* 4.8*   ALB 2.0* 2.1* 1.8*   GLOB 3.8 4.0 3.0     No results for input(s): PH, PCO2, PO2 in the last 72 hours.     Medications Personally Reviewed:    Current Facility-Administered Medications   Medication Dose Route Frequency    oxyCODONE IR (ROXICODONE) tablet 5 mg  5 mg Oral Q8H PRN    sodium chloride (NS) flush 5-40 mL  5-40 mL IntraVENous Q8H    sodium chloride (NS) flush 5-40 mL  5-40 mL IntraVENous PRN    amiodarone (CORDARONE) tablet 400 mg  400 mg Oral BID    meropenem (MERREM) 1 g in 0.9% sodium chloride 20 mL IV syringe  1 g IntraVENous Q12H    ferrous sulfate tablet 325 mg  1 Tablet Oral BID WITH MEALS    heparin 25,000 units in  ml infusion  12-25 Units/kg/hr (Adjusted) IntraVENous TITRATE    heparin (porcine) 1,000 unit/mL injection 4,000 Units  4,000 Units IntraVENous PRN    Or    heparin (porcine) 1,000 unit/mL injection 2,000 Units  2,000 Units IntraVENous PRN    metoprolol succinate (TOPROL-XL) XL tablet 50 mg  50 mg Oral DAILY    nitroglycerin (NITROBID) 2 % ointment 0.5 Inch  0.5 Inch Topical Q6H    aspirin chewable tablet 81 mg  81 mg Oral DAILY    heparin (porcine) 1,000 unit/mL injection 2,800 Units  2,800 Units Hemodialysis DIALYSIS PRN           Problem List:   Patient has a acute on chronic renal failure and had a dialysis and is clinically better. Decompensated heart failure and ejection fraction is depressed and patient has a at least moderate possibly severe pulmonary hypertension. History of paroxysmal atrial fibrillation. Recent DVT of her left axillary vein. Wide-complex tachycardia probably ventricular tachycardia. Catheterization did not show evidence of coronary artery disease and ejection fraction was about 20 to 25%. 1.      Assessment/Plan:   I will continue dialysis as per recommendations of the nephrologist.  Patient is offered ICD by Dr. Zakia Duenas however patient does not want it. Meanwhile I will start the patient on amiodarone. Vascular surgery note reviewed and appreciated and will follow the recommendations. We will continue amiodarone and present care. Thank you. 1.          []       High complexity decision making was performed in this patient at high risk for decompensation with multiple organ involvement.     Blaze Hogue MD

## 2022-01-18 NOTE — PROGRESS NOTES
Progress Note    Patrick Sahu MD             Daily Progress Note: 1/18/2022      Subjective: The patient is seen for follow  up. Offers no complaints  Problem List:  Problem List as of 1/18/2022 Never Reviewed          Codes Class Noted - Resolved    Renal failure ICD-10-CM: N19  ICD-9-CM: 871  1/8/2022 - Present        Hyperkalemia ICD-10-CM: E87.5  ICD-9-CM: 276.7  1/8/2022 - Present        Pulmonary edema ICD-10-CM: J81.1  ICD-9-CM: 019  7/13/2021 - Present        GI bleed ICD-10-CM: K92.2  ICD-9-CM: 578.9  1/5/2021 - Present              Medications reviewed  Current Facility-Administered Medications   Medication Dose Route Frequency    epoetin valente-epbx (RETACRIT) injection 10,000 Units  10,000 Units IntraVENous DIALYSIS TUE, THU & SAT    iron sucrose (VENOFER) injection 200 mg  200 mg IntraVENous DIALYSIS TUE, THU & SAT    oxyCODONE IR (ROXICODONE) tablet 5 mg  5 mg Oral Q8H PRN    sodium chloride (NS) flush 5-40 mL  5-40 mL IntraVENous Q8H    sodium chloride (NS) flush 5-40 mL  5-40 mL IntraVENous PRN    amiodarone (CORDARONE) tablet 400 mg  400 mg Oral BID    meropenem (MERREM) 1 g in 0.9% sodium chloride 20 mL IV syringe  1 g IntraVENous Q12H    ferrous sulfate tablet 325 mg  1 Tablet Oral BID WITH MEALS    heparin 25,000 units in  ml infusion  12-25 Units/kg/hr (Adjusted) IntraVENous TITRATE    heparin (porcine) 1,000 unit/mL injection 4,000 Units  4,000 Units IntraVENous PRN    Or    heparin (porcine) 1,000 unit/mL injection 2,000 Units  2,000 Units IntraVENous PRN    metoprolol succinate (TOPROL-XL) XL tablet 50 mg  50 mg Oral DAILY    nitroglycerin (NITROBID) 2 % ointment 0.5 Inch  0.5 Inch Topical Q6H    aspirin chewable tablet 81 mg  81 mg Oral DAILY    heparin (porcine) 1,000 unit/mL injection 2,800 Units  2,800 Units Hemodialysis DIALYSIS PRN       Review of Systems:   A comprehensive review of systems was negative except for that written in the HPI.     Objective: Physical Exam:     Visit Vitals  /78 (BP 1 Location: Right upper arm, BP Patient Position: At rest)   Pulse 73   Temp 98.5 °F (36.9 °C)   Resp 18   Ht 6' (1.829 m)   Wt 77.1 kg (169 lb 15.6 oz)   SpO2 91%   BMI 23.05 kg/m²    O2 Flow Rate (L/min): 4 l/min O2 Device: Nasal cannula    Temp (24hrs), Av.4 °F (36.9 °C), Min:97.8 °F (36.6 °C), Max:99.5 °F (37.5 °C)     07 -  1900  In: -   Out:     No intake/output data recorded. General:  Alert, cooperative, no distress, appears stated age. Lungs:   Clear to auscultation bilaterally. Chest wall:  No tenderness or deformity. Heart:  Regular rate and rhythm, S1, S2 normal, no murmur, click, rub or gallop. Abdomen:   Soft, non-tender. Bowel sounds normal. No masses,  No organomegaly. Extremities: Extremities normal, atraumatic, no cyanosis or edema. Pulses: 2+ and symmetric all extremities. Skin: Skin color, texture, turgor normal. No rashes or lesions bilateral leg ulcer   Neurologic: CNII-XII intact. No gross sensory or motor deficits     Data Review:       Recent Days:  Recent Labs     22  0441 22  0504   WBC 9.3 8.8   HGB 9.1* 9.2*   HCT 32.1* 32.5*    241     Recent Labs     22  0504 22  0349    138   K 4.2 3.8    103   CO2 27 27   GLU 98 96   BUN 40* 30*   CREA 4.70* 3.73*   CA 7.9* 7.5*   MG  --  2.3   ALB 2.0* 2.1*   TBILI 0.4 0.5   ALT 16 19     No results for input(s): PH, PCO2, PO2, HCO3, FIO2 in the last 72 hours. Results     Procedure Component Value Units Date/Time    CULTURE, Conda Nailer STAIN [[id number]]  (Susceptibility) Collected: 22 0400    Order Status: Completed Specimen: Misc.  Wound Updated: 22 0714     Special Requests: No Special Requests        GRAM STAIN Rare WBCs seen               Occasional Gram Positive Cocci in pairs                  Rare apparent Gram Negative Rods           Culture result:       Heavy Staphylococcus aureus Heavy Alcaligenes faecalis                  Light Klebsiella pneumoniae            Light Proteus mirabilis       Susceptibility      Staphylococcus aureus     SHIRA     Ciprofloxacin ($) Susceptible     Clindamycin ($) Susceptible     Daptomycin ($$$$$) Susceptible     Doxycycline ($$) Susceptible     Erythromycin ($$$$) Susceptible     Gentamicin ($) Susceptible     Levofloxacin ($) Susceptible     Linezolid ($$$$$) Susceptible     Moxifloxacin ($$$$) Susceptible     Oxacillin Susceptible     Rifampin ($$$$) Susceptible  [1]      Tetracycline Susceptible     Trimeth/Sulfa Susceptible     Vancomycin ($) Susceptible                 [1]  Rifampin is not to be used for mono-therapy.           Linear View               Susceptibility      Alcaligenes faecalis     SHIRA     Amikacin ($) Susceptible     Cefepime ($$) Susceptible     Ceftazidime ($) Susceptible     Ceftriaxone ($) Susceptible     Ciprofloxacin ($) Susceptible     Gentamicin ($) Susceptible     Levofloxacin ($) Susceptible     Meropenem ($$) Susceptible     Piperacillin/Tazobac ($) Resistant     Tobramycin ($) Susceptible     Trimeth/Sulfa Susceptible                  Linear View               Susceptibility      Klebsiella pneumoniae     SHIRA     Amikacin ($) Susceptible     Ampicillin ($) Resistant     Ampicillin/sulbactam ($) Susceptible     Cefazolin ($) Susceptible     Cefepime ($$) Susceptible     Cefoxitin Susceptible     Ceftazidime ($) Susceptible     Ceftriaxone ($) Susceptible     Ciprofloxacin ($) Susceptible     Gentamicin ($) Susceptible     Levofloxacin ($) Susceptible     Meropenem ($$) Susceptible     Piperacillin/Tazobac ($) Susceptible     Tobramycin ($) Susceptible     Trimeth/Sulfa Susceptible                  Linear View               Susceptibility      Proteus mirabilis     SHIRA     Amikacin ($) Susceptible     Ampicillin ($) Susceptible     Ampicillin/sulbactam ($) Susceptible     Cefazolin ($) Susceptible     Cefepime ($$) Susceptible Cefoxitin Susceptible     Ceftazidime ($) Susceptible     Ceftriaxone ($) Susceptible     Ciprofloxacin ($) Susceptible     Gentamicin ($) Susceptible     Levofloxacin ($) Susceptible     Meropenem ($$) Susceptible     Piperacillin/Tazobac ($) Susceptible     Tobramycin ($) Susceptible     Trimeth/Sulfa Susceptible                  Linear View                   MRSA SCREEN - PCR (NASAL) [[id number]] Collected: 01/08/22 0223    Order Status: Completed Specimen: Swab Updated: 01/08/22 0516     MRSA by PCR, Nasal Not Detected       COVID-19 RAPID TEST [[id number]] Collected: 01/07/22 231    Order Status: Completed Specimen: Nasopharyngeal Updated: 01/08/22 0010     Specimen source       Please find results under separate order           COVID-19 rapid test Not Detected        Comment: Rapid [employer] ID Now   Rapid NAAT:  The specimen is NEGATIVE for SARS-CoV-2, the novel coronavirus associated with COVID-19. Negative results should be treated as presumptive and, if inconsistent with clinical signs and symptoms or necessary for patient management, should be tested with an alternative molecular assay. Negative results do not preclude SARS-CoV-2 infection and should not be used as the sole basis for patient management decisions. This test has been authorized by the FDA under   an Emergency Use Authorization (EUA) for use by authorized laboratories.  Fact sheet for Healthcare Providers: ConventionUpdate.co.nz Fact sheet for Patients: ConventionUpdate.co.nz   Methodology: Isothermal Nucleic Acid Amplification              24 Hour Results:  Recent Results (from the past 24 hour(s))   CBC WITH AUTOMATED DIFF    Collection Time: 01/18/22  4:41 AM   Result Value Ref Range    WBC 9.3 4.1 - 11.1 K/uL    RBC 3.06 (L) 4.10 - 5.70 M/uL    HGB 9.1 (L) 12.1 - 17.0 g/dL    HCT 32.1 (L) 36.6 - 50.3 %    .9 (H) 80.0 - 99.0 FL    MCH 29.7 26.0 - 34.0 PG    MCHC 28.3 (L) 30.0 - 36.5 g/dL    RDW 15.1 (H) 11.5 - 14.5 %    PLATELET 422 299 - 593 K/uL    MPV 10.3 8.9 - 12.9 FL    NRBC 0.0 0.0  WBC    ABSOLUTE NRBC 0.00 0.00 - 0.01 K/uL    NEUTROPHILS 78 (H) 32 - 75 %    LYMPHOCYTES 5 (L) 12 - 49 %    MONOCYTES 11 5 - 13 %    EOSINOPHILS 3 0 - 7 %    BASOPHILS 1 0 - 1 %    IMMATURE GRANULOCYTES 2 (H) 0 - 0.5 %    ABS. NEUTROPHILS 7.3 1.8 - 8.0 K/UL    ABS. LYMPHOCYTES 0.5 (L) 0.8 - 3.5 K/UL    ABS. MONOCYTES 1.0 0.0 - 1.0 K/UL    ABS. EOSINOPHILS 0.3 0.0 - 0.4 K/UL    ABS. BASOPHILS 0.1 0.0 - 0.1 K/UL    ABS. IMM. GRANS. 0.2 (H) 0.00 - 0.04 K/UL    DF AUTOMATED     PTT    Collection Time: 01/18/22  4:41 AM   Result Value Ref Range    aPTT >153.0 (HH) 21.2 - 34.1 sec    aPTT, therapeutic range   82 - 109 sec   C REACTIVE PROTEIN, QT    Collection Time: 01/18/22  4:41 AM   Result Value Ref Range    C-Reactive protein 2.63 (H) 0.00 - 0.60 mg/dL   PROCALCITONIN    Collection Time: 01/18/22  4:41 AM   Result Value Ref Range    Procalcitonin 0.70 (H) 0 ng/mL       DUPLEX LOW EXT ARTERIES WITH JAVON   Final Result      XR CHEST PORT   Final Result      Interval placement of a right supraclavicular approach dialysis catheter with   distal tip in the proximal right atrium. Cardiomegaly with pulmonary vascular congestion and pulmonary edema. Stable appearing basilar right lung pneumonia. Small right pleural effusion. IR INSERT NON TUNL CVC OVER 5 YRS   Final Result      Technically successful insertion of non-tunneled Trialysis catheter with US   guidance. Plan:       Post catheter placement chest xray confirmed appropriate position of the   catheter.  The catheter is appropriate for immediate use.   _______________________________________________________________      PROCEDURE SUMMARY:   - Venous access with ultrasound guidance   - Non-tunneled central venous catheter insertion      PROCEDURE DETAILS:      Pre-procedure   Relevant imaging review: None    Informed consent for the procedure was obtained and time-out was performed prior   to the procedure. Prophylactic antibiotic administered: Not administered   Preparation: The site was prepared and draped using all elements of maximal   sterile barrier technique including sterile gloves, sterile gown, cap, mask,   large sterile sheet, sterile ultrasound probe cover, sterile ultrasound gel,   hand hygiene and cutaneous antisepsis with 2% chlorhexidine. Medical reason for site preparation exception: Not applicable      Anesthesia/sedation   Level of anesthesia: No sedation   Anesthesia administered by: Not applicable   Duration of anesthesia/sedation: 0 minutes. Access   The vessel was sonographically evaluated and judged to be patent and appropriate   for access and a permanent image was stored. Three mLs of 1% Lidocaine was   administered to the access site. Real time ultrasound was used to visualize   needle entry into the vessel. Vein accessed: Right internal jugular vein   Access technique: 4F micropuncture set      Catheter placement   The access site was dilated and the catheter was placed into the vein over a   wire and all guidewires were removed in their entirety. Catheter ports were   aspirated and flushed. Catheter tip location was verified by portable X-ray. Catheter size:13 Czech   Catheter length: 20 cm   Catheter tip position: Proximal right atrium   Catheter flush: Heparin (100 units/mL)      Closure   The catheter was secured. A sterile dressing was applied. Catheter securement technique: Non-absorbable suture      Additional Medications   None      Additional Details   Estimated blood loss:  Less than 1 mL   Additional description of procedure: None   Additional findings: None   Additional equipment: None   Specimens removed: None                     IR US GUIDED VASCULAR ACCESS   Final Result      Technically successful insertion of non-tunneled Trialysis catheter with US   guidance.       Plan:       Post catheter placement chest xray confirmed appropriate position of the   catheter. The catheter is appropriate for immediate use.   _______________________________________________________________      PROCEDURE SUMMARY:   - Venous access with ultrasound guidance   - Non-tunneled central venous catheter insertion      PROCEDURE DETAILS:      Pre-procedure   Relevant imaging review: None    Informed consent for the procedure was obtained and time-out was performed prior   to the procedure. Prophylactic antibiotic administered: Not administered   Preparation: The site was prepared and draped using all elements of maximal   sterile barrier technique including sterile gloves, sterile gown, cap, mask,   large sterile sheet, sterile ultrasound probe cover, sterile ultrasound gel,   hand hygiene and cutaneous antisepsis with 2% chlorhexidine. Medical reason for site preparation exception: Not applicable      Anesthesia/sedation   Level of anesthesia: No sedation   Anesthesia administered by: Not applicable   Duration of anesthesia/sedation: 0 minutes. Access   The vessel was sonographically evaluated and judged to be patent and appropriate   for access and a permanent image was stored. Three mLs of 1% Lidocaine was   administered to the access site. Real time ultrasound was used to visualize   needle entry into the vessel. Vein accessed: Right internal jugular vein   Access technique: 4F micropuncture set      Catheter placement   The access site was dilated and the catheter was placed into the vein over a   wire and all guidewires were removed in their entirety. Catheter ports were   aspirated and flushed. Catheter tip location was verified by portable X-ray. Catheter size:13 Amharic   Catheter length: 20 cm   Catheter tip position: Proximal right atrium   Catheter flush: Heparin (100 units/mL)      Closure   The catheter was secured. A sterile dressing was applied.    Catheter securement technique: Non-absorbable suture      Additional Medications   None      Additional Details   Estimated blood loss:  Less than 1 mL   Additional description of procedure: None   Additional findings: None   Additional equipment: None   Specimens removed: None                     XR CHEST PORT   Final Result   Airspace opacity and effusion at the right lung base and in the   acute setting would suggest pneumonia and/or aspiration however the patient also   appears to have baseline/background vascular congestion and/or pulmonary   arterial hypertension, noting cardiomegaly and/or pericardial effusion      IR INSERT NON TUNL CVC OVER 5 YRS    (Results Pending)   IR INSERT TUNL CVC W/O PORT OVER 5 YR    (Results Pending)        Assessment: Cellulitis and ulcers infected on both legs improving  Pneumonia  Renal failure on dialysis  Hypertension  CHF  Cardiac dysrhythmia needing ICD placement but patient has refused        Plan: We will continue IV antibiotics as recommended by infectious disease  Continue on current medication        Care Plan discussed with: Patient/Family, Nurse and     Total time spent with patient: 30 minutes.     Domenico Harding MD

## 2022-01-18 NOTE — PROGRESS NOTES
Patient refused wound care this morning and again this afternoon. Dressing is falling off with old drainage noted on roll gauze. RN educated patient on risks of leaving the soiled dressings on his BLE venous stasis ulcers. Patient became agitated and told the nurse \"Nobody is touching my fucking legs. I already told ya'll! .\" Will continue to monitor and reinforce education.

## 2022-01-18 NOTE — PROGRESS NOTES
Progress Note    Patient: Martínez Vance MRN: [id number]  SSN: [id number]    YOB: 1955  Age: 78 y.o. Sex: male      Admit Date: 1/7/2022    LOS: 10 days     Subjective:   Patient followed for sepsis with cellulitis both calves and aspiration pneumonia. Leg wound culture grew multiple organisms as shown below. He is resting comfortably at this time with no complaints. Staff report that patient refused to have dressings on his legs changed. Objective:     Vitals:    01/18/22 0830 01/18/22 0900 01/18/22 0930 01/18/22 1000   BP: 113/71 115/71 (P) 117/71 (P) 108/78   Pulse: (!) 58 (!) 57 (!) (P) 55 (!) (P) 54   Resp:       Temp:       TempSrc:       SpO2:       Weight:       Height:            Intake and Output:  Current Shift: No intake/output data recorded. Last three shifts: No intake/output data recorded. Physical Exam:    Vitals and nursing note reviewed. Constitutional:       Appearance: He is ill-appearing. Genitourinary:     Comments: External urinary catheter  Musculoskeletal:      Right lower leg: Edema present. Left lower leg: Edema present. Comments: Right calf bandaged, left calf fully exposed with clean open wound posteriorly  Skin:         Neurological: nonfocal, mild confusion  Psychiatric:         Behavior: Behavior normal.     Lab/Data Review:     WBC 9,300    CRP 2.63 <2.53 < 5.30 <6.32 <8.19 <12.10 <12.10  Procal 0.70 <0.84 < 2.39 <3.12 < 4.41 <4.39 <5.16    MRSA nasal PCR negative    Wound cultures leg (1/8)  Staphylococcus aureus (MSSA),  Alcaligenes faecalis resistant to Zosyn, Klebsiella pneumoniae      Assessment:     Active Problems:    Renal failure (1/8/2022)      Hyperkalemia (1/8/2022)    1. Cellulitis both calves, secondary to probable S. Aureus (MSSA), Alcaligenes faecalis resistant to Zosyn, Klebsiella pneumoniae,  Day #11 IV Antibiotics, now Meropenem,  improving  2. Aspiration pneumonia, RLL, Day #11 IV Antibiotics, now Meropenem  3. Sepsis with leukocytosis and elevated CRP/procal, resolving     Comment:  WBC normal with still decreasing CRP and procal along with clinical improvement. Plan:   1. Meropenem (Zosyn resistance) IV which will cover MSSA as well, for 3 more days (oral Levaquin would be an option, however, there would be drug interaction with Amiodarone causing prolonged QTc)  2. On Thursday, repeat CRP, procal  3.  Keep open wounds covered    Signed By: Thelma Garcia MD     January 18, 2022

## 2022-01-18 NOTE — PROGRESS NOTES
Patient agreed to wound care on BLE venous ulcers. RN cleansed wounds on R&L BLEs with saline wound wash, covered wounds with oil emulsion gauze to prevent ABD from sticking to wound. Covered with ABD, wrapped in kurlex, and wrapped in ACE bandages.

## 2022-01-18 NOTE — PROGRESS NOTES
RN attempted to provide wound care and assessment of BLE venous ulcers, patient refused for the second day of care with this same RN. RN reinforced education of the risks of leaving soiled dressings on his wounds. Patient told the nurse that the Dr. Gem Javier him that it did not matter If they were changed or not. RN will follow up with MD today on patient's persistent refusal of wound care and lack of education/compliance in with treatment plan. RN will continue to monitor BLE wounds and reinforce education.

## 2022-01-18 NOTE — PROGRESS NOTES
[facility]NEY     Renal Daily Progress Note:     Admission Date: 2022   Seen on dialysis. Awake and alert. Will need temp dialysis cath changed to Permcath      Subjective: Recurrent SVT. Underwent cardiac cath today:  Indication: Severe left ventricular systolic dysfunction and patient had a V. Tach.     Left main coronary artery is a very large caliber vessel and has no disease. LAD artery is a very large caliber vessel and proximal mid and distal segment has no stenosis and diagonal branches has no disease. Ramus intermedius is a medium caliber vessel without disease. Circumflex artery is a large-caliber vessel and proximal mid and distal segment and AV groove segment has no stenosis. Large obtuse marginal branch and posterolateral branch has no stenosis. Right coronary artery is a large-caliber vessel and does junction of proximal and mid segment there was a concern of stenosis and that therefore it was interrogated by IFR which turned out to be 0.95 and was not deemed critical enough to do intervention.     Left ventriculography is performed in the right anterior oblique view and ejection fraction is about 20 to 25% and distal anterior wall apex and distal inferior wall is near akinetic. This may be possible presentation of Takotsubo syndrome.     Plan: Patient will receive defibrillator    Fatigued but looks better. Mental status is good. He  looks better, less short of breath, not tachypneic. 1/15/22 Patient is being dialyzed. He did not have any complaints at present. Review of Systems  Pertinent items are noted in HPI.     Objective:     Visit Vitals  BP (P) 108/78   Pulse (!) (P) 54   Temp 99.5 °F (37.5 °C) (Oral)   Resp 18   Ht 6' (1.829 m)   Wt 77.1 kg (169 lb 15.6 oz)   SpO2 92%   BMI 23.05 kg/m²     Temp (24hrs), Av.3 °F (36.8 °C), Min:97.8 °F (36.6 °C), Max:99.5 °F (37.5 °C)        Intake/Output Summary (Last 24 hours) at 2022 1030  Last data filed at 2022 0800  Gross per 24 hour   Intake --   Output 0 ml   Net 0 ml     Current Facility-Administered Medications   Medication Dose Route Frequency    epoetin valente-epbx (RETACRIT) injection 10,000 Units  10,000 Units IntraVENous DIALYSIS TUE, THU & SAT    iron sucrose (VENOFER) injection 200 mg  200 mg IntraVENous DIALYSIS TUE, THU & SAT    oxyCODONE IR (ROXICODONE) tablet 5 mg  5 mg Oral Q8H PRN    sodium chloride (NS) flush 5-40 mL  5-40 mL IntraVENous Q8H    sodium chloride (NS) flush 5-40 mL  5-40 mL IntraVENous PRN    amiodarone (CORDARONE) tablet 400 mg  400 mg Oral BID    meropenem (MERREM) 1 g in 0.9% sodium chloride 20 mL IV syringe  1 g IntraVENous Q12H    ferrous sulfate tablet 325 mg  1 Tablet Oral BID WITH MEALS    heparin 25,000 units in  ml infusion  12-25 Units/kg/hr (Adjusted) IntraVENous TITRATE    heparin (porcine) 1,000 unit/mL injection 4,000 Units  4,000 Units IntraVENous PRN    Or    heparin (porcine) 1,000 unit/mL injection 2,000 Units  2,000 Units IntraVENous PRN    metoprolol succinate (TOPROL-XL) XL tablet 50 mg  50 mg Oral DAILY    nitroglycerin (NITROBID) 2 % ointment 0.5 Inch  0.5 Inch Topical Q6H    aspirin chewable tablet 81 mg  81 mg Oral DAILY    heparin (porcine) 1,000 unit/mL injection 2,800 Units  2,800 Units Hemodialysis DIALYSIS PRN       Physical Exam:General appearance: alert, cooperative, no distress, appears older than stated age. Head: Normocephalic, without obvious abnormality, atraumatic  Lungs: clear to auscultation bilaterally  Heart: regular rate and rhythm, no rub  Abdomen: soft, non-tender.  Bowel sounds normal. No masses,  no organomegaly  Extremities: venous stasis dermatitis noted, edema: decreased lower extremity edema and dependent thigh edema  Skin: Left lower leg with posterior ulcer inferior to calf  Neurologic: Grossly normal    Data Review:     LABS:  Recent Labs     01/17/22  0504 01/16/22  0349    138   K 4.2 3.8    103   CO2 27 27   BUN 40* 30*   CREA 4.70* 3.73*   CA 7.9* 7.5*   ALB 2.0* 2.1*   MG  --  2.3   Iron saturation 13%    Recent Labs     01/18/22  0441 01/17/22  0504   WBC 9.3 8.8   HGB 9.1* 9.2*   HCT 32.1* 32.5*    241     No results for input(s): HÉCTOR, KU, CLU, CREAU in the last 72 hours. No lab exists for component: PROU   Chest x-ray January 8, 2022:  IMPRESSION     Interval placement of a right supraclavicular approach dialysis catheter with  distal tip in the proximal right atrium.     Cardiomegaly with pulmonary vascular congestion and pulmonary edema.      Stable appearing basilar right lung pneumonia. Small right pleural effusion. Assessment:   Renal Specific Problems  Chronic kidney disease stage VI, started on HD. Hypoalbumin  SVT- recurrent  Volume overload- resolved  Metabolic acidosis- resolved  Hyperkalemia, now hypokalemia.   Anemia with renal failure and iron deficient  Leukocytosis with elevated but declining procalcitonin  Probable right lower lobe pneumonia  Left leg ulcer  Venous stasis dermatitis lower extremity          Plan:     Obtain/ Order: labs/cultures/radiology/procedures:      Therapeutic:     HD on Tuesday  Epogen once iron saturation greater than 20%, or after 2 doses of Venofer   IV iron with dialysis  Wound care - regarding left leg ulcer  Transition dialysis access to [facility]    Case management to arrange rehab and outpatient dialysis

## 2022-01-18 NOTE — DIALYSIS
Pt jared 3.5 hour hemodialysis well.  2 liters net fluid removed. Epogen 04430 units given IV. Venofer 200mg given IV.

## 2022-01-18 NOTE — PROGRESS NOTES
[facility] KIDNEY     Renal Daily Progress Note:     Admission Date: 2022   Events of weekend noted. Patient seen around 1430 hr. Awake and alert. Eating better but remains weak. D/w patient the need for short term rehab, he is agreeable, Subsequently d/w     Subjective: Recurrent SVT. Underwent cardiac cath today:  Indication: Severe left ventricular systolic dysfunction and patient had a V. Tach.     Left main coronary artery is a very large caliber vessel and has no disease. LAD artery is a very large caliber vessel and proximal mid and distal segment has no stenosis and diagonal branches has no disease. Ramus intermedius is a medium caliber vessel without disease. Circumflex artery is a large-caliber vessel and proximal mid and distal segment and AV groove segment has no stenosis. Large obtuse marginal branch and posterolateral branch has no stenosis. Right coronary artery is a large-caliber vessel and does junction of proximal and mid segment there was a concern of stenosis and that therefore it was interrogated by IFR which turned out to be 0.95 and was not deemed critical enough to do intervention.     Left ventriculography is performed in the right anterior oblique view and ejection fraction is about 20 to 25% and distal anterior wall apex and distal inferior wall is near akinetic. This may be possible presentation of Takotsubo syndrome.     Plan: Patient will receive defibrillator    Fatigued but looks better. Mental status is good. He  looks better, less short of breath, not tachypneic. 1/15/22 Patient is being dialyzed. He did not have any complaints at present. Review of Systems  Pertinent items are noted in HPI.     Objective:     Visit Vitals  /76 (BP 1 Location: Left upper arm, BP Patient Position: Sitting)   Pulse 68   Temp 98.3 °F (36.8 °C)   Resp 20   Ht 6' (1.829 m)   Wt 74.4 kg (164 lb 0.4 oz)   SpO2 97%   BMI 22.25 kg/m²     Temp (24hrs), Av.9 °F (36.6 °C), Min:97.4 °F (36.3 °C), Max:98.3 °F (36.8 °C)      No intake or output data in the 24 hours ending 01/17/22 9114  Current Facility-Administered Medications   Medication Dose Route Frequency    oxyCODONE IR (ROXICODONE) tablet 5 mg  5 mg Oral Q8H PRN    sodium chloride (NS) flush 5-40 mL  5-40 mL IntraVENous Q8H    sodium chloride (NS) flush 5-40 mL  5-40 mL IntraVENous PRN    amiodarone (CORDARONE) tablet 400 mg  400 mg Oral BID    meropenem (MERREM) 1 g in 0.9% sodium chloride 20 mL IV syringe  1 g IntraVENous Q12H    ferrous sulfate tablet 325 mg  1 Tablet Oral BID WITH MEALS    heparin 25,000 units in  ml infusion  12-25 Units/kg/hr (Adjusted) IntraVENous TITRATE    heparin (porcine) 1,000 unit/mL injection 4,000 Units  4,000 Units IntraVENous PRN    Or    heparin (porcine) 1,000 unit/mL injection 2,000 Units  2,000 Units IntraVENous PRN    metoprolol succinate (TOPROL-XL) XL tablet 50 mg  50 mg Oral DAILY    nitroglycerin (NITROBID) 2 % ointment 0.5 Inch  0.5 Inch Topical Q6H    aspirin chewable tablet 81 mg  81 mg Oral DAILY    heparin (porcine) 1,000 unit/mL injection 2,800 Units  2,800 Units Hemodialysis DIALYSIS PRN       Physical Exam:General appearance: alert, cooperative, no distress, appears older than stated age. Head: Normocephalic, without obvious abnormality, atraumatic  Lungs: clear to auscultation bilaterally  Heart: regular rate and rhythm, no rub  Abdomen: soft, non-tender.  Bowel sounds normal. No masses,  no organomegaly  Extremities: venous stasis dermatitis noted, edema: decreased lower extremity edema but has dependent thigh edema  Skin: Left lower leg with posterior ulcer inferior to calf  Neurologic: Grossly normal    Data Review:     LABS:  Recent Labs     01/17/22  0504 01/16/22  0349 01/15/22  0800    138 139   K 4.2 3.8 3.1*    103 104   CO2 27 27 26   BUN 40* 30* 49*   CREA 4.70* 3.73* 5.38*   CA 7.9* 7.5* 7.2*   ALB 2.0* 2.1* 1.8*   MG  -- 2.3  --    Iron saturation 13%    Recent Labs     01/17/22  0504   WBC 8.8   HGB 9.2*   HCT 32.5*        No results for input(s): HÉCTOR, KU, CLU, CREAU in the last 72 hours. No lab exists for component: PROU   Chest x-ray January 8, 2022:  IMPRESSION     Interval placement of a right supraclavicular approach dialysis catheter with  distal tip in the proximal right atrium.     Cardiomegaly with pulmonary vascular congestion and pulmonary edema.      Stable appearing basilar right lung pneumonia. Small right pleural effusion. Assessment:   Renal Specific Problems  Chronic kidney disease stage VI, started on HD. Hypoalbumin  SVT- recurrent  Volume overload- resolved  Metabolic acidosis- resolved  Hyperkalemia, now hypokalemia.   Anemia with renal failure and iron deficient  Leukocytosis with elevated but declining procalcitonin  Probable right lower lobe pneumonia  Left leg ulcer  Venous stasis dermatitis lower extremity          Plan:     Obtain/ Order: labs/cultures/radiology/procedures:      Therapeutic:     HD on Tuesday  Epogen if iron saturation greater than 20%   IV iron with dialysis  Wound care - regarding left leg ulcer    Case management to arrange rehab and outpatient dialysis

## 2022-01-19 LAB
APTT PPP: 137.5 SEC (ref 21.2–34.1)
APTT PPP: 68.1 SEC (ref 21.2–34.1)
BASOPHILS # BLD: 0.1 K/UL (ref 0–0.1)
BASOPHILS NFR BLD: 1 % (ref 0–1)
DIFFERENTIAL METHOD BLD: ABNORMAL
EOSINOPHIL # BLD: 0.2 K/UL (ref 0–0.4)
EOSINOPHIL NFR BLD: 2 % (ref 0–7)
ERYTHROCYTE [DISTWIDTH] IN BLOOD BY AUTOMATED COUNT: 15.1 % (ref 11.5–14.5)
HCT VFR BLD AUTO: 28.6 % (ref 36.6–50.3)
HGB BLD-MCNC: 8.3 G/DL (ref 12.1–17)
IMM GRANULOCYTES # BLD AUTO: 0.2 K/UL (ref 0–0.04)
IMM GRANULOCYTES NFR BLD AUTO: 2 % (ref 0–0.5)
LYMPHOCYTES # BLD: 0.6 K/UL (ref 0.8–3.5)
LYMPHOCYTES NFR BLD: 8 % (ref 12–49)
MCH RBC QN AUTO: 29.7 PG (ref 26–34)
MCHC RBC AUTO-ENTMCNC: 29 G/DL (ref 30–36.5)
MCV RBC AUTO: 102.5 FL (ref 80–99)
MONOCYTES # BLD: 1.1 K/UL (ref 0–1)
MONOCYTES NFR BLD: 14 % (ref 5–13)
NEUTS SEG # BLD: 5.8 K/UL (ref 1.8–8)
NEUTS SEG NFR BLD: 73 % (ref 32–75)
NRBC # BLD: 0 K/UL (ref 0–0.01)
NRBC BLD-RTO: 0 PER 100 WBC
PLATELET # BLD AUTO: 243 K/UL (ref 150–400)
PMV BLD AUTO: 10.1 FL (ref 8.9–12.9)
RBC # BLD AUTO: 2.79 M/UL (ref 4.1–5.7)
THERAPEUTIC RANGE,PTTT: ABNORMAL SEC (ref 82–109)
THERAPEUTIC RANGE,PTTT: ABNORMAL SEC (ref 82–109)
WBC # BLD AUTO: 7.9 K/UL (ref 4.1–11.1)

## 2022-01-19 PROCEDURE — 74011250637 HC RX REV CODE- 250/637: Performed by: INTERNAL MEDICINE

## 2022-01-19 PROCEDURE — 97530 THERAPEUTIC ACTIVITIES: CPT

## 2022-01-19 PROCEDURE — 65270000029 HC RM PRIVATE

## 2022-01-19 PROCEDURE — 74011000250 HC RX REV CODE- 250: Performed by: INTERNAL MEDICINE

## 2022-01-19 PROCEDURE — 97116 GAIT TRAINING THERAPY: CPT

## 2022-01-19 PROCEDURE — 99232 SBSQ HOSP IP/OBS MODERATE 35: CPT | Performed by: INTERNAL MEDICINE

## 2022-01-19 PROCEDURE — 85730 THROMBOPLASTIN TIME PARTIAL: CPT

## 2022-01-19 PROCEDURE — 97110 THERAPEUTIC EXERCISES: CPT

## 2022-01-19 PROCEDURE — 74011250636 HC RX REV CODE- 250/636: Performed by: FAMILY MEDICINE

## 2022-01-19 PROCEDURE — 36415 COLL VENOUS BLD VENIPUNCTURE: CPT

## 2022-01-19 PROCEDURE — 85025 COMPLETE CBC W/AUTO DIFF WBC: CPT

## 2022-01-19 PROCEDURE — 74011250636 HC RX REV CODE- 250/636: Performed by: INTERNAL MEDICINE

## 2022-01-19 RX ADMIN — ASPIRIN 81 MG CHEWABLE TABLET 81 MG: 81 TABLET CHEWABLE at 09:26

## 2022-01-19 RX ADMIN — SODIUM CHLORIDE, PRESERVATIVE FREE 1 G: 5 INJECTION INTRAVENOUS at 11:13

## 2022-01-19 RX ADMIN — HEPARIN SODIUM 2000 UNITS: 1000 INJECTION INTRAVENOUS; SUBCUTANEOUS at 09:19

## 2022-01-19 RX ADMIN — NITROGLYCERIN 0.5 INCH: 20 OINTMENT TOPICAL at 06:14

## 2022-01-19 RX ADMIN — AMIODARONE HYDROCHLORIDE 400 MG: 200 TABLET ORAL at 19:57

## 2022-01-19 RX ADMIN — Medication 18 UNITS/KG/HR: at 11:52

## 2022-01-19 RX ADMIN — FERROUS SULFATE TAB 325 MG (65 MG ELEMENTAL FE) 325 MG: 325 (65 FE) TAB at 09:26

## 2022-01-19 RX ADMIN — SODIUM CHLORIDE, PRESERVATIVE FREE 10 ML: 5 INJECTION INTRAVENOUS at 13:05

## 2022-01-19 RX ADMIN — SODIUM CHLORIDE, PRESERVATIVE FREE 10 ML: 5 INJECTION INTRAVENOUS at 20:03

## 2022-01-19 RX ADMIN — AMIODARONE HYDROCHLORIDE 400 MG: 200 TABLET ORAL at 09:26

## 2022-01-19 RX ADMIN — SODIUM CHLORIDE, PRESERVATIVE FREE 10 ML: 5 INJECTION INTRAVENOUS at 06:14

## 2022-01-19 RX ADMIN — FERROUS SULFATE TAB 325 MG (65 MG ELEMENTAL FE) 325 MG: 325 (65 FE) TAB at 17:21

## 2022-01-19 RX ADMIN — Medication 18 UNITS/KG/HR: at 09:19

## 2022-01-19 NOTE — PROGRESS NOTES
Updated clinicals sent to [facility]. Awaiting acceptance. Patient now has 2 days of IV abx left. Patient set up with [facility] dialysis in [facility] am at discharge.

## 2022-01-19 NOTE — PROGRESS NOTES
Progress Note    Octavio Chavez MD             Daily Progress Note: 1/19/2022      Subjective: The patient is seen for follow  up.    Offers no complaints  Problem List:  Problem List as of 1/19/2022 Never Reviewed          Codes Class Noted - Resolved    Renal failure ICD-10-CM: N19  ICD-9-CM: 154  1/8/2022 - Present        Hyperkalemia ICD-10-CM: E87.5  ICD-9-CM: 276.7  1/8/2022 - Present        Pulmonary edema ICD-10-CM: J81.1  ICD-9-CM: 834  7/13/2021 - Present        GI bleed ICD-10-CM: K92.2  ICD-9-CM: 578.9  1/5/2021 - Present              Medications reviewed  Current Facility-Administered Medications   Medication Dose Route Frequency    [START ON 1/21/2022] iron sucrose (VENOFER) injection 200 mg  200 mg IntraVENous DIALYSIS MON, WED & FRI    [START ON 1/21/2022] epoetin valente-epbx (RETACRIT) injection 10,000 Units  10,000 Units IntraVENous DIALYSIS MON, WED & FRI    oxyCODONE IR (ROXICODONE) tablet 5 mg  5 mg Oral Q8H PRN    sodium chloride (NS) flush 5-40 mL  5-40 mL IntraVENous Q8H    sodium chloride (NS) flush 5-40 mL  5-40 mL IntraVENous PRN    amiodarone (CORDARONE) tablet 400 mg  400 mg Oral BID    meropenem (MERREM) 1 g in 0.9% sodium chloride 20 mL IV syringe  1 g IntraVENous Q12H    ferrous sulfate tablet 325 mg  1 Tablet Oral BID WITH MEALS    heparin 25,000 units in  ml infusion  12-25 Units/kg/hr (Adjusted) IntraVENous TITRATE    heparin (porcine) 1,000 unit/mL injection 4,000 Units  4,000 Units IntraVENous PRN    Or    heparin (porcine) 1,000 unit/mL injection 2,000 Units  2,000 Units IntraVENous PRN    metoprolol succinate (TOPROL-XL) XL tablet 50 mg  50 mg Oral DAILY    nitroglycerin (NITROBID) 2 % ointment 0.5 Inch  0.5 Inch Topical Q6H    aspirin chewable tablet 81 mg  81 mg Oral DAILY    heparin (porcine) 1,000 unit/mL injection 2,800 Units  2,800 Units Hemodialysis DIALYSIS PRN       Review of Systems:   A comprehensive review of systems was negative except for that written in the HPI. Objective:   Physical Exam:     Visit Vitals  BP 94/62 (BP 1 Location: Left upper arm, BP Patient Position: Sitting)   Pulse (!) 51   Temp 98.5 °F (36.9 °C)   Resp 18   Ht 6' (1.829 m)   Wt 77.1 kg (169 lb 15.6 oz)   SpO2 100%   BMI 23.05 kg/m²    O2 Flow Rate (L/min): 2 l/min O2 Device: Nasal cannula    Temp (24hrs), Av °F (37.2 °C), Min:98.5 °F (36.9 °C), Max:99.4 °F (37.4 °C)    No intake/output data recorded.  1901 -  0700  In: -   Out:      General:  Alert, cooperative, no distress, appears stated age. Lungs:   Clear to auscultation bilaterally. Chest wall:  No tenderness or deformity. Heart:  Regular rate and rhythm, S1, S2 normal, no murmur, click, rub or gallop. Abdomen:   Soft, non-tender. Bowel sounds normal. No masses,  No organomegaly. Extremities: Extremities normal, atraumatic, no cyanosis or edema. Pulses: 2+ and symmetric all extremities. Skin: Skin color, texture, turgor normal.  Multiple ulcers bilaterally on the legs healing well   Neurologic: CNII-XII intact. No gross sensory or motor deficits     Data Review:       Recent Days:  Recent Labs     22  0750 22  0441 22  0504   WBC 7.9 9.3 8.8   HGB 8.3* 9.1* 9.2*   HCT 28.6* 32.1* 32.5*    259 241     Recent Labs     22  0504      K 4.2      CO2 27   GLU 98   BUN 40*   CREA 4.70*   CA 7.9*   ALB 2.0*   TBILI 0.4   ALT 16     No results for input(s): PH, PCO2, PO2, HCO3, FIO2 in the last 72 hours. Results     Procedure Component Value Units Date/Time    CULTURE, Mala Tenaladarius STAIN [[id number]]  (Susceptibility) Collected: 22 0400    Order Status: Completed Specimen: Misc.  Wound Updated: 2214     Special Requests: No Special Requests        GRAM STAIN Rare WBCs seen               Occasional Gram Positive Cocci in pairs                  Rare apparent Gram Negative Rods           Culture result:       Heavy Staphylococcus aureus Heavy Alcaligenes faecalis                  Light Klebsiella pneumoniae            Light Proteus mirabilis       Susceptibility      Staphylococcus aureus     SHIRA     Ciprofloxacin ($) Susceptible     Clindamycin ($) Susceptible     Daptomycin ($$$$$) Susceptible     Doxycycline ($$) Susceptible     Erythromycin ($$$$) Susceptible     Gentamicin ($) Susceptible     Levofloxacin ($) Susceptible     Linezolid ($$$$$) Susceptible     Moxifloxacin ($$$$) Susceptible     Oxacillin Susceptible     Rifampin ($$$$) Susceptible  [1]      Tetracycline Susceptible     Trimeth/Sulfa Susceptible     Vancomycin ($) Susceptible                 [1]  Rifampin is not to be used for mono-therapy.           Linear View               Susceptibility      Alcaligenes faecalis     SHIRA     Amikacin ($) Susceptible     Cefepime ($$) Susceptible     Ceftazidime ($) Susceptible     Ceftriaxone ($) Susceptible     Ciprofloxacin ($) Susceptible     Gentamicin ($) Susceptible     Levofloxacin ($) Susceptible     Meropenem ($$) Susceptible     Piperacillin/Tazobac ($) Resistant     Tobramycin ($) Susceptible     Trimeth/Sulfa Susceptible                  Linear View               Susceptibility      Klebsiella pneumoniae     SHIRA     Amikacin ($) Susceptible     Ampicillin ($) Resistant     Ampicillin/sulbactam ($) Susceptible     Cefazolin ($) Susceptible     Cefepime ($$) Susceptible     Cefoxitin Susceptible     Ceftazidime ($) Susceptible     Ceftriaxone ($) Susceptible     Ciprofloxacin ($) Susceptible     Gentamicin ($) Susceptible     Levofloxacin ($) Susceptible     Meropenem ($$) Susceptible     Piperacillin/Tazobac ($) Susceptible     Tobramycin ($) Susceptible     Trimeth/Sulfa Susceptible                  Linear View               Susceptibility      Proteus mirabilis     SHIRA     Amikacin ($) Susceptible     Ampicillin ($) Susceptible     Ampicillin/sulbactam ($) Susceptible     Cefazolin ($) Susceptible     Cefepime ($$) Susceptible     Cefoxitin Susceptible     Ceftazidime ($) Susceptible     Ceftriaxone ($) Susceptible     Ciprofloxacin ($) Susceptible     Gentamicin ($) Susceptible     Levofloxacin ($) Susceptible     Meropenem ($$) Susceptible     Piperacillin/Tazobac ($) Susceptible     Tobramycin ($) Susceptible     Trimeth/Sulfa Susceptible                  Linear View                   MRSA SCREEN - PCR (NASAL) [[id number]] Collected: 01/08/22 0223    Order Status: Completed Specimen: Swab Updated: 01/08/22 0516     MRSA by PCR, Nasal Not Detected       COVID-19 RAPID TEST [[id number]] Collected: 01/07/22 2313    Order Status: Completed Specimen: Nasopharyngeal Updated: 01/08/22 0010     Specimen source       Please find results under separate order           COVID-19 rapid test Not Detected        Comment: Rapid Abbott ID Now   Rapid NAAT:  The specimen is NEGATIVE for SARS-CoV-2, the novel coronavirus associated with COVID-19. Negative results should be treated as presumptive and, if inconsistent with clinical signs and symptoms or necessary for patient management, should be tested with an alternative molecular assay. Negative results do not preclude SARS-CoV-2 infection and should not be used as the sole basis for patient management decisions. This test has been authorized by the FDA under   an Emergency Use Authorization (EUA) for use by authorized laboratories.  Fact sheet for Healthcare Providers: ConventionUpdate.co.nz Fact sheet for Patients: ConventionUpdate.co.nz   Methodology: Isothermal Nucleic Acid Amplification              24 Hour Results:  Recent Results (from the past 24 hour(s))   PTT    Collection Time: 01/18/22  3:30 PM   Result Value Ref Range    aPTT 64.0 (H) 21.2 - 34.1 sec    aPTT, therapeutic range   82 - 109 sec   PTT    Collection Time: 01/18/22  9:36 PM   Result Value Ref Range    aPTT 63.0 (H) 21.2 - 34.1 sec    aPTT, therapeutic range   sec   CBC WITH AUTOMATED DIFF    Collection Time: 01/19/22  7:50 AM   Result Value Ref Range    WBC 7.9 4.1 - 11.1 K/uL    RBC 2.79 (L) 4.10 - 5.70 M/uL    HGB 8.3 (L) 12.1 - 17.0 g/dL    HCT 28.6 (L) 36.6 - 50.3 %    .5 (H) 80.0 - 99.0 FL    MCH 29.7 26.0 - 34.0 PG    MCHC 29.0 (L) 30.0 - 36.5 g/dL    RDW 15.1 (H) 11.5 - 14.5 %    PLATELET 661 307 - 026 K/uL    MPV 10.1 8.9 - 12.9 FL    NRBC 0.0 0.0  WBC    ABSOLUTE NRBC 0.00 0.00 - 0.01 K/uL    NEUTROPHILS 73 32 - 75 %    LYMPHOCYTES 8 (L) 12 - 49 %    MONOCYTES 14 (H) 5 - 13 %    EOSINOPHILS 2 0 - 7 %    BASOPHILS 1 0 - 1 %    IMMATURE GRANULOCYTES 2 (H) 0 - 0.5 %    ABS. NEUTROPHILS 5.8 1.8 - 8.0 K/UL    ABS. LYMPHOCYTES 0.6 (L) 0.8 - 3.5 K/UL    ABS. MONOCYTES 1.1 (H) 0.0 - 1.0 K/UL    ABS. EOSINOPHILS 0.2 0.0 - 0.4 K/UL    ABS. BASOPHILS 0.1 0.0 - 0.1 K/UL    ABS. IMM. GRANS. 0.2 (H) 0.00 - 0.04 K/UL    DF AUTOMATED     PTT    Collection Time: 01/19/22  7:50 AM   Result Value Ref Range    aPTT 68.1 (H) 21.2 - 34.1 sec    aPTT, therapeutic range   82 - 109 sec       DUPLEX LOW EXT ARTERIES WITH JAVON   Final Result      XR CHEST PORT   Final Result      Interval placement of a right supraclavicular approach dialysis catheter with   distal tip in the proximal right atrium. Cardiomegaly with pulmonary vascular congestion and pulmonary edema. Stable appearing basilar right lung pneumonia. Small right pleural effusion. IR INSERT NON TUNL CVC OVER 5 YRS   Final Result      Technically successful insertion of non-tunneled Trialysis catheter with US   guidance. Plan:       Post catheter placement chest xray confirmed appropriate position of the   catheter.  The catheter is appropriate for immediate use.   _______________________________________________________________      PROCEDURE SUMMARY:   - Venous access with ultrasound guidance   - Non-tunneled central venous catheter insertion      PROCEDURE DETAILS:      Pre-procedure   Relevant imaging review: None    Informed consent for the procedure was obtained and time-out was performed prior   to the procedure. Prophylactic antibiotic administered: Not administered   Preparation: The site was prepared and draped using all elements of maximal   sterile barrier technique including sterile gloves, sterile gown, cap, mask,   large sterile sheet, sterile ultrasound probe cover, sterile ultrasound gel,   hand hygiene and cutaneous antisepsis with 2% chlorhexidine. Medical reason for site preparation exception: Not applicable      Anesthesia/sedation   Level of anesthesia: No sedation   Anesthesia administered by: Not applicable   Duration of anesthesia/sedation: 0 minutes. Access   The vessel was sonographically evaluated and judged to be patent and appropriate   for access and a permanent image was stored. Three mLs of 1% Lidocaine was   administered to the access site. Real time ultrasound was used to visualize   needle entry into the vessel. Vein accessed: Right internal jugular vein   Access technique: 4F micropuncture set      Catheter placement   The access site was dilated and the catheter was placed into the vein over a   wire and all guidewires were removed in their entirety. Catheter ports were   aspirated and flushed. Catheter tip location was verified by portable X-ray. Catheter size:13 Greek   Catheter length: 20 cm   Catheter tip position: Proximal right atrium   Catheter flush: Heparin (100 units/mL)      Closure   The catheter was secured. A sterile dressing was applied.    Catheter securement technique: Non-absorbable suture      Additional Medications   None      Additional Details   Estimated blood loss:  Less than 1 mL   Additional description of procedure: None   Additional findings: None   Additional equipment: None   Specimens removed: None                     IR US GUIDED VASCULAR ACCESS   Final Result      Technically successful insertion of non-tunneled Trialysis catheter with US   guidance. Plan:       Post catheter placement chest xray confirmed appropriate position of the   catheter. The catheter is appropriate for immediate use.   _______________________________________________________________      PROCEDURE SUMMARY:   - Venous access with ultrasound guidance   - Non-tunneled central venous catheter insertion      PROCEDURE DETAILS:      Pre-procedure   Relevant imaging review: None    Informed consent for the procedure was obtained and time-out was performed prior   to the procedure. Prophylactic antibiotic administered: Not administered   Preparation: The site was prepared and draped using all elements of maximal   sterile barrier technique including sterile gloves, sterile gown, cap, mask,   large sterile sheet, sterile ultrasound probe cover, sterile ultrasound gel,   hand hygiene and cutaneous antisepsis with 2% chlorhexidine. Medical reason for site preparation exception: Not applicable      Anesthesia/sedation   Level of anesthesia: No sedation   Anesthesia administered by: Not applicable   Duration of anesthesia/sedation: 0 minutes. Access   The vessel was sonographically evaluated and judged to be patent and appropriate   for access and a permanent image was stored. Three mLs of 1% Lidocaine was   administered to the access site. Real time ultrasound was used to visualize   needle entry into the vessel. Vein accessed: Right internal jugular vein   Access technique: 4F micropuncture set      Catheter placement   The access site was dilated and the catheter was placed into the vein over a   wire and all guidewires were removed in their entirety. Catheter ports were   aspirated and flushed. Catheter tip location was verified by portable X-ray. Catheter size:13 Egyptian   Catheter length: 20 cm   Catheter tip position: Proximal right atrium   Catheter flush: Heparin (100 units/mL)      Closure   The catheter was secured. A sterile dressing was applied. Catheter securement technique: Non-absorbable suture      Additional Medications   None      Additional Details   Estimated blood loss:  Less than 1 mL   Additional description of procedure: None   Additional findings: None   Additional equipment: None   Specimens removed: None                     XR CHEST PORT   Final Result   Airspace opacity and effusion at the right lung base and in the   acute setting would suggest pneumonia and/or aspiration however the patient also   appears to have baseline/background vascular congestion and/or pulmonary   arterial hypertension, noting cardiomegaly and/or pericardial effusion      IR INSERT NON TUNL CVC OVER 5 YRS    (Results Pending)   IR INSERT TUNL CVC W/O PORT OVER 5 YR    (Results Pending)        Assessment: Pneumonia  Bilateral leg cellulitis with ulcers  Chronic renal failure on dialysis now  Cardiac dysrhythmia  CHF        Plan: We will continue current treatment patient is on IV antibiotics as well as on hemodialysis        Care Plan discussed with: Patient/Family    Total time spent with patient: 30 minutes.     Jose Parra MD

## 2022-01-19 NOTE — PROGRESS NOTES
Problem: Mobility Impaired (Adult and Pediatric)  Goal: *Acute Goals and Plan of Care (Insert Text)  Description: Pt will be I with LE HEP in 7 days. Pt will perform bed mobility with mod I in 7 days. Pt will perform transfers with mod I in 7 days. Pt will amb  feet with LRAD safely with mod I in 7 days. Outcome: Progressing Towards Goal   PHYSICAL THERAPY TREATMENT  Patient: Martha Issa (35 y.o. male)  Date: 1/19/2022  Diagnosis: Renal failure [N19]  Hyperkalemia [E87.5] <principal problem not specified>  Procedure(s) (LRB):  LEFT HEART CATH / CORONARY ANGIOGRAPHY W GRAFTS (N/A)  PERCUTANEOUS CORONARY INTERVENTION (N/A) 6 Days Post-Op  Precautions:    Chart, physical therapy assessment, plan of care and goals were reviewed. ASSESSMENT  Patient continues with skilled PT services and is progressing towards goals. Pt received seated in chair, pleasant and eager to have Tx, stating \"I want to get out here, let's do this! \" Pt transferred sit>stand with min A and amb 28 ft using the FWW with CGA and cues to increase step height and maintain appropriate JOANNA when turning. Pt sat back in chair with min A and completed LE ex (see chart below) without difficulty but needing cues for proper execution, with verbal, visual and at times tactile cues. Pt was left seated in chair in NAD, call bell within reach, all needs addressed. Current Level of Function Impacting Discharge (mobility/balance): assist x 1, balance deficits    Other factors to consider for discharge: support/help available, decreased strength, activity tolerance and balance, safety, severity of impairments         PLAN :  Patient continues to benefit from skilled intervention to address the above impairments. Continue treatment per established plan of care. to address goals.     Recommendation for discharge: (in order for the patient to meet his/her long term goals)  Ramsey Sigala    This discharge recommendation:  Has been made in collaboration with the attending provider and/or case management    IF patient discharges home will need the following DME: to be determined (TBD)       SUBJECTIVE:   Patient stated I want to get out here, let's do this! Nicole Battles    OBJECTIVE DATA SUMMARY:   Critical Behavior:  Neurologic State: Alert  Orientation Level: Oriented X4  Cognition: Follows commands     FunctioTransfers:  Sit to Stand: Minimum assistance  Stand to Sit: Minimum assistance     Balance:  Sitting: Intact  Standing: Impaired; Without support  Standing - Static: Good;Constant support  Standing - Dynamic : Fair;Constant support    Ambulation/Gait Training:  Distance (ft): 28 Feet (ft)  Assistive Device: Walker, rolling;Gait belt  Ambulation - Level of Assistance: Contact guard assistance     Therapeutic Exercises:       EXERCISE   Sets   Reps   Active Active Assist   Passive Self ROM   Comments   Ankle Pumps 1 20 [x] [] [] []    Hip abd/add 1 20 [x] [] [] []    Long Arc Quads 1 20 [x] [] [] []    Marching 1 20 [x] [] [] []       Pain Ratin/10    Activity Tolerance:   Fair  Please refer to the flowsheet for vital signs taken during this treatment. After treatment patient left in no apparent distress:   Sitting in chair and Call bell within reach    COMMUNICATION/COLLABORATION:   The patients plan of care was discussed with: Registered nurse.      Maria Esther Stallings PTA   Time Calculation: 24 mins

## 2022-01-19 NOTE — PROGRESS NOTES
Problem: Self Care Deficits Care Plan (Adult)  Goal: *Acute Goals and Plan of Care (Insert Text)  Description: 1. Pt will be mod I sup <> sit in prep for EOB ADLs  2. Pt will be mod I grooming standing at sink  3. Pt will be min A LE dressing sitting EOB/long sit  4. Pt will be mod I sit <>  prep for toileting LRAD  5. Pt will be mod I toileting/toilet transfer/cloth mgmt LRAD  6. Pt will be IND following UE HEP in prep for self care tasks      Outcome: Progressing Towards Goal   OCCUPATIONAL THERAPY TREATMENT  Patient: Akhil Freeman (73 y.o. male)  Date: 1/19/2022  Diagnosis: Renal failure [N19]  Hyperkalemia [E87.5] <principal problem not specified>  Procedure(s) (LRB):  LEFT HEART CATH / CORONARY ANGIOGRAPHY W GRAFTS (N/A)  PERCUTANEOUS CORONARY INTERVENTION (N/A) 6 Days Post-Op  Precautions: Fall  Chart, occupational therapy assessment, plan of care, and goals were reviewed. ASSESSMENT  Patient continues with skilled OT services and is progressing towards goals. Patient received seated in chair upon MELCHOR'S arrival, on  2L of oxygen via NC and agreeable to work with therapist. Pt declined sink level grooming at this time. Seated in chair, pt required set-up for grooming (oral/facial care and washed hands) 2/2 decreased activity tolerance. IND for navid socks crossing legs, seated. Patient educated on and completed bilateral UE HEP to increase strength/endurance needed for ADL'S and functional transfers, see grid below. Pt left resting in chair with call bell. Patient would benefit from continued skilled Occupational services while at [facility] in order to increase safety and independence with self care and functional tranfers/mobility. Recommend at discharge to SNF vs HHOT when medically appropriate.     Current Level of Function Impacting Discharge (ADLs):  Set-up for grooming and IND for LB dressing      Other factors to consider for discharge: Time of onset, medical prognosis/diagnosis, severity of deficits, PLOF, home environment, and family support         PLAN :  Patient continues to benefit from skilled intervention to address the above impairments. Continue treatment per established plan of care. to address goals. Recommend with staff: seated in chair for grooming    Recommend next OT session: toileting tf    Recommendation for discharge: (in order for the patient to meet his/her long term goals)  SNF vs HHOT    This discharge recommendation:  Has been made in collaboration with the attending provider and/or case management    IF patient discharges home will need the following DME: TBD       SUBJECTIVE:   Patient stated Don't know if I can.     OBJECTIVE DATA SUMMARY:   Cognitive/Behavioral Status:  Neurologic State: Alert  Orientation Level: Oriented X4  Cognition: Follows commands             Functional Mobility and Transfers for ADLs:  Bed Mobility:       Transfers:  Sit to Stand: Minimum assistance          Balance:  Sitting: Intact  Standing: Impaired; Without support  Standing - Static: Good;Constant support  Standing - Dynamic : Fair;Constant support    ADL Intervention:       Grooming  Grooming Assistance: Set-up  Position Performed: Seated in chair  Washing Face: Set-up  Washing Hands: Set-up  Brushing Teeth: Set-up           Lower Body Dressing Assistance  Dressing Assistance: Independent  Socks: Independent  Leg Crossed Method Used: Yes  Position Performed: Seated in chair              Therapeutic Exercises:   Exercise Sets Reps AROM AAROM PROM Self PROM Comments   Shoulder flex/ext 3 10 [x] [] [] [] Seted in chair. Fausto UE WNL   Elbow flex/ext 3 10 [x] [] [] []    Chest press 3 10 [x] [] [] []    Ceiling punches 3 10 [x] [] [] []         Pain:  0/10    Activity Tolerance:   Fair  Please refer to the flowsheet for vital signs taken during this treatment.     After treatment patient left in no apparent distress:   Sitting in chair with call bell    COMMUNICATION/COLLABORATION:   The patients plan of care was discussed with: Registered nurse.      JILL Romero  Time Calculation: 23 mins

## 2022-01-19 NOTE — PROGRESS NOTES
Progress Note      1/19/2022 12:19 PM  NAME: Martha Issa   MRN:  [id number]   Admit Diagnosis: Renal failure [N19]  Hyperkalemia [E87.5]      Subjective:   Chart reviewed. Patient is on dialysis. Symptoms of shortness of breath has improved. Patient had frequent palpitations during the night and apparently a nurse thought it was SVT and was given a Adenocard however EKG is concerning for ventricular tachycardia. With the patient and her with the nurse. Cardiac catheterization findings discussed with the patient and also discussed with the electrophysiologist.    Review of Systems:    Symptom Y/N Comments  Symptom Y/N Comments   Fever/Chills n   Chest Pain n    Poor Appetite    Edema y    Cough    Abdominal Pain     Sputum    Joint Pain     SOB/CARMONA y  improving  Pruritis/Rash     Nausea/vomit    Other     Diarrhea         Constipation           Could NOT obtain due to:      Objective:          Physical Exam:    Last 24hrs VS reviewed since prior progress note. Most recent are:    Visit Vitals  /74 (BP 1 Location: Right upper arm, BP Patient Position: At rest)   Pulse (!) 55   Temp 99 °F (37.2 °C)   Resp 18   Ht 6' (1.829 m)   Wt 77.1 kg (169 lb 15.6 oz)   SpO2 100%   BMI 23.05 kg/m²       Intake/Output Summary (Last 24 hours) at 1/19/2022 1012  Last data filed at 1/18/2022 1130  Gross per 24 hour   Intake --   Output 2000 ml   Net -2000 ml        General Appearance: Well developed, well nourished, alert & oriented x 3,    no acute distress. Ears/Nose/Mouth/Throat: Hearing grossly normal.  Neck: Supple. Chest: Lungs clear to auscultation bilaterally. Cardiovascular: Regular rate and rhythm, S1,S2 normal, no murmur. Abdomen: Soft, non-tender, bowel sounds are active. Extremities: Patient has ulcers of the lower extremities  Skin: Warm and dry. []         Post-cath site without hematoma, bruit, tenderness, or thrill. Distal pulses intact.     PMH/SH reviewed - no change compared to H&P    Data Review    Telemetry: normal sinus rhythm , patient has some PVCs, and episode of wide-complex tachycardia possible ventricular tachycardia. EKG:   Patient had EKG that is very concerning for ventricular tachycardia. Lab Data Personally Reviewed:    Recent Labs     01/19/22  0750 01/18/22  0441   WBC 7.9 9.3   HGB 8.3* 9.1*   HCT 28.6* 32.1*    259     Recent Labs     01/19/22  0750 01/18/22  2136 01/18/22  1530   APTT 68.1* 63.0* 64.0*      Recent Labs     01/17/22  0504      K 4.2      CO2 27   BUN 40*   CREA 4.70*   GLU 98   CA 7.9*     No results for input(s): CPK, CKNDX, TROIQ in the last 72 hours. No lab exists for component: CPKMB  No results found for: CHOL, CHOLX, CHLST, CHOLV, HDL, HDLP, LDL, LDLC, DLDLP, TGLX, TRIGL, TRIGP, CHHD, CHHDX    Recent Labs     01/17/22  0504   AP 92   TP 5.8*   ALB 2.0*   GLOB 3.8     No results for input(s): PH, PCO2, PO2 in the last 72 hours.     Medications Personally Reviewed:    Current Facility-Administered Medications   Medication Dose Route Frequency    epoetin valente-epbx (RETACRIT) injection 10,000 Units  10,000 Units IntraVENous DIALYSIS TUE, THU & SAT    iron sucrose (VENOFER) injection 200 mg  200 mg IntraVENous DIALYSIS TUE, THU & SAT    oxyCODONE IR (ROXICODONE) tablet 5 mg  5 mg Oral Q8H PRN    sodium chloride (NS) flush 5-40 mL  5-40 mL IntraVENous Q8H    sodium chloride (NS) flush 5-40 mL  5-40 mL IntraVENous PRN    amiodarone (CORDARONE) tablet 400 mg  400 mg Oral BID    meropenem (MERREM) 1 g in 0.9% sodium chloride 20 mL IV syringe  1 g IntraVENous Q12H    ferrous sulfate tablet 325 mg  1 Tablet Oral BID WITH MEALS    heparin 25,000 units in  ml infusion  12-25 Units/kg/hr (Adjusted) IntraVENous TITRATE    heparin (porcine) 1,000 unit/mL injection 4,000 Units  4,000 Units IntraVENous PRN    Or    heparin (porcine) 1,000 unit/mL injection 2,000 Units  2,000 Units IntraVENous PRN    metoprolol succinate (TOPROL-XL) XL tablet 50 mg  50 mg Oral DAILY    nitroglycerin (NITROBID) 2 % ointment 0.5 Inch  0.5 Inch Topical Q6H    aspirin chewable tablet 81 mg  81 mg Oral DAILY    heparin (porcine) 1,000 unit/mL injection 2,800 Units  2,800 Units Hemodialysis DIALYSIS PRN           Problem List:   Patient has a acute on chronic renal failure and had a dialysis and is clinically better. Decompensated heart failure and ejection fraction is depressed and patient has a at least moderate possibly severe pulmonary hypertension. History of paroxysmal atrial fibrillation. Recent DVT of her left axillary vein. Wide-complex tachycardia probably ventricular tachycardia. Catheterization did not show evidence of coronary artery disease and ejection fraction was about 20 to 25%. Patient is getting restless and wants to leave. 1.      Assessment/Plan:   I will continue dialysis as per recommendations of the nephrologist.  Patient is offered ICD by Dr. José Luis Celis however patient does not want it. Meanwhile I will start the patient on amiodarone. Vascular surgery note reviewed and appreciated and will follow the recommendations. We will continue amiodarone and present care. Patient will be going for cardiac rehab. Thank you. 1.          []       High complexity decision making was performed in this patient at high risk for decompensation with multiple organ involvement.     Sania Manzo MD

## 2022-01-19 NOTE — PROGRESS NOTES
Progress Note    Patient: Martínez Vance MRN: [id number]  SSN: [id number]    YOB: 1955  Age: 78 y.o. Sex: male      Admit Date: 1/7/2022    LOS: 11 days     Subjective:   Patient followed for sepsis with cellulitis both calves and aspiration pneumonia. Leg wound culture grew multiple organisms as shown below. Low grade temperatures with normal WBC. He has allowed Staff to perform dressing changes on his legs. Patient sitting up in bedside chair, complaining that his legs have been in pain since the dressings were changed. Objective:     Vitals:    01/18/22 2115 01/19/22 0000 01/19/22 0411 01/19/22 0858   BP: 107/70 99/65 112/78 102/74   Pulse: 71 (!) 58 64 (!) 55   Resp: 18 18 18 18   Temp: 99.4 °F (37.4 °C) 99.2 °F (37.3 °C) 98.7 °F (37.1 °C) 99 °F (37.2 °C)   TempSrc:       SpO2:  100% 94% 100%   Weight:       Height:            Intake and Output:  Current Shift: No intake/output data recorded. Last three shifts: 01/17 1901 - 01/19 0700  In: -   Out: 2000     Physical Exam:    Vitals and nursing note reviewed. Constitutional:       Appearance: He is ill-appearing. Genitourinary:     Comments: External urinary catheter  Musculoskeletal:      Right lower leg: Edema present. Left lower leg: Edema present. Comments: Both legs once again with bulky gauze dressings  Skin:         Neurological: nonfocal, mild confusion  Psychiatric:         Behavior: Behavior normal.     Lab/Data Review:     WBC 7,900    CRP 2.63 <2.53 < 5.30 <6.32 <8.19 <12.10 <12.10  Procal 0.70 <0.84 < 2.39 <3.12 < 4.41 <4.39 <5.16    MRSA nasal PCR negative    Wound cultures leg (1/8)  Staphylococcus aureus (MSSA),  Alcaligenes faecalis resistant to Zosyn, Klebsiella pneumoniae      Assessment:     Active Problems:    Renal failure (1/8/2022)      Hyperkalemia (1/8/2022)    1. Cellulitis both calves, secondary to probable S.  Aureus (MSSA), Alcaligenes faecalis resistant to Zosyn, Klebsiella pneumoniae, Day #12 IV Antibiotics, now Meropenem,  improving  2. Aspiration pneumonia, RLL, Day #12 IV Antibiotics, now Meropenem  3. Sepsis with leukocytosis and elevated CRP/procal, resolving     Comment:  WBC normal with still decreasing CRP and procal along with clinical improvement. Plan:   1. Meropenem (Zosyn resistance) IV which will cover MSSA as well, for 2 more days (oral Levaquin would be an option, however, there would be drug interaction with Amiodarone causing prolonged QTc)  2. On Thursday, repeat CRP, procal  3.  Continued wound care    Signed By: Jia Valentino MD     January 19, 2022

## 2022-01-20 LAB
APTT PPP: 124.8 SEC (ref 21.2–34.1)
CRP SERPL-MCNC: 4.66 MG/DL (ref 0–0.6)
PROCALCITONIN SERPL-MCNC: 0.72 NG/ML
THERAPEUTIC RANGE,PTTT: ABNORMAL SEC (ref 82–109)

## 2022-01-20 PROCEDURE — 85730 THROMBOPLASTIN TIME PARTIAL: CPT

## 2022-01-20 PROCEDURE — 74011000250 HC RX REV CODE- 250: Performed by: INTERNAL MEDICINE

## 2022-01-20 PROCEDURE — 84145 PROCALCITONIN (PCT): CPT

## 2022-01-20 PROCEDURE — 74011250636 HC RX REV CODE- 250/636: Performed by: INTERNAL MEDICINE

## 2022-01-20 PROCEDURE — 65270000029 HC RM PRIVATE

## 2022-01-20 PROCEDURE — 74011250637 HC RX REV CODE- 250/637: Performed by: INTERNAL MEDICINE

## 2022-01-20 PROCEDURE — 97530 THERAPEUTIC ACTIVITIES: CPT

## 2022-01-20 PROCEDURE — 94760 N-INVAS EAR/PLS OXIMETRY 1: CPT

## 2022-01-20 PROCEDURE — 36415 COLL VENOUS BLD VENIPUNCTURE: CPT

## 2022-01-20 PROCEDURE — 99232 SBSQ HOSP IP/OBS MODERATE 35: CPT | Performed by: INTERNAL MEDICINE

## 2022-01-20 PROCEDURE — 86140 C-REACTIVE PROTEIN: CPT

## 2022-01-20 RX ADMIN — SODIUM CHLORIDE, PRESERVATIVE FREE 10 ML: 5 INJECTION INTRAVENOUS at 06:29

## 2022-01-20 RX ADMIN — AMIODARONE HYDROCHLORIDE 400 MG: 200 TABLET ORAL at 08:02

## 2022-01-20 RX ADMIN — AMIODARONE HYDROCHLORIDE 400 MG: 200 TABLET ORAL at 21:46

## 2022-01-20 RX ADMIN — SODIUM CHLORIDE, PRESERVATIVE FREE 10 ML: 5 INJECTION INTRAVENOUS at 14:05

## 2022-01-20 RX ADMIN — FERROUS SULFATE TAB 325 MG (65 MG ELEMENTAL FE) 325 MG: 325 (65 FE) TAB at 08:02

## 2022-01-20 RX ADMIN — SODIUM CHLORIDE, PRESERVATIVE FREE 10 ML: 5 INJECTION INTRAVENOUS at 21:51

## 2022-01-20 RX ADMIN — SODIUM CHLORIDE, PRESERVATIVE FREE 1 G: 5 INJECTION INTRAVENOUS at 23:36

## 2022-01-20 RX ADMIN — NITROGLYCERIN 0.5 INCH: 20 OINTMENT TOPICAL at 00:02

## 2022-01-20 RX ADMIN — SODIUM CHLORIDE, PRESERVATIVE FREE 1 G: 5 INJECTION INTRAVENOUS at 14:05

## 2022-01-20 RX ADMIN — FERROUS SULFATE TAB 325 MG (65 MG ELEMENTAL FE) 325 MG: 325 (65 FE) TAB at 17:48

## 2022-01-20 RX ADMIN — ASPIRIN 81 MG CHEWABLE TABLET 81 MG: 81 TABLET CHEWABLE at 08:02

## 2022-01-20 RX ADMIN — NITROGLYCERIN 0.5 INCH: 20 OINTMENT TOPICAL at 06:25

## 2022-01-20 RX ADMIN — SODIUM CHLORIDE, PRESERVATIVE FREE 1 G: 5 INJECTION INTRAVENOUS at 00:02

## 2022-01-20 RX ADMIN — NITROGLYCERIN 0.5 INCH: 20 OINTMENT TOPICAL at 23:37

## 2022-01-20 RX ADMIN — METOPROLOL SUCCINATE 50 MG: 25 TABLET, EXTENDED RELEASE ORAL at 08:02

## 2022-01-20 NOTE — PROGRESS NOTES
[facility] KIDNEY     Renal Daily Progress Note:     Admission Date: 2022   Patient seen early afternoon today. Awake and alert. Will need temp dialysis cath changed to Permcath      Subjective: Recurrent SVT. Underwent cardiac cath today:  Indication: Severe left ventricular systolic dysfunction and patient had a V. Tach.     Left main coronary artery is a very large caliber vessel and has no disease. LAD artery is a very large caliber vessel and proximal mid and distal segment has no stenosis and diagonal branches has no disease. Ramus intermedius is a medium caliber vessel without disease. Circumflex artery is a large-caliber vessel and proximal mid and distal segment and AV groove segment has no stenosis. Large obtuse marginal branch and posterolateral branch has no stenosis. Right coronary artery is a large-caliber vessel and does junction of proximal and mid segment there was a concern of stenosis and that therefore it was interrogated by IFR which turned out to be 0.95 and was not deemed critical enough to do intervention.     Left ventriculography is performed in the right anterior oblique view and ejection fraction is about 20 to 25% and distal anterior wall apex and distal inferior wall is near akinetic. This may be possible presentation of Takotsubo syndrome.     Plan: Patient will receive defibrillator    Fatigued but looks better. Mental status is good. He  looks better, less short of breath, not tachypneic. 1/15/22 Patient is being dialyzed. He did not have any complaints at present. Review of Systems  Pertinent items are noted in HPI.     Objective:     Visit Vitals  /68 (BP 1 Location: Left upper arm, BP Patient Position: Sitting)   Pulse (!) 55   Temp 98.1 °F (36.7 °C)   Resp 18   Ht 6' (1.829 m)   Wt 77.1 kg (169 lb 15.6 oz)   SpO2 97%   BMI 23.05 kg/m²     Temp (24hrs), Av.6 °F (37 °C), Min:98.1 °F (36.7 °C), Max:99.2 °F (37.3 °C)      No intake or output data in the 24 hours ending 01/19/22 9737  Current Facility-Administered Medications   Medication Dose Route Frequency    [START ON 1/21/2022] iron sucrose (VENOFER) injection 200 mg  200 mg IntraVENous DIALYSIS MON, WED & FRI    [START ON 1/21/2022] epoetin valente-epbx (RETACRIT) injection 10,000 Units  10,000 Units IntraVENous DIALYSIS MON, WED & FRI    oxyCODONE IR (ROXICODONE) tablet 5 mg  5 mg Oral Q8H PRN    sodium chloride (NS) flush 5-40 mL  5-40 mL IntraVENous Q8H    sodium chloride (NS) flush 5-40 mL  5-40 mL IntraVENous PRN    amiodarone (CORDARONE) tablet 400 mg  400 mg Oral BID    meropenem (MERREM) 1 g in 0.9% sodium chloride 20 mL IV syringe  1 g IntraVENous Q12H    ferrous sulfate tablet 325 mg  1 Tablet Oral BID WITH MEALS    heparin 25,000 units in  ml infusion  12-25 Units/kg/hr (Adjusted) IntraVENous TITRATE    heparin (porcine) 1,000 unit/mL injection 4,000 Units  4,000 Units IntraVENous PRN    Or    heparin (porcine) 1,000 unit/mL injection 2,000 Units  2,000 Units IntraVENous PRN    metoprolol succinate (TOPROL-XL) XL tablet 50 mg  50 mg Oral DAILY    nitroglycerin (NITROBID) 2 % ointment 0.5 Inch  0.5 Inch Topical Q6H    aspirin chewable tablet 81 mg  81 mg Oral DAILY    heparin (porcine) 1,000 unit/mL injection 2,800 Units  2,800 Units Hemodialysis DIALYSIS PRN       Physical Exam:General appearance: alert, cooperative, no distress, appears older than stated age. Head: Normocephalic, without obvious abnormality, atraumatic  Lungs: clear to auscultation bilaterally  Heart: regular rate and rhythm, no rub  Abdomen: soft, non-tender.  Bowel sounds normal. No masses,  no organomegaly  Extremities: venous stasis dermatitis noted, edema: decreased lower extremity edema and dependent thigh edema  Skin: Left lower leg with posterior ulcer inferior to calf  Neurologic: Grossly normal    Data Review:     LABS:  Recent Labs     01/17/22  0504      K 4.2      CO2 27   BUN 40* CREA 4.70*   CA 7.9*   ALB 2.0*   Iron saturation 13%    Recent Labs     01/19/22  0750 01/18/22  0441 01/17/22  0504   WBC 7.9 9.3 8.8   HGB 8.3* 9.1* 9.2*   HCT 28.6* 32.1* 32.5*    259 241     No results for input(s): HÉCTOR, KU, CLU, CREAU in the last 72 hours. No lab exists for component: PROU   Chest x-ray January 8, 2022:  IMPRESSION     Interval placement of a right supraclavicular approach dialysis catheter with  distal tip in the proximal right atrium.     Cardiomegaly with pulmonary vascular congestion and pulmonary edema.      Stable appearing basilar right lung pneumonia. Small right pleural effusion. Assessment:   Renal Specific Problems  Chronic kidney disease stage VI, started on HD. Hypoalbumin  SVT- recurrent  Volume overload- resolved  Metabolic acidosis- resolved  Hyperkalemia, now hypokalemia. Anemia with renal failure and iron deficient  Leukocytosis with elevated but declining procalcitonin  Probable right lower lobe pneumonia  Left leg ulcer  Venous stasis dermatitis lower extremity          Plan:     Obtain/ Order: labs/cultures/radiology/procedures:      Therapeutic:     HD on Friday, we'll skip tomorrow in [facility] which we'll do dialysis on Monday Wednesday Friday schedule.   Epogen once iron saturation greater than 20%, or after 2 doses of Venofer   IV iron with dialysis  Wound care - regarding left leg ulcer  Transition dialysis access to [facility]    Case management to arrange rehab and outpatient dialysis

## 2022-01-20 NOTE — PROGRESS NOTES
Progress Note      1/20/2022 12:19 PM  NAME: Steven Barroso   MRN:  [id number]   Admit Diagnosis: Renal failure [N19]  Hyperkalemia [E87.5]      Subjective:   Chart reviewed. Patient is on dialysis. Symptoms of shortness of breath has improved. With the patient and her with the nurse. Cardiac catheterization findings discussed with the patient and also discussed with the electrophysiologist.    Review of Systems:    Symptom Y/N Comments  Symptom Y/N Comments   Fever/Chills n   Chest Pain n    Poor Appetite    Edema y    Cough    Abdominal Pain     Sputum    Joint Pain     SOB/CARMONA y  improving  Pruritis/Rash     Nausea/vomit    Other     Diarrhea         Constipation           Could NOT obtain due to:      Objective:          Physical Exam:    Last 24hrs VS reviewed since prior progress note. Most recent are:    Visit Vitals  /72 (BP 1 Location: Left upper arm, BP Patient Position: At rest;Sitting)   Pulse 98   Temp 98.2 °F (36.8 °C)   Resp 20   Ht 6' (1.829 m)   Wt 77.1 kg (169 lb 15.6 oz)   SpO2 100%   BMI 23.05 kg/m²     No intake or output data in the 24 hours ending 01/20/22 1038     General Appearance: Well developed, well nourished, alert & oriented x 3,    no acute distress. Ears/Nose/Mouth/Throat: Hearing grossly normal.  Neck: Supple. Chest: Lungs clear to auscultation bilaterally. Cardiovascular: Regular rate and rhythm, S1,S2 normal, no murmur. Abdomen: Soft, non-tender, bowel sounds are active. Extremities: Patient has ulcers of the lower extremities  Skin: Warm and dry. []         Post-cath site without hematoma, bruit, tenderness, or thrill. Distal pulses intact. PMH/SH reviewed - no change compared to H&P    Data Review    Telemetry: normal sinus rhythm , patient has some PVCs, and episode of wide-complex tachycardia possible ventricular tachycardia. EKG:   Patient had EKG that is very concerning for ventricular tachycardia.     Lab Data Personally Reviewed:    Recent Labs     01/19/22  0750 01/18/22  0441   WBC 7.9 9.3   HGB 8.3* 9.1*   HCT 28.6* 32.1*    259     Recent Labs     01/20/22  0329 01/19/22  1604 01/19/22  0750   APTT 124.8* 137.5* 68.1*      No results for input(s): NA, K, CL, CO2, BUN, CREA, GLU, CA, MG in the last 72 hours. No results for input(s): CPK, CKNDX, TROIQ in the last 72 hours. No lab exists for component: CPKMB  No results found for: CHOL, CHOLX, CHLST, CHOLV, HDL, HDLP, LDL, LDLC, DLDLP, TGLX, TRIGL, TRIGP, CHHD, CHHDX    No results for input(s): AP, TBIL, TP, ALB, GLOB, GGT, AML, LPSE in the last 72 hours. No lab exists for component: SGOT, GPT, AMYP, HLPSE  No results for input(s): PH, PCO2, PO2 in the last 72 hours.     Medications Personally Reviewed:    Current Facility-Administered Medications   Medication Dose Route Frequency    [START ON 1/21/2022] iron sucrose (VENOFER) injection 200 mg  200 mg IntraVENous DIALYSIS MON, WED & FRI    [START ON 1/21/2022] epoetin valente-epbx (RETACRIT) injection 10,000 Units  10,000 Units IntraVENous DIALYSIS MON, WED & FRI    oxyCODONE IR (ROXICODONE) tablet 5 mg  5 mg Oral Q8H PRN    sodium chloride (NS) flush 5-40 mL  5-40 mL IntraVENous Q8H    sodium chloride (NS) flush 5-40 mL  5-40 mL IntraVENous PRN    amiodarone (CORDARONE) tablet 400 mg  400 mg Oral BID    meropenem (MERREM) 1 g in 0.9% sodium chloride 20 mL IV syringe  1 g IntraVENous Q12H    ferrous sulfate tablet 325 mg  1 Tablet Oral BID WITH MEALS    heparin (porcine) 1,000 unit/mL injection 4,000 Units  4,000 Units IntraVENous PRN    Or    heparin (porcine) 1,000 unit/mL injection 2,000 Units  2,000 Units IntraVENous PRN    metoprolol succinate (TOPROL-XL) XL tablet 50 mg  50 mg Oral DAILY    nitroglycerin (NITROBID) 2 % ointment 0.5 Inch  0.5 Inch Topical Q6H    aspirin chewable tablet 81 mg  81 mg Oral DAILY    heparin (porcine) 1,000 unit/mL injection 2,800 Units  2,800 Units Hemodialysis DIALYSIS PRN           Problem List:   Patient has a acute on chronic renal failure and had a dialysis and is clinically better. Decompensated heart failure and ejection fraction is depressed and patient has a at least moderate possibly severe pulmonary hypertension. History of paroxysmal atrial fibrillation. Recent DVT of her left axillary vein. Wide-complex tachycardia probably ventricular tachycardia. Catheterization did not show evidence of coronary artery disease and ejection fraction was about 20 to 25%. Patient is getting restless and wants to leave. Paroxysmal atrial fibrillation. 1.      Assessment/Plan:   I will continue dialysis as per recommendations of the nephrologist.  Patient is offered ICD by Dr. Olya Hastings however patient does not want it. Vascular surgery note reviewed and appreciated and will follow the recommendations. We will continue amiodarone and present care. Discontinue heparin and start on small dose of Eliquis because patient has a history of paroxysmal atrial fibrillation. Patient will be going for cardiac rehab. Thank you. 1.          []       High complexity decision making was performed in this patient at high risk for decompensation with multiple organ involvement.     Ghanshyam Morrison MD

## 2022-01-20 NOTE — PROGRESS NOTES
Progress Note    Patient: Oscar Blake MRN: [id number]  SSN: [id number]    YOB: 1955  Age: 78 y.o. Sex: male      Admit Date: 1/7/2022    LOS: 12 days     Subjective:   Patient followed for sepsis with cellulitis both calves and aspiration pneumonia. Leg wound culture grew multiple organisms as shown below. Afebrile with normal WBC, decreasing procal but increasing CRP. Patient sitting up in bedside chair, again complaining of left leg burning pain, no pain right calf. Objective:     Vitals:    01/20/22 0000 01/20/22 0626 01/20/22 0747 01/20/22 0906   BP: 110/71 112/67 104/72    Pulse: (!) 48 (!) 51 98    Resp: 14  20    Temp: 97.3 °F (36.3 °C)  98.2 °F (36.8 °C)    TempSrc:       SpO2: 99% 100% 100% 100%   Weight:       Height:            Intake and Output:  Current Shift: No intake/output data recorded. Last three shifts: No intake/output data recorded. Physical Exam:    Vitals and nursing note reviewed. Constitutional:       Appearance: He is ill-appearing. Genitourinary:     Comments: External urinary catheter  Musculoskeletal:      Right lower leg: Edema present. Left lower leg: Edema present. Comments: Both legs once again with bulky gauze dressings  Skin:         Neurological: nonfocal, mild confusion  Psychiatric:         Behavior: Behavior normal.     Lab/Data Review:     WBC 7,900    CRP 4.66 <2.63 <2.53 < 5.30 <6.32 <8.19 <12.10 <12.10  Procal 0.70 <0.84 < 2.39 <3.12 < 4.41 <4.39 <5.16    MRSA nasal PCR negative    Wound cultures leg (1/8)  Staphylococcus aureus (MSSA),  Alcaligenes faecalis resistant to Zosyn, Klebsiella pneumoniae      Assessment:     Active Problems:    Renal failure (1/8/2022)      Hyperkalemia (1/8/2022)    1. Cellulitis both calves, secondary to probable S. Aureus (MSSA), Alcaligenes faecalis resistant to Zosyn, Klebsiella pneumoniae,  Day #13 IV Antibiotics, now Meropenem,  improving  2.  Aspiration pneumonia, RLL, Day #13 IV Antibiotics, now Meropenem  3. Sepsis with leukocytosis and elevated CRP/procal, resolving     Comment:  WBC normal but CRP increasing. Leg wounds are progressing favorably. improvement. Unclear why he is experiencing burning pain in his left calf. Plan:   1. Meropenem (Zosyn resistance) IV which will cover MSSA as well, for 1 more day  2.   Otherwise cleared for discharge from ID standpoint    Signed By: Kalyani Lima MD     January 20, 2022

## 2022-01-20 NOTE — PROGRESS NOTES
OCCUPATIONAL THERAPY TREATMENT  Patient: Andrea Klein (65 y.o. male)  Date: 1/20/2022  Diagnosis: Renal failure [N19]  Hyperkalemia [E87.5] <principal problem not specified>  Procedure(s) (LRB):  LEFT HEART CATH / CORONARY ANGIOGRAPHY W GRAFTS (N/A)  PERCUTANEOUS CORONARY INTERVENTION (N/A) 7 Days Post-Op  Precautions:    Chart, occupational therapy assessment, plan of care, and goals were reviewed. ASSESSMENT  Patient continues with skilled OT services and is progressing towards goals. Upon MELCHOR arrival, pt semi supine in bed and agreeable to tx session with encouragement. Pt completed bed mobility with SBA and additional time. Pt completed sit>stand from EOB with Renee using RW for balance upon standing. Pt completed bed>chair with CGA/Renee and cueing for proper transfer technique. Pt completed seated UE therex (see chart below). Pt reporting having completed therex when in room throughout day and encouraged to continue. Pt completed seated oral hygiene and face washing with setup/SBA. Pt expressing frustration with care at hospital and hospital food, therapist provided emotional support. Pt stating that he felt better once talking with therapist and expressed appreciation for listening to him and talking with him. RN notified of pt requesting having an Ensure with lunch. Will continue to follow pt throughout remainder of stay and progress towards OT goals. Recommending SNF vs HHOT at discharge when medically appropriate. Current Level of Function Impacting Discharge (ADLs): Renee sit>stand, CGA/Renee bed>chair, SBA bed mobility    Other factors to consider for discharge: Time of onset, medical prognosis/diagnosis, severity of deficits, PLOF, home environment, and family support         PLAN :  Patient continues to benefit from skilled intervention to address the above impairments. Continue treatment per established plan of care. to address goals.     Recommendation for discharge: (in order for the patient to meet his/her long term goals)  SNF vs HHOT    This discharge recommendation:  Has been made in collaboration with the attending provider and/or case management    IF patient discharges home will need the following DME: TBD       SUBJECTIVE:   Patient stated I appreciate you coming to talk to me, it gets lonely in here.     OBJECTIVE DATA SUMMARY:   Cognitive/Behavioral Status:  Neurologic State: Alert  Orientation Level: Oriented X4  Cognition: Follows commands    Functional Mobility and Transfers for ADLs:  Bed Mobility:  Rolling: Stand-by assistance; Additional time  Supine to Sit: Stand-by assistance; Additional time  Scooting: Stand-by assistance; Additional time    Transfers:  Sit to Stand: Minimum assistance; Additional time     Bed to Chair: Contact guard assistance;Minimum assistance    Balance:  Sitting: Intact; Without support  Standing: Impaired; Without support  Standing - Static: Constant support;Good  Standing - Dynamic : Constant support; Fair    ADL Intervention:  Grooming  Position Performed: Seated in chair  Washing Face: Set-up; Stand-by assistance  Brushing Teeth: Set-up; Stand-by assistance    Therapeutic Exercises:   Exercise Sets Reps AROM AAROM PROM Self PROM Comments   Shoulder flex/ext 1 12 [x] [] [] []    Elbow flex/ext 1 12 [x] [] [] []    Scapular protraction/retraction 1 15 [x] [] [] []    D1/D2 1 15 [x] [] [] []        Pain:  Burning pain reported in LLE foot but no formal rating given    Activity Tolerance:   Fair and requires rest breaks  Please refer to the flowsheet for vital signs taken during this treatment. After treatment patient left in no apparent distress:   Sitting in chair and Call bell within reach    COMMUNICATION/COLLABORATION:   The patients plan of care was discussed with: Registered nurse.      JILL Aponte  Time Calculation: 36 mins    Problem: Self Care Deficits Care Plan (Adult)  Goal: *Acute Goals and Plan of Care (Insert Text)  Description: 1. Pt will be mod I sup <> sit in prep for EOB ADLs  2. Pt will be mod I grooming standing at sink  3. Pt will be min A LE dressing sitting EOB/long sit  4. Pt will be mod I sit <>  prep for toileting LRAD  5. Pt will be mod I toileting/toilet transfer/cloth mgmt LRAD  6.   Pt will be IND following UE HEP in prep for self care tasks      Outcome: Progressing Towards Goal

## 2022-01-20 NOTE — PROGRESS NOTES
Progress Note    Colt De Leon MD             Daily Progress Note: 1/20/2022      Subjective: The patient is seen for follow  up. Offers no complaints. Problem List:  Problem List as of 1/20/2022 Never Reviewed          Codes Class Noted - Resolved    Renal failure ICD-10-CM: N19  ICD-9-CM: 277  1/8/2022 - Present        Hyperkalemia ICD-10-CM: E87.5  ICD-9-CM: 276.7  1/8/2022 - Present        Pulmonary edema ICD-10-CM: J81.1  ICD-9-CM: 746  7/13/2021 - Present        GI bleed ICD-10-CM: K92.2  ICD-9-CM: 578.9  1/5/2021 - Present              Medications reviewed  Current Facility-Administered Medications   Medication Dose Route Frequency    [START ON 1/21/2022] iron sucrose (VENOFER) injection 200 mg  200 mg IntraVENous DIALYSIS MON, WED & FRI    [START ON 1/21/2022] epoetin valente-epbx (RETACRIT) injection 10,000 Units  10,000 Units IntraVENous DIALYSIS MON, WED & FRI    oxyCODONE IR (ROXICODONE) tablet 5 mg  5 mg Oral Q8H PRN    sodium chloride (NS) flush 5-40 mL  5-40 mL IntraVENous Q8H    sodium chloride (NS) flush 5-40 mL  5-40 mL IntraVENous PRN    amiodarone (CORDARONE) tablet 400 mg  400 mg Oral BID    meropenem (MERREM) 1 g in 0.9% sodium chloride 20 mL IV syringe  1 g IntraVENous Q12H    ferrous sulfate tablet 325 mg  1 Tablet Oral BID WITH MEALS    heparin (porcine) 1,000 unit/mL injection 4,000 Units  4,000 Units IntraVENous PRN    Or    heparin (porcine) 1,000 unit/mL injection 2,000 Units  2,000 Units IntraVENous PRN    metoprolol succinate (TOPROL-XL) XL tablet 50 mg  50 mg Oral DAILY    nitroglycerin (NITROBID) 2 % ointment 0.5 Inch  0.5 Inch Topical Q6H    aspirin chewable tablet 81 mg  81 mg Oral DAILY    heparin (porcine) 1,000 unit/mL injection 2,800 Units  2,800 Units Hemodialysis DIALYSIS PRN       Review of Systems:   A comprehensive review of systems was negative except for that written in the HPI.     Objective:   Physical Exam:     Visit Vitals  /71 (BP 1 Location: Left upper arm, BP Patient Position: At rest;Supine)   Pulse (!) 56   Temp 98.2 °F (36.8 °C)   Resp 20   Ht 6' (1.829 m)   Wt 77.1 kg (169 lb 15.6 oz)   SpO2 98%   BMI 23.05 kg/m²    O2 Flow Rate (L/min): 2 l/min O2 Device: None (Room air)    Temp (24hrs), Av.1 °F (36.7 °C), Min:97.3 °F (36.3 °C), Max:98.5 °F (36.9 °C)    No intake/output data recorded. No intake/output data recorded. General:  Alert, cooperative, no distress, appears stated age. Lungs:   Clear to auscultation bilaterally. Chest wall:  No tenderness or deformity. Heart:  Regular rate and rhythm, S1, S2 normal, no murmur, click, rub or gallop. Abdomen:   Soft, non-tender. Bowel sounds normal. No masses,  No organomegaly. Extremities: Extremities normal, atraumatic, no cyanosis edema on both the legs are much   Pulses: 2+ and symmetric all extremities. Skin: Skin color, texture, turgor normal. No rashes or lesions lateral leg ulcer   Neurologic: CNII-XII intact. No gross sensory or motor deficits     Data Review:       Recent Days:  Recent Labs     22  0750 22  0441   WBC 7.9 9.3   HGB 8.3* 9.1*   HCT 28.6* 32.1*    259     No results for input(s): NA, K, CL, CO2, GLU, BUN, CREA, CA, MG, PHOS, ALB, TBIL, TBILI, ALT, INR, INREXT in the last 72 hours. No lab exists for component: SGOT  No results for input(s): PH, PCO2, PO2, HCO3, FIO2 in the last 72 hours. Results     Procedure Component Value Units Date/Time    CULTURE, Shabana Rota STAIN [[id number]]  (Susceptibility) Collected: 22 0400    Order Status: Completed Specimen: Misc.  Wound Updated: 22 6350     Special Requests: No Special Requests        GRAM STAIN Rare WBCs seen               Occasional Gram Positive Cocci in pairs                  Rare apparent Gram Negative Rods           Culture result:       Heavy Staphylococcus aureus                  Heavy Alcaligenes faecalis                  Light Klebsiella pneumoniae Light Proteus mirabilis       Susceptibility      Staphylococcus aureus     SHIRA     Ciprofloxacin ($) Susceptible     Clindamycin ($) Susceptible     Daptomycin ($$$$$) Susceptible     Doxycycline ($$) Susceptible     Erythromycin ($$$$) Susceptible     Gentamicin ($) Susceptible     Levofloxacin ($) Susceptible     Linezolid ($$$$$) Susceptible     Moxifloxacin ($$$$) Susceptible     Oxacillin Susceptible     Rifampin ($$$$) Susceptible  [1]      Tetracycline Susceptible     Trimeth/Sulfa Susceptible     Vancomycin ($) Susceptible                 [1]  Rifampin is not to be used for mono-therapy.           Linear View               Susceptibility      Alcaligenes faecalis     SHIRA     Amikacin ($) Susceptible     Cefepime ($$) Susceptible     Ceftazidime ($) Susceptible     Ceftriaxone ($) Susceptible     Ciprofloxacin ($) Susceptible     Gentamicin ($) Susceptible     Levofloxacin ($) Susceptible     Meropenem ($$) Susceptible     Piperacillin/Tazobac ($) Resistant     Tobramycin ($) Susceptible     Trimeth/Sulfa Susceptible                  Linear View               Susceptibility      Klebsiella pneumoniae     SHIRA     Amikacin ($) Susceptible     Ampicillin ($) Resistant     Ampicillin/sulbactam ($) Susceptible     Cefazolin ($) Susceptible     Cefepime ($$) Susceptible     Cefoxitin Susceptible     Ceftazidime ($) Susceptible     Ceftriaxone ($) Susceptible     Ciprofloxacin ($) Susceptible     Gentamicin ($) Susceptible     Levofloxacin ($) Susceptible     Meropenem ($$) Susceptible     Piperacillin/Tazobac ($) Susceptible     Tobramycin ($) Susceptible     Trimeth/Sulfa Susceptible                  Linear View               Susceptibility      Proteus mirabilis     SHIRA     Amikacin ($) Susceptible     Ampicillin ($) Susceptible     Ampicillin/sulbactam ($) Susceptible     Cefazolin ($) Susceptible     Cefepime ($$) Susceptible     Cefoxitin Susceptible     Ceftazidime ($) Susceptible     Ceftriaxone ($) Susceptible     Ciprofloxacin ($) Susceptible     Gentamicin ($) Susceptible     Levofloxacin ($) Susceptible     Meropenem ($$) Susceptible     Piperacillin/Tazobac ($) Susceptible     Tobramycin ($) Susceptible     Trimeth/Sulfa Susceptible                  Linear View                   MRSA SCREEN - PCR (NASAL) [[id number]] Collected: 01/08/22 0223    Order Status: Completed Specimen: Swab Updated: 01/08/22 0516     MRSA by PCR, Nasal Not Detected       COVID-19 RAPID TEST [[id number]] Collected: 01/07/22 2313    Order Status: Completed Specimen: Nasopharyngeal Updated: 01/08/22 0010     Specimen source       Please find results under separate order           COVID-19 rapid test Not Detected        Comment: Rapid [employer] ID Now   Rapid NAAT:  The specimen is NEGATIVE for SARS-CoV-2, the novel coronavirus associated with COVID-19. Negative results should be treated as presumptive and, if inconsistent with clinical signs and symptoms or necessary for patient management, should be tested with an alternative molecular assay. Negative results do not preclude SARS-CoV-2 infection and should not be used as the sole basis for patient management decisions. This test has been authorized by the FDA under   an Emergency Use Authorization (EUA) for use by authorized laboratories.  Fact sheet for Healthcare Providers: ConventionUpdate.co.nz Fact sheet for Patients: ConventionUpdate.co.nz   Methodology: Isothermal Nucleic Acid Amplification              24 Hour Results:  Recent Results (from the past 24 hour(s))   PTT    Collection Time: 01/19/22  4:04 PM   Result Value Ref Range    aPTT 137.5 (HH) 21.2 - 34.1 sec    aPTT, therapeutic range   82 - 109 sec   C REACTIVE PROTEIN, QT    Collection Time: 01/20/22  3:29 AM   Result Value Ref Range    C-Reactive protein 4.66 (H) 0.00 - 0.60 mg/dL   PTT    Collection Time: 01/20/22  3:29 AM   Result Value Ref Range    aPTT 124.8 (HH) 21.2 - 34.1 sec    aPTT, therapeutic range   82 - 109 sec       DUPLEX LOW EXT ARTERIES WITH JAVON   Final Result      XR CHEST PORT   Final Result      Interval placement of a right supraclavicular approach dialysis catheter with   distal tip in the proximal right atrium. Cardiomegaly with pulmonary vascular congestion and pulmonary edema. Stable appearing basilar right lung pneumonia. Small right pleural effusion. IR INSERT NON TUNL CVC OVER 5 YRS   Final Result      Technically successful insertion of non-tunneled Trialysis catheter with US   guidance. Plan:       Post catheter placement chest xray confirmed appropriate position of the   catheter. The catheter is appropriate for immediate use.   _______________________________________________________________      PROCEDURE SUMMARY:   - Venous access with ultrasound guidance   - Non-tunneled central venous catheter insertion      PROCEDURE DETAILS:      Pre-procedure   Relevant imaging review: None    Informed consent for the procedure was obtained and time-out was performed prior   to the procedure. Prophylactic antibiotic administered: Not administered   Preparation: The site was prepared and draped using all elements of maximal   sterile barrier technique including sterile gloves, sterile gown, cap, mask,   large sterile sheet, sterile ultrasound probe cover, sterile ultrasound gel,   hand hygiene and cutaneous antisepsis with 2% chlorhexidine. Medical reason for site preparation exception: Not applicable      Anesthesia/sedation   Level of anesthesia: No sedation   Anesthesia administered by: Not applicable   Duration of anesthesia/sedation: 0 minutes. Access   The vessel was sonographically evaluated and judged to be patent and appropriate   for access and a permanent image was stored. Three mLs of 1% Lidocaine was   administered to the access site. Real time ultrasound was used to visualize   needle entry into the vessel.     Vein accessed: Right internal jugular vein   Access technique: 4F micropuncture set      Catheter placement   The access site was dilated and the catheter was placed into the vein over a   wire and all guidewires were removed in their entirety. Catheter ports were   aspirated and flushed. Catheter tip location was verified by portable X-ray. Catheter size:13 Czech   Catheter length: 20 cm   Catheter tip position: Proximal right atrium   Catheter flush: Heparin (100 units/mL)      Closure   The catheter was secured. A sterile dressing was applied. Catheter securement technique: Non-absorbable suture      Additional Medications   None      Additional Details   Estimated blood loss:  Less than 1 mL   Additional description of procedure: None   Additional findings: None   Additional equipment: None   Specimens removed: None                     IR US GUIDED VASCULAR ACCESS   Final Result      Technically successful insertion of non-tunneled Trialysis catheter with US   guidance. Plan:       Post catheter placement chest xray confirmed appropriate position of the   catheter. The catheter is appropriate for immediate use.   _______________________________________________________________      PROCEDURE SUMMARY:   - Venous access with ultrasound guidance   - Non-tunneled central venous catheter insertion      PROCEDURE DETAILS:      Pre-procedure   Relevant imaging review: None    Informed consent for the procedure was obtained and time-out was performed prior   to the procedure. Prophylactic antibiotic administered: Not administered   Preparation: The site was prepared and draped using all elements of maximal   sterile barrier technique including sterile gloves, sterile gown, cap, mask,   large sterile sheet, sterile ultrasound probe cover, sterile ultrasound gel,   hand hygiene and cutaneous antisepsis with 2% chlorhexidine.     Medical reason for site preparation exception: Not applicable      Anesthesia/sedation   Level of anesthesia: No sedation   Anesthesia administered by: Not applicable   Duration of anesthesia/sedation: 0 minutes. Access   The vessel was sonographically evaluated and judged to be patent and appropriate   for access and a permanent image was stored. Three mLs of 1% Lidocaine was   administered to the access site. Real time ultrasound was used to visualize   needle entry into the vessel. Vein accessed: Right internal jugular vein   Access technique: 4F micropuncture set      Catheter placement   The access site was dilated and the catheter was placed into the vein over a   wire and all guidewires were removed in their entirety. Catheter ports were   aspirated and flushed. Catheter tip location was verified by portable X-ray. Catheter size:13 Guatemalan   Catheter length: 20 cm   Catheter tip position: Proximal right atrium   Catheter flush: Heparin (100 units/mL)      Closure   The catheter was secured. A sterile dressing was applied.    Catheter securement technique: Non-absorbable suture      Additional Medications   None      Additional Details   Estimated blood loss:  Less than 1 mL   Additional description of procedure: None   Additional findings: None   Additional equipment: None   Specimens removed: None                     XR CHEST PORT   Final Result   Airspace opacity and effusion at the right lung base and in the   acute setting would suggest pneumonia and/or aspiration however the patient also   appears to have baseline/background vascular congestion and/or pulmonary   arterial hypertension, noting cardiomegaly and/or pericardial effusion      IR INSERT NON TUNL CVC OVER 5 YRS    (Results Pending)   IR INSERT TUNL CVC W/O PORT OVER 5 YR    (Results Pending)        Assessment: Pneumonia  Bilateral infected leg ulcers  PAD  CHF with normal coronaries  Ventricular tachycardia  Hypertension  Chronic renal failure on dialysis        Plan: Patient is receiving last day of antibiotic now  If bed is available patient can be discharged tomorrow discussed with RN        Care Plan discussed with: Patient/Family and Nurse    Total time spent with patient: 30 minutes.     Patrick Sahu MD

## 2022-01-20 NOTE — PROGRESS NOTES
Problem: Falls - Risk of  Goal: *Absence of Falls  Description: Document Chanelle Ground Fall Risk and appropriate interventions in the flowsheet. Outcome: Progressing Towards Goal  Note: Fall Risk Interventions:  Mobility Interventions: OT consult for ADLs    Mentation Interventions: Bed/chair exit alarm    Medication Interventions: Bed/chair exit alarm    Elimination Interventions: Call light in reach              Problem: Patient Education: Go to Patient Education Activity  Goal: Patient/Family Education  Outcome: Progressing Towards Goal     Problem: Pressure Injury - Risk of  Goal: *Prevention of pressure injury  Description: Document Alfa Scale and appropriate interventions in the flowsheet.   Outcome: Progressing Towards Goal  Note: Pressure Injury Interventions:  Sensory Interventions: Assess changes in LOC    Moisture Interventions: Minimize layers,Apply protective barrier, creams and emollients    Activity Interventions: Increase time out of bed    Mobility Interventions: HOB 30 degrees or less    Nutrition Interventions: Document food/fluid/supplement intake    Friction and Shear Interventions: HOB 30 degrees or less

## 2022-01-20 NOTE — PROGRESS NOTES
Comprehensive Nutrition Assessment    Type and Reason for Visit: RD nutrition re-screen/LOS    Nutrition Recommendations/Plan:   Continue diet. Recommend ONS (Ensure Enlive TID)  Offer food per preferences. Monitor intake    Nutrition Assessment:  Admitted for renal failure and cellulitis. PO intake noted as >75% of meals as documented in I/O w/ supplement intake of 51-75%(likely snack). Pt. reported w/ continued dislike for food provided and reported as decreasing intake this a.m.; no food preferences offerred. ONS order placed as stated by nurse to support intake; RD to adjust given current ONS scheduled for x6 daily which will exceed EENs. Wts remain fairly stable. Labs: H/H 8.3/28.6, .5, CRP 4.66. Meds: Lopressor, venofer, FerrouSul, meropenem, aspirin. Malnutrition Assessment:  Malnutrition Status:  No malnutrition      Estimated Daily Nutrient Needs:  Energy (kcal): 1928 kcal (25 kcal/kg); Weight Used for Energy Requirements: Current  Protein (g): 93 gm (1.2 gm/kg); Weight Used for Protein Requirements: Current  Fluid (ml/day): 1928 mL; Method Used for Fluid Requirements: 1 ml/kcal    Nutrition Related Findings:  No NFPE completed, Pt unavailable at time of visit. No N/V/D/C per nurse. Tolerating current texture but may be unsatisfied w/ diet. Last BM 1/17. Swelling on LEs due to venous ulcers. Wounds:    Venous stasis       Current Nutrition Therapies:  ADULT DIET Dysphagia - Minced & Moist; Low Sodium (2 gm); Low Potassium (Less than 3000 mg/day);  Low Phosphorus (Less than 1000 mg)  ADULT ORAL NUTRITION SUPPLEMENT Breakfast, Lunch, Dinner, PM Snack, AM Snack, HS Snack; Standard High Calorie/High Protein    Anthropometric Measures:  · Height:  6' (182.9 cm)  · Current Body Wt:  77.1 kg (169 lb 15.6 oz)   · Ideal Body Wt:  178 lbs:  95.5 %   · Adjusted Body Weight:   ; Weight Adjustment for: No adjustment   · Adjusted BMI:       · BMI Category:  Normal weight (BMI 22.0-24.9) age over 72 Nutrition Diagnosis:   · Inadequate energy intake related to renal dysfunction (varied intake) as evidenced by intake 51-75%,dialysis      Nutrition Interventions:   Food and/or Nutrient Delivery:  Modify oral nutrition supplement,Continue current diet  Nutrition Education and Counseling: No recommendations at this time  Coordination of Nutrition Care: Continue to monitor while inpatient    Goals:  PO intake >75% of meals in 5-7 days. Maintain weight +/-5 lb. Improve skin integrity. Improve labs/lytes to WNL. Nutrition Monitoring and Evaluation:   Behavioral-Environmental Outcomes: None identified  Food/Nutrient Intake Outcomes: Food and nutrient intake,Supplement intake  Physical Signs/Symptoms Outcomes: Weight    Discharge Planning: Too soon to determine     Electronically signed by Lisset Hummel RD on 1/20/2022 at 3:24 PM    Contact: ext. Shayna Esquivel.

## 2022-01-20 NOTE — PROGRESS NOTES
PHYSICAL THERAPY TREATMENT: WEEKLY REASSESSMENT  Patient: Eliezer Renae (81 y.o. male)  Date: 1/20/2022  Primary Diagnosis: Renal failure [N19]  Hyperkalemia [E87.5]  Procedure(s) (LRB):  LEFT HEART CATH / CORONARY ANGIOGRAPHY W GRAFTS (N/A)  PERCUTANEOUS CORONARY INTERVENTION (N/A) 7 Days Post-Op   Precautions: falls         ASSESSMENT   Pt is a 77 yo male admitted on 1/7/2022 for CHF exacerbation, aspiration PNA, elevated troponin, HTN, bilateral LE ulcers, hyperkalemia, and sepsis. PMH: CKD and HTN. Pt  initially evaluated by Pt on 1/14/22 , per PT eval, pt  reported that he resides with sister and nephew in a 2 story home (primary bedroom on 1st floor) with 2 KATHY, bilateral handrails, pt was I for ADLS/IADLS, SPC or RW with mobility prior to admission. DME: SPC and RW    Pt received semi-supine in bed upon arrival, agreeable for PT reassessment with encouragement. Based on current observations, pt  presents with c/o pain in b/l LE Le - 5/10, continues to have deficits in generalized strength/AROM, static/dynamic sitting balance, functional activity tolerance, impacting overall performance of functional transfers/mobility. Pt requires SBA for rolling to L side , SBA for supine>sit transfers , demonstrates intact static/dynamic sitting balance with support . Completed there ex's in unsupported sitting position for b/l LE strengthening . Pt performed STS  with Richard, needs cues for hand placement . Pt ambulated - 25'  with Rw, CGA/Richard . demonstrates  slow sanjeev with shuffling gait , needs cues to increased step length. Pt returned to bed with Richard for b/l Le. Overall, pt tolerates session fair today, progressing slowly towards the goals,however, limited by pain in L Le > R LE  . Pt continues with skilled PT services to address deficits with static/dynamic standing balance , activity tolerance and strength b/l Le impacting overall performance of  transfers/mobility.  PT goals and POC reviewed and updated to reflect current progress. PT recommending SNF vs HHPT with family care at discharge once medically appropriate. Current Level of Function Impacting Discharge (mobility/balance): Requires CGA/Richard for functional transfers/mobility    Other factors to consider for discharge: Pain  in b/l LE , severity of deficits, time since onset     PLAN :  Goals have been updated based on progression since last assessment. Patient continues to benefit from skilled intervention to address the above impairments. Recommendations and Planned Interventions: bed mobility training, transfer training, gait training, therapeutic exercises, neuromuscular re-education, patient and family training/education, and therapeutic activities      Frequency/Duration: Patient will be followed by physical therapy:  5 times a week to address goals.     Recommendation for discharge: (in order for the patient to meet his/her long term goals)  SNF    This discharge recommendation:  Has been made in collaboration with the attending provider and/or case management    IF patient discharges home will need the following DME: rollator         SUBJECTIVE:   Patient stated my legs hurt     OBJECTIVE DATA SUMMARY:   HISTORY:    Past Medical History:   Diagnosis Date    Chronic kidney disease     Hypertension      Past Surgical History:   Procedure Laterality Date    IR INSERT NON TUNL CVC OVER 5 YRS  1/8/2022       Personal factors and/or comorbidities impacting plan of care:     Home Situation  Home Environment: Private residence  # Steps to Enter: 2  Rails to Enter: Yes  Hand Rails : Bilateral  Wheelchair Ramp: No  One/Two Story Residence: Two story, live on 1st floor  Lift Chair Available: No  Living Alone: No  Support Systems: Other Family Member(s) (sister and nephew)  Current DME Used/Available at Home: [address] [facility], [place], straight,Walker, rolling    EXAMINATION/PRESENTATION/DECISION MAKING:   Critical Behavior:  Neurologic State: Alert  Orientation Level: Oriented X4  Cognition: Follows commands     Hearing:     Skin:  intact where exposed, dressing present in b/l Le    Range Of Motion:   Generally decreased, functional    Strength:     Generally decreased, functional     Functional Mobility:  Bed Mobility:  Rolling: Stand-by assistance  Supine to Sit: Stand-by assistance  Sit to Supine: Minimum assistance  Scooting: Stand-by assistance  Transfers:  Sit to Stand: Minimum assistance  Stand to Sit: Minimum assistance        Bed to Chair: Contact guard assistance;Minimum assistance       Balance:   Sitting: Intact; Without support  Standing: Impaired; Without support  Standing - Static: Constant support;Good  Standing - Dynamic : Constant support; Fair  Ambulation/Gait Training:  Distance (ft): 25 Feet (ft)  Assistive Device: Walker, rolling;Gait belt  Ambulation - Level of Assistance: Contact guard assistance;Minimal assistance    Base of Support: Narrowed    Speed/Sharmila: Slow;Shuffled      Therapeutic Exercises:   AP, LAQ, seated marches- 20 reps    Pain Rating:  Pain b/l Le - 5/10    Activity Tolerance:   Fair  Please refer to the flowsheet for vital signs taken during this treatment. After treatment patient left in no apparent distress:   Supine in bed and Call bell within reach    COMMUNICATION/EDUCATION:   The patients plan of care was discussed with: Registered nurse. Fall prevention education was provided and the patient/caregiver indicated understanding. and Patient/family agree to work toward stated goals and plan of care.     Thank you for this referral.  Jesus Alberto Lopez   Time Calculation: 29 mins

## 2022-01-21 ENCOUNTER — APPOINTMENT (OUTPATIENT)
Dept: INTERVENTIONAL RADIOLOGY/VASCULAR | Age: 67
DRG: 871 | End: 2022-01-21
Attending: INTERNAL MEDICINE
Payer: MEDICARE

## 2022-01-21 LAB
ANION GAP SERPL CALC-SCNC: 6 MMOL/L (ref 5–15)
BUN SERPL-MCNC: 42 MG/DL (ref 6–20)
BUN/CREAT SERPL: 7 (ref 12–20)
CA-I BLD-MCNC: 7.8 MG/DL (ref 8.5–10.1)
CHLORIDE SERPL-SCNC: 104 MMOL/L (ref 97–108)
CO2 SERPL-SCNC: 30 MMOL/L (ref 21–32)
CREAT SERPL-MCNC: 5.75 MG/DL (ref 0.7–1.3)
CRP SERPL-MCNC: 2.8 MG/DL (ref 0–0.6)
GLUCOSE SERPL-MCNC: 89 MG/DL (ref 65–100)
POTASSIUM SERPL-SCNC: 4.2 MMOL/L (ref 3.5–5.1)
SODIUM SERPL-SCNC: 140 MMOL/L (ref 136–145)

## 2022-01-21 PROCEDURE — 74011250637 HC RX REV CODE- 250/637: Performed by: INTERNAL MEDICINE

## 2022-01-21 PROCEDURE — 90935 HEMODIALYSIS ONE EVALUATION: CPT

## 2022-01-21 PROCEDURE — 74011250636 HC RX REV CODE- 250/636: Performed by: INTERNAL MEDICINE

## 2022-01-21 PROCEDURE — 02H633Z INSERTION OF INFUSION DEVICE INTO RIGHT ATRIUM, PERCUTANEOUS APPROACH: ICD-10-PCS | Performed by: RADIOLOGY

## 2022-01-21 PROCEDURE — 74011000250 HC RX REV CODE- 250: Performed by: INTERNAL MEDICINE

## 2022-01-21 PROCEDURE — 86140 C-REACTIVE PROTEIN: CPT

## 2022-01-21 PROCEDURE — C1769 GUIDE WIRE: HCPCS

## 2022-01-21 PROCEDURE — 99232 SBSQ HOSP IP/OBS MODERATE 35: CPT | Performed by: INTERNAL MEDICINE

## 2022-01-21 PROCEDURE — 36415 COLL VENOUS BLD VENIPUNCTURE: CPT

## 2022-01-21 PROCEDURE — 77001 FLUOROGUIDE FOR VEIN DEVICE: CPT

## 2022-01-21 PROCEDURE — 5A1D70Z PERFORMANCE OF URINARY FILTRATION, INTERMITTENT, LESS THAN 6 HOURS PER DAY: ICD-10-PCS | Performed by: INTERNAL MEDICINE

## 2022-01-21 PROCEDURE — 65270000029 HC RM PRIVATE

## 2022-01-21 PROCEDURE — 80048 BASIC METABOLIC PNL TOTAL CA: CPT

## 2022-01-21 RX ORDER — AMIODARONE HYDROCHLORIDE 200 MG/1
200 TABLET ORAL DAILY
Status: DISCONTINUED | OUTPATIENT
Start: 2022-01-22 | End: 2022-02-08 | Stop reason: HOSPADM

## 2022-01-21 RX ORDER — HEPARIN SODIUM 1000 [USP'U]/ML
INJECTION, SOLUTION INTRAVENOUS; SUBCUTANEOUS
Status: DISPENSED
Start: 2022-01-21 | End: 2022-01-21

## 2022-01-21 RX ADMIN — ASPIRIN 81 MG CHEWABLE TABLET 81 MG: 81 TABLET CHEWABLE at 15:50

## 2022-01-21 RX ADMIN — FERROUS SULFATE TAB 325 MG (65 MG ELEMENTAL FE) 325 MG: 325 (65 FE) TAB at 15:51

## 2022-01-21 RX ADMIN — EPOETIN ALFA-EPBX 10000 UNITS: 10000 INJECTION, SOLUTION INTRAVENOUS; SUBCUTANEOUS at 12:03

## 2022-01-21 RX ADMIN — METOPROLOL SUCCINATE 50 MG: 25 TABLET, EXTENDED RELEASE ORAL at 15:50

## 2022-01-21 RX ADMIN — SODIUM CHLORIDE, PRESERVATIVE FREE 10 ML: 5 INJECTION INTRAVENOUS at 06:24

## 2022-01-21 RX ADMIN — SODIUM CHLORIDE, PRESERVATIVE FREE 10 ML: 5 INJECTION INTRAVENOUS at 22:18

## 2022-01-21 RX ADMIN — SODIUM CHLORIDE, PRESERVATIVE FREE 1 G: 5 INJECTION INTRAVENOUS at 15:50

## 2022-01-21 RX ADMIN — SODIUM CHLORIDE, PRESERVATIVE FREE 1 G: 5 INJECTION INTRAVENOUS at 22:18

## 2022-01-21 RX ADMIN — HEPARIN SODIUM 2800 UNITS: 1000 INJECTION INTRAVENOUS; SUBCUTANEOUS at 12:02

## 2022-01-21 NOTE — PROGRESS NOTES
Received luba message regarding dialysis times for patient. Patient will be dialyzed on MWF at 10 am in [place].

## 2022-01-21 NOTE — PROGRESS NOTES
[facility] KIDNEY     Renal Daily Progress Note:     Admission Date: 2022   Awake and alert. C/o pain when leg dressings are changed      Subjective: Recurrent SVT. Underwent cardiac cath today:  Indication: Severe left ventricular systolic dysfunction and patient had a V. Tach.     Left main coronary artery is a very large caliber vessel and has no disease. LAD artery is a very large caliber vessel and proximal mid and distal segment has no stenosis and diagonal branches has no disease. Ramus intermedius is a medium caliber vessel without disease. Circumflex artery is a large-caliber vessel and proximal mid and distal segment and AV groove segment has no stenosis. Large obtuse marginal branch and posterolateral branch has no stenosis. Right coronary artery is a large-caliber vessel and does junction of proximal and mid segment there was a concern of stenosis and that therefore it was interrogated by IFR which turned out to be 0.95 and was not deemed critical enough to do intervention.     Left ventriculography is performed in the right anterior oblique view and ejection fraction is about 20 to 25% and distal anterior wall apex and distal inferior wall is near akinetic. This may be possible presentation of Takotsubo syndrome.     Plan: Patient will receive defibrillator    Fatigued but looks better. Mental status is good. He  looks better, less short of breath, not tachypneic. 1/15/22 Patient is being dialyzed. He did not have any complaints at present. Review of Systems  Pertinent items are noted in HPI.     Objective:     Visit Vitals  /73   Pulse 65   Temp 97.4 °F (36.3 °C)   Resp 20   Ht 6' (1.829 m)   Wt 77.1 kg (169 lb 15.6 oz)   SpO2 95%   BMI 23.05 kg/m²     Temp (24hrs), Av °F (36.7 °C), Min:97.3 °F (36.3 °C), Max:98.7 °F (37.1 °C)      No intake or output data in the 24 hours ending 22  Current Facility-Administered Medications   Medication Dose Route Frequency  [START ON 1/21/2022] iron sucrose (VENOFER) injection 200 mg  200 mg IntraVENous DIALYSIS MON, WED & FRI    [START ON 1/21/2022] epoetin valente-epbx (RETACRIT) injection 10,000 Units  10,000 Units IntraVENous DIALYSIS MON, WED & FRI    oxyCODONE IR (ROXICODONE) tablet 5 mg  5 mg Oral Q8H PRN    sodium chloride (NS) flush 5-40 mL  5-40 mL IntraVENous Q8H    sodium chloride (NS) flush 5-40 mL  5-40 mL IntraVENous PRN    amiodarone (CORDARONE) tablet 400 mg  400 mg Oral BID    meropenem (MERREM) 1 g in 0.9% sodium chloride 20 mL IV syringe  1 g IntraVENous Q12H    ferrous sulfate tablet 325 mg  1 Tablet Oral BID WITH MEALS    heparin (porcine) 1,000 unit/mL injection 4,000 Units  4,000 Units IntraVENous PRN    Or    heparin (porcine) 1,000 unit/mL injection 2,000 Units  2,000 Units IntraVENous PRN    metoprolol succinate (TOPROL-XL) XL tablet 50 mg  50 mg Oral DAILY    nitroglycerin (NITROBID) 2 % ointment 0.5 Inch  0.5 Inch Topical Q6H    aspirin chewable tablet 81 mg  81 mg Oral DAILY    heparin (porcine) 1,000 unit/mL injection 2,800 Units  2,800 Units Hemodialysis DIALYSIS PRN       Physical Exam:General appearance: alert, cooperative, no distress, appears older than stated age. Head: Normocephalic, without obvious abnormality, atraumatic  Lungs: clear to auscultation bilaterally  Heart: regular rate and rhythm, no rub  Abdomen: soft, non-tender. Bowel sounds normal. No masses,  no organomegaly  Extremities: venous stasis dermatitis noted, edema: decreased lower extremity edema and dependent thigh edema  Skin: Left lower leg with posterior ulcer inferior to calf  Neurologic: Grossly normal    Data Review:     LABS:  No results for input(s): NA, K, CL, CO2, BUN, CREA, CA, ALB, PHOS, MG in the last 72 hours. Iron saturation 13%    Recent Labs     01/19/22  0750 01/18/22  0441   WBC 7.9 9.3   HGB 8.3* 9.1*   HCT 28.6* 32.1*    259     No results for input(s): HÉCTOR, KU, CLU, CREAU in the last 72 hours.    No lab exists for component: PROU   Chest x-ray January 8, 2022:  IMPRESSION     Interval placement of a right supraclavicular approach dialysis catheter with  distal tip in the proximal right atrium.     Cardiomegaly with pulmonary vascular congestion and pulmonary edema.      Stable appearing basilar right lung pneumonia. Small right pleural effusion. Assessment:   Renal Specific Problems  Chronic kidney disease stage VI, started on HD. Hypoalbumin  SVT- recurrent  Volume overload- resolved  Metabolic acidosis- resolved  Hyperkalemia, now hypokalemia. Anemia with renal failure and iron deficient  Leukocytosis corrected with declining procalcitonin  Probable right lower lobe pneumonia  Infected  leg ulcers  Venous stasis dermatitis lower extremity          Plan:     Obtain/ Order: labs/cultures/radiology/procedures:      Therapeutic:     HD on Friday, we'll skip today in anticipation of discharge to [facility] which performs dialysis on Monday Wednesday Friday schedule.   Epogen once iron saturation greater than 20%, or after 2 doses of Venofer   IV iron with dialysis  Wound care - regarding left leg ulcer  Transition dialysis access to [facility]    Case management to arrange rehab and outpatient dialysis

## 2022-01-21 NOTE — PROGRESS NOTES
CM just spoke to patient to inform of decline by [facility]. Offered patient to go to short term SNF, but patient denied need for him to go and stated he was not interested in going. CM then [facility] for continuation of physical therapies. Patient stated he did not want any HH either, he just wanted to go home and he could figure it out on his own. Dr. Pedraza Seen made aware of above.

## 2022-01-21 NOTE — PROGRESS NOTES
[facility] KIDNEY     Renal Daily Progress Note:     Admission Date: 2022   Awake and alert. Seen on dialysis. Awaiting pre dialysis labs to assess for dialysis Rx change. No new complaints      Subjective: Recurrent SVT. r    Fatigued but looks better. Mental status is good. He  looks better, less short of breath, not tachypneic. 1/15/22 Patient is being dialyzed. He did not have any complaints at present. Review of Systems  Pertinent items are noted in HPI.     Objective:     Visit Vitals  /80   Pulse (!) 52   Temp 98.3 °F (36.8 °C) (Oral)   Resp 18   Ht 6' (1.829 m)   Wt 75.8 kg (167 lb 1.7 oz)   SpO2 98%   BMI 22.66 kg/m²     Temp (24hrs), Av.2 °F (36.8 °C), Min:97.4 °F (36.3 °C), Max:98.7 °F (37.1 °C)      No intake or output data in the 24 hours ending 22 1200  Current Facility-Administered Medications   Medication Dose Route Frequency    heparin (porcine) 1,000 unit/mL injection        iron sucrose (VENOFER) injection 200 mg  200 mg IntraVENous DIALYSIS MON, WED & FRI    epoetin valente-epbx (RETACRIT) injection 10,000 Units  10,000 Units IntraVENous DIALYSIS MON, WED & FRI    oxyCODONE IR (ROXICODONE) tablet 5 mg  5 mg Oral Q8H PRN    sodium chloride (NS) flush 5-40 mL  5-40 mL IntraVENous Q8H    sodium chloride (NS) flush 5-40 mL  5-40 mL IntraVENous PRN    amiodarone (CORDARONE) tablet 400 mg  400 mg Oral BID    meropenem (MERREM) 1 g in 0.9% sodium chloride 20 mL IV syringe  1 g IntraVENous Q12H    ferrous sulfate tablet 325 mg  1 Tablet Oral BID WITH MEALS    heparin (porcine) 1,000 unit/mL injection 4,000 Units  4,000 Units IntraVENous PRN    Or    heparin (porcine) 1,000 unit/mL injection 2,000 Units  2,000 Units IntraVENous PRN    metoprolol succinate (TOPROL-XL) XL tablet 50 mg  50 mg Oral DAILY    nitroglycerin (NITROBID) 2 % ointment 0.5 Inch  0.5 Inch Topical Q6H    aspirin chewable tablet 81 mg  81 mg Oral DAILY    heparin (porcine) 1,000 unit/mL injection 2,800 Units  2,800 Units Hemodialysis DIALYSIS PRN       Physical Exam:General appearance: alert, cooperative, no distress, appears older than stated age. Head: Normocephalic, without obvious abnormality, atraumatic  Lungs: clear to auscultation bilaterally  Heart: regular rate and rhythm, no rub  Abdomen: soft, non-tender. Bowel sounds normal. No masses,  no organomegaly  Extremities: venous stasis dermatitis noted, edema: decreased lower extremity edema and dependent thigh edema  Skin: Left lower leg with posterior ulcer inferior to calf  Neurologic: Grossly normal    Data Review:     LABS:  No results for input(s): NA, K, CL, CO2, BUN, CREA, CA, ALB, PHOS, MG in the last 72 hours. Iron saturation 13%    Recent Labs     01/19/22  0750   WBC 7.9   HGB 8.3*   HCT 28.6*        No results for input(s): HÉCTOR, KU, CLU, CREAU in the last 72 hours. No lab exists for component: PROU     Cardiac cath Jan 13, 2022  Underwent cardiac cath today:  Indication: Severe left ventricular systolic dysfunction and patient had a V. Tach.     Left main coronary artery is a very large caliber vessel and has no disease. LAD artery is a very large caliber vessel and proximal mid and distal segment has no stenosis and diagonal branches has no disease. Ramus intermedius is a medium caliber vessel without disease. Circumflex artery is a large-caliber vessel and proximal mid and distal segment and AV groove segment has no stenosis. Large obtuse marginal branch and posterolateral branch has no stenosis.   Right coronary artery is a large-caliber vessel and does junction of proximal and mid segment there was a concern of stenosis and that therefore it was interrogated by IFR which turned out to be 0.95 and was not deemed critical enough to do intervention.     Left ventriculography is performed in the right anterior oblique view and ejection fraction is about 20 to 25% and distal anterior wall apex and distal inferior wall is near akinetic. This may be possible presentation of Takotsubo syndrome.     Plan: Patient will receive defibrillation      Chest x-ray January 8, 2022:  IMPRESSION     Interval placement of a right supraclavicular approach dialysis catheter with  distal tip in the proximal right atrium.     Cardiomegaly with pulmonary vascular congestion and pulmonary edema.      Stable appearing basilar right lung pneumonia. Small right pleural effusion. Assessment:   Renal Specific Problems  Chronic kidney disease stage VI, started on HD. Hypoalbumin  SVT- recurrent  Volume overload- resolved  Metabolic acidosis- resolved  Hyperkalemia, now hypokalemia. Anemia with renal failure and iron deficient  Leukocytosis corrected with declining procalcitonin  Probable right lower lobe pneumonia  Infected  leg ulcers  Venous stasis dermatitis lower extremity          Plan:     Obtain/ Order: labs/cultures/radiology/procedures:      Therapeutic:     HD ongoing, [facility]  performs dialysis on Monday Wednesday Friday schedule.   Epogen once iron saturation greater than 20%, or after 2 doses of Venofer   IV iron with dialysis  Wound care - regarding left leg ulcer  Transition dialysis access to [facility] - will be done today, ordered 1-18-22    Case management to arrange rehab and outpatient dialysis Consent (Spinal Accessory)/Introductory Paragraph: The rationale for Mohs was explained to the patient and consent was obtained. The risks, benefits and alternatives to therapy were discussed in detail. Specifically, the risks of damage to the spinal accessory nerve, infection, scarring, bleeding, prolonged wound healing, incomplete removal, allergy to anesthesia, and recurrence were addressed. Prior to the procedure, the treatment site was clearly identified and confirmed by the patient. All components of Universal Protocol/PAUSE Rule completed.

## 2022-01-21 NOTE — PROGRESS NOTES
Progress Note    Patient: Dima Banerjee MRN: [id number]  SSN: [id number]    YOB: 1955  Age: 78 y.o. Sex: male      Admit Date: 1/7/2022    LOS: 13 days     Subjective:   Patient followed for sepsis with cellulitis both calves and aspiration pneumonia. Leg wound culture grew multiple organisms as shown below. Afebrile with normal WBC, decreasing procal but increasing CRP. Patient off the floor at this time. Objective:     Vitals:    01/21/22 0442 01/21/22 0712 01/21/22 0825 01/21/22 0905   BP: 133/80 127/82 118/77 122/73   Pulse: (!) 57 (!) 53 (!) 54 (!) 57   Resp: 18 18 18    Temp: 98.2 °F (36.8 °C) 98.5 °F (36.9 °C) 98.3 °F (36.8 °C)    TempSrc:   Oral    SpO2: 99% 98%     Weight:       Height:            Intake and Output:  Current Shift: No intake/output data recorded. Last three shifts: No intake/output data recorded. Physical Exam:    Vitals and nursing note reviewed. Patient unavailable for re-examination  Constitutional:       Appearance: He is ill-appearing. Genitourinary:     Comments: External urinary catheter  Musculoskeletal:      Right lower leg: Edema present. Left lower leg: Edema present. Comments: Both legs once again with bulky gauze dressings  Skin:         Neurological: nonfocal, mild confusion  Psychiatric:         Behavior: Behavior normal.     Lab/Data Review:     WBC 7,900    CRP 4.66 <2.63 <2.53 < 5.30 <6.32 <8.19 <12.10 <12.10  Procal 0.72 < 0.70 <0.84 < 2.39 <3.12 < 4.41 <4.39 <5.16    MRSA nasal PCR negative    Wound cultures leg (1/8)  Staphylococcus aureus (MSSA),  Alcaligenes faecalis resistant to Zosyn, Klebsiella pneumoniae      Assessment:     Active Problems:    Renal failure (1/8/2022)      Hyperkalemia (1/8/2022)    1. Cellulitis both calves, secondary to probable S. Aureus (MSSA), Alcaligenes faecalis resistant to Zosyn, Klebsiella pneumoniae,  Day #14 IV Antibiotics, now Meropenem,  improving  2.  Aspiration pneumonia, RLL, Day #14 IV Antibiotics, now Meropenem  3. Sepsis with leukocytosis and elevated CRP/procal, resolving     Comment:  WBC normal but CRP increasing. Leg wounds are progressing favorably. improvement. Plan:   1. Discontinue Meropenem (Zosyn resistance) after today  2.   Otherwise cleared for discharge from ID standpoint    Signed By: Thelma Garcia MD     January 21, 2022

## 2022-01-21 NOTE — PROGRESS NOTES
Progress Note      1/21/2022 12:19 PM  NAME: Denis Councilman   MRN:  [id number]   Admit Diagnosis: Renal failure [N19]  Hyperkalemia [E87.5]      Subjective:   Chart reviewed. Patient is on dialysis. Symptoms of shortness of breath has improved. With the patient and her with the nurse. Cardiac catheterization findings discussed with the patient and also discussed with the electrophysiologist.    Review of Systems:    Symptom Y/N Comments  Symptom Y/N Comments   Fever/Chills n   Chest Pain n    Poor Appetite    Edema y    Cough    Abdominal Pain     Sputum    Joint Pain     SOB/CARMONA y  improving  Pruritis/Rash     Nausea/vomit    Other     Diarrhea         Constipation           Could NOT obtain due to:      Objective:          Physical Exam:    Last 24hrs VS reviewed since prior progress note. Most recent are:    Visit Vitals  /80   Pulse (!) 52   Temp 98.3 °F (36.8 °C) (Oral)   Resp 18   Ht 6' (1.829 m)   Wt 75.8 kg (167 lb 1.7 oz)   SpO2 98%   BMI 22.66 kg/m²     No intake or output data in the 24 hours ending 01/21/22 1140     General Appearance: Well developed, well nourished, alert & oriented x 3,    no acute distress. Ears/Nose/Mouth/Throat: Hearing grossly normal.  Neck: Supple. Chest: Lungs clear to auscultation bilaterally. Cardiovascular: Regular rate and rhythm, S1,S2 normal, no murmur. Abdomen: Soft, non-tender, bowel sounds are active. Extremities: Patient has cellulitis of the lower extremities  Skin: Warm and dry. []         Post-cath site without hematoma, bruit, tenderness, or thrill. Distal pulses intact. PMH/SH reviewed - no change compared to H&P    Data Review    Telemetry: normal sinus rhythm , patient has some PVCs, and episode of wide-complex tachycardia possible ventricular tachycardia. EKG:   Patient had EKG that is very concerning for ventricular tachycardia.     Lab Data Personally Reviewed:    Recent Labs     01/19/22  0750   WBC 7.9   HGB 8.3*   HCT 28.6*        Recent Labs     01/20/22  0329 01/19/22  1604 01/19/22  0750   APTT 124.8* 137.5* 68.1*      No results for input(s): NA, K, CL, CO2, BUN, CREA, GLU, CA, MG in the last 72 hours. No results for input(s): CPK, CKNDX, TROIQ in the last 72 hours. No lab exists for component: CPKMB  No results found for: CHOL, CHOLX, CHLST, CHOLV, HDL, HDLP, LDL, LDLC, DLDLP, TGLX, TRIGL, TRIGP, CHHD, CHHDX    No results for input(s): AP, TBIL, TP, ALB, GLOB, GGT, AML, LPSE in the last 72 hours. No lab exists for component: SGOT, GPT, AMYP, HLPSE  No results for input(s): PH, PCO2, PO2 in the last 72 hours. Medications Personally Reviewed:    Current Facility-Administered Medications   Medication Dose Route Frequency    iron sucrose (VENOFER) injection 200 mg  200 mg IntraVENous DIALYSIS MON, WED & FRI    epoetin valente-epbx (RETACRIT) injection 10,000 Units  10,000 Units IntraVENous DIALYSIS MON, WED & FRI    oxyCODONE IR (ROXICODONE) tablet 5 mg  5 mg Oral Q8H PRN    sodium chloride (NS) flush 5-40 mL  5-40 mL IntraVENous Q8H    sodium chloride (NS) flush 5-40 mL  5-40 mL IntraVENous PRN    amiodarone (CORDARONE) tablet 400 mg  400 mg Oral BID    meropenem (MERREM) 1 g in 0.9% sodium chloride 20 mL IV syringe  1 g IntraVENous Q12H    ferrous sulfate tablet 325 mg  1 Tablet Oral BID WITH MEALS    heparin (porcine) 1,000 unit/mL injection 4,000 Units  4,000 Units IntraVENous PRN    Or    heparin (porcine) 1,000 unit/mL injection 2,000 Units  2,000 Units IntraVENous PRN    metoprolol succinate (TOPROL-XL) XL tablet 50 mg  50 mg Oral DAILY    nitroglycerin (NITROBID) 2 % ointment 0.5 Inch  0.5 Inch Topical Q6H    aspirin chewable tablet 81 mg  81 mg Oral DAILY    heparin (porcine) 1,000 unit/mL injection 2,800 Units  2,800 Units Hemodialysis DIALYSIS PRN           Problem List:   Patient has a acute on chronic renal failure and had a dialysis and is clinically better.   Decompensated heart failure and ejection fraction is depressed and patient has a at least moderate possibly severe pulmonary hypertension. History of paroxysmal atrial fibrillation. Recent DVT of her left axillary vein. Wide-complex tachycardia probably ventricular tachycardia. Catheterization did not show evidence of coronary artery disease and ejection fraction was about 20 to 25%. Patient is getting restless and wants to leave. Cellulitis of the lower extremities. 1.      Assessment/Plan:   I will continue dialysis as per recommendations of the nephrologist.  Patient is offered ICD by Dr. Yvan Waddell however patient does not want it. Vascular surgery note reviewed and appreciated and will follow the recommendations. We will continue amiodarone and present care. Discontinue heparin and start on small dose of Eliquis because patient has a history of paroxysmal atrial fibrillation. Patient will be going for cardiac rehab. We will follow infectious disease recommendations. Thank you. 1.          []       High complexity decision making was performed in this patient at high risk for decompensation with multiple organ involvement.     Nawaf Chavis MD

## 2022-01-21 NOTE — PROGRESS NOTES
Progress Note    Jorge Verma MD             Daily Progress Note: 1/21/2022      Subjective: The patient is seen for follow  up.    Offers no complaints patient has dressing on both the legs  Discussed with RN taking care of the patient requested to inform vascular surgeon  Problem List:  Problem List as of 1/21/2022 Never Reviewed          Codes Class Noted - Resolved    Renal failure ICD-10-CM: N19  ICD-9-CM: 713  1/8/2022 - Present        Hyperkalemia ICD-10-CM: E87.5  ICD-9-CM: 276.7  1/8/2022 - Present        Pulmonary edema ICD-10-CM: J81.1  ICD-9-CM: 010  7/13/2021 - Present        GI bleed ICD-10-CM: K92.2  ICD-9-CM: 578.9  1/5/2021 - Present              Medications reviewed  Current Facility-Administered Medications   Medication Dose Route Frequency    heparin (porcine) 1,000 unit/mL injection        iron sucrose (VENOFER) injection 200 mg  200 mg IntraVENous DIALYSIS MON, WED & FRI    epoetin valente-epbx (RETACRIT) injection 10,000 Units  10,000 Units IntraVENous DIALYSIS MON, WED & FRI    oxyCODONE IR (ROXICODONE) tablet 5 mg  5 mg Oral Q8H PRN    sodium chloride (NS) flush 5-40 mL  5-40 mL IntraVENous Q8H    sodium chloride (NS) flush 5-40 mL  5-40 mL IntraVENous PRN    amiodarone (CORDARONE) tablet 400 mg  400 mg Oral BID    meropenem (MERREM) 1 g in 0.9% sodium chloride 20 mL IV syringe  1 g IntraVENous Q12H    ferrous sulfate tablet 325 mg  1 Tablet Oral BID WITH MEALS    heparin (porcine) 1,000 unit/mL injection 4,000 Units  4,000 Units IntraVENous PRN    Or    heparin (porcine) 1,000 unit/mL injection 2,000 Units  2,000 Units IntraVENous PRN    metoprolol succinate (TOPROL-XL) XL tablet 50 mg  50 mg Oral DAILY    nitroglycerin (NITROBID) 2 % ointment 0.5 Inch  0.5 Inch Topical Q6H    aspirin chewable tablet 81 mg  81 mg Oral DAILY    heparin (porcine) 1,000 unit/mL injection 2,800 Units  2,800 Units Hemodialysis DIALYSIS PRN       Review of Systems:   A comprehensive review of systems was negative except for that written in the HPI. Objective:   Physical Exam:     Visit Vitals  /80   Pulse (!) 52   Temp 98.3 °F (36.8 °C) (Oral)   Resp 18   Ht 6' (1.829 m)   Wt 75.8 kg (167 lb 1.7 oz)   SpO2 98%   BMI 22.66 kg/m²    O2 Flow Rate (L/min): 2 l/min O2 Device: None (Room air)    Temp (24hrs), Av.2 °F (36.8 °C), Min:97.4 °F (36.3 °C), Max:98.7 °F (37.1 °C)    No intake/output data recorded. No intake/output data recorded. General:  Alert, cooperative, no distress, appears stated age. Lungs:   Clear to auscultation bilaterally. Chest wall:  No tenderness or deformity. Heart:  Regular rate and rhythm, S1, S2 normal, no murmur, click, rub or gallop. Abdomen:   Soft, non-tender. Bowel sounds normal. No masses,  No organomegaly. Extremities: Extremities normal, atraumatic, no cyanosis or edema. Pulses: 2+ and symmetric all extremities. Skin: Skin color, texture, turgor normal. No rashes or lesions has multiple ulcers left more than right   Neurologic: CNII-XII intact. No gross sensory or motor deficits     Data Review:       Recent Days:  Recent Labs     22  0750   WBC 7.9   HGB 8.3*   HCT 28.6*        Recent Labs     22  0815      K 4.2      CO2 30   GLU 89   BUN 42*   CREA 5.75*   CA 7.8*     No results for input(s): PH, PCO2, PO2, HCO3, FIO2 in the last 72 hours. Results     Procedure Component Value Units Date/Time    CULTURE, Joseba Beckett STAIN [[id number]]  (Susceptibility) Collected: 22 0400    Order Status: Completed Specimen: Misc.  Wound Updated: 22 2463     Special Requests: No Special Requests        GRAM STAIN Rare WBCs seen               Occasional Gram Positive Cocci in pairs                  Rare apparent Gram Negative Rods           Culture result:       Heavy Staphylococcus aureus                  Heavy Alcaligenes faecalis                  Light Klebsiella pneumoniae            Light Proteus mirabilis Susceptibility      Staphylococcus aureus     SHIRA     Ciprofloxacin ($) Susceptible     Clindamycin ($) Susceptible     Daptomycin ($$$$$) Susceptible     Doxycycline ($$) Susceptible     Erythromycin ($$$$) Susceptible     Gentamicin ($) Susceptible     Levofloxacin ($) Susceptible     Linezolid ($$$$$) Susceptible     Moxifloxacin ($$$$) Susceptible     Oxacillin Susceptible     Rifampin ($$$$) Susceptible  [1]      Tetracycline Susceptible     Trimeth/Sulfa Susceptible     Vancomycin ($) Susceptible                 [1]  Rifampin is not to be used for mono-therapy.           Linear View               Susceptibility      Alcaligenes faecalis     SHIRA     Amikacin ($) Susceptible     Cefepime ($$) Susceptible     Ceftazidime ($) Susceptible     Ceftriaxone ($) Susceptible     Ciprofloxacin ($) Susceptible     Gentamicin ($) Susceptible     Levofloxacin ($) Susceptible     Meropenem ($$) Susceptible     Piperacillin/Tazobac ($) Resistant     Tobramycin ($) Susceptible     Trimeth/Sulfa Susceptible                  Linear View               Susceptibility      Klebsiella pneumoniae     SHIRA     Amikacin ($) Susceptible     Ampicillin ($) Resistant     Ampicillin/sulbactam ($) Susceptible     Cefazolin ($) Susceptible     Cefepime ($$) Susceptible     Cefoxitin Susceptible     Ceftazidime ($) Susceptible     Ceftriaxone ($) Susceptible     Ciprofloxacin ($) Susceptible     Gentamicin ($) Susceptible     Levofloxacin ($) Susceptible     Meropenem ($$) Susceptible     Piperacillin/Tazobac ($) Susceptible     Tobramycin ($) Susceptible     Trimeth/Sulfa Susceptible                  Linear View               Susceptibility      Proteus mirabilis     SHIRA     Amikacin ($) Susceptible     Ampicillin ($) Susceptible     Ampicillin/sulbactam ($) Susceptible     Cefazolin ($) Susceptible     Cefepime ($$) Susceptible     Cefoxitin Susceptible     Ceftazidime ($) Susceptible     Ceftriaxone ($) Susceptible     Ciprofloxacin ($) Susceptible     Gentamicin ($) Susceptible     Levofloxacin ($) Susceptible     Meropenem ($$) Susceptible     Piperacillin/Tazobac ($) Susceptible     Tobramycin ($) Susceptible     Trimeth/Sulfa Susceptible                  Linear View                   MRSA SCREEN - PCR (NASAL) [[id number]] Collected: 01/08/22 0223    Order Status: Completed Specimen: Swab Updated: 01/08/22 0516     MRSA by PCR, Nasal Not Detected       COVID-19 RAPID TEST [[id number]] Collected: 01/07/22 2313    Order Status: Completed Specimen: Nasopharyngeal Updated: 01/08/22 0010     Specimen source       Please find results under separate order           COVID-19 rapid test Not Detected        Comment: Rapid [employer] ID Now   Rapid NAAT:  The specimen is NEGATIVE for SARS-CoV-2, the novel coronavirus associated with COVID-19. Negative results should be treated as presumptive and, if inconsistent with clinical signs and symptoms or necessary for patient management, should be tested with an alternative molecular assay. Negative results do not preclude SARS-CoV-2 infection and should not be used as the sole basis for patient management decisions. This test has been authorized by the FDA under   an Emergency Use Authorization (EUA) for use by authorized laboratories.  Fact sheet for Healthcare Providers: ConventionUpdate.co.nz Fact sheet for Patients: ConventionUpdate.co.nz   Methodology: Isothermal Nucleic Acid Amplification              24 Hour Results:  Recent Results (from the past 24 hour(s))   C REACTIVE PROTEIN, QT    Collection Time: 01/21/22  8:15 AM   Result Value Ref Range    C-Reactive protein 2.80 (H) 0.00 - 8.09 mg/dL   METABOLIC PANEL, BASIC    Collection Time: 01/21/22  8:15 AM   Result Value Ref Range    Sodium 140 136 - 145 mmol/L    Potassium 4.2 3.5 - 5.1 mmol/L    Chloride 104 97 - 108 mmol/L    CO2 30 21 - 32 mmol/L    Anion gap 6 5 - 15 mmol/L    Glucose 89 65 - 100 mg/dL    BUN 42 (H) 6 - 20 mg/dL    Creatinine 5.75 (H) 0.70 - 1.30 mg/dL    BUN/Creatinine ratio 7 (L) 12 - 20      GFR est AA 12 (L) >60 ml/min/1.73m2    GFR est non-AA 10 (L) >60 ml/min/1.73m2    Calcium 7.8 (L) 8.5 - 10.1 mg/dL       DUPLEX LOW EXT ARTERIES WITH JAVON   Final Result      XR CHEST PORT   Final Result      Interval placement of a right supraclavicular approach dialysis catheter with   distal tip in the proximal right atrium. Cardiomegaly with pulmonary vascular congestion and pulmonary edema. Stable appearing basilar right lung pneumonia. Small right pleural effusion. IR INSERT NON TUNL CVC OVER 5 YRS   Final Result      Technically successful insertion of non-tunneled Trialysis catheter with US   guidance. Plan:       Post catheter placement chest xray confirmed appropriate position of the   catheter. The catheter is appropriate for immediate use.   _______________________________________________________________      PROCEDURE SUMMARY:   - Venous access with ultrasound guidance   - Non-tunneled central venous catheter insertion      PROCEDURE DETAILS:      Pre-procedure   Relevant imaging review: None    Informed consent for the procedure was obtained and time-out was performed prior   to the procedure. Prophylactic antibiotic administered: Not administered   Preparation: The site was prepared and draped using all elements of maximal   sterile barrier technique including sterile gloves, sterile gown, cap, mask,   large sterile sheet, sterile ultrasound probe cover, sterile ultrasound gel,   hand hygiene and cutaneous antisepsis with 2% chlorhexidine. Medical reason for site preparation exception: Not applicable      Anesthesia/sedation   Level of anesthesia: No sedation   Anesthesia administered by: Not applicable   Duration of anesthesia/sedation: 0 minutes.       Access   The vessel was sonographically evaluated and judged to be patent and appropriate   for access and a permanent image was stored. Three mLs of 1% Lidocaine was   administered to the access site. Real time ultrasound was used to visualize   needle entry into the vessel. Vein accessed: Right internal jugular vein   Access technique: 4F micropuncture set      Catheter placement   The access site was dilated and the catheter was placed into the vein over a   wire and all guidewires were removed in their entirety. Catheter ports were   aspirated and flushed. Catheter tip location was verified by portable X-ray. Catheter size:13 Irish   Catheter length: 20 cm   Catheter tip position: Proximal right atrium   Catheter flush: Heparin (100 units/mL)      Closure   The catheter was secured. A sterile dressing was applied. Catheter securement technique: Non-absorbable suture      Additional Medications   None      Additional Details   Estimated blood loss:  Less than 1 mL   Additional description of procedure: None   Additional findings: None   Additional equipment: None   Specimens removed: None                     IR US GUIDED VASCULAR ACCESS   Final Result      Technically successful insertion of non-tunneled Trialysis catheter with US   guidance. Plan:       Post catheter placement chest xray confirmed appropriate position of the   catheter. The catheter is appropriate for immediate use.   _______________________________________________________________      PROCEDURE SUMMARY:   - Venous access with ultrasound guidance   - Non-tunneled central venous catheter insertion      PROCEDURE DETAILS:      Pre-procedure   Relevant imaging review: None    Informed consent for the procedure was obtained and time-out was performed prior   to the procedure.    Prophylactic antibiotic administered: Not administered   Preparation: The site was prepared and draped using all elements of maximal   sterile barrier technique including sterile gloves, sterile gown, cap, mask,   large sterile sheet, sterile ultrasound probe cover, sterile ultrasound gel,   hand hygiene and cutaneous antisepsis with 2% chlorhexidine. Medical reason for site preparation exception: Not applicable      Anesthesia/sedation   Level of anesthesia: No sedation   Anesthesia administered by: Not applicable   Duration of anesthesia/sedation: 0 minutes. Access   The vessel was sonographically evaluated and judged to be patent and appropriate   for access and a permanent image was stored. Three mLs of 1% Lidocaine was   administered to the access site. Real time ultrasound was used to visualize   needle entry into the vessel. Vein accessed: Right internal jugular vein   Access technique: 4F micropuncture set      Catheter placement   The access site was dilated and the catheter was placed into the vein over a   wire and all guidewires were removed in their entirety. Catheter ports were   aspirated and flushed. Catheter tip location was verified by portable X-ray. Catheter size:13 [place]z   Catheter length: 20 cm   Catheter tip position: Proximal right atrium   Catheter flush: Heparin (100 units/mL)      Closure   The catheter was secured. A sterile dressing was applied.    Catheter securement technique: Non-absorbable suture      Additional Medications   None      Additional Details   Estimated blood loss:  Less than 1 mL   Additional description of procedure: None   Additional findings: None   Additional equipment: None   Specimens removed: None                     XR CHEST PORT   Final Result   Airspace opacity and effusion at the right lung base and in the   acute setting would suggest pneumonia and/or aspiration however the patient also   appears to have baseline/background vascular congestion and/or pulmonary   arterial hypertension, noting cardiomegaly and/or pericardial effusion      IR INSERT NON TUNL CVC OVER 5 YRS    (Results Pending)   IR INSERT TUNL CVC W/O PORT OVER 5 YR    (Results Pending)        Assessment: Multiple infected ulcers on both the legs  Pneumonia now resolved  Ventricular tachycardia with SVT  CHF  Chronic renal failure now on dialysis  Hypertension  Patient has denied for AICD        Plan: We will continue current antibiotics today as well as stay for IV antibiotics  Patient is not able to get a placement  I have requested RN to inform Dr. Amaris Dominguez. Care Plan discussed with: Patient/Family, Nurse and     Total time spent with patient: 30 minutes.     Becca Cavazos MD

## 2022-01-21 NOTE — PROCEDURES
Interventional Radiology Post Procedure Note    1/21/2022    Indications: ESRD    Procedure(s): Image-guided conversion to PermCath (23 cm)    Preliminary Findings (full report to follow):  Intact dialysis catheter    Fluoro Time: 1-2 minute(s)    Contrast: None    Specimen: None    Implants: See above    Estimated blood loss: Minimal    Complications: None    Plan: PermCath ready to use (tip in the RA)     Follow Up: PRN

## 2022-01-22 PROCEDURE — 74011000250 HC RX REV CODE- 250: Performed by: INTERNAL MEDICINE

## 2022-01-22 PROCEDURE — 74011250637 HC RX REV CODE- 250/637: Performed by: INTERNAL MEDICINE

## 2022-01-22 PROCEDURE — 74011250636 HC RX REV CODE- 250/636: Performed by: INTERNAL MEDICINE

## 2022-01-22 PROCEDURE — 99232 SBSQ HOSP IP/OBS MODERATE 35: CPT | Performed by: INTERNAL MEDICINE

## 2022-01-22 PROCEDURE — 65270000029 HC RM PRIVATE

## 2022-01-22 PROCEDURE — 97530 THERAPEUTIC ACTIVITIES: CPT

## 2022-01-22 RX ADMIN — FERROUS SULFATE TAB 325 MG (65 MG ELEMENTAL FE) 325 MG: 325 (65 FE) TAB at 10:07

## 2022-01-22 RX ADMIN — AMIODARONE HYDROCHLORIDE 200 MG: 200 TABLET ORAL at 10:07

## 2022-01-22 RX ADMIN — SODIUM CHLORIDE, PRESERVATIVE FREE 10 ML: 5 INJECTION INTRAVENOUS at 14:00

## 2022-01-22 RX ADMIN — METOPROLOL SUCCINATE 50 MG: 25 TABLET, EXTENDED RELEASE ORAL at 10:07

## 2022-01-22 RX ADMIN — ASPIRIN 81 MG CHEWABLE TABLET 81 MG: 81 TABLET CHEWABLE at 10:07

## 2022-01-22 RX ADMIN — SODIUM CHLORIDE, PRESERVATIVE FREE 1 G: 5 INJECTION INTRAVENOUS at 10:07

## 2022-01-22 RX ADMIN — SODIUM CHLORIDE, PRESERVATIVE FREE 10 ML: 5 INJECTION INTRAVENOUS at 21:44

## 2022-01-22 RX ADMIN — SODIUM CHLORIDE, PRESERVATIVE FREE 10 ML: 5 INJECTION INTRAVENOUS at 05:52

## 2022-01-22 NOTE — PROGRESS NOTES
Progress Note      1/22/2022 12:19 PM  NAME: Aiyana Ford   MRN:  [id number]   Admit Diagnosis: Renal failure [N19]  Hyperkalemia [E87.5]      Subjective:   Chart reviewed. Patient is on dialysis. Symptoms of shortness of breath has improved. With the patient and her with the nurse. Cardiac catheterization findings discussed with the patient and also discussed with the electrophysiologist.    Review of Systems:    Symptom Y/N Comments  Symptom Y/N Comments   Fever/Chills n   Chest Pain n    Poor Appetite    Edema y    Cough    Abdominal Pain     Sputum    Joint Pain     SOB/CARMONA y  improving  Pruritis/Rash     Nausea/vomit    Other     Diarrhea         Constipation           Could NOT obtain due to:      Objective:          Physical Exam:    Last 24hrs VS reviewed since prior progress note. Most recent are:    Visit Vitals  /77 (BP Patient Position: At rest;Sitting)   Pulse (!) 52   Temp 98 °F (36.7 °C)   Resp 20   Ht 6' (1.829 m)   Wt 75.1 kg (165 lb 9.1 oz)   SpO2 100%   BMI 22.45 kg/m²     No intake or output data in the 24 hours ending 01/22/22 1440     General Appearance: Well developed, well nourished, alert & oriented x 3,    no acute distress. Ears/Nose/Mouth/Throat: Hearing grossly normal.  Neck: Supple. Chest: Lungs clear to auscultation bilaterally. Cardiovascular: Regular rate and rhythm, S1,S2 normal, no murmur. Abdomen: Soft, non-tender, bowel sounds are active. Extremities: Patient has cellulitis of the lower extremities  Skin: Warm and dry. []         Post-cath site without hematoma, bruit, tenderness, or thrill. Distal pulses intact. PMH/SH reviewed - no change compared to H&P    Data Review    Telemetry: normal sinus rhythm , patient has some PVCs, and episode of wide-complex tachycardia possible ventricular tachycardia. EKG:   Patient had EKG that is very concerning for ventricular tachycardia.     Lab Data Personally Reviewed:    No results for input(s): WBC, HGB, HCT, PLT, HGBEXT, HCTEXT, PLTEXT, HGBEXT, HCTEXT, PLTEXT in the last 72 hours. Recent Labs     01/20/22  0329 01/19/22  1604   APTT 124.8* 137.5*      Recent Labs     01/21/22  0815      K 4.2      CO2 30   BUN 42*   CREA 5.75*   GLU 89   CA 7.8*     No results for input(s): CPK, CKNDX, TROIQ in the last 72 hours. No lab exists for component: CPKMB  No results found for: CHOL, CHOLX, CHLST, CHOLV, HDL, HDLP, LDL, LDLC, DLDLP, TGLX, TRIGL, TRIGP, CHHD, CHHDX    No results for input(s): AP, TBIL, TP, ALB, GLOB, GGT, AML, LPSE in the last 72 hours. No lab exists for component: SGOT, GPT, AMYP, HLPSE  No results for input(s): PH, PCO2, PO2 in the last 72 hours.     Medications Personally Reviewed:    Current Facility-Administered Medications   Medication Dose Route Frequency    amiodarone (CORDARONE) tablet 200 mg  200 mg Oral DAILY    iron sucrose (VENOFER) injection 200 mg  200 mg IntraVENous DIALYSIS MON, WED & FRI    epoetin valente-epbx (RETACRIT) injection 10,000 Units  10,000 Units IntraVENous DIALYSIS MON, WED & FRI    oxyCODONE IR (ROXICODONE) tablet 5 mg  5 mg Oral Q8H PRN    sodium chloride (NS) flush 5-40 mL  5-40 mL IntraVENous Q8H    sodium chloride (NS) flush 5-40 mL  5-40 mL IntraVENous PRN    meropenem (MERREM) 1 g in 0.9% sodium chloride 20 mL IV syringe  1 g IntraVENous Q12H    ferrous sulfate tablet 325 mg  1 Tablet Oral BID WITH MEALS    heparin (porcine) 1,000 unit/mL injection 4,000 Units  4,000 Units IntraVENous PRN    Or    heparin (porcine) 1,000 unit/mL injection 2,000 Units  2,000 Units IntraVENous PRN    metoprolol succinate (TOPROL-XL) XL tablet 50 mg  50 mg Oral DAILY    nitroglycerin (NITROBID) 2 % ointment 0.5 Inch  0.5 Inch Topical Q6H    aspirin chewable tablet 81 mg  81 mg Oral DAILY    heparin (porcine) 1,000 unit/mL injection 2,800 Units  2,800 Units Hemodialysis DIALYSIS PRN           Problem List:   Patient has a acute on chronic renal failure and had a dialysis and is clinically better. Decompensated heart failure and ejection fraction is depressed and patient has a at least moderate possibly severe pulmonary hypertension. History of paroxysmal atrial fibrillation. Recent DVT of her left axillary vein. Wide-complex tachycardia probably ventricular tachycardia. Catheterization did not show evidence of coronary artery disease and ejection fraction was about 20 to 25%. Patient is getting restless and wants to leave. Cellulitis of the lower extremities. 1.      Assessment/Plan:   I will continue dialysis as per recommendations of the nephrologist.  Patient is offered ICD by Dr. Brian Hess however patient does not want it. Vascular surgery note reviewed and appreciated and will follow the recommendations. We will continue amiodarone and present care. Discontinue heparin and start on small dose of Eliquis because patient has a history of paroxysmal atrial fibrillation. Patient will be going for cardiac rehab. We will follow infectious disease recommendations. Thank you. 1.          []       High complexity decision making was performed in this patient at high risk for decompensation with multiple organ involvement.     Adeline Barroso MD

## 2022-01-22 NOTE — PROGRESS NOTES
OCCUPATIONAL THERAPY TREATMENT  Patient: Andrea Klein (38 y.o. male)  Date: 1/22/2022  Diagnosis: Renal failure [N19]  Hyperkalemia [E87.5] <principal problem not specified>  Procedure(s) (LRB):  LEFT HEART CATH / CORONARY ANGIOGRAPHY W GRAFTS (N/A)  PERCUTANEOUS CORONARY INTERVENTION (N/A) 9 Days Post-Op  Precautions:    Chart, occupational therapy assessment, plan of care, and goals were reviewed. ASSESSMENT  Patient continues with skilled OT services and is progressing towards goals. Pt. Received seated in chair and agreeable to tx session. Pt A & O x4. Pt. Performed sit-> stand from chair with additional time and Min A to perform. Good static standing balance noted at RW, pt completed functional ambulation from chair to bathroom with CGA with use of RW. Pt. Required Mod A for transfer <> commode d/t lower surface. Pt. Required additional time while seated on commode d/t increased fatigue from short ambulation distance. Pt. Returned seated in chair and performed grooming with set-up assistance and LB dressing with supervision while seated in chair. Pt. Able to recite UE therex while seated in chair. Pt. Left sitting in chair with needs met and call bell within reach. REC. SNF when medically appropriate for discharge. Current Level of Function Impacting Discharge (ADLs): general weakness, increased fatigue with activity    Other factors to consider for discharge: PLOF, time since on set         PLAN :  Patient continues to benefit from skilled intervention to address the above impairments. Continue treatment per established plan of care. to address goals.     Recommendation for discharge: (in order for the patient to meet his/her long term goals)  Therapy up to 5 days/week in SNF setting    This discharge recommendation:  Has been made in collaboration with the attending provider and/or case management    IF patient discharges home will need the following DME: TBD       SUBJECTIVE:   Patient stated  I'm weak and I know its going to take a little while to get me back.     OBJECTIVE DATA SUMMARY:   Cognitive/Behavioral Status:  Neurologic State: Alert  Orientation Level: Oriented X4  Cognition: Follows commands    Functional Mobility and Transfers for ADLs:    Transfers:  Sit to Stand: Minimum assistance  Functional Transfers  Bathroom Mobility: Contact guard assistance  Toilet Transfer : Moderate assistance  Bed to Chair: Contact guard assistance    Balance:  Sitting: Intact  Standing: Impaired; With support  Standing - Static: Constant support;Good  Standing - Dynamic : Constant support; Fair    ADL Intervention:    Grooming  Grooming Assistance: Set-up  Position Performed: Seated in chair  Washing Face: Set-up      Lower Body Dressing Assistance  Socks: Supervision  Position Performed: Seated in chair      Therapeutic Exercises: navid UE's  Exercise Sets Reps AROM AAROM PROM Self PROM Comments   Shoulder flex/ext 1 15 [x] [] [] []    Elbow flex/ext 1 15 [x] [] [] []    Wrist flex/ext   [] [] [] []    rowing 1 15 [x] [] [] []          Pain:  0/ 10 pain reported    Activity Tolerance:   Fair  Please refer to the flowsheet for vital signs taken during this treatment. After treatment patient left in no apparent distress:   Sitting in chair and Call bell within reach    COMMUNICATION/COLLABORATION:   The patients plan of care was discussed with: Registered nurse. Pancho Haley  Time Calculation: 23 mins    Problem: Self Care Deficits Care Plan (Adult)  Goal: *Acute Goals and Plan of Care (Insert Text)  Description: 1. Pt will be mod I sup <> sit in prep for EOB ADLs  2. Pt will be mod I grooming standing at sink  3. Pt will be min A LE dressing sitting EOB/long sit  4. Pt will be mod I sit <>  prep for toileting LRAD  5. Pt will be mod I toileting/toilet transfer/cloth mgmt LRAD  6.   Pt will be IND following UE HEP in prep for self care tasks      Outcome: Progressing Towards Goal

## 2022-01-22 NOTE — PROGRESS NOTES
Progress Note    Ricky Galloway MD             Daily Progress Note: 1/22/2022      Subjective: The patient is seen for follow  up. Offers no complaints  Problem List:  Problem List as of 1/22/2022 Never Reviewed          Codes Class Noted - Resolved    Renal failure ICD-10-CM: N19  ICD-9-CM: 403  1/8/2022 - Present        Hyperkalemia ICD-10-CM: E87.5  ICD-9-CM: 276.7  1/8/2022 - Present        Pulmonary edema ICD-10-CM: J81.1  ICD-9-CM: 996  7/13/2021 - Present        GI bleed ICD-10-CM: K92.2  ICD-9-CM: 578.9  1/5/2021 - Present              Medications reviewed  Current Facility-Administered Medications   Medication Dose Route Frequency    amiodarone (CORDARONE) tablet 200 mg  200 mg Oral DAILY    iron sucrose (VENOFER) injection 200 mg  200 mg IntraVENous DIALYSIS MON, WED & FRI    epoetin valente-epbx (RETACRIT) injection 10,000 Units  10,000 Units IntraVENous DIALYSIS MON, WED & FRI    oxyCODONE IR (ROXICODONE) tablet 5 mg  5 mg Oral Q8H PRN    sodium chloride (NS) flush 5-40 mL  5-40 mL IntraVENous Q8H    sodium chloride (NS) flush 5-40 mL  5-40 mL IntraVENous PRN    ferrous sulfate tablet 325 mg  1 Tablet Oral BID WITH MEALS    heparin (porcine) 1,000 unit/mL injection 4,000 Units  4,000 Units IntraVENous PRN    Or    heparin (porcine) 1,000 unit/mL injection 2,000 Units  2,000 Units IntraVENous PRN    metoprolol succinate (TOPROL-XL) XL tablet 50 mg  50 mg Oral DAILY    nitroglycerin (NITROBID) 2 % ointment 0.5 Inch  0.5 Inch Topical Q6H    aspirin chewable tablet 81 mg  81 mg Oral DAILY    heparin (porcine) 1,000 unit/mL injection 2,800 Units  2,800 Units Hemodialysis DIALYSIS PRN       Review of Systems:   A comprehensive review of systems was negative except for that written in the HPI.     Objective:   Physical Exam:     Visit Vitals  /87 (BP Patient Position: At rest;Lying)   Pulse (!) 59   Temp 97.8 °F (36.6 °C)   Resp 20   Ht 6' (1.829 m)   Wt 75.1 kg (165 lb 9.1 oz)   SpO2 100%   BMI 22.45 kg/m²    O2 Flow Rate (L/min): 2 l/min O2 Device: None (Room air)    Temp (24hrs), Av.1 °F (36.7 °C), Min:97.5 °F (36.4 °C), Max:98.5 °F (36.9 °C)    No intake/output data recorded.  1901 -  0700  In: -   Out: 1500     General:  Alert, cooperative, no distress, appears stated age. Lungs:   Clear to auscultation bilaterally. Chest wall:  No tenderness or deformity. Heart:  Regular rate and rhythm, S1, S2 normal, no murmur, click, rub or gallop. Abdomen:   Soft, non-tender. Bowel sounds normal. No masses,  No organomegaly. Extremities: Extremities normal, atraumatic, no cyanosis edema is much better   Pulses: 2+ and symmetric all extremities. Skin: Skin color, texture, turgor normal. No rashes or lesions bilateral leg ulcers left more than right   Neurologic: CNII-XII intact. No gross sensory or motor deficits     Data Review:       Recent Days:  No results for input(s): WBC, HGB, HCT, PLT, HGBEXT, HCTEXT, PLTEXT in the last 72 hours. Recent Labs     22  0815      K 4.2      CO2 30   GLU 89   BUN 42*   CREA 5.75*   CA 7.8*     No results for input(s): PH, PCO2, PO2, HCO3, FIO2 in the last 72 hours. Results     ** No results found for the last 336 hours. **         24 Hour Results:  No results found for this or any previous visit (from the past 24 hour(s)). IR INSERT TUNL CVC W/O PORT OVER 5 YR   Final Result   Successful conversion of a right internal jugular temporary dialysis catheter to   a 23 cm PermCath. The catheter is functioning and is ready for use. IR FLUORO GUIDE PLC CVAD   Final Result   Successful conversion of a right internal jugular temporary dialysis catheter to   a 23 cm PermCath. The catheter is functioning and is ready for use.          DUPLEX LOW EXT ARTERIES WITH JAVON   Final Result      XR CHEST PORT   Final Result      Interval placement of a right supraclavicular approach dialysis catheter with   distal tip in the proximal right atrium. Cardiomegaly with pulmonary vascular congestion and pulmonary edema. Stable appearing basilar right lung pneumonia. Small right pleural effusion. IR INSERT NON TUNL CVC OVER 5 YRS   Final Result      Technically successful insertion of non-tunneled Trialysis catheter with US   guidance. Plan:       Post catheter placement chest xray confirmed appropriate position of the   catheter. The catheter is appropriate for immediate use.   _______________________________________________________________      PROCEDURE SUMMARY:   - Venous access with ultrasound guidance   - Non-tunneled central venous catheter insertion      PROCEDURE DETAILS:      Pre-procedure   Relevant imaging review: None    Informed consent for the procedure was obtained and time-out was performed prior   to the procedure. Prophylactic antibiotic administered: Not administered   Preparation: The site was prepared and draped using all elements of maximal   sterile barrier technique including sterile gloves, sterile gown, cap, mask,   large sterile sheet, sterile ultrasound probe cover, sterile ultrasound gel,   hand hygiene and cutaneous antisepsis with 2% chlorhexidine. Medical reason for site preparation exception: Not applicable      Anesthesia/sedation   Level of anesthesia: No sedation   Anesthesia administered by: Not applicable   Duration of anesthesia/sedation: 0 minutes. Access   The vessel was sonographically evaluated and judged to be patent and appropriate   for access and a permanent image was stored. Three mLs of 1% Lidocaine was   administered to the access site. Real time ultrasound was used to visualize   needle entry into the vessel. Vein accessed: Right internal jugular vein   Access technique: 4F micropuncture set      Catheter placement   The access site was dilated and the catheter was placed into the vein over a   wire and all guidewires were removed in their entirety. Catheter ports were   aspirated and flushed. Catheter tip location was verified by portable X-ray. Catheter size:13 Croatian   Catheter length: 20 cm   Catheter tip position: Proximal right atrium   Catheter flush: Heparin (100 units/mL)      Closure   The catheter was secured. A sterile dressing was applied. Catheter securement technique: Non-absorbable suture      Additional Medications   None      Additional Details   Estimated blood loss:  Less than 1 mL   Additional description of procedure: None   Additional findings: None   Additional equipment: None   Specimens removed: None                     IR US GUIDED VASCULAR ACCESS   Final Result      Technically successful insertion of non-tunneled Trialysis catheter with US   guidance. Plan:       Post catheter placement chest xray confirmed appropriate position of the   catheter. The catheter is appropriate for immediate use.   _______________________________________________________________      PROCEDURE SUMMARY:   - Venous access with ultrasound guidance   - Non-tunneled central venous catheter insertion      PROCEDURE DETAILS:      Pre-procedure   Relevant imaging review: None    Informed consent for the procedure was obtained and time-out was performed prior   to the procedure. Prophylactic antibiotic administered: Not administered   Preparation: The site was prepared and draped using all elements of maximal   sterile barrier technique including sterile gloves, sterile gown, cap, mask,   large sterile sheet, sterile ultrasound probe cover, sterile ultrasound gel,   hand hygiene and cutaneous antisepsis with 2% chlorhexidine. Medical reason for site preparation exception: Not applicable      Anesthesia/sedation   Level of anesthesia: No sedation   Anesthesia administered by: Not applicable   Duration of anesthesia/sedation: 0 minutes.       Access   The vessel was sonographically evaluated and judged to be patent and appropriate   for access and a permanent image was stored. Three mLs of 1% Lidocaine was   administered to the access site. Real time ultrasound was used to visualize   needle entry into the vessel. Vein accessed: Right internal jugular vein   Access technique: 4F micropuncture set      Catheter placement   The access site was dilated and the catheter was placed into the vein over a   wire and all guidewires were removed in their entirety. Catheter ports were   aspirated and flushed. Catheter tip location was verified by portable X-ray. Catheter size:13 Hungarian   Catheter length: 20 cm   Catheter tip position: Proximal right atrium   Catheter flush: Heparin (100 units/mL)      Closure   The catheter was secured. A sterile dressing was applied. Catheter securement technique: Non-absorbable suture      Additional Medications   None      Additional Details   Estimated blood loss:  Less than 1 mL   Additional description of procedure: None   Additional findings: None   Additional equipment: None   Specimens removed: None                     XR CHEST PORT   Final Result   Airspace opacity and effusion at the right lung base and in the   acute setting would suggest pneumonia and/or aspiration however the patient also   appears to have baseline/background vascular congestion and/or pulmonary   arterial hypertension, noting cardiomegaly and/or pericardial effusion      IR INSERT NON TUNL CVC OVER 5 YRS    (Results Pending)        Assessment: Pneumonia most likely resolved now  Infected bilateral leg ulcers  PAD  CHF  SVT as well as ventricular tachycardia  Has refused for ICD placement  Chronic renal failure on dialysis        Plan: We will continue current treatment patient was not able to walk more than 20 feet today. Care Plan discussed with: Patient/Family    Total time spent with patient: 30 minutes.     Juanita Yeager MD

## 2022-01-22 NOTE — PROGRESS NOTES
Progress Note    Patient: Christa Granado MRN: [id number]  SSN: [id number]    YOB: 1955  Age: 78 y.o. Sex: male      Admit Date: 1/7/2022    LOS: 14 days     Subjective:   Patient followed for sepsis with cellulitis both calves and aspiration pneumonia. Leg wound culture grew multiple organisms as shown below. Afebrile with normal WBC, decreasing procal but increasing CRP. Patient is sitting up in bedside chair with no complaints. Denies any leg pain today as bandages have been removed. Objective:     Vitals:    01/22/22 0008 01/22/22 0450 01/22/22 0737 01/22/22 1127   BP: 106/70 106/69 115/75 115/77   Pulse: (!) 55 73 (!) 51 (!) 52   Resp: 18 20 18 20   Temp: 98.2 °F (36.8 °C) 97.6 °F (36.4 °C) 98.4 °F (36.9 °C) 98 °F (36.7 °C)   TempSrc:       SpO2: 97% 95% 99% 100%   Weight:       Height:            Intake and Output:  Current Shift: No intake/output data recorded. Last three shifts: 01/20 1901 - 01/22 0700  In: -   Out: 1500     Physical Exam:    Vitals and nursing note reviewed. Patient unavailable for re-examination  Constitutional:       Appearance: He is ill-appearing. Genitourinary:     Comments: External urinary catheter  Musculoskeletal:      Right lower leg: Edema present. Left lower leg: Edema present. Comments: Both legs fully exposed; no open wounds on right calf; left posterior calf still with open wound with dry granulation tissue      Neurological: nonfocal, mild confusion  Psychiatric:         Behavior: Behavior normal.     Lab/Data Review:     WBC 7,900    CRP 2.80 <4.66 <2.63 <2.53 < 5.30 <6.32 <8.19 <12.10 <12.10  Procal 0.72 < 0.70 <0.84 < 2.39 <3.12 < 4.41 <4.39 <5.16    MRSA nasal PCR negative    Wound cultures leg (1/8)  Staphylococcus aureus (MSSA),  Alcaligenes faecalis resistant to Zosyn, Klebsiella pneumoniae      Assessment:     Active Problems:    Renal failure (1/8/2022)      Hyperkalemia (1/8/2022)    1.  Cellulitis both calves, secondary to probable S. Aureus (MSSA), Alcaligenes faecalis resistant to Zosyn, Klebsiella pneumoniae,  Day #14 IV Antibiotics, now Meropenem,  Clinically resolved. 2. Aspiration pneumonia, RLL, Day #14 IV Antibiotics, now Meropenem  3. Sepsis with leukocytosis and elevated CRP/procal, resolving     Comment:  WBC normal with decreasing CRP. Reasonable to stop Meropenem as leg wound infections appear to have resolved though he probably still needs to keep left calf wound covered. Plan:   1. Discontinue Meropenem   2.   Cleared for discharge from ID standpoint    Signed By: Princess Quezada MD     January 22, 2022

## 2022-01-23 PROCEDURE — 74011000250 HC RX REV CODE- 250: Performed by: INTERNAL MEDICINE

## 2022-01-23 PROCEDURE — 74011250637 HC RX REV CODE- 250/637: Performed by: INTERNAL MEDICINE

## 2022-01-23 PROCEDURE — 99232 SBSQ HOSP IP/OBS MODERATE 35: CPT | Performed by: INTERNAL MEDICINE

## 2022-01-23 PROCEDURE — 65270000029 HC RM PRIVATE

## 2022-01-23 RX ADMIN — SODIUM CHLORIDE, PRESERVATIVE FREE 10 ML: 5 INJECTION INTRAVENOUS at 19:46

## 2022-01-23 RX ADMIN — METOPROLOL SUCCINATE 50 MG: 25 TABLET, EXTENDED RELEASE ORAL at 09:49

## 2022-01-23 RX ADMIN — ASPIRIN 81 MG CHEWABLE TABLET 81 MG: 81 TABLET CHEWABLE at 09:49

## 2022-01-23 RX ADMIN — AMIODARONE HYDROCHLORIDE 200 MG: 200 TABLET ORAL at 09:49

## 2022-01-23 RX ADMIN — FERROUS SULFATE TAB 325 MG (65 MG ELEMENTAL FE) 325 MG: 325 (65 FE) TAB at 09:49

## 2022-01-23 RX ADMIN — SODIUM CHLORIDE, PRESERVATIVE FREE 10 ML: 5 INJECTION INTRAVENOUS at 05:24

## 2022-01-23 NOTE — PROGRESS NOTES
Patient refused wound care for bilateral legs, stating that he had \"multiple discussions with the doctor\" about the dressing changes and that he did not want to \"deal with\" the dressing change at this time.

## 2022-01-23 NOTE — PROGRESS NOTES
Dr. Hawk Posadas informed me that the patient had removed his wound dressing. Upon asking patient why he removed the dressing, patient stated that the dressing hurt and caused his legs to burn. Came to a compromise with the patient whereby he would allow me to apply the dressing but without the level of compression that had previously been utilized. Dr. Hawk Posadas was informed, said he was amenable to this arrangement provided the patient also be prompted to elevated his legs.

## 2022-01-23 NOTE — PROGRESS NOTES
Resting in bed comfortably. No new complaints were offered. Awaiting transfer to rehab. /64  Pulse 60/min      HD again on Monday (1/24). Will be available over the weekend. Please call with questions.

## 2022-01-23 NOTE — PROGRESS NOTES
Progress Note    David Wells MD             Daily Progress Note: 1/23/2022      Subjective: The patient is seen for follow  up. Patient being sitting on the chair at present time. There is no dressing on the legs. Patient states that he himself has removed it. Denies any chest pain or shortness of breath  Problem List:  Problem List as of 1/23/2022 Never Reviewed          Codes Class Noted - Resolved    Renal failure ICD-10-CM: N19  ICD-9-CM: 091  1/8/2022 - Present        Hyperkalemia ICD-10-CM: E87.5  ICD-9-CM: 276.7  1/8/2022 - Present        Pulmonary edema ICD-10-CM: J81.1  ICD-9-CM: 044  7/13/2021 - Present        GI bleed ICD-10-CM: K92.2  ICD-9-CM: 578.9  1/5/2021 - Present              Medications reviewed  Current Facility-Administered Medications   Medication Dose Route Frequency    amiodarone (CORDARONE) tablet 200 mg  200 mg Oral DAILY    iron sucrose (VENOFER) injection 200 mg  200 mg IntraVENous DIALYSIS MON, WED & FRI    epoetin valente-epbx (RETACRIT) injection 10,000 Units  10,000 Units IntraVENous DIALYSIS MON, WED & FRI    oxyCODONE IR (ROXICODONE) tablet 5 mg  5 mg Oral Q8H PRN    sodium chloride (NS) flush 5-40 mL  5-40 mL IntraVENous Q8H    sodium chloride (NS) flush 5-40 mL  5-40 mL IntraVENous PRN    ferrous sulfate tablet 325 mg  1 Tablet Oral BID WITH MEALS    heparin (porcine) 1,000 unit/mL injection 4,000 Units  4,000 Units IntraVENous PRN    Or    heparin (porcine) 1,000 unit/mL injection 2,000 Units  2,000 Units IntraVENous PRN    metoprolol succinate (TOPROL-XL) XL tablet 50 mg  50 mg Oral DAILY    nitroglycerin (NITROBID) 2 % ointment 0.5 Inch  0.5 Inch Topical Q6H    aspirin chewable tablet 81 mg  81 mg Oral DAILY    heparin (porcine) 1,000 unit/mL injection 2,800 Units  2,800 Units Hemodialysis DIALYSIS PRN       Review of Systems:   A comprehensive review of systems was negative except for that written in the HPI.     Objective:   Physical Exam:     Visit Vitals  /64 (BP Patient Position: At rest;Sitting)   Pulse 60   Temp 98.2 °F (36.8 °C)   Resp 18   Ht 6' (1.829 m)   Wt 74.2 kg (163 lb 9.3 oz)   SpO2 100%   BMI 22.19 kg/m²    O2 Flow Rate (L/min): 2 l/min O2 Device: None (Room air)    Temp (24hrs), Av °F (36.7 °C), Min:97.8 °F (36.6 °C), Max:98.2 °F (36.8 °C)    No intake/output data recorded. No intake/output data recorded. General:  Alert, cooperative, no distress, appears stated age. Lungs:   Clear to auscultation bilaterally. Chest wall:  No tenderness or deformity. Heart:  Regular rate and rhythm, S1, S2 normal, no murmur, click, rub or gallop. Abdomen:   Soft, non-tender. Bowel sounds normal. No masses,  No organomegaly. Extremities: Extremities normal, atraumatic, no cyanosis 4+ edema. Pulses: 2+ and symmetric all extremities. Skin:  Multiple ulcers on both the legs   Neurologic: CNII-XII intact. No gross sensory or motor deficits     Data Review:       Recent Days:  No results for input(s): WBC, HGB, HCT, PLT, HGBEXT, HCTEXT, PLTEXT in the last 72 hours. Recent Labs     22  0815      K 4.2      CO2 30   GLU 89   BUN 42*   CREA 5.75*   CA 7.8*     No results for input(s): PH, PCO2, PO2, HCO3, FIO2 in the last 72 hours. Results     ** No results found for the last 336 hours. **         24 Hour Results:  No results found for this or any previous visit (from the past 24 hour(s)). IR INSERT TUNL CVC W/O PORT OVER 5 YR   Final Result   Successful conversion of a right internal jugular temporary dialysis catheter to   a 23 cm PermCath. The catheter is functioning and is ready for use. IR FLUORO GUIDE PLC CVAD   Final Result   Successful conversion of a right internal jugular temporary dialysis catheter to   a 23 cm PermCath. The catheter is functioning and is ready for use.          DUPLEX LOW EXT ARTERIES WITH JAVON   Final Result      XR CHEST PORT   Final Result      Interval placement of a right supraclavicular approach dialysis catheter with   distal tip in the proximal right atrium. Cardiomegaly with pulmonary vascular congestion and pulmonary edema. Stable appearing basilar right lung pneumonia. Small right pleural effusion. IR INSERT NON TUNL CVC OVER 5 YRS   Final Result      Technically successful insertion of non-tunneled Trialysis catheter with US   guidance. Plan:       Post catheter placement chest xray confirmed appropriate position of the   catheter. The catheter is appropriate for immediate use.   _______________________________________________________________      PROCEDURE SUMMARY:   - Venous access with ultrasound guidance   - Non-tunneled central venous catheter insertion      PROCEDURE DETAILS:      Pre-procedure   Relevant imaging review: None    Informed consent for the procedure was obtained and time-out was performed prior   to the procedure. Prophylactic antibiotic administered: Not administered   Preparation: The site was prepared and draped using all elements of maximal   sterile barrier technique including sterile gloves, sterile gown, cap, mask,   large sterile sheet, sterile ultrasound probe cover, sterile ultrasound gel,   hand hygiene and cutaneous antisepsis with 2% chlorhexidine. Medical reason for site preparation exception: Not applicable      Anesthesia/sedation   Level of anesthesia: No sedation   Anesthesia administered by: Not applicable   Duration of anesthesia/sedation: 0 minutes. Access   The vessel was sonographically evaluated and judged to be patent and appropriate   for access and a permanent image was stored. Three mLs of 1% Lidocaine was   administered to the access site. Real time ultrasound was used to visualize   needle entry into the vessel.     Vein accessed: Right internal jugular vein   Access technique: 4F micropuncture set      Catheter placement   The access site was dilated and the catheter was placed into the vein over a   wire and all guidewires were removed in their entirety. Catheter ports were   aspirated and flushed. Catheter tip location was verified by portable X-ray. Catheter size:13 Turkmen   Catheter length: 20 cm   Catheter tip position: Proximal right atrium   Catheter flush: Heparin (100 units/mL)      Closure   The catheter was secured. A sterile dressing was applied. Catheter securement technique: Non-absorbable suture      Additional Medications   None      Additional Details   Estimated blood loss:  Less than 1 mL   Additional description of procedure: None   Additional findings: None   Additional equipment: None   Specimens removed: None                     IR US GUIDED VASCULAR ACCESS   Final Result      Technically successful insertion of non-tunneled Trialysis catheter with US   guidance. Plan:       Post catheter placement chest xray confirmed appropriate position of the   catheter. The catheter is appropriate for immediate use.   _______________________________________________________________      PROCEDURE SUMMARY:   - Venous access with ultrasound guidance   - Non-tunneled central venous catheter insertion      PROCEDURE DETAILS:      Pre-procedure   Relevant imaging review: None    Informed consent for the procedure was obtained and time-out was performed prior   to the procedure. Prophylactic antibiotic administered: Not administered   Preparation: The site was prepared and draped using all elements of maximal   sterile barrier technique including sterile gloves, sterile gown, cap, mask,   large sterile sheet, sterile ultrasound probe cover, sterile ultrasound gel,   hand hygiene and cutaneous antisepsis with 2% chlorhexidine. Medical reason for site preparation exception: Not applicable      Anesthesia/sedation   Level of anesthesia: No sedation   Anesthesia administered by: Not applicable   Duration of anesthesia/sedation: 0 minutes.       Access   The vessel was sonographically evaluated and judged to be patent and appropriate   for access and a permanent image was stored. Three mLs of 1% Lidocaine was   administered to the access site. Real time ultrasound was used to visualize   needle entry into the vessel. Vein accessed: Right internal jugular vein   Access technique: 4F micropuncture set      Catheter placement   The access site was dilated and the catheter was placed into the vein over a   wire and all guidewires were removed in their entirety. Catheter ports were   aspirated and flushed. Catheter tip location was verified by portable X-ray. Catheter size:13 Prydeinig   Catheter length: 20 cm   Catheter tip position: Proximal right atrium   Catheter flush: Heparin (100 units/mL)      Closure   The catheter was secured. A sterile dressing was applied. Catheter securement technique: Non-absorbable suture      Additional Medications   None      Additional Details   Estimated blood loss:  Less than 1 mL   Additional description of procedure: None   Additional findings: None   Additional equipment: None   Specimens removed: None                     XR CHEST PORT   Final Result   Airspace opacity and effusion at the right lung base and in the   acute setting would suggest pneumonia and/or aspiration however the patient also   appears to have baseline/background vascular congestion and/or pulmonary   arterial hypertension, noting cardiomegaly and/or pericardial effusion      IR INSERT NON TUNL CVC OVER 5 YRS    (Results Pending)        Assessment: Bilateral leg edema with ulcers on both legs left more than right  Pneumonia now resolved  Chronic renal failure on dialysis  CHF  Requires ICD but refused  Hypertension        Plan: Discussed with RN regarding his leg ulcers. Patient has removed the dressing himself. According to RN yesterday she wanted to check his wound but patient did not let her examine. Requested patient to be cooperative.         Care Plan discussed with: Patient/Family and Nurse    Total time spent with patient: 30 minutes.     Melvin Matamoros MD

## 2022-01-23 NOTE — PROGRESS NOTES
Pt refused all meds and wound care. This nurse educated pt on the importance of wound care and infection prevention.

## 2022-01-23 NOTE — PROGRESS NOTES
Progress Note    Patient: Josefina York MRN: [id number]  SSN: [id number]    YOB: 1955  Age: 78 y.o. Sex: male      Admit Date: 1/7/2022    LOS: 15 days     Subjective:   Patient followed for sepsis with cellulitis both calves and aspiration pneumonia. Leg wound culture grew multiple organisms as shown below. Afebrile with normal WBC, decreasing procal but increasing CRP. Patient is again sitting up in bedside chair with no complaints. Objective:     Vitals:    01/22/22 1127 01/22/22 1537 01/22/22 2055 01/23/22 0256   BP: 115/77 130/87 105/64    Pulse: (!) 52 (!) 59 60    Resp: 20 20 18    Temp: 98 °F (36.7 °C) 97.8 °F (36.6 °C) 98.2 °F (36.8 °C)    TempSrc:       SpO2: 100% 100% 100% 100%   Weight:   163 lb 9.3 oz (74.2 kg)    Height:            Intake and Output:  Current Shift: No intake/output data recorded. Last three shifts: No intake/output data recorded. Physical Exam:    Vitals and nursing note reviewed. Patient unavailable for re-examination  Constitutional:       Appearance: He is ill-appearing. Genitourinary:     Comments: External urinary catheter  Musculoskeletal:      Right lower leg: Edema present. Left lower leg: Edema present. Comments: Both legs fully exposed; no open wounds on right calf; left posterior calf still with open wound with dry granulation tissue      Neurological: nonfocal, mild confusion  Psychiatric:         Behavior: Behavior normal.     Lab/Data Review:     WBC 7,900    CRP 2.80 <4.66 <2.63 <2.53 < 5.30 <6.32 <8.19 <12.10 <12.10  Procal 0.72 < 0.70 <0.84 < 2.39 <3.12 < 4.41 <4.39 <5.16    MRSA nasal PCR negative    Wound cultures leg (1/8)  Staphylococcus aureus (MSSA),  Alcaligenes faecalis resistant to Zosyn, Klebsiella pneumoniae      Assessment:     Active Problems:    Renal failure (1/8/2022)      Hyperkalemia (1/8/2022)    1. Cellulitis both calves, secondary to probable S.  Aureus (MSSA), Alcaligenes faecalis resistant to Zosyn, Klebsiella pneumoniae, status post 14 days IV Antibiotics, including Meropenem, clinically resolved. 2. Aspiration pneumonia, RLL, status antibiotics as note above  3. Sepsis with leukocytosis and elevated CRP/procal, resolving    Plan:   1. No further antibiotic recommendations at this time  2.   Cleared for discharge from ID standpoint    Signed By: Bina Harp MD     January 23, 2022

## 2022-01-23 NOTE — PROGRESS NOTES
Problem: Falls - Risk of  Goal: *Absence of Falls  Description: Document Tmaar Brown Fall Risk and appropriate interventions in the flowsheet. Outcome: Progressing Towards Goal  Note: Fall Risk Interventions:  Mobility Interventions: PT Consult for mobility concerns,Patient to call before getting OOB    Mentation Interventions: Adequate sleep, hydration, pain control    Medication Interventions: Patient to call before getting OOB,Teach patient to arise slowly    Elimination Interventions: Call light in reach              Problem: Patient Education: Go to Patient Education Activity  Goal: Patient/Family Education  Outcome: Progressing Towards Goal     Problem: Pressure Injury - Risk of  Goal: *Prevention of pressure injury  Description: Document Alfa Scale and appropriate interventions in the flowsheet.   Outcome: Progressing Towards Goal  Note: Pressure Injury Interventions:  Sensory Interventions: Assess changes in LOC    Moisture Interventions: Apply protective barrier, creams and emollients    Activity Interventions: PT/OT evaluation    Mobility Interventions: PT/OT evaluation    Nutrition Interventions: Offer support with meals,snacks and hydration    Friction and Shear Interventions: Minimize layers                Problem: Patient Education: Go to Patient Education Activity  Goal: Patient/Family Education  Outcome: Progressing Towards Goal     Problem: Discharge Planning  Goal: *Discharge to safe environment  Outcome: Progressing Towards Goal  Goal: *Knowledge of medication management  Outcome: Progressing Towards Goal  Goal: *Knowledge of discharge instructions  Outcome: Progressing Towards Goal     Problem: Patient Education: Go to Patient Education Activity  Goal: Patient/Family Education  Outcome: Progressing Towards Goal     Problem: Patient Education: Go to Patient Education Activity  Goal: Patient/Family Education  Outcome: Progressing Towards Goal     Problem: Patient Education: Go to Patient Education Activity  Goal: Patient/Family Education  Outcome: Progressing Towards Goal

## 2022-01-23 NOTE — PROGRESS NOTES
Progress Note      1/23/2022 12:19 PM  NAME: Frankie Christianson   MRN:  [id number]   Admit Diagnosis: Renal failure [N19]  Hyperkalemia [E87.5]      Subjective:   Chart reviewed. Patient is on dialysis. Symptoms of shortness of breath has improved. With the patient and with the nurse. Cardiac catheterization findings discussed with the patient and also discussed with the electrophysiologist.    Review of Systems:    Symptom Y/N Comments  Symptom Y/N Comments   Fever/Chills n   Chest Pain n    Poor Appetite    Edema y    Cough    Abdominal Pain     Sputum    Joint Pain     SOB/CARMONA y  improving  Pruritis/Rash     Nausea/vomit    Other     Diarrhea         Constipation           Could NOT obtain due to:      Objective:          Physical Exam:    Last 24hrs VS reviewed since prior progress note. Most recent are:    Visit Vitals  /64 (BP Patient Position: At rest;Sitting)   Pulse 60   Temp 98.2 °F (36.8 °C)   Resp 18   Ht 6' (1.829 m)   Wt 74.2 kg (163 lb 9.3 oz)   SpO2 100%   BMI 22.19 kg/m²     No intake or output data in the 24 hours ending 01/23/22 1207     General Appearance: Well developed, well nourished, alert & oriented x 3,    no acute distress. Ears/Nose/Mouth/Throat: Hearing grossly normal.  Neck: Supple. Chest: Lungs clear to auscultation bilaterally. Cardiovascular: Regular rate and rhythm, S1,S2 normal, no murmur. Abdomen: Soft, non-tender, bowel sounds are active. Extremities: Patient has cellulitis of the lower extremities  Skin: Warm and dry. []         Post-cath site without hematoma, bruit, tenderness, or thrill. Distal pulses intact. PMH/SH reviewed - no change compared to H&P    Data Review    Telemetry: normal sinus rhythm , patient has some PVCs, and episode of wide-complex tachycardia possible ventricular tachycardia. EKG:   Patient had EKG that is very concerning for ventricular tachycardia.     Lab Data Personally Reviewed:    No results for input(s): WBC, HGB, HCT, PLT, HGBEXT, HCTEXT, PLTEXT, HGBEXT, HCTEXT, PLTEXT in the last 72 hours. No results for input(s): INR, PTP, APTT, INREXT, INREXT in the last 72 hours. Recent Labs     01/21/22  0815      K 4.2      CO2 30   BUN 42*   CREA 5.75*   GLU 89   CA 7.8*     No results for input(s): CPK, CKNDX, TROIQ in the last 72 hours. No lab exists for component: CPKMB  No results found for: CHOL, CHOLX, CHLST, CHOLV, HDL, HDLP, LDL, LDLC, DLDLP, TGLX, TRIGL, TRIGP, CHHD, CHHDX    No results for input(s): AP, TBIL, TP, ALB, GLOB, GGT, AML, LPSE in the last 72 hours. No lab exists for component: SGOT, GPT, AMYP, HLPSE  No results for input(s): PH, PCO2, PO2 in the last 72 hours. Medications Personally Reviewed:    Current Facility-Administered Medications   Medication Dose Route Frequency    amiodarone (CORDARONE) tablet 200 mg  200 mg Oral DAILY    iron sucrose (VENOFER) injection 200 mg  200 mg IntraVENous DIALYSIS MON, WED & FRI    epoetin valente-epbx (RETACRIT) injection 10,000 Units  10,000 Units IntraVENous DIALYSIS MON, WED & FRI    oxyCODONE IR (ROXICODONE) tablet 5 mg  5 mg Oral Q8H PRN    sodium chloride (NS) flush 5-40 mL  5-40 mL IntraVENous Q8H    sodium chloride (NS) flush 5-40 mL  5-40 mL IntraVENous PRN    ferrous sulfate tablet 325 mg  1 Tablet Oral BID WITH MEALS    heparin (porcine) 1,000 unit/mL injection 4,000 Units  4,000 Units IntraVENous PRN    Or    heparin (porcine) 1,000 unit/mL injection 2,000 Units  2,000 Units IntraVENous PRN    metoprolol succinate (TOPROL-XL) XL tablet 50 mg  50 mg Oral DAILY    nitroglycerin (NITROBID) 2 % ointment 0.5 Inch  0.5 Inch Topical Q6H    aspirin chewable tablet 81 mg  81 mg Oral DAILY    heparin (porcine) 1,000 unit/mL injection 2,800 Units  2,800 Units Hemodialysis DIALYSIS PRN           Problem List:   Patient has a acute on chronic renal failure and had a dialysis and is clinically better.   Decompensated heart failure and ejection fraction is depressed and patient has a at least moderate possibly severe pulmonary hypertension. History of paroxysmal atrial fibrillation. Recent DVT of her left axillary vein. Wide-complex tachycardia probably ventricular tachycardia. Catheterization did not show evidence of coronary artery disease and ejection fraction was about 20 to 25%. Patient is getting restless and wants to leave. Cellulitis of the lower extremities. 1.      Assessment/Plan:   I will continue dialysis as per recommendations of the nephrologist.  Patient is offered ICD by Dr. Misa Bryant however patient does not want it. Vascular surgery note reviewed and appreciated and will follow the recommendations. We will continue amiodarone and present care. Discontinue heparin and start on small dose of Eliquis because patient has a history of paroxysmal atrial fibrillation. Patient will be going for cardiac rehab. We will follow infectious disease recommendations. Thank you. 1.          []       High complexity decision making was performed in this patient at high risk for decompensation with multiple organ involvement.     Breanna Sprague MD

## 2022-01-24 PROCEDURE — 97530 THERAPEUTIC ACTIVITIES: CPT

## 2022-01-24 PROCEDURE — 99232 SBSQ HOSP IP/OBS MODERATE 35: CPT | Performed by: INTERNAL MEDICINE

## 2022-01-24 PROCEDURE — 99232 SBSQ HOSP IP/OBS MODERATE 35: CPT | Performed by: SURGERY

## 2022-01-24 PROCEDURE — 74011250636 HC RX REV CODE- 250/636: Performed by: INTERNAL MEDICINE

## 2022-01-24 PROCEDURE — 74011250637 HC RX REV CODE- 250/637: Performed by: INTERNAL MEDICINE

## 2022-01-24 PROCEDURE — 5A1D70Z PERFORMANCE OF URINARY FILTRATION, INTERMITTENT, LESS THAN 6 HOURS PER DAY: ICD-10-PCS | Performed by: INTERNAL MEDICINE

## 2022-01-24 PROCEDURE — 90935 HEMODIALYSIS ONE EVALUATION: CPT

## 2022-01-24 PROCEDURE — 74011000250 HC RX REV CODE- 250: Performed by: INTERNAL MEDICINE

## 2022-01-24 PROCEDURE — 97110 THERAPEUTIC EXERCISES: CPT

## 2022-01-24 PROCEDURE — 65270000029 HC RM PRIVATE

## 2022-01-24 PROCEDURE — 74011250636 HC RX REV CODE- 250/636

## 2022-01-24 RX ORDER — HEPARIN SODIUM 1000 [USP'U]/ML
INJECTION, SOLUTION INTRAVENOUS; SUBCUTANEOUS
Status: DISPENSED
Start: 2022-01-24 | End: 2022-01-24

## 2022-01-24 RX ADMIN — FERROUS SULFATE TAB 325 MG (65 MG ELEMENTAL FE) 325 MG: 325 (65 FE) TAB at 17:15

## 2022-01-24 RX ADMIN — EPOETIN ALFA-EPBX 10000 UNITS: 10000 INJECTION, SOLUTION INTRAVENOUS; SUBCUTANEOUS at 09:11

## 2022-01-24 RX ADMIN — SODIUM CHLORIDE, PRESERVATIVE FREE 10 ML: 5 INJECTION INTRAVENOUS at 21:56

## 2022-01-24 RX ADMIN — IRON SUCROSE 200 MG: 20 INJECTION, SOLUTION INTRAVENOUS at 08:02

## 2022-01-24 RX ADMIN — AMIODARONE HYDROCHLORIDE 200 MG: 200 TABLET ORAL at 11:09

## 2022-01-24 RX ADMIN — FERROUS SULFATE TAB 325 MG (65 MG ELEMENTAL FE) 325 MG: 325 (65 FE) TAB at 11:09

## 2022-01-24 RX ADMIN — OXYCODONE 5 MG: 5 TABLET ORAL at 08:20

## 2022-01-24 RX ADMIN — METOPROLOL SUCCINATE 50 MG: 25 TABLET, EXTENDED RELEASE ORAL at 11:08

## 2022-01-24 RX ADMIN — ASPIRIN 81 MG CHEWABLE TABLET 81 MG: 81 TABLET CHEWABLE at 11:09

## 2022-01-24 RX ADMIN — SODIUM CHLORIDE, PRESERVATIVE FREE 10 ML: 5 INJECTION INTRAVENOUS at 13:58

## 2022-01-24 NOTE — PROGRESS NOTES
[facility] KIDNEY     Renal Daily Progress Note:     Admission Date: 2022   Subjective:  Fatigued but looks better. Mental status is good. He  looks better, less short of breath, not tachypneic. Permcath working    Scheduled for angiogram in AM due to poor wound healing. Review of Systems  Pertinent items are noted in HPI. Objective:     Visit Vitals  /78   Pulse (!) 56   Temp 99.5 °F (37.5 °C)   Resp 18   Ht 6' (1.829 m)   Wt 74.2 kg (163 lb 9.3 oz)   SpO2 95%   BMI 22.19 kg/m²     Temp (24hrs), Av.6 °F (37 °C), Min:97.8 °F (36.6 °C), Max:99.5 °F (37.5 °C)      No intake or output data in the 24 hours ending 22 0954  Current Facility-Administered Medications   Medication Dose Route Frequency    heparin (porcine) 1,000 unit/mL injection        amiodarone (CORDARONE) tablet 200 mg  200 mg Oral DAILY    iron sucrose (VENOFER) injection 200 mg  200 mg IntraVENous DIALYSIS MON, WED & FRI    epoetin valente-epbx (RETACRIT) injection 10,000 Units  10,000 Units IntraVENous DIALYSIS MON, WED & FRI    oxyCODONE IR (ROXICODONE) tablet 5 mg  5 mg Oral Q8H PRN    sodium chloride (NS) flush 5-40 mL  5-40 mL IntraVENous Q8H    sodium chloride (NS) flush 5-40 mL  5-40 mL IntraVENous PRN    ferrous sulfate tablet 325 mg  1 Tablet Oral BID WITH MEALS    heparin (porcine) 1,000 unit/mL injection 4,000 Units  4,000 Units IntraVENous PRN    Or    heparin (porcine) 1,000 unit/mL injection 2,000 Units  2,000 Units IntraVENous PRN    metoprolol succinate (TOPROL-XL) XL tablet 50 mg  50 mg Oral DAILY    nitroglycerin (NITROBID) 2 % ointment 0.5 Inch  0.5 Inch Topical Q6H    aspirin chewable tablet 81 mg  81 mg Oral DAILY    heparin (porcine) 1,000 unit/mL injection 2,800 Units  2,800 Units Hemodialysis DIALYSIS PRN       Physical Exam:General appearance: alert, cooperative, no distress, appears older than stated age.    Head: Normocephalic, without obvious abnormality, atraumatic  Lungs: clear to auscultation bilaterally  Heart: regular rate and rhythm, no rub  Abdomen: soft, non-tender. Bowel sounds normal. No masses,  no organomegaly  Extremities: venous stasis dermatitis noted, edema: decreased lower extremity edema and dependent thigh edema  Skin: Left lower leg with posterior ulcer inferior to calf  Neurologic: Grossly normal    Data Review:     LABS:  No results for input(s): NA, K, CL, CO2, BUN, CREA, CA, ALB, PHOS, MG in the last 72 hours. Iron saturation 13%    No results for input(s): WBC, HGB, HCT, PLT, HGBEXT, HCTEXT, PLTEXT, HGBEXT, HCTEXT, PLTEXT in the last 72 hours. No results for input(s): HÉCTOR, KU, CLU, CREAU in the last 72 hours. No lab exists for component: PROU     Cardiac cath Jan 13, 2022  Underwent cardiac cath today:  Indication: Severe left ventricular systolic dysfunction and patient had a V. Tach.     Left main coronary artery is a very large caliber vessel and has no disease. LAD artery is a very large caliber vessel and proximal mid and distal segment has no stenosis and diagonal branches has no disease. Ramus intermedius is a medium caliber vessel without disease. Circumflex artery is a large-caliber vessel and proximal mid and distal segment and AV groove segment has no stenosis. Large obtuse marginal branch and posterolateral branch has no stenosis. Right coronary artery is a large-caliber vessel and does junction of proximal and mid segment there was a concern of stenosis and that therefore it was interrogated by IFR which turned out to be 0.95 and was not deemed critical enough to do intervention.     Left ventriculography is performed in the right anterior oblique view and ejection fraction is about 20 to 25% and distal anterior wall apex and distal inferior wall is near akinetic.   This may be possible presentation of Takotsubo syndrome.     Plan: Patient will receive defibrillation      Chest x-ray January 8, 2022:  IMPRESSION     Interval placement of a right supraclavicular approach dialysis catheter with  distal tip in the proximal right atrium.     Cardiomegaly with pulmonary vascular congestion and pulmonary edema.      Stable appearing basilar right lung pneumonia. Small right pleural effusion. Assessment:   Renal Specific Problems  Chronic kidney disease stage VI, started on HD. Hypoalbumin  SVT- recurrent  Volume overload- resolved  Metabolic acidosis- resolved  Hyperkalemia- resolved  Anemia with renal failure and iron deficient  Leukocytosis corrected with declining procalcitonin  Probable right lower lobe pneumonia  Infected  leg ulcers  Venous stasis dermatitis lower extremity          Plan:     Obtain/ Order: labs/cultures/radiology/procedures:      Therapeutic:    Angiogram with possible angioplasty of lower leg vessels   HD ongoing, [facility]  performs dialysis on Monday Wednesday Friday schedule.   Epogen once iron saturation greater than 20%, or after 2 doses of Venofer   IV iron with dialysis  Wound care - regarding left leg ulcer    Case management to arrange rehab and outpatient dialysis

## 2022-01-24 NOTE — PROGRESS NOTES
Progress Note    Na Watts MD             Daily Progress Note: 1/24/2022      Subjective: The patient is seen for follow  up. Offers no complaints just completed dialysis  Problem List:  Problem List as of 1/24/2022 Never Reviewed          Codes Class Noted - Resolved    Renal failure ICD-10-CM: N19  ICD-9-CM: 190  1/8/2022 - Present        Hyperkalemia ICD-10-CM: E87.5  ICD-9-CM: 276.7  1/8/2022 - Present        Pulmonary edema ICD-10-CM: J81.1  ICD-9-CM: 740  7/13/2021 - Present        GI bleed ICD-10-CM: K92.2  ICD-9-CM: 578.9  1/5/2021 - Present              Medications reviewed  Current Facility-Administered Medications   Medication Dose Route Frequency    heparin (porcine) 1,000 unit/mL injection        amiodarone (CORDARONE) tablet 200 mg  200 mg Oral DAILY    iron sucrose (VENOFER) injection 200 mg  200 mg IntraVENous DIALYSIS MON, WED & FRI    epoetin valente-epbx (RETACRIT) injection 10,000 Units  10,000 Units IntraVENous DIALYSIS MON, WED & FRI    oxyCODONE IR (ROXICODONE) tablet 5 mg  5 mg Oral Q8H PRN    sodium chloride (NS) flush 5-40 mL  5-40 mL IntraVENous Q8H    sodium chloride (NS) flush 5-40 mL  5-40 mL IntraVENous PRN    ferrous sulfate tablet 325 mg  1 Tablet Oral BID WITH MEALS    heparin (porcine) 1,000 unit/mL injection 4,000 Units  4,000 Units IntraVENous PRN    Or    heparin (porcine) 1,000 unit/mL injection 2,000 Units  2,000 Units IntraVENous PRN    metoprolol succinate (TOPROL-XL) XL tablet 50 mg  50 mg Oral DAILY    nitroglycerin (NITROBID) 2 % ointment 0.5 Inch  0.5 Inch Topical Q6H    aspirin chewable tablet 81 mg  81 mg Oral DAILY    heparin (porcine) 1,000 unit/mL injection 2,800 Units  2,800 Units Hemodialysis DIALYSIS PRN       Review of Systems:   A comprehensive review of systems was negative except for that written in the HPI.     Objective:   Physical Exam:     Visit Vitals  BP (!) 152/93 (BP 1 Location: Right upper arm, BP Patient Position: At rest) Pulse (!) 48   Temp 98.7 °F (37.1 °C)   Resp 20   Ht 6' (1.829 m)   Wt 74.2 kg (163 lb 9.3 oz)   SpO2 98%   BMI 22.19 kg/m²    O2 Flow Rate (L/min): 2 l/min O2 Device: None (Room air)    Temp (24hrs), Av.6 °F (37 °C), Min:97.8 °F (36.6 °C), Max:99.5 °F (37.5 °C)     0701 -  1900  In: -   Out: 1510    No intake/output data recorded. General:  Alert, cooperative, no distress, appears stated age. Lungs:   Clear to auscultation bilaterally. Chest wall:  No tenderness or deformity. Heart:  Regular rate and rhythm, S1, S2 normal, no murmur, click, rub or gallop. Abdomen:   Soft, non-tender. Bowel sounds normal. No masses,  No organomegaly. Extremities: Extremities normal, atraumatic, no cyanosis bilateral leg edema. Pulses: 2+ and symmetric all extremities. Skin: Skin color, texture, turgor normal. No rashes or lesions has multiple ulcers on left leg more than right leg   Neurologic: CNII-XII intact. No gross sensory or motor deficits     Data Review:       Recent Days:  No results for input(s): WBC, HGB, HCT, PLT, HGBEXT, HCTEXT, PLTEXT in the last 72 hours. No results for input(s): NA, K, CL, CO2, GLU, BUN, CREA, CA, MG, PHOS, ALB, TBIL, TBILI, ALT, INR, INREXT in the last 72 hours. No lab exists for component: SGOT  No results for input(s): PH, PCO2, PO2, HCO3, FIO2 in the last 72 hours. Results     ** No results found for the last 336 hours. **         24 Hour Results:  No results found for this or any previous visit (from the past 24 hour(s)). IR INSERT TUNL CVC W/O PORT OVER 5 YR   Final Result   Successful conversion of a right internal jugular temporary dialysis catheter to   a 23 cm PermCath. The catheter is functioning and is ready for use. IR FLUORO GUIDE PLC CVAD   Final Result   Successful conversion of a right internal jugular temporary dialysis catheter to   a 23 cm PermCath. The catheter is functioning and is ready for use.          DUPLEX LOW EXT ARTERIES WITH JAVON   Final Result      XR CHEST PORT   Final Result      Interval placement of a right supraclavicular approach dialysis catheter with   distal tip in the proximal right atrium. Cardiomegaly with pulmonary vascular congestion and pulmonary edema. Stable appearing basilar right lung pneumonia. Small right pleural effusion. IR INSERT NON TUNL CVC OVER 5 YRS   Final Result      Technically successful insertion of non-tunneled Trialysis catheter with US   guidance. Plan:       Post catheter placement chest xray confirmed appropriate position of the   catheter. The catheter is appropriate for immediate use.   _______________________________________________________________      PROCEDURE SUMMARY:   - Venous access with ultrasound guidance   - Non-tunneled central venous catheter insertion      PROCEDURE DETAILS:      Pre-procedure   Relevant imaging review: None    Informed consent for the procedure was obtained and time-out was performed prior   to the procedure. Prophylactic antibiotic administered: Not administered   Preparation: The site was prepared and draped using all elements of maximal   sterile barrier technique including sterile gloves, sterile gown, cap, mask,   large sterile sheet, sterile ultrasound probe cover, sterile ultrasound gel,   hand hygiene and cutaneous antisepsis with 2% chlorhexidine. Medical reason for site preparation exception: Not applicable      Anesthesia/sedation   Level of anesthesia: No sedation   Anesthesia administered by: Not applicable   Duration of anesthesia/sedation: 0 minutes. Access   The vessel was sonographically evaluated and judged to be patent and appropriate   for access and a permanent image was stored. Three mLs of 1% Lidocaine was   administered to the access site. Real time ultrasound was used to visualize   needle entry into the vessel.     Vein accessed: Right internal jugular vein   Access technique: 4F micropuncture set Catheter placement   The access site was dilated and the catheter was placed into the vein over a   wire and all guidewires were removed in their entirety. Catheter ports were   aspirated and flushed. Catheter tip location was verified by portable X-ray. Catheter size:13 Wallisian   Catheter length: 20 cm   Catheter tip position: Proximal right atrium   Catheter flush: Heparin (100 units/mL)      Closure   The catheter was secured. A sterile dressing was applied. Catheter securement technique: Non-absorbable suture      Additional Medications   None      Additional Details   Estimated blood loss:  Less than 1 mL   Additional description of procedure: None   Additional findings: None   Additional equipment: None   Specimens removed: None                     IR US GUIDED VASCULAR ACCESS   Final Result      Technically successful insertion of non-tunneled Trialysis catheter with US   guidance. Plan:       Post catheter placement chest xray confirmed appropriate position of the   catheter. The catheter is appropriate for immediate use.   _______________________________________________________________      PROCEDURE SUMMARY:   - Venous access with ultrasound guidance   - Non-tunneled central venous catheter insertion      PROCEDURE DETAILS:      Pre-procedure   Relevant imaging review: None    Informed consent for the procedure was obtained and time-out was performed prior   to the procedure. Prophylactic antibiotic administered: Not administered   Preparation: The site was prepared and draped using all elements of maximal   sterile barrier technique including sterile gloves, sterile gown, cap, mask,   large sterile sheet, sterile ultrasound probe cover, sterile ultrasound gel,   hand hygiene and cutaneous antisepsis with 2% chlorhexidine.     Medical reason for site preparation exception: Not applicable      Anesthesia/sedation   Level of anesthesia: No sedation   Anesthesia administered by: Not applicable Duration of anesthesia/sedation: 0 minutes. Access   The vessel was sonographically evaluated and judged to be patent and appropriate   for access and a permanent image was stored. Three mLs of 1% Lidocaine was   administered to the access site. Real time ultrasound was used to visualize   needle entry into the vessel. Vein accessed: Right internal jugular vein   Access technique: 4F micropuncture set      Catheter placement   The access site was dilated and the catheter was placed into the vein over a   wire and all guidewires were removed in their entirety. Catheter ports were   aspirated and flushed. Catheter tip location was verified by portable X-ray. Catheter size:13 Australian   Catheter length: 20 cm   Catheter tip position: Proximal right atrium   Catheter flush: Heparin (100 units/mL)      Closure   The catheter was secured. A sterile dressing was applied. Catheter securement technique: Non-absorbable suture      Additional Medications   None      Additional Details   Estimated blood loss:  Less than 1 mL   Additional description of procedure: None   Additional findings: None   Additional equipment: None   Specimens removed: None                     XR CHEST PORT   Final Result   Airspace opacity and effusion at the right lung base and in the   acute setting would suggest pneumonia and/or aspiration however the patient also   appears to have baseline/background vascular congestion and/or pulmonary   arterial hypertension, noting cardiomegaly and/or pericardial effusion      IR INSERT NON TUNL CVC OVER 5 YRS    (Results Pending)        Assessment: Bilateral infected leg ulcers  PAD  CHF  Negative cardiac cath  Chronic renal failure on dialysis  Hypertension  Patient has refused for ICD placement        Plan: Patient may go for arteriogram tomorrow.   His leg swelling has been increased  Patient has been noncompliant regarding the dressing of the legs he says that it hurts whenever he has pressure dressings. Care Plan discussed with: Patient/Family    Total time spent with patient: 30 minutes.     David Wells MD

## 2022-01-24 NOTE — PROGRESS NOTES
Progress Note      1/24/2022 12:19 PM  NAME: Chris Hoover   MRN:  [id number]   Admit Diagnosis: Renal failure [N19]  Hyperkalemia [E87.5]      Subjective:   Chart reviewed. Patient is on dialysis. Symptoms of shortness of breath has improved. With the patient and with the nurse. Cardiac catheterization findings discussed with the patient and also discussed with the electrophysiologist.    Review of Systems:    Symptom Y/N Comments  Symptom Y/N Comments   Fever/Chills n   Chest Pain n    Poor Appetite    Edema y    Cough    Abdominal Pain     Sputum    Joint Pain     SOB/CARMONA y  improving  Pruritis/Rash     Nausea/vomit    Other     Diarrhea         Constipation           Could NOT obtain due to:      Objective:          Physical Exam:    Last 24hrs VS reviewed since prior progress note. Most recent are:    Visit Vitals  BP (!) 152/93 (BP 1 Location: Right upper arm, BP Patient Position: At rest)   Pulse (!) 48   Temp 98.7 °F (37.1 °C)   Resp 20   Ht 6' (1.829 m)   Wt 74.2 kg (163 lb 9.3 oz)   SpO2 98%   BMI 22.19 kg/m²       Intake/Output Summary (Last 24 hours) at 1/24/2022 1244  Last data filed at 1/24/2022 1045  Gross per 24 hour   Intake --   Output 1510 ml   Net -1510 ml        General Appearance: Well developed, well nourished, alert & oriented x 3,    no acute distress. Ears/Nose/Mouth/Throat: Hearing grossly normal.  Neck: Supple. Chest: Lungs clear to auscultation bilaterally. Cardiovascular: Regular rate and rhythm, S1,S2 normal, no murmur. Abdomen: Soft, non-tender, bowel sounds are active. Extremities: Patient has cellulitis of the lower extremities  Skin: Warm and dry. []         Post-cath site without hematoma, bruit, tenderness, or thrill. Distal pulses intact. PMH/SH reviewed - no change compared to H&P    Data Review    Telemetry: normal sinus rhythm , patient has some PVCs, and episode of wide-complex tachycardia possible ventricular tachycardia.     EKG:   Patient had EKG that is very concerning for ventricular tachycardia. Lab Data Personally Reviewed:    No results for input(s): WBC, HGB, HCT, PLT, HGBEXT, HCTEXT, PLTEXT, HGBEXT, HCTEXT, PLTEXT in the last 72 hours. No results for input(s): INR, PTP, APTT, INREXT, INREXT in the last 72 hours. No results for input(s): NA, K, CL, CO2, BUN, CREA, GLU, CA, MG in the last 72 hours. No results for input(s): CPK, CKNDX, TROIQ in the last 72 hours. No lab exists for component: CPKMB  No results found for: CHOL, CHOLX, CHLST, CHOLV, HDL, HDLP, LDL, LDLC, DLDLP, TGLX, TRIGL, TRIGP, CHHD, CHHDX    No results for input(s): AP, TBIL, TP, ALB, GLOB, GGT, AML, LPSE in the last 72 hours. No lab exists for component: SGOT, GPT, AMYP, HLPSE  No results for input(s): PH, PCO2, PO2 in the last 72 hours.     Medications Personally Reviewed:    Current Facility-Administered Medications   Medication Dose Route Frequency    heparin (porcine) 1,000 unit/mL injection        amiodarone (CORDARONE) tablet 200 mg  200 mg Oral DAILY    iron sucrose (VENOFER) injection 200 mg  200 mg IntraVENous DIALYSIS MON, WED & FRI    epoetin valente-epbx (RETACRIT) injection 10,000 Units  10,000 Units IntraVENous DIALYSIS MON, WED & FRI    oxyCODONE IR (ROXICODONE) tablet 5 mg  5 mg Oral Q8H PRN    sodium chloride (NS) flush 5-40 mL  5-40 mL IntraVENous Q8H    sodium chloride (NS) flush 5-40 mL  5-40 mL IntraVENous PRN    ferrous sulfate tablet 325 mg  1 Tablet Oral BID WITH MEALS    heparin (porcine) 1,000 unit/mL injection 4,000 Units  4,000 Units IntraVENous PRN    Or    heparin (porcine) 1,000 unit/mL injection 2,000 Units  2,000 Units IntraVENous PRN    metoprolol succinate (TOPROL-XL) XL tablet 50 mg  50 mg Oral DAILY    nitroglycerin (NITROBID) 2 % ointment 0.5 Inch  0.5 Inch Topical Q6H    aspirin chewable tablet 81 mg  81 mg Oral DAILY    heparin (porcine) 1,000 unit/mL injection 2,800 Units  2,800 Units Hemodialysis DIALYSIS PRN Problem List:   Patient has a acute on chronic renal failure and had a dialysis and is clinically better. Decompensated heart failure and ejection fraction is depressed and patient has a at least moderate possibly severe pulmonary hypertension. History of paroxysmal atrial fibrillation. Recent DVT of her left axillary vein. Wide-complex tachycardia probably ventricular tachycardia. Catheterization did not show evidence of coronary artery disease and ejection fraction was about 20 to 25%. Patient is getting restless and wants to leave. Cellulitis of the lower extremities. 1.      Assessment/Plan:   I will continue dialysis as per recommendations of the nephrologist.  Patient is offered ICD by Dr. Joaquim Mukherjee however patient does not want it. Vascular surgery note reviewed and appreciated and will follow the recommendations. We will continue amiodarone and present care. Discontinue heparin and start on small dose of Eliquis because patient has a history of paroxysmal atrial fibrillation. Patient will be going for cardiac rehab. We will follow infectious disease recommendations. Thank you. 1.          []       High complexity decision making was performed in this patient at high risk for decompensation with multiple organ involvement.     Carline Greene MD

## 2022-01-24 NOTE — PROGRESS NOTES
OCCUPATIONAL THERAPY TREATMENT  Patient: Rimma Reyes (7 y.o. male)  Date: 1/24/2022  Diagnosis: Renal failure [N19]  Hyperkalemia [E87.5] <principal problem not specified>  Procedure(s) (LRB):  LEFT HEART CATH / CORONARY ANGIOGRAPHY W GRAFTS (N/A)  PERCUTANEOUS CORONARY INTERVENTION (N/A) 11 Days Post-Op  Precautions:    Chart, occupational therapy assessment, plan of care, and goals were reviewed. ASSESSMENT  Patient continues with skilled OT services and is progressing towards goals. Upon MELCHOR arrival, pt semi supine in bed and agreeable to tx session. Pt completed bed mobility with SBA and additional time. Donning of slippers with back completed with SBA, pt reporting shoe will not fit onto LLE so pt wears with back of shoe down under foot. Pt completed sit>stand from EOB with CGA using RW for balance upon standing. Pt completed standing donning of robe with Renee for putting in LUE. Pt completed ambulation in hallway with CGA and one standing rest break leaning against wall. Pt ambulated back to room reporting needing to use restroom. Pt completed transfer onto toilet with CGA, bladder hygiene with supervision/SBA and transfer from toilet with [facility]. Pt returned to EOB with CGA and requesting to eat lunch while sitting at EOB. Pt completed self feeding with Mod I for container management. Pt left sitting EOB with call bell within reach and eating lunch, RN aware and will assist pt to supine once completed. Will continue to follow pt throughout remainder of stay and progress towards OT goals. Recommending SNF vs HHOT at discharge when medically appropriate. Patient seen today by Denzel Ellis for treatment, discussed plan of care with supervising OT and goals reviewed and updated as appropriate. Plan of care reviewed and adjusted as necessary due to patient LOS, no change in patient functional status at this time, continue to progress towards goals as able.     Current Level of Function Impacting Discharge (ADLs): SBA bed mobility, CGA/Renee transfers, Renee UB dressing, supervision/SBA toileting    Other factors to consider for discharge: Time of onset, medical prognosis/diagnosis, severity of deficits, PLOF, home environment, and family support         PLAN :  Patient continues to benefit from skilled intervention to address the above impairments. Continue treatment per established plan of care. to address goals. Recommend next OT session: standing grooming    Recommendation for discharge: (in order for the patient to meet his/her long term goals)  SNF vs HHOT    This discharge recommendation:  Has been made in collaboration with the attending provider and/or case management    IF patient discharges home will need the following DME: TBD       SUBJECTIVE:   Patient stated I want to sit on the EOB and eat my lunch.     OBJECTIVE DATA SUMMARY:   Cognitive/Behavioral Status:  Neurologic State: Alert  Orientation Level: Oriented X4  Cognition: Follows commands    Functional Mobility and Transfers for ADLs:  Bed Mobility:  Rolling: Stand-by assistance; Additional time  Supine to Sit: Stand-by assistance; Additional time  Scooting: Stand-by assistance; Additional time    Transfers:  Sit to Stand: Contact guard assistance  Functional Transfers  Toilet Transfer : Minimum assistance    Balance:  Sitting: Intact; Without support  Standing: Impaired; With support  Standing - Static: Constant support;Good  Standing - Dynamic : Constant support; Fair    ADL Intervention:  Feeding  Feeding Assistance: Modified independent  Container Management: Modified independent  Food to Mouth:  Independent    Upper Body Dressing Assistance  Front Opened Shirt: Minimum assistance    Lower Body Dressing Assistance  Slip on Shoes with Back: Stand-by assistance    Toileting  Bladder Hygiene: Supervision;Stand-by assistance    Pain:  Minor pain reported during ambulation but no formal rating given    Activity Tolerance:   Fair, requires rest breaks, and observed SOB with activity  Please refer to the flowsheet for vital signs taken during this treatment. After treatment patient left in no apparent distress:   Call bell within reach and sitting EOB (RN aware)    COMMUNICATION/COLLABORATION:   The patients plan of care was discussed with: Physical therapy assistant and Registered nurse. JILL Aponte  Time Calculation: 33 mins    Problem: Self Care Deficits Care Plan (Adult)  Goal: *Acute Goals and Plan of Care (Insert Text)  Description: 1. Pt will be mod I sup <> sit in prep for EOB ADLs  2. Pt will be mod I grooming standing at sink  3. Pt will be min A LE dressing sitting EOB/long sit  4. Pt will be mod I sit <>  prep for toileting LRAD  5. Pt will be mod I toileting/toilet transfer/cloth mgmt LRAD  6.   Pt will be IND following UE HEP in prep for self care tasks      Outcome: Progressing Towards Goal

## 2022-01-24 NOTE — PROGRESS NOTES
CM into room to speak to patient. Patient just returning from dialysis. Asked patient if he would like for me to set him up to discharge to SNF or [facility]. Patient stated \"no, I am going home. \"    Patient reports that he is to have an angiogram tomorrow morning with Dr. Mikey Geiger.

## 2022-01-24 NOTE — PROGRESS NOTES
Attempted to see pt at 10:37 this AM, but pt asked to postpone session for a later time, being fatigued post dialysis. Will continue to follow and attempt later.

## 2022-01-24 NOTE — PROGRESS NOTES
Patient at Dialysis today. Waiting on placement. Problem: Falls - Risk of  Goal: *Absence of Falls  Description: Document Shade Fletcher Fall Risk and appropriate interventions in the flowsheet. Outcome: Progressing Towards Goal  Note: Fall Risk Interventions:  Mobility Interventions: Bed/chair exit alarm    Mentation Interventions: Bed/chair exit alarm    Medication Interventions: Bed/chair exit alarm    Elimination Interventions: Bed/chair exit alarm              Problem: Patient Education: Go to Patient Education Activity  Goal: Patient/Family Education  Outcome: Progressing Towards Goal     Problem: Pressure Injury - Risk of  Goal: *Prevention of pressure injury  Description: Document Alfa Scale and appropriate interventions in the flowsheet.   Outcome: Progressing Towards Goal  Note: Pressure Injury Interventions:  Sensory Interventions: Assess changes in LOC    Moisture Interventions: Absorbent underpads    Activity Interventions: PT/OT evaluation    Mobility Interventions: HOB 30 degrees or less    Nutrition Interventions: Document food/fluid/supplement intake    Friction and Shear Interventions: Apply protective barrier, creams and emollients

## 2022-01-24 NOTE — PROGRESS NOTES
OT tx session attempted at 21 [phone], however pt off floor at this time. Will continue to follow pt and attempt tx session at a later time as time allows. Thank you.

## 2022-01-24 NOTE — PROGRESS NOTES
Problem: Mobility Impaired (Adult and Pediatric)  Goal: *Acute Goals and Plan of Care (Insert Text)  Description: Pt will be I with LE HEP in 7 days. Pt will perform bed mobility with mod I in 7 days. Pt will perform transfers with mod I in 7 days. Pt will amb  feet with LRAD safely with mod I in 7 days. Outcome: Progressing Towards Goal   PHYSICAL THERAPY TREATMENT  Patient: Cm Bah (44 y.o. male)  Date: 1/24/2022  Diagnosis: Renal failure [N19]  Hyperkalemia [E87.5] <principal problem not specified>  Procedure(s) (LRB):  LEFT HEART CATH / CORONARY ANGIOGRAPHY W GRAFTS (N/A)  PERCUTANEOUS CORONARY INTERVENTION (N/A) 11 Days Post-Op  Precautions:    Chart, physical therapy assessment, plan of care and goals were reviewed. ASSESSMENT  Patient continues with skilled PT services and is progressing towards goals. Pt received supine in bed, pleasant and agreeable to Tx, but declining OOB activities, c/o fatigue and stating he had already amb with the previous therapist. Pt completed heel slides then stated he wanted to come to EOB for other ex. He rolled to L, went from supine to sitting and scooted to EOB with Sup, but additional time. On EOB, pt completed remaining ex (see chart below) without difficulty but needing rest breaks. Pt displayed fair balance on EOB, alternating UE support. Pt went back to supine with Sup and additional time and using UEs to bring LEs onto bed. Pt was left semi supine in bed in NAD, call bell within reach, all needs addressed. Current Level of Function Impacting Discharge (mobility/balance): assist x 1, balance deficits    Other factors to consider for discharge: decreased functional mobility and activity tolerance, severity of impairments, safety         PLAN :  Patient continues to benefit from skilled intervention to address the above impairments. Continue treatment per established plan of care. to address goals.     Recommendation for discharge: (in order for the patient to meet his/her long term goals)  Ramsey Sigala    This discharge recommendation:  Has been made in collaboration with the attending provider and/or case management    IF patient discharges home will need the following DME: to be determined (TBD)       SUBJECTIVE:   Patient stated I'm tired, can we do ex in the bed? I already walked with the other therapist.    OBJECTIVE DATA SUMMARY:   Critical Behavior:  Neurologic State: Alert  Orientation Level: Oriented X4  Cognition: Follows commands     Functional Mobility Training:  Bed Mobility:  Rolling: Supervision; Additional time  Supine to Sit: Supervision; Additional time  Sit to Supine: Supervision; Additional time  Scooting: Supervision; Additional time     Transfers:  Sit to Stand: Contact guard assistance  Stand to Sit: Contact guard assistance        Balance:  Sitting: Impaired; Without support  Sitting - Static: Fair (occasional)  Sitting - Dynamic: Fair (occasional)  Standing: Impaired; With support  Standing - Static: Constant support;Good  Standing - Dynamic : Constant support; Fair    Therapeutic Exercises:       EXERCISE   Sets   Reps   Active Active Assist   Passive Self ROM   Comments   Glut Sets 1 20 [x] [] [] []    Toes Raises 1 20 [x] [] [] []    Heel Raises 1 20 [x] [] [] []    Heel Slides 1 20 [x] [] [] [] in supine   Hip abd/add 1 20 [x] [] [] []    Long Arc Quads 1 20 [x] [] [] []    Marching 1 20 [x] [] [] []       Pain Ratin/10    Activity Tolerance:   Fair  Please refer to the flowsheet for vital signs taken during this treatment. After treatment patient left in no apparent distress:   Supine in bed and Call bell within reach    COMMUNICATION/COLLABORATION:   The patients plan of care was discussed with: Registered nurse.      Maria Esther Stallings PTA   Time Calculation: 25 mins

## 2022-01-24 NOTE — PROGRESS NOTES
Progress Note    Patient: rEica Coombs MRN: [id number]  SSN: [id number]    YOB: 1955  Age: 78 y.o. Sex: male      Admit Date: 1/7/2022    LOS: 16 days     Subjective:   Patient followed for sepsis with cellulitis both calves and aspiration pneumonia. Leg wound culture grew multiple organisms as shown below. Afebrile with normal WBC, decreasing procal and CRP, now off antibiotics with clinical resolution of leg infections. Patient is again sitting up in bedside chair with no complaints. Objective:     Vitals:    01/24/22 1020 01/24/22 1045 01/24/22 1051 01/24/22 1456   BP: 136/83 134/85 (!) 152/93 120/80   Pulse: (!) 59 62 (!) 48 (!) 59   Resp: 18 18 20 18   Temp:  98.4 °F (36.9 °C) 98.7 °F (37.1 °C) 98.8 °F (37.1 °C)   TempSrc:  Oral     SpO2:   98% 100%   Weight:       Height:            Intake and Output:  Current Shift: 01/24 0701 - 01/24 1900  In: -   Out: 1510   Last three shifts: No intake/output data recorded. Physical Exam:    Vitals and nursing note reviewed. Patient unavailable for re-examination  Constitutional:       Appearance: He is ill-appearing. Genitourinary:     Comments: External urinary catheter  Musculoskeletal:      Right lower leg: Edema present. Left lower leg: Edema present. Comments: Both legs fully exposed; no open wounds on right calf; left posterior calf still with open wound with dry granulation tissue      Neurological: nonfocal, mild confusion  Psychiatric:         Behavior: Behavior normal.     Lab/Data Review:     WBC 7,900    CRP 2.80 <4.66 <2.63 <2.53 < 5.30 <6.32 <8.19 <12.10 <12.10  Procal 0.72 < 0.70 <0.84 < 2.39 <3.12 < 4.41 <4.39 <5.16    MRSA nasal PCR negative    Wound cultures leg (1/8)  Staphylococcus aureus (MSSA),  Alcaligenes faecalis resistant to Zosyn, Klebsiella pneumoniae      Assessment:     Active Problems:    Renal failure (1/8/2022)      Hyperkalemia (1/8/2022)    1.  Cellulitis both calves, secondary to probable S. Aureus (MSSA), Alcaligenes faecalis resistant to Zosyn, Klebsiella pneumoniae, status post 14 days IV Antibiotics, including Meropenem, clinically resolved. 2. Aspiration pneumonia, RLL, status antibiotics as note above  3. Sepsis with leukocytosis and elevated CRP/procal, resolving    Plan:   1. No further antibiotic recommendations at this time  2. Cleared for discharge from ID standpoint  3.  Repeat CRP and procal in am    Signed By: Neptali Goldsmith MD     January 24, 2022

## 2022-01-24 NOTE — PROGRESS NOTES
PROGRESS NOTE      Chief Complaints:  Patient examined this morning. HPI and  Objective:    Patient has no new complaints denies chest pain shortness breath fever chills generalized weakness abdominal pain. Patient denies any particular worsening pain in the both legs. Dressings intact in both legs. Review of Systems:  Rest review of system negative, personally reviewed. EXAM:  Visit Vitals  /76   Pulse 61   Temp 98.6 °F (37 °C)   Resp 18   Ht 6' (1.829 m)   Wt 163 lb 9.3 oz (74.2 kg)   SpO2 95%   BMI 22.19 kg/m²       Patient is awake and alert  Head and neck atraumatic, normocephalic. ENT: No hoarse voice  Cardiac system regular rate rhythm. Pulmonary no audible wheeze  Chest wall excursion normal with respiration cycle  Abdomen is soft not particularly distended. Neurologically nonfocal.  Skin is warm and moist.  Psychosocial: Cooperative. Vascular examination as previously noted no changes. No results found for this or any previous visit (from the past 24 hour(s)). ASSESSMENT:   Patient is 78 y.o. with diagnosis of : Active Problems:    Renal failure (1/8/2022)      Hyperkalemia (1/8/2022)        PLAN:                 Patient will need a bilateral leg angiographic examination possibly intervention. Patient's wound conditions worse on the left side. So I will do angiographic examination left leg tomorrow. I discussed with the patient rational  for the procedure risks and benefits.

## 2022-01-24 NOTE — DIALYSIS
Pt tolerated 3.5h dialysis with 1.5 L fluid removal.  Dressing changed site wnl w/o s/s infection. Visited with Dr. Abbey Villegas during dialysis.

## 2022-01-25 LAB
CRP SERPL-MCNC: 4.91 MG/DL (ref 0–0.6)
PROCALCITONIN SERPL-MCNC: 0.6 NG/ML

## 2022-01-25 PROCEDURE — 86140 C-REACTIVE PROTEIN: CPT

## 2022-01-25 PROCEDURE — 97530 THERAPEUTIC ACTIVITIES: CPT

## 2022-01-25 PROCEDURE — 65270000029 HC RM PRIVATE

## 2022-01-25 PROCEDURE — 74011250637 HC RX REV CODE- 250/637: Performed by: INTERNAL MEDICINE

## 2022-01-25 PROCEDURE — 84145 PROCALCITONIN (PCT): CPT

## 2022-01-25 PROCEDURE — 99231 SBSQ HOSP IP/OBS SF/LOW 25: CPT | Performed by: SURGERY

## 2022-01-25 PROCEDURE — 74011000250 HC RX REV CODE- 250: Performed by: INTERNAL MEDICINE

## 2022-01-25 PROCEDURE — 99232 SBSQ HOSP IP/OBS MODERATE 35: CPT | Performed by: INTERNAL MEDICINE

## 2022-01-25 PROCEDURE — 36415 COLL VENOUS BLD VENIPUNCTURE: CPT

## 2022-01-25 RX ADMIN — SODIUM CHLORIDE, PRESERVATIVE FREE 10 ML: 5 INJECTION INTRAVENOUS at 14:25

## 2022-01-25 RX ADMIN — FERROUS SULFATE TAB 325 MG (65 MG ELEMENTAL FE) 325 MG: 325 (65 FE) TAB at 09:10

## 2022-01-25 RX ADMIN — SODIUM CHLORIDE, PRESERVATIVE FREE 10 ML: 5 INJECTION INTRAVENOUS at 22:54

## 2022-01-25 RX ADMIN — SODIUM CHLORIDE, PRESERVATIVE FREE 10 ML: 5 INJECTION INTRAVENOUS at 06:31

## 2022-01-25 RX ADMIN — AMIODARONE HYDROCHLORIDE 200 MG: 200 TABLET ORAL at 09:10

## 2022-01-25 RX ADMIN — FERROUS SULFATE TAB 325 MG (65 MG ELEMENTAL FE) 325 MG: 325 (65 FE) TAB at 17:02

## 2022-01-25 RX ADMIN — ASPIRIN 81 MG CHEWABLE TABLET 81 MG: 81 TABLET CHEWABLE at 09:10

## 2022-01-25 NOTE — PROGRESS NOTES
Progress Note    Patient: Marshal Boeck MRN: [id number]  [id number]: [id number]    YOB: 1955  Age: 78 y.o. Sex: male      Admit Date: 1/7/2022    LOS: 17 days     Subjective:   Patient followed for sepsis with cellulitis both calves and aspiration pneumonia. Leg wound culture grew multiple organisms as shown below. Afebrile with normal WBC, decreasing procal and CRP, now off antibiotics with clinical resolution of leg infections. Patient apparently scheduled for left leg angiography but decline. Patient resting comfortably with no complaints but wants to have bandages removed from his calves. Objective:     Vitals:    01/24/22 2036 01/24/22 2319 01/25/22 0329 01/25/22 0815   BP: 120/69 112/70 115/72 109/67   Pulse: 60 61 (!) 57 (!) 59   Resp: 18 18 20 18   Temp: 99.3 °F (37.4 °C) 100 °F (37.8 °C) 98.6 °F (37 °C) 97.6 °F (36.4 °C)   TempSrc:       SpO2: 96% 95% 97% 95%   Weight: 169 lb 15.6 oz (77.1 kg)      Height:            Intake and Output:  Current Shift: No intake/output data recorded. Last three shifts: 01/23 1901 - 01/25 0700  In: -   Out: 1510     Physical Exam:    Vitals and nursing note reviewed. Constitutional:       Appearance: He is ill-appearing. Genitourinary:     Comments: External urinary catheter  Musculoskeletal:      Right lower leg: Edema present. Left lower leg: Edema present. Comments:  Both calves with gauze dressing, no wounds on right calf but left calf with still with open wound with granulation tissue      Neurological: nonfocal, mild confusion  Psychiatric:         Behavior: Behavior normal.     Lab/Data Review:     WBC 7,900    CRP 2.80 <4.66 <2.63 <2.53 < 5.30 <6.32 <8.19 <12.10 <12.10  Procal 0.72 < 0.70 <0.84 < 2.39 <3.12 < 4.41 <4.39 <5.16    MRSA nasal PCR negative    Wound cultures leg (1/8)  Staphylococcus aureus (MSSA),  Alcaligenes faecalis resistant to Zosyn, Klebsiella pneumoniae      Assessment:     Active Problems:    Renal failure (1/8/2022)      Hyperkalemia (1/8/2022)    1. Cellulitis both calves, secondary to probable S. Aureus (MSSA), Alcaligenes faecalis resistant to Zosyn, Klebsiella pneumoniae, status post 14 days IV Antibiotics, including Meropenem, clinically resolved. 2. Aspiration pneumonia, RLL, status antibiotics as note above  3. Sepsis with leukocytosis and elevated CRP/procal, resolving    Plan:   1. No further antibiotic recommendations at this time  2. Cleared for discharge from ID standpoint  3.   Would continue dressings for left calf wound    Signed By: Ona Gosselin, MD     January 25, 2022

## 2022-01-25 NOTE — PROGRESS NOTES
Problem: Falls - Risk of  Goal: *Absence of Falls  Description: Document Candace Raya Fall Risk and appropriate interventions in the flowsheet. Outcome: Progressing Towards Goal  Note: Fall Risk Interventions:  Mobility Interventions: Bed/chair exit alarm,Patient to call before getting OOB,PT Consult for mobility concerns    Mentation Interventions: Adequate sleep, hydration, pain control,Bed/chair exit alarm    Medication Interventions: Bed/chair exit alarm,Patient to call before getting OOB,Teach patient to arise slowly    Elimination Interventions: Bed/chair exit alarm,Call light in reach,Urinal in reach              Problem: Patient Education: Go to Patient Education Activity  Goal: Patient/Family Education  Outcome: Progressing Towards Goal     Problem: Pressure Injury - Risk of  Goal: *Prevention of pressure injury  Description: Document Alfa Scale and appropriate interventions in the flowsheet.   Outcome: Progressing Towards Goal  Note: Pressure Injury Interventions:  Sensory Interventions: Assess changes in LOC,Float heels,Minimize linen layers,Maintain/enhance activity level    Moisture Interventions: Absorbent underpads,Apply protective barrier, creams and emollients,Minimize layers    Activity Interventions: PT/OT evaluation    Mobility Interventions: PT/OT evaluation    Nutrition Interventions: Document food/fluid/supplement intake    Friction and Shear Interventions: Apply protective barrier, creams and emollients,Minimize layers                Problem: Patient Education: Go to Patient Education Activity  Goal: Patient/Family Education  Outcome: Progressing Towards Goal

## 2022-01-25 NOTE — PROGRESS NOTES
Patient was scheduled for left leg angiographic examination this morning, however patient changed his mind and he does not want undergo this procedure. I did try to explain and convince the patient about the procedure however patient declined this procedure.

## 2022-01-25 NOTE — PROGRESS NOTES
Progress Note    Lance Vallejo MD             Daily Progress Note: 1/25/2022      Subjective: The patient is seen for follow  up. RN paged me and requested me to call patient's son. 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 Patient has refused for the arteriography. He would like to go for inpatient rehab I have conveyed this message to RN Martha York taking care of the patient. Patient denies any chest pain or shortness of breath. As per RN patient is able to walk less than 20 feet.   Problem List:  Problem List as of 1/25/2022 Never Reviewed          Codes Class Noted - Resolved    Renal failure ICD-10-CM: N19  ICD-9-CM: 427  1/8/2022 - Present        Hyperkalemia ICD-10-CM: E87.5  ICD-9-CM: 276.7  1/8/2022 - Present        Pulmonary edema ICD-10-CM: J81.1  ICD-9-CM: 930  7/13/2021 - Present        GI bleed ICD-10-CM: K92.2  ICD-9-CM: 578.9  1/5/2021 - Present              Medications reviewed  Current Facility-Administered Medications   Medication Dose Route Frequency    amiodarone (CORDARONE) tablet 200 mg  200 mg Oral DAILY    iron sucrose (VENOFER) injection 200 mg  200 mg IntraVENous DIALYSIS MON, WED & FRI    epoetin valente-epbx (RETACRIT) injection 10,000 Units  10,000 Units IntraVENous DIALYSIS MON, WED & FRI    oxyCODONE IR (ROXICODONE) tablet 5 mg  5 mg Oral Q8H PRN    sodium chloride (NS) flush 5-40 mL  5-40 mL IntraVENous Q8H    sodium chloride (NS) flush 5-40 mL  5-40 mL IntraVENous PRN    ferrous sulfate tablet 325 mg  1 Tablet Oral BID WITH MEALS    heparin (porcine) 1,000 unit/mL injection 4,000 Units  4,000 Units IntraVENous PRN    Or    heparin (porcine) 1,000 unit/mL injection 2,000 Units  2,000 Units IntraVENous PRN    metoprolol succinate (TOPROL-XL) XL tablet 50 mg  50 mg Oral DAILY    nitroglycerin (NITROBID) 2 % ointment 0.5 Inch  0.5 Inch Topical Q6H    aspirin chewable tablet 81 mg  81 mg Oral DAILY    heparin (porcine) 1,000 unit/mL injection 2,800 Units  2,800 Units Hemodialysis DIALYSIS PRN       Review of Systems:   A comprehensive review of systems was negative except for that written in the HPI. Objective:   Physical Exam:     Visit Vitals  /71 (BP Patient Position: At rest;Semi fowlers)   Pulse 61   Temp 98.1 °F (36.7 °C)   Resp 18   Ht 6' (1.829 m)   Wt 77.1 kg (169 lb 15.6 oz)   SpO2 96%   BMI 23.05 kg/m²    O2 Flow Rate (L/min): 2 l/min O2 Device: None (Room air)    Temp (24hrs), Av.7 °F (37.1 °C), Min:97.6 °F (36.4 °C), Max:100 °F (37.8 °C)    No intake/output data recorded.  1901 -  0700  In: -   Out: 1510     General:  Alert, cooperative, no distress, appears stated age. Lungs:   Clear to auscultation bilaterally. Chest wall:  No tenderness or deformity. Heart:  Regular rate and rhythm, S1, S2 normal, no murmur, click, rub or gallop. Abdomen:   Soft, non-tender. Bowel sounds normal. No masses,  No organomegaly. Extremities: Extremities normal, atraumatic, no cyanosis or edema. Pulses: 2+ and symmetric all extremities. Skin: Skin color, texture, turgor normal. No rashes or lesions ulcers on the both the legs are well-healed   Neurologic: CNII-XII intact. No gross sensory or motor deficits     Data Review:       Recent Days:  No results for input(s): WBC, HGB, HCT, PLT, HGBEXT, HCTEXT, PLTEXT in the last 72 hours. No results for input(s): NA, K, CL, CO2, GLU, BUN, CREA, CA, MG, PHOS, ALB, TBIL, TBILI, ALT, INR, INREXT in the last 72 hours. No lab exists for component: SGOT  No results for input(s): PH, PCO2, PO2, HCO3, FIO2 in the last 72 hours. Results     ** No results found for the last 336 hours. **         24 Hour Results:  No results found for this or any previous visit (from the past 24 hour(s)).     IR INSERT TUNL CVC W/O PORT OVER 5 YR   Final Result   Successful conversion of a right internal jugular temporary dialysis catheter to   a 23 cm PermCath. The catheter is functioning and is ready for use. IR FLUORO GUIDE PLC CVAD   Final Result   Successful conversion of a right internal jugular temporary dialysis catheter to   a 23 cm PermCath. The catheter is functioning and is ready for use. DUPLEX LOW EXT ARTERIES WITH JAVON   Final Result      XR CHEST PORT   Final Result      Interval placement of a right supraclavicular approach dialysis catheter with   distal tip in the proximal right atrium. Cardiomegaly with pulmonary vascular congestion and pulmonary edema. Stable appearing basilar right lung pneumonia. Small right pleural effusion. IR INSERT NON TUNL CVC OVER 5 YRS   Final Result      Technically successful insertion of non-tunneled Trialysis catheter with US   guidance. Plan:       Post catheter placement chest xray confirmed appropriate position of the   catheter. The catheter is appropriate for immediate use.   _______________________________________________________________      PROCEDURE SUMMARY:   - Venous access with ultrasound guidance   - Non-tunneled central venous catheter insertion      PROCEDURE DETAILS:      Pre-procedure   Relevant imaging review: None    Informed consent for the procedure was obtained and time-out was performed prior   to the procedure. Prophylactic antibiotic administered: Not administered   Preparation: The site was prepared and draped using all elements of maximal   sterile barrier technique including sterile gloves, sterile gown, cap, mask,   large sterile sheet, sterile ultrasound probe cover, sterile ultrasound gel,   hand hygiene and cutaneous antisepsis with 2% chlorhexidine. Medical reason for site preparation exception: Not applicable      Anesthesia/sedation   Level of anesthesia: No sedation   Anesthesia administered by: Not applicable   Duration of anesthesia/sedation: 0 minutes.       Access   The vessel was sonographically evaluated and judged to be patent and appropriate   for access and a permanent image was stored. Three mLs of 1% Lidocaine was   administered to the access site. Real time ultrasound was used to visualize   needle entry into the vessel. Vein accessed: Right internal jugular vein   Access technique: 4F micropuncture set      Catheter placement   The access site was dilated and the catheter was placed into the vein over a   wire and all guidewires were removed in their entirety. Catheter ports were   aspirated and flushed. Catheter tip location was verified by portable X-ray. Catheter size:13 Tamazight   Catheter length: 20 cm   Catheter tip position: Proximal right atrium   Catheter flush: Heparin (100 units/mL)      Closure   The catheter was secured. A sterile dressing was applied. Catheter securement technique: Non-absorbable suture      Additional Medications   None      Additional Details   Estimated blood loss:  Less than 1 mL   Additional description of procedure: None   Additional findings: None   Additional equipment: None   Specimens removed: None                     IR US GUIDED VASCULAR ACCESS   Final Result      Technically successful insertion of non-tunneled Trialysis catheter with US   guidance. Plan:       Post catheter placement chest xray confirmed appropriate position of the   catheter. The catheter is appropriate for immediate use.   _______________________________________________________________      PROCEDURE SUMMARY:   - Venous access with ultrasound guidance   - Non-tunneled central venous catheter insertion      PROCEDURE DETAILS:      Pre-procedure   Relevant imaging review: None    Informed consent for the procedure was obtained and time-out was performed prior   to the procedure.    Prophylactic antibiotic administered: Not administered   Preparation: The site was prepared and draped using all elements of maximal   sterile barrier technique including sterile gloves, sterile gown, cap, mask,   large sterile sheet, sterile ultrasound probe cover, sterile ultrasound gel,   hand hygiene and cutaneous antisepsis with 2% chlorhexidine. Medical reason for site preparation exception: Not applicable      Anesthesia/sedation   Level of anesthesia: No sedation   Anesthesia administered by: Not applicable   Duration of anesthesia/sedation: 0 minutes. Access   The vessel was sonographically evaluated and judged to be patent and appropriate   for access and a permanent image was stored. Three mLs of 1% Lidocaine was   administered to the access site. Real time ultrasound was used to visualize   needle entry into the vessel. Vein accessed: Right internal jugular vein   Access technique: 4F micropuncture set      Catheter placement   The access site was dilated and the catheter was placed into the vein over a   wire and all guidewires were removed in their entirety. Catheter ports were   aspirated and flushed. Catheter tip location was verified by portable X-ray. Catheter size:13 Divehi   Catheter length: 20 cm   Catheter tip position: Proximal right atrium   Catheter flush: Heparin (100 units/mL)      Closure   The catheter was secured. A sterile dressing was applied.    Catheter securement technique: Non-absorbable suture      Additional Medications   None      Additional Details   Estimated blood loss:  Less than 1 mL   Additional description of procedure: None   Additional findings: None   Additional equipment: None   Specimens removed: None                     XR CHEST PORT   Final Result   Airspace opacity and effusion at the right lung base and in the   acute setting would suggest pneumonia and/or aspiration however the patient also   appears to have baseline/background vascular congestion and/or pulmonary   arterial hypertension, noting cardiomegaly and/or pericardial effusion      IR INSERT NON TUNL CVC OVER 5 YRS    (Results Pending)        Assessment: Bilateral leg ulcers  PAD  CHF  Normal coronaries patient has refused for ICD  Hypertension  Renal failure on dialysis        Plan: Patient's ejection fraction is low and he has refused for ICD. He told me that he would like to go for inpatient rehab for about 2 to 3 weeks which is an excellent idea. Would like to  involved for placement        Care Plan discussed with: Patient/Family, Nurse and     Total time spent with patient: 30 minutes.     Martha Montilla MD

## 2022-01-25 NOTE — PROGRESS NOTES
Progress Note      1/25/2022 12:19 PM  NAME: Maryam Cruz   MRN:  [id number]   Admit Diagnosis: Renal failure [N19]  Hyperkalemia [E87.5]      Subjective:   Chart reviewed. Patient is on dialysis. Symptoms of shortness of breath has improved. With the patient and with the nurse. Cardiac catheterization findings discussed with the patient and also discussed with the electrophysiologist.  Vascular surgeon's note reviewed and patient refused to have peripheral angiogram.    Review of Systems:    Symptom Y/N Comments  Symptom Y/N Comments   Fever/Chills n   Chest Pain n    Poor Appetite    Edema y    Cough    Abdominal Pain     Sputum    Joint Pain     SOB/CARMONA y  improving  Pruritis/Rash     Nausea/vomit    Other     Diarrhea         Constipation           Could NOT obtain due to:      Objective:          Physical Exam:    Last 24hrs VS reviewed since prior progress note. Most recent are:    Visit Vitals  /67   Pulse (!) 59   Temp 97.6 °F (36.4 °C)   Resp 18   Ht 6' (1.829 m)   Wt 77.1 kg (169 lb 15.6 oz)   SpO2 95%   BMI 23.05 kg/m²     No intake or output data in the 24 hours ending 01/25/22 1153     General Appearance: Well developed, well nourished, alert & oriented x 3,    no acute distress. Ears/Nose/Mouth/Throat: Hearing grossly normal.  Neck: Supple. Chest: Lungs clear to auscultation bilaterally. Cardiovascular: Regular rate and rhythm, S1,S2 normal, no murmur. Abdomen: Soft, non-tender, bowel sounds are active. Extremities: Patient has cellulitis of the lower extremities  Skin: Warm and dry. []         Post-cath site without hematoma, bruit, tenderness, or thrill. Distal pulses intact. PMH/SH reviewed - no change compared to H&P    Data Review    Telemetry: normal sinus rhythm , patient has some PVCs, and episode of wide-complex tachycardia possible ventricular tachycardia. EKG:   Patient had EKG that is very concerning for ventricular tachycardia.     Lab Data Personally Reviewed:    No results for input(s): WBC, HGB, HCT, PLT, HGBEXT, HCTEXT, PLTEXT, HGBEXT, HCTEXT, PLTEXT in the last 72 hours. No results for input(s): INR, PTP, APTT, INREXT, INREXT in the last 72 hours. No results for input(s): NA, K, CL, CO2, BUN, CREA, GLU, CA, MG in the last 72 hours. No results for input(s): CPK, CKNDX, TROIQ in the last 72 hours. No lab exists for component: CPKMB  No results found for: CHOL, CHOLX, CHLST, CHOLV, HDL, HDLP, LDL, LDLC, DLDLP, TGLX, TRIGL, TRIGP, CHHD, CHHDX    No results for input(s): AP, TBIL, TP, ALB, GLOB, GGT, AML, LPSE in the last 72 hours. No lab exists for component: SGOT, GPT, AMYP, HLPSE  No results for input(s): PH, PCO2, PO2 in the last 72 hours. Medications Personally Reviewed:    Current Facility-Administered Medications   Medication Dose Route Frequency    amiodarone (CORDARONE) tablet 200 mg  200 mg Oral DAILY    iron sucrose (VENOFER) injection 200 mg  200 mg IntraVENous DIALYSIS MON, WED & FRI    epoetin valente-epbx (RETACRIT) injection 10,000 Units  10,000 Units IntraVENous DIALYSIS MON, WED & FRI    oxyCODONE IR (ROXICODONE) tablet 5 mg  5 mg Oral Q8H PRN    sodium chloride (NS) flush 5-40 mL  5-40 mL IntraVENous Q8H    sodium chloride (NS) flush 5-40 mL  5-40 mL IntraVENous PRN    ferrous sulfate tablet 325 mg  1 Tablet Oral BID WITH MEALS    heparin (porcine) 1,000 unit/mL injection 4,000 Units  4,000 Units IntraVENous PRN    Or    heparin (porcine) 1,000 unit/mL injection 2,000 Units  2,000 Units IntraVENous PRN    metoprolol succinate (TOPROL-XL) XL tablet 50 mg  50 mg Oral DAILY    nitroglycerin (NITROBID) 2 % ointment 0.5 Inch  0.5 Inch Topical Q6H    aspirin chewable tablet 81 mg  81 mg Oral DAILY    heparin (porcine) 1,000 unit/mL injection 2,800 Units  2,800 Units Hemodialysis DIALYSIS PRN           Problem List:   Patient has a acute on chronic renal failure and had a dialysis and is clinically better.   Decompensated heart failure and ejection fraction is depressed and patient has a at least moderate possibly severe pulmonary hypertension. History of paroxysmal atrial fibrillation. Recent DVT of her left axillary vein. Wide-complex tachycardia probably ventricular tachycardia. Catheterization did not show evidence of coronary artery disease and ejection fraction was about 20 to 25%. Patient is getting restless and wants to leave. Cellulitis of the lower extremities. Patient refused peripheral arterial angiogram as advised by the vascular surgeon. 1.      Assessment/Plan:   I will continue dialysis as per recommendations of the nephrologist.  Patient is offered ICD by Dr. Gisela Cain however patient does not want it. Vascular surgery note reviewed and appreciated and will follow the recommendations. We will continue amiodarone and present care. Discontinue heparin and start on small dose of Eliquis because patient has a history of paroxysmal atrial fibrillation. Patient will be going for cardiac rehab. We will follow infectious disease recommendations. Thank you. 1.          []       High complexity decision making was performed in this patient at high risk for decompensation with multiple organ involvement.     Michelle Freeman MD

## 2022-01-25 NOTE — PROGRESS NOTES
CM in to speak with patient about discharge plan. Patient in agreement to go to SNF or IRF ,but refused to go to [facility] in [facility] as patient says that it too far. Obtained choice to [facility] and [facility] from patient. Referrals sent via [employer].

## 2022-01-25 NOTE — PROGRESS NOTES
[facility]     Renal Daily Progress Note:     Admission Date: 2022   Subjective:  Events noted, Mental status is good. He  looks better, less short of breath, not tachypneic. Permcath working    Refused angiogram this morning      Review of Systems  Pertinent items are noted in HPI. Objective:     Visit Vitals  /66 (BP Patient Position: At rest;Sitting)   Pulse 62   Temp 99.5 °F (37.5 °C)   Resp 18   Ht 6' (1.829 m)   Wt 77.1 kg (169 lb 15.6 oz)   SpO2 95%   BMI 23.05 kg/m²     Temp (24hrs), Av.9 °F (37.2 °C), Min:97.6 °F (36.4 °C), Max:100 °F (37.8 °C)      No intake or output data in the 24 hours ending 22 1750  Current Facility-Administered Medications   Medication Dose Route Frequency    amiodarone (CORDARONE) tablet 200 mg  200 mg Oral DAILY    iron sucrose (VENOFER) injection 200 mg  200 mg IntraVENous DIALYSIS MON, WED & FRI    epoetin valente-epbx (RETACRIT) injection 10,000 Units  10,000 Units IntraVENous DIALYSIS MON, WED & FRI    oxyCODONE IR (ROXICODONE) tablet 5 mg  5 mg Oral Q8H PRN    sodium chloride (NS) flush 5-40 mL  5-40 mL IntraVENous Q8H    sodium chloride (NS) flush 5-40 mL  5-40 mL IntraVENous PRN    ferrous sulfate tablet 325 mg  1 Tablet Oral BID WITH MEALS    heparin (porcine) 1,000 unit/mL injection 4,000 Units  4,000 Units IntraVENous PRN    Or    heparin (porcine) 1,000 unit/mL injection 2,000 Units  2,000 Units IntraVENous PRN    metoprolol succinate (TOPROL-XL) XL tablet 50 mg  50 mg Oral DAILY    nitroglycerin (NITROBID) 2 % ointment 0.5 Inch  0.5 Inch Topical Q6H    aspirin chewable tablet 81 mg  81 mg Oral DAILY    heparin (porcine) 1,000 unit/mL injection 2,800 Units  2,800 Units Hemodialysis DIALYSIS PRN       Physical Exam:General appearance: alert, cooperative, no distress, appears older than stated age.    Head: Normocephalic, without obvious abnormality, atraumatic  Lungs: clear to auscultation bilaterally  Heart: regular rate and rhythm, no rub  Abdomen: soft, non-tender. Bowel sounds normal. No masses,  no organomegaly  Extremities: venous stasis dermatitis noted, edema: decreased lower extremity edema and dependent thigh edema  Skin: Left lower leg with posterior ulcer inferior to calf  Neurologic: Grossly normal    Data Review:     LABS:  No results for input(s): NA, K, CL, CO2, BUN, CREA, CA, ALB, PHOS, MG in the last 72 hours. Iron saturation 13%    No results for input(s): WBC, HGB, HCT, PLT, HGBEXT, HCTEXT, PLTEXT, HGBEXT, HCTEXT, PLTEXT in the last 72 hours. No results for input(s): HÉCTOR, KU, CLU, CREAU in the last 72 hours. No lab exists for component: PROU     Cardiac cath Jan 13, 2022  Underwent cardiac cath today:  Indication: Severe left ventricular systolic dysfunction and patient had a V. Tach.     Left main coronary artery is a very large caliber vessel and has no disease. LAD artery is a very large caliber vessel and proximal mid and distal segment has no stenosis and diagonal branches has no disease. Ramus intermedius is a medium caliber vessel without disease. Circumflex artery is a large-caliber vessel and proximal mid and distal segment and AV groove segment has no stenosis. Large obtuse marginal branch and posterolateral branch has no stenosis. Right coronary artery is a large-caliber vessel and does junction of proximal and mid segment there was a concern of stenosis and that therefore it was interrogated by IFR which turned out to be 0.95 and was not deemed critical enough to do intervention.     Left ventriculography is performed in the right anterior oblique view and ejection fraction is about 20 to 25% and distal anterior wall apex and distal inferior wall is near akinetic.   This may be possible presentation of Takotsubo syndrome.     Plan: Patient will receive defibrillation      Chest x-ray January 8, 2022:  IMPRESSION     Interval placement of a right supraclavicular approach dialysis catheter with  distal tip in the proximal right atrium.     Cardiomegaly with pulmonary vascular congestion and pulmonary edema.      Stable appearing basilar right lung pneumonia. Small right pleural effusion. Assessment:   Renal Specific Problems  Chronic kidney disease stage VI, started on HD.   Hypoalbumin  SVT- recurrent  Volume overload- resolved  Metabolic acidosis- resolved  Hyperkalemia- resolved  Anemia with renal failure and iron deficient  Leukocytosis corrected with declining procalcitonin  Probable right lower lobe pneumonia  Infected  leg ulcers  Venous stasis dermatitis lower extremity          Plan:     Obtain/ Order: labs/cultures/radiology/procedures:      Therapeutic:    Angiogram with possible angioplasty of lower leg vessels- refused, will reattempt later if wound healing does not occur   HD in AM    Epogen once iron saturation greater than 20%, or after 2 doses of Venofer   IV iron with dialysis  Wound care - regarding left leg ulcer    Case management to arrange rehab and outpatient dialysis

## 2022-01-25 NOTE — PROGRESS NOTES
OCCUPATIONAL THERAPY TREATMENT  Patient: Denis Councilman (82 y.o. male)  Date: 1/25/2022  Diagnosis: Renal failure [N19]  Hyperkalemia [E87.5] <principal problem not specified>  Procedure(s) (LRB):  LEFT HEART CATH / CORONARY ANGIOGRAPHY W GRAFTS (N/A)  PERCUTANEOUS CORONARY INTERVENTION (N/A) 12 Days Post-Op  Precautions:    Chart, occupational therapy assessment, plan of care, and goals were reviewed. ASSESSMENT  Patient continues with skilled OT services and is progressing towards goals. Upon MELCHOR arrival, pt sitting at EOB and agreeable to tx session with encouragement. Pt completed scooting at EOB with SBA. Pt completed sit>stand from EOB with CGA using RW for balance upon standing. Pt ambulated into bathroom with CGA and completed transfer onto toilet with CGA/SBA. Pt required increased time for completion of BM. Pt completed seated bowel/bladder hygiene with supervision. Sit>stand completed from toilet with Renee. Clothing management completed in standing with CGA. Pt completed standing oral hygiene and face washing at sink with CGA. Pt ambulated to EOB for seated rest break. Pt completed sit>stand from EOB with Renee. Pt ambulated down hallway with CGA using RW and required one standing rest break throughout ambulation. Pt ambulated back to room and completed transfer into recliner with Renee for safety due to pt plopping into chair. Pt completed seated UE therex (see chart below). Pt noted to be SOB from ambulation and educated on PLB. Pt then educated on completion of UE HEP throughout time sitting in chair. Pt left sitting in recliner with call bell within reach. Will continue to follow pt throughout remainder of stay and progress towards OT goals. Recommending SNF at discharge when medically appropriate.     Current Level of Function Impacting Discharge (ADLs): CGA/Renee transfers, supervision/CGA toileting, CGA/setup A grooming    Other factors to consider for discharge: Time of onset, medical prognosis/diagnosis, severity of deficits, PLOF, home environment, and family support         PLAN :  Patient continues to benefit from skilled intervention to address the above impairments. Continue treatment per established plan of care. to address goals. Recommend next OT session: standing grooming, transfers    Recommendation for discharge: (in order for the patient to meet his/her long term goals)  Ramsey Jovon    This discharge recommendation:  Has been made in collaboration with the attending provider and/or case management    IF patient discharges home will need the following DME: TBD       SUBJECTIVE:   Patient stated I can't beat it this time, I need to go somewhere before I go home.     OBJECTIVE DATA SUMMARY:   Cognitive/Behavioral Status:  Neurologic State: Alert  Orientation Level: Oriented X4  Cognition: Follows commands    Functional Mobility and Transfers for ADLs:  Bed Mobility:  Scooting: Stand-by assistance    Transfers:  Sit to Stand: Minimum assistance  Functional Transfers  Bathroom Mobility: Minimum assistance;Contact guard assistance  Toilet Transfer : Minimum assistance  Bed to Chair: Minimum assistance    Balance:  Sitting: Intact; Without support  Sitting - Static: Good (unsupported)  Sitting - Dynamic: Good (unsupported)  Standing: Impaired; With support  Standing - Static: Constant support;Good  Standing - Dynamic : Constant support; Fair    ADL Intervention:  Grooming  Position Performed: Seated edge of bed;Standing  Washing Face: Contact guard assistance  Brushing Teeth: Contact guard assistance  Brushing/Combing Hair: Set-up    Toileting  Bladder Hygiene: Supervision  Bowel Hygiene: Supervision  Clothing Management: Contact guard assistance    Therapeutic Exercises:   Exercise Sets Reps AROM AAROM PROM Self PROM Comments   Shoulder flex/ext 1 12 [x] [] [] []      Pain:  Pain reported in LE but no formal rating given at this time.   Pt reporting he is always in some kind of pain    Activity Tolerance:   Fair, requires rest breaks, and observed SOB with activity  Please refer to the flowsheet for vital signs taken during this treatment. After treatment patient left in no apparent distress:   Sitting in chair and Call bell within reach    COMMUNICATION/COLLABORATION:   The patients plan of care was discussed with: Registered nurse. JILL Aponte  Time Calculation: 42 mins    Problem: Self Care Deficits Care Plan (Adult)  Goal: *Acute Goals and Plan of Care (Insert Text)  Description: 1. Pt will be mod I sup <> sit in prep for EOB ADLs  2. Pt will be mod I grooming standing at sink  3. Pt will be min A LE dressing sitting EOB/long sit  4. Pt will be mod I sit <>  prep for toileting LRAD  5. Pt will be mod I toileting/toilet transfer/cloth mgmt LRAD  6.   Pt will be IND following UE HEP in prep for self care tasks      Outcome: Progressing Towards Goal

## 2022-01-25 NOTE — PROGRESS NOTES
Problem: Falls - Risk of  Goal: *Absence of Falls  Description: Document Nikolay Pitt Fall Risk and appropriate interventions in the flowsheet. Outcome: Progressing Towards Goal  Note: Fall Risk Interventions:  Mobility Interventions: Bed/chair exit alarm,Patient to call before getting OOB,PT Consult for mobility concerns    Mentation Interventions: Adequate sleep, hydration, pain control,Bed/chair exit alarm    Medication Interventions: Bed/chair exit alarm,Patient to call before getting OOB,Teach patient to arise slowly    Elimination Interventions: Bed/chair exit alarm,Call light in reach,Urinal in reach              Problem: Pressure Injury - Risk of  Goal: *Prevention of pressure injury  Description: Document Alfa Scale and appropriate interventions in the flowsheet.   Outcome: Progressing Towards Goal  Note: Pressure Injury Interventions:  Sensory Interventions: Assess changes in LOC,Float heels,Minimize linen layers,Maintain/enhance activity level    Moisture Interventions: Absorbent underpads,Apply protective barrier, creams and emollients,Minimize layers    Activity Interventions: PT/OT evaluation    Mobility Interventions: PT/OT evaluation    Nutrition Interventions: Document food/fluid/supplement intake    Friction and Shear Interventions: Apply protective barrier, creams and emollients,Minimize layers                Problem: Patient Education: Go to Patient Education Activity  Goal: Patient/Family Education  Outcome: Progressing Towards Goal

## 2022-01-25 NOTE — PROGRESS NOTES
Per patient's son-pt wants to drive himself to dialysis in his car. Patient's son worried.      Would like MD to give him a call [phone]

## 2022-01-26 LAB
ALBUMIN SERPL-MCNC: 2.1 G/DL (ref 3.5–5)
ANION GAP SERPL CALC-SCNC: 9 MMOL/L (ref 5–15)
BUN SERPL-MCNC: 44 MG/DL (ref 6–20)
BUN/CREAT SERPL: 8 (ref 12–20)
CA-I BLD-MCNC: 8 MG/DL (ref 8.5–10.1)
CHLORIDE SERPL-SCNC: 105 MMOL/L (ref 97–108)
CO2 SERPL-SCNC: 26 MMOL/L (ref 21–32)
CREAT SERPL-MCNC: 5.18 MG/DL (ref 0.7–1.3)
CRP SERPL-MCNC: 4.85 MG/DL (ref 0–0.6)
GLUCOSE SERPL-MCNC: 92 MG/DL (ref 65–100)
PHOSPHATE SERPL-MCNC: 5.3 MG/DL (ref 2.6–4.7)
POTASSIUM SERPL-SCNC: 4.1 MMOL/L (ref 3.5–5.1)
PROCALCITONIN SERPL-MCNC: 0.55 NG/ML
SODIUM SERPL-SCNC: 140 MMOL/L (ref 136–145)

## 2022-01-26 PROCEDURE — 84145 PROCALCITONIN (PCT): CPT

## 2022-01-26 PROCEDURE — 99232 SBSQ HOSP IP/OBS MODERATE 35: CPT | Performed by: INTERNAL MEDICINE

## 2022-01-26 PROCEDURE — 5A1D70Z PERFORMANCE OF URINARY FILTRATION, INTERMITTENT, LESS THAN 6 HOURS PER DAY: ICD-10-PCS | Performed by: INTERNAL MEDICINE

## 2022-01-26 PROCEDURE — 86140 C-REACTIVE PROTEIN: CPT

## 2022-01-26 PROCEDURE — 74011250637 HC RX REV CODE- 250/637

## 2022-01-26 PROCEDURE — 74011250637 HC RX REV CODE- 250/637: Performed by: INTERNAL MEDICINE

## 2022-01-26 PROCEDURE — 90935 HEMODIALYSIS ONE EVALUATION: CPT

## 2022-01-26 PROCEDURE — 65270000029 HC RM PRIVATE

## 2022-01-26 PROCEDURE — 80069 RENAL FUNCTION PANEL: CPT

## 2022-01-26 PROCEDURE — 36415 COLL VENOUS BLD VENIPUNCTURE: CPT

## 2022-01-26 PROCEDURE — 74011000250 HC RX REV CODE- 250: Performed by: INTERNAL MEDICINE

## 2022-01-26 PROCEDURE — 74011250636 HC RX REV CODE- 250/636

## 2022-01-26 PROCEDURE — 97116 GAIT TRAINING THERAPY: CPT

## 2022-01-26 PROCEDURE — 97110 THERAPEUTIC EXERCISES: CPT

## 2022-01-26 PROCEDURE — 74011250636 HC RX REV CODE- 250/636: Performed by: INTERNAL MEDICINE

## 2022-01-26 RX ORDER — OXYCODONE AND ACETAMINOPHEN 5; 325 MG/1; MG/1
1 TABLET ORAL
Status: COMPLETED | OUTPATIENT
Start: 2022-01-26 | End: 2022-01-26

## 2022-01-26 RX ORDER — OXYCODONE AND ACETAMINOPHEN 5; 325 MG/1; MG/1
TABLET ORAL
Status: COMPLETED
Start: 2022-01-26 | End: 2022-01-26

## 2022-01-26 RX ORDER — HEPARIN SODIUM 1000 [USP'U]/ML
INJECTION, SOLUTION INTRAVENOUS; SUBCUTANEOUS
Status: COMPLETED
Start: 2022-01-26 | End: 2022-01-26

## 2022-01-26 RX ADMIN — HEPARIN SODIUM 2800 UNITS: 1000 INJECTION INTRAVENOUS; SUBCUTANEOUS at 11:01

## 2022-01-26 RX ADMIN — FERROUS SULFATE TAB 325 MG (65 MG ELEMENTAL FE) 325 MG: 325 (65 FE) TAB at 18:41

## 2022-01-26 RX ADMIN — IRON SUCROSE 200 MG: 20 INJECTION, SOLUTION INTRAVENOUS at 08:12

## 2022-01-26 RX ADMIN — OXYCODONE AND ACETAMINOPHEN 1 TABLET: 5; 325 TABLET ORAL at 10:03

## 2022-01-26 RX ADMIN — OXYCODONE 5 MG: 5 TABLET ORAL at 03:29

## 2022-01-26 RX ADMIN — AMIODARONE HYDROCHLORIDE 200 MG: 200 TABLET ORAL at 18:41

## 2022-01-26 RX ADMIN — EPOETIN ALFA-EPBX 10000 UNITS: 10000 INJECTION, SOLUTION INTRAVENOUS; SUBCUTANEOUS at 09:23

## 2022-01-26 RX ADMIN — SODIUM CHLORIDE, PRESERVATIVE FREE 10 ML: 5 INJECTION INTRAVENOUS at 05:58

## 2022-01-26 RX ADMIN — OXYCODONE HYDROCHLORIDE AND ACETAMINOPHEN 1 TABLET: 5; 325 TABLET ORAL at 10:03

## 2022-01-26 RX ADMIN — SODIUM CHLORIDE, PRESERVATIVE FREE 10 ML: 5 INJECTION INTRAVENOUS at 14:47

## 2022-01-26 NOTE — PROGRESS NOTES
Progress Note    Patient: Franklyn Scheuermann MRN: [id number]  SSN: [id number]    YOB: 1955  Age: 78 y.o. Sex: male      Admit Date: 1/7/2022    LOS: 18 days     Subjective:   Patient followed for sepsis with cellulitis both calves and aspiration pneumonia. Leg wound culture grew multiple organisms as shown below. Afebrile with normal WBC, decreasing procal and CRP, now off antibiotics with clinical resolution of leg infections. Patient apparently refusing leg dressings again. He is sitting up on edge of bed. States that his legs feel better without the dressings. Objective:     Vitals:    01/26/22 0930 01/26/22 1000 01/26/22 1030 01/26/22 1100   BP: 126/80 137/88 138/84 138/72   Pulse: 60 61 61 60   Resp: 16 16 18 18   Temp:    98.2 °F (36.8 °C)   TempSrc:    Oral   SpO2:       Weight:       Height:            Intake and Output:  Current Shift: 01/26 0701 - 01/26 1900  In: -   Out: 1550   Last three shifts: 01/24 1901 - 01/26 0700  In: -   Out: 2     Physical Exam:    Vitals and nursing note reviewed. Constitutional:       Appearance: He is ill-appearing. Genitourinary:     Comments: External urinary catheter  Musculoskeletal:      Right lower leg: Edema present. Left lower leg: Edema present. Comments: Both calves with gauze dressing, no wounds on right calf but left calf with still with open wound with granulation tissue that is dry today   Neurological: nonfocal, mild confusion  Psychiatric:         Behavior: Behavior normal.     Lab/Data Review:     WBC 7,900    CRP 4.85 <4.91 <2.80 <4.66 <2.63 <2.53 < 5.30 <6.32 <8.19 <12.10 <12.10  Procal 0.55 <0.60 <0.72 < 0.70 <0.84 < 2.39 <3.12 < 4.41 <4.39 <5.16    MRSA nasal PCR negative    Wound cultures leg (1/8)  Staphylococcus aureus (MSSA),  Alcaligenes faecalis resistant to Zosyn, Klebsiella pneumoniae      Assessment:     Active Problems:    Renal failure (1/8/2022)      Hyperkalemia (1/8/2022)    1.  Cellulitis both calves, secondary to probable S. Aureus (MSSA), Alcaligenes faecalis resistant to Zosyn, Klebsiella pneumoniae, status post 14 days IV Antibiotics, including Meropenem, clinically resolved. 2. Aspiration pneumonia, RLL, status antibiotics as note above  3. Sepsis with leukocytosis and elevated CRP/procal, resolving    Comment:  CRP remains elevated but procal still decreasing off antibiotics. Plan:   1. No further antibiotic recommendations at this time, however, I am concerned about the open wound left calf that patient is refusing dressing for. 2.  Cleared for discharge from ID standpoint  3.   Would continue dressings for left calf wound    Signed By: Diego Fabian MD     January 26, 2022

## 2022-01-26 NOTE — PROGRESS NOTES
Progress Note    Dasia Kemp MD             Daily Progress Note: 1/26/2022      Subjective: The patient is seen for follow  up. Sitting in the bed looks better denies any chest pain or shortness of breath  Problem List:  Problem List as of 1/26/2022 Never Reviewed          Codes Class Noted - Resolved    Renal failure ICD-10-CM: N19  ICD-9-CM: 771  1/8/2022 - Present        Hyperkalemia ICD-10-CM: E87.5  ICD-9-CM: 276.7  1/8/2022 - Present        Pulmonary edema ICD-10-CM: J81.1  ICD-9-CM: 221  7/13/2021 - Present        GI bleed ICD-10-CM: K92.2  ICD-9-CM: 578.9  1/5/2021 - Present              Medications reviewed  Current Facility-Administered Medications   Medication Dose Route Frequency    amiodarone (CORDARONE) tablet 200 mg  200 mg Oral DAILY    iron sucrose (VENOFER) injection 200 mg  200 mg IntraVENous DIALYSIS MON, WED & FRI    epoetin valente-epbx (RETACRIT) injection 10,000 Units  10,000 Units IntraVENous DIALYSIS MON, WED & FRI    oxyCODONE IR (ROXICODONE) tablet 5 mg  5 mg Oral Q8H PRN    sodium chloride (NS) flush 5-40 mL  5-40 mL IntraVENous Q8H    sodium chloride (NS) flush 5-40 mL  5-40 mL IntraVENous PRN    ferrous sulfate tablet 325 mg  1 Tablet Oral BID WITH MEALS    heparin (porcine) 1,000 unit/mL injection 4,000 Units  4,000 Units IntraVENous PRN    Or    heparin (porcine) 1,000 unit/mL injection 2,000 Units  2,000 Units IntraVENous PRN    metoprolol succinate (TOPROL-XL) XL tablet 50 mg  50 mg Oral DAILY    nitroglycerin (NITROBID) 2 % ointment 0.5 Inch  0.5 Inch Topical Q6H    aspirin chewable tablet 81 mg  81 mg Oral DAILY    heparin (porcine) 1,000 unit/mL injection 2,800 Units  2,800 Units Hemodialysis DIALYSIS PRN       Review of Systems:   A comprehensive review of systems was negative except for that written in the HPI.     Objective:   Physical Exam:     Visit Vitals  /86 (BP 1 Location: Right upper arm, BP Patient Position: Sitting)   Pulse 69   Temp 98.9 °F (37.2 °C)   Resp 18   Ht 6' (1.829 m)   Wt 77.1 kg (169 lb 15.6 oz)   SpO2 95%   BMI 23.05 kg/m²    O2 Flow Rate (L/min): 2 l/min O2 Device: None (Room air)    Temp (24hrs), Av.7 °F (37.1 °C), Min:98.2 °F (36.8 °C), Max:99.5 °F (37.5 °C)    701 - 1900  In: -   Out:  - 700  In: -   Out: 2     General:  Alert, cooperative, no distress, appears stated age. Lungs:   Clear to auscultation bilaterally. Chest wall:  No tenderness or deformity. Heart:  Regular rate and rhythm, S1, S2 normal, no murmur, click, rub or gallop. Abdomen:   Soft, non-tender. Bowel sounds normal. No masses,  No organomegaly. Extremities: Extremities normal, atraumatic, no cyanosis 3+ edema   Pulses: 2+ and symmetric all extremities. Skin: Skin color, texture, turgor normal. No rashes or lesions ulcers are well-healed on both the legs   Neurologic: CNII-XII intact. No gross sensory or motor deficits     Data Review:       Recent Days:  No results for input(s): WBC, HGB, HCT, PLT, HGBEXT, HCTEXT, PLTEXT in the last 72 hours. Recent Labs     22  0728      K 4.1      CO2 26   GLU 92   BUN 44*   CREA 5.18*   CA 8.0*   PHOS 5.3*   ALB 2.1*     No results for input(s): PH, PCO2, PO2, HCO3, FIO2 in the last 72 hours. Results     ** No results found for the last 336 hours.  **         24 Hour Results:  Recent Results (from the past 24 hour(s))   C REACTIVE PROTEIN, QT    Collection Time: 22  3:56 PM   Result Value Ref Range    C-Reactive protein 4.91 (H) 0.00 - 0.60 mg/dL   PROCALCITONIN    Collection Time: 22  3:56 PM   Result Value Ref Range    Procalcitonin 0.60 (H) 0 ng/mL   C REACTIVE PROTEIN, QT    Collection Time: 22  7:28 AM   Result Value Ref Range    C-Reactive protein 4.85 (H) 0.00 - 0.60 mg/dL   PROCALCITONIN    Collection Time: 22  7:28 AM   Result Value Ref Range    Procalcitonin 0.55 (H) 0 ng/mL   RENAL FUNCTION PANEL    Collection Time: 01/26/22  7:28 AM   Result Value Ref Range    Sodium 140 136 - 145 mmol/L    Potassium 4.1 3.5 - 5.1 mmol/L    Chloride 105 97 - 108 mmol/L    CO2 26 21 - 32 mmol/L    Anion gap 9 5 - 15 mmol/L    Glucose 92 65 - 100 mg/dL    BUN 44 (H) 6 - 20 mg/dL    Creatinine 5.18 (H) 0.70 - 1.30 mg/dL    BUN/Creatinine ratio 8 (L) 12 - 20      GFR est AA 14 (L) >60 ml/min/1.73m2    GFR est non-AA 11 (L) >60 ml/min/1.73m2    Calcium 8.0 (L) 8.5 - 10.1 mg/dL    Phosphorus 5.3 (H) 2.6 - 4.7 mg/dL    Albumin 2.1 (L) 3.5 - 5.0 g/dL       IR INSERT TUNL CVC W/O PORT OVER 5 YR   Final Result   Successful conversion of a right internal jugular temporary dialysis catheter to   a 23 cm PermCath. The catheter is functioning and is ready for use. IR FLUORO GUIDE PLC CVAD   Final Result   Successful conversion of a right internal jugular temporary dialysis catheter to   a 23 cm PermCath. The catheter is functioning and is ready for use. DUPLEX LOW EXT ARTERIES WITH JAVON   Final Result      XR CHEST PORT   Final Result      Interval placement of a right supraclavicular approach dialysis catheter with   distal tip in the proximal right atrium. Cardiomegaly with pulmonary vascular congestion and pulmonary edema. Stable appearing basilar right lung pneumonia. Small right pleural effusion. IR INSERT NON TUNL CVC OVER 5 YRS   Final Result      Technically successful insertion of non-tunneled Trialysis catheter with US   guidance. Plan:       Post catheter placement chest xray confirmed appropriate position of the   catheter.  The catheter is appropriate for immediate use.   _______________________________________________________________      PROCEDURE SUMMARY:   - Venous access with ultrasound guidance   - Non-tunneled central venous catheter insertion      PROCEDURE DETAILS:      Pre-procedure   Relevant imaging review: None    Informed consent for the procedure was obtained and time-out was performed prior to the procedure. Prophylactic antibiotic administered: Not administered   Preparation: The site was prepared and draped using all elements of maximal   sterile barrier technique including sterile gloves, sterile gown, cap, mask,   large sterile sheet, sterile ultrasound probe cover, sterile ultrasound gel,   hand hygiene and cutaneous antisepsis with 2% chlorhexidine. Medical reason for site preparation exception: Not applicable      Anesthesia/sedation   Level of anesthesia: No sedation   Anesthesia administered by: Not applicable   Duration of anesthesia/sedation: 0 minutes. Access   The vessel was sonographically evaluated and judged to be patent and appropriate   for access and a permanent image was stored. Three mLs of 1% Lidocaine was   administered to the access site. Real time ultrasound was used to visualize   needle entry into the vessel. Vein accessed: Right internal jugular vein   Access technique: 4F micropuncture set      Catheter placement   The access site was dilated and the catheter was placed into the vein over a   wire and all guidewires were removed in their entirety. Catheter ports were   aspirated and flushed. Catheter tip location was verified by portable X-ray. Catheter size:13 Wol[place]   Catheter length: 20 cm   Catheter tip position: Proximal right atrium   Catheter flush: Heparin (100 units/mL)      Closure   The catheter was secured. A sterile dressing was applied. Catheter securement technique: Non-absorbable suture      Additional Medications   None      Additional Details   Estimated blood loss:  Less than 1 mL   Additional description of procedure: None   Additional findings: None   Additional equipment: None   Specimens removed: None                     IR US GUIDED VASCULAR ACCESS   Final Result      Technically successful insertion of non-tunneled Trialysis catheter with US   guidance.       Plan:       Post catheter placement chest xray confirmed appropriate position of the   catheter. The catheter is appropriate for immediate use.   _______________________________________________________________      PROCEDURE SUMMARY:   - Venous access with ultrasound guidance   - Non-tunneled central venous catheter insertion      PROCEDURE DETAILS:      Pre-procedure   Relevant imaging review: None    Informed consent for the procedure was obtained and time-out was performed prior   to the procedure. Prophylactic antibiotic administered: Not administered   Preparation: The site was prepared and draped using all elements of maximal   sterile barrier technique including sterile gloves, sterile gown, cap, mask,   large sterile sheet, sterile ultrasound probe cover, sterile ultrasound gel,   hand hygiene and cutaneous antisepsis with 2% chlorhexidine. Medical reason for site preparation exception: Not applicable      Anesthesia/sedation   Level of anesthesia: No sedation   Anesthesia administered by: Not applicable   Duration of anesthesia/sedation: 0 minutes. Access   The vessel was sonographically evaluated and judged to be patent and appropriate   for access and a permanent image was stored. Three mLs of 1% Lidocaine was   administered to the access site. Real time ultrasound was used to visualize   needle entry into the vessel. Vein accessed: Right internal jugular vein   Access technique: 4F micropuncture set      Catheter placement   The access site was dilated and the catheter was placed into the vein over a   wire and all guidewires were removed in their entirety. Catheter ports were   aspirated and flushed. Catheter tip location was verified by portable X-ray. Catheter size:13 Faroese   Catheter length: 20 cm   Catheter tip position: Proximal right atrium   Catheter flush: Heparin (100 units/mL)      Closure   The catheter was secured. A sterile dressing was applied.    Catheter securement technique: Non-absorbable suture      Additional Medications   None Additional Details   Estimated blood loss:  Less than 1 mL   Additional description of procedure: None   Additional findings: None   Additional equipment: None   Specimens removed: None                     XR CHEST PORT   Final Result   Airspace opacity and effusion at the right lung base and in the   acute setting would suggest pneumonia and/or aspiration however the patient also   appears to have baseline/background vascular congestion and/or pulmonary   arterial hypertension, noting cardiomegaly and/or pericardial effusion      IR INSERT NON TUNL CVC OVER 5 YRS    (Results Pending)        Assessment: Chronic renal failure on dialysis  CHF  PAD          Plan: Patient has refused for ICD placement as well as for arteriography  Patient is not vaccinated as per  patient needs vaccine before his transfer to skilled care unit        Care Plan discussed with: Patient/Family and     Total time spent with patient: 30 minutes.     Sergio Harris MD

## 2022-01-26 NOTE — PROGRESS NOTES
Problem: Falls - Risk of  Goal: *Absence of Falls  Description: Document Bj Hopper Fall Risk and appropriate interventions in the flowsheet.   Outcome: Progressing Towards Goal  Note: Fall Risk Interventions:  Mobility Interventions: Bed/chair exit alarm    Mentation Interventions: Adequate sleep, hydration, pain control,Increase mobility    Medication Interventions: Teach patient to arise slowly,Patient to call before getting OOB    Elimination Interventions: Bed/chair exit alarm,Call light in reach,Urinal in reach      Problem: Discharge Planning  Goal: *Knowledge of medication management  Outcome: Progressing Towards Goal     Problem: Patient Education: Go to Patient Education Activity  Goal: Patient/Family Education  Outcome: Progressing Towards Goal

## 2022-01-26 NOTE — PROGRESS NOTES
Progress Note      1/26/2022 12:19 PM  NAME: Jeanne Sanchez   MRN:  [id number]   Admit Diagnosis: Renal failure [N19]  Hyperkalemia [E87.5]      Subjective:   Chart reviewed. Patient is on dialysis. Symptoms of shortness of breath has improved. With the patient and with the nurse. Cardiac catheterization findings discussed with the patient and also discussed with the electrophysiologist.  Vascular surgeon's note reviewed and patient refused to have peripheral angiogram.    Review of Systems:    Symptom Y/N Comments  Symptom Y/N Comments   Fever/Chills n   Chest Pain n    Poor Appetite    Edema y    Cough    Abdominal Pain     Sputum    Joint Pain     SOB/CARMONA y  improving  Pruritis/Rash     Nausea/vomit    Other     Diarrhea         Constipation           Could NOT obtain due to:      Objective:          Physical Exam:    Last 24hrs VS reviewed since prior progress note. Most recent are:    Visit Vitals  /86 (BP 1 Location: Right upper arm, BP Patient Position: Sitting)   Pulse 69   Temp 98.9 °F (37.2 °C)   Resp 18   Ht 6' (1.829 m)   Wt 77.1 kg (169 lb 15.6 oz)   SpO2 95%   BMI 23.05 kg/m²       Intake/Output Summary (Last 24 hours) at 1/26/2022 1236  Last data filed at 1/26/2022 1100  Gross per 24 hour   Intake --   Output 1552 ml   Net -1552 ml        General Appearance: Well developed, well nourished, alert & oriented x 3,    no acute distress. Ears/Nose/Mouth/Throat: Hearing grossly normal.  Neck: Supple. Chest: Lungs clear to auscultation bilaterally. Cardiovascular: Regular rate and rhythm, S1,S2 normal, no murmur. Abdomen: Soft, non-tender, bowel sounds are active. Extremities: Patient has cellulitis of the lower extremities  Skin: Warm and dry. []         Post-cath site without hematoma, bruit, tenderness, or thrill. Distal pulses intact.     PMH/SH reviewed - no change compared to H&P    Data Review    Telemetry: normal sinus rhythm , patient has some PVCs, and episode of wide-complex tachycardia possible ventricular tachycardia. EKG:   Patient had EKG that is very concerning for ventricular tachycardia. Lab Data Personally Reviewed:    No results for input(s): WBC, HGB, HCT, PLT, HGBEXT, HCTEXT, PLTEXT, HGBEXT, HCTEXT, PLTEXT in the last 72 hours. No results for input(s): INR, PTP, APTT, INREXT, INREXT in the last 72 hours. Recent Labs     01/26/22  0728      K 4.1      CO2 26   BUN 44*   CREA 5.18*   GLU 92   CA 8.0*     No results for input(s): CPK, CKNDX, TROIQ in the last 72 hours. No lab exists for component: CPKMB  No results found for: CHOL, CHOLX, CHLST, CHOLV, HDL, HDLP, LDL, LDLC, DLDLP, TGLX, TRIGL, TRIGP, CHHD, CHHDX    Recent Labs     01/26/22  0728   ALB 2.1*     No results for input(s): PH, PCO2, PO2 in the last 72 hours.     Medications Personally Reviewed:    Current Facility-Administered Medications   Medication Dose Route Frequency    amiodarone (CORDARONE) tablet 200 mg  200 mg Oral DAILY    iron sucrose (VENOFER) injection 200 mg  200 mg IntraVENous DIALYSIS MON, WED & FRI    epoetin valente-epbx (RETACRIT) injection 10,000 Units  10,000 Units IntraVENous DIALYSIS MON, WED & FRI    oxyCODONE IR (ROXICODONE) tablet 5 mg  5 mg Oral Q8H PRN    sodium chloride (NS) flush 5-40 mL  5-40 mL IntraVENous Q8H    sodium chloride (NS) flush 5-40 mL  5-40 mL IntraVENous PRN    ferrous sulfate tablet 325 mg  1 Tablet Oral BID WITH MEALS    heparin (porcine) 1,000 unit/mL injection 4,000 Units  4,000 Units IntraVENous PRN    Or    heparin (porcine) 1,000 unit/mL injection 2,000 Units  2,000 Units IntraVENous PRN    metoprolol succinate (TOPROL-XL) XL tablet 50 mg  50 mg Oral DAILY    nitroglycerin (NITROBID) 2 % ointment 0.5 Inch  0.5 Inch Topical Q6H    aspirin chewable tablet 81 mg  81 mg Oral DAILY    heparin (porcine) 1,000 unit/mL injection 2,800 Units  2,800 Units Hemodialysis DIALYSIS PRN           Problem List:   Patient has a acute on chronic renal failure and had a dialysis and is clinically better. Decompensated heart failure and ejection fraction is depressed and patient has a at least moderate possibly severe pulmonary hypertension. History of paroxysmal atrial fibrillation. Recent DVT of her left axillary vein. Wide-complex tachycardia probably ventricular tachycardia. Catheterization did not show evidence of coronary artery disease and ejection fraction was about 20 to 25%. Patient is getting restless and wants to leave. Cellulitis of the lower extremities. Patient refused peripheral arterial angiogram as advised by the vascular surgeon. 1.      Assessment/Plan:   I will continue dialysis as per recommendations of the nephrologist.  Patient is offered ICD by Dr. Camila Contreras however patient does not want it. Vascular surgery note reviewed and appreciated and will follow the recommendations. We will continue amiodarone and present care. Discontinue heparin and start on small dose of Eliquis because patient has a history of paroxysmal atrial fibrillation. Patient will be going for cardiac rehab. We will follow infectious disease recommendations. Thank you. 1.          []       High complexity decision making was performed in this patient at high risk for decompensation with multiple organ involvement.     Britney Pina MD

## 2022-01-26 NOTE — PROGRESS NOTES
PHYSICAL THERAPY TREATMENT  Patient: Erica Coombs (46 y.o. male)  Date: 1/26/2022  Diagnosis: Renal failure [N19]  Hyperkalemia [E87.5] <principal problem not specified>  Procedure(s) (LRB):  LEFT HEART CATH / CORONARY ANGIOGRAPHY W GRAFTS (N/A)  PERCUTANEOUS CORONARY INTERVENTION (N/A) 13 Days Post-Op  Precautions:    Chart, physical therapy assessment, plan of care and goals were reviewed. ASSESSMENT  Patient continues with skilled PT services and is progressing towards goals. Pt seated in recliner upon entry and agreeable to session. Pt completed STS with CGA with RW for balance upon standing. Pt able to increase amb distance with RW and CGA, no LOB. Gait speed is slow and shuffled. Pt returned to room and completed seated therex at EOB, see details below. Pt then transferred bed>chair with CGA and no AD. Pt left sitting in chair with call bell in reach and needs met. Rec d/c to SNF once medically appropriate. Current Level of Function Impacting Discharge (mobility/balance): assistance required for all safety with all mobility     Other factors to consider for discharge: PLOF, time since onset, severity of deficits          PLAN :  Patient continues to benefit from skilled intervention to address the above impairments. Continue treatment per established plan of care. to address goals.     Recommendation for discharge: (in order for the patient to meet his/her long term goals)  SNF    This discharge recommendation:  Has been made in collaboration with the attending provider and/or case management    IF patient discharges home will need the following DME: to be determined (TBD)       SUBJECTIVE:   Patient stated I want to walk more    OBJECTIVE DATA SUMMARY:   Critical Behavior:  Neurologic State: Alert  Orientation Level: Oriented X4  Cognition: Follows commands    Functional Mobility Training:  Transfers:  Sit to Stand: Contact guard assistance  Stand to Sit: Contact guard assistance  Bed to Chair: Contact guard assistance    Balance:  Sitting: Intact  Sitting - Static: Good (unsupported)  Sitting - Dynamic: Good (unsupported)  Standing: Impaired; With support  Standing - Static: Constant support;Good  Standing - Dynamic : Constant support; Fair    Ambulation/Gait Training:  Distance (ft): 115 Feet (ft)  Assistive Device: Gait belt;Walker, rolling  Ambulation - Level of Assistance: Contact guard assistance  Base of Support: Widened  Speed/Sharmila: Shuffled; Slow      Therapeutic Exercises:   1 x 10 LAQ  1 x 10 Marches  1 x 10 AP  1 x 10 hip ab/ad  1 x 10 resisted hip ab    Pain Ratin/10    Activity Tolerance:   Fair and requires rest breaks  Please refer to the flowsheet for vital signs taken during this treatment. After treatment patient left in no apparent distress:   Sitting in chair and Bed / chair alarm activated    COMMUNICATION/COLLABORATION:   The patients plan of care was discussed with: Occupational therapy assistant. Problem: Mobility Impaired (Adult and Pediatric)  Goal: *Acute Goals and Plan of Care (Insert Text)  Description: Pt will be I with LE HEP in 7 days. Pt will perform bed mobility with mod I in 7 days. Pt will perform transfers with mod I in 7 days. Pt will amb  feet with LRAD safely with mod I in 7 days.    Outcome: Progressing Towards Goal       Matthew Noonan PTA   Time Calculation: 30 mins

## 2022-01-26 NOTE — PROGRESS NOTES
PT treatment attempted at 1001 however, pt currently off floor at this time. Will continue to follow pt and will attempt treatment at a later time.  Thank you

## 2022-01-26 NOTE — PROGRESS NOTES
Primary Nurse Jacklyn Chinchilla RN and Cassandra De La Garza RN performed a dual skin assessment on this patient. Impairment noted as wounds on lower legs bilaterally. Pt. Is refusing wound care and states he will not have his legs wrapped anymore. Pt. Was educated on the importance of wound care but still declined. Alfa score is 18.

## 2022-01-26 NOTE — PROGRESS NOTES
ISAMAR spoke to Hermon Meigs at [facility]. Hermon Meigs stated she only had vaccinated beds at this time. Patient has not been vaccinated but we could give first dose of vaccine before discharge if that will be acceptable.

## 2022-01-27 PROCEDURE — 74011000250 HC RX REV CODE- 250: Performed by: INTERNAL MEDICINE

## 2022-01-27 PROCEDURE — 97530 THERAPEUTIC ACTIVITIES: CPT

## 2022-01-27 PROCEDURE — 65270000029 HC RM PRIVATE

## 2022-01-27 PROCEDURE — 74011250637 HC RX REV CODE- 250/637: Performed by: INTERNAL MEDICINE

## 2022-01-27 PROCEDURE — 97110 THERAPEUTIC EXERCISES: CPT

## 2022-01-27 PROCEDURE — 99232 SBSQ HOSP IP/OBS MODERATE 35: CPT | Performed by: INTERNAL MEDICINE

## 2022-01-27 PROCEDURE — 97116 GAIT TRAINING THERAPY: CPT

## 2022-01-27 RX ADMIN — SODIUM CHLORIDE, PRESERVATIVE FREE 10 ML: 5 INJECTION INTRAVENOUS at 18:02

## 2022-01-27 RX ADMIN — SODIUM CHLORIDE, PRESERVATIVE FREE 10 ML: 5 INJECTION INTRAVENOUS at 00:48

## 2022-01-27 RX ADMIN — METOPROLOL SUCCINATE 50 MG: 25 TABLET, EXTENDED RELEASE ORAL at 10:43

## 2022-01-27 RX ADMIN — SODIUM CHLORIDE, PRESERVATIVE FREE 10 ML: 5 INJECTION INTRAVENOUS at 06:27

## 2022-01-27 RX ADMIN — ASPIRIN 81 MG CHEWABLE TABLET 81 MG: 81 TABLET CHEWABLE at 10:43

## 2022-01-27 RX ADMIN — FERROUS SULFATE TAB 325 MG (65 MG ELEMENTAL FE) 325 MG: 325 (65 FE) TAB at 18:26

## 2022-01-27 RX ADMIN — AMIODARONE HYDROCHLORIDE 200 MG: 200 TABLET ORAL at 10:43

## 2022-01-27 RX ADMIN — FERROUS SULFATE TAB 325 MG (65 MG ELEMENTAL FE) 325 MG: 325 (65 FE) TAB at 10:43

## 2022-01-27 NOTE — PROGRESS NOTES
PHYSICAL THERAPY TREATMENT  Patient: Jose Johns (44 y.o. male)  Date: 1/27/2022  Diagnosis: Renal failure [N19]  Hyperkalemia [E87.5] <principal problem not specified>  Procedure(s) (LRB):  LEFT HEART CATH / CORONARY ANGIOGRAPHY W GRAFTS (N/A)  PERCUTANEOUS CORONARY INTERVENTION (N/A) 14 Days Post-Op  Precautions:    Chart, physical therapy assessment, plan of care and goals were reviewed. ASSESSMENT  Patient continues with skilled PT services and is progressing towards goals. Patient was semi supine in bed upon approach and agree to partake in therapy today. Pt transferred from semi supine to seated EOB with supervision. Patient performed STS at min A with rolling walker upon standing for balance. Patient ambulated with RW for CGA for 115' taking with multiple standing rest breaks. Pt performed seated TE , see below. Returned to semi supine with supervision. Pt left semi supine in bed with call bell within reach and all notes meet. Rec d/c to SNF once medically appropriate. Current Level of Function Impacting Discharge (mobility/balance): min A STS, CGA OOB    Other factors to consider for discharge: PLOF, assistance at home         PLAN :  Patient continues to benefit from skilled intervention to address the above impairments. Continue treatment per established plan of care. to address goals. Recommendation for discharge: (in order for the patient to meet his/her long term goals)  Ramsey Sigala    This discharge recommendation:  Has been made in collaboration with the attending provider and/or case management    IF patient discharges home will need the following DME: to be determined (TBD)       SUBJECTIVE:   Patient stated i want to more exercises.     OBJECTIVE DATA SUMMARY:   Critical Behavior:  Neurologic State: Alert  Orientation Level: Oriented X4  Cognition: Appropriate for age attention/concentration     Functional Mobility Training:  Bed Mobility:  Rolling: Supervision; Additional time  Supine to Sit: Supervision; Additional time  Sit to Supine: Supervision; Additional time  Scooting: Supervision; Additional time    Transfers:  Sit to Stand: Stand-by assistance  Stand to Sit: Stand-by assistance    Balance:  Sitting: Intact  Sitting - Static: Good (unsupported)  Sitting - Dynamic: Good (unsupported)  Standing: Impaired; With support  Standing - Static: Constant support;Good  Standing - Dynamic : Fair;Constant support    Ambulation/Gait Training:  Distance (ft): 115 Feet (ft)  Assistive Device: Gait belt;Walker, rolling  Ambulation - Level of Assistance: Contact guard assistance  Base of Support: Widened  Speed/Sharmila: Shuffled; Slow    Therapeutic Exercises:   1x10 seated marches  1x12 LAQ  1x12 resisted adduction squeezes  1x12 abduction  1x4 STS     Pain Ratin/10    Activity Tolerance:   Fair and requires rest breaks  Please refer to the flowsheet for vital signs taken during this treatment. After treatment patient left in no apparent distress:   Supine in bed and Call bell within reach    COMMUNICATION/COLLABORATION:   The patients plan of care was discussed with: Registered nurse.      Abbie Covarrubias   Time Calculation: 33 mins

## 2022-01-27 NOTE — PROGRESS NOTES
Progress Note    Johnny Matamoros MD             Daily Progress Note: 1/27/2022      Subjective: The patient is seen for follow  up. Offers no complaints but I was called by RN that when he walked his saturation went down into 80s. I have started him on 2 L/min nasal cannula oxygen  The patient denies any chest pain or shortness of breath  Discussed with  regarding his placement.   Patient is not able to walk more than 15 feet so he is not a good candidate to go home  Problem List:  Problem List as of 1/27/2022 Never Reviewed          Codes Class Noted - Resolved    Renal failure ICD-10-CM: N19  ICD-9-CM: 402  1/8/2022 - Present        Hyperkalemia ICD-10-CM: E87.5  ICD-9-CM: 276.7  1/8/2022 - Present        Pulmonary edema ICD-10-CM: J81.1  ICD-9-CM: 100  7/13/2021 - Present        GI bleed ICD-10-CM: K92.2  ICD-9-CM: 578.9  1/5/2021 - Present              Medications reviewed  Current Facility-Administered Medications   Medication Dose Route Frequency    amiodarone (CORDARONE) tablet 200 mg  200 mg Oral DAILY    iron sucrose (VENOFER) injection 200 mg  200 mg IntraVENous DIALYSIS MON, WED & FRI    epoetin valente-epbx (RETACRIT) injection 10,000 Units  10,000 Units IntraVENous DIALYSIS MON, WED & FRI    oxyCODONE IR (ROXICODONE) tablet 5 mg  5 mg Oral Q8H PRN    sodium chloride (NS) flush 5-40 mL  5-40 mL IntraVENous Q8H    sodium chloride (NS) flush 5-40 mL  5-40 mL IntraVENous PRN    ferrous sulfate tablet 325 mg  1 Tablet Oral BID WITH MEALS    heparin (porcine) 1,000 unit/mL injection 4,000 Units  4,000 Units IntraVENous PRN    Or    heparin (porcine) 1,000 unit/mL injection 2,000 Units  2,000 Units IntraVENous PRN    metoprolol succinate (TOPROL-XL) XL tablet 50 mg  50 mg Oral DAILY    nitroglycerin (NITROBID) 2 % ointment 0.5 Inch  0.5 Inch Topical Q6H    aspirin chewable tablet 81 mg  81 mg Oral DAILY    heparin (porcine) 1,000 unit/mL injection 2,800 Units  2,800 Units Hemodialysis DIALYSIS PRN       Review of Systems:   A comprehensive review of systems was negative except for that written in the HPI. Objective:   Physical Exam:     Visit Vitals  /88 (BP 1 Location: Left upper arm, BP Patient Position: Sitting; At rest)   Pulse 81   Temp 98.5 °F (36.9 °C)   Resp 14   Ht 6' (1.829 m)   Wt 77.1 kg (169 lb 15.6 oz)   SpO2 90% Comment: activity intolerance   BMI 23.05 kg/m²    O2 Flow Rate (L/min): 2 l/min O2 Device: None (Room air)    Temp (24hrs), Av.5 °F (36.9 °C), Min:98.2 °F (36.8 °C), Max:98.9 °F (37.2 °C)    701 - 1900  In: 862 [P.O.:694]  Out: -    1901 -  0700  In: -   Out: 1552     General:  Alert, cooperative, no distress, appears stated age. Lungs:   Clear to auscultation bilaterally. Chest wall:  No tenderness or deformity. Heart:   Has a murmur in precordial area regular rhythm   Abdomen:   Soft, non-tender. Bowel sounds normal. No masses,  No organomegaly. Extremities: Extremities normal, atraumatic, no cyanosis 3+ edema   Pulses: 2+ and symmetric all extremities. Skin: Skin color, texture, turgor normal. No rashes or lesions   Neurologic: CNII-XII intact. No gross sensory or motor deficits     Data Review:       Recent Days:  No results for input(s): WBC, HGB, HCT, PLT, HGBEXT, HCTEXT, PLTEXT in the last 72 hours. Recent Labs     22  0728      K 4.1      CO2 26   GLU 92   BUN 44*   CREA 5.18*   CA 8.0*   PHOS 5.3*   ALB 2.1*     No results for input(s): PH, PCO2, PO2, HCO3, FIO2 in the last 72 hours. Results     ** No results found for the last 336 hours. **         24 Hour Results:  No results found for this or any previous visit (from the past 24 hour(s)). IR INSERT TUNL CVC W/O PORT OVER 5 YR   Final Result   Successful conversion of a right internal jugular temporary dialysis catheter to   a 23 cm PermCath. The catheter is functioning and is ready for use.          IR FLUORO GUIDE PLC CVAD   Final Result   Successful conversion of a right internal jugular temporary dialysis catheter to   a 23 cm PermCath. The catheter is functioning and is ready for use. DUPLEX LOW EXT ARTERIES WITH JAVON   Final Result      XR CHEST PORT   Final Result      Interval placement of a right supraclavicular approach dialysis catheter with   distal tip in the proximal right atrium. Cardiomegaly with pulmonary vascular congestion and pulmonary edema. Stable appearing basilar right lung pneumonia. Small right pleural effusion. IR INSERT NON TUNL CVC OVER 5 YRS   Final Result      Technically successful insertion of non-tunneled Trialysis catheter with US   guidance. Plan:       Post catheter placement chest xray confirmed appropriate position of the   catheter. The catheter is appropriate for immediate use.   _______________________________________________________________      PROCEDURE SUMMARY:   - Venous access with ultrasound guidance   - Non-tunneled central venous catheter insertion      PROCEDURE DETAILS:      Pre-procedure   Relevant imaging review: None    Informed consent for the procedure was obtained and time-out was performed prior   to the procedure. Prophylactic antibiotic administered: Not administered   Preparation: The site was prepared and draped using all elements of maximal   sterile barrier technique including sterile gloves, sterile gown, cap, mask,   large sterile sheet, sterile ultrasound probe cover, sterile ultrasound gel,   hand hygiene and cutaneous antisepsis with 2% chlorhexidine. Medical reason for site preparation exception: Not applicable      Anesthesia/sedation   Level of anesthesia: No sedation   Anesthesia administered by: Not applicable   Duration of anesthesia/sedation: 0 minutes. Access   The vessel was sonographically evaluated and judged to be patent and appropriate   for access and a permanent image was stored.   Three mLs of 1% Lidocaine was administered to the access site. Real time ultrasound was used to visualize   needle entry into the vessel. Vein accessed: Right internal jugular vein   Access technique: 4F micropuncture set      Catheter placement   The access site was dilated and the catheter was placed into the vein over a   wire and all guidewires were removed in their entirety. Catheter ports were   aspirated and flushed. Catheter tip location was verified by portable X-ray. Catheter size:13 Chinese   Catheter length: 20 cm   Catheter tip position: Proximal right atrium   Catheter flush: Heparin (100 units/mL)      Closure   The catheter was secured. A sterile dressing was applied. Catheter securement technique: Non-absorbable suture      Additional Medications   None      Additional Details   Estimated blood loss:  Less than 1 mL   Additional description of procedure: None   Additional findings: None   Additional equipment: None   Specimens removed: None                     IR US GUIDED VASCULAR ACCESS   Final Result      Technically successful insertion of non-tunneled Trialysis catheter with US   guidance. Plan:       Post catheter placement chest xray confirmed appropriate position of the   catheter. The catheter is appropriate for immediate use.   _______________________________________________________________      PROCEDURE SUMMARY:   - Venous access with ultrasound guidance   - Non-tunneled central venous catheter insertion      PROCEDURE DETAILS:      Pre-procedure   Relevant imaging review: None    Informed consent for the procedure was obtained and time-out was performed prior   to the procedure.    Prophylactic antibiotic administered: Not administered   Preparation: The site was prepared and draped using all elements of maximal   sterile barrier technique including sterile gloves, sterile gown, cap, mask,   large sterile sheet, sterile ultrasound probe cover, sterile ultrasound gel,   hand hygiene and cutaneous antisepsis with 2% chlorhexidine. Medical reason for site preparation exception: Not applicable      Anesthesia/sedation   Level of anesthesia: No sedation   Anesthesia administered by: Not applicable   Duration of anesthesia/sedation: 0 minutes. Access   The vessel was sonographically evaluated and judged to be patent and appropriate   for access and a permanent image was stored. Three mLs of 1% Lidocaine was   administered to the access site. Real time ultrasound was used to visualize   needle entry into the vessel. Vein accessed: Right internal jugular vein   Access technique: 4F micropuncture set      Catheter placement   The access site was dilated and the catheter was placed into the vein over a   wire and all guidewires were removed in their entirety. Catheter ports were   aspirated and flushed. Catheter tip location was verified by portable X-ray. Catheter size:13 Taiwanese   Catheter length: 20 cm   Catheter tip position: Proximal right atrium   Catheter flush: Heparin (100 units/mL)      Closure   The catheter was secured. A sterile dressing was applied.    Catheter securement technique: Non-absorbable suture      Additional Medications   None      Additional Details   Estimated blood loss:  Less than 1 mL   Additional description of procedure: None   Additional findings: None   Additional equipment: None   Specimens removed: None                     XR CHEST PORT   Final Result   Airspace opacity and effusion at the right lung base and in the   acute setting would suggest pneumonia and/or aspiration however the patient also   appears to have baseline/background vascular congestion and/or pulmonary   arterial hypertension, noting cardiomegaly and/or pericardial effusion      IR INSERT NON TUNL CVC OVER 5 YRS    (Results Pending)        Assessment: CHF  Chronic renal failure on dialysis  Hypertension  Ventricular tachycardia  Patient has refused for ICD placement  PAD        Plan: I am awaiting for the placement of the patient        Care Plan discussed with: Patient/Family, Nurse and     Total time spent with patient: 30 minutes.     David Wells MD

## 2022-01-27 NOTE — PROGRESS NOTES
Problem: Self Care Deficits Care Plan (Adult)  Goal: *Acute Goals and Plan of Care (Insert Text)  Description: 1. Pt will be mod I sup <> sit in prep for EOB ADLs  2. Pt will be mod I grooming standing at sink  3. Pt will be min A LE dressing sitting EOB/long sit  4. Pt will be mod I sit <>  prep for toileting LRAD  5. Pt will be mod I toileting/toilet transfer/cloth mgmt LRAD  6. Pt will be IND following UE HEP in prep for self care tasks      Outcome: Progressing Towards Goal   OCCUPATIONAL THERAPY TREATMENT  Patient: Sharmila Llamas (40 y.o. male)  Date: 1/27/2022  Diagnosis: Renal failure [N19]  Hyperkalemia [E87.5] <principal problem not specified>  Procedure(s) (LRB):  LEFT HEART CATH / CORONARY ANGIOGRAPHY W GRAFTS (N/A)  PERCUTANEOUS CORONARY INTERVENTION (N/A) 14 Days Post-Op  Precautions: FALL  Chart, occupational therapy assessment, plan of care, and goals were reviewed. ASSESSMENT  Patient continues with skilled OT services and is progressing towards goals. Patient received semi supine in bed upon MELCHOR'S arrival, on  2L of oxygen via NC and agreeable to work with therapist.  Patient Peng Abad for bed mobility, supine to sit EOB and scooting with additional time 2/2 weakness. Seated EOB, pt TA for LB dressing 2/2 pt reporting he could not reach. Patient educated on and completed bilateral UE HEP to increase strength/endurance needed for ADL'S and functional transfers, see grid below. Pt sat EOB for ~ 20 minutes with good sitting balance. STS using RW with SBA and CGA to SBA for side stepping towards HOB for balance. Supervision for sit to supine. Fresh ice provided per pt's request.   Pt left resting comfortably in bed with call bell. Patient would benefit from continued skilled Occupational services while at [facility] in order to increase safety and independence with self care and functional tranfers/mobility.  Recommend at discharge to SNF vs HHOT   when medically appropriate. Current Level of Function Impacting Discharge (ADLs): TA for LB dressing    Other factors to consider for discharge: Time of onset, medical prognosis/diagnosis, severity of deficits, PLOF, home environment, and family support       PLAN :  Patient continues to benefit from skilled intervention to address the above impairments. Continue treatment per established plan of care. to address goals. Recommend with staff: seated in chair    Recommend next OT session: Sink level grooming    Recommendation for discharge: (in order for the patient to meet his/her long term goals)  SNF vs HHOT    This discharge recommendation:  Has been made in collaboration with the attending provider and/or case management    IF patient discharges home will need the following DME: TBD       SUBJECTIVE:   Patient stated I'm ready.     OBJECTIVE DATA SUMMARY:   Cognitive/Behavioral Status:  Neurologic State: Alert  Orientation Level: Oriented X4  Cognition: Follows commands             Functional Mobility and Transfers for ADLs:  Bed Mobility:  Rolling: Supervision  Supine to Sit: Supervision; Additional time  Sit to Supine: Supervision  Scooting: Supervision    Transfers:  Sit to Stand: Stand-by assistance          Balance:  Sitting: Intact  Sitting - Static: Good (unsupported)  Sitting - Dynamic: Good (unsupported)  Standing: Impaired; With support  Standing - Static: Good;Constant support  Standing - Dynamic : Fair;Constant support    ADL Intervention:       Lower Body Dressing Assistance  Dressing Assistance: Total assistance(dependent)  Socks:  Total assistance (dependent)  Position Performed: Seated edge of bed      Therapeutic Exercises:   Exercise Sets Reps AROM AAROM PROM Self PROM Comments   Shoulder flex/ext 3 12 [x] [] [] [] Seated EOB, vc's for correct technique, pacing self and posture, rest breaks   Elbow flex/ext 3 15 [x] [] [] []    Ceiling punches 3 15 [x] [] [] []    Chest press 3 15 [x] [] [] [] Pain:  4/10     Activity Tolerance:   Fair and requires rest breaks  Please refer to the flowsheet for vital signs taken during this treatment. After treatment patient left in no apparent distress:   Supine in bed, Call bell within reach, Bed / chair alarm activated and Side rails x 3    COMMUNICATION/COLLABORATION:   The patients plan of care was discussed with: Registered nurse.      JILL Cano  Time Calculation: 29 mins

## 2022-01-27 NOTE — PROGRESS NOTES
[facility]     Renal Daily Progress Note:     Admission Date: 2022   Subjective:  Events noted, Mental status is good. He  looks better, less short of breath, not tachypneic. Permcath working, he underwent dialysis today without difficulty      Review of Systems  Pertinent items are noted in HPI. Objective:     Visit Vitals  /72   Pulse 71   Temp 98.9 °F (37.2 °C)   Resp 18   Ht 6' (1.829 m)   Wt 77.1 kg (169 lb 15.6 oz)   SpO2 97%   BMI 23.05 kg/m²     Temp (24hrs), Av.6 °F (37 °C), Min:98.2 °F (36.8 °C), Max:98.9 °F (37.2 °C)        Intake/Output Summary (Last 24 hours) at 2022 2311  Last data filed at 2022 1100  Gross per 24 hour   Intake --   Output 1552 ml   Net -1552 ml     Current Facility-Administered Medications   Medication Dose Route Frequency    amiodarone (CORDARONE) tablet 200 mg  200 mg Oral DAILY    iron sucrose (VENOFER) injection 200 mg  200 mg IntraVENous DIALYSIS MON, WED & FRI    epoetin valente-epbx (RETACRIT) injection 10,000 Units  10,000 Units IntraVENous DIALYSIS MON, WED & FRI    oxyCODONE IR (ROXICODONE) tablet 5 mg  5 mg Oral Q8H PRN    sodium chloride (NS) flush 5-40 mL  5-40 mL IntraVENous Q8H    sodium chloride (NS) flush 5-40 mL  5-40 mL IntraVENous PRN    ferrous sulfate tablet 325 mg  1 Tablet Oral BID WITH MEALS    heparin (porcine) 1,000 unit/mL injection 4,000 Units  4,000 Units IntraVENous PRN    Or    heparin (porcine) 1,000 unit/mL injection 2,000 Units  2,000 Units IntraVENous PRN    metoprolol succinate (TOPROL-XL) XL tablet 50 mg  50 mg Oral DAILY    nitroglycerin (NITROBID) 2 % ointment 0.5 Inch  0.5 Inch Topical Q6H    aspirin chewable tablet 81 mg  81 mg Oral DAILY    heparin (porcine) 1,000 unit/mL injection 2,800 Units  2,800 Units Hemodialysis DIALYSIS PRN       Physical Exam:General appearance: alert, cooperative, no distress, appears older than stated age.    Head: Normocephalic, without obvious abnormality, atraumatic  Lungs: clear to auscultation bilaterally  Heart: regular rate and rhythm, no rub  Abdomen: soft, non-tender. Bowel sounds normal. No masses,  no organomegaly  Extremities: venous stasis dermatitis noted, edema: decreased lower extremity edema and dependent thigh edema  Skin: Left lower leg with posterior ulcer inferior to calf  Neurologic: Grossly normal    Data Review:     LABS:  Recent Labs     01/26/22  0728      K 4.1      CO2 26   BUN 44*   CREA 5.18*   CA 8.0*   ALB 2.1*   PHOS 5.3*   Iron saturation 13%    No results for input(s): WBC, HGB, HCT, PLT, HGBEXT, HCTEXT, PLTEXT, HGBEXT, HCTEXT, PLTEXT in the last 72 hours. No results for input(s): HÉCTOR, KU, CLU, CREAU in the last 72 hours. No lab exists for component: PROU     Cardiac cath Jan 13, 2022  Underwent cardiac cath today:  Indication: Severe left ventricular systolic dysfunction and patient had a V. Tach.     Left main coronary artery is a very large caliber vessel and has no disease. LAD artery is a very large caliber vessel and proximal mid and distal segment has no stenosis and diagonal branches has no disease. Ramus intermedius is a medium caliber vessel without disease. Circumflex artery is a large-caliber vessel and proximal mid and distal segment and AV groove segment has no stenosis. Large obtuse marginal branch and posterolateral branch has no stenosis. Right coronary artery is a large-caliber vessel and does junction of proximal and mid segment there was a concern of stenosis and that therefore it was interrogated by IFR which turned out to be 0.95 and was not deemed critical enough to do intervention.     Left ventriculography is performed in the right anterior oblique view and ejection fraction is about 20 to 25% and distal anterior wall apex and distal inferior wall is near akinetic.   This may be possible presentation of Takotsubo syndrome.     Plan: Patient will receive defibrillation      Chest x-ray January 8, 2022:  IMPRESSION     Interval placement of a right supraclavicular approach dialysis catheter with  distal tip in the proximal right atrium.     Cardiomegaly with pulmonary vascular congestion and pulmonary edema.      Stable appearing basilar right lung pneumonia. Small right pleural effusion. Assessment:   Renal Specific Problems  Chronic kidney disease stage VI, started on HD.   Hypoalbumin  SVT- recurrent  Volume overload- resolved  Metabolic acidosis- resolved  Hyperkalemia- resolved  Anemia with renal failure and iron deficient  Leukocytosis corrected with declining procalcitonin  Probable right lower lobe pneumonia  Infected  leg ulcers  Venous stasis dermatitis lower extremity          Plan:     Obtain/ Order: labs/cultures/radiology/procedures:      Therapeutic:    Angiogram with possible angioplasty of lower leg vessels- refused, will reattempt later if wound healing does not occur   HD today    Epogen with dialysis   IV iron with dialysis  Wound care - regarding left leg ulcer    Case management to arrange rehab and outpatient dialysis- d/w CM today

## 2022-01-27 NOTE — PROGRESS NOTES
Comprehensive Nutrition Assessment    Type and Reason for Visit: Reassess    Nutrition Recommendations/Plan:   Modify diet to Low Potassium/Low Phosphorus. Modify ONS to x2/day. Monitor while Inpatient. Nutrition Assessment:   Admitted for ESME, asp. PNA, cellulitis, hyperkalemia. Pt reported speaking to [facility] this a.m. for food preferences, prev. dislike likely r/t modified texture of meals(EC7 then [facility]). Prefers decreasing ONS from TID to x1/day and reported as consumed <65% of tray this a.m.; Nsg reported untouched tray from [address] [place] [address] on 1/26. Pt reported HD as going well. Briefly educated on impact of foods high in Phos (chocolate) on renal fx; consider nutrition education at F/U given unable to complete at time of visit. Desires snacks such as crackers/kenna crackers- not appropriate on EC7 diet. Recommend modifying diet for renal fx and improved intake. Labs: BUN 44, Cr 5.18, GFR 14, Phos 5.3, Alb 2.1, CRP 4.85, Ca 8.0. Meds: Retacrit, Venofer, FerrouSul, lopressor, amiodarone, aspirin, heparin, oxycodone. Malnutrition Assessment:  Malnutrition Status: At risk for malnutrition (specify)    Context:  Acute illness     Findings of the 6 clinical characteristics of malnutrition:   Energy Intake:  No significant decrease in energy intake  Weight Loss:  No significant weight loss     Body Fat Loss:  No significant body fat loss,  Muscle Mass Loss:  No significant muscle mass loss,    Fluid Accumulation:  1 - Mild,     Strength:  Not performed     Estimated Daily Nutrient Needs:  Energy (kcal): 2313 kcal/d (30 kcal/kg); Weight Used for Energy Requirements: Current  Protein (g): 93 gm/d (1.2 gm/kg); Weight Used for Protein Requirements: Current  Fluid (ml/day): <2000 mL/d; Method Used for Fluid Requirements: 1 ml/kcal    Nutrition Related Findings:   No NFPE completed at this time, appears well nourished. Edema on LEs and dependent thigh per MD, noted as improving w/ HD (removed 1.5 L on 1/26).  No N/V/D/C per Pt, currently on EC7; interested in crackers which is inappropriate for current diet texture- will monitor and consider SLP consult given no h/o dysphagia, no SLP rec's and medically appropriate Regular diet likely to improve intake. Last BM on 1/26, loose- Pt stated likely r/t chocolate candy. Wounds:    Venous stasis,Pressure injury (PI on Ischium(no specific info); 2 venous stasis)       Current Nutrition Therapies:  ADULT ORAL NUTRITION SUPPLEMENT Breakfast, Lunch, Dinner; Renal Supplement  ADULT DIET Easy to Chew    Anthropometric Measures:  · Height:  6' (182.9 cm)  · Current Body Wt:  77.1 kg (169 lb 15.6 oz)   · Admission Body Wt:       · Usual Body Wt:        · Ideal Body Wt:  178 lbs:  95.5 %   · Adjusted Body Weight:   ; Weight Adjustment for: No adjustment   · Adjusted BMI:       · BMI Category:  Normal weight (BMI 22.0-24.9) age over 72       Nutrition Diagnosis:   · Inadequate energy intake related to swallowing difficulty,renal dysfunction as evidenced by dialysis    Nutrition Interventions:   Food and/or Nutrient Delivery: Modify oral nutrition supplement,Modify current diet  Nutrition Education and Counseling: Education needed,Survival skills/brief education completed (Diet for CKD on HD.)  Coordination of Nutrition Care: Continue to monitor while inpatient    Goals:  PO intake >70% of meals in 5-7 days. Maintain weight +/-5 lb in 3 days. Improve skin integrity. Improve labs/lytes to baseline.        Nutrition Monitoring and Evaluation:   Behavioral-Environmental Outcomes: Knowledge or skill  Food/Nutrient Intake Outcomes: Food and nutrient intake,Supplement intake  Physical Signs/Symptoms Outcomes: Biochemical data,Chewing or swallowing,Meal time behavior,Diarrhea,Weight,Fluid status or edema    Discharge Planning:    Continue current diet,Continue oral nutrition supplement     Electronically signed by Jeyson Rivers RD on 1/27/2022 at 10:48 AM    Contact: ext. [phone] or [employer].

## 2022-01-27 NOTE — PROGRESS NOTES
Progress Note    Patient: Cm aBh MRN: [id number]  SSN: [id number]    YOB: 1955  Age: 78 y.o. Sex: male      Admit Date: 1/7/2022    LOS: 19 days     Subjective:   Patient followed for sepsis with cellulitis both calves and aspiration pneumonia. Leg wound culture grew multiple organisms as shown below. Afebrile with normal WBC, decreasing procal and CRP, now off antibiotics with clinical resolution of leg infections. Patient sitting on toilet. Objective:     Vitals:    01/27/22 0626 01/27/22 0724 01/27/22 1048 01/27/22 1113   BP: 136/81 138/83  136/88   Pulse:  67  81   Resp:  14  14   Temp:  98.2 °F (36.8 °C)  98.5 °F (36.9 °C)   TempSrc:       SpO2:  98%  90%   Weight:       Height:   6' (1.829 m)         Intake and Output:  Current Shift: 01/27 0701 - 01/27 1900  In: 708 [P.O.:694]  Out: -   Last three shifts: 01/25 1901 - 01/27 0700  In: -   Out: 1552     Physical Exam:    Vitals and nursing note reviewed. Patient not re-examined today  Constitutional:       Appearance: He is ill-appearing. Genitourinary:     Comments: External urinary catheter  Musculoskeletal:      Right lower leg: Edema present. Left lower leg: Edema present. Comments: Both calves with gauze dressing, no wounds on right calf but left calf with still with open wound with granulation tissue that is dry today   Neurological: nonfocal, mild confusion  Psychiatric:         Behavior: Behavior normal.     Lab/Data Review:     WBC 7,900    CRP 4.85 <4.91 <2.80 <4.66 <2.63 <2.53 < 5.30 <6.32 <8.19 <12.10 <12.10  Procal 0.55 <0.60 <0.72 < 0.70 <0.84 < 2.39 <3.12 < 4.41 <4.39 <5.16    MRSA nasal PCR negative    Wound cultures leg (1/8)  Staphylococcus aureus (MSSA),  Alcaligenes faecalis resistant to Zosyn, Klebsiella pneumoniae      Assessment:     Active Problems:    Renal failure (1/8/2022)      Hyperkalemia (1/8/2022)    1. Cellulitis both calves, secondary to probable S.  Aureus (MSSA), Alcaligenes faecalis resistant to Zosyn, Klebsiella pneumoniae, status post 14 days IV Antibiotics, including Meropenem, clinically resolved. 2. Aspiration pneumonia, RLL, status antibiotics as note above  3. Sepsis with leukocytosis and elevated CRP/procal, resolving    Comment:  CRP remains elevated but procal still decreasing off antibiotics. Plan:   1. No further antibiotic recommendations at this time   2. Cleared for discharge from ID standpoint  3. Would continue dressings for left calf wound  4.  Repeat procal and CRP on Monday, if still inpatient    Signed By: Rehan Adan MD     January 27, 2022

## 2022-01-28 PROCEDURE — 74011250636 HC RX REV CODE- 250/636: Performed by: INTERNAL MEDICINE

## 2022-01-28 PROCEDURE — 99232 SBSQ HOSP IP/OBS MODERATE 35: CPT | Performed by: INTERNAL MEDICINE

## 2022-01-28 PROCEDURE — 74011250637 HC RX REV CODE- 250/637: Performed by: INTERNAL MEDICINE

## 2022-01-28 PROCEDURE — 77010033678 HC OXYGEN DAILY

## 2022-01-28 PROCEDURE — 74011000250 HC RX REV CODE- 250: Performed by: INTERNAL MEDICINE

## 2022-01-28 PROCEDURE — 74011250636 HC RX REV CODE- 250/636

## 2022-01-28 PROCEDURE — 90935 HEMODIALYSIS ONE EVALUATION: CPT

## 2022-01-28 PROCEDURE — 97116 GAIT TRAINING THERAPY: CPT

## 2022-01-28 PROCEDURE — 65270000029 HC RM PRIVATE

## 2022-01-28 PROCEDURE — 5A1D70Z PERFORMANCE OF URINARY FILTRATION, INTERMITTENT, LESS THAN 6 HOURS PER DAY: ICD-10-PCS | Performed by: INTERNAL MEDICINE

## 2022-01-28 PROCEDURE — 94760 N-INVAS EAR/PLS OXIMETRY 1: CPT

## 2022-01-28 PROCEDURE — 97110 THERAPEUTIC EXERCISES: CPT

## 2022-01-28 RX ORDER — HEPARIN SODIUM 1000 [USP'U]/ML
INJECTION, SOLUTION INTRAVENOUS; SUBCUTANEOUS
Status: COMPLETED
Start: 2022-01-28 | End: 2022-01-28

## 2022-01-28 RX ORDER — FUROSEMIDE 40 MG/1
80 TABLET ORAL DAILY
Status: DISCONTINUED | OUTPATIENT
Start: 2022-01-28 | End: 2022-02-07

## 2022-01-28 RX ORDER — HEPARIN SODIUM 1000 [USP'U]/ML
3600 INJECTION, SOLUTION INTRAVENOUS; SUBCUTANEOUS
Status: DISCONTINUED | OUTPATIENT
Start: 2022-01-28 | End: 2022-02-08 | Stop reason: HOSPADM

## 2022-01-28 RX ADMIN — IRON SUCROSE 200 MG: 20 INJECTION, SOLUTION INTRAVENOUS at 15:00

## 2022-01-28 RX ADMIN — AMIODARONE HYDROCHLORIDE 200 MG: 200 TABLET ORAL at 14:04

## 2022-01-28 RX ADMIN — IRON SUCROSE 100 MG: 20 INJECTION, SOLUTION INTRAVENOUS at 15:44

## 2022-01-28 RX ADMIN — SODIUM CHLORIDE, PRESERVATIVE FREE 10 ML: 5 INJECTION INTRAVENOUS at 05:53

## 2022-01-28 RX ADMIN — METOPROLOL SUCCINATE 50 MG: 25 TABLET, EXTENDED RELEASE ORAL at 14:03

## 2022-01-28 RX ADMIN — HEPARIN SODIUM 3600 UNITS: 1000 INJECTION INTRAVENOUS; SUBCUTANEOUS at 11:26

## 2022-01-28 RX ADMIN — FERROUS SULFATE TAB 325 MG (65 MG ELEMENTAL FE) 325 MG: 325 (65 FE) TAB at 18:07

## 2022-01-28 RX ADMIN — ASPIRIN 81 MG CHEWABLE TABLET 81 MG: 81 TABLET CHEWABLE at 14:03

## 2022-01-28 RX ADMIN — EPOETIN ALFA-EPBX 10000 UNITS: 10000 INJECTION, SOLUTION INTRAVENOUS; SUBCUTANEOUS at 11:25

## 2022-01-28 RX ADMIN — SODIUM CHLORIDE, PRESERVATIVE FREE 10 ML: 5 INJECTION INTRAVENOUS at 00:23

## 2022-01-28 RX ADMIN — FUROSEMIDE 80 MG: 40 TABLET ORAL at 14:04

## 2022-01-28 RX ADMIN — SODIUM CHLORIDE, PRESERVATIVE FREE 10 ML: 5 INJECTION INTRAVENOUS at 14:03

## 2022-01-28 NOTE — PROGRESS NOTES
[facility]     Renal Daily Progress Note:     Admission Date: 2022   Subjective: Patient seen on dialysis, Mental status is good. He  looks better, less short of breath but does have dyspnea on exertion; not tachypneic. Attempting to increase ultrafiltration to 3 L today      Review of Systems  Pertinent items are noted in HPI. Objective:     Visit Vitals  /79   Pulse (!) 55   Temp 99.8 °F (37.7 °C) (Oral)   Resp 18   Ht 6' (1.829 m)   Wt 75.9 kg (167 lb 5.3 oz)   SpO2 97%   BMI 22.69 kg/m²     Temp (24hrs), Av.7 °F (37.1 °C), Min:98.3 °F (36.8 °C), Max:99.8 °F (37.7 °C)        Intake/Output Summary (Last 24 hours) at 2022 1050  Last data filed at 2022 0745  Gross per 24 hour   Intake 360 ml   Output 0 ml   Net 360 ml     Current Facility-Administered Medications   Medication Dose Route Frequency    amiodarone (CORDARONE) tablet 200 mg  200 mg Oral DAILY    iron sucrose (VENOFER) injection 200 mg  200 mg IntraVENous DIALYSIS MON, WED & FRI    epoetin valente-epbx (RETACRIT) injection 10,000 Units  10,000 Units IntraVENous DIALYSIS MON, WED & FRI    oxyCODONE IR (ROXICODONE) tablet 5 mg  5 mg Oral Q8H PRN    sodium chloride (NS) flush 5-40 mL  5-40 mL IntraVENous Q8H    sodium chloride (NS) flush 5-40 mL  5-40 mL IntraVENous PRN    ferrous sulfate tablet 325 mg  1 Tablet Oral BID WITH MEALS    heparin (porcine) 1,000 unit/mL injection 4,000 Units  4,000 Units IntraVENous PRN    Or    heparin (porcine) 1,000 unit/mL injection 2,000 Units  2,000 Units IntraVENous PRN    metoprolol succinate (TOPROL-XL) XL tablet 50 mg  50 mg Oral DAILY    nitroglycerin (NITROBID) 2 % ointment 0.5 Inch  0.5 Inch Topical Q6H    aspirin chewable tablet 81 mg  81 mg Oral DAILY    heparin (porcine) 1,000 unit/mL injection 2,800 Units  2,800 Units Hemodialysis DIALYSIS PRN       Physical Exam:General appearance: alert, cooperative, no distress, appears older than stated age.    Head: Normocephalic, without obvious abnormality, atraumatic  Lungs: clear to auscultation bilaterally  Heart: regular rate and rhythm, no rub  Abdomen: soft, non-tender. Bowel sounds normal. No masses,  no organomegaly  Extremities: venous stasis dermatitis noted, edema: decreased lower extremity edema and dependent thigh edema  Skin: Left lower leg with posterior ulcer inferior to calf, currently wrapped  Neurologic: Grossly normal    Data Review:     LABS:  Recent Labs     01/26/22  0728      K 4.1      CO2 26   BUN 44*   CREA 5.18*   CA 8.0*   ALB 2.1*   PHOS 5.3*   Iron saturation 13%    No results for input(s): WBC, HGB, HCT, PLT, HGBEXT, HCTEXT, PLTEXT, HGBEXT, HCTEXT, PLTEXT in the last 72 hours. No results for input(s): HÉCTOR, KU, CLU, CREAU in the last 72 hours. No lab exists for component: PROU     Cardiac cath Jan 13, 2022  Underwent cardiac cath today:  Indication: Severe left ventricular systolic dysfunction and patient had a V. Tach.     Left main coronary artery is a very large caliber vessel and has no disease. LAD artery is a very large caliber vessel and proximal mid and distal segment has no stenosis and diagonal branches has no disease. Ramus intermedius is a medium caliber vessel without disease. Circumflex artery is a large-caliber vessel and proximal mid and distal segment and AV groove segment has no stenosis. Large obtuse marginal branch and posterolateral branch has no stenosis. Right coronary artery is a large-caliber vessel and does junction of proximal and mid segment there was a concern of stenosis and that therefore it was interrogated by IFR which turned out to be 0.95 and was not deemed critical enough to do intervention.     Left ventriculography is performed in the right anterior oblique view and ejection fraction is about 20 to 25% and distal anterior wall apex and distal inferior wall is near akinetic.   This may be possible presentation of Takotsubo syndrome.     Plan: Patient will receive defibrillation      Chest x-ray January 8, 2022:  IMPRESSION     Interval placement of a right supraclavicular approach dialysis catheter with  distal tip in the proximal right atrium.     Cardiomegaly with pulmonary vascular congestion and pulmonary edema.      Stable appearing basilar right lung pneumonia. Small right pleural effusion. Assessment:   Renal Specific Problems  Chronic kidney disease stage VI, started on HD. Hypoalbumin  SVT- recurrent  Volume overload- resolved  Metabolic acidosis- resolved  Hyperkalemia- resolved  Anemia with renal failure and iron deficient  Leukocytosis corrected with declining procalcitonin  Probable right lower lobe pneumonia  Infected  leg ulcers  Venous stasis dermatitis lower extremity          Plan:     Obtain/ Order: labs/cultures/radiology/procedures:      Therapeutic:    Angiogram with possible angioplasty of lower leg vessels- refused, will reattempt later if wound healing does not occur     HD ongoing, seen on dialysis. , will increase ultrafiltration  Resume Lasix after dialysis today    Epogen with dialysis  Wound care - regarding left leg ulcer    Case management to arrange rehab and outpatient dialysis- d/w  today

## 2022-01-28 NOTE — PROGRESS NOTES
Progress Note    Patient: Marshal Boeck MRN: [id number]  SSN: [id number]    YOB: 1955  Age: 78 y.o. Sex: male      Admit Date: 1/7/2022    LOS: 20 days     Subjective:   Patient followed for sepsis with cellulitis both calves and aspiration pneumonia. Leg wound culture grew multiple organisms as shown below. Afebrile with normal WBC, decreasing procal and CRP, now off antibiotics with clinical resolution of leg infections. Patient asleep at this time but appears to be resting comfortably. Objective:     Vitals:    01/28/22 0915 01/28/22 0945 01/28/22 1015 01/28/22 1045   BP: 125/69 127/77 137/74 138/78   Pulse: 60 (!) 58 61 (!) 58   Resp:       Temp:       TempSrc:       SpO2:       Weight:       Height:            Intake and Output:  Current Shift: No intake/output data recorded. Last three shifts: 01/26 1901 - 01/28 0700  In: 694 [P.O.:694]  Out: -     Physical Exam:    Vitals and nursing note reviewed. Constitutional:       Appearance: He is ill-appearing. Genitourinary:     Comments: External urinary catheter  Musculoskeletal:      Right lower leg: Edema present. Left lower leg: Edema present. Comments: Only left calf with gauze dressing today   Neurological: nonfocal, mild confusion  Psychiatric:         Behavior: Behavior normal.     Lab/Data Review:     WBC 7,900    CRP 4.85 <4.91 <2.80 <4.66 <2.63 <2.53 < 5.30 <6.32 <8.19 <12.10 <12.10  Procal 0.55 <0.60 <0.72 < 0.70 <0.84 < 2.39 <3.12 < 4.41 <4.39 <5.16    MRSA nasal PCR negative    Wound cultures leg (1/8)  Staphylococcus aureus (MSSA),  Alcaligenes faecalis resistant to Zosyn, Klebsiella pneumoniae      Assessment:     Active Problems:    Renal failure (1/8/2022)      Hyperkalemia (1/8/2022)    1. Cellulitis both calves, secondary to probable S. Aureus (MSSA), Alcaligenes faecalis resistant to Zosyn, Klebsiella pneumoniae, status post 14 days IV Antibiotics, including Meropenem, clinically resolved.    2. Aspiration pneumonia, RLL, status antibiotics as note above  3. Sepsis with leukocytosis and elevated CRP/procal, resolving    Comment:  CRP remains elevated but procal still decreasing off antibiotics. Plan:   1. No further antibiotic recommendations at this time   2. Cleared for discharge from ID standpoint  3.   Repeat procal and CRP on Monday, if still inpatient    Signed By: Aden Rubin MD     January 28, 2022

## 2022-01-28 NOTE — PROGRESS NOTES
PHYSICAL THERAPY TREATMENT  Patient: Neptali Bedolla (89 y.o. male)  Date: 1/28/2022  Diagnosis: Renal failure [N19]  Hyperkalemia [E87.5] <principal problem not specified>  Procedure(s) (LRB):  LEFT HEART CATH / CORONARY ANGIOGRAPHY W GRAFTS (N/A)  PERCUTANEOUS CORONARY INTERVENTION (N/A) 15 Days Post-Op  Precautions:    Chart, physical therapy assessment, plan of care and goals were reviewed. ASSESSMENT  Patient continues with skilled PT services and is progressing towards goals. Patient semi supine in bed upon approach and agreed to therapy today. Patient performed seated EOB TE ( see details below). During TE patients performed STS at SBA without using rolling walker for support. Patient was able to sit unsupported throughout all exercises. Patient ambulated at [facility] for 125' with RW. Patient able to go to bathroom (#1) and wash hands at [facility]. Patient left seated EOB with call bell and all needs meet. Current Level of Function Impacting Discharge (mobility/balance): poor balance and reduced endurance. Other factors to consider for discharge: PLOF, severity of deficits, time since onset         PLAN :  Patient continues to benefit from skilled intervention to address the above impairments. Continue treatment per established plan of care. to address goals. Recommendation for discharge: (in order for the patient to meet his/her long term goals)  Ramsey Sigala    This discharge recommendation:  Has been made in collaboration with the attending provider and/or case management    IF patient discharges home will need the following DME: walker       SUBJECTIVE:   Patient stated im tired but im ready to work to get better.     OBJECTIVE DATA SUMMARY:   Critical Behavior:  Neurologic State: Alert  Orientation Level: Oriented X4  Cognition: Follows commands     Functional Mobility Training:  Bed Mobility:  Rolling: Supervision  Supine to Sit: Supervision; Additional time  Sit to Supine: Supervision  Scooting: Supervision    Transfers:  Sit to Stand: Stand-by assistance  Stand to Sit: Stand-by assistance    Balance:  Sitting: Intact  Sitting - Static: Good (unsupported)  Sitting - Dynamic: Good (unsupported)  Standing: Impaired; With support  Standing - Static: Constant support;Good  Standing - Dynamic : Constant support; Fair    Ambulation/Gait Training:  Distance (ft): 125 Feet (ft)  Assistive Device: Gait belt;Walker, rolling  Ambulation - Level of Assistance: Stand-by assistance  Base of Support: Widened  Speed/Sharmila: Shuffled; Slow      Therapeutic Exercises:   1x12 seated marches  1x15 AP  [id number]  1x15 seated add/abd  1x6 STS (SBA)    Pain Rating:  3/10    Activity Tolerance:   Fair and requires rest breaks  Please refer to the flowsheet for vital signs taken during this treatment. After treatment patient left in no apparent distress:   Call bell within reach and sitting EOB    COMMUNICATION/COLLABORATION:   The patients plan of care was discussed with: Registered nurse. Problem: Mobility Impaired (Adult and Pediatric)  Goal: *Acute Goals and Plan of Care (Insert Text)  Description: Pt will be I with LE HEP in 7 days. Pt will perform bed mobility with mod I in 7 days. Pt will perform transfers with mod I in 7 days. Pt will amb  feet with LRAD safely with mod I in 7 days.        Outcome: Progressing Towards Goal     Cecilia Mitchell PTA   Time Calculation: 26 mins

## 2022-01-28 NOTE — PROGRESS NOTES
Progress Note      1/28/2022 12:19 PM  NAME: Ramses Grove   MRN:  [id number]   Admit Diagnosis: Renal failure [N19]  Hyperkalemia [E87.5]      Subjective:   Chart reviewed. Patient is on dialysis. Symptoms of shortness of breath has improved. With the patient and with the nurse. Cardiac catheterization findings discussed with the patient and also discussed with the electrophysiologist.  Vascular surgeon's note reviewed and patient refused to have peripheral angiogram.    Review of Systems:    Symptom Y/N Comments  Symptom Y/N Comments   Fever/Chills n   Chest Pain n    Poor Appetite    Edema y    Cough    Abdominal Pain     Sputum    Joint Pain     SOB/CARMONA y  improving  Pruritis/Rash     Nausea/vomit    Other     Diarrhea         Constipation           Could NOT obtain due to:      Objective:          Physical Exam:    Last 24hrs VS reviewed since prior progress note. Most recent are:    Visit Vitals  /78   Pulse (!) 58   Temp 99.8 °F (37.7 °C) (Oral)   Resp 18   Ht 6' (1.829 m)   Wt 75.9 kg (167 lb 5.3 oz)   SpO2 97%   BMI 22.69 kg/m²       Intake/Output Summary (Last 24 hours) at 1/28/2022 1116  Last data filed at 1/28/2022 0745  Gross per 24 hour   Intake 240 ml   Output 0 ml   Net 240 ml        General Appearance: Well developed, well nourished, alert & oriented x 3,    no acute distress. Ears/Nose/Mouth/Throat: Hearing grossly normal.  Neck: Supple. Chest: Lungs clear to auscultation bilaterally. Cardiovascular: Regular rate and rhythm, S1,S2 normal, no murmur. Abdomen: Soft, non-tender, bowel sounds are active. Extremities: Patient has cellulitis of the lower extremities  Skin: Warm and dry. []         Post-cath site without hematoma, bruit, tenderness, or thrill. Distal pulses intact.     PMH/SH reviewed - no change compared to H&P    Data Review    Telemetry: normal sinus rhythm , patient has some PVCs, and episode of wide-complex tachycardia possible ventricular tachycardia. EKG:   Patient had EKG that is very concerning for ventricular tachycardia. Lab Data Personally Reviewed:    No results for input(s): WBC, HGB, HCT, PLT, HGBEXT, HCTEXT, PLTEXT, HGBEXT, HCTEXT, PLTEXT in the last 72 hours. No results for input(s): INR, PTP, APTT, INREXT, INREXT in the last 72 hours. Recent Labs     01/26/22  0728      K 4.1      CO2 26   BUN 44*   CREA 5.18*   GLU 92   CA 8.0*     No results for input(s): CPK, CKNDX, TROIQ in the last 72 hours. No lab exists for component: CPKMB  No results found for: CHOL, CHOLX, CHLST, CHOLV, HDL, HDLP, LDL, LDLC, DLDLP, TGLX, TRIGL, TRIGP, CHHD, CHHDX    Recent Labs     01/26/22  0728   ALB 2.1*     No results for input(s): PH, PCO2, PO2 in the last 72 hours.     Medications Personally Reviewed:    Current Facility-Administered Medications   Medication Dose Route Frequency    heparin (porcine) 1,000 unit/mL injection        furosemide (LASIX) tablet 80 mg  80 mg Oral DAILY    amiodarone (CORDARONE) tablet 200 mg  200 mg Oral DAILY    iron sucrose (VENOFER) injection 200 mg  200 mg IntraVENous DIALYSIS MON, WED & FRI    epoetin valente-epbx (RETACRIT) injection 10,000 Units  10,000 Units IntraVENous DIALYSIS MON, WED & FRI    oxyCODONE IR (ROXICODONE) tablet 5 mg  5 mg Oral Q8H PRN    sodium chloride (NS) flush 5-40 mL  5-40 mL IntraVENous Q8H    sodium chloride (NS) flush 5-40 mL  5-40 mL IntraVENous PRN    ferrous sulfate tablet 325 mg  1 Tablet Oral BID WITH MEALS    heparin (porcine) 1,000 unit/mL injection 4,000 Units  4,000 Units IntraVENous PRN    Or    heparin (porcine) 1,000 unit/mL injection 2,000 Units  2,000 Units IntraVENous PRN    metoprolol succinate (TOPROL-XL) XL tablet 50 mg  50 mg Oral DAILY    nitroglycerin (NITROBID) 2 % ointment 0.5 Inch  0.5 Inch Topical Q6H    aspirin chewable tablet 81 mg  81 mg Oral DAILY    heparin (porcine) 1,000 unit/mL injection 2,800 Units  2,800 Units Hemodialysis DIALYSIS PRN           Problem List:   Patient has a acute on chronic renal failure and had a dialysis and is clinically better. Decompensated heart failure and ejection fraction is depressed and patient has a at least moderate possibly severe pulmonary hypertension. History of paroxysmal atrial fibrillation. Recent DVT of her left axillary vein. Wide-complex tachycardia probably ventricular tachycardia. Catheterization did not show evidence of coronary artery disease and ejection fraction was about 20 to 25%. Patient is getting restless and wants to leave. Cellulitis of the lower extremities. Patient refused peripheral arterial angiogram as advised by the vascular surgeon. 1.      Assessment/Plan:   I will continue dialysis as per recommendations of the nephrologist.  Patient is offered ICD by Dr. Griselda Morfin however patient does not want it. Vascular surgery note reviewed and appreciated and will follow the recommendations. We will continue amiodarone and present care. Discontinue heparin and start on small dose of Eliquis because patient has a history of paroxysmal atrial fibrillation. Patient will be going for cardiac rehab. We will follow infectious disease recommendations. Thank you. 1.          []       High complexity decision making was performed in this patient at high risk for decompensation with multiple organ involvement.     Cole Mcnair MD

## 2022-01-28 NOTE — PROGRESS NOTES
[facility]     Renal Daily Progress Note:     Admission Date: 2022   Subjective:  Events noted, Mental status is good. He  looks better, less short of breath bed rest but does have dyspnea on exertion; not tachypneic. Review of Systems  Pertinent items are noted in HPI. Objective:     Visit Vitals  /71   Pulse 78   Temp 98.5 °F (36.9 °C)   Resp 20   Ht 6' (1.829 m)   Wt 77.1 kg (169 lb 15.6 oz)   SpO2 98%   BMI 23.05 kg/m²     Temp (24hrs), Av.4 °F (36.9 °C), Min:98.2 °F (36.8 °C), Max:98.6 °F (37 °C)        Intake/Output Summary (Last 24 hours) at 2022 2316  Last data filed at 2022 1244  Gross per 24 hour   Intake 694 ml   Output --   Net 694 ml     Current Facility-Administered Medications   Medication Dose Route Frequency    amiodarone (CORDARONE) tablet 200 mg  200 mg Oral DAILY    iron sucrose (VENOFER) injection 200 mg  200 mg IntraVENous DIALYSIS MON, WED & FRI    epoetin valente-epbx (RETACRIT) injection 10,000 Units  10,000 Units IntraVENous DIALYSIS MON, WED & FRI    oxyCODONE IR (ROXICODONE) tablet 5 mg  5 mg Oral Q8H PRN    sodium chloride (NS) flush 5-40 mL  5-40 mL IntraVENous Q8H    sodium chloride (NS) flush 5-40 mL  5-40 mL IntraVENous PRN    ferrous sulfate tablet 325 mg  1 Tablet Oral BID WITH MEALS    heparin (porcine) 1,000 unit/mL injection 4,000 Units  4,000 Units IntraVENous PRN    Or    heparin (porcine) 1,000 unit/mL injection 2,000 Units  2,000 Units IntraVENous PRN    metoprolol succinate (TOPROL-XL) XL tablet 50 mg  50 mg Oral DAILY    nitroglycerin (NITROBID) 2 % ointment 0.5 Inch  0.5 Inch Topical Q6H    aspirin chewable tablet 81 mg  81 mg Oral DAILY    heparin (porcine) 1,000 unit/mL injection 2,800 Units  2,800 Units Hemodialysis DIALYSIS PRN       Physical Exam:General appearance: alert, cooperative, no distress, appears older than stated age.    Head: Normocephalic, without obvious abnormality, atraumatic  Lungs: clear to auscultation bilaterally  Heart: regular rate and rhythm, no rub  Abdomen: soft, non-tender. Bowel sounds normal. No masses,  no organomegaly  Extremities: venous stasis dermatitis noted, edema: decreased lower extremity edema and dependent thigh edema  Skin: Left lower leg with posterior ulcer inferior to calf  Neurologic: Grossly normal    Data Review:     LABS:  Recent Labs     01/26/22  0728      K 4.1      CO2 26   BUN 44*   CREA 5.18*   CA 8.0*   ALB 2.1*   PHOS 5.3*   Iron saturation 13%    No results for input(s): WBC, HGB, HCT, PLT, HGBEXT, HCTEXT, PLTEXT, HGBEXT, HCTEXT, PLTEXT in the last 72 hours. No results for input(s): HÉCTOR, KU, CLU, CREAU in the last 72 hours. No lab exists for component: PROU     Cardiac cath Jan 13, 2022  Underwent cardiac cath today:  Indication: Severe left ventricular systolic dysfunction and patient had a V. Tach.     Left main coronary artery is a very large caliber vessel and has no disease. LAD artery is a very large caliber vessel and proximal mid and distal segment has no stenosis and diagonal branches has no disease. Ramus intermedius is a medium caliber vessel without disease. Circumflex artery is a large-caliber vessel and proximal mid and distal segment and AV groove segment has no stenosis. Large obtuse marginal branch and posterolateral branch has no stenosis. Right coronary artery is a large-caliber vessel and does junction of proximal and mid segment there was a concern of stenosis and that therefore it was interrogated by IFR which turned out to be 0.95 and was not deemed critical enough to do intervention.     Left ventriculography is performed in the right anterior oblique view and ejection fraction is about 20 to 25% and distal anterior wall apex and distal inferior wall is near akinetic.   This may be possible presentation of Takotsubo syndrome.     Plan: Patient will receive defibrillation      Chest x-ray January 8, 2022:  IMPRESSION     Interval placement of a right supraclavicular approach dialysis catheter with  distal tip in the proximal right atrium.     Cardiomegaly with pulmonary vascular congestion and pulmonary edema.      Stable appearing basilar right lung pneumonia. Small right pleural effusion. Assessment:   Renal Specific Problems  Chronic kidney disease stage VI, started on HD.   Hypoalbumin  SVT- recurrent  Volume overload- resolved  Metabolic acidosis- resolved  Hyperkalemia- resolved  Anemia with renal failure and iron deficient  Leukocytosis corrected with declining procalcitonin  Probable right lower lobe pneumonia  Infected  leg ulcers  Venous stasis dermatitis lower extremity          Plan:     Obtain/ Order: labs/cultures/radiology/procedures:      Therapeutic:    Angiogram with possible angioplasty of lower leg vessels- refused, will reattempt later if wound healing does not occur   HD in a.m., will increase ultrafiltration  Resume Lasix    Epogen with dialysis  Wound care - regarding left leg ulcer    Case management to arrange rehab and outpatient dialysis- d/w CM today

## 2022-01-28 NOTE — PROGRESS NOTES
Progress Note    Amanda Garrett MD             Daily Progress Note: 1/28/2022      Subjective: The patient is seen for follow  up. No complaints  Patient had dialysis today  Problem List:  Problem List as of 1/28/2022 Never Reviewed          Codes Class Noted - Resolved    Renal failure ICD-10-CM: N19  ICD-9-CM: 484  1/8/2022 - Present        Hyperkalemia ICD-10-CM: E87.5  ICD-9-CM: 276.7  1/8/2022 - Present        Pulmonary edema ICD-10-CM: J81.1  ICD-9-CM: 857  7/13/2021 - Present        GI bleed ICD-10-CM: K92.2  ICD-9-CM: 578.9  1/5/2021 - Present              Medications reviewed  Current Facility-Administered Medications   Medication Dose Route Frequency    furosemide (LASIX) tablet 80 mg  80 mg Oral DAILY    amiodarone (CORDARONE) tablet 200 mg  200 mg Oral DAILY    iron sucrose (VENOFER) injection 200 mg  200 mg IntraVENous DIALYSIS MON, WED & FRI    epoetin valente-epbx (RETACRIT) injection 10,000 Units  10,000 Units IntraVENous DIALYSIS MON, WED & FRI    oxyCODONE IR (ROXICODONE) tablet 5 mg  5 mg Oral Q8H PRN    sodium chloride (NS) flush 5-40 mL  5-40 mL IntraVENous Q8H    sodium chloride (NS) flush 5-40 mL  5-40 mL IntraVENous PRN    ferrous sulfate tablet 325 mg  1 Tablet Oral BID WITH MEALS    heparin (porcine) 1,000 unit/mL injection 4,000 Units  4,000 Units IntraVENous PRN    Or    heparin (porcine) 1,000 unit/mL injection 2,000 Units  2,000 Units IntraVENous PRN    metoprolol succinate (TOPROL-XL) XL tablet 50 mg  50 mg Oral DAILY    nitroglycerin (NITROBID) 2 % ointment 0.5 Inch  0.5 Inch Topical Q6H    aspirin chewable tablet 81 mg  81 mg Oral DAILY    heparin (porcine) 1,000 unit/mL injection 2,800 Units  2,800 Units Hemodialysis DIALYSIS PRN       Review of Systems:   A comprehensive review of systems was negative except for that written in the HPI.     Objective:   Physical Exam:     Visit Vitals  /83   Pulse (!) 56   Temp 98.5 °F (36.9 °C) (Oral)   Resp 16   Ht 6' (1.829 m)   Wt 75.9 kg (167 lb 5.3 oz)   SpO2 97%   BMI 22.69 kg/m²    O2 Flow Rate (L/min): 2 l/min O2 Device: Nasal cannula    Temp (24hrs), Av.7 °F (37.1 °C), Min:98.3 °F (36.8 °C), Max:99.8 °F (37.7 °C)    701 - 1900  In: -   Out: 3000    1901 -  07  In: 515 [P.O.:694]  Out: -     General:  Alert, cooperative, no distress, appears stated age. Lungs:   Clear to auscultation bilaterally. Chest wall:  No tenderness or deformity. Heart:  Regular rate and rhythm, S1, S2 normal, no murmur, click, rub or gallop. Abdomen:   Soft, non-tender. Bowel sounds normal. No masses,  No organomegaly. Extremities: Extremities normal, atraumatic, no cyanosis or edema. Pulses: 2+ and symmetric all extremities. Skin: Skin color, texture, turgor normal. No rashes or lesions   Neurologic: CNII-XII intact. No gross sensory or motor deficits     Data Review:       Recent Days:  No results for input(s): WBC, HGB, HCT, PLT, HGBEXT, HCTEXT, PLTEXT in the last 72 hours. Recent Labs     22  0728      K 4.1      CO2 26   GLU 92   BUN 44*   CREA 5.18*   CA 8.0*   PHOS 5.3*   ALB 2.1*     No results for input(s): PH, PCO2, PO2, HCO3, FIO2 in the last 72 hours. Results     ** No results found for the last 336 hours. **         24 Hour Results:  No results found for this or any previous visit (from the past 24 hour(s)). IR INSERT TUNL CVC W/O PORT OVER 5 YR   Final Result   Successful conversion of a right internal jugular temporary dialysis catheter to   a 23 cm PermCath. The catheter is functioning and is ready for use. IR FLUORO GUIDE PLC CVAD   Final Result   Successful conversion of a right internal jugular temporary dialysis catheter to   a 23 cm PermCath. The catheter is functioning and is ready for use.          DUPLEX LOW EXT ARTERIES WITH JAVON   Final Result      XR CHEST PORT   Final Result      Interval placement of a right supraclavicular approach dialysis catheter with   distal tip in the proximal right atrium. Cardiomegaly with pulmonary vascular congestion and pulmonary edema. Stable appearing basilar right lung pneumonia. Small right pleural effusion. IR INSERT NON TUNL CVC OVER 5 YRS   Final Result      Technically successful insertion of non-tunneled Trialysis catheter with US   guidance. Plan:       Post catheter placement chest xray confirmed appropriate position of the   catheter. The catheter is appropriate for immediate use.   _______________________________________________________________      PROCEDURE SUMMARY:   - Venous access with ultrasound guidance   - Non-tunneled central venous catheter insertion      PROCEDURE DETAILS:      Pre-procedure   Relevant imaging review: None    Informed consent for the procedure was obtained and time-out was performed prior   to the procedure. Prophylactic antibiotic administered: Not administered   Preparation: The site was prepared and draped using all elements of maximal   sterile barrier technique including sterile gloves, sterile gown, cap, mask,   large sterile sheet, sterile ultrasound probe cover, sterile ultrasound gel,   hand hygiene and cutaneous antisepsis with 2% chlorhexidine. Medical reason for site preparation exception: Not applicable      Anesthesia/sedation   Level of anesthesia: No sedation   Anesthesia administered by: Not applicable   Duration of anesthesia/sedation: 0 minutes. Access   The vessel was sonographically evaluated and judged to be patent and appropriate   for access and a permanent image was stored. Three mLs of 1% Lidocaine was   administered to the access site. Real time ultrasound was used to visualize   needle entry into the vessel.     Vein accessed: Right internal jugular vein   Access technique: 4F micropuncture set      Catheter placement   The access site was dilated and the catheter was placed into the vein over a   wire and all guidewires were removed in their entirety. Catheter ports were   aspirated and flushed. Catheter tip location was verified by portable X-ray. Catheter size:13 Swazi   Catheter length: 20 cm   Catheter tip position: Proximal right atrium   Catheter flush: Heparin (100 units/mL)      Closure   The catheter was secured. A sterile dressing was applied. Catheter securement technique: Non-absorbable suture      Additional Medications   None      Additional Details   Estimated blood loss:  Less than 1 mL   Additional description of procedure: None   Additional findings: None   Additional equipment: None   Specimens removed: None                     IR US GUIDED VASCULAR ACCESS   Final Result      Technically successful insertion of non-tunneled Trialysis catheter with US   guidance. Plan:       Post catheter placement chest xray confirmed appropriate position of the   catheter. The catheter is appropriate for immediate use.   _______________________________________________________________      PROCEDURE SUMMARY:   - Venous access with ultrasound guidance   - Non-tunneled central venous catheter insertion      PROCEDURE DETAILS:      Pre-procedure   Relevant imaging review: None    Informed consent for the procedure was obtained and time-out was performed prior   to the procedure. Prophylactic antibiotic administered: Not administered   Preparation: The site was prepared and draped using all elements of maximal   sterile barrier technique including sterile gloves, sterile gown, cap, mask,   large sterile sheet, sterile ultrasound probe cover, sterile ultrasound gel,   hand hygiene and cutaneous antisepsis with 2% chlorhexidine. Medical reason for site preparation exception: Not applicable      Anesthesia/sedation   Level of anesthesia: No sedation   Anesthesia administered by: Not applicable   Duration of anesthesia/sedation: 0 minutes.       Access   The vessel was sonographically evaluated and judged to be patent and appropriate   for access and a permanent image was stored. Three mLs of 1% Lidocaine was   administered to the access site. Real time ultrasound was used to visualize   needle entry into the vessel. Vein accessed: Right internal jugular vein   Access technique: 4F micropuncture set      Catheter placement   The access site was dilated and the catheter was placed into the vein over a   wire and all guidewires were removed in their entirety. Catheter ports were   aspirated and flushed. Catheter tip location was verified by portable X-ray. Catheter size:13 Vietnamese   Catheter length: 20 cm   Catheter tip position: Proximal right atrium   Catheter flush: Heparin (100 units/mL)      Closure   The catheter was secured. A sterile dressing was applied. Catheter securement technique: Non-absorbable suture      Additional Medications   None      Additional Details   Estimated blood loss:  Less than 1 mL   Additional description of procedure: None   Additional findings: None   Additional equipment: None   Specimens removed: None                     XR CHEST PORT   Final Result   Airspace opacity and effusion at the right lung base and in the   acute setting would suggest pneumonia and/or aspiration however the patient also   appears to have baseline/background vascular congestion and/or pulmonary   arterial hypertension, noting cardiomegaly and/or pericardial effusion      IR INSERT NON TUNL CVC OVER 5 YRS    (Results Pending)        Assessment: CHF  Chronic renal failure on dialysis  Ventricular tachycardia patient has refused for ICD  Hypertension  PAD patient has refused for arteriogram        Plan: I think patient requires inpatient rehab he is not able to walk more than 20 feet  Awaiting for placement        Care Plan discussed with: Patient/Family    Total time spent with patient: 30 minutes.     Carolee Field MD

## 2022-01-28 NOTE — PROGRESS NOTES
Problem: Falls - Risk of  Goal: *Absence of Falls  Description: Document Janae Rodriguez Fall Risk and appropriate interventions in the flowsheet. Outcome: Progressing Towards Goal  Note: Fall Risk Interventions:  Mobility Interventions: Bed/chair exit alarm,Patient to call before getting OOB,OT consult for ADLs,PT Consult for mobility concerns    Mentation Interventions: Adequate sleep, hydration, pain control,Bed/chair exit alarm    Medication Interventions: Assess postural VS orthostatic hypotension,Patient to call before getting OOB,Teach patient to arise slowly    Elimination Interventions: Bed/chair exit alarm,Call light in reach,Urinal in reach              Problem: Patient Education: Go to Patient Education Activity  Goal: Patient/Family Education  Outcome: Progressing Towards Goal     Problem: Pressure Injury - Risk of  Goal: *Prevention of pressure injury  Description: Document Alfa Scale and appropriate interventions in the flowsheet.   Outcome: Progressing Towards Goal  Note: Pressure Injury Interventions:  Sensory Interventions: Discuss PT/OT consult with provider,Keep linens dry and wrinkle-free,Maintain/enhance activity level,Minimize linen layers    Moisture Interventions: Absorbent underpads,Apply protective barrier, creams and emollients,Minimize layers    Activity Interventions: PT/OT evaluation    Mobility Interventions: PT/OT evaluation    Nutrition Interventions: Document food/fluid/supplement intake,Offer support with meals,snacks and hydration    Friction and Shear Interventions: Apply protective barrier, creams and emollients,Minimize layers                Problem: Patient Education: Go to Patient Education Activity  Goal: Patient/Family Education  Outcome: Progressing Towards Goal

## 2022-01-29 PROCEDURE — 74011000250 HC RX REV CODE- 250: Performed by: INTERNAL MEDICINE

## 2022-01-29 PROCEDURE — 94760 N-INVAS EAR/PLS OXIMETRY 1: CPT

## 2022-01-29 PROCEDURE — 74011250637 HC RX REV CODE- 250/637: Performed by: INTERNAL MEDICINE

## 2022-01-29 PROCEDURE — 65270000029 HC RM PRIVATE

## 2022-01-29 RX ADMIN — SODIUM CHLORIDE, PRESERVATIVE FREE 10 ML: 5 INJECTION INTRAVENOUS at 21:14

## 2022-01-29 RX ADMIN — SODIUM CHLORIDE, PRESERVATIVE FREE 10 ML: 5 INJECTION INTRAVENOUS at 05:09

## 2022-01-29 RX ADMIN — ASPIRIN 81 MG CHEWABLE TABLET 81 MG: 81 TABLET CHEWABLE at 09:27

## 2022-01-29 RX ADMIN — AMIODARONE HYDROCHLORIDE 200 MG: 200 TABLET ORAL at 09:27

## 2022-01-29 RX ADMIN — SODIUM CHLORIDE, PRESERVATIVE FREE 10 ML: 5 INJECTION INTRAVENOUS at 13:16

## 2022-01-29 RX ADMIN — FERROUS SULFATE TAB 325 MG (65 MG ELEMENTAL FE) 325 MG: 325 (65 FE) TAB at 09:27

## 2022-01-29 RX ADMIN — FUROSEMIDE 80 MG: 40 TABLET ORAL at 09:27

## 2022-01-29 RX ADMIN — FERROUS SULFATE TAB 325 MG (65 MG ELEMENTAL FE) 325 MG: 325 (65 FE) TAB at 17:49

## 2022-01-29 NOTE — PROGRESS NOTES
1220: Chart reviewed. CM contacted [facility] [phone] to f/u on bed availability. Court Herring stated she would check contact CM. Contact info provided. 1300: CM spoke with nurse regarding patient receiving COVID vaccine in prep for SNF discharge. She is going to speak with patient and provide vaccine if he is agreeable. 1415: Per nurse, patient refused offer to receive COVID vaccine and then remain at the hospital 14d in an effort to go to a SNF. Per recent MD note, he has encouraged patient to get vaccine. 1600: CM met with patient at bedside to discuss discharge plan. Patient relayed he wanted to be discharged home when medically stable. He states he lives with his sister and nephew who assist him as needed. Patient states he still drives and uses a walker at home which is in the room. Patient went on to say he is going to drive himself to and from dialysis. CM advised this may not be in his best interest and he stated his nephew can assist him. 1615: Patient asked to speak with CM again. CM called patient room. He asked if transportation could be arranged to take him to and from dialysis. CM reviewed his insurance as Medicare [employer] and relayed this would not be a covered benefit.

## 2022-01-29 NOTE — PROGRESS NOTES
Progress Note    Octavio Chavez MD             Daily Progress Note: 1/29/2022      Subjective: The patient is seen for follow  up.    Patient looks very comfortable denies any chest pain or shortness of breath his left leg is hanging from the bed and it is swollen but the right leg which is on the bed is not swollen  Problem List:  Problem List as of 1/29/2022 Never Reviewed          Codes Class Noted - Resolved    Renal failure ICD-10-CM: N19  ICD-9-CM: 846  1/8/2022 - Present        Hyperkalemia ICD-10-CM: E87.5  ICD-9-CM: 276.7  1/8/2022 - Present        Pulmonary edema ICD-10-CM: J81.1  ICD-9-CM: 752  7/13/2021 - Present        GI bleed ICD-10-CM: K92.2  ICD-9-CM: 578.9  1/5/2021 - Present              Medications reviewed  Current Facility-Administered Medications   Medication Dose Route Frequency    furosemide (LASIX) tablet 80 mg  80 mg Oral DAILY    heparin (porcine) 1,000 unit/mL injection 3,600 Units  3,600 Units Hemodialysis DIALYSIS PRN    amiodarone (CORDARONE) tablet 200 mg  200 mg Oral DAILY    iron sucrose (VENOFER) injection 200 mg  200 mg IntraVENous DIALYSIS MON, WED & FRI    epoetin valente-epbx (RETACRIT) injection 10,000 Units  10,000 Units IntraVENous DIALYSIS MON, WED & FRI    oxyCODONE IR (ROXICODONE) tablet 5 mg  5 mg Oral Q8H PRN    sodium chloride (NS) flush 5-40 mL  5-40 mL IntraVENous Q8H    sodium chloride (NS) flush 5-40 mL  5-40 mL IntraVENous PRN    ferrous sulfate tablet 325 mg  1 Tablet Oral BID WITH MEALS    heparin (porcine) 1,000 unit/mL injection 4,000 Units  4,000 Units IntraVENous PRN    Or    heparin (porcine) 1,000 unit/mL injection 2,000 Units  2,000 Units IntraVENous PRN    metoprolol succinate (TOPROL-XL) XL tablet 50 mg  50 mg Oral DAILY    nitroglycerin (NITROBID) 2 % ointment 0.5 Inch  0.5 Inch Topical Q6H    aspirin chewable tablet 81 mg  81 mg Oral DAILY       Review of Systems:   A comprehensive review of systems was negative except for that written in the HPI. Objective:   Physical Exam:     Visit Vitals  BP (!) 135/90 (BP 1 Location: Right upper arm, BP Patient Position: At rest)   Pulse 61   Temp 98.2 °F (36.8 °C)   Resp 18   Ht 6' (1.829 m)   Wt 72.6 kg (160 lb 0.9 oz)   SpO2 94%   BMI 21.71 kg/m²    O2 Flow Rate (L/min): 2 l/min O2 Device: None (Room air)    Temp (24hrs), Av.3 °F (36.8 °C), Min:97.9 °F (36.6 °C), Max:99 °F (37.2 °C)    No intake/output data recorded.  1901 -  0700  In: -   Out: 3000     General:  Alert, cooperative, no distress, appears stated age. Lungs:   Clear to auscultation bilaterally. Chest wall:  No tenderness or deformity. Heart:  Regular rate and rhythm, S1, S2 normal, no murmur, click, rub or gallop. Abdomen:   Soft, non-tender. Bowel sounds normal. No masses,  No organomegaly. Extremities: Extremities normal, atraumatic, no cyanosis left leg edema   Pulses: 2+ and symmetric all extremities. Skin: Skin color, texture, turgor normal. No rashes or lesions   Neurologic: CNII-XII intact. No gross sensory or motor deficits     Data Review:       Recent Days:  No results for input(s): WBC, HGB, HCT, PLT, HGBEXT, HCTEXT, PLTEXT in the last 72 hours. No results for input(s): NA, K, CL, CO2, GLU, BUN, CREA, CA, MG, PHOS, ALB, TBIL, TBILI, ALT, INR, INREXT in the last 72 hours. No lab exists for component: SGOT  No results for input(s): PH, PCO2, PO2, HCO3, FIO2 in the last 72 hours. Results     ** No results found for the last 336 hours. **         24 Hour Results:  No results found for this or any previous visit (from the past 24 hour(s)). IR INSERT TUNL CVC W/O PORT OVER 5 YR   Final Result   Successful conversion of a right internal jugular temporary dialysis catheter to   a 23 cm PermCath. The catheter is functioning and is ready for use. IR FLUORO GUIDE PLC CVAD   Final Result   Successful conversion of a right internal jugular temporary dialysis catheter to   a 23 cm PermCath. The catheter is functioning and is ready for use. DUPLEX LOW EXT ARTERIES WITH JAVON   Final Result      XR CHEST PORT   Final Result      Interval placement of a right supraclavicular approach dialysis catheter with   distal tip in the proximal right atrium. Cardiomegaly with pulmonary vascular congestion and pulmonary edema. Stable appearing basilar right lung pneumonia. Small right pleural effusion. IR INSERT NON TUNL CVC OVER 5 YRS   Final Result      Technically successful insertion of non-tunneled Trialysis catheter with US   guidance. Plan:       Post catheter placement chest xray confirmed appropriate position of the   catheter. The catheter is appropriate for immediate use.   _______________________________________________________________      PROCEDURE SUMMARY:   - Venous access with ultrasound guidance   - Non-tunneled central venous catheter insertion      PROCEDURE DETAILS:      Pre-procedure   Relevant imaging review: None    Informed consent for the procedure was obtained and time-out was performed prior   to the procedure. Prophylactic antibiotic administered: Not administered   Preparation: The site was prepared and draped using all elements of maximal   sterile barrier technique including sterile gloves, sterile gown, cap, mask,   large sterile sheet, sterile ultrasound probe cover, sterile ultrasound gel,   hand hygiene and cutaneous antisepsis with 2% chlorhexidine. Medical reason for site preparation exception: Not applicable      Anesthesia/sedation   Level of anesthesia: No sedation   Anesthesia administered by: Not applicable   Duration of anesthesia/sedation: 0 minutes. Access   The vessel was sonographically evaluated and judged to be patent and appropriate   for access and a permanent image was stored. Three mLs of 1% Lidocaine was   administered to the access site. Real time ultrasound was used to visualize   needle entry into the vessel.     Vein accessed: Right internal jugular vein   Access technique: 4F micropuncture set      Catheter placement   The access site was dilated and the catheter was placed into the vein over a   wire and all guidewires were removed in their entirety. Catheter ports were   aspirated and flushed. Catheter tip location was verified by portable X-ray. Catheter size:13 [place]   Catheter length: 20 cm   Catheter tip position: Proximal right atrium   Catheter flush: Heparin (100 units/mL)      Closure   The catheter was secured. A sterile dressing was applied. Catheter securement technique: Non-absorbable suture      Additional Medications   None      Additional Details   Estimated blood loss:  Less than 1 mL   Additional description of procedure: None   Additional findings: None   Additional equipment: None   Specimens removed: None                     IR US GUIDED VASCULAR ACCESS   Final Result      Technically successful insertion of non-tunneled Trialysis catheter with US   guidance. Plan:       Post catheter placement chest xray confirmed appropriate position of the   catheter. The catheter is appropriate for immediate use.   _______________________________________________________________      PROCEDURE SUMMARY:   - Venous access with ultrasound guidance   - Non-tunneled central venous catheter insertion      PROCEDURE DETAILS:      Pre-procedure   Relevant imaging review: None    Informed consent for the procedure was obtained and time-out was performed prior   to the procedure. Prophylactic antibiotic administered: Not administered   Preparation: The site was prepared and draped using all elements of maximal   sterile barrier technique including sterile gloves, sterile gown, cap, mask,   large sterile sheet, sterile ultrasound probe cover, sterile ultrasound gel,   hand hygiene and cutaneous antisepsis with 2% chlorhexidine.     Medical reason for site preparation exception: Not applicable      Anesthesia/sedation Level of anesthesia: No sedation   Anesthesia administered by: Not applicable   Duration of anesthesia/sedation: 0 minutes. Access   The vessel was sonographically evaluated and judged to be patent and appropriate   for access and a permanent image was stored. Three mLs of 1% Lidocaine was   administered to the access site. Real time ultrasound was used to visualize   needle entry into the vessel. Vein accessed: Right internal jugular vein   Access technique: 4F micropuncture set      Catheter placement   The access site was dilated and the catheter was placed into the vein over a   wire and all guidewires were removed in their entirety. Catheter ports were   aspirated and flushed. Catheter tip location was verified by portable X-ray. Catheter size:13 [place]   Catheter length: 20 cm   Catheter tip position: Proximal right atrium   Catheter flush: Heparin (100 units/mL)      Closure   The catheter was secured. A sterile dressing was applied. Catheter securement technique: Non-absorbable suture      Additional Medications   None      Additional Details   Estimated blood loss:  Less than 1 mL   Additional description of procedure: None   Additional findings: None   Additional equipment: None   Specimens removed: None                     XR CHEST PORT   Final Result   Airspace opacity and effusion at the right lung base and in the   acute setting would suggest pneumonia and/or aspiration however the patient also   appears to have baseline/background vascular congestion and/or pulmonary   arterial hypertension, noting cardiomegaly and/or pericardial effusion      IR INSERT NON TUNL CVC OVER 5 YRS    (Results Pending)        Assessment: CHF  Ejection fraction 25% patient has refused ICD placement  Hypertension  Chronic renal failure on dialysis  PAD        Plan: Awaiting for the placement. Continue current treatment  Requested patient to get vaccine.   Enumerated on the advantages to get the vaccine        Care Plan discussed with: Patient/Family    Total time spent with patient: 30 minutes.     Brittney Fernandes MD

## 2022-01-29 NOTE — PROGRESS NOTES
Progress Note      1/29/2022 12:19 PM  NAME: Akhil Freeman   MRN:  [id number]   Admit Diagnosis: Renal failure [N19]  Hyperkalemia [E87.5]      Subjective:   Chart reviewed. Patient is on dialysis. Symptoms of shortness of breath has improved. With the patient and with the nurse. Cardiac catheterization findings discussed with the patient and also discussed with the electrophysiologist.  Vascular surgeon's note reviewed and patient refused to have peripheral angiogram.  Patient feels good. Review of Systems:    Symptom Y/N Comments  Symptom Y/N Comments   Fever/Chills n   Chest Pain n    Poor Appetite    Edema y  improving   Cough    Abdominal Pain     Sputum    Joint Pain     SOB/CARMONA y  improving  Pruritis/Rash     Nausea/vomit    Other     Diarrhea         Constipation           Could NOT obtain due to:      Objective:          Physical Exam:    Last 24hrs VS reviewed since prior progress note. Most recent are:    Visit Vitals  /86 (BP 1 Location: Right upper arm, BP Patient Position: At rest)   Pulse (!) 53   Temp 98.1 °F (36.7 °C)   Resp 18   Ht 6' (1.829 m)   Wt 72.6 kg (160 lb 0.9 oz)   SpO2 93%   BMI 21.71 kg/m²     No intake or output data in the 24 hours ending 01/29/22 1149     General Appearance: Well developed, well nourished, alert & oriented x 3,    no acute distress. Ears/Nose/Mouth/Throat: Hearing grossly normal.  Neck: Supple. Chest: Lungs clear to auscultation bilaterally. Cardiovascular: Regular rate and rhythm, S1,S2 normal, no murmur. Abdomen: Soft, non-tender, bowel sounds are active. Extremities: Patient has cellulitis of the lower extremities  Skin: Warm and dry. []         Post-cath site without hematoma, bruit, tenderness, or thrill. Distal pulses intact. PMH/SH reviewed - no change compared to H&P    Data Review    Telemetry: normal sinus rhythm , patient has some PVCs, and episode of wide-complex tachycardia possible ventricular tachycardia.     EKG: Patient had EKG that is very concerning for ventricular tachycardia. Lab Data Personally Reviewed:    No results for input(s): WBC, HGB, HCT, PLT, HGBEXT, HCTEXT, PLTEXT, HGBEXT, HCTEXT, PLTEXT in the last 72 hours. No results for input(s): INR, PTP, APTT, INREXT, INREXT in the last 72 hours. No results for input(s): NA, K, CL, CO2, BUN, CREA, GLU, CA, MG in the last 72 hours. No results for input(s): CPK, CKNDX, TROIQ in the last 72 hours. No lab exists for component: CPKMB  No results found for: CHOL, CHOLX, CHLST, CHOLV, HDL, HDLP, LDL, LDLC, DLDLP, TGLX, TRIGL, TRIGP, CHHD, CHHDX    No results for input(s): AP, TBIL, TP, ALB, GLOB, GGT, AML, LPSE in the last 72 hours. No lab exists for component: SGOT, GPT, AMYP, HLPSE  No results for input(s): PH, PCO2, PO2 in the last 72 hours.     Medications Personally Reviewed:    Current Facility-Administered Medications   Medication Dose Route Frequency    furosemide (LASIX) tablet 80 mg  80 mg Oral DAILY    heparin (porcine) 1,000 unit/mL injection 3,600 Units  3,600 Units Hemodialysis DIALYSIS PRN    amiodarone (CORDARONE) tablet 200 mg  200 mg Oral DAILY    iron sucrose (VENOFER) injection 200 mg  200 mg IntraVENous DIALYSIS MON, WED & FRI    epoetin valente-epbx (RETACRIT) injection 10,000 Units  10,000 Units IntraVENous DIALYSIS MON, WED & FRI    oxyCODONE IR (ROXICODONE) tablet 5 mg  5 mg Oral Q8H PRN    sodium chloride (NS) flush 5-40 mL  5-40 mL IntraVENous Q8H    sodium chloride (NS) flush 5-40 mL  5-40 mL IntraVENous PRN    ferrous sulfate tablet 325 mg  1 Tablet Oral BID WITH MEALS    heparin (porcine) 1,000 unit/mL injection 4,000 Units  4,000 Units IntraVENous PRN    Or    heparin (porcine) 1,000 unit/mL injection 2,000 Units  2,000 Units IntraVENous PRN    metoprolol succinate (TOPROL-XL) XL tablet 50 mg  50 mg Oral DAILY    nitroglycerin (NITROBID) 2 % ointment 0.5 Inch  0.5 Inch Topical Q6H    aspirin chewable tablet 81 mg  81 mg Oral DAILY           Problem List:   Patient has a acute on chronic renal failure and had a dialysis and is clinically better. Decompensated heart failure and ejection fraction is depressed and patient has a at least moderate possibly severe pulmonary hypertension. History of paroxysmal atrial fibrillation. Recent DVT of her left axillary vein. Wide-complex tachycardia probably ventricular tachycardia. Catheterization did not show evidence of coronary artery disease and ejection fraction was about 20 to 25%. Patient is getting restless and wants to leave. Cellulitis of the lower extremities. Patient refused peripheral arterial angiogram as advised by the vascular surgeon. 1.      Assessment/Plan:   I will continue dialysis as per recommendations of the nephrologist.  Patient is offered ICD by Dr. Camila Contreras however patient does not want it. Vascular surgery note reviewed and appreciated and will follow the recommendations. We will continue amiodarone and present care. Discontinue heparin and start on small dose of Eliquis because patient has a history of paroxysmal atrial fibrillation. Patient will be going for cardiac rehab. We will follow infectious disease recommendations. Thank you. 1.          []       High complexity decision making was performed in this patient at high risk for decompensation with multiple organ involvement.     Britney Pina MD

## 2022-01-30 PROCEDURE — 97110 THERAPEUTIC EXERCISES: CPT

## 2022-01-30 PROCEDURE — 65270000029 HC RM PRIVATE

## 2022-01-30 PROCEDURE — 74011250637 HC RX REV CODE- 250/637: Performed by: INTERNAL MEDICINE

## 2022-01-30 PROCEDURE — 97116 GAIT TRAINING THERAPY: CPT

## 2022-01-30 PROCEDURE — 94761 N-INVAS EAR/PLS OXIMETRY MLT: CPT

## 2022-01-30 PROCEDURE — 74011000250 HC RX REV CODE- 250: Performed by: INTERNAL MEDICINE

## 2022-01-30 RX ADMIN — FERROUS SULFATE TAB 325 MG (65 MG ELEMENTAL FE) 325 MG: 325 (65 FE) TAB at 17:00

## 2022-01-30 RX ADMIN — SODIUM CHLORIDE, PRESERVATIVE FREE 10 ML: 5 INJECTION INTRAVENOUS at 17:17

## 2022-01-30 RX ADMIN — ASPIRIN 81 MG CHEWABLE TABLET 81 MG: 81 TABLET CHEWABLE at 09:25

## 2022-01-30 RX ADMIN — SODIUM CHLORIDE, PRESERVATIVE FREE 10 ML: 5 INJECTION INTRAVENOUS at 05:08

## 2022-01-30 RX ADMIN — AMIODARONE HYDROCHLORIDE 200 MG: 200 TABLET ORAL at 09:25

## 2022-01-30 RX ADMIN — SODIUM CHLORIDE, PRESERVATIVE FREE 10 ML: 5 INJECTION INTRAVENOUS at 22:09

## 2022-01-30 RX ADMIN — METOPROLOL SUCCINATE 50 MG: 25 TABLET, EXTENDED RELEASE ORAL at 09:25

## 2022-01-30 RX ADMIN — FUROSEMIDE 80 MG: 40 TABLET ORAL at 09:26

## 2022-01-30 RX ADMIN — FERROUS SULFATE TAB 325 MG (65 MG ELEMENTAL FE) 325 MG: 325 (65 FE) TAB at 09:26

## 2022-01-30 NOTE — PROGRESS NOTES
Problem: Mobility Impaired (Adult and Pediatric)  Goal: *Acute Goals and Plan of Care (Insert Text)  Description: Pt will be I with LE HEP in 7 days. Pt will perform bed mobility with mod I in 7 days. Pt will perform transfers with mod I in 7 days. Pt will amb  feet with LRAD safely with mod I in 7 days. Outcome: Progressing Towards Goal   PHYSICAL THERAPY TREATMENT  Patient: Maeve Torres (89 y.o. male)  Date: 1/30/2022  Diagnosis: Renal failure [N19]  Hyperkalemia [E87.5] <principal problem not specified>  Procedure(s) (LRB):  LEFT HEART CATH / CORONARY ANGIOGRAPHY W GRAFTS (N/A)  PERCUTANEOUS CORONARY INTERVENTION (N/A) 17 Days Post-Op  Precautions:    Chart, physical therapy assessment, plan of care and goals were reviewed. ASSESSMENT  Patient continues with skilled PT services and is progressing towards goals. Pt received semi supine in bed, pleasant and agreeable to Tx. Pt rolled to L, went from supine to sitting with mod Indep, then scooted to EOB with Sup but needing additional time to complete the task. Pt completed LE sitting on EOB (see chart below), no difficulty  and displaying good balance without UE support. Pt stood to walker with Sup and amb to bathroom 13 ft with SBA. He displayed good dynamic balance on toilet and fair static standing balance during toileting. Pt amb to sink  6 ft then from sink to bed 18 ft with SBA sat on EOB to rest. Pt got up to walker once more and amb 60 ft with SBA then sat in chair. Pt was left seated in chair in NAD, call bell within reach, all needs addressed. Current Level of Function Impacting Discharge (mobility/balance): assist x 1, standing balance deficit     Other factors to consider for discharge: severity of impairments, safety, decreased functional mobility,support available         PLAN :  Patient continues to benefit from skilled intervention to address the above impairments. Continue treatment per established plan of care.   to address goals. Recommendation for discharge: (in order for the patient to meet his/her long term goals)  Ramsey Sigala    This discharge recommendation:  Has been made in collaboration with the attending provider and/or case management    IF patient discharges home will need the following DME: to be determined (TBD)       SUBJECTIVE:   Patient stated alright, let's go    OBJECTIVE DATA SUMMARY:   Critical Behavior:  Neurologic State: Alert  Orientation Level: Oriented X4  Cognition: Follows commands     Functional Mobility Training:  Bed Mobility:  Rolling: Modified independent  Supine to Sit: Modified independent  Scooting: Supervision; Additional time     Transfers:  Sit to Stand: Supervision  Stand to Sit: Supervision  Bed to Chair: Supervision      Balance:  Sitting: Intact  Sitting - Static: Good (unsupported)  Sitting - Dynamic: Good (unsupported)  Standing: Impaired  Standing - Static: Good;Constant support  Standing - Dynamic : Fair;Constant support    Ambulation/Gait Training:  Distance (ft): 60 Feet (ft)  Assistive Device: Walker;Gait belt  Ambulation - Level of Assistance: Stand-by assistance    Therapeutic Exercises:       EXERCISE   Sets   Reps   Active Active Assist   Passive Self ROM   Comments   Heel Raises 1 20 [] [] [] []    Glut Sets 1 20 [] [] [] []    Toes Raises 1 20 [] [] [] []    Hip abd/add 1 20 [] [] [] []    Long Arc Quads 1 20 [] [] [] []    Marching 1 20 [] [] [] []      Pain Ratin/10 L knee and calf    Activity Tolerance:   Good  Please refer to the flowsheet for vital signs taken during this treatment. After treatment patient left in no apparent distress:   Sitting in chair and Call bell within reach    COMMUNICATION/COLLABORATION:   The patients plan of care was discussed with: Registered nurse.      Leta Reed PTA   Time Calculation: 29 mins

## 2022-01-30 NOTE — PROGRESS NOTES
Progress Note    Joanie Griffin MD             Daily Progress Note: 1/30/2022      Subjective: The patient is seen for follow  up. Offers no complaints but seems to be short of breath according to patient he had just physical therapy completed. Problem List:  Problem List as of 1/30/2022 Never Reviewed          Codes Class Noted - Resolved    Renal failure ICD-10-CM: N19  ICD-9-CM: 212  1/8/2022 - Present        Hyperkalemia ICD-10-CM: E87.5  ICD-9-CM: 276.7  1/8/2022 - Present        Pulmonary edema ICD-10-CM: J81.1  ICD-9-CM: 839  7/13/2021 - Present        GI bleed ICD-10-CM: K92.2  ICD-9-CM: 578.9  1/5/2021 - Present              Medications reviewed  Current Facility-Administered Medications   Medication Dose Route Frequency    furosemide (LASIX) tablet 80 mg  80 mg Oral DAILY    heparin (porcine) 1,000 unit/mL injection 3,600 Units  3,600 Units Hemodialysis DIALYSIS PRN    amiodarone (CORDARONE) tablet 200 mg  200 mg Oral DAILY    iron sucrose (VENOFER) injection 200 mg  200 mg IntraVENous DIALYSIS MON, WED & FRI    epoetin valente-epbx (RETACRIT) injection 10,000 Units  10,000 Units IntraVENous DIALYSIS MON, WED & FRI    oxyCODONE IR (ROXICODONE) tablet 5 mg  5 mg Oral Q8H PRN    sodium chloride (NS) flush 5-40 mL  5-40 mL IntraVENous Q8H    sodium chloride (NS) flush 5-40 mL  5-40 mL IntraVENous PRN    ferrous sulfate tablet 325 mg  1 Tablet Oral BID WITH MEALS    heparin (porcine) 1,000 unit/mL injection 4,000 Units  4,000 Units IntraVENous PRN    Or    heparin (porcine) 1,000 unit/mL injection 2,000 Units  2,000 Units IntraVENous PRN    metoprolol succinate (TOPROL-XL) XL tablet 50 mg  50 mg Oral DAILY    nitroglycerin (NITROBID) 2 % ointment 0.5 Inch  0.5 Inch Topical Q6H    aspirin chewable tablet 81 mg  81 mg Oral DAILY       Review of Systems:   A comprehensive review of systems was negative except for that written in the HPI.     Objective:   Physical Exam:     Visit Vitals  BP 127/85 (BP 1 Location: Right upper arm, BP Patient Position: At rest)   Pulse 60   Temp 98.7 °F (37.1 °C)   Resp 16   Ht 6' (1.829 m)   Wt 72.9 kg (160 lb 11.5 oz)   SpO2 90%   BMI 21.80 kg/m²    O2 Flow Rate (L/min): 2 l/min O2 Device: None (Room air)    Temp (24hrs), Av.7 °F (37.1 °C), Min:98.2 °F (36.8 °C), Max:99.2 °F (37.3 °C)    No intake/output data recorded.  1901 -  0700  In: -   Out: 700 [Urine:700]    General:  Alert, cooperative, no distress, appears stated age. Lungs:   Clear to auscultation bilaterally. Chest wall:  No tenderness or deformity. Heart:  Regular rate and rhythm, S1, S2 normal, no murmur, click, rub or gallop. Abdomen:   Soft, non-tender. Bowel sounds normal. No masses,  No organomegaly. Extremities: Extremities normal, atraumatic, no cyanosis or edema. Pulses: 2+ and symmetric all extremities. Skin: Skin color, texture, turgor normal. No rashes or lesions   Neurologic: CNII-XII intact. No gross sensory or motor deficits     Data Review:       Recent Days:  No results for input(s): WBC, HGB, HCT, PLT, HGBEXT, HCTEXT, PLTEXT in the last 72 hours. No results for input(s): NA, K, CL, CO2, GLU, BUN, CREA, CA, MG, PHOS, ALB, TBIL, TBILI, ALT, INR, INREXT in the last 72 hours. No lab exists for component: SGOT  No results for input(s): PH, PCO2, PO2, HCO3, FIO2 in the last 72 hours. Results     ** No results found for the last 336 hours. **         24 Hour Results:  No results found for this or any previous visit (from the past 24 hour(s)). IR INSERT TUNL CVC W/O PORT OVER 5 YR   Final Result   Successful conversion of a right internal jugular temporary dialysis catheter to   a 23 cm PermCath. The catheter is functioning and is ready for use. IR FLUORO GUIDE PLC CVAD   Final Result   Successful conversion of a right internal jugular temporary dialysis catheter to   a 23 cm PermCath. The catheter is functioning and is ready for use.          DUPLEX LOW EXT ARTERIES WITH JAVON   Final Result      XR CHEST PORT   Final Result      Interval placement of a right supraclavicular approach dialysis catheter with   distal tip in the proximal right atrium. Cardiomegaly with pulmonary vascular congestion and pulmonary edema. Stable appearing basilar right lung pneumonia. Small right pleural effusion. IR INSERT NON TUNL CVC OVER 5 YRS   Final Result      Technically successful insertion of non-tunneled Trialysis catheter with US   guidance. Plan:       Post catheter placement chest xray confirmed appropriate position of the   catheter. The catheter is appropriate for immediate use.   _______________________________________________________________      PROCEDURE SUMMARY:   - Venous access with ultrasound guidance   - Non-tunneled central venous catheter insertion      PROCEDURE DETAILS:      Pre-procedure   Relevant imaging review: None    Informed consent for the procedure was obtained and time-out was performed prior   to the procedure. Prophylactic antibiotic administered: Not administered   Preparation: The site was prepared and draped using all elements of maximal   sterile barrier technique including sterile gloves, sterile gown, cap, mask,   large sterile sheet, sterile ultrasound probe cover, sterile ultrasound gel,   hand hygiene and cutaneous antisepsis with 2% chlorhexidine. Medical reason for site preparation exception: Not applicable      Anesthesia/sedation   Level of anesthesia: No sedation   Anesthesia administered by: Not applicable   Duration of anesthesia/sedation: 0 minutes. Access   The vessel was sonographically evaluated and judged to be patent and appropriate   for access and a permanent image was stored. Three mLs of 1% Lidocaine was   administered to the access site. Real time ultrasound was used to visualize   needle entry into the vessel.     Vein accessed: Right internal jugular vein   Access technique: 4F micropuncture set      Catheter placement   The access site was dilated and the catheter was placed into the vein over a   wire and all guidewires were removed in their entirety. Catheter ports were   aspirated and flushed. Catheter tip location was verified by portable X-ray. Catheter size:13 Kinyarwanda   Catheter length: 20 cm   Catheter tip position: Proximal right atrium   Catheter flush: Heparin (100 units/mL)      Closure   The catheter was secured. A sterile dressing was applied. Catheter securement technique: Non-absorbable suture      Additional Medications   None      Additional Details   Estimated blood loss:  Less than 1 mL   Additional description of procedure: None   Additional findings: None   Additional equipment: None   Specimens removed: None                     IR US GUIDED VASCULAR ACCESS   Final Result      Technically successful insertion of non-tunneled Trialysis catheter with US   guidance. Plan:       Post catheter placement chest xray confirmed appropriate position of the   catheter. The catheter is appropriate for immediate use.   _______________________________________________________________      PROCEDURE SUMMARY:   - Venous access with ultrasound guidance   - Non-tunneled central venous catheter insertion      PROCEDURE DETAILS:      Pre-procedure   Relevant imaging review: None    Informed consent for the procedure was obtained and time-out was performed prior   to the procedure. Prophylactic antibiotic administered: Not administered   Preparation: The site was prepared and draped using all elements of maximal   sterile barrier technique including sterile gloves, sterile gown, cap, mask,   large sterile sheet, sterile ultrasound probe cover, sterile ultrasound gel,   hand hygiene and cutaneous antisepsis with 2% chlorhexidine.     Medical reason for site preparation exception: Not applicable      Anesthesia/sedation   Level of anesthesia: No sedation   Anesthesia administered by: Not applicable   Duration of anesthesia/sedation: 0 minutes. Access   The vessel was sonographically evaluated and judged to be patent and appropriate   for access and a permanent image was stored. Three mLs of 1% Lidocaine was   administered to the access site. Real time ultrasound was used to visualize   needle entry into the vessel. Vein accessed: Right internal jugular vein   Access technique: 4F micropuncture set      Catheter placement   The access site was dilated and the catheter was placed into the vein over a   wire and all guidewires were removed in their entirety. Catheter ports were   aspirated and flushed. Catheter tip location was verified by portable X-ray. Catheter size:13 Estonian   Catheter length: 20 cm   Catheter tip position: Proximal right atrium   Catheter flush: Heparin (100 units/mL)      Closure   The catheter was secured. A sterile dressing was applied. Catheter securement technique: Non-absorbable suture      Additional Medications   None      Additional Details   Estimated blood loss:  Less than 1 mL   Additional description of procedure: None   Additional findings: None   Additional equipment: None   Specimens removed: None                     XR CHEST PORT   Final Result   Airspace opacity and effusion at the right lung base and in the   acute setting would suggest pneumonia and/or aspiration however the patient also   appears to have baseline/background vascular congestion and/or pulmonary   arterial hypertension, noting cardiomegaly and/or pericardial effusion      IR INSERT NON TUNL CVC OVER 5 YRS    (Results Pending)        Assessment: CHF  Ejection fraction 25% refused for ICD  Ventricular dysrhythmia  Hypertension  Chronic renal failure  On dialysis  PAD      Plan:  We will continue present patient has refused for ICD placement as well as arteriogram        Care Plan discussed with: Patient/Family, Nurse and Other Respiratory therapy    Total time spent with patient: 30 minutes.     Jose Parra MD

## 2022-01-30 NOTE — PROGRESS NOTES
[facility]     Renal Daily Progress Note:     Admission Date: 2022   Subjective: Mental status is good. He  looks better, not tachypneic. Review of Systems  Pertinent items are noted in HPI. Objective:     Visit Vitals  /81   Pulse (!) 59   Temp 98.6 °F (37 °C)   Resp 18   Ht 6' (1.829 m)   Wt 72.6 kg (160 lb 0.9 oz)   SpO2 97%   BMI 21.71 kg/m²     Temp (24hrs), Av.3 °F (36.8 °C), Min:97.9 °F (36.6 °C), Max:98.9 °F (37.2 °C)      No intake or output data in the 24 hours ending 22  Current Facility-Administered Medications   Medication Dose Route Frequency    furosemide (LASIX) tablet 80 mg  80 mg Oral DAILY    heparin (porcine) 1,000 unit/mL injection 3,600 Units  3,600 Units Hemodialysis DIALYSIS PRN    amiodarone (CORDARONE) tablet 200 mg  200 mg Oral DAILY    iron sucrose (VENOFER) injection 200 mg  200 mg IntraVENous DIALYSIS MON, WED & FRI    epoetin valente-epbx (RETACRIT) injection 10,000 Units  10,000 Units IntraVENous DIALYSIS MON, WED & FRI    oxyCODONE IR (ROXICODONE) tablet 5 mg  5 mg Oral Q8H PRN    sodium chloride (NS) flush 5-40 mL  5-40 mL IntraVENous Q8H    sodium chloride (NS) flush 5-40 mL  5-40 mL IntraVENous PRN    ferrous sulfate tablet 325 mg  1 Tablet Oral BID WITH MEALS    heparin (porcine) 1,000 unit/mL injection 4,000 Units  4,000 Units IntraVENous PRN    Or    heparin (porcine) 1,000 unit/mL injection 2,000 Units  2,000 Units IntraVENous PRN    metoprolol succinate (TOPROL-XL) XL tablet 50 mg  50 mg Oral DAILY    nitroglycerin (NITROBID) 2 % ointment 0.5 Inch  0.5 Inch Topical Q6H    aspirin chewable tablet 81 mg  81 mg Oral DAILY       Physical Exam:General appearance: alert, cooperative, no distress, appears older than stated age. Head: Normocephalic, without obvious abnormality, atraumatic  Lungs: clear to auscultation bilaterally  Heart: regular rate and rhythm, no rub  Abdomen: soft, non-tender.  Bowel sounds normal. No masses, no organomegaly  Extremities: venous stasis dermatitis noted, edema: decreased lower extremity edema and dependent thigh edema  Skin: Left lower leg with posterior ulcer inferior to calf, currently wrapped  Neurologic: Grossly normal    Data Review:     LABS:  No results for input(s): NA, K, CL, CO2, BUN, CREA, CA, ALB, PHOS, MG in the last 72 hours. Iron saturation 13%    No results for input(s): WBC, HGB, HCT, PLT, HGBEXT, HCTEXT, PLTEXT, HGBEXT, HCTEXT, PLTEXT in the last 72 hours. No results for input(s): HÉCTOR, KU, CLU, CREAU in the last 72 hours. No lab exists for component: PROU     Cardiac cath Jan 13, 2022  Underwent cardiac cath today:  Indication: Severe left ventricular systolic dysfunction and patient had a V. Tach.     Left main coronary artery is a very large caliber vessel and has no disease. LAD artery is a very large caliber vessel and proximal mid and distal segment has no stenosis and diagonal branches has no disease. Ramus intermedius is a medium caliber vessel without disease. Circumflex artery is a large-caliber vessel and proximal mid and distal segment and AV groove segment has no stenosis. Large obtuse marginal branch and posterolateral branch has no stenosis. Right coronary artery is a large-caliber vessel and does junction of proximal and mid segment there was a concern of stenosis and that therefore it was interrogated by IFR which turned out to be 0.95 and was not deemed critical enough to do intervention.     Left ventriculography is performed in the right anterior oblique view and ejection fraction is about 20 to 25% and distal anterior wall apex and distal inferior wall is near akinetic.   This may be possible presentation of Takotsubo syndrome.     Plan: Patient will receive defibrillation      Chest x-ray January 8, 2022:  IMPRESSION     Interval placement of a right supraclavicular approach dialysis catheter with  distal tip in the proximal right atrium.     Cardiomegaly with pulmonary vascular congestion and pulmonary edema.      Stable appearing basilar right lung pneumonia. Small right pleural effusion. Assessment:   Renal Specific Problems  Chronic kidney disease stage VI, started on HD.   Hypoalbumin  SVT- recurrent  Volume overload- resolved  Metabolic acidosis- resolved  Hyperkalemia- resolved  Anemia with renal failure and iron deficient  Leukocytosis corrected with declining procalcitonin  Probable right lower lobe pneumonia  Infected  leg ulcers  Venous stasis dermatitis lower extremity          Plan:     Obtain/ Order: labs/cultures/radiology/procedures:      Therapeutic:    Angiogram with possible angioplasty of lower leg vessels- refused, will reattempt later if wound healing does not occur     HD Monday  Back on Lasix    Epogen with dialysis  Wound care - regarding left leg ulcer    Case management to arrange rehab and outpatient dialysis- d/w CM today

## 2022-01-30 NOTE — PROGRESS NOTES
Attempted to see pt at 12:27 this PM, but pt asked to postpone session for later. Will continue to follow and attempt at a later time.

## 2022-01-30 NOTE — PROGRESS NOTES
Progress Note      1/30/2022 12:19 PM  NAME: Cm Bah   MRN:  [id number]   Admit Diagnosis: Renal failure [N19]  Hyperkalemia [E87.5]      Subjective:   Chart reviewed. Patient is on dialysis. Symptoms of shortness of breath has improved. With the patient and with the nurse. Cardiac catheterization findings discussed with the patient and also discussed with the electrophysiologist.  Vascular surgeon's note reviewed and patient refused to have peripheral angiogram.  Patient feels good. Review of Systems:    Symptom Y/N Comments  Symptom Y/N Comments   Fever/Chills n   Chest Pain n    Poor Appetite    Edema y  improving   Cough    Abdominal Pain     Sputum    Joint Pain     SOB/CARMONA y  improving  Pruritis/Rash     Nausea/vomit    Other     Diarrhea         Constipation           Could NOT obtain due to:      Objective:          Physical Exam:    Last 24hrs VS reviewed since prior progress note. Most recent are:    Visit Vitals  BP (!) 142/95 (BP 1 Location: Right upper arm, BP Patient Position: At rest)   Pulse 64   Temp 98.2 °F (36.8 °C)   Resp 18   Ht 6' (1.829 m)   Wt 72.9 kg (160 lb 11.5 oz)   SpO2 99%   BMI 21.80 kg/m²       Intake/Output Summary (Last 24 hours) at 1/30/2022 1141  Last data filed at 1/30/2022 0511  Gross per 24 hour   Intake --   Output 700 ml   Net -700 ml        General Appearance: Well developed, well nourished, alert & oriented x 3,    no acute distress. Ears/Nose/Mouth/Throat: Hearing grossly normal.  Neck: Supple. Chest: Lungs clear to auscultation bilaterally. Cardiovascular: Regular rate and rhythm, S1,S2 normal, no murmur. Abdomen: Soft, non-tender, bowel sounds are active. Extremities: Patient has cellulitis of the lower extremities  Skin: Warm and dry. []         Post-cath site without hematoma, bruit, tenderness, or thrill. Distal pulses intact.     PMH/SH reviewed - no change compared to H&P    Data Review    Telemetry: normal sinus rhythm , patient has some PVCs, and episode of wide-complex tachycardia possible ventricular tachycardia. EKG:   Patient had EKG that is very concerning for ventricular tachycardia. Lab Data Personally Reviewed:    No results for input(s): WBC, HGB, HCT, PLT, HGBEXT, HCTEXT, PLTEXT, HGBEXT, HCTEXT, PLTEXT in the last 72 hours. No results for input(s): INR, PTP, APTT, INREXT, INREXT in the last 72 hours. No results for input(s): NA, K, CL, CO2, BUN, CREA, GLU, CA, MG in the last 72 hours. No results for input(s): CPK, CKNDX, TROIQ in the last 72 hours. No lab exists for component: CPKMB  No results found for: CHOL, CHOLX, CHLST, CHOLV, HDL, HDLP, LDL, LDLC, DLDLP, TGLX, TRIGL, TRIGP, CHHD, CHHDX    No results for input(s): AP, TBIL, TP, ALB, GLOB, GGT, AML, LPSE in the last 72 hours. No lab exists for component: SGOT, GPT, AMYP, HLPSE  No results for input(s): PH, PCO2, PO2 in the last 72 hours.     Medications Personally Reviewed:    Current Facility-Administered Medications   Medication Dose Route Frequency    furosemide (LASIX) tablet 80 mg  80 mg Oral DAILY    heparin (porcine) 1,000 unit/mL injection 3,600 Units  3,600 Units Hemodialysis DIALYSIS PRN    amiodarone (CORDARONE) tablet 200 mg  200 mg Oral DAILY    iron sucrose (VENOFER) injection 200 mg  200 mg IntraVENous DIALYSIS MON, WED & FRI    epoetin valente-epbx (RETACRIT) injection 10,000 Units  10,000 Units IntraVENous DIALYSIS MON, WED & FRI    oxyCODONE IR (ROXICODONE) tablet 5 mg  5 mg Oral Q8H PRN    sodium chloride (NS) flush 5-40 mL  5-40 mL IntraVENous Q8H    sodium chloride (NS) flush 5-40 mL  5-40 mL IntraVENous PRN    ferrous sulfate tablet 325 mg  1 Tablet Oral BID WITH MEALS    heparin (porcine) 1,000 unit/mL injection 4,000 Units  4,000 Units IntraVENous PRN    Or    heparin (porcine) 1,000 unit/mL injection 2,000 Units  2,000 Units IntraVENous PRN    metoprolol succinate (TOPROL-XL) XL tablet 50 mg  50 mg Oral DAILY    nitroglycerin (NITROBID) 2 % ointment 0.5 Inch  0.5 Inch Topical Q6H    aspirin chewable tablet 81 mg  81 mg Oral DAILY           Problem List:   Patient has a acute on chronic renal failure and had a dialysis and is clinically better. Decompensated heart failure and ejection fraction is depressed and patient has a at least moderate possibly severe pulmonary hypertension. History of paroxysmal atrial fibrillation. Recent DVT of her left axillary vein. Wide-complex tachycardia probably ventricular tachycardia. Catheterization did not show evidence of coronary artery disease and ejection fraction was about 20 to 25%. Patient is getting restless and wants to leave. Cellulitis of the lower extremities. Patient refused peripheral arterial angiogram as advised by the vascular surgeon. 1.      Assessment/Plan:   I will continue dialysis as per recommendations of the nephrologist.  Patient is offered ICD by Dr. Gisela Cain however patient does not want it. Vascular surgery note reviewed and appreciated and will follow the recommendations. We will continue amiodarone and present care. Discontinue heparin and start on small dose of Eliquis because patient has a history of paroxysmal atrial fibrillation. Patient will be going for cardiac rehab. We will follow infectious disease recommendations. Thank you. 1.          []       High complexity decision making was performed in this patient at high risk for decompensation with multiple organ involvement.     Michelle Freeman MD

## 2022-01-30 NOTE — PROGRESS NOTES
[facility]     Renal Daily Progress Note:     Admission Date: 2022   Subjective: Mental status is good. He  looks better, not tachypneic. Review of Systems  Pertinent items are noted in HPI. Objective:     Visit Vitals  /89 (BP 1 Location: Right upper arm, BP Patient Position: At rest)   Pulse 60   Temp 99.2 °F (37.3 °C)   Resp 16   Ht 6' (1.829 m)   Wt 72.9 kg (160 lb 11.5 oz)   SpO2 97%   BMI 21.80 kg/m²     Temp (24hrs), Av.7 °F (37.1 °C), Min:98.2 °F (36.8 °C), Max:99.2 °F (37.3 °C)        Intake/Output Summary (Last 24 hours) at 2022 1232  Last data filed at 2022 0511  Gross per 24 hour   Intake --   Output 700 ml   Net -700 ml     Current Facility-Administered Medications   Medication Dose Route Frequency    furosemide (LASIX) tablet 80 mg  80 mg Oral DAILY    heparin (porcine) 1,000 unit/mL injection 3,600 Units  3,600 Units Hemodialysis DIALYSIS PRN    amiodarone (CORDARONE) tablet 200 mg  200 mg Oral DAILY    iron sucrose (VENOFER) injection 200 mg  200 mg IntraVENous DIALYSIS MON, WED & FRI    epoetin valente-epbx (RETACRIT) injection 10,000 Units  10,000 Units IntraVENous DIALYSIS MON, WED & FRI    oxyCODONE IR (ROXICODONE) tablet 5 mg  5 mg Oral Q8H PRN    sodium chloride (NS) flush 5-40 mL  5-40 mL IntraVENous Q8H    sodium chloride (NS) flush 5-40 mL  5-40 mL IntraVENous PRN    ferrous sulfate tablet 325 mg  1 Tablet Oral BID WITH MEALS    heparin (porcine) 1,000 unit/mL injection 4,000 Units  4,000 Units IntraVENous PRN    Or    heparin (porcine) 1,000 unit/mL injection 2,000 Units  2,000 Units IntraVENous PRN    metoprolol succinate (TOPROL-XL) XL tablet 50 mg  50 mg Oral DAILY    nitroglycerin (NITROBID) 2 % ointment 0.5 Inch  0.5 Inch Topical Q6H    aspirin chewable tablet 81 mg  81 mg Oral DAILY       Physical Exam:General appearance: alert, cooperative, no distress, appears older than stated age.    Head: Normocephalic, without obvious abnormality, atraumatic  Lungs: clear to auscultation bilaterally  Heart: regular rate and rhythm, no rub  Abdomen: soft, non-tender. Bowel sounds normal. No masses,  no organomegaly  Extremities: venous stasis dermatitis noted, edema: decreased lower extremity edema and dependent thigh edema  Skin: Left lower leg with posterior ulcer inferior to calf, currently wrapped  Neurologic: Grossly normal    Data Review:     LABS:  No results for input(s): NA, K, CL, CO2, BUN, CREA, CA, ALB, PHOS, MG in the last 72 hours. Iron saturation 13%    No results for input(s): WBC, HGB, HCT, PLT, HGBEXT, HCTEXT, PLTEXT, HGBEXT, HCTEXT, PLTEXT in the last 72 hours. No results for input(s): HÉCTOR, KU, CLU, CREAU in the last 72 hours. No lab exists for component: PROU     Cardiac cath Jan 13, 2022  Underwent cardiac cath today:  Indication: Severe left ventricular systolic dysfunction and patient had a V. Tach.     Left main coronary artery is a very large caliber vessel and has no disease. LAD artery is a very large caliber vessel and proximal mid and distal segment has no stenosis and diagonal branches has no disease. Ramus intermedius is a medium caliber vessel without disease. Circumflex artery is a large-caliber vessel and proximal mid and distal segment and AV groove segment has no stenosis. Large obtuse marginal branch and posterolateral branch has no stenosis. Right coronary artery is a large-caliber vessel and does junction of proximal and mid segment there was a concern of stenosis and that therefore it was interrogated by IFR which turned out to be 0.95 and was not deemed critical enough to do intervention.     Left ventriculography is performed in the right anterior oblique view and ejection fraction is about 20 to 25% and distal anterior wall apex and distal inferior wall is near akinetic.   This may be possible presentation of Takotsubo syndrome.     Plan: Patient will receive defibrillation      Chest x-ray January 8, 2022:  IMPRESSION     Interval placement of a right supraclavicular approach dialysis catheter with  distal tip in the proximal right atrium.     Cardiomegaly with pulmonary vascular congestion and pulmonary edema.      Stable appearing basilar right lung pneumonia. Small right pleural effusion. Assessment:   Renal Specific Problems  Chronic kidney disease stage VI, started on HD.   Hypoalbumin  SVT- recurrent  Volume overload- resolved  Metabolic acidosis- resolved  Hyperkalemia- resolved  Anemia with renal failure and iron deficient  Leukocytosis corrected with declining procalcitonin  Probable right lower lobe pneumonia  Infected  leg ulcers  Venous stasis dermatitis lower extremity          Plan:     Obtain/ Order: labs/cultures/radiology/procedures:      Therapeutic:    Angiogram with possible angioplasty of lower leg vessels- refused, will reattempt later if wound healing does not occur     HD Monday  Back on Lasix    Epogen with dialysis  Wound care - regarding left leg ulcer    Case management to arrange rehab and outpatient dialysis- d/w CM today

## 2022-01-30 NOTE — PROGRESS NOTES
RN performed wound care on left lower calf. Serosanginous drainage noted on old dressing. Wounds have slough with drainage, RN will request wound care to assess for worsening/possible infection. Cleansed with wound wash solution, covered wounds with nonstick dressing, ABD pads, and wrapped lightly with kurlex. Patient requested not to wrap with ACE bandage.

## 2022-01-31 LAB
BASOPHILS # BLD: 0.1 K/UL (ref 0–0.1)
BASOPHILS NFR BLD: 1 % (ref 0–1)
CRP SERPL-MCNC: 4.56 MG/DL (ref 0–0.6)
DIFFERENTIAL METHOD BLD: ABNORMAL
EOSINOPHIL # BLD: 0.4 K/UL (ref 0–0.4)
EOSINOPHIL NFR BLD: 7 % (ref 0–7)
ERYTHROCYTE [DISTWIDTH] IN BLOOD BY AUTOMATED COUNT: 15.4 % (ref 11.5–14.5)
HCT VFR BLD AUTO: 26.5 % (ref 36.6–50.3)
HGB BLD-MCNC: 7.7 G/DL (ref 12.1–17)
IMM GRANULOCYTES # BLD AUTO: 0.1 K/UL (ref 0–0.04)
IMM GRANULOCYTES NFR BLD AUTO: 2 % (ref 0–0.5)
LYMPHOCYTES # BLD: 0.7 K/UL (ref 0.8–3.5)
LYMPHOCYTES NFR BLD: 11 % (ref 12–49)
MCH RBC QN AUTO: 29.7 PG (ref 26–34)
MCHC RBC AUTO-ENTMCNC: 29.1 G/DL (ref 30–36.5)
MCV RBC AUTO: 102.3 FL (ref 80–99)
MONOCYTES # BLD: 0.8 K/UL (ref 0–1)
MONOCYTES NFR BLD: 13 % (ref 5–13)
NEUTS SEG # BLD: 3.9 K/UL (ref 1.8–8)
NEUTS SEG NFR BLD: 66 % (ref 32–75)
NRBC # BLD: 0 K/UL (ref 0–0.01)
NRBC BLD-RTO: 0 PER 100 WBC
PLATELET # BLD AUTO: 332 K/UL (ref 150–400)
PMV BLD AUTO: 9.5 FL (ref 8.9–12.9)
PROCALCITONIN SERPL-MCNC: 0.59 NG/ML
RBC # BLD AUTO: 2.59 M/UL (ref 4.1–5.7)
WBC # BLD AUTO: 6 K/UL (ref 4.1–11.1)

## 2022-01-31 PROCEDURE — 74011250636 HC RX REV CODE- 250/636: Performed by: INTERNAL MEDICINE

## 2022-01-31 PROCEDURE — 5A1D70Z PERFORMANCE OF URINARY FILTRATION, INTERMITTENT, LESS THAN 6 HOURS PER DAY: ICD-10-PCS | Performed by: INTERNAL MEDICINE

## 2022-01-31 PROCEDURE — 85025 COMPLETE CBC W/AUTO DIFF WBC: CPT

## 2022-01-31 PROCEDURE — 36415 COLL VENOUS BLD VENIPUNCTURE: CPT

## 2022-01-31 PROCEDURE — 86140 C-REACTIVE PROTEIN: CPT

## 2022-01-31 PROCEDURE — 90935 HEMODIALYSIS ONE EVALUATION: CPT

## 2022-01-31 PROCEDURE — 74011250637 HC RX REV CODE- 250/637: Performed by: INTERNAL MEDICINE

## 2022-01-31 PROCEDURE — 74011000250 HC RX REV CODE- 250: Performed by: INTERNAL MEDICINE

## 2022-01-31 PROCEDURE — 99232 SBSQ HOSP IP/OBS MODERATE 35: CPT | Performed by: INTERNAL MEDICINE

## 2022-01-31 PROCEDURE — 65270000029 HC RM PRIVATE

## 2022-01-31 PROCEDURE — 84145 PROCALCITONIN (PCT): CPT

## 2022-01-31 RX ORDER — HEPARIN SODIUM 1000 [USP'U]/ML
INJECTION, SOLUTION INTRAVENOUS; SUBCUTANEOUS
Status: DISPENSED
Start: 2022-01-31 | End: 2022-01-31

## 2022-01-31 RX ADMIN — METOPROLOL SUCCINATE 50 MG: 25 TABLET, EXTENDED RELEASE ORAL at 09:00

## 2022-01-31 RX ADMIN — ASPIRIN 81 MG CHEWABLE TABLET 81 MG: 81 TABLET CHEWABLE at 11:59

## 2022-01-31 RX ADMIN — AMIODARONE HYDROCHLORIDE 200 MG: 200 TABLET ORAL at 11:59

## 2022-01-31 RX ADMIN — HEPARIN SODIUM 3600 UNITS: 1000 INJECTION, SOLUTION INTRAVENOUS; SUBCUTANEOUS at 10:40

## 2022-01-31 RX ADMIN — SODIUM CHLORIDE, PRESERVATIVE FREE 10 ML: 5 INJECTION INTRAVENOUS at 05:13

## 2022-01-31 RX ADMIN — FERROUS SULFATE TAB 325 MG (65 MG ELEMENTAL FE) 325 MG: 325 (65 FE) TAB at 17:30

## 2022-01-31 RX ADMIN — NITROGLYCERIN 0.5 INCH: 20 OINTMENT TOPICAL at 12:00

## 2022-01-31 RX ADMIN — FUROSEMIDE 80 MG: 40 TABLET ORAL at 11:59

## 2022-01-31 RX ADMIN — FERROUS SULFATE TAB 325 MG (65 MG ELEMENTAL FE) 325 MG: 325 (65 FE) TAB at 12:00

## 2022-01-31 RX ADMIN — NITROGLYCERIN 0.5 INCH: 20 OINTMENT TOPICAL at 17:30

## 2022-01-31 RX ADMIN — EPOETIN ALFA-EPBX 10000 UNITS: 10000 INJECTION, SOLUTION INTRAVENOUS; SUBCUTANEOUS at 10:40

## 2022-01-31 RX ADMIN — SODIUM CHLORIDE, PRESERVATIVE FREE 10 ML: 5 INJECTION INTRAVENOUS at 17:36

## 2022-01-31 RX ADMIN — IRON SUCROSE 200 MG: 20 INJECTION, SOLUTION INTRAVENOUS at 11:13

## 2022-01-31 NOTE — PROGRESS NOTES
Progress Note    Gretel Alejandro MD             Daily Progress Note: 1/31/2022      Subjective: The patient is seen for follow  up. Patient feels weak today. Patient just had dialysis. He wants to be here. Since patient needs oxygen at home on exertion as well as at night.   Discussed with RN taking care of the patient in detail regarding patient's condition  Problem List:  Problem List as of 1/31/2022 Never Reviewed          Codes Class Noted - Resolved    Renal failure ICD-10-CM: N19  ICD-9-CM: 624  1/8/2022 - Present        Hyperkalemia ICD-10-CM: E87.5  ICD-9-CM: 276.7  1/8/2022 - Present        Pulmonary edema ICD-10-CM: J81.1  ICD-9-CM: 555  7/13/2021 - Present        GI bleed ICD-10-CM: K92.2  ICD-9-CM: 578.9  1/5/2021 - Present              Medications reviewed  Current Facility-Administered Medications   Medication Dose Route Frequency    furosemide (LASIX) tablet 80 mg  80 mg Oral DAILY    heparin (porcine) 1,000 unit/mL injection 3,600 Units  3,600 Units Hemodialysis DIALYSIS PRN    amiodarone (CORDARONE) tablet 200 mg  200 mg Oral DAILY    iron sucrose (VENOFER) injection 200 mg  200 mg IntraVENous DIALYSIS MON, WED & FRI    epoetin valente-epbx (RETACRIT) injection 10,000 Units  10,000 Units IntraVENous DIALYSIS MON, WED & FRI    oxyCODONE IR (ROXICODONE) tablet 5 mg  5 mg Oral Q8H PRN    sodium chloride (NS) flush 5-40 mL  5-40 mL IntraVENous Q8H    sodium chloride (NS) flush 5-40 mL  5-40 mL IntraVENous PRN    ferrous sulfate tablet 325 mg  1 Tablet Oral BID WITH MEALS    heparin (porcine) 1,000 unit/mL injection 4,000 Units  4,000 Units IntraVENous PRN    Or    heparin (porcine) 1,000 unit/mL injection 2,000 Units  2,000 Units IntraVENous PRN    metoprolol succinate (TOPROL-XL) XL tablet 50 mg  50 mg Oral DAILY    nitroglycerin (NITROBID) 2 % ointment 0.5 Inch  0.5 Inch Topical Q6H    aspirin chewable tablet 81 mg  81 mg Oral DAILY       Review of Systems:   A comprehensive review of systems was negative except for that written in the HPI. Objective:   Physical Exam:     Visit Vitals  /69 (BP Patient Position: At rest;Semi fowlers)   Pulse 62   Temp 98.3 °F (36.8 °C)   Resp 18   Ht 6' (1.829 m)   Wt 72.9 kg (160 lb 11.5 oz)   SpO2 98%   BMI 21.80 kg/m²    O2 Flow Rate (L/min): 2 l/min O2 Device: None (Room air)    Temp (24hrs), Av.3 °F (36.8 °C), Min:98.1 °F (36.7 °C), Max:98.7 °F (37.1 °C)     07 - 1900  In: -   Out: 3000    1901 -  07  In: -   Out: 700 [Urine:700]    General:  Alert, cooperative, no distress, appears stated age. But looks tired   Lungs:   Clear to auscultation bilaterally. Chest wall:  No tenderness or deformity. Heart:  Regular rate and rhythm, S1, S2 normal, no murmur, click, rub or gallop. Abdomen:   Soft, non-tender. Bowel sounds normal. No masses,  No organomegaly. Extremities: Extremities normal, atraumatic, no cyanosis or edema. Pulses: 2+ and symmetric all extremities. Skin: Skin color, texture, turgor normal. No rashes or lesions   Neurologic: CNII-XII intact. No gross sensory or motor deficits     Data Review:       Recent Days:  Recent Labs     22  0705   WBC 6.0   HGB 7.7*   HCT 26.5*        No results for input(s): NA, K, CL, CO2, GLU, BUN, CREA, CA, MG, PHOS, ALB, TBIL, TBILI, ALT, INR, INREXT in the last 72 hours. No lab exists for component: SGOT  No results for input(s): PH, PCO2, PO2, HCO3, FIO2 in the last 72 hours. Results     ** No results found for the last 336 hours.  **         24 Hour Results:  Recent Results (from the past 24 hour(s))   C REACTIVE PROTEIN, QT    Collection Time: 22  7:05 AM   Result Value Ref Range    C-Reactive protein 4.56 (H) 0.00 - 0.60 mg/dL   PROCALCITONIN    Collection Time: 22  7:05 AM   Result Value Ref Range    Procalcitonin 0.59 (H) 0 ng/mL   CBC WITH AUTOMATED DIFF    Collection Time: 22  7:05 AM   Result Value Ref Range    WBC 6.0 4.1 - 11.1 K/uL    RBC 2.59 (L) 4.10 - 5.70 M/uL    HGB 7.7 (L) 12.1 - 17.0 g/dL    HCT 26.5 (L) 36.6 - 50.3 %    .3 (H) 80.0 - 99.0 FL    MCH 29.7 26.0 - 34.0 PG    MCHC 29.1 (L) 30.0 - 36.5 g/dL    RDW 15.4 (H) 11.5 - 14.5 %    PLATELET 881 946 - 878 K/uL    MPV 9.5 8.9 - 12.9 FL    NRBC 0.0 0.0  WBC    ABSOLUTE NRBC 0.00 0.00 - 0.01 K/uL    NEUTROPHILS 66 32 - 75 %    LYMPHOCYTES 11 (L) 12 - 49 %    MONOCYTES 13 5 - 13 %    EOSINOPHILS 7 0 - 7 %    BASOPHILS 1 0 - 1 %    IMMATURE GRANULOCYTES 2 (H) 0 - 0.5 %    ABS. NEUTROPHILS 3.9 1.8 - 8.0 K/UL    ABS. LYMPHOCYTES 0.7 (L) 0.8 - 3.5 K/UL    ABS. MONOCYTES 0.8 0.0 - 1.0 K/UL    ABS. EOSINOPHILS 0.4 0.0 - 0.4 K/UL    ABS. BASOPHILS 0.1 0.0 - 0.1 K/UL    ABS. IMM. GRANS. 0.1 (H) 0.00 - 0.04 K/UL    DF AUTOMATED         IR INSERT TUNL CVC W/O PORT OVER 5 YR   Final Result   Successful conversion of a right internal jugular temporary dialysis catheter to   a 23 cm PermCath. The catheter is functioning and is ready for use. IR FLUORO GUIDE PLC CVAD   Final Result   Successful conversion of a right internal jugular temporary dialysis catheter to   a 23 cm PermCath. The catheter is functioning and is ready for use. DUPLEX LOW EXT ARTERIES WITH JAVON   Final Result      XR CHEST PORT   Final Result      Interval placement of a right supraclavicular approach dialysis catheter with   distal tip in the proximal right atrium. Cardiomegaly with pulmonary vascular congestion and pulmonary edema. Stable appearing basilar right lung pneumonia. Small right pleural effusion. IR INSERT NON TUNL CVC OVER 5 YRS   Final Result      Technically successful insertion of non-tunneled Trialysis catheter with US   guidance. Plan:       Post catheter placement chest xray confirmed appropriate position of the   catheter.  The catheter is appropriate for immediate use.   _______________________________________________________________ PROCEDURE SUMMARY:   - Venous access with ultrasound guidance   - Non-tunneled central venous catheter insertion      PROCEDURE DETAILS:      Pre-procedure   Relevant imaging review: None    Informed consent for the procedure was obtained and time-out was performed prior   to the procedure. Prophylactic antibiotic administered: Not administered   Preparation: The site was prepared and draped using all elements of maximal   sterile barrier technique including sterile gloves, sterile gown, cap, mask,   large sterile sheet, sterile ultrasound probe cover, sterile ultrasound gel,   hand hygiene and cutaneous antisepsis with 2% chlorhexidine. Medical reason for site preparation exception: Not applicable      Anesthesia/sedation   Level of anesthesia: No sedation   Anesthesia administered by: Not applicable   Duration of anesthesia/sedation: 0 minutes. Access   The vessel was sonographically evaluated and judged to be patent and appropriate   for access and a permanent image was stored. Three mLs of 1% Lidocaine was   administered to the access site. Real time ultrasound was used to visualize   needle entry into the vessel. Vein accessed: Right internal jugular vein   Access technique: 4F micropuncture set      Catheter placement   The access site was dilated and the catheter was placed into the vein over a   wire and all guidewires were removed in their entirety. Catheter ports were   aspirated and flushed. Catheter tip location was verified by portable X-ray. Catheter size:13 [place]   Catheter length: 20 cm   Catheter tip position: Proximal right atrium   Catheter flush: Heparin (100 units/mL)      Closure   The catheter was secured. A sterile dressing was applied.    Catheter securement technique: Non-absorbable suture      Additional Medications   None      Additional Details   Estimated blood loss:  Less than 1 mL   Additional description of procedure: None   Additional findings: None   Additional equipment: None   Specimens removed: None                     IR US GUIDED VASCULAR ACCESS   Final Result      Technically successful insertion of non-tunneled Trialysis catheter with US   guidance. Plan:       Post catheter placement chest xray confirmed appropriate position of the   catheter. The catheter is appropriate for immediate use.   _______________________________________________________________      PROCEDURE SUMMARY:   - Venous access with ultrasound guidance   - Non-tunneled central venous catheter insertion      PROCEDURE DETAILS:      Pre-procedure   Relevant imaging review: None    Informed consent for the procedure was obtained and time-out was performed prior   to the procedure. Prophylactic antibiotic administered: Not administered   Preparation: The site was prepared and draped using all elements of maximal   sterile barrier technique including sterile gloves, sterile gown, cap, mask,   large sterile sheet, sterile ultrasound probe cover, sterile ultrasound gel,   hand hygiene and cutaneous antisepsis with 2% chlorhexidine. Medical reason for site preparation exception: Not applicable      Anesthesia/sedation   Level of anesthesia: No sedation   Anesthesia administered by: Not applicable   Duration of anesthesia/sedation: 0 minutes. Access   The vessel was sonographically evaluated and judged to be patent and appropriate   for access and a permanent image was stored. Three mLs of 1% Lidocaine was   administered to the access site. Real time ultrasound was used to visualize   needle entry into the vessel. Vein accessed: Right internal jugular vein   Access technique: 4F micropuncture set      Catheter placement   The access site was dilated and the catheter was placed into the vein over a   wire and all guidewires were removed in their entirety. Catheter ports were   aspirated and flushed. Catheter tip location was verified by portable X-ray.    Catheter size:13 [facility] Catheter length: 20 cm   Catheter tip position: Proximal right atrium   Catheter flush: Heparin (100 units/mL)      Closure   The catheter was secured. A sterile dressing was applied. Catheter securement technique: Non-absorbable suture      Additional Medications   None      Additional Details   Estimated blood loss:  Less than 1 mL   Additional description of procedure: None   Additional findings: None   Additional equipment: None   Specimens removed: None                     XR CHEST PORT   Final Result   Airspace opacity and effusion at the right lung base and in the   acute setting would suggest pneumonia and/or aspiration however the patient also   appears to have baseline/background vascular congestion and/or pulmonary   arterial hypertension, noting cardiomegaly and/or pericardial effusion      IR INSERT NON TUNL CVC OVER 5 YRS    (Results Pending)        Assessment: CHF  Ventricular tachycardia  Ejection fraction 25% patient refused for ICD  Peripheral arterial disease-patient has refused for arteriogram  Hypertension  Chronic renal failure on dialysis now        Plan: As per RN yesterday I was told that patient requires to be in hospital for 2 weeks before he goes to inpatient rehab after getting the vaccination  I will try to make it possible if he can go home with home health care as well as physical therapy and Occupational Therapy. Reason he feels weak not comfortable in going home        Care Plan discussed with: Patient/Family and Nurse    Total time spent with patient: 30 minutes.     Nieves Naranjo MD

## 2022-01-31 NOTE — DIALYSIS
Pt jared 3.5 hour hemodialysis well.  3 liters net fluid removed. Epogen 95225 units given IV.   Venofer 200mg given IV

## 2022-01-31 NOTE — PROGRESS NOTES
Progress Note    Patient: Josefina York MRN: [id number]  SSN: [id number]    YOB: 1955  Age: 78 y.o. Sex: male      Admit Date: 1/7/2022    LOS: 23 days     Subjective:   Patient followed for sepsis with cellulitis both calves and aspiration pneumonia. Leg wound culture grew multiple organisms as shown below. Afebrile with normal WBC, decreasing procal and CRP, now off antibiotics with clinical resolution of leg infections. Patient lying in bed asleep but easily arouseable with no new complaints. Objective:     Vitals:    01/31/22 0830 01/31/22 0900 01/31/22 0930 01/31/22 1000   BP: 131/80 130/84 130/77 (P) 131/81   Pulse: (!) 55 (!) 54 (!) 53 (!) (P) 54   Resp:       Temp:       TempSrc:       SpO2:       Weight:       Height:            Intake and Output:  Current Shift: No intake/output data recorded. Last three shifts: 01/29 1901 - 01/31 0700  In: -   Out: 700 [Urine:700]    Physical Exam:    Vitals and nursing note reviewed. Constitutional:       Appearance: He is ill-appearing. Genitourinary:     Comments: External urinary catheter  Musculoskeletal:      Right lower leg edema resolved; 1.5 x3 cm eschar posteriorly     Left lower leg with mild edma; left calf with bulky gauze dressing  Neurological: nonfocal, mild confusion  Psychiatric:         Behavior: Behavior normal.     Lab/Data Review:     WBC 6,000     CRP 4.56 <4.85 <4.91 <2.80 <4.66 <2.63 <2.53 < 5.30 <6.32 <8.19 <12.10 <12.10  Procal 0.59  <0. .55 <0.60 <0.72 < 0.70 <0.84 < 2.39 <3.12 < 4.41 <4.39 <5.16    MRSA nasal PCR negative    Wound cultures leg (1/8)  Staphylococcus aureus (MSSA),  Alcaligenes faecalis resistant to Zosyn, Klebsiella pneumoniae      Assessment:     Active Problems:    Renal failure (1/8/2022)      Hyperkalemia (1/8/2022)    1. Cellulitis both calves, secondary to probable S.  Aureus (MSSA), Alcaligenes faecalis resistant to Zosyn, Klebsiella pneumoniae, status post 14 days IV Antibiotics, including Meropenem, clinically resolved. 2. Aspiration pneumonia, RLL, status antibiotics as note above  3. Sepsis with leukocytosis and elevated CRP/procal, resolving    Comment:  CRP decreasing off antibiotics. Plan:   1. No further antibiotic recommendations at this time   2.   Cleared for discharge from ID standpoint       Signed By: Augie Easton MD     January 31, 2022

## 2022-01-31 NOTE — PROGRESS NOTES
1500 pm    CM called [phone], [facility] and spoke to [place] [facility] regarding bed availability. Marylene instructed  to call back on Wednesday as they currently have no unvaccinated beds. ISAMAR called [facility] and spoke with Linn. CM told to please call back on Wednesday as they are planning to do something different with bed situation and may have a bed available.

## 2022-01-31 NOTE — PROGRESS NOTES
Spo2 on RA at rest 91%, ambulated patient on RA and Spo2 down to 78%. Placed patient on 2lpm nc while ambulating and spo2 93%.

## 2022-01-31 NOTE — PROGRESS NOTES
Progress Note      1/31/2022 12:19 PM  NAME: Eliezer Renae   MRN:  [id number]   Admit Diagnosis: Renal failure [N19]  Hyperkalemia [E87.5]      Subjective:   Chart reviewed. Patient is on dialysis. Symptoms of shortness of breath has improved. With the patient and with the nurse. Cardiac catheterization findings discussed with the patient and also discussed with the electrophysiologist.  Vascular surgeon's note reviewed and patient refused to have peripheral angiogram.  Patient feels good. Review of Systems:    Symptom Y/N Comments  Symptom Y/N Comments   Fever/Chills n   Chest Pain n    Poor Appetite    Edema y  improving   Cough    Abdominal Pain     Sputum    Joint Pain     SOB/CARMONA y  improving  Pruritis/Rash     Nausea/vomit    Other     Diarrhea         Constipation           Could NOT obtain due to:      Objective:          Physical Exam:    Last 24hrs VS reviewed since prior progress note. Most recent are:    Visit Vitals  /69 (BP Patient Position: At rest;Semi fowlers)   Pulse 62   Temp 98.3 °F (36.8 °C)   Resp 18   Ht 6' (1.829 m)   Wt 72.9 kg (160 lb 11.5 oz)   SpO2 98%   BMI 21.80 kg/m²       Intake/Output Summary (Last 24 hours) at 1/31/2022 1225  Last data filed at 1/31/2022 1100  Gross per 24 hour   Intake --   Output 3000 ml   Net -3000 ml        General Appearance: Well developed, well nourished, alert & oriented x 3,    no acute distress. Ears/Nose/Mouth/Throat: Hearing grossly normal.  Neck: Supple. Chest: Lungs clear to auscultation bilaterally. Cardiovascular: Regular rate and rhythm, S1,S2 normal, no murmur. Abdomen: Soft, non-tender, bowel sounds are active. Extremities: Patient has cellulitis of the lower extremities  Skin: Warm and dry. []         Post-cath site without hematoma, bruit, tenderness, or thrill. Distal pulses intact.     PMH/SH reviewed - no change compared to H&P    Data Review    Telemetry: normal sinus rhythm , patient has some PVCs, and episode of wide-complex tachycardia possible ventricular tachycardia. EKG:   Patient had EKG that is very concerning for ventricular tachycardia. Lab Data Personally Reviewed:    Recent Labs     01/31/22  0705   WBC 6.0   HGB 7.7*   HCT 26.5*        No results for input(s): INR, PTP, APTT, INREXT, INREXT in the last 72 hours. No results for input(s): NA, K, CL, CO2, BUN, CREA, GLU, CA, MG in the last 72 hours. No results for input(s): CPK, CKNDX, TROIQ in the last 72 hours. No lab exists for component: CPKMB  No results found for: CHOL, CHOLX, CHLST, CHOLV, HDL, HDLP, LDL, LDLC, DLDLP, TGLX, TRIGL, TRIGP, CHHD, CHHDX    No results for input(s): AP, TBIL, TP, ALB, GLOB, GGT, AML, LPSE in the last 72 hours. No lab exists for component: SGOT, GPT, AMYP, HLPSE  No results for input(s): PH, PCO2, PO2 in the last 72 hours.     Medications Personally Reviewed:    Current Facility-Administered Medications   Medication Dose Route Frequency    furosemide (LASIX) tablet 80 mg  80 mg Oral DAILY    heparin (porcine) 1,000 unit/mL injection 3,600 Units  3,600 Units Hemodialysis DIALYSIS PRN    amiodarone (CORDARONE) tablet 200 mg  200 mg Oral DAILY    iron sucrose (VENOFER) injection 200 mg  200 mg IntraVENous DIALYSIS MON, WED & FRI    epoetin valente-epbx (RETACRIT) injection 10,000 Units  10,000 Units IntraVENous DIALYSIS MON, WED & FRI    oxyCODONE IR (ROXICODONE) tablet 5 mg  5 mg Oral Q8H PRN    sodium chloride (NS) flush 5-40 mL  5-40 mL IntraVENous Q8H    sodium chloride (NS) flush 5-40 mL  5-40 mL IntraVENous PRN    ferrous sulfate tablet 325 mg  1 Tablet Oral BID WITH MEALS    heparin (porcine) 1,000 unit/mL injection 4,000 Units  4,000 Units IntraVENous PRN    Or    heparin (porcine) 1,000 unit/mL injection 2,000 Units  2,000 Units IntraVENous PRN    metoprolol succinate (TOPROL-XL) XL tablet 50 mg  50 mg Oral DAILY    nitroglycerin (NITROBID) 2 % ointment 0.5 Inch  0.5 Inch Topical Q6H  aspirin chewable tablet 81 mg  81 mg Oral DAILY           Problem List:   Patient has a acute on chronic renal failure and had a dialysis and is clinically better. Decompensated heart failure and ejection fraction is depressed and patient has a at least moderate possibly severe pulmonary hypertension. History of paroxysmal atrial fibrillation. Recent DVT of her left axillary vein. Wide-complex tachycardia probably ventricular tachycardia. Catheterization did not show evidence of coronary artery disease and ejection fraction was about 20 to 25%. Patient is getting restless and wants to leave. Cellulitis of the lower extremities. Patient refused peripheral arterial angiogram as advised by the vascular surgeon. 1.      Assessment/Plan:   I will continue dialysis as per recommendations of the nephrologist.  Patient is offered ICD by Dr. Olya Hastings however patient does not want it. Vascular surgery note reviewed and appreciated and will follow the recommendations. We will continue amiodarone and present care. Discontinue heparin and start on small dose of Eliquis because patient has a history of paroxysmal atrial fibrillation. Patient will be going for cardiac rehab. We will follow infectious disease recommendations. Thank you. 1.          []       High complexity decision making was performed in this patient at high risk for decompensation with multiple organ involvement.     Ghanshyam Morrison MD

## 2022-01-31 NOTE — PROGRESS NOTES
[facility] [place] [facility]     Renal Daily Progress Note:     Admission Date: 2022   Subjective: Mental status is good. He  looks better, not tachypneic. 2022 --> F/U ESRD     Had HD earlier. There were no reported major difficulties with the HD session. Review of Systems  Pertinent items are noted in HPI. Objective:     Visit Vitals  /69 (BP Patient Position: At rest;Semi fowlers)   Pulse 62   Temp 98.3 °F (36.8 °C)   Resp 18   Ht 6' (1.829 m)   Wt 72.9 kg (160 lb 11.5 oz)   SpO2 98%   BMI 21.80 kg/m²     Temp (24hrs), Av.2 °F (36.8 °C), Min:98.1 °F (36.7 °C), Max:98.3 °F (36.8 °C)        Intake/Output Summary (Last 24 hours) at 2022 1538  Last data filed at 2022 1100  Gross per 24 hour   Intake --   Output 3000 ml   Net -3000 ml     Current Facility-Administered Medications   Medication Dose Route Frequency    furosemide (LASIX) tablet 80 mg  80 mg Oral DAILY    heparin (porcine) 1,000 unit/mL injection 3,600 Units  3,600 Units Hemodialysis DIALYSIS PRN    amiodarone (CORDARONE) tablet 200 mg  200 mg Oral DAILY    iron sucrose (VENOFER) injection 200 mg  200 mg IntraVENous DIALYSIS MON, WED & FRI    epoetin valente-epbx (RETACRIT) injection 10,000 Units  10,000 Units IntraVENous DIALYSIS MON, WED & FRI    oxyCODONE IR (ROXICODONE) tablet 5 mg  5 mg Oral Q8H PRN    sodium chloride (NS) flush 5-40 mL  5-40 mL IntraVENous Q8H    sodium chloride (NS) flush 5-40 mL  5-40 mL IntraVENous PRN    ferrous sulfate tablet 325 mg  1 Tablet Oral BID WITH MEALS    heparin (porcine) 1,000 unit/mL injection 4,000 Units  4,000 Units IntraVENous PRN    Or    heparin (porcine) 1,000 unit/mL injection 2,000 Units  2,000 Units IntraVENous PRN    metoprolol succinate (TOPROL-XL) XL tablet 50 mg  50 mg Oral DAILY    nitroglycerin (NITROBID) 2 % ointment 0.5 Inch  0.5 Inch Topical Q6H    aspirin chewable tablet 81 mg  81 mg Oral DAILY       Physical Exam:    Seen in Room 477.     General appearance: alert, cooperative, no distress. Head: Normocephalic. Chest :  Right tunneled HD catheter    Lungs: clear to auscultation, no wheezes, no rales    Heart: no s3 gallop ,  no pericardial rub    Abdomen: not distended    Neurologic: Responding to questions appropriately    Data Review:     LABS:  No results for input(s): NA, K, CL, CO2, BUN, CREA, CA, ALB, PHOS, MG in the last 72 hours. Iron saturation 13%    Recent Labs     01/31/22  0705   WBC 6.0   HGB 7.7*   HCT 26.5*        No results for input(s): HÉCTOR, KU, CLU, CREAU in the last 72 hours. No lab exists for component: PROU     Cardiac cath Jan 13, 2022  Underwent cardiac cath today:  Indication: Severe left ventricular systolic dysfunction and patient had a V. Tach.     Left main coronary artery is a very large caliber vessel and has no disease. LAD artery is a very large caliber vessel and proximal mid and distal segment has no stenosis and diagonal branches has no disease. Ramus intermedius is a medium caliber vessel without disease. Circumflex artery is a large-caliber vessel and proximal mid and distal segment and AV groove segment has no stenosis. Large obtuse marginal branch and posterolateral branch has no stenosis. Right coronary artery is a large-caliber vessel and does junction of proximal and mid segment there was a concern of stenosis and that therefore it was interrogated by IFR which turned out to be 0.95 and was not deemed critical enough to do intervention.     Left ventriculography is performed in the right anterior oblique view and ejection fraction is about 20 to 25% and distal anterior wall apex and distal inferior wall is near akinetic.   This may be possible presentation of Takotsubo syndrome.     Plan: Patient will receive defibrillation      Chest x-ray January 8, 2022:  IMPRESSION     Interval placement of a right supraclavicular approach dialysis catheter with  distal tip in the proximal right atrium.     Cardiomegaly with pulmonary vascular congestion and pulmonary edema.      Stable appearing basilar right lung pneumonia. Small right pleural effusion. Assessment:   Renal Specific Problems  Chronic kidney disease stage VI, started on HD. Hypoalbumin  SVT- recurrent  Volume overload- resolved  Metabolic acidosis- resolved  Hyperkalemia- resolved  Anemia with renal failure and iron deficient  Leukocytosis corrected with declining procalcitonin  Probable right lower lobe pneumonia  Infected  leg ulcers  Venous stasis dermatitis lower extremity          Plan:     Obtain/ Order: labs/cultures/radiology/procedures:      Therapeutic:      Angiogram with possible angioplasty of lower leg vessels- refused. It will be reattempted later if wound healing does not occur    HD again on Wed (2/2)    Back on Lasix    Epogen with dialysis    Wound care - regarding left leg ulcer    Case management to arrange rehab and outpatient dialysis.       Andi Agudelo MD  [phone]

## 2022-02-01 PROCEDURE — 97110 THERAPEUTIC EXERCISES: CPT

## 2022-02-01 PROCEDURE — 65270000029 HC RM PRIVATE

## 2022-02-01 PROCEDURE — 74011250637 HC RX REV CODE- 250/637: Performed by: INTERNAL MEDICINE

## 2022-02-01 PROCEDURE — 97116 GAIT TRAINING THERAPY: CPT

## 2022-02-01 PROCEDURE — 74011000250 HC RX REV CODE- 250: Performed by: INTERNAL MEDICINE

## 2022-02-01 PROCEDURE — 99232 SBSQ HOSP IP/OBS MODERATE 35: CPT | Performed by: INTERNAL MEDICINE

## 2022-02-01 RX ADMIN — FERROUS SULFATE TAB 325 MG (65 MG ELEMENTAL FE) 325 MG: 325 (65 FE) TAB at 09:11

## 2022-02-01 RX ADMIN — SODIUM CHLORIDE, PRESERVATIVE FREE 10 ML: 5 INJECTION INTRAVENOUS at 21:44

## 2022-02-01 RX ADMIN — SODIUM CHLORIDE, PRESERVATIVE FREE 10 ML: 5 INJECTION INTRAVENOUS at 05:44

## 2022-02-01 RX ADMIN — FUROSEMIDE 80 MG: 40 TABLET ORAL at 09:11

## 2022-02-01 RX ADMIN — SODIUM CHLORIDE, PRESERVATIVE FREE 10 ML: 5 INJECTION INTRAVENOUS at 15:45

## 2022-02-01 RX ADMIN — ASPIRIN 81 MG CHEWABLE TABLET 81 MG: 81 TABLET CHEWABLE at 09:11

## 2022-02-01 RX ADMIN — FERROUS SULFATE TAB 325 MG (65 MG ELEMENTAL FE) 325 MG: 325 (65 FE) TAB at 18:14

## 2022-02-01 RX ADMIN — NITROGLYCERIN 0.5 INCH: 20 OINTMENT TOPICAL at 18:14

## 2022-02-01 NOTE — PROGRESS NOTES
Progress Note    Patient: Erica Coombs MRN: [id number]  SSN: [id number]    YOB: 1955  Age: 78 y.o. Sex: male      Admit Date: 1/7/2022    LOS: 24 days     Subjective:   Patient followed for sepsis with cellulitis both calves and aspiration pneumonia. Leg wound culture grew multiple organisms as shown below. Afebrile with normal WBC, decreasing procal and CRP, now off antibiotics with clinical resolution of leg infections. Patient sitting up in bedside chair with no complaints except for pain again in left calf which he attributes to gauze dressings which are now off. Objective:     Vitals:    02/01/22 0244 02/01/22 0541 02/01/22 0756 02/01/22 0911   BP: 125/86  109/76    Pulse: (!) 58  (!) 59 (!) 56   Resp: 18  16    Temp: 98.8 °F (37.1 °C)  98.6 °F (37 °C)    TempSrc:       SpO2: 99%  100%    Weight:  152 lb 1.9 oz (69 kg)     Height:            Intake and Output:  Current Shift: No intake/output data recorded. Last three shifts: 01/30 1901 - 02/01 0700  In: -   Out: 3000     Physical Exam:    Vitals and nursing note reviewed. Constitutional:       Appearance: He is ill-appearing. Genitourinary:     Comments: External urinary catheter  Musculoskeletal:      Right lower leg edema resolved; 1.5 x3 cm eschar posteriorly     Left lower leg with mild edma; left calf with shallow open wound posteriorly, no drainage  Neurological: nonfocal, mild confusion  Psychiatric:         Behavior: Behavior normal.     Lab/Data Review:     WBC 6,000     CRP 4.56 <4.85 <4.91 <2.80 <4.66 <2.63 <2.53 < 5.30 <6.32 <8.19 <12.10 <12.10  Procal 0.59  <0. .55 <0.60 <0.72 < 0.70 <0.84 < 2.39 <3.12 < 4.41 <4.39 <5.16    MRSA nasal PCR negative    Wound cultures leg (1/8)  Staphylococcus aureus (MSSA),  Alcaligenes faecalis resistant to Zosyn, Klebsiella pneumoniae      Assessment:     Active Problems:    Renal failure (1/8/2022)      Hyperkalemia (1/8/2022)    1.  Cellulitis both calves, secondary to probable S. Aureus (MSSA), Alcaligenes faecalis resistant to Zosyn, Klebsiella pneumoniae, status post 14 days IV Antibiotics, including Meropenem, clinically resolved. 2. Aspiration pneumonia, RLL, status antibiotics as note above  3. Sepsis with leukocytosis and elevated CRP/procal, resolving    Comment:  CRP decreasing off antibiotics. Plan:   1. No further antibiotic recommendations at this time   2. Orders to avoid gauze dressing left calf unless nonstick covering such as Xeroform or Telfa pad is placed over open wound  3.  Cleared for discharge from ID standpoint       Signed By: Arelis Gomez MD     February 1, 2022

## 2022-02-01 NOTE — PROGRESS NOTES
CM spoke to patient and questioned patient going to another facility. Patient only wants to go to [email or web address] or [facility]. CM called [facility] and [facility] and neither have unvaccinated beds again today.  CM will call again on Wednesday am.

## 2022-02-01 NOTE — PROGRESS NOTES
Progress Note      2/1/2022 12:19 PM  NAME: Sharmila Llamas   MRN:  [id number]   Admit Diagnosis: Renal failure [N19]  Hyperkalemia [E87.5]      Subjective:   Chart reviewed. Patient is on dialysis. Symptoms of shortness of breath has improved. With the patient and with the nurse. Cardiac catheterization findings discussed with the patient and also discussed with the electrophysiologist.  Vascular surgeon's note reviewed and patient refused to have peripheral angiogram.  Patient feels good. Patient had a wound dressing changed today. Review of Systems:    Symptom Y/N Comments  Symptom Y/N Comments   Fever/Chills n   Chest Pain n    Poor Appetite    Edema y  improving   Cough    Abdominal Pain     Sputum    Joint Pain     SOB/CARMONA y  improving  Pruritis/Rash     Nausea/vomit    Other     Diarrhea         Constipation           Could NOT obtain due to:      Objective:          Physical Exam:    Last 24hrs VS reviewed since prior progress note. Most recent are:    Visit Vitals  /76 (BP 1 Location: Left upper arm, BP Patient Position: Sitting)   Pulse (!) 56   Temp 98.6 °F (37 °C)   Resp 16   Ht 6' (1.829 m)   Wt 69 kg (152 lb 1.9 oz)   SpO2 100%   BMI 20.63 kg/m²     No intake or output data in the 24 hours ending 02/01/22 1127     General Appearance: Well developed, well nourished, alert & oriented x 3,    no acute distress. Ears/Nose/Mouth/Throat: Hearing grossly normal.  Neck: Supple. Chest: Lungs clear to auscultation bilaterally. Cardiovascular: Regular rate and rhythm, S1,S2 normal, no murmur. Abdomen: Soft, non-tender, bowel sounds are active. Extremities: Patient has cellulitis of the lower extremities  Skin: Warm and dry. []         Post-cath site without hematoma, bruit, tenderness, or thrill. Distal pulses intact.     PMH/SH reviewed - no change compared to H&P    Data Review    Telemetry: normal sinus rhythm , patient has some PVCs, and episode of wide-complex tachycardia possible ventricular tachycardia. EKG:   Patient had EKG that is very concerning for ventricular tachycardia. Lab Data Personally Reviewed:    Recent Labs     01/31/22  0705   WBC 6.0   HGB 7.7*   HCT 26.5*        No results for input(s): INR, PTP, APTT, INREXT, INREXT in the last 72 hours. No results for input(s): NA, K, CL, CO2, BUN, CREA, GLU, CA, MG in the last 72 hours. No results for input(s): CPK, CKNDX, TROIQ in the last 72 hours. No lab exists for component: CPKMB  No results found for: CHOL, CHOLX, CHLST, CHOLV, HDL, HDLP, LDL, LDLC, DLDLP, TGLX, TRIGL, TRIGP, CHHD, CHHDX    No results for input(s): AP, TBIL, TP, ALB, GLOB, GGT, AML, LPSE in the last 72 hours. No lab exists for component: SGOT, GPT, AMYP, HLPSE  No results for input(s): PH, PCO2, PO2 in the last 72 hours.     Medications Personally Reviewed:    Current Facility-Administered Medications   Medication Dose Route Frequency    furosemide (LASIX) tablet 80 mg  80 mg Oral DAILY    heparin (porcine) 1,000 unit/mL injection 3,600 Units  3,600 Units Hemodialysis DIALYSIS PRN    amiodarone (CORDARONE) tablet 200 mg  200 mg Oral DAILY    iron sucrose (VENOFER) injection 200 mg  200 mg IntraVENous DIALYSIS MON, WED & FRI    epoetin valente-epbx (RETACRIT) injection 10,000 Units  10,000 Units IntraVENous DIALYSIS MON, WED & FRI    oxyCODONE IR (ROXICODONE) tablet 5 mg  5 mg Oral Q8H PRN    sodium chloride (NS) flush 5-40 mL  5-40 mL IntraVENous Q8H    sodium chloride (NS) flush 5-40 mL  5-40 mL IntraVENous PRN    ferrous sulfate tablet 325 mg  1 Tablet Oral BID WITH MEALS    heparin (porcine) 1,000 unit/mL injection 4,000 Units  4,000 Units IntraVENous PRN    Or    heparin (porcine) 1,000 unit/mL injection 2,000 Units  2,000 Units IntraVENous PRN    metoprolol succinate (TOPROL-XL) XL tablet 50 mg  50 mg Oral DAILY    nitroglycerin (NITROBID) 2 % ointment 0.5 Inch  0.5 Inch Topical Q6H    aspirin chewable tablet 81 mg  81 mg Oral DAILY           Problem List:   Patient has a acute on chronic renal failure and had a dialysis and is clinically better. Decompensated heart failure and ejection fraction is depressed and patient has a at least moderate possibly severe pulmonary hypertension. History of paroxysmal atrial fibrillation. Recent DVT of her left axillary vein. Wide-complex tachycardia probably ventricular tachycardia. Catheterization did not show evidence of coronary artery disease and ejection fraction was about 20 to 25%. Patient is getting restless and wants to leave. Cellulitis of the lower extremities. Patient refused peripheral arterial angiogram as advised by the vascular surgeon. 1.      Assessment/Plan:   I will continue dialysis as per recommendations of the nephrologist.  Patient is offered ICD by Dr. Consuelo Ellis however patient does not want it. Vascular surgery note reviewed and appreciated and will follow the recommendations. We will continue amiodarone and present care. Discontinue heparin and start on small dose of Eliquis because patient has a history of paroxysmal atrial fibrillation. Patient will be going for cardiac rehab. We will follow infectious disease recommendations. Thank you. 1.          []       High complexity decision making was performed in this patient at high risk for decompensation with multiple organ involvement.     Puja Parra MD

## 2022-02-01 NOTE — PROGRESS NOTES
PHYSICAL THERAPY TREATMENT  Patient: Marshal Boeck (86 y.o. male)  Date: 2/1/2022  Diagnosis: Renal failure [N19]  Hyperkalemia [E87.5] <principal problem not specified>  Procedure(s) (LRB):  LEFT HEART CATH / CORONARY ANGIOGRAPHY W GRAFTS (N/A)  PERCUTANEOUS CORONARY INTERVENTION (N/A) 19 Days Post-Op  Precautions:    Chart, physical therapy assessment, plan of care and goals were reviewed. ASSESSMENT  Patient continues with skilled PT services and is progressing towards goals. Patient seated in recliner chair upon approach and agree to therapy today. Patient performed all seated TE in chair before transferring via SPT from chair to EOB at supervision without support device to complete STS exercise at [facility]. Patient then ambulated with walker for 115\" at [facility] with slow but steady gait pattern with no knee buckling or loss of balance before returning to seated EOB. Pt on room air throughout session, SpO2 >90% throughout mobility. Patient left seated EOB with call bell within reach and all needs meet. Current Level of Function Impacting Discharge (mobility/balance): CGA for ambulation, pain/weakness in LLE with gait    Other factors to consider for discharge: PLOF, assistance at home, availability of care giver. PLAN :  Patient continues to benefit from skilled intervention to address the above impairments. Continue treatment per established plan of care. to address goals. Recommendation for discharge: (in order for the patient to meet his/her long term goals)  [address] [facility]    This discharge recommendation:  Has been made in collaboration with the attending provider and/or case management    IF patient discharges home will need the following DME: gait belt and rolling walker       SUBJECTIVE:   Patient stated i'm tired but I want to work today.     OBJECTIVE DATA SUMMARY:   Critical Behavior:  Neurologic State: Alert  Orientation Level: Oriented X4  Cognition: Follows commands     Functional Mobility Training:  Bed Mobility:  Transfers:  Sit to Stand: Supervision  Stand to Sit: Supervision  Bed to Chair: Supervision  Balance:  Sitting: Intact  Sitting - Static: Good (unsupported)  Sitting - Dynamic: Good (unsupported)  Standing: Impaired; With support  Standing - Static: Constant support;Good  Standing - Dynamic : Constant support; Fair  Ambulation/Gait Training:  Distance (ft): 115 Feet (ft)  Assistive Device: Gait belt;Walker  Ambulation - Level of Assistance: Stand-by assistance  Base of Support: Widened  Speed/Sharmila: Shuffled; Slow    Therapeutic Exercises:   1.15 ankle circles  1x15 LAQ  1x15 seated marches  1x8 STS   1x12 hip ADD/ABD  Pain Ratin/10    Activity Tolerance:   WNL, Fair, and requires rest breaks  Please refer to the flowsheet for vital signs taken during this treatment. After treatment patient left in no apparent distress:   Sitting in chair and Call bell within reach    COMMUNICATION/COLLABORATION:   The patients plan of care was discussed with: Registered nurse. Problem: Mobility Impaired (Adult and Pediatric)  Goal: *Acute Goals and Plan of Care (Insert Text)  Description: Pt will be I with LE HEP in 7 days. Pt will perform bed mobility with mod I in 7 days. Pt will perform transfers with mod I in 7 days. Pt will amb  feet with LRAD safely with mod I in 7 days.        Outcome: Progressing Towards Goal       Emy Ferreira PTA   Time Calculation: 30 mins

## 2022-02-02 PROCEDURE — 74011250637 HC RX REV CODE- 250/637: Performed by: INTERNAL MEDICINE

## 2022-02-02 PROCEDURE — 90935 HEMODIALYSIS ONE EVALUATION: CPT

## 2022-02-02 PROCEDURE — 74011250636 HC RX REV CODE- 250/636: Performed by: INTERNAL MEDICINE

## 2022-02-02 PROCEDURE — 74011000250 HC RX REV CODE- 250: Performed by: INTERNAL MEDICINE

## 2022-02-02 PROCEDURE — 99232 SBSQ HOSP IP/OBS MODERATE 35: CPT | Performed by: INTERNAL MEDICINE

## 2022-02-02 PROCEDURE — 5A1D70Z PERFORMANCE OF URINARY FILTRATION, INTERMITTENT, LESS THAN 6 HOURS PER DAY: ICD-10-PCS | Performed by: INTERNAL MEDICINE

## 2022-02-02 PROCEDURE — 65270000029 HC RM PRIVATE

## 2022-02-02 RX ORDER — HEPARIN SODIUM 1000 [USP'U]/ML
INJECTION, SOLUTION INTRAVENOUS; SUBCUTANEOUS
Status: DISPENSED
Start: 2022-02-02 | End: 2022-02-02

## 2022-02-02 RX ADMIN — METOPROLOL SUCCINATE 50 MG: 25 TABLET, EXTENDED RELEASE ORAL at 14:26

## 2022-02-02 RX ADMIN — FERROUS SULFATE TAB 325 MG (65 MG ELEMENTAL FE) 325 MG: 325 (65 FE) TAB at 14:26

## 2022-02-02 RX ADMIN — SODIUM CHLORIDE, PRESERVATIVE FREE 10 ML: 5 INJECTION INTRAVENOUS at 05:39

## 2022-02-02 RX ADMIN — ASPIRIN 81 MG CHEWABLE TABLET 81 MG: 81 TABLET CHEWABLE at 14:26

## 2022-02-02 RX ADMIN — FERROUS SULFATE TAB 325 MG (65 MG ELEMENTAL FE) 325 MG: 325 (65 FE) TAB at 18:09

## 2022-02-02 RX ADMIN — AMIODARONE HYDROCHLORIDE 200 MG: 200 TABLET ORAL at 14:26

## 2022-02-02 RX ADMIN — FUROSEMIDE 80 MG: 40 TABLET ORAL at 14:26

## 2022-02-02 RX ADMIN — NITROGLYCERIN 0.5 INCH: 20 OINTMENT TOPICAL at 01:03

## 2022-02-02 RX ADMIN — EPOETIN ALFA-EPBX 10000 UNITS: 10000 INJECTION, SOLUTION INTRAVENOUS; SUBCUTANEOUS at 11:10

## 2022-02-02 RX ADMIN — SODIUM CHLORIDE, PRESERVATIVE FREE 10 ML: 5 INJECTION INTRAVENOUS at 20:20

## 2022-02-02 RX ADMIN — NITROGLYCERIN 0.5 INCH: 20 OINTMENT TOPICAL at 05:35

## 2022-02-02 RX ADMIN — HEPARIN SODIUM 3600 UNITS: 1000 INJECTION, SOLUTION INTRAVENOUS; SUBCUTANEOUS at 11:09

## 2022-02-02 NOTE — PROGRESS NOTES
[facility] KIDNEY     Renal Daily Progress Note:     Admission Date: 2022   Subjective: Mental status is good. He  looks better, not tachypneic. Awaiting transfer to SNF for rehab. Seen on dialysis, tolerating Rx w/o difficulty    2022 --> F/U ESRD     Had HD earlier. There were no reported major difficulties with the HD session. Review of Systems  Pertinent items are noted in HPI. Objective:     Visit Vitals  /86   Pulse 60   Temp 99.4 °F (37.4 °C) (Oral)   Resp 18   Ht 6' (1.829 m)   Wt 69 kg (152 lb 1.9 oz)   SpO2 97%   BMI 20.63 kg/m²     Temp (24hrs), Av.6 °F (37 °C), Min:98 °F (36.7 °C), Max:99.4 °F (37.4 °C)      No intake or output data in the 24 hours ending 22 1054  Current Facility-Administered Medications   Medication Dose Route Frequency    furosemide (LASIX) tablet 80 mg  80 mg Oral DAILY    heparin (porcine) 1,000 unit/mL injection 3,600 Units  3,600 Units Hemodialysis DIALYSIS PRN    amiodarone (CORDARONE) tablet 200 mg  200 mg Oral DAILY    epoetin valente-epbx (RETACRIT) injection 10,000 Units  10,000 Units IntraVENous DIALYSIS MON, WED & FRI    oxyCODONE IR (ROXICODONE) tablet 5 mg  5 mg Oral Q8H PRN    sodium chloride (NS) flush 5-40 mL  5-40 mL IntraVENous Q8H    sodium chloride (NS) flush 5-40 mL  5-40 mL IntraVENous PRN    ferrous sulfate tablet 325 mg  1 Tablet Oral BID WITH MEALS    heparin (porcine) 1,000 unit/mL injection 4,000 Units  4,000 Units IntraVENous PRN    Or    heparin (porcine) 1,000 unit/mL injection 2,000 Units  2,000 Units IntraVENous PRN    metoprolol succinate (TOPROL-XL) XL tablet 50 mg  50 mg Oral DAILY    nitroglycerin (NITROBID) 2 % ointment 0.5 Inch  0.5 Inch Topical Q6H    aspirin chewable tablet 81 mg  81 mg Oral DAILY       Physical Exam:    Seen in Room 477. General appearance: alert, cooperative, no distress. Head: Normocephalic.     Chest :  Right tunneled HD catheter    Lungs: clear to auscultation, no wheezes, no rales    Heart: no s3 gallop ,  no pericardial rub    Abdomen: not distended    Ext: left lower leg wrapped    Neurologic: A/O x 3, no focal motor signs, able to ambulate with walker    Data Review:     LABS:  No results for input(s): NA, K, CL, CO2, BUN, CREA, CA, ALB, PHOS, MG in the last 72 hours. Iron saturation 13%    Recent Labs     01/31/22  0705   WBC 6.0   HGB 7.7*   HCT 26.5*        No results for input(s): HÉCTOR, KU, CLU, CREAU in the last 72 hours. No lab exists for component: PROU     Cardiac cath Jan 13, 2022  Underwent cardiac cath today:  Indication: Severe left ventricular systolic dysfunction and patient had a V. Tach.     Left main coronary artery is a very large caliber vessel and has no disease. LAD artery is a very large caliber vessel and proximal mid and distal segment has no stenosis and diagonal branches has no disease. Ramus intermedius is a medium caliber vessel without disease. Circumflex artery is a large-caliber vessel and proximal mid and distal segment and AV groove segment has no stenosis. Large obtuse marginal branch and posterolateral branch has no stenosis. Right coronary artery is a large-caliber vessel and does junction of proximal and mid segment there was a concern of stenosis and that therefore it was interrogated by IFR which turned out to be 0.95 and was not deemed critical enough to do intervention.     Left ventriculography is performed in the right anterior oblique view and ejection fraction is about 20 to 25% and distal anterior wall apex and distal inferior wall is near akinetic.   This may be possible presentation of Takotsubo syndrome.     Plan: Patient will receive defibrillation      Chest x-ray January 8, 2022:  IMPRESSION     Interval placement of a right supraclavicular approach dialysis catheter with  distal tip in the proximal right atrium.     Cardiomegaly with pulmonary vascular congestion and pulmonary edema.      Stable appearing basilar right lung pneumonia. Small right pleural effusion. Assessment:   Renal Specific Problems  Chronic kidney disease stage VI, started on HD. Hypoalbumin  SVT- recurrent  Volume overload- resolved  Metabolic acidosis- resolved  Hyperkalemia- resolved  Anemia with renal failure and iron deficient  Leukocytosis corrected with declining procalcitonin  Probable right lower lobe pneumonia  Infected  leg ulcers  Venous stasis dermatitis lower extremity          Plan:     Obtain/ Order: labs/cultures/radiology/procedures:      Therapeutic:      Angiogram with possible angioplasty of lower leg vessels- refused. It will be reattempted later if wound healing does not occur    HD ongoing    Epogen with dialysis    Wound care - regarding left leg ulcer    Case management to arrange rehab and outpatient dialysis.

## 2022-02-02 NOTE — PROGRESS NOTES
Progress Note      2/2/2022 12:19 PM  NAME: Dima Banerjee   MRN:  [id number]   Admit Diagnosis: Renal failure [N19]  Hyperkalemia [E87.5]      Subjective:   Chart reviewed. Patient is on dialysis. Symptoms of shortness of breath has improved. With the patient and with the nurse. Cardiac catheterization findings discussed with the patient and also discussed with the electrophysiologist.  Vascular surgeon's note reviewed and patient refused to have peripheral angiogram.  Patient feels good. Review of Systems:    Symptom Y/N Comments  Symptom Y/N Comments   Fever/Chills n   Chest Pain n    Poor Appetite    Edema y  improving   Cough    Abdominal Pain     Sputum    Joint Pain     SOB/CARMONA y  improving  Pruritis/Rash     Nausea/vomit    Other     Diarrhea         Constipation           Could NOT obtain due to:      Objective:          Physical Exam:    Last 24hrs VS reviewed since prior progress note. Most recent are:    Visit Vitals  /80   Pulse 63   Temp 99.4 °F (37.4 °C) (Oral)   Resp 18   Ht 6' (1.829 m)   Wt 69 kg (152 lb 1.9 oz)   SpO2 97%   BMI 20.63 kg/m²     No intake or output data in the 24 hours ending 02/02/22 1201     General Appearance: Well developed, well nourished, alert & oriented x 3,    no acute distress. Ears/Nose/Mouth/Throat: Hearing grossly normal.  Neck: Supple. Chest: Lungs clear to auscultation bilaterally. Cardiovascular: Regular rate and rhythm, S1,S2 normal, no murmur. Abdomen: Soft, non-tender, bowel sounds are active. Extremities: Patient has cellulitis of the lower extremities  Skin: Warm and dry. []         Post-cath site without hematoma, bruit, tenderness, or thrill. Distal pulses intact. PMH/SH reviewed - no change compared to H&P    Data Review    Telemetry: normal sinus rhythm , patient has some PVCs, and episode of wide-complex tachycardia possible ventricular tachycardia.     EKG:   Patient had EKG that is very concerning for ventricular tachycardia. Lab Data Personally Reviewed:    Recent Labs     01/31/22  0705   WBC 6.0   HGB 7.7*   HCT 26.5*        No results for input(s): INR, PTP, APTT, INREXT, INREXT in the last 72 hours. No results for input(s): NA, K, CL, CO2, BUN, CREA, GLU, CA, MG in the last 72 hours. No results for input(s): CPK, CKNDX, TROIQ in the last 72 hours. No lab exists for component: CPKMB  No results found for: CHOL, CHOLX, CHLST, CHOLV, HDL, HDLP, LDL, LDLC, DLDLP, TGLX, TRIGL, TRIGP, CHHD, CHHDX    No results for input(s): AP, TBIL, TP, ALB, GLOB, GGT, AML, LPSE in the last 72 hours. No lab exists for component: SGOT, GPT, AMYP, HLPSE  No results for input(s): PH, PCO2, PO2 in the last 72 hours. Medications Personally Reviewed:    Current Facility-Administered Medications   Medication Dose Route Frequency    furosemide (LASIX) tablet 80 mg  80 mg Oral DAILY    heparin (porcine) 1,000 unit/mL injection 3,600 Units  3,600 Units Hemodialysis DIALYSIS PRN    amiodarone (CORDARONE) tablet 200 mg  200 mg Oral DAILY    epoetin valente-epbx (RETACRIT) injection 10,000 Units  10,000 Units IntraVENous DIALYSIS MON, WED & FRI    oxyCODONE IR (ROXICODONE) tablet 5 mg  5 mg Oral Q8H PRN    sodium chloride (NS) flush 5-40 mL  5-40 mL IntraVENous Q8H    sodium chloride (NS) flush 5-40 mL  5-40 mL IntraVENous PRN    ferrous sulfate tablet 325 mg  1 Tablet Oral BID WITH MEALS    heparin (porcine) 1,000 unit/mL injection 4,000 Units  4,000 Units IntraVENous PRN    Or    heparin (porcine) 1,000 unit/mL injection 2,000 Units  2,000 Units IntraVENous PRN    metoprolol succinate (TOPROL-XL) XL tablet 50 mg  50 mg Oral DAILY    nitroglycerin (NITROBID) 2 % ointment 0.5 Inch  0.5 Inch Topical Q6H    aspirin chewable tablet 81 mg  81 mg Oral DAILY           Problem List:   Patient has a acute on chronic renal failure and had a dialysis and is clinically better.   Decompensated heart failure and ejection fraction is depressed and patient has a at least moderate possibly severe pulmonary hypertension. History of paroxysmal atrial fibrillation. Recent DVT of her left axillary vein. Wide-complex tachycardia probably ventricular tachycardia. Catheterization did not show evidence of coronary artery disease and ejection fraction was about 20 to 25%. Patient is getting restless and wants to leave. Cellulitis of the lower extremities. Patient refused peripheral arterial angiogram as advised by the vascular surgeon. 1.      Assessment/Plan:   I will continue dialysis as per recommendations of the nephrologist.  Patient is offered ICD by Dr. Emmanuel Dumont however patient does not want it. Vascular surgery note reviewed and appreciated and will follow the recommendations. We will continue amiodarone and present care. Discontinue heparin and start on small dose of Eliquis because patient has a history of paroxysmal atrial fibrillation. Okay to transfer to skilled nursing facility when bed is available. We will follow infectious disease recommendations. Thank you. 1.          []       High complexity decision making was performed in this patient at high risk for decompensation with multiple organ involvement.     Chinedu Solano MD

## 2022-02-02 NOTE — PROGRESS NOTES
Progress Note    Patient: Denis Councilman MRN: [id number]  SSN: [id number]    YOB: 1955  Age: 78 y.o. Sex: male      Admit Date: 1/7/2022    LOS: 25 days     Subjective:   Patient followed for sepsis with cellulitis both calves and aspiration pneumonia. Leg wound culture grew multiple organisms as shown below. Afebrile with low grade temperature, normal WBC, decreasing procal but increasing CRP, now off antibiotics with clinical resolution of leg infections. Patient lying in bed still complaining about left calf being bandaged. Objective:     Vitals:    02/02/22 0752 02/02/22 0815 02/02/22 0845 02/02/22 0915   BP: 134/76 134/86 129/85 (P) 135/82   Pulse: 63 66 (!) 59 (!) (P) 59   Resp: 18 18     Temp: 98.6 °F (37 °C) 99.4 °F (37.4 °C)     TempSrc:  Oral     SpO2: 97%      Weight:       Height:            Intake and Output:  Current Shift: No intake/output data recorded. Last three shifts: No intake/output data recorded. Physical Exam:    Vitals and nursing note reviewed. Constitutional:       Appearance: He is ill-appearing. Genitourinary:     Comments: External urinary catheter  Musculoskeletal:      Right lower leg edema resolved; 1.5 x3 cm eschar posteriorly     Left lower leg with mild edma; left calf with nonstick dressing and gauze bandage  Neurological: nonfocal, mild confusion  Psychiatric:         Behavior: Behavior normal.     Lab/Data Review:     WBC 6,000     CRP 4.56 <4.85 <4.91 <2.80 <4.66 <2.63 <2.53 < 5.30 <6.32 <8.19 <12.10 <12.10  Procal 0.59  <0. .55 <0.60 <0.72 < 0.70 <0.84 < 2.39 <3.12 < 4.41 <4.39 <5.16    MRSA nasal PCR negative    Wound cultures leg (1/8)  Staphylococcus aureus (MSSA),  Alcaligenes faecalis resistant to Zosyn, Klebsiella pneumoniae      Assessment:     Active Problems:    Renal failure (1/8/2022)      Hyperkalemia (1/8/2022)    1. Cellulitis both calves, secondary to probable S.  Aureus (MSSA), Alcaligenes faecalis resistant to Zosyn, Klebsiella pneumoniae, status post 14 days IV Antibiotics, including Meropenem, clinically resolved. 2. Aspiration pneumonia, RLL, status antibiotics as note above  3. Sepsis with leukocytosis and elevated CRP/procal, resolving     Plan:   1. No further antibiotic recommendations at this time   2. In am, repeat CRP and procal  3.  Cleared for discharge from ID standpoint       Signed By: Dennison Aschoff, MD     February 2, 2022

## 2022-02-02 NOTE — PROGRESS NOTES
[facility] KIDNEY     Renal Daily Progress Note:     Admission Date: 2022   Subjective: Mental status is good. He  looks better, not tachypneic. Awaiting transfer to SNF for rehab    2022 --> F/U ESRD     Had HD earlier. There were no reported major difficulties with the HD session. Review of Systems  Pertinent items are noted in HPI. Objective:     Visit Vitals  /83   Pulse 63   Temp 98.2 °F (36.8 °C)   Resp 18   Ht 6' (1.829 m)   Wt 69 kg (152 lb 1.9 oz)   SpO2 95%   BMI 20.63 kg/m²     Temp (24hrs), Av.5 °F (36.9 °C), Min:98 °F (36.7 °C), Max:99.1 °F (37.3 °C)      No intake or output data in the 24 hours ending 22 2308  Current Facility-Administered Medications   Medication Dose Route Frequency    furosemide (LASIX) tablet 80 mg  80 mg Oral DAILY    heparin (porcine) 1,000 unit/mL injection 3,600 Units  3,600 Units Hemodialysis DIALYSIS PRN    amiodarone (CORDARONE) tablet 200 mg  200 mg Oral DAILY    iron sucrose (VENOFER) injection 200 mg  200 mg IntraVENous DIALYSIS MON, WED & FRI    epoetin valente-epbx (RETACRIT) injection 10,000 Units  10,000 Units IntraVENous DIALYSIS MON, WED & FRI    oxyCODONE IR (ROXICODONE) tablet 5 mg  5 mg Oral Q8H PRN    sodium chloride (NS) flush 5-40 mL  5-40 mL IntraVENous Q8H    sodium chloride (NS) flush 5-40 mL  5-40 mL IntraVENous PRN    ferrous sulfate tablet 325 mg  1 Tablet Oral BID WITH MEALS    heparin (porcine) 1,000 unit/mL injection 4,000 Units  4,000 Units IntraVENous PRN    Or    heparin (porcine) 1,000 unit/mL injection 2,000 Units  2,000 Units IntraVENous PRN    metoprolol succinate (TOPROL-XL) XL tablet 50 mg  50 mg Oral DAILY    nitroglycerin (NITROBID) 2 % ointment 0.5 Inch  0.5 Inch Topical Q6H    aspirin chewable tablet 81 mg  81 mg Oral DAILY       Physical Exam:    Seen in Room 477. General appearance: alert, cooperative, no distress. Head: Normocephalic.     Chest :  Right tunneled HD catheter    Lungs: clear to auscultation, no wheezes, no rales    Heart: no s3 gallop ,  no pericardial rub    Abdomen: not distended    Neurologic: A/O x 3, no focal motor signs, able to ambulate with walker    Data Review:     LABS:  No results for input(s): NA, K, CL, CO2, BUN, CREA, CA, ALB, PHOS, MG in the last 72 hours. Iron saturation 13%    Recent Labs     01/31/22  0705   WBC 6.0   HGB 7.7*   HCT 26.5*        No results for input(s): HÉCTOR, KU, CLU, CREAU in the last 72 hours. No lab exists for component: PROU     Cardiac cath Jan 13, 2022  Underwent cardiac cath today:  Indication: Severe left ventricular systolic dysfunction and patient had a V. Tach.     Left main coronary artery is a very large caliber vessel and has no disease. LAD artery is a very large caliber vessel and proximal mid and distal segment has no stenosis and diagonal branches has no disease. Ramus intermedius is a medium caliber vessel without disease. Circumflex artery is a large-caliber vessel and proximal mid and distal segment and AV groove segment has no stenosis. Large obtuse marginal branch and posterolateral branch has no stenosis. Right coronary artery is a large-caliber vessel and does junction of proximal and mid segment there was a concern of stenosis and that therefore it was interrogated by IFR which turned out to be 0.95 and was not deemed critical enough to do intervention.     Left ventriculography is performed in the right anterior oblique view and ejection fraction is about 20 to 25% and distal anterior wall apex and distal inferior wall is near akinetic.   This may be possible presentation of Takotsubo syndrome.     Plan: Patient will receive defibrillation      Chest x-ray January 8, 2022:  IMPRESSION     Interval placement of a right supraclavicular approach dialysis catheter with  distal tip in the proximal right atrium.     Cardiomegaly with pulmonary vascular congestion and pulmonary edema.      Stable appearing basilar right lung pneumonia. Small right pleural effusion. Assessment:   Renal Specific Problems  Chronic kidney disease stage VI, started on HD. Hypoalbumin  SVT- recurrent  Volume overload- resolved  Metabolic acidosis- resolved  Hyperkalemia- resolved  Anemia with renal failure and iron deficient  Leukocytosis corrected with declining procalcitonin  Probable right lower lobe pneumonia  Infected  leg ulcers  Venous stasis dermatitis lower extremity          Plan:     Obtain/ Order: labs/cultures/radiology/procedures:      Therapeutic:      Angiogram with possible angioplasty of lower leg vessels- refused. It will be reattempted later if wound healing does not occur    HD again on Wed (2/2)    Back on Lasix    Epogen with dialysis    Wound care - regarding left leg ulcer    Case management to arrange rehab and outpatient dialysis.

## 2022-02-02 NOTE — PROGRESS NOTES
Progress Note    Colt De Leon MD             Daily Progress Note: 2/2/2022      Subjective: The patient is seen for follow  up. Feels tired. Short of breath. Has some swelling in both legs since he is sitting on the side of the bed. No chest pain. Problem List:  Problem List as of 2/2/2022 Never Reviewed          Codes Class Noted - Resolved    Renal failure ICD-10-CM: N19  ICD-9-CM: 409  1/8/2022 - Present        Hyperkalemia ICD-10-CM: E87.5  ICD-9-CM: 276.7  1/8/2022 - Present        Pulmonary edema ICD-10-CM: J81.1  ICD-9-CM: 203  7/13/2021 - Present        GI bleed ICD-10-CM: K92.2  ICD-9-CM: 578.9  1/5/2021 - Present              Medications reviewed  Current Facility-Administered Medications   Medication Dose Route Frequency    furosemide (LASIX) tablet 80 mg  80 mg Oral DAILY    heparin (porcine) 1,000 unit/mL injection 3,600 Units  3,600 Units Hemodialysis DIALYSIS PRN    amiodarone (CORDARONE) tablet 200 mg  200 mg Oral DAILY    epoetin valente-epbx (RETACRIT) injection 10,000 Units  10,000 Units IntraVENous DIALYSIS MON, WED & FRI    oxyCODONE IR (ROXICODONE) tablet 5 mg  5 mg Oral Q8H PRN    sodium chloride (NS) flush 5-40 mL  5-40 mL IntraVENous Q8H    sodium chloride (NS) flush 5-40 mL  5-40 mL IntraVENous PRN    ferrous sulfate tablet 325 mg  1 Tablet Oral BID WITH MEALS    heparin (porcine) 1,000 unit/mL injection 4,000 Units  4,000 Units IntraVENous PRN    Or    heparin (porcine) 1,000 unit/mL injection 2,000 Units  2,000 Units IntraVENous PRN    metoprolol succinate (TOPROL-XL) XL tablet 50 mg  50 mg Oral DAILY    nitroglycerin (NITROBID) 2 % ointment 0.5 Inch  0.5 Inch Topical Q6H    aspirin chewable tablet 81 mg  81 mg Oral DAILY       Review of Systems:   A comprehensive review of systems was negative except for that written in the HPI.     Objective:   Physical Exam:     Visit Vitals  /86   Pulse 69   Temp 99 °F (37.2 °C) (Oral)   Resp 18   Ht 6' (1.829 m)   [employer] 69 kg (152 lb 1.9 oz)   SpO2 97%   BMI 20.63 kg/m²    O2 Flow Rate (L/min): 2 l/min O2 Device: None (Room air)    Temp (24hrs), Av.6 °F (37 °C), Min:98 °F (36.7 °C), Max:99.4 °F (37.4 °C)     07 -  1900  In: -   Out: 3000    No intake/output data recorded. General:  Alert, cooperative, no distress, appears stated age. Lungs:   Clear to auscultation bilaterally. Chest wall:  No tenderness or deformity. Heart:  Regular rate and rhythm, S1, S2 normal, no murmur, click, rub or gallop. Abdomen:   Soft, non-tender. Bowel sounds normal. No masses,  No organomegaly. Extremities: Extremities normal, atraumatic, no cyanosis bilateral leg edema   Pulses: 2+ and symmetric all extremities. Skin: Skin color, texture, turgor normal. No rashes or lesions   Neurologic: CNII-XII intact. No gross sensory or motor deficits     Data Review:       Recent Days:  Recent Labs     22  0705   WBC 6.0   HGB 7.7*   HCT 26.5*        No results for input(s): NA, K, CL, CO2, GLU, BUN, CREA, CA, MG, PHOS, ALB, TBIL, TBILI, ALT, INR, INREXT in the last 72 hours. No lab exists for component: SGOT  No results for input(s): PH, PCO2, PO2, HCO3, FIO2 in the last 72 hours. Results     ** No results found for the last 336 hours. **         24 Hour Results:  No results found for this or any previous visit (from the past 24 hour(s)). IR INSERT TUNL CVC W/O PORT OVER 5 YR   Final Result   Successful conversion of a right internal jugular temporary dialysis catheter to   a 23 cm PermCath. The catheter is functioning and is ready for use. IR FLUORO GUIDE PLC CVAD   Final Result   Successful conversion of a right internal jugular temporary dialysis catheter to   a 23 cm PermCath. The catheter is functioning and is ready for use.          DUPLEX LOW EXT ARTERIES WITH JVAON   Final Result      XR CHEST PORT   Final Result      Interval placement of a right supraclavicular approach dialysis catheter with distal tip in the proximal right atrium. Cardiomegaly with pulmonary vascular congestion and pulmonary edema. Stable appearing basilar right lung pneumonia. Small right pleural effusion. IR INSERT NON TUNL CVC OVER 5 YRS   Final Result      Technically successful insertion of non-tunneled Trialysis catheter with US   guidance. Plan:       Post catheter placement chest xray confirmed appropriate position of the   catheter. The catheter is appropriate for immediate use.   _______________________________________________________________      PROCEDURE SUMMARY:   - Venous access with ultrasound guidance   - Non-tunneled central venous catheter insertion      PROCEDURE DETAILS:      Pre-procedure   Relevant imaging review: None    Informed consent for the procedure was obtained and time-out was performed prior   to the procedure. Prophylactic antibiotic administered: Not administered   Preparation: The site was prepared and draped using all elements of maximal   sterile barrier technique including sterile gloves, sterile gown, cap, mask,   large sterile sheet, sterile ultrasound probe cover, sterile ultrasound gel,   hand hygiene and cutaneous antisepsis with 2% chlorhexidine. Medical reason for site preparation exception: Not applicable      Anesthesia/sedation   Level of anesthesia: No sedation   Anesthesia administered by: Not applicable   Duration of anesthesia/sedation: 0 minutes. Access   The vessel was sonographically evaluated and judged to be patent and appropriate   for access and a permanent image was stored. Three mLs of 1% Lidocaine was   administered to the access site. Real time ultrasound was used to visualize   needle entry into the vessel.     Vein accessed: Right internal jugular vein   Access technique: 4F micropuncture set      Catheter placement   The access site was dilated and the catheter was placed into the vein over a   wire and all guidewires were removed in their entirety. Catheter ports were   aspirated and flushed. Catheter tip location was verified by portable X-ray. Catheter size:13 Kh[place]   Catheter length: 20 cm   Catheter tip position: Proximal right atrium   Catheter flush: Heparin (100 units/mL)      Closure   The catheter was secured. A sterile dressing was applied. Catheter securement technique: Non-absorbable suture      Additional Medications   None      Additional Details   Estimated blood loss:  Less than 1 mL   Additional description of procedure: None   Additional findings: None   Additional equipment: None   Specimens removed: None                     IR US GUIDED VASCULAR ACCESS   Final Result      Technically successful insertion of non-tunneled Trialysis catheter with US   guidance. Plan:       Post catheter placement chest xray confirmed appropriate position of the   catheter. The catheter is appropriate for immediate use.   _______________________________________________________________      PROCEDURE SUMMARY:   - Venous access with ultrasound guidance   - Non-tunneled central venous catheter insertion      PROCEDURE DETAILS:      Pre-procedure   Relevant imaging review: None    Informed consent for the procedure was obtained and time-out was performed prior   to the procedure. Prophylactic antibiotic administered: Not administered   Preparation: The site was prepared and draped using all elements of maximal   sterile barrier technique including sterile gloves, sterile gown, cap, mask,   large sterile sheet, sterile ultrasound probe cover, sterile ultrasound gel,   hand hygiene and cutaneous antisepsis with 2% chlorhexidine. Medical reason for site preparation exception: Not applicable      Anesthesia/sedation   Level of anesthesia: No sedation   Anesthesia administered by: Not applicable   Duration of anesthesia/sedation: 0 minutes.       Access   The vessel was sonographically evaluated and judged to be patent and appropriate   for access and a permanent image was stored. Three mLs of 1% Lidocaine was   administered to the access site. Real time ultrasound was used to visualize   needle entry into the vessel. Vein accessed: Right internal jugular vein   Access technique: 4F micropuncture set      Catheter placement   The access site was dilated and the catheter was placed into the vein over a   wire and all guidewires were removed in their entirety. Catheter ports were   aspirated and flushed. Catheter tip location was verified by portable X-ray. Catheter size:13 Frisian   Catheter length: 20 cm   Catheter tip position: Proximal right atrium   Catheter flush: Heparin (100 units/mL)      Closure   The catheter was secured. A sterile dressing was applied. Catheter securement technique: Non-absorbable suture      Additional Medications   None      Additional Details   Estimated blood loss:  Less than 1 mL   Additional description of procedure: None   Additional findings: None   Additional equipment: None   Specimens removed: None                     XR CHEST PORT   Final Result   Airspace opacity and effusion at the right lung base and in the   acute setting would suggest pneumonia and/or aspiration however the patient also   appears to have baseline/background vascular congestion and/or pulmonary   arterial hypertension, noting cardiomegaly and/or pericardial effusion      IR INSERT NON TUNL CVC OVER 5 YRS    (Results Pending)        Assessment: CHF  Ejection fraction 25% patient has refused for ICD placement  CRF on dialysis  PAD  Hypertension        Plan: Await for the placement. Care Plan discussed with: Patient/Family    Total time spent with patient: 30 minutes.     Sergio Harris MD

## 2022-02-02 NOTE — PROGRESS NOTES
Progress Note    Alberto Ramirez MD             Daily Progress Note: 2/2/2022      Subjective: The patient is seen for follow  up. Patient had dialysis today. He feels sluggish more than yesterday. Feels tired. But denies any history of chest pain. Positive shortness of breath on minimal exertion  Problem List:  Problem List as of 2/2/2022 Never Reviewed          Codes Class Noted - Resolved    Renal failure ICD-10-CM: N19  ICD-9-CM: 753  1/8/2022 - Present        Hyperkalemia ICD-10-CM: E87.5  ICD-9-CM: 276.7  1/8/2022 - Present        Pulmonary edema ICD-10-CM: J81.1  ICD-9-CM: 285  7/13/2021 - Present        GI bleed ICD-10-CM: K92.2  ICD-9-CM: 578.9  1/5/2021 - Present              Medications reviewed  Current Facility-Administered Medications   Medication Dose Route Frequency    furosemide (LASIX) tablet 80 mg  80 mg Oral DAILY    heparin (porcine) 1,000 unit/mL injection 3,600 Units  3,600 Units Hemodialysis DIALYSIS PRN    amiodarone (CORDARONE) tablet 200 mg  200 mg Oral DAILY    epoetin valente-epbx (RETACRIT) injection 10,000 Units  10,000 Units IntraVENous DIALYSIS MON, WED & FRI    oxyCODONE IR (ROXICODONE) tablet 5 mg  5 mg Oral Q8H PRN    sodium chloride (NS) flush 5-40 mL  5-40 mL IntraVENous Q8H    sodium chloride (NS) flush 5-40 mL  5-40 mL IntraVENous PRN    ferrous sulfate tablet 325 mg  1 Tablet Oral BID WITH MEALS    heparin (porcine) 1,000 unit/mL injection 4,000 Units  4,000 Units IntraVENous PRN    Or    heparin (porcine) 1,000 unit/mL injection 2,000 Units  2,000 Units IntraVENous PRN    metoprolol succinate (TOPROL-XL) XL tablet 50 mg  50 mg Oral DAILY    nitroglycerin (NITROBID) 2 % ointment 0.5 Inch  0.5 Inch Topical Q6H    aspirin chewable tablet 81 mg  81 mg Oral DAILY       Review of Systems:   A comprehensive review of systems was negative except for that written in the HPI.     Objective:   Physical Exam:     Visit Vitals  /86   Pulse 69   Temp 99 °F (37.2 °C) (Oral)   Resp 18   Ht 6' (1.829 m)   Wt 69 kg (152 lb 1.9 oz)   SpO2 97%   BMI 20.63 kg/m²    O2 Flow Rate (L/min): 2 l/min O2 Device: None (Room air)    Temp (24hrs), Av.6 °F (37 °C), Min:98 °F (36.7 °C), Max:99.4 °F (37.4 °C)     07 -  1900  In: -   Out: 3000    No intake/output data recorded. General:  Alert, cooperative, no distress, appears stated age. Lungs:   Clear to auscultation bilaterally. Chest wall:  No tenderness or deformity. Heart:  Regular rate and rhythm, S1, S2 normal, no murmur, click, rub or gallop. Abdomen:   Soft, non-tender. Bowel sounds normal. No masses,  No organomegaly. Extremities: Extremities normal, atraumatic, no cyanosis improved leg edema as patient is lying down in the bed   Pulses: 2+ and symmetric all extremities. Skin: Skin color, texture, turgor normal. No rashes or lesions   Neurologic: CNII-XII intact. No gross sensory or motor deficits     Data Review:       Recent Days:  Recent Labs     22  0705   WBC 6.0   HGB 7.7*   HCT 26.5*        No results for input(s): NA, K, CL, CO2, GLU, BUN, CREA, CA, MG, PHOS, ALB, TBIL, TBILI, ALT, INR, INREXT in the last 72 hours. No lab exists for component: SGOT  No results for input(s): PH, PCO2, PO2, HCO3, FIO2 in the last 72 hours. Results     ** No results found for the last 336 hours. **         24 Hour Results:  No results found for this or any previous visit (from the past 24 hour(s)). IR INSERT TUNL CVC W/O PORT OVER 5 YR   Final Result   Successful conversion of a right internal jugular temporary dialysis catheter to   a 23 cm PermCath. The catheter is functioning and is ready for use. IR FLUORO GUIDE PLC CVAD   Final Result   Successful conversion of a right internal jugular temporary dialysis catheter to   a 23 cm PermCath. The catheter is functioning and is ready for use.          DUPLEX LOW EXT ARTERIES WITH JAVON   Final Result      XR CHEST PORT   Final Result Interval placement of a right supraclavicular approach dialysis catheter with   distal tip in the proximal right atrium. Cardiomegaly with pulmonary vascular congestion and pulmonary edema. Stable appearing basilar right lung pneumonia. Small right pleural effusion. IR INSERT NON TUNL CVC OVER 5 YRS   Final Result      Technically successful insertion of non-tunneled Trialysis catheter with US   guidance. Plan:       Post catheter placement chest xray confirmed appropriate position of the   catheter. The catheter is appropriate for immediate use.   _______________________________________________________________      PROCEDURE SUMMARY:   - Venous access with ultrasound guidance   - Non-tunneled central venous catheter insertion      PROCEDURE DETAILS:      Pre-procedure   Relevant imaging review: None    Informed consent for the procedure was obtained and time-out was performed prior   to the procedure. Prophylactic antibiotic administered: Not administered   Preparation: The site was prepared and draped using all elements of maximal   sterile barrier technique including sterile gloves, sterile gown, cap, mask,   large sterile sheet, sterile ultrasound probe cover, sterile ultrasound gel,   hand hygiene and cutaneous antisepsis with 2% chlorhexidine. Medical reason for site preparation exception: Not applicable      Anesthesia/sedation   Level of anesthesia: No sedation   Anesthesia administered by: Not applicable   Duration of anesthesia/sedation: 0 minutes. Access   The vessel was sonographically evaluated and judged to be patent and appropriate   for access and a permanent image was stored. Three mLs of 1% Lidocaine was   administered to the access site. Real time ultrasound was used to visualize   needle entry into the vessel.     Vein accessed: Right internal jugular vein   Access technique: 4F micropuncture set      Catheter placement   The access site was dilated and the catheter was placed into the vein over a   wire and all guidewires were removed in their entirety. Catheter ports were   aspirated and flushed. Catheter tip location was verified by portable X-ray. Catheter size:13 [place]   Catheter length: 20 cm   Catheter tip position: Proximal right atrium   Catheter flush: Heparin (100 units/mL)      Closure   The catheter was secured. A sterile dressing was applied. Catheter securement technique: Non-absorbable suture      Additional Medications   None      Additional Details   Estimated blood loss:  Less than 1 mL   Additional description of procedure: None   Additional findings: None   Additional equipment: None   Specimens removed: None                     IR US GUIDED VASCULAR ACCESS   Final Result      Technically successful insertion of non-tunneled Trialysis catheter with US   guidance. Plan:       Post catheter placement chest xray confirmed appropriate position of the   catheter. The catheter is appropriate for immediate use.   _______________________________________________________________      PROCEDURE SUMMARY:   - Venous access with ultrasound guidance   - Non-tunneled central venous catheter insertion      PROCEDURE DETAILS:      Pre-procedure   Relevant imaging review: None    Informed consent for the procedure was obtained and time-out was performed prior   to the procedure. Prophylactic antibiotic administered: Not administered   Preparation: The site was prepared and draped using all elements of maximal   sterile barrier technique including sterile gloves, sterile gown, cap, mask,   large sterile sheet, sterile ultrasound probe cover, sterile ultrasound gel,   hand hygiene and cutaneous antisepsis with 2% chlorhexidine. Medical reason for site preparation exception: Not applicable      Anesthesia/sedation   Level of anesthesia: No sedation   Anesthesia administered by: Not applicable   Duration of anesthesia/sedation: 0 minutes.       Access   The vessel was sonographically evaluated and judged to be patent and appropriate   for access and a permanent image was stored. Three mLs of 1% Lidocaine was   administered to the access site. Real time ultrasound was used to visualize   needle entry into the vessel. Vein accessed: Right internal jugular vein   Access technique: 4F micropuncture set      Catheter placement   The access site was dilated and the catheter was placed into the vein over a   wire and all guidewires were removed in their entirety. Catheter ports were   aspirated and flushed. Catheter tip location was verified by portable X-ray. Catheter size:13 [employer]   Catheter length: 20 cm   Catheter tip position: Proximal right atrium   Catheter flush: Heparin (100 units/mL)      Closure   The catheter was secured. A sterile dressing was applied. Catheter securement technique: Non-absorbable suture      Additional Medications   None      Additional Details   Estimated blood loss:  Less than 1 mL   Additional description of procedure: None   Additional findings: None   Additional equipment: None   Specimens removed: None                     XR CHEST PORT   Final Result   Airspace opacity and effusion at the right lung base and in the   acute setting would suggest pneumonia and/or aspiration however the patient also   appears to have baseline/background vascular congestion and/or pulmonary   arterial hypertension, noting cardiomegaly and/or pericardial effusion      IR INSERT NON TUNL CVC OVER 5 YRS    (Results Pending)        Assessment: Anemia  CRF on dialysis  CHF  Hypertension  PAD  History of ventricular dysrhythmia        Plan: Patient's hemoglobin was 7.7 on 31 January  I will check CBC in the morning if it is lower than 7.7 as patient is symptomatic I will transfuse PRBC after discussing with nephrology        Care Plan discussed with: Patient/Family    Total time spent with patient: 30 minutes.     Gretel Alejandro MD

## 2022-02-02 NOTE — PROGRESS NOTES
ISAMAR spoke to kate Chapa for [facility] and [facility]. [facility] states that right now there is a covid outbreak at [facility] and they are not accepting admissions right now and the only bed available at [facility] is a vaccinated male bed. Patient is still adamant he only wants to go to SNF in [place].

## 2022-02-03 LAB
BASOPHILS # BLD: 0.1 K/UL (ref 0–0.1)
BASOPHILS # BLD: 0.1 K/UL (ref 0–0.1)
BASOPHILS NFR BLD: 1 % (ref 0–1)
BASOPHILS NFR BLD: 1 % (ref 0–1)
CRP SERPL-MCNC: 7.1 MG/DL (ref 0–0.6)
DIFFERENTIAL METHOD BLD: ABNORMAL
DIFFERENTIAL METHOD BLD: ABNORMAL
EOSINOPHIL # BLD: 0.4 K/UL (ref 0–0.4)
EOSINOPHIL # BLD: 0.5 K/UL (ref 0–0.4)
EOSINOPHIL NFR BLD: 5 % (ref 0–7)
EOSINOPHIL NFR BLD: 6 % (ref 0–7)
ERYTHROCYTE [DISTWIDTH] IN BLOOD BY AUTOMATED COUNT: 15.1 % (ref 11.5–14.5)
ERYTHROCYTE [DISTWIDTH] IN BLOOD BY AUTOMATED COUNT: 15.2 % (ref 11.5–14.5)
HCT VFR BLD AUTO: 31.7 % (ref 36.6–50.3)
HCT VFR BLD AUTO: 32 % (ref 36.6–50.3)
HGB BLD-MCNC: 9.3 G/DL (ref 12.1–17)
HGB BLD-MCNC: 9.4 G/DL (ref 12.1–17)
IMM GRANULOCYTES # BLD AUTO: 0.1 K/UL (ref 0–0.04)
IMM GRANULOCYTES # BLD AUTO: 0.1 K/UL (ref 0–0.04)
IMM GRANULOCYTES NFR BLD AUTO: 1 % (ref 0–0.5)
IMM GRANULOCYTES NFR BLD AUTO: 1 % (ref 0–0.5)
LYMPHOCYTES # BLD: 0.7 K/UL (ref 0.8–3.5)
LYMPHOCYTES # BLD: 0.8 K/UL (ref 0.8–3.5)
LYMPHOCYTES NFR BLD: 8 % (ref 12–49)
LYMPHOCYTES NFR BLD: 9 % (ref 12–49)
MCH RBC QN AUTO: 29.9 PG (ref 26–34)
MCH RBC QN AUTO: 29.9 PG (ref 26–34)
MCHC RBC AUTO-ENTMCNC: 29.3 G/DL (ref 30–36.5)
MCHC RBC AUTO-ENTMCNC: 29.4 G/DL (ref 30–36.5)
MCV RBC AUTO: 101.9 FL (ref 80–99)
MCV RBC AUTO: 101.9 FL (ref 80–99)
MONOCYTES # BLD: 1 K/UL (ref 0–1)
MONOCYTES # BLD: 1.1 K/UL (ref 0–1)
MONOCYTES NFR BLD: 11 % (ref 5–13)
MONOCYTES NFR BLD: 12 % (ref 5–13)
NEUTS SEG # BLD: 6.3 K/UL (ref 1.8–8)
NEUTS SEG # BLD: 6.5 K/UL (ref 1.8–8)
NEUTS SEG NFR BLD: 72 % (ref 32–75)
NEUTS SEG NFR BLD: 73 % (ref 32–75)
NRBC # BLD: 0 K/UL (ref 0–0.01)
NRBC # BLD: 0 K/UL (ref 0–0.01)
NRBC BLD-RTO: 0 PER 100 WBC
NRBC BLD-RTO: 0 PER 100 WBC
PLATELET # BLD AUTO: 352 K/UL (ref 150–400)
PLATELET # BLD AUTO: 383 K/UL (ref 150–400)
PMV BLD AUTO: 9.5 FL (ref 8.9–12.9)
PMV BLD AUTO: 9.5 FL (ref 8.9–12.9)
PROCALCITONIN SERPL-MCNC: 1.36 NG/ML
RBC # BLD AUTO: 3.11 M/UL (ref 4.1–5.7)
RBC # BLD AUTO: 3.14 M/UL (ref 4.1–5.7)
WBC # BLD AUTO: 8.6 K/UL (ref 4.1–11.1)
WBC # BLD AUTO: 9 K/UL (ref 4.1–11.1)

## 2022-02-03 PROCEDURE — 74011250637 HC RX REV CODE- 250/637: Performed by: INTERNAL MEDICINE

## 2022-02-03 PROCEDURE — 97116 GAIT TRAINING THERAPY: CPT

## 2022-02-03 PROCEDURE — 99232 SBSQ HOSP IP/OBS MODERATE 35: CPT | Performed by: INTERNAL MEDICINE

## 2022-02-03 PROCEDURE — 74011000250 HC RX REV CODE- 250: Performed by: INTERNAL MEDICINE

## 2022-02-03 PROCEDURE — 65270000029 HC RM PRIVATE

## 2022-02-03 PROCEDURE — 97110 THERAPEUTIC EXERCISES: CPT

## 2022-02-03 PROCEDURE — 86140 C-REACTIVE PROTEIN: CPT

## 2022-02-03 PROCEDURE — 36415 COLL VENOUS BLD VENIPUNCTURE: CPT

## 2022-02-03 PROCEDURE — 84145 PROCALCITONIN (PCT): CPT

## 2022-02-03 PROCEDURE — 85025 COMPLETE CBC W/AUTO DIFF WBC: CPT

## 2022-02-03 RX ORDER — ONDANSETRON 4 MG/1
4 TABLET, ORALLY DISINTEGRATING ORAL
Status: DISCONTINUED | OUTPATIENT
Start: 2022-02-03 | End: 2022-02-08 | Stop reason: HOSPADM

## 2022-02-03 RX ORDER — ALBUTEROL SULFATE 2.5 MG/.5ML
2.5 SOLUTION RESPIRATORY (INHALATION)
Status: DISCONTINUED | OUTPATIENT
Start: 2022-02-03 | End: 2022-02-03

## 2022-02-03 RX ADMIN — FERROUS SULFATE TAB 325 MG (65 MG ELEMENTAL FE) 325 MG: 325 (65 FE) TAB at 10:03

## 2022-02-03 RX ADMIN — FUROSEMIDE 80 MG: 40 TABLET ORAL at 10:03

## 2022-02-03 RX ADMIN — NITROGLYCERIN 0.5 INCH: 20 OINTMENT TOPICAL at 05:58

## 2022-02-03 RX ADMIN — SODIUM CHLORIDE, PRESERVATIVE FREE 10 ML: 5 INJECTION INTRAVENOUS at 06:01

## 2022-02-03 RX ADMIN — AMIODARONE HYDROCHLORIDE 200 MG: 200 TABLET ORAL at 12:17

## 2022-02-03 RX ADMIN — FERROUS SULFATE TAB 325 MG (65 MG ELEMENTAL FE) 325 MG: 325 (65 FE) TAB at 18:24

## 2022-02-03 RX ADMIN — ASPIRIN 81 MG CHEWABLE TABLET 81 MG: 81 TABLET CHEWABLE at 10:04

## 2022-02-03 RX ADMIN — SODIUM CHLORIDE, PRESERVATIVE FREE 10 ML: 5 INJECTION INTRAVENOUS at 20:30

## 2022-02-03 NOTE — PROGRESS NOTES
[facility] KIDNEY     Renal Daily Progress Note:     Admission Date: 2022   Subjective: Mental status is good. He  looks better, not tachypneic. Awaiting transfer to SNF for rehab. Improving with PT, able t walk 100 feet with walker. 2022 --> F/U ESRD     Had HD earlier. There were no reported major difficulties with the HD session. Review of Systems  Pertinent items are noted in HPI.     Objective:     Visit Vitals  /65 (BP 1 Location: Right upper arm, BP Patient Position: At rest;Sitting)   Pulse (!) 53 Comment: informed the nurse   Temp 97.8 °F (36.6 °C)   Resp 16   Ht 6' (1.829 m)   Wt 67.5 kg (148 lb 13 oz)   SpO2 99%   BMI 20.18 kg/m²     Temp (24hrs), Av.6 °F (37 °C), Min:97.8 °F (36.6 °C), Max:99.5 °F (37.5 °C)        Intake/Output Summary (Last 24 hours) at 2/3/2022 1626  Last data filed at 2/3/2022 1335  Gross per 24 hour   Intake 360 ml   Output --   Net 360 ml     Current Facility-Administered Medications   Medication Dose Route Frequency    ondansetron (ZOFRAN ODT) tablet 4 mg  4 mg Oral Q6H PRN    furosemide (LASIX) tablet 80 mg  80 mg Oral DAILY    heparin (porcine) 1,000 unit/mL injection 3,600 Units  3,600 Units Hemodialysis DIALYSIS PRN    amiodarone (CORDARONE) tablet 200 mg  200 mg Oral DAILY    epoetin valente-epbx (RETACRIT) injection 10,000 Units  10,000 Units IntraVENous DIALYSIS MON, WED & FRI    oxyCODONE IR (ROXICODONE) tablet 5 mg  5 mg Oral Q8H PRN    sodium chloride (NS) flush 5-40 mL  5-40 mL IntraVENous Q8H    sodium chloride (NS) flush 5-40 mL  5-40 mL IntraVENous PRN    ferrous sulfate tablet 325 mg  1 Tablet Oral BID WITH MEALS    heparin (porcine) 1,000 unit/mL injection 4,000 Units  4,000 Units IntraVENous PRN    Or    heparin (porcine) 1,000 unit/mL injection 2,000 Units  2,000 Units IntraVENous PRN    metoprolol succinate (TOPROL-XL) XL tablet 50 mg  50 mg Oral DAILY    nitroglycerin (NITROBID) 2 % ointment 0.5 Inch  0.5 Inch Topical Q6H HPI:  Called patient to follow up. Patient c/o JOE and headaches with nifedipine.   Patient endorses adherence to medication regimen daily and denies missed doses. Patient denies hypotensive s/sx (lightheadedness, dizziness, nausea, fatigue); patient denies hypertensive s/sx (SOB, CP, severe headaches, changes in vision, dizziness, fatigue, confusion, anxiety, nosebleeds).     Last 5 Patient Entered Readings                                      Current 30 Day Average: 133/93     Recent Readings 1/15/2019 1/14/2019 1/14/2019 1/9/2019 1/8/2019    SBP (mmHg) 139 146 154 116 131    DBP (mmHg) 89 93 99 89 95    Pulse 73 83 78 92 86        Assessment:  Reviewed recent readings. Per 2017 ACC/ AHA HTN guidelines (goal of BP < 130/80), current 30-day average is uncontrolled.     Plan:  During our discussion, call was disconnected. Called back, went straight to , LVM requested patient call back at her convenience.    Discussed changing nifedipine back to amlodipine due to ADRs accompanied by nifedipine. Would also recommend restarting chlorthalidone.   Patients health , Prema Mcmanus, will be following up as scheduled.   I will continue to monitor regularly and will follow-up in 2 weeks.    Current medication regimen:  Hypertension Medications             NIFEdipine (ADALAT CC) 30 MG TbSR Take 1 tablet (30 mg total) by mouth once daily.         Patient denies having questions or concerns. Patient has my contact information and knows to call with any concerns or clinical changes.                           aspirin chewable tablet 81 mg  81 mg Oral DAILY       Physical Exam:    Seen in Room 477. General appearance: alert, cooperative, no distress. Head: Normocephalic. Chest :  Right tunneled HD catheter    Lungs: clear to auscultation, no wheezes, no rales    Heart: no s3 gallop ,  no pericardial rub    Abdomen: not distended    Ext: left lower leg wrapped    Neurologic: A/O x 3, no focal motor signs, able to ambulate with walker    Data Review:     LABS:  No results for input(s): NA, K, CL, CO2, BUN, CREA, CA, ALB, PHOS, MG in the last 72 hours. Iron saturation 13%    Recent Labs     02/03/22  0748   WBC 9.0   HGB 9.3*   HCT 31.7*        No results for input(s): ÉHCTOR, KU, CLU, CREAU in the last 72 hours. No lab exists for component: PROU     Cardiac cath Jan 13, 2022  Underwent cardiac cath today:  Indication: Severe left ventricular systolic dysfunction and patient had a V. Tach.     Left main coronary artery is a very large caliber vessel and has no disease. LAD artery is a very large caliber vessel and proximal mid and distal segment has no stenosis and diagonal branches has no disease. Ramus intermedius is a medium caliber vessel without disease. Circumflex artery is a large-caliber vessel and proximal mid and distal segment and AV groove segment has no stenosis. Large obtuse marginal branch and posterolateral branch has no stenosis. Right coronary artery is a large-caliber vessel and does junction of proximal and mid segment there was a concern of stenosis and that therefore it was interrogated by IFR which turned out to be 0.95 and was not deemed critical enough to do intervention.     Left ventriculography is performed in the right anterior oblique view and ejection fraction is about 20 to 25% and distal anterior wall apex and distal inferior wall is near akinetic.   This may be possible presentation of Takotsubo syndrome.     Plan: Patient will receive defibrillation      Chest x-ray January 8, 2022:  IMPRESSION     Interval placement of a right supraclavicular approach dialysis catheter with  distal tip in the proximal right atrium.     Cardiomegaly with pulmonary vascular congestion and pulmonary edema.      Stable appearing basilar right lung pneumonia. Small right pleural effusion. Assessment:   Renal Specific Problems  Chronic kidney disease stage VI, started on HD. Hypoalbumin  SVT- recurrent  Volume overload- resolved  Metabolic acidosis- resolved  Hyperkalemia- resolved  Anemia with renal failure and iron deficient  Leukocytosis corrected with declining procalcitonin  Probable right lower lobe pneumonia  Infected  leg ulcers  Venous stasis dermatitis lower extremity          Plan:     Obtain/ Order: labs/cultures/radiology/procedures:      Therapeutic:      HD in AM    Epogen with dialysis    Wound care - regarding left leg ulcer    Case management to arrange rehab and outpatient dialysis. Spoke w/ patient and . I think we could get him hoe over the weekend since SNF are shuttered due to Covid. If he has good PT today and Friday then probably could go Saturday. Will d/w Dr. Annie Weber.

## 2022-02-03 NOTE — PROGRESS NOTES
Progress Note      2/3/2022 12:19 PM  NAME: Martínez Vance   MRN:  [id number]   Admit Diagnosis: Renal failure [N19]  Hyperkalemia [E87.5]      Subjective:   Chart reviewed. Patient is on dialysis. Symptoms of shortness of breath has improved. With the patient and with the nurse. Cardiac catheterization findings discussed with the patient and also discussed with the electrophysiologist.  Vascular surgeon's note reviewed and patient refused to have peripheral angiogram.  Patient feels good. Review of Systems:    Symptom Y/N Comments  Symptom Y/N Comments   Fever/Chills n   Chest Pain n    Poor Appetite    Edema y  improving   Cough    Abdominal Pain     Sputum    Joint Pain     SOB/CARMONA y  improving  Pruritis/Rash     Nausea/vomit    Other     Diarrhea         Constipation           Could NOT obtain due to:      Objective:          Physical Exam:    Last 24hrs VS reviewed since prior progress note. Most recent are:    Visit Vitals  /65 (BP 1 Location: Right upper arm, BP Patient Position: At rest;Sitting)   Pulse (!) 53 Comment: informed the nurse   Temp 97.8 °F (36.6 °C)   Resp 16   Ht 6' (1.829 m)   Wt 67.5 kg (148 lb 13 oz)   SpO2 99%   BMI 20.18 kg/m²       Intake/Output Summary (Last 24 hours) at 2/3/2022 1223  Last data filed at 2/3/2022 0847  Gross per 24 hour   Intake 240 ml   Output --   Net 240 ml        General Appearance: Well developed, well nourished, alert & oriented x 3,    no acute distress. Ears/Nose/Mouth/Throat: Hearing grossly normal.  Neck: Supple. Chest: Lungs clear to auscultation bilaterally. Cardiovascular: Regular rate and rhythm, S1,S2 normal, no murmur. Abdomen: Soft, non-tender, bowel sounds are active. Extremities: Patient has cellulitis of the lower extremities  Skin: Warm and dry. []         Post-cath site without hematoma, bruit, tenderness, or thrill. Distal pulses intact.     PMH/SH reviewed - no change compared to H&P    Data Review    Telemetry: normal sinus rhythm , patient has some PVCs, and episode of wide-complex tachycardia possible ventricular tachycardia. EKG:   Patient had EKG that is very concerning for ventricular tachycardia. Lab Data Personally Reviewed:    Recent Labs     02/03/22  0748   WBC 9.0   HGB 9.3*   HCT 31.7*        No results for input(s): INR, PTP, APTT, INREXT, INREXT in the last 72 hours. No results for input(s): NA, K, CL, CO2, BUN, CREA, GLU, CA, MG in the last 72 hours. No results for input(s): CPK, CKNDX, TROIQ in the last 72 hours. No lab exists for component: CPKMB  No results found for: CHOL, CHOLX, CHLST, CHOLV, HDL, HDLP, LDL, LDLC, DLDLP, TGLX, TRIGL, TRIGP, CHHD, CHHDX    No results for input(s): AP, TBIL, TP, ALB, GLOB, GGT, AML, LPSE in the last 72 hours. No lab exists for component: SGOT, GPT, AMYP, HLPSE  No results for input(s): PH, PCO2, PO2 in the last 72 hours.     Medications Personally Reviewed:    Current Facility-Administered Medications   Medication Dose Route Frequency    furosemide (LASIX) tablet 80 mg  80 mg Oral DAILY    heparin (porcine) 1,000 unit/mL injection 3,600 Units  3,600 Units Hemodialysis DIALYSIS PRN    amiodarone (CORDARONE) tablet 200 mg  200 mg Oral DAILY    epoetin vaelnte-epbx (RETACRIT) injection 10,000 Units  10,000 Units IntraVENous DIALYSIS MON, WED & FRI    oxyCODONE IR (ROXICODONE) tablet 5 mg  5 mg Oral Q8H PRN    sodium chloride (NS) flush 5-40 mL  5-40 mL IntraVENous Q8H    sodium chloride (NS) flush 5-40 mL  5-40 mL IntraVENous PRN    ferrous sulfate tablet 325 mg  1 Tablet Oral BID WITH MEALS    heparin (porcine) 1,000 unit/mL injection 4,000 Units  4,000 Units IntraVENous PRN    Or    heparin (porcine) 1,000 unit/mL injection 2,000 Units  2,000 Units IntraVENous PRN    metoprolol succinate (TOPROL-XL) XL tablet 50 mg  50 mg Oral DAILY    nitroglycerin (NITROBID) 2 % ointment 0.5 Inch  0.5 Inch Topical Q6H    aspirin chewable tablet 81 mg  81 mg Oral DAILY           Problem List:   Patient has a acute on chronic renal failure and had a dialysis and is clinically better. Decompensated heart failure and ejection fraction is depressed and patient has a at least moderate possibly severe pulmonary hypertension. History of paroxysmal atrial fibrillation. Recent DVT of her left axillary vein. Wide-complex tachycardia probably ventricular tachycardia. Catheterization did not show evidence of coronary artery disease and ejection fraction was about 20 to 25%. Patient is getting restless and wants to leave. Cellulitis of the lower extremities. Patient refused peripheral arterial angiogram as advised by the vascular surgeon. 1.      Assessment/Plan:   I will continue dialysis as per recommendations of the nephrologist.  Patient is offered ICD by Dr. Arlen Bustillo however patient does not want it. Vascular surgery note reviewed and appreciated and will follow the recommendations. We will continue amiodarone and present care. Discontinue heparin and start on small dose of Eliquis because patient has a history of paroxysmal atrial fibrillation. Okay to transfer to skilled nursing facility when bed is available. We will follow infectious disease recommendations. Thank you. 1.          []       High complexity decision making was performed in this patient at high risk for decompensation with multiple organ involvement.     Dion Fuller MD

## 2022-02-03 NOTE — PROGRESS NOTES
Patient vomited up breakfast nd liquid. Called MD, new orders for antiemetics in chart. Pt denies SOB, chest pain, or weakness. Pt up self with walker. A/O x 4. Room air.

## 2022-02-03 NOTE — PROGRESS NOTES
Comprehensive Nutrition Assessment    Type and Reason for Visit: Reassess (Goal)    Nutrition Recommendations/Plan:   Continue diet. Decrease ONS (Nepro) to x1/d. Nutrition education on Renal diet. Nutrition Assessment:   Admitted for asp. PNA, renal failure, hyperkalemia, sepsis, BLE cellulitis. Pt seen sitting in chair in room this a.m. Pt reported having a good Breakfast this a.m. due to ability to modify menu items. Pt seen w/ a Special Menu on table and made aware of ability to request alternatives- reported improved willingness to eat meals. PO intake approx. 70-80% this morning and Pt consumed entirety of 1 of 4 boxes of ONS (Nepro) (provided per orders for x2/day) on table during visit; reported intent to consume a maximum of one/day. Additional ONS was kept given h/o poor intake. Possible D/C home on 2/5 per Pt- will follow up w/ nutrition education prior to discharge for ESRD on HD diet. Observed CBW as 67.5 kg w/ weight loss indicated- recommend reweighing after HD tomorrow given variance in weights may be r/t fluid balance/shifts. Labs: H/H 9.3/31.7, .9, CRP 7. 10. Meds: FerrouSul, retacrit, lasix, lopressor, aspirin, amiodarone, heparin, oxycodone PRN. Malnutrition Assessment:  Malnutrition Status: Moderate malnutrition    Context:  Acute illness     Findings of the 6 clinical characteristics of malnutrition:   Energy Intake:  No significant decrease in energy intake  Weight Loss:  Unable to assess     Body Fat Loss:  7 - Moderate body fat loss,    Muscle Mass Loss:  7 - Moderate muscle mass loss, Thigh (quadriceps)  Fluid Accumulation:  1 - Mild, Extremities,Genitals   Strength:  Not performed     Estimated Daily Nutrient Needs:  Energy (kcal): 2313 kcal/d (30 kcal/77.1 kg); Weight Used for Energy Requirements: Current  Protein (g): 93 gm/d (1.2 gm/77.1 kg);  Weight Used for Protein Requirements: Current  Fluid (ml/day): <2000 mL/d; or as indicated by Nephrology; Method Used for Fluid Requirements: 1 ml/kcal    Nutrition Related Findings:   NFPE completed w/ findings of moderate fat and muscle loss likely r/t ESRD on HD and h/o poor intake. Pt reported w/ RLL edema resolved, continued gential edema and LLE w/ mild edema- 3 L removed during HD(3.5 hrs) on 2/2 and remains on lasix. Plan is for Pt to D/C on HD per MD Donta Kumar. No N/V/D/C reported- last BM on 1/30, loose & soft. Wounds:    Venous stasis (stasis ulcer x2)       Current Nutrition Therapies:  ADULT ORAL NUTRITION SUPPLEMENT Breakfast, Dinner; Renal Supplement  ADULT DIET Easy to Chew; Low Potassium (Less than 3000 mg/day); Low Phosphorus (Less than 1000 mg)    Anthropometric Measures:  · Height:  6' (182.9 cm)  · Current Body Wt:  67.5 kg (148 lb 13 oz)   · Ideal Body Wt:  178 lbs:  83.6 %   · Adjusted Body Weight:   ; Weight Adjustment for: No adjustment   · BMI Category:  Underweight (BMI less than 22) age over 72       Nutrition Diagnosis:   · Inadequate oral intake related to renal dysfunction,inadequate protein-energy intake as evidenced by intake 51-75%,weight loss,dialysis    Nutrition Interventions:   Food and/or Nutrient Delivery: Continue current diet,Modify oral nutrition supplement  Nutrition Education and Counseling: Education needed  Coordination of Nutrition Care: Continue to monitor while inpatient    Goals:  PO intake >70% of meals in 5-7 days. Maintain weight +/-5 lb in 3 days. Improve skin integrity. Improve labs/lytes to baseline. Nutrition Monitoring and Evaluation:   Behavioral-Environmental Outcomes: None identified  Food/Nutrient Intake Outcomes: Diet advancement/tolerance,Food and nutrient intake,Supplement intake  Physical Signs/Symptoms Outcomes: Biochemical data,Meal time behavior,Skin,Weight,Fluid status or edema,Constipation    Discharge Planning:    Continue oral nutrition supplement,Continue current diet     Electronically signed by Gary Moore RD on 2/3/2022 at 1:30 PM    Contact: ext. [phone] or [employer].

## 2022-02-03 NOTE — PROGRESS NOTES
Progress Note    Patient: Mariajose Cheatham MRN: [id number]  SSN: [id number]    YOB: 1955  Age: 78 y.o. Sex: male      Admit Date: 1/7/2022    LOS: 26 days     Subjective:   Patient followed for sepsis with cellulitis both calves and aspiration pneumonia. Leg wound culture grew multiple organisms as shown below. Afebrile with low grade temperature, normal WBC, decreasing procal and CRP off antibiotics. He is anemic and may require transfusion. Objective:     Vitals:    02/02/22 2312 02/03/22 0400 02/03/22 0414 02/03/22 0800   BP: 110/79 129/82  120/75   Pulse: 62 61  60   Resp: 16 18  16   Temp: 98.4 °F (36.9 °C) 97.8 °F (36.6 °C)  99.4 °F (37.4 °C)   TempSrc:       SpO2: 95% 96%  99%   Weight:   148 lb 13 oz (67.5 kg)    Height:            Intake and Output:  Current Shift: No intake/output data recorded. Last three shifts: 02/01 1901 - 02/03 0700  In: -   Out: 3000     Physical Exam:    Vitals and nursing note reviewed. Constitutional:       Appearance: He is ill-appearing. Genitourinary:     Comments: External urinary catheter  Musculoskeletal:      Right lower leg edema resolved; 1.5 x3 cm eschar posteriorly     Left lower leg with mild edma; left calf with nonstick dressing and gauze bandage  Neurological: nonfocal, mild confusion  Psychiatric:         Behavior: Behavior normal.     Lab/Data Review:     WBC 6,000     CRP 4.56 <4.85 <4.91 <2.80 <4.66 <2.63 <2.53 < 5.30 <6.32 <8.19 <12.10 <12.10  Procal 0.59  <0. .55 <0.60 <0.72 < 0.70 <0.84 < 2.39 <3.12 < 4.41 <4.39 <5.16    MRSA nasal PCR negative    Wound cultures leg (1/8)  Staphylococcus aureus (MSSA),  Alcaligenes faecalis resistant to Zosyn, Klebsiella pneumoniae      Assessment:     Active Problems:    Renal failure (1/8/2022)      Hyperkalemia (1/8/2022)    1. Cellulitis both calves, secondary to probable S.  Aureus (MSSA), Alcaligenes faecalis resistant to Zosyn, Klebsiella pneumoniae, status post 14 days IV Antibiotics, including Meropenem, clinically resolved. 2. Aspiration pneumonia, RLL, status antibiotics as note above  3. Sepsis with leukocytosis and elevated CRP/procal, resolving     Plan:   1. No further antibiotic recommendations at this time   2. In am, repeat CRP and procal  3.  Cleared for discharge from ID standpoint       Signed By: Migue Oneill MD     February 3, 2022

## 2022-02-03 NOTE — PROGRESS NOTES
PHYSICAL THERAPY TREATMENT  Patient: Maryam Cruz (53 y.o. male)  Date: 2/3/2022  Diagnosis: Renal failure [N19]  Hyperkalemia [E87.5] <principal problem not specified>  Procedure(s) (LRB):  LEFT HEART CATH / CORONARY ANGIOGRAPHY W GRAFTS (N/A)  PERCUTANEOUS CORONARY INTERVENTION (N/A) 21 Days Post-Op  Precautions:    Chart, physical therapy assessment, plan of care and goals were reviewed. ASSESSMENT  Patient continues with skilled PT services and is progressing towards goals. Pt in bathroom upon entry and agreeable to session. Pt finished toileting, able to stand from toilet with SBA and RW for balance upon standing. Pt washed hands at sink with SBA. Pt ambulated with RW and close SBA for safety, JOANNA is narrow and gait speed is slow. Pt gait is slightly unsteady but no LOB noted. Pt completed seated therex, see details below. Pt left sitting in recliner with call bell in reach and needs met. Rec d/c to SNF vs home with HHPT and family care. Current Level of Function Impacting Discharge (mobility/balance): SBA for safety    Other factors to consider for discharge: PLOF, time since onset, assistance at home         PLAN :  Patient continues to benefit from skilled intervention to address the above impairments. Continue treatment per established plan of care. to address goals.     Recommendation for discharge: (in order for the patient to meet his/her long term goals)  SNF vs home with HHPT and family care    This discharge recommendation:  Has been made in collaboration with the attending provider and/or case management    IF patient discharges home will need the following DME: to be determined (TBD)       SUBJECTIVE:   Patient stated we can walk, but my knee arthritis in my knee is really hurting    OBJECTIVE DATA SUMMARY:   Critical Behavior:  Neurologic State: Alert  Orientation Level: Oriented X4  Cognition: Follows commands    Functional Mobility Training:  Transfers:  Sit to Stand: Supervision  Stand to Sit: Supervision  Bed to Chair: Supervision    Balance:  Sitting: Intact  Sitting - Static: Good (unsupported)  Sitting - Dynamic: Good (unsupported)  Standing: Impaired; Without support  Standing - Static: Constant support;Good  Standing - Dynamic : Constant support; Fair    Ambulation/Gait Training:  Distance (ft): 100 Feet (ft)  Assistive Device: Gait belt;Walker, rolling  Ambulation - Level of Assistance: Stand-by assistance  Base of Support: Narrowed  Speed/Sharmila: Shuffled; Slow      Therapeutic Exercises:   1 x 20 AP  1 x 15 LAQ decrease ROM on LLE 2/2 knee pain    Pain Rating:  Reporting L knee pain, 5/10     Activity Tolerance:   Fair and requires rest breaks  Please refer to the flowsheet for vital signs taken during this treatment. After treatment patient left in no apparent distress:   Sitting in chair and Call bell within reach    COMMUNICATION/COLLABORATION:   The patients plan of care was discussed with: Registered nurse. Problem: Mobility Impaired (Adult and Pediatric)  Goal: *Acute Goals and Plan of Care (Insert Text)  Description: Pt will be I with LE HEP in 7 days. Pt will perform bed mobility with mod I in 7 days. Pt will perform transfers with mod I in 7 days. Pt will amb  feet with LRAD safely with mod I in 7 days.    Outcome: Progressing Towards Goal       Qing Shepard PTA   Time Calculation: 30 mins

## 2022-02-04 LAB
BASOPHILS # BLD: 0.1 K/UL (ref 0–0.1)
BASOPHILS NFR BLD: 1 % (ref 0–1)
CRP SERPL-MCNC: 5.41 MG/DL (ref 0–0.6)
DIFFERENTIAL METHOD BLD: ABNORMAL
EOSINOPHIL # BLD: 0.5 K/UL (ref 0–0.4)
EOSINOPHIL NFR BLD: 7 % (ref 0–7)
ERYTHROCYTE [DISTWIDTH] IN BLOOD BY AUTOMATED COUNT: 15 % (ref 11.5–14.5)
HCT VFR BLD AUTO: 29.3 % (ref 36.6–50.3)
HGB BLD-MCNC: 8.5 G/DL (ref 12.1–17)
IMM GRANULOCYTES # BLD AUTO: 0.1 K/UL (ref 0–0.04)
IMM GRANULOCYTES NFR BLD AUTO: 1 % (ref 0–0.5)
LYMPHOCYTES # BLD: 0.6 K/UL (ref 0.8–3.5)
LYMPHOCYTES NFR BLD: 10 % (ref 12–49)
MCH RBC QN AUTO: 29.6 PG (ref 26–34)
MCHC RBC AUTO-ENTMCNC: 29 G/DL (ref 30–36.5)
MCV RBC AUTO: 102.1 FL (ref 80–99)
MONOCYTES # BLD: 1 K/UL (ref 0–1)
MONOCYTES NFR BLD: 15 % (ref 5–13)
NEUTS SEG # BLD: 4.3 K/UL (ref 1.8–8)
NEUTS SEG NFR BLD: 66 % (ref 32–75)
NRBC # BLD: 0 K/UL (ref 0–0.01)
NRBC BLD-RTO: 0 PER 100 WBC
PLATELET # BLD AUTO: 358 K/UL (ref 150–400)
PMV BLD AUTO: 9.7 FL (ref 8.9–12.9)
PROCALCITONIN SERPL-MCNC: 1.06 NG/ML
RBC # BLD AUTO: 2.87 M/UL (ref 4.1–5.7)
WBC # BLD AUTO: 6.5 K/UL (ref 4.1–11.1)

## 2022-02-04 PROCEDURE — 74011000250 HC RX REV CODE- 250: Performed by: INTERNAL MEDICINE

## 2022-02-04 PROCEDURE — 84145 PROCALCITONIN (PCT): CPT

## 2022-02-04 PROCEDURE — 5A1D70Z PERFORMANCE OF URINARY FILTRATION, INTERMITTENT, LESS THAN 6 HOURS PER DAY: ICD-10-PCS | Performed by: INTERNAL MEDICINE

## 2022-02-04 PROCEDURE — 85025 COMPLETE CBC W/AUTO DIFF WBC: CPT

## 2022-02-04 PROCEDURE — 74011250637 HC RX REV CODE- 250/637: Performed by: INTERNAL MEDICINE

## 2022-02-04 PROCEDURE — 90935 HEMODIALYSIS ONE EVALUATION: CPT

## 2022-02-04 PROCEDURE — 65270000029 HC RM PRIVATE

## 2022-02-04 PROCEDURE — 36415 COLL VENOUS BLD VENIPUNCTURE: CPT

## 2022-02-04 PROCEDURE — 86140 C-REACTIVE PROTEIN: CPT

## 2022-02-04 PROCEDURE — 99232 SBSQ HOSP IP/OBS MODERATE 35: CPT | Performed by: INTERNAL MEDICINE

## 2022-02-04 PROCEDURE — 74011250636 HC RX REV CODE- 250/636: Performed by: INTERNAL MEDICINE

## 2022-02-04 RX ORDER — HEPARIN SODIUM 1000 [USP'U]/ML
INJECTION, SOLUTION INTRAVENOUS; SUBCUTANEOUS
Status: DISPENSED
Start: 2022-02-04 | End: 2022-02-04

## 2022-02-04 RX ADMIN — AMIODARONE HYDROCHLORIDE 200 MG: 200 TABLET ORAL at 11:45

## 2022-02-04 RX ADMIN — FUROSEMIDE 80 MG: 40 TABLET ORAL at 11:45

## 2022-02-04 RX ADMIN — HEPARIN SODIUM 3600 UNITS: 1000 INJECTION, SOLUTION INTRAVENOUS; SUBCUTANEOUS at 09:53

## 2022-02-04 RX ADMIN — ASPIRIN 81 MG CHEWABLE TABLET 81 MG: 81 TABLET CHEWABLE at 11:45

## 2022-02-04 RX ADMIN — EPOETIN ALFA-EPBX 10000 UNITS: 10000 INJECTION, SOLUTION INTRAVENOUS; SUBCUTANEOUS at 09:53

## 2022-02-04 RX ADMIN — SODIUM CHLORIDE, PRESERVATIVE FREE 10 ML: 5 INJECTION INTRAVENOUS at 05:20

## 2022-02-04 RX ADMIN — FERROUS SULFATE TAB 325 MG (65 MG ELEMENTAL FE) 325 MG: 325 (65 FE) TAB at 11:45

## 2022-02-04 RX ADMIN — SODIUM CHLORIDE, PRESERVATIVE FREE 10 ML: 5 INJECTION INTRAVENOUS at 17:04

## 2022-02-04 RX ADMIN — SODIUM CHLORIDE, PRESERVATIVE FREE 10 ML: 5 INJECTION INTRAVENOUS at 21:04

## 2022-02-04 RX ADMIN — FERROUS SULFATE TAB 325 MG (65 MG ELEMENTAL FE) 325 MG: 325 (65 FE) TAB at 17:03

## 2022-02-04 NOTE — PROGRESS NOTES
[facility]     Renal Daily Progress Note:     Admission Date: 2022   Subjective: Mental status is good. He  looks better, not tachypneic. Seen on dialysis  2022 --> F/U ESRD     Had HD earlier. There were no reported major difficulties with the HD session. Review of Systems  Pertinent items are noted in HPI. Objective:     Visit Vitals  /81   Pulse (!) 54   Temp 98 °F (36.7 °C) (Oral)   Resp 18   Ht 6' (1.829 m)   Wt 66.6 kg (146 lb 13.2 oz)   SpO2 98%   BMI 19.91 kg/m²     Temp (24hrs), Av °F (36.7 °C), Min:97.8 °F (36.6 °C), Max:98.3 °F (36.8 °C)        Intake/Output Summary (Last 24 hours) at 2022 1055  Last data filed at 2/3/2022 1335  Gross per 24 hour   Intake 120 ml   Output --   Net 120 ml     Current Facility-Administered Medications   Medication Dose Route Frequency    ondansetron (ZOFRAN ODT) tablet 4 mg  4 mg Oral Q6H PRN    furosemide (LASIX) tablet 80 mg  80 mg Oral DAILY    heparin (porcine) 1,000 unit/mL injection 3,600 Units  3,600 Units Hemodialysis DIALYSIS PRN    amiodarone (CORDARONE) tablet 200 mg  200 mg Oral DAILY    epoetin valente-epbx (RETACRIT) injection 10,000 Units  10,000 Units IntraVENous DIALYSIS MON, WED & FRI    oxyCODONE IR (ROXICODONE) tablet 5 mg  5 mg Oral Q8H PRN    sodium chloride (NS) flush 5-40 mL  5-40 mL IntraVENous Q8H    sodium chloride (NS) flush 5-40 mL  5-40 mL IntraVENous PRN    ferrous sulfate tablet 325 mg  1 Tablet Oral BID WITH MEALS    heparin (porcine) 1,000 unit/mL injection 4,000 Units  4,000 Units IntraVENous PRN    Or    heparin (porcine) 1,000 unit/mL injection 2,000 Units  2,000 Units IntraVENous PRN    metoprolol succinate (TOPROL-XL) XL tablet 50 mg  50 mg Oral DAILY    nitroglycerin (NITROBID) 2 % ointment 0.5 Inch  0.5 Inch Topical Q6H    aspirin chewable tablet 81 mg  81 mg Oral DAILY       Physical Exam:    General appearance: alert, cooperative, no distress. Head: Normocephalic.     Chest : Right tunneled HD catheter    Lungs: clear to auscultation, no wheezes, no rales    Heart: no s3 gallop ,  no pericardial rub    Abdomen: not distended    Ext: left lower leg wrapped    Neurologic: A/O x 3, no focal motor signs    Data Review:     LABS:  No results for input(s): NA, K, CL, CO2, BUN, CREA, CA, ALB, PHOS, MG in the last 72 hours. Iron saturation 13%    Recent Labs     02/04/22  0654 02/03/22  2137 02/03/22  0748   WBC 6.5 8.6 9.0   HGB 8.5* 9.4* 9.3*   HCT 29.3* 32.0* 31.7*    352 383     No results for input(s): HÉCTOR, KU, CLU, CREAU in the last 72 hours. No lab exists for component: PROU     Cardiac cath Jan 13, 2022  Underwent cardiac cath today:  Indication: Severe left ventricular systolic dysfunction and patient had a V. Tach.     Left main coronary artery is a very large caliber vessel and has no disease. LAD artery is a very large caliber vessel and proximal mid and distal segment has no stenosis and diagonal branches has no disease. Ramus intermedius is a medium caliber vessel without disease. Circumflex artery is a large-caliber vessel and proximal mid and distal segment and AV groove segment has no stenosis. Large obtuse marginal branch and posterolateral branch has no stenosis. Right coronary artery is a large-caliber vessel and does junction of proximal and mid segment there was a concern of stenosis and that therefore it was interrogated by IFR which turned out to be 0.95 and was not deemed critical enough to do intervention.     Left ventriculography is performed in the right anterior oblique view and ejection fraction is about 20 to 25% and distal anterior wall apex and distal inferior wall is near akinetic.   This may be possible presentation of Takotsubo syndrome.     Plan: Patient will receive defibrillation      Chest x-ray January 8, 2022:  IMPRESSION     Interval placement of a right supraclavicular approach dialysis catheter with  distal tip in the proximal right atrium.     Cardiomegaly with pulmonary vascular congestion and pulmonary edema.      Stable appearing basilar right lung pneumonia. Small right pleural effusion. Assessment:   Renal Specific Problems  Chronic kidney disease stage VI, started on HD. Hypoalbumin  SVT- recurrent  Volume overload- resolved  Metabolic acidosis- resolved  Hyperkalemia- resolved  Anemia with renal failure and iron deficient  Leukocytosis corrected with declining procalcitonin  Probable right lower lobe pneumonia  Infected  leg ulcers  Venous stasis dermatitis lower extremity          Plan:     Obtain/ Order: labs/cultures/radiology/procedures:      Therapeutic:      HD ongoing    Epogen with dialysis    Wound care - regarding left leg ulcer    Case management to arrange rehab and outpatient dialysis. Spoke w/ patient and . I think we could get him home over the weekend since SNF are shuttered due to Covid. If he has good PT today then probably could go Saturday. Will d/w Dr. Martha Aguilera.

## 2022-02-04 NOTE — PROGRESS NOTES
Progress Note    Colt De Leon MD             Daily Progress Note: 2/4/2022      Subjective: The patient is seen for follow  up. Looks and feels tired. But yesterday he was able to walk about 115 feet. Patient expressed his desire to go home if there is no placement on Saturday  Problem List:  Problem List as of 2/4/2022 Never Reviewed          Codes Class Noted - Resolved    Renal failure ICD-10-CM: N19  ICD-9-CM: 183  1/8/2022 - Present        Hyperkalemia ICD-10-CM: E87.5  ICD-9-CM: 276.7  1/8/2022 - Present        Pulmonary edema ICD-10-CM: J81.1  ICD-9-CM: 269  7/13/2021 - Present        GI bleed ICD-10-CM: K92.2  ICD-9-CM: 578.9  1/5/2021 - Present              Medications reviewed  Current Facility-Administered Medications   Medication Dose Route Frequency    ondansetron (ZOFRAN ODT) tablet 4 mg  4 mg Oral Q6H PRN    furosemide (LASIX) tablet 80 mg  80 mg Oral DAILY    heparin (porcine) 1,000 unit/mL injection 3,600 Units  3,600 Units Hemodialysis DIALYSIS PRN    amiodarone (CORDARONE) tablet 200 mg  200 mg Oral DAILY    epoetin valente-epbx (RETACRIT) injection 10,000 Units  10,000 Units IntraVENous DIALYSIS MON, WED & FRI    oxyCODONE IR (ROXICODONE) tablet 5 mg  5 mg Oral Q8H PRN    sodium chloride (NS) flush 5-40 mL  5-40 mL IntraVENous Q8H    sodium chloride (NS) flush 5-40 mL  5-40 mL IntraVENous PRN    ferrous sulfate tablet 325 mg  1 Tablet Oral BID WITH MEALS    heparin (porcine) 1,000 unit/mL injection 4,000 Units  4,000 Units IntraVENous PRN    Or    heparin (porcine) 1,000 unit/mL injection 2,000 Units  2,000 Units IntraVENous PRN    metoprolol succinate (TOPROL-XL) XL tablet 50 mg  50 mg Oral DAILY    nitroglycerin (NITROBID) 2 % ointment 0.5 Inch  0.5 Inch Topical Q6H    aspirin chewable tablet 81 mg  81 mg Oral DAILY       Review of Systems:   A comprehensive review of systems was negative except for that written in the HPI.     Objective:   Physical Exam:     Visit Vitals  /64   Pulse 67   Temp 98.1 °F (36.7 °C)   Resp 18   Ht 6' (1.829 m)   Wt 66.6 kg (146 lb 13.2 oz)   SpO2 96%   BMI 19.91 kg/m²    O2 Flow Rate (L/min): 2 l/min O2 Device: None (Room air)    Temp (24hrs), Av.1 °F (36.7 °C), Min:98 °F (36.7 °C), Max:98.3 °F (36.8 °C)    701 - 1900  In: -   Out: 7802    1901 - 700  In: 360 [P.O.:360]  Out: -     General:  Alert, cooperative, no distress, appears stated age. Lungs:   Clear to auscultation bilaterally. Chest wall:  No tenderness or deformity. Heart:  Regular rate and rhythm, S1, S2 normal, no murmur, click, rub or gallop. Abdomen:   Soft, non-tender. Bowel sounds normal. No masses,  No organomegaly. Extremities: Extremities normal, atraumatic, no cyanosis or edema. Pulses: 2+ and symmetric all extremities. Skin: Skin color, texture, turgor normal. No rashes or lesions   Neurologic: CNII-XII intact. No gross sensory or motor deficits     Data Review:       Recent Days:  Recent Labs     22  0654 22  2137 22  0748   WBC 6.5 8.6 9.0   HGB 8.5* 9.4* 9.3*   HCT 29.3* 32.0* 31.7*    352 383     No results for input(s): NA, K, CL, CO2, GLU, BUN, CREA, CA, MG, PHOS, ALB, TBIL, TBILI, ALT, INR, INREXT in the last 72 hours. No lab exists for component: SGOT  No results for input(s): PH, PCO2, PO2, HCO3, FIO2 in the last 72 hours. Results     ** No results found for the last 336 hours.  **         24 Hour Results:  Recent Results (from the past 24 hour(s))   CBC WITH AUTOMATED DIFF    Collection Time: 22  9:37 PM   Result Value Ref Range    WBC 8.6 4.1 - 11.1 K/uL    RBC 3.14 (L) 4.10 - 5.70 M/uL    HGB 9.4 (L) 12.1 - 17.0 g/dL    HCT 32.0 (L) 36.6 - 50.3 %    .9 (H) 80.0 - 99.0 FL    MCH 29.9 26.0 - 34.0 PG    MCHC 29.4 (L) 30.0 - 36.5 g/dL    RDW 15.2 (H) 11.5 - 14.5 %    PLATELET 891 102 - 596 K/uL    MPV 9.5 8.9 - 12.9 FL    NRBC 0.0 0.0  WBC    ABSOLUTE NRBC 0.00 0.00 - 0.01 K/uL    NEUTROPHILS 73 32 - 75 %    LYMPHOCYTES 9 (L) 12 - 49 %    MONOCYTES 11 5 - 13 %    EOSINOPHILS 5 0 - 7 %    BASOPHILS 1 0 - 1 %    IMMATURE GRANULOCYTES 1 (H) 0 - 0.5 %    ABS. NEUTROPHILS 6.3 1.8 - 8.0 K/UL    ABS. LYMPHOCYTES 0.8 0.8 - 3.5 K/UL    ABS. MONOCYTES 1.0 0.0 - 1.0 K/UL    ABS. EOSINOPHILS 0.4 0.0 - 0.4 K/UL    ABS. BASOPHILS 0.1 0.0 - 0.1 K/UL    ABS. IMM. GRANS. 0.1 (H) 0.00 - 0.04 K/UL    DF AUTOMATED     CBC WITH AUTOMATED DIFF    Collection Time: 02/04/22  6:54 AM   Result Value Ref Range    WBC 6.5 4.1 - 11.1 K/uL    RBC 2.87 (L) 4.10 - 5.70 M/uL    HGB 8.5 (L) 12.1 - 17.0 g/dL    HCT 29.3 (L) 36.6 - 50.3 %    .1 (H) 80.0 - 99.0 FL    MCH 29.6 26.0 - 34.0 PG    MCHC 29.0 (L) 30.0 - 36.5 g/dL    RDW 15.0 (H) 11.5 - 14.5 %    PLATELET 121 203 - 200 K/uL    MPV 9.7 8.9 - 12.9 FL    NRBC 0.0 0.0  WBC    ABSOLUTE NRBC 0.00 0.00 - 0.01 K/uL    NEUTROPHILS 66 32 - 75 %    LYMPHOCYTES 10 (L) 12 - 49 %    MONOCYTES 15 (H) 5 - 13 %    EOSINOPHILS 7 0 - 7 %    BASOPHILS 1 0 - 1 %    IMMATURE GRANULOCYTES 1 (H) 0 - 0.5 %    ABS. NEUTROPHILS 4.3 1.8 - 8.0 K/UL    ABS. LYMPHOCYTES 0.6 (L) 0.8 - 3.5 K/UL    ABS. MONOCYTES 1.0 0.0 - 1.0 K/UL    ABS. EOSINOPHILS 0.5 (H) 0.0 - 0.4 K/UL    ABS. BASOPHILS 0.1 0.0 - 0.1 K/UL    ABS. IMM. GRANS. 0.1 (H) 0.00 - 0.04 K/UL    DF AUTOMATED     C REACTIVE PROTEIN, QT    Collection Time: 02/04/22  6:54 AM   Result Value Ref Range    C-Reactive protein 5.41 (H) 0.00 - 0.60 mg/dL   PROCALCITONIN    Collection Time: 02/04/22  6:54 AM   Result Value Ref Range    Procalcitonin 1.06 (H) 0 ng/mL       IR INSERT TUNL CVC W/O PORT OVER 5 YR   Final Result   Successful conversion of a right internal jugular temporary dialysis catheter to   a 23 cm PermCath. The catheter is functioning and is ready for use. IR FLUORO GUIDE PLC CVAD   Final Result   Successful conversion of a right internal jugular temporary dialysis catheter to   a 23 cm PermCath. The catheter is functioning and is ready for use. DUPLEX LOW EXT ARTERIES WITH JAVON   Final Result      XR CHEST PORT   Final Result      Interval placement of a right supraclavicular approach dialysis catheter with   distal tip in the proximal right atrium. Cardiomegaly with pulmonary vascular congestion and pulmonary edema. Stable appearing basilar right lung pneumonia. Small right pleural effusion. IR INSERT NON TUNL CVC OVER 5 YRS   Final Result      Technically successful insertion of non-tunneled Trialysis catheter with US   guidance. Plan:       Post catheter placement chest xray confirmed appropriate position of the   catheter. The catheter is appropriate for immediate use.   _______________________________________________________________      PROCEDURE SUMMARY:   - Venous access with ultrasound guidance   - Non-tunneled central venous catheter insertion      PROCEDURE DETAILS:      Pre-procedure   Relevant imaging review: None    Informed consent for the procedure was obtained and time-out was performed prior   to the procedure. Prophylactic antibiotic administered: Not administered   Preparation: The site was prepared and draped using all elements of maximal   sterile barrier technique including sterile gloves, sterile gown, cap, mask,   large sterile sheet, sterile ultrasound probe cover, sterile ultrasound gel,   hand hygiene and cutaneous antisepsis with 2% chlorhexidine. Medical reason for site preparation exception: Not applicable      Anesthesia/sedation   Level of anesthesia: No sedation   Anesthesia administered by: Not applicable   Duration of anesthesia/sedation: 0 minutes. Access   The vessel was sonographically evaluated and judged to be patent and appropriate   for access and a permanent image was stored. Three mLs of 1% Lidocaine was   administered to the access site. Real time ultrasound was used to visualize   needle entry into the vessel.     Vein accessed: Right internal jugular vein   Access technique: 4F micropuncture set      Catheter placement   The access site was dilated and the catheter was placed into the vein over a   wire and all guidewires were removed in their entirety. Catheter ports were   aspirated and flushed. Catheter tip location was verified by portable X-ray. Catheter size:13 [place]   Catheter length: 20 cm   Catheter tip position: Proximal right atrium   Catheter flush: Heparin (100 units/mL)      Closure   The catheter was secured. A sterile dressing was applied. Catheter securement technique: Non-absorbable suture      Additional Medications   None      Additional Details   Estimated blood loss:  Less than 1 mL   Additional description of procedure: None   Additional findings: None   Additional equipment: None   Specimens removed: None                     IR US GUIDED VASCULAR ACCESS   Final Result      Technically successful insertion of non-tunneled Trialysis catheter with US   guidance. Plan:       Post catheter placement chest xray confirmed appropriate position of the   catheter. The catheter is appropriate for immediate use.   _______________________________________________________________      PROCEDURE SUMMARY:   - Venous access with ultrasound guidance   - Non-tunneled central venous catheter insertion      PROCEDURE DETAILS:      Pre-procedure   Relevant imaging review: None    Informed consent for the procedure was obtained and time-out was performed prior   to the procedure. Prophylactic antibiotic administered: Not administered   Preparation: The site was prepared and draped using all elements of maximal   sterile barrier technique including sterile gloves, sterile gown, cap, mask,   large sterile sheet, sterile ultrasound probe cover, sterile ultrasound gel,   hand hygiene and cutaneous antisepsis with 2% chlorhexidine.     Medical reason for site preparation exception: Not applicable      Anesthesia/sedation Level of anesthesia: No sedation   Anesthesia administered by: Not applicable   Duration of anesthesia/sedation: 0 minutes. Access   The vessel was sonographically evaluated and judged to be patent and appropriate   for access and a permanent image was stored. Three mLs of 1% Lidocaine was   administered to the access site. Real time ultrasound was used to visualize   needle entry into the vessel. Vein accessed: Right internal jugular vein   Access technique: 4F micropuncture set      Catheter placement   The access site was dilated and the catheter was placed into the vein over a   wire and all guidewires were removed in their entirety. Catheter ports were   aspirated and flushed. Catheter tip location was verified by portable X-ray. Catheter size:13 [place]   Catheter length: 20 cm   Catheter tip position: Proximal right atrium   Catheter flush: Heparin (100 units/mL)      Closure   The catheter was secured. A sterile dressing was applied.    Catheter securement technique: Non-absorbable suture      Additional Medications   None      Additional Details   Estimated blood loss:  Less than 1 mL   Additional description of procedure: None   Additional findings: None   Additional equipment: None   Specimens removed: None                     XR CHEST PORT   Final Result   Airspace opacity and effusion at the right lung base and in the   acute setting would suggest pneumonia and/or aspiration however the patient also   appears to have baseline/background vascular congestion and/or pulmonary   arterial hypertension, noting cardiomegaly and/or pericardial effusion      IR INSERT NON TUNL CVC OVER 5 YRS    (Results Pending)        Assessment: CRF on dialysis  Hypertension  CHF  Ventricular dysrhythmia  PAD        Plan: Patient has refused for ICD as well as arteriogram.  If no placement is possible then will try to discharge him with home physical therapy and [employer] [employer] 52 discussed with: Patient/Family    Total time spent with patient: 30 minutes.     Gretel Alejandro MD

## 2022-02-04 NOTE — PROGRESS NOTES
Progress Note    Jose Parra MD             Daily Progress Note: 2/4/2022      Subjective: The patient is seen for follow  up. Patient just came in from dialysis feels very weak and tired he is awaiting for the physical therapy  Problem List:  Problem List as of 2/4/2022 Never Reviewed          Codes Class Noted - Resolved    Renal failure ICD-10-CM: N19  ICD-9-CM: 311  1/8/2022 - Present        Hyperkalemia ICD-10-CM: E87.5  ICD-9-CM: 276.7  1/8/2022 - Present        Pulmonary edema ICD-10-CM: J81.1  ICD-9-CM: 587  7/13/2021 - Present        GI bleed ICD-10-CM: K92.2  ICD-9-CM: 578.9  1/5/2021 - Present              Medications reviewed  Current Facility-Administered Medications   Medication Dose Route Frequency    ondansetron (ZOFRAN ODT) tablet 4 mg  4 mg Oral Q6H PRN    furosemide (LASIX) tablet 80 mg  80 mg Oral DAILY    heparin (porcine) 1,000 unit/mL injection 3,600 Units  3,600 Units Hemodialysis DIALYSIS PRN    amiodarone (CORDARONE) tablet 200 mg  200 mg Oral DAILY    epoetin valente-epbx (RETACRIT) injection 10,000 Units  10,000 Units IntraVENous DIALYSIS MON, WED & FRI    oxyCODONE IR (ROXICODONE) tablet 5 mg  5 mg Oral Q8H PRN    sodium chloride (NS) flush 5-40 mL  5-40 mL IntraVENous Q8H    sodium chloride (NS) flush 5-40 mL  5-40 mL IntraVENous PRN    ferrous sulfate tablet 325 mg  1 Tablet Oral BID WITH MEALS    heparin (porcine) 1,000 unit/mL injection 4,000 Units  4,000 Units IntraVENous PRN    Or    heparin (porcine) 1,000 unit/mL injection 2,000 Units  2,000 Units IntraVENous PRN    metoprolol succinate (TOPROL-XL) XL tablet 50 mg  50 mg Oral DAILY    nitroglycerin (NITROBID) 2 % ointment 0.5 Inch  0.5 Inch Topical Q6H    aspirin chewable tablet 81 mg  81 mg Oral DAILY       Review of Systems:   A comprehensive review of systems was negative except for that written in the HPI.     Objective:   Physical Exam:     Visit Vitals  /64   Pulse 67   Temp 98.1 °F (36.7 °C) Resp 18   Ht 6' (1.829 m)   Wt 66.6 kg (146 lb 13.2 oz)   SpO2 96%   BMI 19.91 kg/m²    O2 Flow Rate (L/min): 2 l/min O2 Device: None (Room air)    Temp (24hrs), Av.1 °F (36.7 °C), Min:98 °F (36.7 °C), Max:98.3 °F (36.8 °C)    701 - 1900  In: -   Out: 3368    1901 - 700  In: 360 [P.O.:360]  Out: -     General:  Alert, cooperative, no distress, appears stated age. Lungs:   Clear to auscultation bilaterally. Chest wall:  No tenderness or deformity. Heart:  Regular rate and rhythm, S1, S2 normal, no murmur, click, rub or gallop. Abdomen:   Soft, non-tender. Bowel sounds normal. No masses,  No organomegaly. Extremities: Extremities normal, atraumatic, no cyanosis edema much better as patient is lying in the bed   Pulses: 2+ and symmetric all extremities. Skin: Skin color, texture, turgor normal. No rashes or lesions   Neurologic: CNII-XII intact. No gross sensory or motor deficits     Data Review:       Recent Days:  Recent Labs     22  0654 22  2137 22  0748   WBC 6.5 8.6 9.0   HGB 8.5* 9.4* 9.3*   HCT 29.3* 32.0* 31.7*    352 383     No results for input(s): NA, K, CL, CO2, GLU, BUN, CREA, CA, MG, PHOS, ALB, TBIL, TBILI, ALT, INR, INREXT in the last 72 hours. No lab exists for component: SGOT  No results for input(s): PH, PCO2, PO2, HCO3, FIO2 in the last 72 hours. Results     ** No results found for the last 336 hours.  **         24 Hour Results:  Recent Results (from the past 24 hour(s))   CBC WITH AUTOMATED DIFF    Collection Time: 22  9:37 PM   Result Value Ref Range    WBC 8.6 4.1 - 11.1 K/uL    RBC 3.14 (L) 4.10 - 5.70 M/uL    HGB 9.4 (L) 12.1 - 17.0 g/dL    HCT 32.0 (L) 36.6 - 50.3 %    .9 (H) 80.0 - 99.0 FL    MCH 29.9 26.0 - 34.0 PG    MCHC 29.4 (L) 30.0 - 36.5 g/dL    RDW 15.2 (H) 11.5 - 14.5 %    PLATELET 564 979 - 800 K/uL    MPV 9.5 8.9 - 12.9 FL    NRBC 0.0 0.0  WBC    ABSOLUTE NRBC 0.00 0.00 - 0.01 K/uL NEUTROPHILS 73 32 - 75 %    LYMPHOCYTES 9 (L) 12 - 49 %    MONOCYTES 11 5 - 13 %    EOSINOPHILS 5 0 - 7 %    BASOPHILS 1 0 - 1 %    IMMATURE GRANULOCYTES 1 (H) 0 - 0.5 %    ABS. NEUTROPHILS 6.3 1.8 - 8.0 K/UL    ABS. LYMPHOCYTES 0.8 0.8 - 3.5 K/UL    ABS. MONOCYTES 1.0 0.0 - 1.0 K/UL    ABS. EOSINOPHILS 0.4 0.0 - 0.4 K/UL    ABS. BASOPHILS 0.1 0.0 - 0.1 K/UL    ABS. IMM. GRANS. 0.1 (H) 0.00 - 0.04 K/UL    DF AUTOMATED     CBC WITH AUTOMATED DIFF    Collection Time: 02/04/22  6:54 AM   Result Value Ref Range    WBC 6.5 4.1 - 11.1 K/uL    RBC 2.87 (L) 4.10 - 5.70 M/uL    HGB 8.5 (L) 12.1 - 17.0 g/dL    HCT 29.3 (L) 36.6 - 50.3 %    .1 (H) 80.0 - 99.0 FL    MCH 29.6 26.0 - 34.0 PG    MCHC 29.0 (L) 30.0 - 36.5 g/dL    RDW 15.0 (H) 11.5 - 14.5 %    PLATELET 341 549 - 599 K/uL    MPV 9.7 8.9 - 12.9 FL    NRBC 0.0 0.0  WBC    ABSOLUTE NRBC 0.00 0.00 - 0.01 K/uL    NEUTROPHILS 66 32 - 75 %    LYMPHOCYTES 10 (L) 12 - 49 %    MONOCYTES 15 (H) 5 - 13 %    EOSINOPHILS 7 0 - 7 %    BASOPHILS 1 0 - 1 %    IMMATURE GRANULOCYTES 1 (H) 0 - 0.5 %    ABS. NEUTROPHILS 4.3 1.8 - 8.0 K/UL    ABS. LYMPHOCYTES 0.6 (L) 0.8 - 3.5 K/UL    ABS. MONOCYTES 1.0 0.0 - 1.0 K/UL    ABS. EOSINOPHILS 0.5 (H) 0.0 - 0.4 K/UL    ABS. BASOPHILS 0.1 0.0 - 0.1 K/UL    ABS. IMM. GRANS. 0.1 (H) 0.00 - 0.04 K/UL    DF AUTOMATED     C REACTIVE PROTEIN, QT    Collection Time: 02/04/22  6:54 AM   Result Value Ref Range    C-Reactive protein 5.41 (H) 0.00 - 0.60 mg/dL   PROCALCITONIN    Collection Time: 02/04/22  6:54 AM   Result Value Ref Range    Procalcitonin 1.06 (H) 0 ng/mL       IR INSERT TUNL CVC W/O PORT OVER 5 YR   Final Result   Successful conversion of a right internal jugular temporary dialysis catheter to   a 23 cm PermCath. The catheter is functioning and is ready for use. IR FLUORO GUIDE PLC CVAD   Final Result   Successful conversion of a right internal jugular temporary dialysis catheter to   a 23 cm PermCath.  The catheter is functioning and is ready for use. DUPLEX LOW EXT ARTERIES WITH JAVON   Final Result      XR CHEST PORT   Final Result      Interval placement of a right supraclavicular approach dialysis catheter with   distal tip in the proximal right atrium. Cardiomegaly with pulmonary vascular congestion and pulmonary edema. Stable appearing basilar right lung pneumonia. Small right pleural effusion. IR INSERT NON TUNL CVC OVER 5 YRS   Final Result      Technically successful insertion of non-tunneled Trialysis catheter with US   guidance. Plan:       Post catheter placement chest xray confirmed appropriate position of the   catheter. The catheter is appropriate for immediate use.   _______________________________________________________________      PROCEDURE SUMMARY:   - Venous access with ultrasound guidance   - Non-tunneled central venous catheter insertion      PROCEDURE DETAILS:      Pre-procedure   Relevant imaging review: None    Informed consent for the procedure was obtained and time-out was performed prior   to the procedure. Prophylactic antibiotic administered: Not administered   Preparation: The site was prepared and draped using all elements of maximal   sterile barrier technique including sterile gloves, sterile gown, cap, mask,   large sterile sheet, sterile ultrasound probe cover, sterile ultrasound gel,   hand hygiene and cutaneous antisepsis with 2% chlorhexidine. Medical reason for site preparation exception: Not applicable      Anesthesia/sedation   Level of anesthesia: No sedation   Anesthesia administered by: Not applicable   Duration of anesthesia/sedation: 0 minutes. Access   The vessel was sonographically evaluated and judged to be patent and appropriate   for access and a permanent image was stored. Three mLs of 1% Lidocaine was   administered to the access site. Real time ultrasound was used to visualize   needle entry into the vessel.     Vein accessed: Right internal jugular vein   Access technique: 4F micropuncture set      Catheter placement   The access site was dilated and the catheter was placed into the vein over a   wire and all guidewires were removed in their entirety. Catheter ports were   aspirated and flushed. Catheter tip location was verified by portable X-ray. Catheter size:13 [place]   Catheter length: 20 cm   Catheter tip position: Proximal right atrium   Catheter flush: Heparin (100 units/mL)      Closure   The catheter was secured. A sterile dressing was applied. Catheter securement technique: Non-absorbable suture      Additional Medications   None      Additional Details   Estimated blood loss:  Less than 1 mL   Additional description of procedure: None   Additional findings: None   Additional equipment: None   Specimens removed: None                     IR US GUIDED VASCULAR ACCESS   Final Result      Technically successful insertion of non-tunneled Trialysis catheter with US   guidance. Plan:       Post catheter placement chest xray confirmed appropriate position of the   catheter. The catheter is appropriate for immediate use.   _______________________________________________________________      PROCEDURE SUMMARY:   - Venous access with ultrasound guidance   - Non-tunneled central venous catheter insertion      PROCEDURE DETAILS:      Pre-procedure   Relevant imaging review: None    Informed consent for the procedure was obtained and time-out was performed prior   to the procedure. Prophylactic antibiotic administered: Not administered   Preparation: The site was prepared and draped using all elements of maximal   sterile barrier technique including sterile gloves, sterile gown, cap, mask,   large sterile sheet, sterile ultrasound probe cover, sterile ultrasound gel,   hand hygiene and cutaneous antisepsis with 2% chlorhexidine.     Medical reason for site preparation exception: Not applicable      Anesthesia/sedation   Level of anesthesia: No sedation   Anesthesia administered by: Not applicable   Duration of anesthesia/sedation: 0 minutes. Access   The vessel was sonographically evaluated and judged to be patent and appropriate   for access and a permanent image was stored. Three mLs of 1% Lidocaine was   administered to the access site. Real time ultrasound was used to visualize   needle entry into the vessel. Vein accessed: Right internal jugular vein   Access technique: 4F micropuncture set      Catheter placement   The access site was dilated and the catheter was placed into the vein over a   wire and all guidewires were removed in their entirety. Catheter ports were   aspirated and flushed. Catheter tip location was verified by portable X-ray. Catheter size:13 Citizen of [place]   Catheter length: 20 cm   Catheter tip position: Proximal right atrium   Catheter flush: Heparin (100 units/mL)      Closure   The catheter was secured. A sterile dressing was applied. Catheter securement technique: Non-absorbable suture      Additional Medications   None      Additional Details   Estimated blood loss:  Less than 1 mL   Additional description of procedure: None   Additional findings: None   Additional equipment: None   Specimens removed: None                     XR CHEST PORT   Final Result   Airspace opacity and effusion at the right lung base and in the   acute setting would suggest pneumonia and/or aspiration however the patient also   appears to have baseline/background vascular congestion and/or pulmonary   arterial hypertension, noting cardiomegaly and/or pericardial effusion      IR INSERT NON TUNL CVC OVER 5 YRS    (Results Pending)        Assessment: CHF  CRF on dialysis  Hypertension  PAD          Plan: Patient has refused for arteriogram as well as ICD placement  Awaiting for the placement.   If no placement available may try to send him home with physical therapy and occupational therapy        Care Plan discussed with: Patient/Family    Total time spent with patient: 30 minutes.     Na Watts MD

## 2022-02-04 NOTE — PROGRESS NOTES
Progress Note    Patient: Lorena Irene MRN: [id number]  SSN: [id number]    YOB: 1955  Age: 78 y.o. Sex: male      Admit Date: 1/7/2022    LOS: 27 days     Subjective:   Patient followed for sepsis with cellulitis both calves and aspiration pneumonia. Leg wound culture grew multiple organisms as shown below. Afebrile with low grade temperature, normal WBC, but  procal and CRP have been increasing off antibiotics. He apparently ha refused arteriogram and ICD placement. He is lying in bed with no new complaints. No cough, no dysuria (oliguric). Objective:     Vitals:    02/03/22 1940 02/04/22 0024 02/04/22 0400 02/04/22 0745   BP: (!) 95/56 121/80 113/78 123/81   Pulse: (!) 58 (!) 56 (!) 58 (!) 54   Resp: 16 18 18    Temp: 98.3 °F (36.8 °C) 98 °F (36.7 °C) 98 °F (36.7 °C) 98 °F (36.7 °C)   TempSrc:    Oral   SpO2: 100% 100% 98%    Weight:   146 lb 13.2 oz (66.6 kg)    Height:            Intake and Output:  Current Shift: No intake/output data recorded. Last three shifts: 02/02 1901 - 02/04 0700  In: 360 [P.O.:360]  Out: -     Physical Exam:    Vitals and nursing note reviewed. Constitutional:       Appearance: He is ill-appearing. Genitourinary:     Comments: External urinary catheter  Musculoskeletal:      Right lower leg edema resolved; 1.5 x3 cm eschar posteriorly     Left lower leg with mild edma; left calf with nonstick dressing and gauze bandage  Neurological: nonfocal, mild confusion  Psychiatric:         Behavior: Behavior normal.     Lab/Data Review:     WBC 6,500     CRP 5.41 <4.56 <4.85 <4.91 <2.80 <4.66 <2.63 <2.53 < 5.30 <6.32 <8.19 <12.10 <12.10  Procal 1.06 < 0.59  <0. .55 <0.60 <0.72 < 0.70 <0.84 < 2.39 <3.12 < 4.41 <4.39 <5.16    MRSA nasal PCR negative    Wound cultures leg (1/8)  Staphylococcus aureus (MSSA),  Alcaligenes faecalis resistant to Zosyn, Klebsiella pneumoniae      Assessment:     Active Problems:    Renal failure (1/8/2022)      Hyperkalemia (1/8/2022)    1. Cellulitis both calves, secondary to probable S. Aureus (MSSA), Alcaligenes faecalis resistant to Zosyn, Klebsiella pneumoniae, status post 14 days IV Antibiotics, including Meropenem, clinically resolved. 2. Aspiration pneumonia, RLL, status antibiotics as note above  3. Sepsis with leukocytosis and elevated CRP/procal, resolving    Comment:  Unclear why procal and CRP increasing as leg wounds are resolving. No other obvious infection. Plan:   1. No further antibiotic recommendations at this time   2. Will continue to follow  3.  Awaiting placement     Signed By: Kourtney Quispe MD     February 4, 2022

## 2022-02-04 NOTE — PROGRESS NOTES
CM spoke to Juwan Abdul with [facility] in [place]. Faxed over CXR, H and P, Hep B panel and dialysis flowsheets to [phone] as requested. Juwan Abdul states they will not be able to start dialysis on Monday, more than likely will be Wednesday morning dialysis in [facility]. Notified patient he would be discharged on Monday after dialysis.

## 2022-02-04 NOTE — PROGRESS NOTES
Pt approached for OT reassessment at 11:37 AM, however, pt requesting therapy return at another time as pt is eating. OT will attempt reassess at a later time. Thank you.

## 2022-02-04 NOTE — PROGRESS NOTES
Tx initiated via a patent right chest no s/s of infection or skin breakdown. Net fluid removal goal is 3kgs and 3.5 hours.  Epo 10k given

## 2022-02-05 PROCEDURE — 65270000029 HC RM PRIVATE

## 2022-02-05 PROCEDURE — 74011250637 HC RX REV CODE- 250/637: Performed by: INTERNAL MEDICINE

## 2022-02-05 PROCEDURE — 99232 SBSQ HOSP IP/OBS MODERATE 35: CPT | Performed by: INTERNAL MEDICINE

## 2022-02-05 PROCEDURE — 74011000250 HC RX REV CODE- 250: Performed by: INTERNAL MEDICINE

## 2022-02-05 RX ADMIN — FERROUS SULFATE TAB 325 MG (65 MG ELEMENTAL FE) 325 MG: 325 (65 FE) TAB at 14:31

## 2022-02-05 RX ADMIN — FERROUS SULFATE TAB 325 MG (65 MG ELEMENTAL FE) 325 MG: 325 (65 FE) TAB at 09:42

## 2022-02-05 RX ADMIN — FUROSEMIDE 80 MG: 40 TABLET ORAL at 09:42

## 2022-02-05 RX ADMIN — SODIUM CHLORIDE, PRESERVATIVE FREE 10 ML: 5 INJECTION INTRAVENOUS at 22:00

## 2022-02-05 RX ADMIN — SODIUM CHLORIDE, PRESERVATIVE FREE 10 ML: 5 INJECTION INTRAVENOUS at 14:00

## 2022-02-05 RX ADMIN — AMIODARONE HYDROCHLORIDE 200 MG: 200 TABLET ORAL at 09:42

## 2022-02-05 RX ADMIN — NITROGLYCERIN 0.5 INCH: 20 OINTMENT TOPICAL at 18:12

## 2022-02-05 RX ADMIN — NITROGLYCERIN 0.5 INCH: 20 OINTMENT TOPICAL at 09:00

## 2022-02-05 RX ADMIN — ASPIRIN 81 MG CHEWABLE TABLET 81 MG: 81 TABLET CHEWABLE at 09:41

## 2022-02-05 RX ADMIN — SODIUM CHLORIDE, PRESERVATIVE FREE 10 ML: 5 INJECTION INTRAVENOUS at 06:00

## 2022-02-05 RX ADMIN — METOPROLOL SUCCINATE 50 MG: 25 TABLET, EXTENDED RELEASE ORAL at 09:41

## 2022-02-05 NOTE — PROGRESS NOTES
Progress Note    Alberto Ramirez MD             Daily Progress Note: 2/5/2022      Subjective: The patient is seen for follow  up. Offers no complaints. As per case management's note patient's outpatient dialysis cannot happen until next Wednesday so patient will be here till Monday and can be discharged after dialysis on Monday.   Problem List:  Problem List as of 2/5/2022 Never Reviewed          Codes Class Noted - Resolved    Renal failure ICD-10-CM: N19  ICD-9-CM: 190  1/8/2022 - Present        Hyperkalemia ICD-10-CM: E87.5  ICD-9-CM: 276.7  1/8/2022 - Present        Pulmonary edema ICD-10-CM: J81.1  ICD-9-CM: 983  7/13/2021 - Present        GI bleed ICD-10-CM: K92.2  ICD-9-CM: 578.9  1/5/2021 - Present              Medications reviewed  Current Facility-Administered Medications   Medication Dose Route Frequency    ondansetron (ZOFRAN ODT) tablet 4 mg  4 mg Oral Q6H PRN    furosemide (LASIX) tablet 80 mg  80 mg Oral DAILY    heparin (porcine) 1,000 unit/mL injection 3,600 Units  3,600 Units Hemodialysis DIALYSIS PRN    amiodarone (CORDARONE) tablet 200 mg  200 mg Oral DAILY    epoetin valente-epbx (RETACRIT) injection 10,000 Units  10,000 Units IntraVENous DIALYSIS MON, WED & FRI    oxyCODONE IR (ROXICODONE) tablet 5 mg  5 mg Oral Q8H PRN    sodium chloride (NS) flush 5-40 mL  5-40 mL IntraVENous Q8H    sodium chloride (NS) flush 5-40 mL  5-40 mL IntraVENous PRN    ferrous sulfate tablet 325 mg  1 Tablet Oral BID WITH MEALS    heparin (porcine) 1,000 unit/mL injection 4,000 Units  4,000 Units IntraVENous PRN    Or    heparin (porcine) 1,000 unit/mL injection 2,000 Units  2,000 Units IntraVENous PRN    metoprolol succinate (TOPROL-XL) XL tablet 50 mg  50 mg Oral DAILY    nitroglycerin (NITROBID) 2 % ointment 0.5 Inch  0.5 Inch Topical Q6H    aspirin chewable tablet 81 mg  81 mg Oral DAILY       Review of Systems:   A comprehensive review of systems was negative except for that written in the HPI.    Objective:   Physical Exam:     Visit Vitals  /65 (BP Patient Position: At rest;Sitting)   Pulse 66   Temp 97.8 °F (36.6 °C)   Resp 18   Ht 6' (1.829 m)   Wt 66.6 kg (146 lb 13.2 oz)   SpO2 99%   BMI 19.91 kg/m²    O2 Flow Rate (L/min): 2 l/min O2 Device: None (Room air)    Temp (24hrs), Av.1 °F (36.7 °C), Min:97.8 °F (36.6 °C), Max:98.4 °F (36.9 °C)    No intake/output data recorded.  1901 -  0700  In: -   Out: 3000     General:  Alert, cooperative, no distress, appears stated age. Lungs:   Clear to auscultation bilaterally. Chest wall:  No tenderness or deformity. Heart:  Regular rate and rhythm, S1, S2 normal, no murmur, click, rub or gallop. Abdomen:   Soft, non-tender. Bowel sounds normal. No masses,  No organomegaly. Extremities: Extremities normal, atraumatic, no cyanosis edema 2+ as   Pulses: 2+ and symmetric all extremities. Skin: Skin color, texture, turgor normal. No rashes or lesions   Neurologic: CNII-XII intact. No gross sensory or motor deficits     Data Review:       Recent Days:  Recent Labs     22  0654 22  2137 22  0748   WBC 6.5 8.6 9.0   HGB 8.5* 9.4* 9.3*   HCT 29.3* 32.0* 31.7*    352 383     No results for input(s): NA, K, CL, CO2, GLU, BUN, CREA, CA, MG, PHOS, ALB, TBIL, TBILI, ALT, INR, INREXT in the last 72 hours. No lab exists for component: SGOT  No results for input(s): PH, PCO2, PO2, HCO3, FIO2 in the last 72 hours. Results     ** No results found for the last 336 hours. **         24 Hour Results:  No results found for this or any previous visit (from the past 24 hour(s)). IR INSERT TUNL CVC W/O PORT OVER 5 YR   Final Result   Successful conversion of a right internal jugular temporary dialysis catheter to   a 23 cm PermCath. The catheter is functioning and is ready for use.          IR FLUORO GUIDE PLC CVAD   Final Result   Successful conversion of a right internal jugular temporary dialysis catheter to   a 23 cm PermCath. The catheter is functioning and is ready for use. DUPLEX LOW EXT ARTERIES WITH JAVON   Final Result      XR CHEST PORT   Final Result      Interval placement of a right supraclavicular approach dialysis catheter with   distal tip in the proximal right atrium. Cardiomegaly with pulmonary vascular congestion and pulmonary edema. Stable appearing basilar right lung pneumonia. Small right pleural effusion. IR INSERT NON TUNL CVC OVER 5 YRS   Final Result      Technically successful insertion of non-tunneled Trialysis catheter with US   guidance. Plan:       Post catheter placement chest xray confirmed appropriate position of the   catheter. The catheter is appropriate for immediate use.   _______________________________________________________________      PROCEDURE SUMMARY:   - Venous access with ultrasound guidance   - Non-tunneled central venous catheter insertion      PROCEDURE DETAILS:      Pre-procedure   Relevant imaging review: None    Informed consent for the procedure was obtained and time-out was performed prior   to the procedure. Prophylactic antibiotic administered: Not administered   Preparation: The site was prepared and draped using all elements of maximal   sterile barrier technique including sterile gloves, sterile gown, cap, mask,   large sterile sheet, sterile ultrasound probe cover, sterile ultrasound gel,   hand hygiene and cutaneous antisepsis with 2% chlorhexidine. Medical reason for site preparation exception: Not applicable      Anesthesia/sedation   Level of anesthesia: No sedation   Anesthesia administered by: Not applicable   Duration of anesthesia/sedation: 0 minutes. Access   The vessel was sonographically evaluated and judged to be patent and appropriate   for access and a permanent image was stored. Three mLs of 1% Lidocaine was   administered to the access site.   Real time ultrasound was used to visualize   needle entry into the vessel. Vein accessed: Right internal jugular vein   Access technique: 4F micropuncture set      Catheter placement   The access site was dilated and the catheter was placed into the vein over a   wire and all guidewires were removed in their entirety. Catheter ports were   aspirated and flushed. Catheter tip location was verified by portable X-ray. Catheter size:13 Palestinian   Catheter length: 20 cm   Catheter tip position: Proximal right atrium   Catheter flush: Heparin (100 units/mL)      Closure   The catheter was secured. A sterile dressing was applied. Catheter securement technique: Non-absorbable suture      Additional Medications   None      Additional Details   Estimated blood loss:  Less than 1 mL   Additional description of procedure: None   Additional findings: None   Additional equipment: None   Specimens removed: None                     IR US GUIDED VASCULAR ACCESS   Final Result      Technically successful insertion of non-tunneled Trialysis catheter with US   guidance. Plan:       Post catheter placement chest xray confirmed appropriate position of the   catheter. The catheter is appropriate for immediate use.   _______________________________________________________________      PROCEDURE SUMMARY:   - Venous access with ultrasound guidance   - Non-tunneled central venous catheter insertion      PROCEDURE DETAILS:      Pre-procedure   Relevant imaging review: None    Informed consent for the procedure was obtained and time-out was performed prior   to the procedure. Prophylactic antibiotic administered: Not administered   Preparation: The site was prepared and draped using all elements of maximal   sterile barrier technique including sterile gloves, sterile gown, cap, mask,   large sterile sheet, sterile ultrasound probe cover, sterile ultrasound gel,   hand hygiene and cutaneous antisepsis with 2% chlorhexidine.     Medical reason for site preparation exception: Not applicable Anesthesia/sedation   Level of anesthesia: No sedation   Anesthesia administered by: Not applicable   Duration of anesthesia/sedation: 0 minutes. Access   The vessel was sonographically evaluated and judged to be patent and appropriate   for access and a permanent image was stored. Three mLs of 1% Lidocaine was   administered to the access site. Real time ultrasound was used to visualize   needle entry into the vessel. Vein accessed: Right internal jugular vein   Access technique: 4F micropuncture set      Catheter placement   The access site was dilated and the catheter was placed into the vein over a   wire and all guidewires were removed in their entirety. Catheter ports were   aspirated and flushed. Catheter tip location was verified by portable X-ray. Catheter size:13 Japanese   Catheter length: 20 cm   Catheter tip position: Proximal right atrium   Catheter flush: Heparin (100 units/mL)      Closure   The catheter was secured. A sterile dressing was applied. Catheter securement technique: Non-absorbable suture      Additional Medications   None      Additional Details   Estimated blood loss:  Less than 1 mL   Additional description of procedure: None   Additional findings: None   Additional equipment: None   Specimens removed: None                     XR CHEST PORT   Final Result   Airspace opacity and effusion at the right lung base and in the   acute setting would suggest pneumonia and/or aspiration however the patient also   appears to have baseline/background vascular congestion and/or pulmonary   arterial hypertension, noting cardiomegaly and/or pericardial effusion      IR INSERT NON TUNL CVC OVER 5 YRS    (Results Pending)        Assessment: CHF now well compensated  CRF on hemodialysis  Hypertension  History of ventricular tachycardia  PAD        Plan: We will continue current management.         Care Plan discussed with: Patient/Family    Total time spent with patient: 30 minutes.     Becca Cavazos MD

## 2022-02-05 NOTE — PROGRESS NOTES
Progress Note    Patient: Erica Coombs MRN: [id number]  SSN: [id number]    YOB: 1955  Age: 78 y.o. Sex: male      Admit Date: 1/7/2022    LOS: 28 days     Subjective:   Patient followed for sepsis with cellulitis both calves and aspiration pneumonia. Leg wound culture grew multiple organisms as shown below. Afebrile with low grade temperature, normal WBC with now decreasing procal and CRP. He apparently refused arteriogram and ICD placement. Patient sitting up in bedside chair with no complaints. Objective:     Vitals:    02/04/22 1948 02/05/22 0000 02/05/22 0100 02/05/22 0723   BP: 100/72 108/74 99/70 115/82   Pulse: 69 72 60 61   Resp: 17 16  18   Temp: 98.4 °F (36.9 °C) 97.8 °F (36.6 °C)  98.1 °F (36.7 °C)   TempSrc:       SpO2: 100%   97%   Weight:       Height:            Intake and Output:  Current Shift: No intake/output data recorded. Last three shifts: 02/03 1901 - 02/05 0700  In: -   Out: 3000     Physical Exam:    Vitals and nursing note reviewed. Constitutional:       Appearance: He is ill-appearing. Genitourinary:     Comments: External urinary catheter  Musculoskeletal:      Right lower leg edema resolved; 1.5 x3 cm eschar posteriorly     Left lower leg with mild edma; left calf with nonstick dressing and gauze bandage  Neurological: nonfocal, mild confusion  Psychiatric:         Behavior: Behavior normal.     Lab/Data Review:     WBC 6,500     CRP 5.41 <4.56 <4.85 <4.91 <2.80 <4.66 <2.63 <2.53 < 5.30 <6.32 <8.19 <12.10 <12.10  Procal 1.06 < 0.59  <0. .55 <0.60 <0.72 < 0.70 <0.84 < 2.39 <3.12 < 4.41 <4.39 <5.16    MRSA nasal PCR negative    Wound cultures leg (1/8)  Staphylococcus aureus (MSSA),  Alcaligenes faecalis resistant to Zosyn, Klebsiella pneumoniae      Assessment:     Active Problems:    Renal failure (1/8/2022)      Hyperkalemia (1/8/2022)    1. Cellulitis both calves, secondary to probable S.  Aureus (MSSA), Alcaligenes faecalis resistant to Zosyn, Klebsiella pneumoniae, status post 14 days IV Antibiotics, including Meropenem, clinically resolved. 2. Aspiration pneumonia, RLL, status antibiotics as note above  3. Sepsis with leukocytosis and elevated CRP/procal, resolving    Comment:  Procal and CRP now decreasing without antibiotic intervention. Plan:   1. No further antibiotic recommendations at this time   2. Will continue to follow  3.   Awaiting placement     Signed By: Chong Wyman MD     February 5, 2022

## 2022-02-05 NOTE — PROGRESS NOTES
Progress Note      2/5/2022 12:19 PM  NAME: Jerrell Huynh   MRN:  [id number]   Admit Diagnosis: Renal failure [N19]  Hyperkalemia [E87.5]      Subjective:   Chart reviewed. Patient is on dialysis. Symptoms of shortness of breath has improved. With the patient and with the nurse. Cardiac catheterization findings discussed with the patient and also discussed with the electrophysiologist.  Vascular surgeon's note reviewed and patient refused to have peripheral angiogram.  Patient feels good. Review of Systems:    Symptom Y/N Comments  Symptom Y/N Comments   Fever/Chills n   Chest Pain n    Poor Appetite    Edema y  improving   Cough    Abdominal Pain     Sputum    Joint Pain     SOB/CARMONA y  improving  Pruritis/Rash     Nausea/vomit    Other     Diarrhea         Constipation           Could NOT obtain due to:      Objective:          Physical Exam:    Last 24hrs VS reviewed since prior progress note. Most recent are:    Visit Vitals  BP 99/75 (BP Patient Position: At rest;Sitting)   Pulse (!) 58   Temp 97.8 °F (36.6 °C)   Resp 18   Ht 6' (1.829 m)   Wt 66.6 kg (146 lb 13.2 oz)   SpO2 98%   BMI 19.91 kg/m²     No intake or output data in the 24 hours ending 02/05/22 1821     General Appearance: Well developed, well nourished, alert & oriented x 3,    no acute distress. Ears/Nose/Mouth/Throat: Hearing grossly normal.  Neck: Supple. Chest: Lungs clear to auscultation bilaterally. Cardiovascular: Regular rate and rhythm, S1,S2 normal, no murmur. Abdomen: Soft, non-tender, bowel sounds are active. Extremities: Patient has cellulitis of the lower extremities  Skin: Warm and dry. []         Post-cath site without hematoma, bruit, tenderness, or thrill. Distal pulses intact. PMH/SH reviewed - no change compared to H&P    Data Review    Telemetry: normal sinus rhythm , patient has some PVCs, and episode of wide-complex tachycardia possible ventricular tachycardia.     EKG:   Patient had EKG that is very concerning for ventricular tachycardia. Lab Data Personally Reviewed:    Recent Labs     02/04/22  0654 02/03/22  2137   WBC 6.5 8.6   HGB 8.5* 9.4*   HCT 29.3* 32.0*    352     No results for input(s): INR, PTP, APTT, INREXT, INREXT in the last 72 hours. No results for input(s): NA, K, CL, CO2, BUN, CREA, GLU, CA, MG in the last 72 hours. No results for input(s): CPK, CKNDX, TROIQ in the last 72 hours. No lab exists for component: CPKMB  No results found for: CHOL, CHOLX, CHLST, CHOLV, HDL, HDLP, LDL, LDLC, DLDLP, TGLX, TRIGL, TRIGP, CHHD, CHHDX    No results for input(s): AP, TBIL, TP, ALB, GLOB, GGT, AML, LPSE in the last 72 hours. No lab exists for component: SGOT, GPT, AMYP, HLPSE  No results for input(s): PH, PCO2, PO2 in the last 72 hours.     Medications Personally Reviewed:    Current Facility-Administered Medications   Medication Dose Route Frequency    ondansetron (ZOFRAN ODT) tablet 4 mg  4 mg Oral Q6H PRN    furosemide (LASIX) tablet 80 mg  80 mg Oral DAILY    heparin (porcine) 1,000 unit/mL injection 3,600 Units  3,600 Units Hemodialysis DIALYSIS PRN    amiodarone (CORDARONE) tablet 200 mg  200 mg Oral DAILY    epoetin valente-epbx (RETACRIT) injection 10,000 Units  10,000 Units IntraVENous DIALYSIS MON, WED & FRI    oxyCODONE IR (ROXICODONE) tablet 5 mg  5 mg Oral Q8H PRN    sodium chloride (NS) flush 5-40 mL  5-40 mL IntraVENous Q8H    sodium chloride (NS) flush 5-40 mL  5-40 mL IntraVENous PRN    ferrous sulfate tablet 325 mg  1 Tablet Oral BID WITH MEALS    heparin (porcine) 1,000 unit/mL injection 4,000 Units  4,000 Units IntraVENous PRN    Or    heparin (porcine) 1,000 unit/mL injection 2,000 Units  2,000 Units IntraVENous PRN    metoprolol succinate (TOPROL-XL) XL tablet 50 mg  50 mg Oral DAILY    nitroglycerin (NITROBID) 2 % ointment 0.5 Inch  0.5 Inch Topical Q6H    aspirin chewable tablet 81 mg  81 mg Oral DAILY           Problem List:   Patient has a acute on chronic renal failure and had a dialysis and is clinically better. Decompensated heart failure and ejection fraction is depressed and patient has a at least moderate possibly severe pulmonary hypertension. History of paroxysmal atrial fibrillation. Recent DVT of her left axillary vein. Wide-complex tachycardia probably ventricular tachycardia. Catheterization did not show evidence of coronary artery disease and ejection fraction was about 20 to 25%. Patient is getting restless and wants to leave. Cellulitis of the lower extremities. Patient refused peripheral arterial angiogram as advised by the vascular surgeon. 1.      Assessment/Plan:   I will continue dialysis as per recommendations of the nephrologist.  Patient is offered ICD by Dr. Queenie Breaux however patient does not want it. Vascular surgery note reviewed and appreciated and will follow the recommendations. We will continue amiodarone and present care. Discontinue heparin and start on small dose of Eliquis because patient has a history of paroxysmal atrial fibrillation. Okay to transfer to skilled nursing facility when bed is available. We will follow infectious disease recommendations. Thank you. 1.          []       High complexity decision making was performed in this patient at high risk for decompensation with multiple organ involvement.     Misyt Ellison MD

## 2022-02-05 NOTE — PROGRESS NOTES
Problem: Falls - Risk of  Goal: *Absence of Falls  Description: Document [place] Fail Fall Risk and appropriate interventions in the flowsheet.   Outcome: Progressing Towards Goal  Note: Fall Risk Interventions:  Mobility Interventions: Bed/chair exit alarm,Patient to call before getting OOB    Mentation Interventions: Bed/chair exit alarm,Adequate sleep, hydration, pain control    Medication Interventions: Patient to call before getting OOB    Elimination Interventions: Bed/chair exit alarm,Call light in reach,Patient to call for help with toileting needs              Problem: Patient Education: Go to Patient Education Activity  Goal: Patient/Family Education  Outcome: Progressing Towards Goal

## 2022-02-06 PROCEDURE — 99232 SBSQ HOSP IP/OBS MODERATE 35: CPT | Performed by: INTERNAL MEDICINE

## 2022-02-06 PROCEDURE — 87186 SC STD MICRODIL/AGAR DIL: CPT

## 2022-02-06 PROCEDURE — 65270000029 HC RM PRIVATE

## 2022-02-06 PROCEDURE — 87077 CULTURE AEROBIC IDENTIFY: CPT

## 2022-02-06 PROCEDURE — 87147 CULTURE TYPE IMMUNOLOGIC: CPT

## 2022-02-06 PROCEDURE — 74011250637 HC RX REV CODE- 250/637: Performed by: INTERNAL MEDICINE

## 2022-02-06 PROCEDURE — 87205 SMEAR GRAM STAIN: CPT

## 2022-02-06 PROCEDURE — 74011000250 HC RX REV CODE- 250: Performed by: INTERNAL MEDICINE

## 2022-02-06 RX ADMIN — SODIUM CHLORIDE, PRESERVATIVE FREE 10 ML: 5 INJECTION INTRAVENOUS at 14:00

## 2022-02-06 RX ADMIN — FERROUS SULFATE TAB 325 MG (65 MG ELEMENTAL FE) 325 MG: 325 (65 FE) TAB at 15:36

## 2022-02-06 RX ADMIN — FERROUS SULFATE TAB 325 MG (65 MG ELEMENTAL FE) 325 MG: 325 (65 FE) TAB at 09:31

## 2022-02-06 RX ADMIN — SODIUM CHLORIDE, PRESERVATIVE FREE 10 ML: 5 INJECTION INTRAVENOUS at 22:13

## 2022-02-06 RX ADMIN — AMIODARONE HYDROCHLORIDE 200 MG: 200 TABLET ORAL at 09:31

## 2022-02-06 RX ADMIN — METOPROLOL SUCCINATE 50 MG: 25 TABLET, EXTENDED RELEASE ORAL at 09:31

## 2022-02-06 RX ADMIN — FUROSEMIDE 80 MG: 40 TABLET ORAL at 09:31

## 2022-02-06 RX ADMIN — ASPIRIN 81 MG CHEWABLE TABLET 81 MG: 81 TABLET CHEWABLE at 09:32

## 2022-02-06 RX ADMIN — NITROGLYCERIN 0.5 INCH: 20 OINTMENT TOPICAL at 15:35

## 2022-02-06 RX ADMIN — SODIUM CHLORIDE, PRESERVATIVE FREE 10 ML: 5 INJECTION INTRAVENOUS at 06:16

## 2022-02-06 NOTE — PROGRESS NOTES
Progress Note      2/6/2022 12:19 PM  NAME: Rimma Reyes   MRN:  [id number]   Admit Diagnosis: Renal failure [N19]  Hyperkalemia [E87.5]      Subjective:   Chart reviewed. Patient is on dialysis. Symptoms of shortness of breath has improved. With the patient and with the nurse. Cardiac catheterization findings discussed with the patient and also discussed with the electrophysiologist.  Vascular surgeon's note reviewed and patient refused to have peripheral angiogram.  Patient feels good. Review of Systems:    Symptom Y/N Comments  Symptom Y/N Comments   Fever/Chills n   Chest Pain n    Poor Appetite    Edema y  improving   Cough    Abdominal Pain     Sputum    Joint Pain     SOB/CARMONA y  improving  Pruritis/Rash     Nausea/vomit    Other     Diarrhea         Constipation           Could NOT obtain due to:      Objective:          Physical Exam:    Last 24hrs VS reviewed since prior progress note. Most recent are:    Visit Vitals  /75 (BP Patient Position: At rest;Lying)   Pulse (!) 55   Temp 98.1 °F (36.7 °C)   Resp 18   Ht 6' (1.829 m)   Wt 66.6 kg (146 lb 13.2 oz)   SpO2 99%   BMI 19.91 kg/m²     No intake or output data in the 24 hours ending 02/06/22 1508     General Appearance: Well developed, well nourished, alert & oriented x 3,    no acute distress. Ears/Nose/Mouth/Throat: Hearing grossly normal.  Neck: Supple. Chest: Lungs clear to auscultation bilaterally. Cardiovascular: Regular rate and rhythm, S1,S2 normal, no murmur. Abdomen: Soft, non-tender, bowel sounds are active. Extremities: Patient has cellulitis of the lower extremities  Skin: Warm and dry. []         Post-cath site without hematoma, bruit, tenderness, or thrill. Distal pulses intact. PMH/SH reviewed - no change compared to H&P    Data Review    Telemetry: normal sinus rhythm , patient has some PVCs, and episode of wide-complex tachycardia possible ventricular tachycardia.     EKG:   Patient had EKG that is very concerning for ventricular tachycardia. Lab Data Personally Reviewed:    Recent Labs     02/04/22  0654 02/03/22  2137   WBC 6.5 8.6   HGB 8.5* 9.4*   HCT 29.3* 32.0*    352     No results for input(s): INR, PTP, APTT, INREXT, INREXT in the last 72 hours. No results for input(s): NA, K, CL, CO2, BUN, CREA, GLU, CA, MG in the last 72 hours. No results for input(s): CPK, CKNDX, TROIQ in the last 72 hours. No lab exists for component: CPKMB  No results found for: CHOL, CHOLX, CHLST, CHOLV, HDL, HDLP, LDL, LDLC, DLDLP, TGLX, TRIGL, TRIGP, CHHD, CHHDX    No results for input(s): AP, TBIL, TP, ALB, GLOB, GGT, AML, LPSE in the last 72 hours. No lab exists for component: SGOT, GPT, AMYP, HLPSE  No results for input(s): PH, PCO2, PO2 in the last 72 hours.     Medications Personally Reviewed:    Current Facility-Administered Medications   Medication Dose Route Frequency    ondansetron (ZOFRAN ODT) tablet 4 mg  4 mg Oral Q6H PRN    furosemide (LASIX) tablet 80 mg  80 mg Oral DAILY    heparin (porcine) 1,000 unit/mL injection 3,600 Units  3,600 Units Hemodialysis DIALYSIS PRN    amiodarone (CORDARONE) tablet 200 mg  200 mg Oral DAILY    epoetin valente-epbx (RETACRIT) injection 10,000 Units  10,000 Units IntraVENous DIALYSIS MON, WED & FRI    oxyCODONE IR (ROXICODONE) tablet 5 mg  5 mg Oral Q8H PRN    sodium chloride (NS) flush 5-40 mL  5-40 mL IntraVENous Q8H    sodium chloride (NS) flush 5-40 mL  5-40 mL IntraVENous PRN    ferrous sulfate tablet 325 mg  1 Tablet Oral BID WITH MEALS    heparin (porcine) 1,000 unit/mL injection 4,000 Units  4,000 Units IntraVENous PRN    Or    heparin (porcine) 1,000 unit/mL injection 2,000 Units  2,000 Units IntraVENous PRN    metoprolol succinate (TOPROL-XL) XL tablet 50 mg  50 mg Oral DAILY    nitroglycerin (NITROBID) 2 % ointment 0.5 Inch  0.5 Inch Topical Q6H    aspirin chewable tablet 81 mg  81 mg Oral DAILY           Problem List:   Patient has a acute on chronic renal failure and had a dialysis and is clinically better. Decompensated heart failure and ejection fraction is depressed and patient has a at least moderate possibly severe pulmonary hypertension. History of paroxysmal atrial fibrillation. Recent DVT of her left axillary vein. Wide-complex tachycardia probably ventricular tachycardia. Catheterization did not show evidence of coronary artery disease and ejection fraction was about 20 to 25%. Patient is getting restless and wants to leave. Cellulitis of the lower extremities. Patient refused peripheral arterial angiogram as advised by the vascular surgeon. 1.      Assessment/Plan:   I will continue dialysis as per recommendations of the nephrologist.  Patient is offered ICD by Dr. Emmanuel Dumont however patient does not want it. Vascular surgery note reviewed and appreciated and will follow the recommendations. We will continue amiodarone and present care. Discontinue heparin and start on small dose of Eliquis because patient has a history of paroxysmal atrial fibrillation. Okay to transfer to skilled nursing facility when bed is available. We will follow infectious disease recommendations. Thank you. 1.          []       High complexity decision making was performed in this patient at high risk for decompensation with multiple organ involvement.     Chinedu Solano MD

## 2022-02-06 NOTE — PROGRESS NOTES
Problem: Falls - Risk of  Goal: *Absence of Falls  Description: Document Penelope Hopson Fall Risk and appropriate interventions in the flowsheet. Outcome: Progressing Towards Goal  Note: Fall Risk Interventions:  Mobility Interventions: Bed/chair exit alarm,Patient to call before getting OOB    Mentation Interventions: Bed/chair exit alarm    Medication Interventions: Bed/chair exit alarm,Patient to call before getting OOB    Elimination Interventions: Bed/chair exit alarm,Call light in reach,Patient to call for help with toileting needs              Problem: Patient Education: Go to Patient Education Activity  Goal: Patient/Family Education  Outcome: Progressing Towards Goal     Problem: Pressure Injury - Risk of  Goal: *Prevention of pressure injury  Description: Document Alfa Scale and appropriate interventions in the flowsheet.   Outcome: Progressing Towards Goal  Note: Pressure Injury Interventions:  Sensory Interventions: Assess changes in LOC,Float heels,Keep linens dry and wrinkle-free,Minimize linen layers    Moisture Interventions: Absorbent underpads    Activity Interventions: Increase time out of bed,Pressure redistribution bed/mattress(bed type)    Mobility Interventions: Float heels,HOB 30 degrees or less,Pressure redistribution bed/mattress (bed type)    Nutrition Interventions: Document food/fluid/supplement intake    Friction and Shear Interventions: Apply protective barrier, creams and emollients,HOB 30 degrees or less                Problem: Patient Education: Go to Patient Education Activity  Goal: Patient/Family Education  Outcome: Progressing Towards Goal

## 2022-02-06 NOTE — PROGRESS NOTES
Progress Note    Patient: Christa Siu MRN: [id number]  SSN: [id number]    YOB: 1955  Age: 78 y.o. Sex: male      Admit Date: 1/7/2022    LOS: 29 days     Subjective:   Patient followed for sepsis with cellulitis both calves and aspiration pneumonia. Leg wound culture grew multiple organisms as shown below. Afebrile with low grade temperature, normal WBC with now decreasing procal and CRP. Patient sitting up in bedside chair. States that his left leg bandage has not been changed in 3 days. Objective:     Vitals:    02/05/22 2000 02/06/22 0000 02/06/22 0400 02/06/22 0731   BP: 99/70 100/68 105/65 106/68   Pulse: 62 (!) 59 60 (!) 52   Resp: 18 18 18 18   Temp:  98 °F (36.7 °C) 97.9 °F (36.6 °C) 98 °F (36.7 °C)   TempSrc:       SpO2: 97% 98% 98% 97%   Weight:       Height:            Intake and Output:  Current Shift: No intake/output data recorded. Last three shifts: No intake/output data recorded. Physical Exam:    Vitals and nursing note reviewed. Constitutional:       Appearance: He is ill-appearing. Genitourinary:     Comments: External urinary catheter  Musculoskeletal:      Right lower leg edema resolved; 1.5 x3 cm eschar posteriorly     Left lower leg with mild edma; left calf fully exposed and still showing a large open wound with apparent exudate; dressing changed  Neurological: nonfocal, no confusion  Psychiatric:         Behavior: Behavior normal.     Lab/Data Review:     WBC 6,500     CRP 5.41 <4.56 <6.32 <8.19 <12.10 <12.10  Procal 1.06 < 0.59 <3.12 < 4.41 <4.39 <5.16    MRSA nasal PCR negative    Wound cultures leg (1/8)  Staphylococcus aureus (MSSA),  Alcaligenes faecalis resistant to Zosyn, Klebsiella pneumoniae      Assessment:     Active Problems:    Renal failure (1/8/2022)      Hyperkalemia (1/8/2022)    1. Cellulitis both calves, secondary to probable S.  Aureus (MSSA), Alcaligenes faecalis resistant to Zosyn, Klebsiella pneumoniae, status post 14 days IV Antibiotics, including Meropenem, clinically resolved. 2. Aspiration pneumonia, RLL, status antibiotics as note above  3. Sepsis with leukocytosis and elevated CRP/procal, resolving    Comment:  Procal and CRP decreasing without antibiotic intervention. Plan:   1. Wound culture taken from left calf  2. Wound redressed  3. In am, repeat CBC, CRP  4.  Pending discharge    Signed By: Thelma Garcia MD     February 6, 2022

## 2022-02-06 NOTE — PROGRESS NOTES
Progress Note    David Wells MD             Daily Progress Note: 2/6/2022      Subjective: The patient is seen for follow  up. Patient dated sitting on the bed hanging placed on the side of the bed. Awaiting for the physical therapy. Denies any shortness of breath increased. No history of chest pain  Problem List:  Problem List as of 2/6/2022 Never Reviewed          Codes Class Noted - Resolved    Renal failure ICD-10-CM: N19  ICD-9-CM: 451  1/8/2022 - Present        Hyperkalemia ICD-10-CM: E87.5  ICD-9-CM: 276.7  1/8/2022 - Present        Pulmonary edema ICD-10-CM: J81.1  ICD-9-CM: 196  7/13/2021 - Present        GI bleed ICD-10-CM: K92.2  ICD-9-CM: 578.9  1/5/2021 - Present              Medications reviewed  Current Facility-Administered Medications   Medication Dose Route Frequency    ondansetron (ZOFRAN ODT) tablet 4 mg  4 mg Oral Q6H PRN    furosemide (LASIX) tablet 80 mg  80 mg Oral DAILY    heparin (porcine) 1,000 unit/mL injection 3,600 Units  3,600 Units Hemodialysis DIALYSIS PRN    amiodarone (CORDARONE) tablet 200 mg  200 mg Oral DAILY    epoetin valente-epbx (RETACRIT) injection 10,000 Units  10,000 Units IntraVENous DIALYSIS MON, WED & FRI    oxyCODONE IR (ROXICODONE) tablet 5 mg  5 mg Oral Q8H PRN    sodium chloride (NS) flush 5-40 mL  5-40 mL IntraVENous Q8H    sodium chloride (NS) flush 5-40 mL  5-40 mL IntraVENous PRN    ferrous sulfate tablet 325 mg  1 Tablet Oral BID WITH MEALS    heparin (porcine) 1,000 unit/mL injection 4,000 Units  4,000 Units IntraVENous PRN    Or    heparin (porcine) 1,000 unit/mL injection 2,000 Units  2,000 Units IntraVENous PRN    metoprolol succinate (TOPROL-XL) XL tablet 50 mg  50 mg Oral DAILY    nitroglycerin (NITROBID) 2 % ointment 0.5 Inch  0.5 Inch Topical Q6H    aspirin chewable tablet 81 mg  81 mg Oral DAILY       Review of Systems:   A comprehensive review of systems was negative except for that written in the HPI.     Objective: Physical Exam:     Visit Vitals  /70 (BP Patient Position: At rest;Sitting)   Pulse 60   Temp 97.9 °F (36.6 °C)   Resp 18   Ht 6' (1.829 m)   Wt 66.6 kg (146 lb 13.2 oz)   SpO2 99%   BMI 19.91 kg/m²    O2 Flow Rate (L/min): 2 l/min O2 Device: None (Room air)    Temp (24hrs), Av °F (36.7 °C), Min:97.9 °F (36.6 °C), Max:98.1 °F (36.7 °C)    No intake/output data recorded. No intake/output data recorded. General:  Alert, cooperative, no distress, appears stated age. Lungs:   Clear to auscultation bilaterally. Chest wall:  No tenderness or deformity. Heart:  Regular rate and rhythm, S1, S2 normal, no murmur, click, rub or gallop. Abdomen:   Soft, non-tender. Bowel sounds normal. No masses,  No organomegaly. Extremities: Extremities normal, atraumatic, no cyanosis or edema. Pulses: 2+ and symmetric all extremities. Skin: Skin color, texture, turgor normal. No rashes or lesions   Neurologic: CNII-XII intact. No gross sensory or motor deficits     Data Review:       Recent Days:  Recent Labs     22  0654 22  2137   WBC 6.5 8.6   HGB 8.5* 9.4*   HCT 29.3* 32.0*    352     No results for input(s): NA, K, CL, CO2, GLU, BUN, CREA, CA, MG, PHOS, ALB, TBIL, TBILI, ALT, INR, INREXT in the last 72 hours. No lab exists for component: SGOT  No results for input(s): PH, PCO2, PO2, HCO3, FIO2 in the last 72 hours. Results     Procedure Component Value Units Date/Time    TERESA, Fox Bowden [[id number]]     Order Status: Sent Specimen: Wound from Leg          24 Hour Results:  No results found for this or any previous visit (from the past 24 hour(s)). IR INSERT TUNL CVC W/O PORT OVER 5 YR   Final Result   Successful conversion of a right internal jugular temporary dialysis catheter to   a 23 cm PermCath. The catheter is functioning and is ready for use.          IR FLUORO GUIDE PLC CVAD   Final Result   Successful conversion of a right internal jugular temporary dialysis catheter to   a 23 cm PermCath. The catheter is functioning and is ready for use. DUPLEX LOW EXT ARTERIES WITH JAVON   Final Result      XR CHEST PORT   Final Result      Interval placement of a right supraclavicular approach dialysis catheter with   distal tip in the proximal right atrium. Cardiomegaly with pulmonary vascular congestion and pulmonary edema. Stable appearing basilar right lung pneumonia. Small right pleural effusion. IR INSERT NON TUNL CVC OVER 5 YRS   Final Result      Technically successful insertion of non-tunneled Trialysis catheter with US   guidance. Plan:       Post catheter placement chest xray confirmed appropriate position of the   catheter. The catheter is appropriate for immediate use.   _______________________________________________________________      PROCEDURE SUMMARY:   - Venous access with ultrasound guidance   - Non-tunneled central venous catheter insertion      PROCEDURE DETAILS:      Pre-procedure   Relevant imaging review: None    Informed consent for the procedure was obtained and time-out was performed prior   to the procedure. Prophylactic antibiotic administered: Not administered   Preparation: The site was prepared and draped using all elements of maximal   sterile barrier technique including sterile gloves, sterile gown, cap, mask,   large sterile sheet, sterile ultrasound probe cover, sterile ultrasound gel,   hand hygiene and cutaneous antisepsis with 2% chlorhexidine. Medical reason for site preparation exception: Not applicable      Anesthesia/sedation   Level of anesthesia: No sedation   Anesthesia administered by: Not applicable   Duration of anesthesia/sedation: 0 minutes. Access   The vessel was sonographically evaluated and judged to be patent and appropriate   for access and a permanent image was stored. Three mLs of 1% Lidocaine was   administered to the access site.   Real time ultrasound was used to visualize   needle entry into the vessel. Vein accessed: Right internal jugular vein   Access technique: 4F micropuncture set      Catheter placement   The access site was dilated and the catheter was placed into the vein over a   wire and all guidewires were removed in their entirety. Catheter ports were   aspirated and flushed. Catheter tip location was verified by portable X-ray. Catheter size:13 Tajik   Catheter length: 20 cm   Catheter tip position: Proximal right atrium   Catheter flush: Heparin (100 units/mL)      Closure   The catheter was secured. A sterile dressing was applied. Catheter securement technique: Non-absorbable suture      Additional Medications   None      Additional Details   Estimated blood loss:  Less than 1 mL   Additional description of procedure: None   Additional findings: None   Additional equipment: None   Specimens removed: None                     IR US GUIDED VASCULAR ACCESS   Final Result      Technically successful insertion of non-tunneled Trialysis catheter with US   guidance. Plan:       Post catheter placement chest xray confirmed appropriate position of the   catheter. The catheter is appropriate for immediate use.   _______________________________________________________________      PROCEDURE SUMMARY:   - Venous access with ultrasound guidance   - Non-tunneled central venous catheter insertion      PROCEDURE DETAILS:      Pre-procedure   Relevant imaging review: None    Informed consent for the procedure was obtained and time-out was performed prior   to the procedure. Prophylactic antibiotic administered: Not administered   Preparation: The site was prepared and draped using all elements of maximal   sterile barrier technique including sterile gloves, sterile gown, cap, mask,   large sterile sheet, sterile ultrasound probe cover, sterile ultrasound gel,   hand hygiene and cutaneous antisepsis with 2% chlorhexidine.     Medical reason for site preparation exception: Not applicable      Anesthesia/sedation   Level of anesthesia: No sedation   Anesthesia administered by: Not applicable   Duration of anesthesia/sedation: 0 minutes. Access   The vessel was sonographically evaluated and judged to be patent and appropriate   for access and a permanent image was stored. Three mLs of 1% Lidocaine was   administered to the access site. Real time ultrasound was used to visualize   needle entry into the vessel. Vein accessed: Right internal jugular vein   Access technique: 4F micropuncture set      Catheter placement   The access site was dilated and the catheter was placed into the vein over a   wire and all guidewires were removed in their entirety. Catheter ports were   aspirated and flushed. Catheter tip location was verified by portable X-ray. Catheter size:13 Turkish   Catheter length: 20 cm   Catheter tip position: Proximal right atrium   Catheter flush: Heparin (100 units/mL)      Closure   The catheter was secured. A sterile dressing was applied.    Catheter securement technique: Non-absorbable suture      Additional Medications   None      Additional Details   Estimated blood loss:  Less than 1 mL   Additional description of procedure: None   Additional findings: None   Additional equipment: None   Specimens removed: None                     XR CHEST PORT   Final Result   Airspace opacity and effusion at the right lung base and in the   acute setting would suggest pneumonia and/or aspiration however the patient also   appears to have baseline/background vascular congestion and/or pulmonary   arterial hypertension, noting cardiomegaly and/or pericardial effusion      IR INSERT NON TUNL CVC OVER 5 YRS    (Results Pending)        Assessment: CRF on hemodialysis  CHF  Hypertension  PAD  Paroxysmal atrial fibrillation also with ventricular tachycardia          Plan: Patient has refused for ICD placement as well as arteriogram  Patient will get dialysis and if there is no inpatient rehab arranged would like to send him home with home PT and OT        Care Plan discussed with: Patient/Family    Total time spent with patient: 30 minutes.     Na Watts MD

## 2022-02-07 LAB
BASOPHILS # BLD: 0.1 K/UL (ref 0–0.1)
BASOPHILS NFR BLD: 2 % (ref 0–1)
CRP SERPL-MCNC: 2.27 MG/DL (ref 0–0.6)
DIFFERENTIAL METHOD BLD: ABNORMAL
EOSINOPHIL # BLD: 0.5 K/UL (ref 0–0.4)
EOSINOPHIL NFR BLD: 7 % (ref 0–7)
ERYTHROCYTE [DISTWIDTH] IN BLOOD BY AUTOMATED COUNT: 14.7 % (ref 11.5–14.5)
HCT VFR BLD AUTO: 29.5 % (ref 36.6–50.3)
HGB BLD-MCNC: 8.7 G/DL (ref 12.1–17)
IMM GRANULOCYTES # BLD AUTO: 0.1 K/UL (ref 0–0.04)
IMM GRANULOCYTES NFR BLD AUTO: 2 % (ref 0–0.5)
LYMPHOCYTES # BLD: 0.9 K/UL (ref 0.8–3.5)
LYMPHOCYTES NFR BLD: 11 % (ref 12–49)
MCH RBC QN AUTO: 30 PG (ref 26–34)
MCHC RBC AUTO-ENTMCNC: 29.5 G/DL (ref 30–36.5)
MCV RBC AUTO: 101.7 FL (ref 80–99)
MONOCYTES # BLD: 0.9 K/UL (ref 0–1)
MONOCYTES NFR BLD: 11 % (ref 5–13)
NEUTS SEG # BLD: 5.4 K/UL (ref 1.8–8)
NEUTS SEG NFR BLD: 67 % (ref 32–75)
NRBC # BLD: 0 K/UL (ref 0–0.01)
NRBC BLD-RTO: 0 PER 100 WBC
PLATELET # BLD AUTO: 385 K/UL (ref 150–400)
PMV BLD AUTO: 9.6 FL (ref 8.9–12.9)
PROCALCITONIN SERPL-MCNC: 0.53 NG/ML
RBC # BLD AUTO: 2.9 M/UL (ref 4.1–5.7)
WBC # BLD AUTO: 7.9 K/UL (ref 4.1–11.1)

## 2022-02-07 PROCEDURE — 74011250636 HC RX REV CODE- 250/636: Performed by: INTERNAL MEDICINE

## 2022-02-07 PROCEDURE — 74011250637 HC RX REV CODE- 250/637: Performed by: INTERNAL MEDICINE

## 2022-02-07 PROCEDURE — 85025 COMPLETE CBC W/AUTO DIFF WBC: CPT

## 2022-02-07 PROCEDURE — 84145 PROCALCITONIN (PCT): CPT

## 2022-02-07 PROCEDURE — 74011000250 HC RX REV CODE- 250: Performed by: INTERNAL MEDICINE

## 2022-02-07 PROCEDURE — 36415 COLL VENOUS BLD VENIPUNCTURE: CPT

## 2022-02-07 PROCEDURE — 99232 SBSQ HOSP IP/OBS MODERATE 35: CPT | Performed by: INTERNAL MEDICINE

## 2022-02-07 PROCEDURE — 65270000029 HC RM PRIVATE

## 2022-02-07 PROCEDURE — 86140 C-REACTIVE PROTEIN: CPT

## 2022-02-07 PROCEDURE — 90935 HEMODIALYSIS ONE EVALUATION: CPT

## 2022-02-07 PROCEDURE — 5A1D70Z PERFORMANCE OF URINARY FILTRATION, INTERMITTENT, LESS THAN 6 HOURS PER DAY: ICD-10-PCS | Performed by: INTERNAL MEDICINE

## 2022-02-07 RX ORDER — AMOXICILLIN AND CLAVULANATE POTASSIUM 500; 125 MG/1; MG/1
1 TABLET, FILM COATED ORAL DAILY
Status: DISCONTINUED | OUTPATIENT
Start: 2022-02-08 | End: 2022-02-08 | Stop reason: HOSPADM

## 2022-02-07 RX ADMIN — FERROUS SULFATE TAB 325 MG (65 MG ELEMENTAL FE) 325 MG: 325 (65 FE) TAB at 17:24

## 2022-02-07 RX ADMIN — ASPIRIN 81 MG CHEWABLE TABLET 81 MG: 81 TABLET CHEWABLE at 11:09

## 2022-02-07 RX ADMIN — EPOETIN ALFA-EPBX 10000 UNITS: 10000 INJECTION, SOLUTION INTRAVENOUS; SUBCUTANEOUS at 09:20

## 2022-02-07 RX ADMIN — FERROUS SULFATE TAB 325 MG (65 MG ELEMENTAL FE) 325 MG: 325 (65 FE) TAB at 11:09

## 2022-02-07 RX ADMIN — FUROSEMIDE 80 MG: 40 TABLET ORAL at 11:09

## 2022-02-07 RX ADMIN — HEPARIN SODIUM 3600 UNITS: 1000 INJECTION, SOLUTION INTRAVENOUS; SUBCUTANEOUS at 10:52

## 2022-02-07 RX ADMIN — AMIODARONE HYDROCHLORIDE 200 MG: 200 TABLET ORAL at 11:09

## 2022-02-07 RX ADMIN — OXYCODONE 5 MG: 5 TABLET ORAL at 08:15

## 2022-02-07 RX ADMIN — SODIUM CHLORIDE, PRESERVATIVE FREE 10 ML: 5 INJECTION INTRAVENOUS at 06:05

## 2022-02-07 RX ADMIN — SODIUM CHLORIDE, PRESERVATIVE FREE 10 ML: 5 INJECTION INTRAVENOUS at 15:35

## 2022-02-07 NOTE — PROGRESS NOTES
ISAMAR spoke with Ivanna Abdul [facility] to make sure dialysis chair was set up for patient at [facility]. She stated that case was closed on Friday stating he decided to go somewhere else. Explained to admissions that patient was going to SNF and is now discharging home, but was always going to go to [facility]. She gave me the number of the [facility] clinic [phone] to see if patient could get the 930am- 1100am chair on MWF. ISAMAR called and spoke to [facility] admissions who states that chair has been taken and told me to contact [facility] at [phone]. Explained I had already spoken to them and was informed to call the [facility]. Admissions in [place] said she did not understand that and was going to call herself and get back with me shortly. Faxed H and P, CXR and Hep B panel to [phone] as requested. On Friday, ISAMAR was asked to fax to [facility]. Task completed on Friday as requested.

## 2022-02-07 NOTE — PROGRESS NOTES
Progress Note    Patient: Eliezer Renae MRN: [id number]  SSN: [id number]    YOB: 1955  Age: 78 y.o. Sex: male      Admit Date: 1/7/2022    LOS: 30 days     Subjective:   Patient followed for sepsis with cellulitis both calves and aspiration pneumonia. Leg wound culture grew multiple organisms as shown below. Afebrile with low grade temperature, normal WBC with now decreasing procal and CRP. Patient sitting on edge of bed. Objective:     Vitals:    02/07/22 0920 02/07/22 0950 02/07/22 1020 02/07/22 1050   BP: 110/72 118/78 118/74 120/72   Pulse: (!) 53 (!) 55 (!) 54 (!) 55   Resp: 18 18 18 18   Temp:    98.1 °F (36.7 °C)   TempSrc:    Oral   SpO2:       Weight:       Height:            Intake and Output:  Current Shift: 02/07 0701 - 02/07 1900  In: -   Out: 3020   Last three shifts: No intake/output data recorded. Physical Exam:    Vitals and nursing note reviewed. Constitutional:       Appearance: He is ill-appearing. Genitourinary:     Comments: External urinary catheter  Musculoskeletal:      Right lower leg edema resolved; 1.5 x3 cm eschar posteriorly     Left lower leg with mild edma; left calf fully exposed and still showing a large open wound with apparent exudate; dressing changed  Neurological: nonfocal, no confusion  Psychiatric:         Behavior: Behavior normal.     Lab/Data Review:     WBC 7,900     CRP 2.27 < 5.41 <4.56 <6.32 <8.19 <12.10 <12.10  Procal 0.53 <1.06 < 0.59 <3.12 < 4.41 <4.39 <5.16    MRSA nasal PCR negative    Wound cultures leg (1/8)  Staphylococcus aureus (MSSA),  Alcaligenes faecalis resistant to Zosyn, Klebsiella pneumoniae    Wound culture left calf (2/6) MSSA, Alcaligenes faecalis, Klebsiella pneumoniae, Proteus mirabilis ALL sensitive to Levaquin  Assessment:     Active Problems:    Renal failure (1/8/2022)      Hyperkalemia (1/8/2022)    1. Cellulitis both calves, secondary to probable S.  Aureus (MSSA), Alcaligenes faecalis resistant to Zosyn, Klebsiella pneumoniae, status post 14 days IV Antibiotics, including Meropenem, clinically resolved. 2. Aspiration pneumonia, RLL, status antibiotics as note above  3. Open wound left calf with persistent ? infection with MSSA, Alcaligenes faecalis, Klebsiella pneumoniae, Proteus mirabilis  4. Sepsis with leukocytosis and elevated CRP/procal, resolving    Comment:  Procal and CRP still decreasing without antibiotic intervention, however, given that he still has an exudative wound, I would favor antibiotic treatment. Plan:   1. Start Augmentin 875 mg po BID for 2 weeks  2.  Still cleared for discharge from ID standpoint    Signed By: Samm Kirk MD     February 7, 2022

## 2022-02-07 NOTE — PROGRESS NOTES
Problem: Falls - Risk of  Goal: *Absence of Falls  Description: Document Jeffery Hassan Fall Risk and appropriate interventions in the flowsheet. Outcome: Progressing Towards Goal  Note: Fall Risk Interventions:  Mobility Interventions: Bed/chair exit alarm,Patient to call before getting OOB    Mentation Interventions: Bed/chair exit alarm    Medication Interventions: Bed/chair exit alarm,Patient to call before getting OOB    Elimination Interventions: Bed/chair exit alarm,Call light in reach,Patient to call for help with toileting needs              Problem: Patient Education: Go to Patient Education Activity  Goal: Patient/Family Education  Outcome: Progressing Towards Goal     Problem: Pressure Injury - Risk of  Goal: *Prevention of pressure injury  Description: Document Alfa Scale and appropriate interventions in the flowsheet.   Outcome: Progressing Towards Goal  Note: Pressure Injury Interventions:  Sensory Interventions: Assess changes in LOC,Float heels,Keep linens dry and wrinkle-free,Minimize linen layers    Moisture Interventions: Absorbent underpads,Minimize layers    Activity Interventions: Increase time out of bed,Pressure redistribution bed/mattress(bed type)    Mobility Interventions: HOB 30 degrees or less,Float heels    Nutrition Interventions: Offer support with meals,snacks and hydration    Friction and Shear Interventions: Feet elevated on foot rest,HOB 30 degrees or less,Lift sheet,Minimize layers

## 2022-02-07 NOTE — PROGRESS NOTES
OT evaluation order received and acknowledged. OT evaluation attempted at 8:20 however pt currently off of the floor for HD. Will continue to follow and re-attempt OT eval at a later time. Thank you.

## 2022-02-07 NOTE — PROGRESS NOTES
Progress Note      2/7/2022 12:19 PM  NAME: Andrea Klein   MRN:  [id number]   Admit Diagnosis: Renal failure [N19]  Hyperkalemia [E87.5]      Subjective:   Chart reviewed. Patient is on dialysis. Symptoms of shortness of breath has improved. With the patient and with the nurse. Cardiac catheterization findings discussed with the patient and also discussed with the electrophysiologist.  Vascular surgeon's note reviewed and patient refused to have peripheral angiogram.  Patient feels good. Review of Systems:    Symptom Y/N Comments  Symptom Y/N Comments   Fever/Chills n   Chest Pain n    Poor Appetite    Edema y  improving   Cough    Abdominal Pain     Sputum    Joint Pain     SOB/CARMONA y  improving  Pruritis/Rash     Nausea/vomit    Other     Diarrhea         Constipation           Could NOT obtain due to:      Objective:          Physical Exam:    Last 24hrs VS reviewed since prior progress note. Most recent are:    Visit Vitals  /72   Pulse (!) 55   Temp 98.1 °F (36.7 °C) (Oral)   Resp 18   Ht 6' (1.829 m)   Wt 66.6 kg (146 lb 13.2 oz)   SpO2 98%   BMI 19.91 kg/m²       Intake/Output Summary (Last 24 hours) at 2/7/2022 1212  Last data filed at 2/7/2022 1050  Gross per 24 hour   Intake --   Output 3020 ml   Net -3020 ml        General Appearance: Well developed, well nourished, alert & oriented x 3,    no acute distress. Ears/Nose/Mouth/Throat: Hearing grossly normal.  Neck: Supple. Chest: Lungs clear to auscultation bilaterally. Cardiovascular: Regular rate and rhythm, S1,S2 normal, no murmur. Abdomen: Soft, non-tender, bowel sounds are active. Extremities: Patient has cellulitis of the lower extremities  Skin: Warm and dry. []         Post-cath site without hematoma, bruit, tenderness, or thrill. Distal pulses intact.     PMH/SH reviewed - no change compared to H&P    Data Review    Telemetry: normal sinus rhythm , patient has some PVCs, and episode of wide-complex tachycardia possible ventricular tachycardia. EKG:   Patient had EKG that is very concerning for ventricular tachycardia. Lab Data Personally Reviewed:    Recent Labs     02/07/22  0400   WBC 7.9   HGB 8.7*   HCT 29.5*        No results for input(s): INR, PTP, APTT, INREXT, INREXT in the last 72 hours. No results for input(s): NA, K, CL, CO2, BUN, CREA, GLU, CA, MG in the last 72 hours. No results for input(s): CPK, CKNDX, TROIQ in the last 72 hours. No lab exists for component: CPKMB  No results found for: CHOL, CHOLX, CHLST, CHOLV, HDL, HDLP, LDL, LDLC, DLDLP, TGLX, TRIGL, TRIGP, CHHD, CHHDX    No results for input(s): AP, TBIL, TP, ALB, GLOB, GGT, AML, LPSE in the last 72 hours. No lab exists for component: SGOT, GPT, AMYP, HLPSE  No results for input(s): PH, PCO2, PO2 in the last 72 hours.     Medications Personally Reviewed:    Current Facility-Administered Medications   Medication Dose Route Frequency    ondansetron (ZOFRAN ODT) tablet 4 mg  4 mg Oral Q6H PRN    furosemide (LASIX) tablet 80 mg  80 mg Oral DAILY    heparin (porcine) 1,000 unit/mL injection 3,600 Units  3,600 Units Hemodialysis DIALYSIS PRN    amiodarone (CORDARONE) tablet 200 mg  200 mg Oral DAILY    epoetin valente-epbx (RETACRIT) injection 10,000 Units  10,000 Units IntraVENous DIALYSIS MON, WED & FRI    oxyCODONE IR (ROXICODONE) tablet 5 mg  5 mg Oral Q8H PRN    sodium chloride (NS) flush 5-40 mL  5-40 mL IntraVENous Q8H    sodium chloride (NS) flush 5-40 mL  5-40 mL IntraVENous PRN    ferrous sulfate tablet 325 mg  1 Tablet Oral BID WITH MEALS    heparin (porcine) 1,000 unit/mL injection 4,000 Units  4,000 Units IntraVENous PRN    Or    heparin (porcine) 1,000 unit/mL injection 2,000 Units  2,000 Units IntraVENous PRN    metoprolol succinate (TOPROL-XL) XL tablet 50 mg  50 mg Oral DAILY    nitroglycerin (NITROBID) 2 % ointment 0.5 Inch  0.5 Inch Topical Q6H    aspirin chewable tablet 81 mg  81 mg Oral DAILY Problem List:   Patient has a acute on chronic renal failure and had a dialysis and is clinically better. Decompensated heart failure and ejection fraction is depressed and patient has a at least moderate possibly severe pulmonary hypertension. History of paroxysmal atrial fibrillation. Recent DVT of her left axillary vein. Wide-complex tachycardia probably ventricular tachycardia. Catheterization did not show evidence of coronary artery disease and ejection fraction was about 20 to 25%. Patient is getting restless and wants to leave. Cellulitis of the lower extremities. Patient refused peripheral arterial angiogram as advised by the vascular surgeon. Patient did ambulate with a walker around in the room. 1.      Assessment/Plan:   I will continue dialysis as per recommendations of the nephrologist.  Patient is offered ICD by Dr. El Coates however patient does not want it. Vascular surgery note reviewed and appreciated and will follow the recommendations. We will continue amiodarone and present care. Discontinue heparin and start on small dose of Eliquis because patient has a history of paroxysmal atrial fibrillation. Okay to transfer to skilled nursing facility when bed is available. We will follow infectious disease recommendations. Thank you. 1.          []       High complexity decision making was performed in this patient at high risk for decompensation with multiple organ involvement.     Salo Broderick MD

## 2022-02-07 NOTE — PROGRESS NOTES
Problem: Falls - Risk of  Goal: *Absence of Falls  Description: Document Neela Jean Fall Risk and appropriate interventions in the flowsheet. Outcome: Progressing Towards Goal  Note: Fall Risk Interventions:  Mobility Interventions: Bed/chair exit alarm,Patient to call before getting OOB    Mentation Interventions: Bed/chair exit alarm    Medication Interventions: Bed/chair exit alarm,Patient to call before getting OOB    Elimination Interventions: Bed/chair exit alarm,Call light in reach,Patient to call for help with toileting needs              Problem: Patient Education: Go to Patient Education Activity  Goal: Patient/Family Education  Outcome: Progressing Towards Goal     Problem: Pressure Injury - Risk of  Goal: *Prevention of pressure injury  Description: Document Alfa Scale and appropriate interventions in the flowsheet.   Outcome: Progressing Towards Goal  Note: Pressure Injury Interventions:  Sensory Interventions: Assess changes in LOC,Float heels,Keep linens dry and wrinkle-free,Minimize linen layers    Moisture Interventions: Absorbent underpads,Minimize layers    Activity Interventions: Increase time out of bed,Pressure redistribution bed/mattress(bed type)    Mobility Interventions: HOB 30 degrees or less,Float heels    Nutrition Interventions: Offer support with meals,snacks and hydration    Friction and Shear Interventions: Feet elevated on foot rest,HOB 30 degrees or less,Lift sheet,Minimize layers                Problem: Patient Education: Go to Patient Education Activity  Goal: Patient/Family Education  Outcome: Progressing Towards Goal

## 2022-02-08 VITALS
HEART RATE: 68 BPM | HEIGHT: 72 IN | SYSTOLIC BLOOD PRESSURE: 101 MMHG | WEIGHT: 146.83 LBS | BODY MASS INDEX: 19.89 KG/M2 | RESPIRATION RATE: 16 BRPM | DIASTOLIC BLOOD PRESSURE: 66 MMHG | OXYGEN SATURATION: 100 % | TEMPERATURE: 98.3 F

## 2022-02-08 PROCEDURE — 74011250637 HC RX REV CODE- 250/637: Performed by: INTERNAL MEDICINE

## 2022-02-08 PROCEDURE — 74011000250 HC RX REV CODE- 250: Performed by: INTERNAL MEDICINE

## 2022-02-08 PROCEDURE — 99232 SBSQ HOSP IP/OBS MODERATE 35: CPT | Performed by: INTERNAL MEDICINE

## 2022-02-08 RX ORDER — AMIODARONE HYDROCHLORIDE 200 MG/1
200 TABLET ORAL DAILY
Qty: 60 TABLET | Refills: 0 | Status: SHIPPED | OUTPATIENT
Start: 2022-02-09

## 2022-02-08 RX ORDER — LANOLIN ALCOHOL/MO/W.PET/CERES
325 CREAM (GRAM) TOPICAL 2 TIMES DAILY WITH MEALS
Qty: 60 TABLET | Refills: 0 | Status: SHIPPED | OUTPATIENT
Start: 2022-02-08

## 2022-02-08 RX ORDER — METOPROLOL SUCCINATE 50 MG/1
50 TABLET, EXTENDED RELEASE ORAL DAILY
Qty: 30 TABLET | Refills: 0 | Status: SHIPPED | OUTPATIENT
Start: 2022-02-09 | End: 2022-03-11

## 2022-02-08 RX ORDER — GUAIFENESIN 100 MG/5ML
81 LIQUID (ML) ORAL DAILY
Qty: 90 TABLET | Refills: 0 | Status: SHIPPED | OUTPATIENT
Start: 2022-02-09

## 2022-02-08 RX ADMIN — AMOXICILLIN AND CLAVULANATE POTASSIUM 1 TABLET: 500; 125 TABLET, FILM COATED ORAL at 08:54

## 2022-02-08 RX ADMIN — SODIUM CHLORIDE, PRESERVATIVE FREE 10 ML: 5 INJECTION INTRAVENOUS at 15:26

## 2022-02-08 RX ADMIN — ASPIRIN 81 MG CHEWABLE TABLET 81 MG: 81 TABLET CHEWABLE at 08:54

## 2022-02-08 RX ADMIN — FERROUS SULFATE TAB 325 MG (65 MG ELEMENTAL FE) 325 MG: 325 (65 FE) TAB at 08:54

## 2022-02-08 RX ADMIN — METOPROLOL SUCCINATE 50 MG: 25 TABLET, EXTENDED RELEASE ORAL at 08:54

## 2022-02-08 RX ADMIN — AMIODARONE HYDROCHLORIDE 200 MG: 200 TABLET ORAL at 08:54

## 2022-02-08 RX ADMIN — NITROGLYCERIN 0.5 INCH: 20 OINTMENT TOPICAL at 12:27

## 2022-02-08 RX ADMIN — NITROGLYCERIN 0.5 INCH: 20 OINTMENT TOPICAL at 05:19

## 2022-02-08 RX ADMIN — SODIUM CHLORIDE, PRESERVATIVE FREE 10 ML: 5 INJECTION INTRAVENOUS at 05:20

## 2022-02-08 NOTE — PROGRESS NOTES
Progress Note    Patient: Akhil Freeman MRN: [id number]  SSN: [id number]    YOB: 1955  Age: 78 y.o. Sex: male      Admit Date: 1/7/2022    LOS: 31 days     Subjective:   Patient followed for sepsis with cellulitis both calves and aspiration pneumonia. Left leg wound culture again grew multiple organisms as shown below. He was started on oral Augmentin primarily because of exudate in wound. Patient is being discharged today but will be followed in the [address] [facility],[address]. It does not appear that Augmentin was included in discharge medications. Objective:     Vitals:    02/07/22 1050 02/07/22 1440 02/08/22 0307 02/08/22 0727   BP: 120/72 (!) 94/58 104/70 101/66   Pulse: (!) 55 63 71 68   Resp: 18 20 18 16   Temp: 98.1 °F (36.7 °C) 98.8 °F (37.1 °C) 99 °F (37.2 °C) 98.3 °F (36.8 °C)   TempSrc: Oral      SpO2:  97% 98% 100%   Weight:       Height:            Intake and Output:  Current Shift: No intake/output data recorded. Last three shifts: 02/06 1901 - 02/08 0700  In: -   Out: 3020     Physical Exam:    Vitals and nursing note reviewed. Constitutional:       Appearance: He is ill-appearing.    Musculoskeletal:      Right lower leg edema resolved; 1.5 x3 cm eschar posteriorly     Left lower leg with mild edma; left calf fully exposed and still showing a large open wound with apparent exudate; dressing changed  Neurological: nonfocal, no confusion  Psychiatric:         Behavior: Behavior normal.     Lab/Data Review:     WBC 7,900     CRP 2.27 < 5.41 <4.56 <6.32 <8.19 <12.10 <12.10  Procal 0.53 <1.06 < 0.59 <3.12 < 4.41 <4.39 <5.16    MRSA nasal PCR negative    Wound cultures leg (1/8)  Staphylococcus aureus (MSSA),  Alcaligenes faecalis resistant to Zosyn, Klebsiella pneumoniae    Wound culture left calf (2/6) MSSA, Alcaligenes faecalis, Klebsiella pneumoniae, Proteus mirabilis ALL sensitive to Levaquin  Assessment:     Active Problems:    Renal failure (1/8/2022)      Hyperkalemia (1/8/2022)    1. Cellulitis both calves, secondary to probable S. Aureus (MSSA), Alcaligenes faecalis resistant to Zosyn, Klebsiella pneumoniae, status post 14 days IV Antibiotics, including Meropenem, clinically resolved. 2. Aspiration pneumonia, RLL, status antibiotics as note above  3. Open wound left calf with persistent ? infection with MSSA, Alcaligenes faecalis, Klebsiella pneumoniae, Proteus mirabilis, Day #2 oral Augmentin  4. Sepsis with leukocytosis and elevated CRP/procal, resolving    Comment:  Procal and CRP still decreasing without antibiotic intervention, however, given that he still has an exudative wound, I would favor antibiotic treatment. Plan:   1. Would recommend discharge on Augmentin 875 mg po BID for 12 more days  2. Follow-up at [address] [facility],[address] as scheduled  3.  Cleared for discharge from ID standpoint    Signed By: Dennison Aschoff, MD     February 8, 2022

## 2022-02-08 NOTE — PROGRESS NOTES
[facility]     Renal Daily Progress Note:     Admission Date: 2022   Subjective: Mental status is good. Had HD earlier. There were no reported difficulties with the HD session. Outpatient dialysis placement had to be restarted. Review of Systems  Pertinent items are noted in HPI. Objective:     Visit Vitals  BP (!) 94/58 (BP 1 Location: Left upper arm, BP Patient Position: At rest;Supine)   Pulse 63   Temp 98.8 °F (37.1 °C)   Resp 20   Ht 6' (1.829 m)   Wt 66.6 kg (146 lb 13.2 oz)   SpO2 97%   BMI 19.91 kg/m²     Temp (24hrs), Av.3 °F (36.8 °C), Min:97.9 °F (36.6 °C), Max:98.8 °F (37.1 °C)        Intake/Output Summary (Last 24 hours) at 2022  Last data filed at 2022 1050  Gross per 24 hour   Intake --   Output 3020 ml   Net -3020 ml     Current Facility-Administered Medications   Medication Dose Route Frequency    ondansetron (ZOFRAN ODT) tablet 4 mg  4 mg Oral Q6H PRN    furosemide (LASIX) tablet 80 mg  80 mg Oral DAILY    heparin (porcine) 1,000 unit/mL injection 3,600 Units  3,600 Units Hemodialysis DIALYSIS PRN    amiodarone (CORDARONE) tablet 200 mg  200 mg Oral DAILY    epoetin valente-epbx (RETACRIT) injection 10,000 Units  10,000 Units IntraVENous DIALYSIS MON, WED & FRI    oxyCODONE IR (ROXICODONE) tablet 5 mg  5 mg Oral Q8H PRN    sodium chloride (NS) flush 5-40 mL  5-40 mL IntraVENous Q8H    sodium chloride (NS) flush 5-40 mL  5-40 mL IntraVENous PRN    ferrous sulfate tablet 325 mg  1 Tablet Oral BID WITH MEALS    heparin (porcine) 1,000 unit/mL injection 4,000 Units  4,000 Units IntraVENous PRN    Or    heparin (porcine) 1,000 unit/mL injection 2,000 Units  2,000 Units IntraVENous PRN    metoprolol succinate (TOPROL-XL) XL tablet 50 mg  50 mg Oral DAILY    nitroglycerin (NITROBID) 2 % ointment 0.5 Inch  0.5 Inch Topical Q6H    aspirin chewable tablet 81 mg  81 mg Oral DAILY       Physical Exam:    General appearance: alert, cooperative, no distress. Head: Normocephalic. Chest :  Right tunneled HD catheter    Lungs: clear to auscultation, no wheezes, no rales    Heart: no s3 gallop ,  no pericardial rub    Abdomen: not distended    Ext: left lower leg wrapped, no edema    Neurologic: A/O x 3, no focal motor signs    Data Review:     LABS:  No results for input(s): NA, K, CL, CO2, BUN, CREA, CA, ALB, PHOS, MG in the last 72 hours. Iron saturation 13%    Recent Labs     02/07/22  0400   WBC 7.9   HGB 8.7*   HCT 29.5*        No results for input(s): HÉCTOR, KU, CLU, CREAU in the last 72 hours. No lab exists for component: PROU     Cardiac cath Jan 13, 2022  Underwent cardiac cath today:  Indication: Severe left ventricular systolic dysfunction and patient had a V. Tach.     Left main coronary artery is a very large caliber vessel and has no disease. LAD artery is a very large caliber vessel and proximal mid and distal segment has no stenosis and diagonal branches has no disease. Ramus intermedius is a medium caliber vessel without disease. Circumflex artery is a large-caliber vessel and proximal mid and distal segment and AV groove segment has no stenosis. Large obtuse marginal branch and posterolateral branch has no stenosis. Right coronary artery is a large-caliber vessel and does junction of proximal and mid segment there was a concern of stenosis and that therefore it was interrogated by IFR which turned out to be 0.95 and was not deemed critical enough to do intervention.     Left ventriculography is performed in the right anterior oblique view and ejection fraction is about 20 to 25% and distal anterior wall apex and distal inferior wall is near akinetic.   This may be possible presentation of Takotsubo syndrome.     Plan: Patient will receive defibrillation      Chest x-ray January 8, 2022:  IMPRESSION     Interval placement of a right supraclavicular approach dialysis catheter with  distal tip in the proximal right atrium.     Cardiomegaly with pulmonary vascular congestion and pulmonary edema.      Stable appearing basilar right lung pneumonia. Small right pleural effusion. Assessment:   Renal Specific Problems  Chronic kidney disease stage VI, started on HD. Hypoalbumin  SVT- recurrent  Volume overload- resolved  Metabolic acidosis- resolved  Hyperkalemia- resolved  Anemia with renal failure and iron deficient  Leukocytosis corrected with declining procalcitonin  Probable right lower lobe pneumonia  Infected  leg ulcers  Venous stasis dermatitis lower extremity          Plan:     Obtain/ Order: labs/cultures/radiology/procedures:      Therapeutic:      HD today w/o problems, next Rx Wed as outpatient  Epogen with dialysis    Wound care - regarding left leg ulcer    Case management to arrange rehab and outpatient dialysis. Spoke w/ patient and . Sourav Ortiz, should be able to go home Tuesday. [facility] will accept on Wednesday at 1430. D/C lasix.

## 2022-02-08 NOTE — PROGRESS NOTES
Problem: Falls - Risk of  Goal: *Absence of Falls  Description: Document Jing Dennis Fall Risk and appropriate interventions in the flowsheet. Outcome: Progressing Towards Goal  Note: Fall Risk Interventions:  Mobility Interventions: Bed/chair exit alarm,Patient to call before getting OOB    Mentation Interventions: Bed/chair exit alarm    Medication Interventions: Bed/chair exit alarm,Patient to call before getting OOB    Elimination Interventions: Bed/chair exit alarm,Call light in reach,Patient to call for help with toileting needs              Problem: Patient Education: Go to Patient Education Activity  Goal: Patient/Family Education  Outcome: Progressing Towards Goal     Problem: Pressure Injury - Risk of  Goal: *Prevention of pressure injury  Description: Document Alfa Scale and appropriate interventions in the flowsheet.   Outcome: Progressing Towards Goal  Note: Pressure Injury Interventions:  Sensory Interventions: Assess changes in LOC,Float heels,Keep linens dry and wrinkle-free,Minimize linen layers    Moisture Interventions: Minimize layers,Absorbent underpads    Activity Interventions: Increase time out of bed,Pressure redistribution bed/mattress(bed type),PT/OT evaluation    Mobility Interventions: HOB 30 degrees or less,Float heels,Pressure redistribution bed/mattress (bed type)    Nutrition Interventions: Offer support with meals,snacks and hydration    Friction and Shear Interventions: Minimize layers                Problem: Patient Education: Go to Patient Education Activity  Goal: Patient/Family Education  Outcome: Progressing Towards Goal

## 2022-02-08 NOTE — DISCHARGE SUMMARY
Discharge Summary       PATIENT ID: Cristela Worthy  MRN: [id number]   YOB: 1955    DATE OF ADMISSION: 1/7/2022 10:29 PM    DATE OF DISCHARGE: 8 February 2022  PRIMARY CARE PROVIDER: Dayana Narayanan MD     ATTENDING PHYSICIAN: Mike Samuels  DISCHARGING PROVIDER:Mike Mcgee   To contact this individual call [phone]    CONSULTATIONS: IP CONSULT TO CARDIOLOGY  IP CONSULT TO INFECTIOUS DISEASES  IP CONSULT TO NEPHROLOGY  IP CONSULT TO INFECTIOUS DISEASES  IP CONSULT TO VASCULAR SURGERY    PROCEDURES/SURGERIES: Procedure(s):  LEFT HEART CATH / CORONARY ANGIOGRAPHY W GRAFTS  PERCUTANEOUS CORONARY INTERVENTION    ADMITTING [address] [place] [address] COURSE:   Severe anemia  CHF  Chronic renal failure  Hypertension  Pneumonia  Patient is a known patient to me 78years old -American man who was admitted here with diagnosis of aspiration pneumonia sepsis as well as chronic renal failure he was transfused 2 units of PRBCs was started on IV antibiotics patient had also multiple leg ulcers secondary to peripheral arterial disease infectious disease was consulted patient's bacteriology as per results. He was on IV antibiotics and his ulcers are healing very well. In between patient had ventricular tachycardia for which he is on amiodarone patient refused for the ICD placement as well as angiogram.  Or arteriogram.  His left leg wound is healing well. He needs wound care consult as an outpatient  Requested RN to provide instruction from wound care  As per infectious disease patient CRP is improving does not need any outpatient antibiotics  Patient has poor prognosis. We will try to get him in inpatient rehab but as he has not got any Covid vaccine he was not accepted by any inpatient rehab as well as the head hesitation as patient needs ICD but has not got it. Patient's ejection fraction is 20 to 25%.   He had a cardiac catheterization done which was completely normal  Patient has been started by nephrologist on dialysis he is receiving dialysis every other day. He is supposed to get dialysis tomorrow    DISCHARGE DIAGNOSES / PLAN:      1. Anemia  2. Infected ulcers on both the legs  3. Peripheral arterial disease  4. Chronic renal failure on dialysis  5. CHF  6. Ventricular tachycardia  7. Hypertension     ADDITIONAL CARE RECOMMENDATIONS:   Patient requires wound care clinic appointment as an outpatient for his left leg wound    PENDING TEST RESULTS:   At the time of discharge the following test results are still pending: None    FOLLOW UP APPOINTMENTS:    Dr.Akshay FLAVIA Dyer in 1 week      DIET: Renal diet  Oral Nutritional Supplements:     ACTIVITY: Activity as tolerated    WOUND CARE: Patient is going to go to wound care clinic and follow the instructions from wound care nurse here for dressing    EQUIPMENT needed: Walker      DISCHARGE MEDICATIONS:  Current Discharge Medication List      START taking these medications    Details   amiodarone (CORDARONE) 200 mg tablet Take 1 Tablet by mouth daily. Qty: 60 Tablet, Refills: 0  Start date: 2/9/2022      aspirin 81 mg chewable tablet Take 1 Tablet by mouth daily. Qty: 90 Tablet, Refills: 0  Start date: 2/9/2022      ferrous sulfate 325 mg (65 mg iron) tablet Take 1 Tablet by mouth two (2) times daily (with meals). Qty: 60 Tablet, Refills: 0  Start date: 2/8/2022      metoprolol succinate (TOPROL-XL) 50 mg XL tablet Take 1 Tablet by mouth daily for 30 days. Qty: 30 Tablet, Refills: 0  Start date: 2/9/2022, End date: 3/11/2022         CONTINUE these medications which have NOT CHANGED    Details   furosemide (LASIX) 40 mg tablet Take 1 and half tab daily twice a day  Qty: 135 Tablet, Refills: 0      amLODIPine (NORVASC) 5 mg tablet Take 1 Tablet by mouth daily. Qty: 30 Tablet, Refills: 0      apixaban (ELIQUIS) 2.5 mg tablet Take 1 Tablet by mouth two (2) times a day.   Qty: 60 Tablet, Refills: 0      calcitRIOL (ROCALTROL) 0.25 mcg capsule Take 1 Capsule by mouth daily. Qty: 30 Capsule, Refills: 0      metoprolol tartrate (LOPRESSOR) 25 mg tablet Take 1 Tablet by mouth daily. Indications: rapid ventricular heartbeat  Qty: 30 Tablet, Refills: 0      sevelamer carbonate (RENVELA) 800 mg tab tab Take 2 Tablets by mouth three (3) times daily (with meals). Indications: renal osteodystrophy with hyperphosphatemia  Qty: 90 Tablet, Refills: 0      tamsulosin (FLOMAX) 0.4 mg capsule Take 1 Capsule by mouth daily. Qty: 30 Capsule, Refills: 0      sodium bicarbonate 650 mg tablet Take 1 Tab by mouth two (2) times a day. Qty: 60 Tab, Refills: 0         STOP taking these medications       metOLazone (ZAROXOLYN) 5 mg tablet Comments:   Reason for Stopping:                 NOTIFY YOUR PHYSICIAN FOR ANY OF THE FOLLOWING:   Fever over 101 degrees for 24 hours. Chest pain, shortness of breath, fever, chills, nausea, vomiting, diarrhea, change in mentation, falling, weakness, bleeding. Severe pain or pain not relieved by medications. Or, any other signs or symptoms that you may have questions about. DISPOSITION:   y Home With:   OT  PT  HH  RN       Long term SNF/Inpatient Rehab    Independent/assisted living    Hospice    Other:       PATIENT CONDITION AT DISCHARGE:     Functional status    Poor    y Deconditioned     Independent      Cognition     Lucid     Forgetful     Dementia      Catheters/lines (plus indication)    Quesada     PICC     PEG    y None      Code status   y  Full code     DNR      PHYSICAL EXAMINATION AT DISCHARGE:  General:          Alert, cooperative, no distress, appears stated age. HEENT:           Atraumatic, anicteric sclerae, pink conjunctivae                          No oral ulcers, mucosa moist, throat clear, dentition fair  Neck:               Supple, symmetrical  Lungs:             Clear to auscultation bilaterally. No Wheezing or Rhonchi. No rales. Chest wall:      No tenderness  No Accessory muscle use.   Heart: Regular  rhythm,  No  murmur   No edema  Abdomen:        Soft, non-tender. Not distended. Bowel sounds normal  Extremities:     No cyanosis. No clubbing,                            Skin turgor normal, Capillary refill normal  Skin:                 Has 2 ulcers on the left  Psych:             Not anxious or agitated.   Neurologic:      Alert, moves all extremities, answers questions appropriately and responds to commands       CHRONIC MEDICAL DIAGNOSES:  Problem List as of 2/8/2022 Never Reviewed          Codes Class Noted - Resolved    Renal failure ICD-10-CM: N19  ICD-9-CM: 579  1/8/2022 - Present        Hyperkalemia ICD-10-CM: E87.5  ICD-9-CM: 276.7  1/8/2022 - Present        Pulmonary edema ICD-10-CM: J81.1  ICD-9-CM: 299  7/13/2021 - Present        GI bleed ICD-10-CM: K92.2  ICD-9-CM: 578.9  1/5/2021 - Present              Greater than 45 minutes were spent with the patient on counseling and coordination of care    Signed:   Jarocho Garcias MD  2/8/2022  12:52 PM

## 2022-02-08 NOTE — PROGRESS NOTES
DC Plan: [address] [facility] ([facility]: 2/9/22)        Chair days and time: M,W,F at 2:45pm    Pt transferred from 4W to 4E. Cm spoke with attending regarding pt's discharge. Unit secretary will arrange appointment with the wound care center. Cm called [address] and received confirmation pt will start dialysis tomorrow. Pt will need to arrive at 2:30pm to complete paperwork. Cm met with pt at the bedside to f/up on his plan of care. Cm informed pt of start day with [address]. Pt is aware he will need to arrive at 2:30pm to complete paperwork. Cm provided pt with address and phone number to the dialysis center. CM discussed home health. Pt declined home health. Pt is aware he is being discharged home today. Medicare pt has received, reviewed, and signed 2nd IM letter informing them of their right to appeal the discharge. Signed copied has been placed on pt bedside chart. Discharge plan of care/case management plan validated with provider's discharge order.

## 2022-02-08 NOTE — WOUND CARE
IP WOUND CONSULT    [address] [facility]  RECORD NUMBER:  [id number]  AGE: 78 y.o. GENDER: male  : 1955  TODAY'S DATE:  2022    GENERAL     [] Follow-up   [x] New Consult    Oscar Blake is a 78 y.o. male referred by:   [x] Physician  [] Nursing  [] Other:         PAST MEDICAL HISTORY    Past Medical History:   Diagnosis Date    Chronic kidney disease     Hypertension         PAST SURGICAL HISTORY    Past Surgical History:   Procedure Laterality Date    IR FLUORO GUIDE PLC CVAD  2022    IR INSERT NON TUNL CVC OVER 5 YRS  2022    IR INSERT TUNL CVC W/O PORT OVER 5 YR  2022       FAMILY HISTORY    No family history on file. ALLERGIES    No Known Allergies    MEDICATIONS    No current facility-administered medications on file prior to encounter. Current Outpatient Medications on File Prior to Encounter   Medication Sig Dispense Refill    furosemide (LASIX) 40 mg tablet Take 1 and half tab daily twice a day 135 Tablet 0    amLODIPine (NORVASC) 5 mg tablet Take 1 Tablet by mouth daily. 30 Tablet 0    apixaban (ELIQUIS) 2.5 mg tablet Take 1 Tablet by mouth two (2) times a day. 60 Tablet 0    calcitRIOL (ROCALTROL) 0.25 mcg capsule Take 1 Capsule by mouth daily. 30 Capsule 0    metOLazone (ZAROXOLYN) 5 mg tablet Take 1 Tablet by mouth daily. 30 Tablet 0    metoprolol tartrate (LOPRESSOR) 25 mg tablet Take 1 Tablet by mouth daily. Indications: rapid ventricular heartbeat 30 Tablet 0    sevelamer carbonate (RENVELA) 800 mg tab tab Take 2 Tablets by mouth three (3) times daily (with meals). Indications: renal osteodystrophy with hyperphosphatemia 90 Tablet 0    tamsulosin (FLOMAX) 0.4 mg capsule Take 1 Capsule by mouth daily. 30 Capsule 0    sodium bicarbonate 650 mg tablet Take 1 Tab by mouth two (2) times a day.  60 Tab 0         [unfilled]  Visit Vitals  /66   Pulse 68   Temp 98.3 °F (36.8 °C)   Resp 16   Ht 6' (1.829 m)   Wt 66.6 kg (146 lb 13.2 oz)   SpO2 100%   BMI 19.91 kg/m²       ASSESSMENT     Wound Identification & Type: Venous stasis ulcers to BLEs  Dressing change: Yes, see flow chart  Verbal consent for picture: Yes    Contributing Factors: anticoagulation therapy, venous stasis, decreased mobility, shear force, decreased tissue oxygenation and malnutrition    Wound Toe (Comment  which one) Left Dry eschar, intact 07/16/21 (Active)   Dressing Status Dry; Intact 01/24/22 0313   Wound Assessment Other (Comment) 01/24/22 0313   Drainage Amount None 01/24/22 0313   Wound Odor None 01/18/22 0118   Number of days: 207       Wound Pretibial Right Stasis ulcers (Active)   Wound Image   02/08/22 1620   Wound Etiology Venous 02/08/22 1620   Dressing Status Intact 01/25/22 0800   Cleansed Cleansed with saline 02/08/22 1620   Dressing/Treatment Open to air 02/08/22 1620   Dressing Change Due 01/17/22 01/18/22 0839   Wound Assessment Eschar dry 02/08/22 1620   Drainage Amount None 02/08/22 1620   Drainage Description Serosanguinous 01/18/22 0839   Wound Odor None 02/08/22 1620   Sera-Wound/Incision Assessment Dry/flaky;Fragile 02/08/22 1620   Edges Attached edges 02/08/22 1620   Wound Thickness Description Partial thickness 01/12/22 1735   Number of days: 31       Wound Pretibial Left Stasis Ulcers (Active)   Wound Image    02/08/22 1618   Wound Etiology Venous 02/08/22 1618   Dressing Status New dressing applied 02/08/22 1618   Cleansed Cleansed with saline 02/08/22 1618   Dressing/Treatment ABD pad;Alginate with Ag;Roll gauze; Ace wrap 02/08/22 1618   Dressing Change Due 02/10/22 02/08/22 1618   Wound Assessment Pink/red;Granulation tissue 02/08/22 1618   Drainage Amount Scant 02/08/22 1618   Drainage Description Pink 02/08/22 1618   Wound Odor None 02/08/22 1618   Sera-Wound/Incision Assessment Dry/flaky;Fragile 02/08/22 1618   Edges Defined edges; Unattached edges 02/08/22 1618   Wound Thickness Description Partial thickness 02/08/22 1618   Number of days: 31 PLAN     Skin Care & Pressure Relief Recommendations  Minimize layers of linen  Turn/reposition approximately every 2 hours  Pillow wedges  Offload heels pillows    Alfa 21  Blood Glucose: 92 on 1/26/22                             Albumin: 2.1 on 1/26/22  WBCs: 7.9 on 2/7/22    Support Surface: Gel mattress    Physician/Provider notified:   Recommendations: Venous stasis ulcers noted to BLEs, appears to be chronic. More drainage noted to LLE ulcers than on previous assessment on 1/12/22 but not much. Apply Opticell Ag and wrap with ACE wraps (for gentle compression) every other day and prn for soiling, see dressing order for materials. Dry stable eschar noted to RLE posterior. Keep clean and dry. Keep BLEs elevated with 2-4 pillows to help reduce swelling/edema. Dr. Joey Gaytan previously assessed and recommended angiographic exam with possible intervention, especially the left leg, but patient declined. Patient also noted to be moderately malnourished. Wound healing may be a challenge due to these factors. Patient has discharge order for today and is scheduled to follow up with the [address] [place] [address] on 2/15/22 for wound management. Please provided patient with material for dressing changes at home until seen by the [address] [place] [address]. Discussed with Roberto Kim. Discharge Wound Care Needs: Follow up with the [address] [place] [address]. For Left posterior calf and lateral wounds: rinse with NS, apply Opticell Ag to open areas, cover with ABD pads, wrap with kerlix and 6\" ACE wrap every other day and prn for soiling. To wrap for gentle compression: begin at approximately 1 inch below toes and spiral wrap to approximately 2-3 inches below knees. Elevate BLEs with 2-3 pillows.     Teaching completed with:   [] Patient           [] Family member       [] Caregiver          [] Nursing  [] Other    Patient/Caregiver Teaching:  Level of patient/caregiver understanding able to:   [] Indicates understanding [] Needs reinforcement  [] Unsuccessful      [] Verbal Understanding  [] Demonstrated understanding       [] No evidence of learning  [] Refused teaching         [] N/A       Electronically signed by Rancho Milligan RN on 2/8/2022 at 4:24 PM

## 2022-02-08 NOTE — PROGRESS NOTES
Progress Note      2/8/2022 12:19 PM  NAME: Oscar Blake   MRN:  [id number]   Admit Diagnosis: Renal failure [N19]  Hyperkalemia [E87.5]      Subjective:   Chart reviewed. Patient is on dialysis. Symptoms of shortness of breath has improved. With the patient and with the nurse. Cardiac catheterization findings discussed with the patient and also discussed with the electrophysiologist.  Vascular surgeon's note reviewed and patient refused to have peripheral angiogram.  Patient feels good. Review of Systems:    Symptom Y/N Comments  Symptom Y/N Comments   Fever/Chills n   Chest Pain n    Poor Appetite    Edema y  improving   Cough    Abdominal Pain     Sputum    Joint Pain     SOB/CARMONA y  improving  Pruritis/Rash     Nausea/vomit    Other     Diarrhea         Constipation           Could NOT obtain due to:      Objective:          Physical Exam:    Last 24hrs VS reviewed since prior progress note. Most recent are:    Visit Vitals  /66   Pulse 68   Temp 98.3 °F (36.8 °C)   Resp 16   Ht 6' (1.829 m)   Wt 66.6 kg (146 lb 13.2 oz)   SpO2 100%   BMI 19.91 kg/m²     No intake or output data in the 24 hours ending 02/08/22 1138     General Appearance: Well developed, well nourished, alert & oriented x 3,    no acute distress. Ears/Nose/Mouth/Throat: Hearing grossly normal.  Neck: Supple. Chest: Lungs clear to auscultation bilaterally. Cardiovascular: Regular rate and rhythm, S1,S2 normal, no murmur. Abdomen: Soft, non-tender, bowel sounds are active. Extremities: Patient has cellulitis of the lower extremities  Skin: Warm and dry. []         Post-cath site without hematoma, bruit, tenderness, or thrill. Distal pulses intact. PMH/SH reviewed - no change compared to H&P    Data Review    Telemetry: normal sinus rhythm , patient has some PVCs, and episode of wide-complex tachycardia possible ventricular tachycardia.     EKG:   Patient had EKG that is very concerning for ventricular tachycardia. Lab Data Personally Reviewed:    Recent Labs     02/07/22  0400   WBC 7.9   HGB 8.7*   HCT 29.5*        No results for input(s): INR, PTP, APTT, INREXT, INREXT in the last 72 hours. No results for input(s): NA, K, CL, CO2, BUN, CREA, GLU, CA, MG in the last 72 hours. No results for input(s): CPK, CKNDX, TROIQ in the last 72 hours. No lab exists for component: CPKMB  No results found for: CHOL, CHOLX, CHLST, CHOLV, HDL, HDLP, LDL, LDLC, DLDLP, TGLX, TRIGL, TRIGP, CHHD, CHHDX    No results for input(s): AP, TBIL, TP, ALB, GLOB, GGT, AML, LPSE in the last 72 hours. No lab exists for component: SGOT, GPT, AMYP, HLPSE  No results for input(s): PH, PCO2, PO2 in the last 72 hours.     Medications Personally Reviewed:    Current Facility-Administered Medications   Medication Dose Route Frequency    amoxicillin-clavulanate (AUGMENTIN) 500-125 mg per tablet 1 Tablet  1 Tablet Oral DAILY    ondansetron (ZOFRAN ODT) tablet 4 mg  4 mg Oral Q6H PRN    heparin (porcine) 1,000 unit/mL injection 3,600 Units  3,600 Units Hemodialysis DIALYSIS PRN    amiodarone (CORDARONE) tablet 200 mg  200 mg Oral DAILY    epoetin valente-epbx (RETACRIT) injection 10,000 Units  10,000 Units IntraVENous DIALYSIS MON, WED & FRI    oxyCODONE IR (ROXICODONE) tablet 5 mg  5 mg Oral Q8H PRN    sodium chloride (NS) flush 5-40 mL  5-40 mL IntraVENous Q8H    sodium chloride (NS) flush 5-40 mL  5-40 mL IntraVENous PRN    ferrous sulfate tablet 325 mg  1 Tablet Oral BID WITH MEALS    heparin (porcine) 1,000 unit/mL injection 4,000 Units  4,000 Units IntraVENous PRN    Or    heparin (porcine) 1,000 unit/mL injection 2,000 Units  2,000 Units IntraVENous PRN    metoprolol succinate (TOPROL-XL) XL tablet 50 mg  50 mg Oral DAILY    nitroglycerin (NITROBID) 2 % ointment 0.5 Inch  0.5 Inch Topical Q6H    aspirin chewable tablet 81 mg  81 mg Oral DAILY           Problem List:   Patient has a acute on chronic renal failure and had a dialysis and is clinically better. Decompensated heart failure and ejection fraction is depressed and patient has a at least moderate possibly severe pulmonary hypertension. History of paroxysmal atrial fibrillation. Recent DVT of her left axillary vein. Wide-complex tachycardia probably ventricular tachycardia. Catheterization did not show evidence of coronary artery disease and ejection fraction was about 20 to 25%. Patient is getting restless and wants to leave. Cellulitis of the lower extremities. Patient refused peripheral arterial angiogram as advised by the vascular surgeon. Patient did ambulate with a walker around in the room. 1.      Assessment/Plan:   I will continue dialysis as per recommendations of the nephrologist.  Patient is offered ICD by Dr. Micky Sotelo however patient does not want it. Vascular surgery note reviewed and appreciated and will follow the recommendations. We will continue amiodarone and present care. Discontinue heparin and start on small dose of Eliquis because patient has a history of paroxysmal atrial fibrillation. Okay to transfer to skilled nursing facility when bed is available. We will follow infectious disease recommendations. Thank you. 1.          []       High complexity decision making was performed in this patient at high risk for decompensation with multiple organ involvement.     Rai Cody MD

## 2022-02-08 NOTE — DISCHARGE INSTRUCTIONS
Please follow woundcare instructions, copy printed out and highlighted.   Discharge  Paperwork printed and explained to patient, patient verbalized acknowledged

## 2022-02-09 LAB
BACTERIA SPEC CULT: NORMAL
GRAM STN SPEC: NORMAL
SPECIAL REQUESTS,SREQ: NORMAL

## 2022-03-18 PROBLEM — J81.1 PULMONARY EDEMA: Status: ACTIVE | Noted: 2021-07-13

## 2022-03-18 PROBLEM — E87.5 HYPERKALEMIA: Status: ACTIVE | Noted: 2022-01-08

## 2022-03-19 PROBLEM — N19 RENAL FAILURE: Status: ACTIVE | Noted: 2022-01-08

## 2022-03-20 PROBLEM — K92.2 GI BLEED: Status: ACTIVE | Noted: 2021-01-05

## 2023-01-02 ENCOUNTER — HOSPITAL ENCOUNTER (EMERGENCY)
Age: 68
Discharge: HOME | End: 2023-01-02
Attending: STUDENT IN AN ORGANIZED HEALTH CARE EDUCATION/TRAINING PROGRAM
Payer: MEDICARE

## 2023-01-02 VITALS
HEART RATE: 75 BPM | WEIGHT: 160 LBS | RESPIRATION RATE: 18 BRPM | TEMPERATURE: 98.6 F | HEIGHT: 72 IN | OXYGEN SATURATION: 95 % | SYSTOLIC BLOOD PRESSURE: 199 MMHG | BODY MASS INDEX: 21.67 KG/M2 | DIASTOLIC BLOOD PRESSURE: 95 MMHG

## 2023-01-02 DIAGNOSIS — N18.6 ESRD (END STAGE RENAL DISEASE) (HCC): ICD-10-CM

## 2023-01-02 DIAGNOSIS — Z86.16 HISTORY OF COVID-19: Primary | ICD-10-CM

## 2023-01-02 LAB
ALBUMIN SERPL-MCNC: 3.4 G/DL (ref 3.5–5)
ALBUMIN/GLOB SERPL: 1 {RATIO} (ref 1.1–2.2)
ALP SERPL-CCNC: 72 U/L (ref 45–117)
ALT SERPL-CCNC: 17 U/L (ref 12–78)
ANION GAP SERPL CALC-SCNC: 9 MMOL/L (ref 5–15)
AST SERPL W P-5'-P-CCNC: 22 U/L (ref 15–37)
BASOPHILS # BLD: 0 K/UL (ref 0–0.1)
BASOPHILS NFR BLD: 1 % (ref 0–1)
BILIRUB SERPL-MCNC: 0.7 MG/DL (ref 0.2–1)
BUN SERPL-MCNC: 43 MG/DL (ref 6–20)
BUN/CREAT SERPL: 3 (ref 12–20)
CA-I BLD-MCNC: 8.5 MG/DL (ref 8.5–10.1)
CHLORIDE SERPL-SCNC: 97 MMOL/L (ref 97–108)
CO2 SERPL-SCNC: 31 MMOL/L (ref 21–32)
CREAT SERPL-MCNC: 13 MG/DL (ref 0.7–1.3)
DIFFERENTIAL METHOD BLD: ABNORMAL
EOSINOPHIL # BLD: 0.3 K/UL (ref 0–0.4)
EOSINOPHIL NFR BLD: 9 % (ref 0–7)
ERYTHROCYTE [DISTWIDTH] IN BLOOD BY AUTOMATED COUNT: 13.2 % (ref 11.5–14.5)
GLOBULIN SER CALC-MCNC: 3.3 G/DL (ref 2–4)
GLUCOSE SERPL-MCNC: 98 MG/DL (ref 65–100)
HCT VFR BLD AUTO: 34 % (ref 36.6–50.3)
HGB BLD-MCNC: 10.6 G/DL (ref 12.1–17)
IMM GRANULOCYTES # BLD AUTO: 0 K/UL (ref 0–0.04)
IMM GRANULOCYTES NFR BLD AUTO: 1 % (ref 0–0.5)
LYMPHOCYTES # BLD: 0.9 K/UL (ref 0.8–3.5)
LYMPHOCYTES NFR BLD: 25 % (ref 12–49)
MCH RBC QN AUTO: 33 PG (ref 26–34)
MCHC RBC AUTO-ENTMCNC: 31.2 G/DL (ref 30–36.5)
MCV RBC AUTO: 105.9 FL (ref 80–99)
MONOCYTES # BLD: 0.3 K/UL (ref 0–1)
MONOCYTES NFR BLD: 9 % (ref 5–13)
NEUTS SEG # BLD: 2 K/UL (ref 1.8–8)
NEUTS SEG NFR BLD: 55 % (ref 32–75)
NRBC # BLD: 0 K/UL (ref 0–0.01)
NRBC BLD-RTO: 0 PER 100 WBC
PLATELET # BLD AUTO: 117 K/UL (ref 150–400)
PMV BLD AUTO: 9.4 FL (ref 8.9–12.9)
POTASSIUM SERPL-SCNC: 4.4 MMOL/L (ref 3.5–5.1)
PROT SERPL-MCNC: 6.7 G/DL (ref 6.4–8.2)
RBC # BLD AUTO: 3.21 M/UL (ref 4.1–5.7)
SODIUM SERPL-SCNC: 137 MMOL/L (ref 136–145)
WBC # BLD AUTO: 3.5 K/UL (ref 4.1–11.1)

## 2023-01-02 PROCEDURE — 85025 COMPLETE CBC W/AUTO DIFF WBC: CPT

## 2023-01-02 PROCEDURE — 80053 COMPREHEN METABOLIC PANEL: CPT

## 2023-01-02 PROCEDURE — 36415 COLL VENOUS BLD VENIPUNCTURE: CPT

## 2023-01-02 PROCEDURE — 99283 EMERGENCY DEPT VISIT LOW MDM: CPT

## 2023-01-02 NOTE — DISCHARGE INSTRUCTIONS
Call your dialysis center to find out where they will be able to perform your dialysis now that you have tested positive for COVID.     Return to the emergency department if you experience significant extremity swelling, difficulty breathing, chest pain, confusion, or any new symptoms that are concerning to you as this might indicate that you need dialysis here more emergently

## 2023-01-02 NOTE — ED NOTES
Pt stated he was sent to ed by Dialysis center because he tested positive for covid. This writer called Dialysis center for the patient because he was confused to why they send him here. Spoke with Sharifa and she stated that the pt was to go home and the Center would call the pt and let him know where he needed to go for his dialysis' tx. Pt verbalized understanding.

## 2023-01-03 NOTE — ED PROVIDER NOTES
EMERGENCY DEPARTMENT HISTORY AND PHYSICAL EXAM      Date: 1/2/2023  Patient Name: iNcho Ball    History of Presenting Illness     Chief Complaint   Patient presents with    Positive For Covid-19       History Provided By: Patient    HPI: Nicho Ball, 75 y.o. male with past medical history of ESRD presents after being told that he was COVID-positive at his dialysis center today. Patient does not know why they told him to come to the emergency department. He endorses mild cough for the last few days, no dyspnea, no  or GI symptoms. He is on dialysis 3 days a week. He denies any fluid overload symptoms with no leg swelling, no shortness of breath. There are no other complaints, changes, or physical findings at this time. Past History     Past Medical History:  Past Medical History:   Diagnosis Date    Chronic kidney disease     Hypertension        Past Surgical History:  Past Surgical History:   Procedure Laterality Date    IR FLUORO GUIDE PLC CVAD  1/21/2022    IR INSERT NON TUNL CVC OVER 5 YRS  1/8/2022    IR INSERT TUNL CVC W/O PORT OVER 5 YR  1/21/2022       Family History:  History reviewed. No pertinent family history. Social History:  Social History     Tobacco Use    Smoking status: Never    Smokeless tobacco: Never       Allergies:  No Known Allergies    PCP: Vanessa Davey MD    No current facility-administered medications on file prior to encounter. Current Outpatient Medications on File Prior to Encounter   Medication Sig Dispense Refill    amiodarone (CORDARONE) 200 mg tablet Take 1 Tablet by mouth daily. 60 Tablet 0    aspirin 81 mg chewable tablet Take 1 Tablet by mouth daily. 90 Tablet 0    ferrous sulfate 325 mg (65 mg iron) tablet Take 1 Tablet by mouth two (2) times daily (with meals). 60 Tablet 0    furosemide (LASIX) 40 mg tablet Take 1 and half tab daily twice a day 135 Tablet 0    amLODIPine (NORVASC) 5 mg tablet Take 1 Tablet by mouth daily.  30 Tablet 0 apixaban (ELIQUIS) 2.5 mg tablet Take 1 Tablet by mouth two (2) times a day. 60 Tablet 0    calcitRIOL (ROCALTROL) 0.25 mcg capsule Take 1 Capsule by mouth daily. 30 Capsule 0    metoprolol tartrate (LOPRESSOR) 25 mg tablet Take 1 Tablet by mouth daily. Indications: rapid ventricular heartbeat 30 Tablet 0    sevelamer carbonate (RENVELA) 800 mg tab tab Take 2 Tablets by mouth three (3) times daily (with meals). Indications: renal osteodystrophy with hyperphosphatemia 90 Tablet 0    tamsulosin (FLOMAX) 0.4 mg capsule Take 1 Capsule by mouth daily. 30 Capsule 0    sodium bicarbonate 650 mg tablet Take 1 Tab by mouth two (2) times a day. 60 Tab 0       Review of Systems   Review of Systems  No difficulty breathing, cough present  Physical Exam   Physical Exam  Lungs clear bilaterally with no rhonchi or rales. No dyspnea. No lower extremity edema  Lab and Diagnostic Study Results   Labs -   No results found for this or any previous visit (from the past 12 hour(s)). Radiologic Studies -   @lastxrresult@  CT Results  (Last 48 hours)      None          CXR Results  (Last 48 hours)      None            Medical Decision Making and ED Course   Differential Diagnosis & Medical Decision Making Provider Note:   70-year-old male presents for evaluation after being told to come to the emergency department because he tested positive for COVID. He is well-appearing with only mild cough. Do not suspect that he needs hospitalization for his COVID. He does not appear fluid overloaded on exam.  We will check basic labs, CBC and BMP to evaluate for hyperkalemia. We will reach out to his dialysis center    - I am the first provider for this patient. I reviewed the vital signs, available nursing notes, past medical history, past surgical history, family history and social history. The patients presenting problems have been discussed, and they are in agreement with the care plan formulated and outlined with them.   I have encouraged them to ask questions as they arise throughout their visit. Vital Signs-Reviewed the patient's vital signs. No data found. ED Course:   ED Course as of 01/03/23 1552   Mon Jan 02, 2023   1333 Spoke with charge nurse at patient's dialysis center. She reports that patient was mistakenly sent to the emergency department and agrees that he did not appear to need emergent dialysis. She will continue to work on finding a facility that can dialyze him with a COVID-positive cohort. Patient given return precautions for signs of fluid overload indicating more emergent need for dialysis [BQ]      ED Course User Index  [BQ] Susie Mills MD         Procedures   Performed by: Alfred Vazquez MD  Procedures      Disposition   Disposition: DC- Adult Discharges: All of the diagnostic tests were reviewed and questions answered. Diagnosis, care plan and treatment options were discussed. The patient understands the instructions and will follow up as directed. The patients results have been reviewed with them. They have been counseled regarding their diagnosis. The patient verbally convey understanding and agreement of the signs, symptoms, diagnosis, treatment and prognosis and additionally agrees to follow up as recommended with their PCP in 24 - 48 hours. They also agree with the care-plan and convey that all of their questions have been answered. I have also put together some discharge instructions for them that include: 1) educational information regarding their diagnosis, 2) how to care for their diagnosis at home, as well a 3) list of reasons why they would want to return to the ED prior to their follow-up appointment, should their condition change. DISCHARGE PLAN:  1. Cannot display discharge medications since this patient is not currently admitted.     2.   Follow-up Information       Follow up With Specialties Details Why [address]   Call to find out where they can do your dialysis [phone]          3. Return to ED if worse   4. Discharge Medication List as of 1/2/2023  1:57 PM          Diagnosis/Clinical Impression     Clinical Impression:   1. History of COVID-19    2. ESRD (end stage renal disease) (HCC)        Attestations: Garrett Espinosa MD, am the primary clinician of record. Please note that this dictation was completed with [email or web address], the computer voice recognition software. Quite often unanticipated grammatical, syntax, homophones, and other interpretive errors are inadvertently transcribed by the computer software. Please disregard these errors. Please excuse any errors that have escaped final proofreading. Thank you.

## 2023-03-14 ENCOUNTER — HOSPITAL ENCOUNTER (OUTPATIENT)
Dept: INTERVENTIONAL RADIOLOGY/VASCULAR | Age: 68
Discharge: HOME | End: 2023-03-14
Attending: SURGERY
Payer: MEDICARE

## 2023-03-14 VITALS
TEMPERATURE: 98.4 F | WEIGHT: 170 LBS | RESPIRATION RATE: 16 BRPM | SYSTOLIC BLOOD PRESSURE: 129 MMHG | BODY MASS INDEX: 23.03 KG/M2 | HEIGHT: 72 IN | DIASTOLIC BLOOD PRESSURE: 74 MMHG | HEART RATE: 67 BPM | OXYGEN SATURATION: 99 %

## 2023-03-14 DIAGNOSIS — T82.590A DIALYSIS AV FISTULA MALFUNCTION (HCC): ICD-10-CM

## 2023-03-14 LAB
ANION GAP SERPL CALC-SCNC: 4 MMOL/L (ref 5–15)
BASOPHILS # BLD: 0 K/UL (ref 0–0.1)
BASOPHILS NFR BLD: 1 % (ref 0–1)
BUN SERPL-MCNC: 18 MG/DL (ref 6–20)
BUN/CREAT SERPL: 3 (ref 12–20)
CA-I BLD-MCNC: 8.4 MG/DL (ref 8.5–10.1)
CHLORIDE SERPL-SCNC: 100 MMOL/L (ref 97–108)
CO2 SERPL-SCNC: 36 MMOL/L (ref 21–32)
CREAT SERPL-MCNC: 5.68 MG/DL (ref 0.7–1.3)
DIFFERENTIAL METHOD BLD: ABNORMAL
EOSINOPHIL # BLD: 0.1 K/UL (ref 0–0.4)
EOSINOPHIL NFR BLD: 4 % (ref 0–7)
ERYTHROCYTE [DISTWIDTH] IN BLOOD BY AUTOMATED COUNT: 13.7 % (ref 11.5–14.5)
GLUCOSE SERPL-MCNC: 93 MG/DL (ref 65–100)
HCT VFR BLD AUTO: 28.5 % (ref 36.6–50.3)
HGB BLD-MCNC: 9 G/DL (ref 12.1–17)
IMM GRANULOCYTES # BLD AUTO: 0 K/UL (ref 0–0.04)
IMM GRANULOCYTES NFR BLD AUTO: 0 % (ref 0–0.5)
INR PPP: 1.2 (ref 0.9–1.1)
LYMPHOCYTES # BLD: 1 K/UL (ref 0.8–3.5)
LYMPHOCYTES NFR BLD: 26 % (ref 12–49)
MCH RBC QN AUTO: 33.8 PG (ref 26–34)
MCHC RBC AUTO-ENTMCNC: 31.6 G/DL (ref 30–36.5)
MCV RBC AUTO: 107.1 FL (ref 80–99)
MONOCYTES # BLD: 0.5 K/UL (ref 0–1)
MONOCYTES NFR BLD: 12 % (ref 5–13)
NEUTS SEG # BLD: 2.2 K/UL (ref 1.8–8)
NEUTS SEG NFR BLD: 57 % (ref 32–75)
NRBC # BLD: 0 K/UL (ref 0–0.01)
NRBC BLD-RTO: 0 PER 100 WBC
PLATELET # BLD AUTO: 120 K/UL (ref 150–400)
PMV BLD AUTO: 9.5 FL (ref 8.9–12.9)
POTASSIUM SERPL-SCNC: 3.7 MMOL/L (ref 3.5–5.1)
PROTHROMBIN TIME: 14.9 SEC (ref 11.9–14.6)
RBC # BLD AUTO: 2.66 M/UL (ref 4.1–5.7)
SODIUM SERPL-SCNC: 140 MMOL/L (ref 136–145)
WBC # BLD AUTO: 3.8 K/UL (ref 4.1–11.1)

## 2023-03-14 PROCEDURE — 99152 MOD SED SAME PHYS/QHP 5/>YRS: CPT

## 2023-03-14 PROCEDURE — 36909 DIALYSIS CIRCUIT EMBOLJ: CPT

## 2023-03-14 PROCEDURE — 36902 INTRO CATH DIALYSIS CIRCUIT: CPT

## 2023-03-14 PROCEDURE — 80048 BASIC METABOLIC PNL TOTAL CA: CPT

## 2023-03-14 PROCEDURE — 74011250636 HC RX REV CODE- 250/636: Performed by: STUDENT IN AN ORGANIZED HEALTH CARE EDUCATION/TRAINING PROGRAM

## 2023-03-14 PROCEDURE — 85025 COMPLETE CBC W/AUTO DIFF WBC: CPT

## 2023-03-14 PROCEDURE — 74011000636 HC RX REV CODE- 636: Performed by: SURGERY

## 2023-03-14 PROCEDURE — 85610 PROTHROMBIN TIME: CPT

## 2023-03-14 PROCEDURE — 99153 MOD SED SAME PHYS/QHP EA: CPT

## 2023-03-14 PROCEDURE — 36415 COLL VENOUS BLD VENIPUNCTURE: CPT

## 2023-03-14 RX ORDER — FENTANYL CITRATE 50 UG/ML
25-100 INJECTION, SOLUTION INTRAMUSCULAR; INTRAVENOUS
Status: DISCONTINUED | OUTPATIENT
Start: 2023-03-14 | End: 2023-03-23 | Stop reason: HOSPADM

## 2023-03-14 RX ORDER — MIDAZOLAM HYDROCHLORIDE 1 MG/ML
.5-2 INJECTION, SOLUTION INTRAMUSCULAR; INTRAVENOUS
Status: DISCONTINUED | OUTPATIENT
Start: 2023-03-14 | End: 2023-03-23 | Stop reason: HOSPADM

## 2023-03-14 RX ADMIN — MIDAZOLAM HYDROCHLORIDE 1 MG: 1 INJECTION, SOLUTION INTRAMUSCULAR; INTRAVENOUS at 09:42

## 2023-03-14 RX ADMIN — MIDAZOLAM HYDROCHLORIDE 1 MG: 1 INJECTION, SOLUTION INTRAMUSCULAR; INTRAVENOUS at 09:53

## 2023-03-14 RX ADMIN — FENTANYL CITRATE 50 MCG: 0.05 INJECTION, SOLUTION INTRAMUSCULAR; INTRAVENOUS at 09:53

## 2023-03-14 RX ADMIN — FENTANYL CITRATE 50 MCG: 0.05 INJECTION, SOLUTION INTRAMUSCULAR; INTRAVENOUS at 09:42

## 2023-03-14 RX ADMIN — IOPAMIDOL 57 ML: 755 INJECTION, SOLUTION INTRAVENOUS at 12:16

## 2023-03-14 NOTE — H&P
INTERVENTIONAL RADIOLOGY  Preoperative History and Physical      Patient:  Zandra Fernandez  :  1955  Age:  75 y.o. MRN:  [id number]  Today's Date:  3/14/2023      CC / HPI   Zandra Fernandez is a 75 y.o. male with left AVF presents for fistulogram and possible intervention.      PAST MEDICAL HISTORY  Past Medical History:   Diagnosis Date    Afib ([facility] [address]     possible cardioversion in past    CAD (coronary artery disease)     per cardiologist note 2022 no evidence of CAD during cardiac cath    Chronic kidney disease     dialysis MWF    Heart failure ([facility] [address]     Hypertension     Poor historian     Thromboembolus ([facility] [address]        PAST SURGICAL HISTORY  Past Surgical History:   Procedure Laterality Date    HX HEART CATHETERIZATION      no intervention    HX PACEMAKER      Pt states does not have, asked him about getting one before, he declined    IR FLUORO GUIDE PLC CVAD  2022    IR INSERT NON TUNL CVC OVER 5 YRS  2022    IR INSERT TUNL CVC W/O PORT OVER 5 YR  2022    right chest permacath    CA UNLISTED PROCEDURE VASCULAR SURGERY      fistula creation       SOCIAL HISTORY  Social History     Socioeconomic History    Marital status: SINGLE     Spouse name: Not on file    Number of children: Not on file    Years of education: Not on file    Highest education level: Not on file   Occupational History    Not on file   Tobacco Use    Smoking status: Never    Smokeless tobacco: Never   Substance and Sexual Activity    Alcohol use: Never    Drug use: Never    Sexual activity: Not on file   Other Topics Concern    Not on file   Social History Narrative    Not on file     Social Determinants of Health     Financial Resource Strain: Not on file   Food Insecurity: Not on file   Transportation Needs: Not on file   Physical Activity: Not on file   Stress: Not on file   Social Connections: Not on file   Intimate Partner Violence: Not on file   Housing Stability: Not on file       FAMILY HISTORY  Family History   Problem Relation Age of Onset    Cancer Mother     Heart Disease Mother     Cancer Father        CURRENT MEDICATIONS  Current Outpatient Medications   Medication Sig Dispense Refill    amiodarone (CORDARONE) 200 mg tablet Take 1 Tablet by mouth daily. 60 Tablet 0    aspirin 81 mg chewable tablet Take 1 Tablet by mouth daily. 90 Tablet 0    ferrous sulfate 325 mg (65 mg iron) tablet Take 1 Tablet by mouth two (2) times daily (with meals). 60 Tablet 0    furosemide (LASIX) 40 mg tablet Take 1 and half tab daily twice a day 135 Tablet 0    amLODIPine (NORVASC) 5 mg tablet Take 1 Tablet by mouth daily. 30 Tablet 0    apixaban (ELIQUIS) 2.5 mg tablet Take 1 Tablet by mouth two (2) times a day. 60 Tablet 0    calcitRIOL (ROCALTROL) 0.25 mcg capsule Take 1 Capsule by mouth daily. 30 Capsule 0    metoprolol tartrate (LOPRESSOR) 25 mg tablet Take 1 Tablet by mouth daily. Indications: rapid ventricular heartbeat 30 Tablet 0    sevelamer carbonate (RENVELA) 800 mg tab tab Take 2 Tablets by mouth three (3) times daily (with meals). Indications: renal osteodystrophy with hyperphosphatemia 90 Tablet 0    tamsulosin (FLOMAX) 0.4 mg capsule Take 1 Capsule by mouth daily. 30 Capsule 0    sodium bicarbonate 650 mg tablet Take 1 Tab by mouth two (2) times a day.  60 Tab 0     Current Facility-Administered Medications   Medication Dose Route Frequency Provider Last Rate Last Admin    fentaNYL citrate (PF) injection  mcg   mcg IntraVENous Rad Ute Mckeon Mai, MD        midazolam (VERSED) injection 0.5-2 mg  0.5-2 mg IntraVENous Rad Ute Mckeon Mai, MD           ALLERGIES  No Known Allergies    DIAGNOSTIC STUDIES   IMAGING STUDIES  Relevant Imaging studies reviewed    LABS  Lab Results   Component Value Date/Time    WBC 3.8 (L) 03/14/2023 07:34 AM    HGB 9.0 (L) 03/14/2023 07:34 AM    HCT 28.5 (L) 03/14/2023 07:34 AM    PLATELET 536 (L) 82/16/4518 07:34 AM    .1 (H) 03/14/2023 07:34 AM     Lab Results   Component Value Date/Time    Sodium 140 03/14/2023 07:34 AM    Potassium 3.7 03/14/2023 07:34 AM    Chloride 100 03/14/2023 07:34 AM    CO2 36 (H) 03/14/2023 07:34 AM    Anion gap 4 (L) 03/14/2023 07:34 AM    Glucose 93 03/14/2023 07:34 AM    BUN 18 03/14/2023 07:34 AM    Creatinine 5.68 (H) 03/14/2023 07:34 AM    BUN/Creatinine ratio 3 (L) 03/14/2023 07:34 AM    GFR est AA 14 (L) 01/26/2022 07:28 AM    GFR est non-AA 11 (L) 01/26/2022 07:28 AM    Calcium 8.4 (L) 03/14/2023 07:34 AM     Lab Results   Component Value Date/Time    INR 1.2 (H) 03/14/2023 07:34 AM    Prothrombin time 14.9 (H) 03/14/2023 07:34 AM       PHYSICAL EXAM   BP (!) 143/82   Pulse 61   Temp 98.8 °F (37.1 °C)   Resp 18   Ht 6' (1.829 m)   Wt 77.1 kg (170 lb)   SpO2 98%   BMI 23.06 kg/m²   General:  NAD  Heart:  RRR  Lungs:  NWOB  Neurological:  AAOX3    PLAN   Procedure to be performed:  Left arm fistulogram   Plan for sedation:  Moderate if necessary  NPO status: Patient NPO   Mallampati: 2  ASA: 3  Post procedure plan:  observation per protocol  Informed consent:  risks, benefits, and alternatives reviewed with the patient / family who agree to proceed  Code status:  Prior      Lana Torres MD  [facility], [place].

## 2023-03-14 NOTE — ROUTINE PROCESS
Pt recovered on unit. Vitals remained stable and were stable after suture removal.AVS reviewed with pt and pt's nephew. Both verbalized understanding. IV removed and pt discharged safely.

## 2023-03-14 NOTE — PERIOP NOTES
Arelis Latif in IR made aware pt took Eliquis and aspirin 3/12/23 at 1900. States she will let her doctor know and call us back if any new orders.

## 2023-08-14 NOTE — PROGRESS NOTES
Progress Note      2/4/2022 12:19 PM  NAME: Neptali Bedolla   MRN:  [id number]   Admit Diagnosis: Renal failure [N19]  Hyperkalemia [E87.5]      Subjective:   Chart reviewed. Patient is on dialysis. Symptoms of shortness of breath has improved. With the patient and with the nurse. Cardiac catheterization findings discussed with the patient and also discussed with the electrophysiologist.  Vascular surgeon's note reviewed and patient refused to have peripheral angiogram.  Patient feels good. Review of Systems:    Symptom Y/N Comments  Symptom Y/N Comments   Fever/Chills n   Chest Pain n    Poor Appetite    Edema y  improving   Cough    Abdominal Pain     Sputum    Joint Pain     SOB/CARMONA y  improving  Pruritis/Rash     Nausea/vomit    Other     Diarrhea         Constipation           Could NOT obtain due to:      Objective:          Physical Exam:    Last 24hrs VS reviewed since prior progress note. Most recent are:    Visit Vitals  /72 (BP 1 Location: Right upper arm, BP Patient Position: At rest)   Pulse 67   Temp 98.4 °F (36.9 °C)   Resp 18   Ht 6' (1.829 m)   Wt 66.6 kg (146 lb 13.2 oz)   SpO2 90%   BMI 19.91 kg/m²       Intake/Output Summary (Last 24 hours) at 2/4/2022 1542  Last data filed at 2/4/2022 1115  Gross per 24 hour   Intake --   Output 3000 ml   Net -3000 ml        General Appearance: Well developed, well nourished, alert & oriented x 3,    no acute distress. Ears/Nose/Mouth/Throat: Hearing grossly normal.  Neck: Supple. Chest: Lungs clear to auscultation bilaterally. Cardiovascular: Regular rate and rhythm, S1,S2 normal, no murmur. Abdomen: Soft, non-tender, bowel sounds are active. Extremities: Patient has cellulitis of the lower extremities  Skin: Warm and dry. []         Post-cath site without hematoma, bruit, tenderness, or thrill. Distal pulses intact.     PMH/SH reviewed - no change compared to H&P    Data Review    Telemetry: normal sinus rhythm , patient has some PVCs, and episode of wide-complex tachycardia possible ventricular tachycardia. EKG:   Patient had EKG that is very concerning for ventricular tachycardia. Lab Data Personally Reviewed:    Recent Labs     02/04/22  0654 02/03/22  2137   WBC 6.5 8.6   HGB 8.5* 9.4*   HCT 29.3* 32.0*    352     No results for input(s): INR, PTP, APTT, INREXT, INREXT in the last 72 hours. No results for input(s): NA, K, CL, CO2, BUN, CREA, GLU, CA, MG in the last 72 hours. No results for input(s): CPK, CKNDX, TROIQ in the last 72 hours. No lab exists for component: CPKMB  No results found for: CHOL, CHOLX, CHLST, CHOLV, HDL, HDLP, LDL, LDLC, DLDLP, TGLX, TRIGL, TRIGP, CHHD, CHHDX    No results for input(s): AP, TBIL, TP, ALB, GLOB, GGT, AML, LPSE in the last 72 hours. No lab exists for component: SGOT, GPT, AMYP, HLPSE  No results for input(s): PH, PCO2, PO2 in the last 72 hours.     Medications Personally Reviewed:    Current Facility-Administered Medications   Medication Dose Route Frequency    ondansetron (ZOFRAN ODT) tablet 4 mg  4 mg Oral Q6H PRN    furosemide (LASIX) tablet 80 mg  80 mg Oral DAILY    heparin (porcine) 1,000 unit/mL injection 3,600 Units  3,600 Units Hemodialysis DIALYSIS PRN    amiodarone (CORDARONE) tablet 200 mg  200 mg Oral DAILY    epoetin valente-epbx (RETACRIT) injection 10,000 Units  10,000 Units IntraVENous DIALYSIS MON, WED & FRI    oxyCODONE IR (ROXICODONE) tablet 5 mg  5 mg Oral Q8H PRN    sodium chloride (NS) flush 5-40 mL  5-40 mL IntraVENous Q8H    sodium chloride (NS) flush 5-40 mL  5-40 mL IntraVENous PRN    ferrous sulfate tablet 325 mg  1 Tablet Oral BID WITH MEALS    heparin (porcine) 1,000 unit/mL injection 4,000 Units  4,000 Units IntraVENous PRN    Or    heparin (porcine) 1,000 unit/mL injection 2,000 Units  2,000 Units IntraVENous PRN    metoprolol succinate (TOPROL-XL) XL tablet 50 mg  50 mg Oral DAILY    nitroglycerin (NITROBID) 2 % ointment 0.5 Inch 0.5 Inch Topical Q6H    aspirin chewable tablet 81 mg  81 mg Oral DAILY           Problem List:   Patient has a acute on chronic renal failure and had a dialysis and is clinically better. Decompensated heart failure and ejection fraction is depressed and patient has a at least moderate possibly severe pulmonary hypertension. History of paroxysmal atrial fibrillation. Recent DVT of her left axillary vein. Wide-complex tachycardia probably ventricular tachycardia. Catheterization did not show evidence of coronary artery disease and ejection fraction was about 20 to 25%. Patient is getting restless and wants to leave. Cellulitis of the lower extremities. Patient refused peripheral arterial angiogram as advised by the vascular surgeon. 1.      Assessment/Plan:   I will continue dialysis as per recommendations of the nephrologist.  Patient is offered ICD by Dr. Camila Contreras however patient does not want it. Vascular surgery note reviewed and appreciated and will follow the recommendations. We will continue amiodarone and present care. Discontinue heparin and start on small dose of Eliquis because patient has a history of paroxysmal atrial fibrillation. Okay to transfer to skilled nursing facility when bed is available. We will follow infectious disease recommendations. Thank you. 1.          []       High complexity decision making was performed in this patient at high risk for decompensation with multiple organ involvement.     Britney Pina MD Cyclosporine Pregnancy And Lactation Text: This medication is Pregnancy Category C and it isn't know if it is safe during pregnancy. This medication is excreted in breast milk.

## (undated) DEVICE — ANGIOGRAPHIC CATHETER: Brand: EXPO™

## (undated) DEVICE — PINNACLE INTRODUCER SHEATH: Brand: PINNACLE

## (undated) DEVICE — GUIDEWIRE VASC L150CM DIA0.035IN TIP L3MM PTFE J CRV FIX

## (undated) DEVICE — SYRINGE MED 10ML RED POLYCARB BRL FIX M LUER CONN FLAT GRP

## (undated) DEVICE — CATHETER GUID 6FR L100CM GRN PTFE AL0.75 TRUELUMEN HYBRID

## (undated) DEVICE — SYRINGE ANGIO 20ML WHT POLYCARB VAC PRSS CAP PLUNG FIX M

## (undated) DEVICE — DEVICE INFL SYR BLLN ENDO 30 -- INTELLI

## (undated) DEVICE — BOWL UTIL 16OZ STRL --

## (undated) DEVICE — PRESSURE TUBING: Brand: TRUWAVE

## (undated) DEVICE — KIT COR DIAG CATH ANGIO CURVES 6FR JL 4.0 100CM JR 4.0

## (undated) DEVICE — Device: Brand: OMNIWIRE PRESSURE GUIDE WIRE

## (undated) DEVICE — SURGICAL PROCEDURE TRAY CRD CATH 3 PRT

## (undated) DEVICE — TOOL INSRT ANGI GDWIRE MTL SS --